# Patient Record
Sex: FEMALE | Race: WHITE | NOT HISPANIC OR LATINO | Employment: OTHER | URBAN - NONMETROPOLITAN AREA
[De-identification: names, ages, dates, MRNs, and addresses within clinical notes are randomized per-mention and may not be internally consistent; named-entity substitution may affect disease eponyms.]

---

## 2017-01-09 RX ORDER — ZOLPIDEM TARTRATE 5 MG/1
5 TABLET ORAL NIGHTLY PRN
Qty: 30 TABLET | Refills: 0 | Status: SHIPPED
Start: 2017-01-09

## 2017-01-10 RX ORDER — PANTOPRAZOLE SODIUM 40 MG/1
TABLET, DELAYED RELEASE ORAL
Qty: 90 TABLET | Refills: 2 | Status: SHIPPED | OUTPATIENT
Start: 2017-01-10 | End: 2017-06-19 | Stop reason: SDUPTHER

## 2017-04-10 RX ORDER — MONTELUKAST SODIUM 10 MG/1
TABLET ORAL
Qty: 90 TABLET | Refills: 3 | Status: SHIPPED | OUTPATIENT
Start: 2017-04-10

## 2017-04-27 ENCOUNTER — OFFICE VISIT (OUTPATIENT)
Dept: OTOLARYNGOLOGY | Facility: CLINIC | Age: 62
End: 2017-04-27

## 2017-04-27 VITALS — TEMPERATURE: 98.4 F | HEIGHT: 67 IN | BODY MASS INDEX: 34.06 KG/M2 | WEIGHT: 217 LBS

## 2017-04-27 DIAGNOSIS — J34.3 NASAL TURBINATE HYPERTROPHY: ICD-10-CM

## 2017-04-27 DIAGNOSIS — J32.8 OTHER CHRONIC SINUSITIS: Primary | ICD-10-CM

## 2017-04-27 PROCEDURE — 99203 OFFICE O/P NEW LOW 30 MIN: CPT | Performed by: OTOLARYNGOLOGY

## 2017-04-27 RX ORDER — FLUTICASONE PROPIONATE 50 MCG
2 SPRAY, SUSPENSION (ML) NASAL DAILY
COMMUNITY
End: 2017-04-27

## 2017-04-27 RX ORDER — SUMATRIPTAN 25 MG/1
25 TABLET, FILM COATED ORAL ONCE AS NEEDED
COMMUNITY
End: 2017-10-24 | Stop reason: SDUPTHER

## 2017-04-27 RX ORDER — FLUOXETINE HYDROCHLORIDE 20 MG/1
20 CAPSULE ORAL DAILY
COMMUNITY
End: 2017-08-01 | Stop reason: SDUPTHER

## 2017-04-27 RX ORDER — METOPROLOL TARTRATE 100 MG/1
100 TABLET ORAL 2 TIMES DAILY
COMMUNITY
End: 2017-08-01

## 2017-04-27 RX ORDER — CEFDINIR 300 MG/1
300 CAPSULE ORAL 2 TIMES DAILY
Qty: 42 CAPSULE | Refills: 0 | Status: SHIPPED | OUTPATIENT
Start: 2017-04-27 | End: 2017-05-26

## 2017-04-27 RX ORDER — MOMETASONE FUROATE 50 UG/1
2 SPRAY, METERED NASAL 2 TIMES DAILY
Qty: 17 G | Refills: 11 | Status: SHIPPED | OUTPATIENT
Start: 2017-04-27 | End: 2017-09-05 | Stop reason: HOSPADM

## 2017-04-27 NOTE — PROGRESS NOTES
Subjective   Mikki Joel is a 62 y.o. female.   CC:She complains of chronic sinusitis has long-standing standing history of ear problems  History of Present Illness   Patient had ear problems for many years to displace and then developed perforation she has to her hearing aid now.  She's noticed some eardrops burn.  Recently she's had sinus difficulty causing her to require ampicillin Z-Sina and more recently Augmentin symptoms only improved temporarily.  She's using Flonase as well.  She doesn't use irrigation the use some sort of saline mist his irrigation seems to make her worse she says.  This problem with his sinuses been particularly worrisome since November.  She's had no imaging.      The following portions of the patient's history were reviewed and updated as appropriate: allergies, current medications, past family history, past medical history, past social history, past surgical history and problem list.      Mikki Joel reports that she has never smoked. She does not have any smokeless tobacco history on file.  Patient is not a tobacco user and has not been counseled for use of tobacco products    No family history on file.      Current Outpatient Prescriptions:   •  BucAlfAspKGlucCouchParsUvaUrJu (WATER PILLS PO), Take  by mouth., Disp: , Rfl:   •  cefdinir (OMNICEF) 300 MG capsule, Take 1 capsule by mouth 2 (Two) Times a Day. Take with food and probiotics and call office and stop if develop watery diarrhea., Disp: 42 capsule, Rfl: 0  •  Cholecalciferol (VITAMIN D3) 5000 UNITS capsule capsule, Take 5,000 Units by mouth Daily., Disp: , Rfl:   •  FLUoxetine (PROzac) 20 MG capsule, Take 20 mg by mouth Daily., Disp: , Rfl:   •  IRON, FERROUS SULFATE, PO, Take  by mouth., Disp: , Rfl:   •  metoprolol tartrate (LOPRESSOR) 100 MG tablet, Take 100 mg by mouth 2 (Two) Times a Day., Disp: , Rfl:   •  mometasone (NASONEX) 50 MCG/ACT nasal spray, 2 sprays into each nostril 2 (Two) Times a Day., Disp: 17 g,  Rfl: 11  •  Multiple Vitamin (MULTI VITAMIN DAILY PO), Take  by mouth., Disp: , Rfl:   •  SUMAtriptan (IMITREX) 25 MG tablet, Take 25 mg by mouth 1 (One) Time As Needed for migraine., Disp: , Rfl:   •  Unable to find, 1 each 1 (One) Time. Unknown Sleeping pill, Disp: , Rfl:   •  Unable to find, 1 each 1 (One) Time. Unknown anxiety medication, Disp: , Rfl:       Past Medical History:   Diagnosis Date   • Arthritis    • Asthma    • High blood pressure          Review of Systems   HENT: Positive for congestion, hearing loss, postnasal drip, rhinorrhea, sore throat and voice change. Negative for ear discharge, ear pain, facial swelling and trouble swallowing.    Respiratory: Positive for choking.    Cardiovascular: Negative.    Musculoskeletal: Positive for back pain.   Allergic/Immunologic: Positive for environmental allergies.   Neurological: Positive for headaches.   Psychiatric/Behavioral:        Depression   All other systems reviewed and are negative.          Objective   Physical Exam   Constitutional: She is oriented to person, place, and time. She appears well-developed and well-nourished.   HENT:   Head: Normocephalic and atraumatic.   Right Ear: External ear and ear canal normal.   Left Ear: External ear and ear canal normal.   Ears:    Nose: Mucosal edema and rhinorrhea present. No nasal deformity or septal deviation. No epistaxis. Right sinus exhibits no maxillary sinus tenderness and no frontal sinus tenderness. Left sinus exhibits no maxillary sinus tenderness and no frontal sinus tenderness.   Mouth/Throat: Uvula is midline, oropharynx is clear and moist and mucous membranes are normal. No trismus in the jaw. Normal dentition. No oropharyngeal exudate or posterior oropharyngeal edema.       Eyes: Conjunctivae and EOM are normal. Pupils are equal, round, and reactive to light.   Neck: Normal range of motion and phonation normal. Neck supple. No JVD present. No tracheal deviation present. No thyroid mass  and no thyromegaly present.       Cardiovascular: Normal rate.    Pulmonary/Chest: Effort normal.   Lymphadenopathy:        Head (right side): No submental, no submandibular, no tonsillar, no preauricular, no posterior auricular and no occipital adenopathy present.        Head (left side): No submental, no submandibular, no tonsillar, no preauricular, no posterior auricular and no occipital adenopathy present.     She has no cervical adenopathy.        Right cervical: No superficial cervical, no deep cervical and no posterior cervical adenopathy present.       Left cervical: No superficial cervical, no deep cervical and no posterior cervical adenopathy present.   Neurological: She is alert and oriented to person, place, and time. No cranial nerve deficit.   Skin: Skin is warm.   Psychiatric: She has a normal mood and affect. Her speech is normal and behavior is normal. Thought content normal.   Nursing note and vitals reviewed.            Assessment/Plan   Mikki was seen today for sinus problem.    Diagnoses and all orders for this visit:    Other chronic sinusitis  -     CT Sinus Without Contrast; Future    Nasal turbinate hypertrophy  -     CT Sinus Without Contrast; Future    Other orders  -     mometasone (NASONEX) 50 MCG/ACT nasal spray; 2 sprays into each nostril 2 (Two) Times a Day.  -     cefdinir (OMNICEF) 300 MG capsule; Take 1 capsule by mouth 2 (Two) Times a Day. Take with food and probiotics and call office and stop if develop watery diarrhea.     plan a 3 week course of antibiotics and nasal neck stop Flonase and get CT scan of follow-up after were she's call for problems or questions discussed use and side effects medications in use of probiotics explained proper use a nasal spray

## 2017-05-25 ENCOUNTER — HOSPITAL ENCOUNTER (OUTPATIENT)
Dept: CT IMAGING | Facility: HOSPITAL | Age: 62
Discharge: HOME | End: 2017-05-25
Admitting: OTOLARYNGOLOGY

## 2017-05-25 DIAGNOSIS — J32.8 OTHER CHRONIC SINUSITIS: ICD-10-CM

## 2017-05-25 DIAGNOSIS — J34.3 NASAL TURBINATE HYPERTROPHY: ICD-10-CM

## 2017-05-25 PROCEDURE — 70486 CT MAXILLOFACIAL W/O DYE: CPT

## 2017-05-25 RX ORDER — TRIAMTERENE AND HYDROCHLOROTHIAZIDE 37.5; 25 MG/1; MG/1
TABLET ORAL
Qty: 90 TABLET | Refills: 3 | Status: SHIPPED | OUTPATIENT
Start: 2017-05-25

## 2017-05-26 RX ORDER — AMOXICILLIN AND CLAVULANATE POTASSIUM 875; 125 MG/1; MG/1
1 TABLET, FILM COATED ORAL EVERY 12 HOURS
Qty: 28 TABLET | Refills: 0 | Status: SHIPPED | OUTPATIENT
Start: 2017-05-26 | End: 2017-07-20

## 2017-05-31 ENCOUNTER — OFFICE VISIT (OUTPATIENT)
Dept: OTOLARYNGOLOGY | Facility: CLINIC | Age: 62
End: 2017-05-31

## 2017-05-31 VITALS — TEMPERATURE: 99.4 F | BODY MASS INDEX: 34.06 KG/M2 | WEIGHT: 217 LBS | HEIGHT: 67 IN

## 2017-05-31 DIAGNOSIS — J34.89 NASAL VALVE STENOSIS: ICD-10-CM

## 2017-05-31 DIAGNOSIS — J34.89 NASAL OBSTRUCTION: ICD-10-CM

## 2017-05-31 DIAGNOSIS — J32.8 OTHER CHRONIC SINUSITIS: Primary | ICD-10-CM

## 2017-05-31 DIAGNOSIS — J34.2 NASAL SEPTAL DEFORMITY: ICD-10-CM

## 2017-05-31 DIAGNOSIS — J32.2 CHRONIC ETHMOIDAL SINUSITIS: Primary | ICD-10-CM

## 2017-05-31 DIAGNOSIS — J32.0 SINUSITIS, MAXILLARY, CHRONIC: ICD-10-CM

## 2017-05-31 DIAGNOSIS — J32.8 OTHER CHRONIC SINUSITIS: ICD-10-CM

## 2017-05-31 PROCEDURE — 31231 NASAL ENDOSCOPY DX: CPT | Performed by: OTOLARYNGOLOGY

## 2017-05-31 PROCEDURE — 87077 CULTURE AEROBIC IDENTIFY: CPT | Performed by: OTOLARYNGOLOGY

## 2017-05-31 PROCEDURE — 87186 SC STD MICRODIL/AGAR DIL: CPT | Performed by: OTOLARYNGOLOGY

## 2017-05-31 PROCEDURE — 87070 CULTURE OTHR SPECIMN AEROBIC: CPT | Performed by: OTOLARYNGOLOGY

## 2017-05-31 PROCEDURE — 99213 OFFICE O/P EST LOW 20 MIN: CPT | Performed by: OTOLARYNGOLOGY

## 2017-05-31 PROCEDURE — 87205 SMEAR GRAM STAIN: CPT | Performed by: OTOLARYNGOLOGY

## 2017-05-31 RX ORDER — OXYMETAZOLINE HYDROCHLORIDE 0.05 G/100ML
2 SPRAY NASAL 2 TIMES DAILY
COMMUNITY
End: 2017-08-10

## 2017-06-02 ENCOUNTER — TELEPHONE (OUTPATIENT)
Dept: OTOLARYNGOLOGY | Facility: CLINIC | Age: 62
End: 2017-06-02

## 2017-06-02 LAB
BACTERIA SPEC AEROBE CULT: ABNORMAL
BETA LACTAMASE: ABNORMAL
GRAM STN SPEC: ABNORMAL
GRAM STN SPEC: ABNORMAL

## 2017-06-02 RX ORDER — CIPROFLOXACIN 500 MG/1
500 TABLET, FILM COATED ORAL 2 TIMES DAILY
Qty: 28 TABLET | Refills: 1 | Status: SHIPPED | OUTPATIENT
Start: 2017-06-02 | End: 2017-07-20

## 2017-06-02 NOTE — TELEPHONE ENCOUNTER
Left message re culture and sensitivities -explained side effects , joint problem and tendon problems and what to look for.  Pt to call back if questions . F/u previously arranged.  PRISCILA Gaston MD

## 2017-06-06 ENCOUNTER — TELEPHONE (OUTPATIENT)
Dept: OTOLARYNGOLOGY | Facility: CLINIC | Age: 62
End: 2017-06-06

## 2017-06-06 NOTE — TELEPHONE ENCOUNTER
I contacted the patient to make sure that she had received Dr. Gaston' message about the new antibiotic that he had called in for her. She said that she was aware and that she had started the new antibiotic and that she is not having any problems from it.    I told her that if she has any other questions or concerns to give us a call back and that otherwise we would see her at her follow up appointment at the end of next month.

## 2017-06-19 RX ORDER — PANTOPRAZOLE SODIUM 40 MG/1
TABLET, DELAYED RELEASE ORAL
Qty: 90 TABLET | Refills: 1 | Status: SHIPPED | OUTPATIENT
Start: 2017-06-19 | End: 2017-09-10 | Stop reason: SDUPTHER

## 2017-07-17 ENCOUNTER — TELEPHONE (OUTPATIENT)
Dept: FAMILY MEDICINE | Age: 62
End: 2017-07-17

## 2017-07-20 ENCOUNTER — OFFICE VISIT (OUTPATIENT)
Dept: OTOLARYNGOLOGY | Facility: CLINIC | Age: 62
End: 2017-07-20

## 2017-07-20 VITALS — TEMPERATURE: 97 F | BODY MASS INDEX: 34.06 KG/M2 | WEIGHT: 217 LBS | HEIGHT: 67 IN

## 2017-07-20 DIAGNOSIS — J32.2 CHRONIC ETHMOIDAL SINUSITIS: Primary | ICD-10-CM

## 2017-07-20 DIAGNOSIS — J32.0 CHRONIC SINUSITIS OF BOTH MAXILLARY SINUSES: ICD-10-CM

## 2017-07-20 PROCEDURE — 99213 OFFICE O/P EST LOW 20 MIN: CPT | Performed by: OTOLARYNGOLOGY

## 2017-07-20 RX ORDER — LEVOFLOXACIN 500 MG/1
500 TABLET, FILM COATED ORAL DAILY
Qty: 21 TABLET | Refills: 0 | Status: SHIPPED | OUTPATIENT
Start: 2017-07-20 | End: 2017-08-10

## 2017-07-20 NOTE — PROGRESS NOTES
Subjective   Mikki Joel is a 62 y.o. female.   CC: chronic sinusitis    History of Present Illness   Comes back in follow-up stating that when she is on antibiotic she is doing great there are symptoms worsen or she is having more pain and pressure and thick drainage and shows recent large crust coming other nose.  We previously reviewed her CT scan and her culture she been placed on Cipro which showed sensitivity to the Serratia marcescens grew her culture.  Allergic to sulfa drugs and so only quinolones with only oral option to treat this.  I have any neurologic or visual complaints has discomfort around the eye.  She has no unusual bleeding but has drainage out of her nose .  As stated the week or 2 after the antibiotics and finish her symptoms started coming back with a large crust forming and discomfort      The following portions of the patient's history were reviewed and updated as appropriate: allergies, current medications, past family history, past medical history, past social history, past surgical history and problem list.      Review of Systems   Constitutional: Positive for fever.   HENT: Positive for postnasal drip, rhinorrhea and sinus pressure. Negative for facial swelling and nosebleeds.    Neurological: Positive for headaches.           Objective   Physical Exam   Constitutional: She is oriented to person, place, and time. She appears well-developed and well-nourished.   HENT:   Head: Normocephalic and atraumatic.   Right Ear: Hearing, tympanic membrane, external ear and ear canal normal.   Left Ear: Hearing, tympanic membrane, external ear and ear canal normal.   Ears:    Nose: Nose normal. No mucosal edema, rhinorrhea, nasal deformity or septal deviation. No epistaxis. Right sinus exhibits no maxillary sinus tenderness and no frontal sinus tenderness. Left sinus exhibits no maxillary sinus tenderness and no frontal sinus tenderness.       Mouth/Throat: Uvula is midline, oropharynx is clear  and moist and mucous membranes are normal. No trismus in the jaw. Normal dentition. No oropharyngeal exudate or posterior oropharyngeal edema.       Eyes: Conjunctivae and EOM are normal. Pupils are equal, round, and reactive to light.   Neck: Normal range of motion and phonation normal. Neck supple. No JVD present. No tracheal deviation present. No thyroid mass and no thyromegaly present.       Cardiovascular: Normal rate and regular rhythm.    Pulmonary/Chest: Effort normal and breath sounds normal.   Musculoskeletal: Normal range of motion.   Lymphadenopathy:        Head (right side): No submental, no submandibular, no tonsillar, no preauricular, no posterior auricular and no occipital adenopathy present.        Head (left side): No submental, no submandibular, no tonsillar, no preauricular, no posterior auricular and no occipital adenopathy present.     She has no cervical adenopathy.        Right cervical: No superficial cervical, no deep cervical and no posterior cervical adenopathy present.       Left cervical: No superficial cervical, no deep cervical and no posterior cervical adenopathy present.   Neurological: She is alert and oriented to person, place, and time. No cranial nerve deficit.   Skin: Skin is warm.   Psychiatric: She has a normal mood and affect. Her speech is normal and behavior is normal. Thought content normal.   Nursing note and vitals reviewed.      Information: Sinus, maxillary left; Wound        Culture   Light growth (2+) Serratia marcescens (A)      BETA LACTAMASE           Corrected gram stain.   Stain   Few (2+) WBCs seen      Rare (1+) Gram negative bacilli            Resulting Agency: [facility]   Susceptibility    Serratia marcescens     CHAR     Amikacin <=16 ug/ml Susceptible     Amoxicillin + Clavulanate >16/8 ug/ml Resistant     Ampicillin >16 ug/ml Resistant     Ampicillin + Sulbactam >16/8 ug/ml Resistant     Cefazolin >16 ug/ml Resistant     Cefotaxime <=2 ug/ml      Cefoxitin 16 ug/ml Intermediate     Ceftazidime <=1 ug/ml     Ceftriaxone <=8 ug/ml     Cefuroxime >16 ug/ml Resistant     Ciprofloxacin <=1 ug/ml Susceptible     Gentamicin <=4 ug/ml Susceptible     Imipenem <=4 ug/ml Susceptible     Levofloxacin <=2 ug/ml Susceptible     Meropenem <=4 ug/ml Susceptible     Nitrofurantoin >64 ug/ml     Piperacillin + Tazobactam <=16 ug/ml     Tetracycline 8 ug/ml Intermediate     Tobramycin <=4 ug/ml Susceptible     Trimethoprim + Sulfamethoxazole <=2/38 ug/ml Susceptible              Specimen Collected: 05/31/17  4:51 PM Last Resulted: 06/02/17  8:19 AM                           Appointment Information   PACS Images   Radiology Images   Study Result   DATE OF EXAM: 5/25/2017 12:20 PM CDT     PROCEDURE: CT SINUSES WITHOUT IV CONTRAST     INDICATION FOR PROCEDURE: 62 years old patient presents for  evaluation of chronic sinusitis.  ICD 10 codes:  J32.8 and J34.3.     TECHNIQUE:  Contiguous axial images were obtained of the  paranasal sinuses. Multiplanar reformations are submitted for  interpretation. Dose length product is 580.1 This exam was  performed according to our departmental dose-optimization  program, which includes automated exposure control, adjustment of  the mA and/or kV according to patient size and/or use of  iterative reconstruction technique.     COMPARISON:  None.     FINDINGS:  Patient has had operative resection of the ostiomeatal  complexes. There has been surgical resection of bilateral middle  turbinates.     The bones appear osteopenic. There is hyperostosis frontalis  interna. The mandible has a normal appearance. The walls of  frontal sinuses, walls of maxillary sinuses and pterygoid plates  have a normal appearance. The bony orbits and zygomatic arches  have a grossly normal appearance. Extraocular muscles, optic  nerves and globes have a normal appearance.     The visualized parotid glands and submandibular glands have a  normal appearance. The  nasopharynx and oropharynx have a grossly  normal appearance.     There is moderate mucoperiosteal thickening in both maxillary  sinuses. There are several opacified ethmoid sinuses. Frontal  sinuses appear to be clear. Sphenoid sinuses appear to be clear.  There is partial opacification of several mastoid air cells.     There is patchy atherosclerotic calcification of the intracranial  arteries.     IMPRESSION:     1.  Extensive postoperative changes of the paranasal sinuses with  moderate residual mucoperiosteal thickening in the maxillary  sinuses. This could be the result of chronic inflammation though  neoplastic etiologies cannot be excluded.  2.  Ethmoid sinus disease.     Electronically signed by:  Marylin Sampson MD  5/25/2017 1:06 PM CDT  Workstation: [employer]   Imaging   CT Sinus Without Contrast (Order #[id number]) on 5/25/2017 - Imaging Information   Signed by   Signed Date/Time    Phone Pager   MARYLIN SAMPSON 5/25/2017 13:06 [phone]    Exam Information       Assessment/Plan   Mikki was seen today for follow-up.    Diagnoses and all orders for this visit:    Chronic ethmoidal sinusitis    Chronic sinusitis of both maxillary sinuses    Other orders  -     levoFLOXacin (LEVAQUIN) 500 MG tablet; Take 1 tablet by mouth Daily.      A long discussion about her culture in the difficulty treating problem she's allergic to sulfa which is only other non-quinolone break she wants to try Levaquin we discussed its use and side effects.  We discussed specifically permanent joint and tendon problems.  She's willing to go through with this she wants to try and avoid surgery possible and also her  is quite ill which is made surgery not a possibility at this time.  She is to call for questions or demonstrated shattered side effects to look for she is to call for improving within the next week otherwise will see her after 3 weeks of treatment 2 weeks following that.  Questions were answered she is agreement  with this approach

## 2017-07-28 ENCOUNTER — TELEPHONE (OUTPATIENT)
Dept: OTOLARYNGOLOGY | Facility: CLINIC | Age: 62
End: 2017-07-28

## 2017-07-28 RX ORDER — TRAMADOL HYDROCHLORIDE 50 MG/1
50 TABLET ORAL EVERY 6 HOURS PRN
Qty: 15 TABLET | Refills: 0 | Status: SHIPPED | OUTPATIENT
Start: 2017-07-28 | End: 2017-10-24

## 2017-07-28 NOTE — TELEPHONE ENCOUNTER
Patient called stating she has had a sinus headache for a week. She states that her tylenol and ibuprofen is not helping and she would like to know if there is something else she can take or that the DR could prescribe.     After speaking with  he has reviewed the patients chart and will be writing a RX for Tramadol 1 tab by mouth every 6 hours as needed for moderate pain.     The patient understood she would have to come to our office to physically pick this RX up and is to call our office if not feeling better the beginning of next week.

## 2017-08-01 ENCOUNTER — OFFICE VISIT (OUTPATIENT)
Dept: FAMILY MEDICINE CLINIC | Facility: CLINIC | Age: 62
End: 2017-08-01

## 2017-08-01 ENCOUNTER — APPOINTMENT (OUTPATIENT)
Dept: LAB | Facility: HOSPITAL | Age: 62
End: 2017-08-01

## 2017-08-01 VITALS
HEART RATE: 77 BPM | OXYGEN SATURATION: 98 % | BODY MASS INDEX: 32.87 KG/M2 | HEIGHT: 67 IN | SYSTOLIC BLOOD PRESSURE: 118 MMHG | DIASTOLIC BLOOD PRESSURE: 68 MMHG | WEIGHT: 209.4 LBS

## 2017-08-01 DIAGNOSIS — Z12.31 ENCOUNTER FOR SCREENING MAMMOGRAM FOR BREAST CANCER: ICD-10-CM

## 2017-08-01 DIAGNOSIS — M79.672 FOOT PAIN, LEFT: ICD-10-CM

## 2017-08-01 DIAGNOSIS — I10 ESSENTIAL HYPERTENSION: Primary | ICD-10-CM

## 2017-08-01 DIAGNOSIS — F32.A DEPRESSION, UNSPECIFIED DEPRESSION TYPE: ICD-10-CM

## 2017-08-01 DIAGNOSIS — G25.81 RESTLESS LEG SYNDROME: ICD-10-CM

## 2017-08-01 DIAGNOSIS — Z11.59 NEED FOR HEPATITIS C SCREENING TEST: ICD-10-CM

## 2017-08-01 DIAGNOSIS — Z13.1 SCREENING FOR DIABETES MELLITUS: ICD-10-CM

## 2017-08-01 LAB — HBA1C MFR BLD: 5.7 % (ref 4–5.6)

## 2017-08-01 PROCEDURE — 99204 OFFICE O/P NEW MOD 45 MIN: CPT | Performed by: FAMILY MEDICINE

## 2017-08-01 PROCEDURE — 83036 HEMOGLOBIN GLYCOSYLATED A1C: CPT | Performed by: FAMILY MEDICINE

## 2017-08-01 PROCEDURE — 36415 COLL VENOUS BLD VENIPUNCTURE: CPT | Performed by: FAMILY MEDICINE

## 2017-08-01 PROCEDURE — 86803 HEPATITIS C AB TEST: CPT | Performed by: FAMILY MEDICINE

## 2017-08-01 RX ORDER — TRIAMTERENE AND HYDROCHLOROTHIAZIDE 37.5; 25 MG/1; MG/1
1 CAPSULE ORAL EVERY MORNING
Qty: 30 CAPSULE | Refills: 11
Start: 2017-08-01 | End: 2018-04-03

## 2017-08-01 RX ORDER — FLUOXETINE HYDROCHLORIDE 40 MG/1
40 CAPSULE ORAL DAILY
Qty: 30 CAPSULE | Refills: 11 | Status: SHIPPED | OUTPATIENT
Start: 2017-08-01 | End: 2018-08-15 | Stop reason: SDUPTHER

## 2017-08-01 RX ORDER — METOPROLOL SUCCINATE 100 MG/1
100 TABLET, EXTENDED RELEASE ORAL DAILY
Qty: 30 TABLET | Refills: 11 | Status: SHIPPED | OUTPATIENT
Start: 2017-08-01 | End: 2018-08-27 | Stop reason: SDUPTHER

## 2017-08-01 RX ORDER — ROPINIROLE 0.25 MG/1
0.25 TABLET, FILM COATED ORAL NIGHTLY
Qty: 30 TABLET | Refills: 11 | Status: SHIPPED | OUTPATIENT
Start: 2017-08-01 | End: 2017-08-18 | Stop reason: SDUPTHER
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
## 2017-08-02 LAB — HCV AB SER DONR QL: NEGATIVE

## 2017-08-07 DIAGNOSIS — M19.90 ARTHRITIS: Primary | ICD-10-CM

## 2017-08-08 ENCOUNTER — TELEPHONE (OUTPATIENT)
Dept: FAMILY MEDICINE CLINIC | Facility: CLINIC | Age: 62
End: 2017-08-08

## 2017-08-08 NOTE — TELEPHONE ENCOUNTER
I have talked to the patient already this morning and relayed her results and recommendations to her.

## 2017-08-08 NOTE — TELEPHONE ENCOUNTER
Pr Dr. Corrigan, Ms. Joel has been called with her recent lab results & recommendations.  Continue her current medications and follow-up as planned or sooner if any problems.  The family member misunderstood when I called yesterday and thought I was wanting to speak to his daughter.    ----- Message from Katheryn Corrigan DO sent at 8/7/2017  1:32 PM CDT -----  She is prediabetic. Should limit sugar and carbs in diet. Hep c screen negative.

## 2017-08-08 NOTE — TELEPHONE ENCOUNTER
Memo Corrigan, Ms. Joel has been called with her recent lab results & recommendations.  Continue her current medications and follow-up as planned or sooner if any problems.  The family member misunderstood when I called yesterday and thought I was wanting to speak to his daughter.    She states she has had Bunion Surgery Bilaterally     ----- Message from Katheryn Corrigan DO sent at 8/7/2017  1:32 PM CDT -----  No fractures on x ray. She has erosive changes of the bones in her toes. This can sometimes be a sign of inflammatory arthritis. It can also be due to osteoarthritis. I recommend that she have testing for rheumatologic disorders through bloodwork. I have ordered these tests and she can come in and have them done at her convenience. X ray also showed deformity of first toe that is usually seen after part of the toe is removed surgically. Please ask her if she has had surgery done on this toe.

## 2017-08-08 NOTE — PROGRESS NOTES
Pr Dr. Corrigan, Ms. Joel has been called with her recent lab results & recommendations.  Continue her current medications and follow-up as planned or sooner if any problems.  The family member misunderstood when I called yesterday and thought I was wanting to speak to his daughter.

## 2017-08-10 ENCOUNTER — APPOINTMENT (OUTPATIENT)
Dept: LAB | Facility: HOSPITAL | Age: 62
End: 2017-08-10

## 2017-08-10 ENCOUNTER — CLINICAL SUPPORT (OUTPATIENT)
Dept: AUDIOLOGY | Facility: CLINIC | Age: 62
End: 2017-08-10

## 2017-08-10 ENCOUNTER — OFFICE VISIT (OUTPATIENT)
Dept: OTOLARYNGOLOGY | Facility: CLINIC | Age: 62
End: 2017-08-10

## 2017-08-10 VITALS — TEMPERATURE: 99.1 F | HEIGHT: 67 IN | WEIGHT: 209 LBS | BODY MASS INDEX: 32.8 KG/M2

## 2017-08-10 DIAGNOSIS — J32.2 CHRONIC ETHMOIDAL SINUSITIS: Primary | ICD-10-CM

## 2017-08-10 DIAGNOSIS — J32.0 SINUSITIS, MAXILLARY, CHRONIC: ICD-10-CM

## 2017-08-10 DIAGNOSIS — Z46.1 ENCOUNTER FOR FITTING OR ADJUSTMENT OF HEARING AID: Primary | ICD-10-CM

## 2017-08-10 LAB
CRP SERPL-MCNC: 1 MG/DL (ref 0–1)
ERYTHROCYTE [SEDIMENTATION RATE] IN BLOOD: 9 MM/HR (ref 0–20)

## 2017-08-10 PROCEDURE — HEARINGNOCHG: Performed by: AUDIOLOGIST

## 2017-08-10 PROCEDURE — 85651 RBC SED RATE NONAUTOMATED: CPT | Performed by: FAMILY MEDICINE

## 2017-08-10 PROCEDURE — 86140 C-REACTIVE PROTEIN: CPT | Performed by: FAMILY MEDICINE

## 2017-08-10 PROCEDURE — 86431 RHEUMATOID FACTOR QUANT: CPT | Performed by: FAMILY MEDICINE

## 2017-08-10 PROCEDURE — 36415 COLL VENOUS BLD VENIPUNCTURE: CPT | Performed by: FAMILY MEDICINE

## 2017-08-10 PROCEDURE — 31231 NASAL ENDOSCOPY DX: CPT | Performed by: OTOLARYNGOLOGY

## 2017-08-10 PROCEDURE — 86038 ANTINUCLEAR ANTIBODIES: CPT | Performed by: FAMILY MEDICINE

## 2017-08-10 PROCEDURE — 99213 OFFICE O/P EST LOW 20 MIN: CPT | Performed by: OTOLARYNGOLOGY

## 2017-08-10 RX ORDER — NAPROXEN SODIUM 275 MG/1
500 TABLET ORAL 2 TIMES DAILY WITH MEALS
Qty: 28 TABLET | Refills: 0 | Status: SHIPPED | OUTPATIENT
Start: 2017-08-10 | End: 2017-08-18 | Stop reason: SDUPTHER

## 2017-08-10 NOTE — PROGRESS NOTES
Subjective   Mikki Joel is a 62 y.o. female.     CC:f/u sinusitis  History of Present Illness   Patient comes back in follow-up with a history of chronic x-ray and ethmoid sinusitis with culture documented therapy done she says the drainage is improved congestion improved thick mucus improved but she's noticed some facial pain and she just finished her antibiotics the pain is located primarily over the right side of her face worse in the morning and then worse at night is better in the middle of the day she's not been febrile she does have history of migraines      The following portions of the patient's history were reviewed and updated as appropriate: allergies, current medications, past family history, past medical history, past social history, past surgical history and problem list.      Current Outpatient Prescriptions:   •  BucAlfAspKGlucCouchParsUvaUrJu (WATER PILLS PO), Take  by mouth., Disp: , Rfl:   •  Cholecalciferol (VITAMIN D3) 5000 UNITS capsule capsule, Take 5,000 Units by mouth Daily., Disp: , Rfl:   •  FLUoxetine (PROzac) 40 MG capsule, Take 1 capsule by mouth Daily., Disp: 30 capsule, Rfl: 11  •  IRON, FERROUS SULFATE, PO, Take  by mouth., Disp: , Rfl:   •  metoprolol succinate XL (TOPROL XL) 100 MG 24 hr tablet, Take 1 tablet by mouth Daily., Disp: 30 tablet, Rfl: 11  •  mometasone (NASONEX) 50 MCG/ACT nasal spray, 2 sprays into each nostril 2 (Two) Times a Day., Disp: 17 g, Rfl: 11  •  Multiple Vitamin (MULTI VITAMIN DAILY PO), Take  by mouth., Disp: , Rfl:   •  rOPINIRole (REQUIP) 0.25 MG tablet, Take 1 tablet by mouth Every Night. Take 1 hour before bedtime., Disp: 30 tablet, Rfl: 11  •  SUMAtriptan (IMITREX) 25 MG tablet, Take 25 mg by mouth 1 (One) Time As Needed for migraine., Disp: , Rfl:   •  traMADol (ULTRAM) 50 MG tablet, Take 1 tablet by mouth Every 6 (Six) Hours As Needed for Moderate Pain (4-6)., Disp: 15 tablet, Rfl: 0  •  triamterene-hydrochlorothiazide (DYAZIDE) 37.5-25 MG per  capsule, Take 1 capsule by mouth Every Morning., Disp: 30 capsule, Rfl: 11  •  Unable to find, 1 each 1 (One) Time. Unknown Sleeping pill, Disp: , Rfl:   •  Unable to find, 1 each 1 (One) Time. Unknown anxiety medication, Disp: , Rfl:   •  naproxen sodium (ANAPROX) 275 MG tablet, Take 2 tablets by mouth 2 (Two) Times a Day With Meals., Disp: 28 tablet, Rfl: 0    Allergies   Allergen Reactions   • Sulfa Antibiotics Hives       Review of Systems   Constitutional: Negative for fever.   HENT: Positive for postnasal drip and rhinorrhea. Negative for facial swelling and nosebleeds.    Hematological: Negative.            Objective   Physical Exam   Constitutional: She is oriented to person, place, and time. She appears well-developed and well-nourished.   HENT:   Head: Normocephalic and atraumatic.   Right Ear: Hearing, tympanic membrane, external ear and ear canal normal.   Left Ear: Hearing, tympanic membrane, external ear and ear canal normal.   Nose: Nose normal. No mucosal edema, rhinorrhea, nasal deformity or septal deviation. No epistaxis. Right sinus exhibits no maxillary sinus tenderness and no frontal sinus tenderness. Left sinus exhibits no maxillary sinus tenderness and no frontal sinus tenderness.   Mouth/Throat: Uvula is midline and oropharynx is clear and moist. No trismus in the jaw. Normal dentition. No oropharyngeal exudate or posterior oropharyngeal edema.   Eyes: Conjunctivae and EOM are normal. Pupils are equal, round, and reactive to light.   Neck: Normal range of motion. Neck supple. No JVD present. No tracheal deviation present. No thyromegaly present.   Cardiovascular: Normal rate.    Pulmonary/Chest: Effort normal.   Musculoskeletal: Normal range of motion.   Lymphadenopathy:        Head (right side): No submental, no submandibular, no tonsillar, no preauricular, no posterior auricular and no occipital adenopathy present.        Head (left side): No submental, no submandibular, no tonsillar, no  preauricular, no posterior auricular and no occipital adenopathy present.     She has no cervical adenopathy.        Right cervical: No superficial cervical, no deep cervical and no posterior cervical adenopathy present.       Left cervical: No superficial cervical, no deep cervical and no posterior cervical adenopathy present.   Neurological: She is alert and oriented to person, place, and time. No cranial nerve deficit.   Skin: Skin is warm.   Psychiatric: She has a normal mood and affect. Her speech is normal and behavior is normal. Thought content normal.   Nursing note and vitals reviewed.    Could not see any purulence and nose spider having the pain.  For that reason nasal endoscopy was carried out or seizure note nasal endoscopy indication chronic maxillary ethmoid sinusitis past history of positive cultures  With persistent facial pain diagnosis same procedure patient had her nose decongested local anesthetic applied an endoscope was applied left side nose is suctioned debris was noted was made minimal debris was noted much better than she had before there is no liquid secretions no evidence of epistaxis no polyps patient tolerated procedure well without endoscopic occasional        Assessment/Plan   Mikki was seen today for follow-up.    Diagnoses and all orders for this visit:    Chronic ethmoidal sinusitis    Sinusitis, maxillary, chronic    Other orders  -     naproxen sodium (ANAPROX) 275 MG tablet; Take 2 tablets by mouth 2 (Two) Times a Day With Meals.         Patient will begin an anti-inflammatory medicines helped her discomfort.  She's not febrile and has no clear evidence of sinusitis on exam sore using anti-inflammatory she just finished her antibiotics yesterday.  We'll offer further antibiotics but if her symptoms continue consider repeat CT scan for further antibiotics.  Discussed with her if this continues she may need to consider sinus surgery revision nature she wants to avoid that if  possible.     recheck 2 weeks where the other when she's been off antibiotics to see if this recurring.  Concerned about the chronic nature of her situation she'll continue nasal saline and  nasal antihistamine and steroid nasal sprays in the meantime

## 2017-08-11 LAB — ANA SER QL: NEGATIVE

## 2017-08-16 NOTE — PROGRESS NOTES
HEARING AID CHECK    Name:  Mikki Joel  :  1955  Age:  62 y.o.  Date of Evaluation:  2017      HISTORY    Reason for visit:  Mikki Joel is seen today for a hearing aid check.  Patient reports she needs a piece on her hearing aid replaced.  [place] hearing aid provider in the [facility] now.      Hearing aid history:  Patient wears hearing aids     OFFICE VISIT    During today's visit parts were replace and in-house service was completed at this time. After service, aid was in good working condition.  A Level 1 office charge of $25.00 was assessed at this time and collected in [facility].      She expressed interest in having a complete hearing test done to see if her hearing aids need to be reprogrammed or if she needs to purchase new aids.  She agreed to make an appointment to have this completed.  She was also encourage to obtain records of her previous hearing test(s) from her previous provider.      It was a pleasure seeing Mikki Joel in Audiology today.  It is a pleasure helping Ms. Joel with her amplification needs.             This document has been electronically signed by Shanell Berrios MS CCC-A on 2017 2:16 PM       Shanell Berrios MS CCC-A  Licensed Audiologist    For Billing and Codin  Hearing Aid Check, Binaural - no charge

## 2017-08-17 ENCOUNTER — TELEPHONE (OUTPATIENT)
Dept: FAMILY MEDICINE CLINIC | Facility: CLINIC | Age: 62
End: 2017-08-17

## 2017-08-17 NOTE — TELEPHONE ENCOUNTER
Ms. Joel has been called with her recent lab results.      She has 2 questions/problems she would like resolved if possible.    1.) She states she is still having problems with sleep, states the things she is doing now are not helping.    2.) She states she is having increased pain and problems with her feet, the left is worse than the right.  She would like to know what can be done to help, or does she need to go to Podiatry??    Please advise.     Her Home number is # [phone]

## 2017-08-17 NOTE — PROGRESS NOTES
Pr Dr. Corrigan, Ms. Joel has been called with her recent lab results & recommendations.  Continue her current medications and follow-up as planned or sooner if any problems.

## 2017-08-18 DIAGNOSIS — M79.672 LEFT FOOT PAIN: Primary | ICD-10-CM

## 2017-08-18 DIAGNOSIS — G25.81 RESTLESS LEG SYNDROME: ICD-10-CM

## 2017-08-18 RX ORDER — NAPROXEN SODIUM 550 MG/1
550 TABLET ORAL 2 TIMES DAILY PRN
Qty: 60 TABLET | Refills: 0 | Status: SHIPPED | OUTPATIENT
Start: 2017-08-18 | End: 2017-09-05 | Stop reason: HOSPADM

## 2017-08-18 RX ORDER — ROPINIROLE 0.5 MG/1
0.5 TABLET, FILM COATED ORAL NIGHTLY
Qty: 30 TABLET | Refills: 5 | Status: SHIPPED | OUTPATIENT
Start: 2017-08-18 | End: 2017-10-24 | Stop reason: SDUPTHER

## 2017-08-18 NOTE — TELEPHONE ENCOUNTER
I'll increase her requip to 0.5 mg at bedtime. I can also refer her to sleep medicine (Dr Bai) if she would like. I sent in a refill on naproxen for her for the feet. Yes, she can see podiatry. Referral is in.

## 2017-08-20 LAB — RHEUMATOID FACT SERPL-ACNC: NEGATIVE [IU]/ML

## 2017-08-21 RX ORDER — FLUOXETINE HYDROCHLORIDE 40 MG/1
CAPSULE ORAL
Qty: 90 CAPSULE | Refills: 0 | Status: SHIPPED | OUTPATIENT
Start: 2017-08-21

## 2017-08-22 NOTE — TELEPHONE ENCOUNTER
I have called MsBrandi Marycruz and relayed recommendations.  She will  the new script of Requip.    She would like the referral to Sleep Medicine as well.  She states she is having trouble staying asleep and getting restful sleep.  (Hopefully the increase of Requip will help with that)    Do you want me to send that referral??  Please Advise    Thank crow

## 2017-08-23 DIAGNOSIS — G47.00 INSOMNIA, UNSPECIFIED TYPE: Primary | ICD-10-CM

## 2017-08-24 ENCOUNTER — OFFICE VISIT (OUTPATIENT)
Dept: OTOLARYNGOLOGY | Facility: CLINIC | Age: 62
End: 2017-08-24

## 2017-08-24 ENCOUNTER — CLINICAL SUPPORT (OUTPATIENT)
Dept: AUDIOLOGY | Facility: CLINIC | Age: 62
End: 2017-08-24

## 2017-08-24 ENCOUNTER — PREP FOR SURGERY (OUTPATIENT)
Dept: OTHER | Facility: HOSPITAL | Age: 62
End: 2017-08-24

## 2017-08-24 VITALS — HEIGHT: 67 IN | WEIGHT: 209 LBS | TEMPERATURE: 97.3 F | BODY MASS INDEX: 32.8 KG/M2

## 2017-08-24 DIAGNOSIS — J34.2 NASAL SEPTAL DEFORMITY: Primary | ICD-10-CM

## 2017-08-24 DIAGNOSIS — J32.2 CHRONIC ETHMOIDAL SINUSITIS: ICD-10-CM

## 2017-08-24 DIAGNOSIS — H93.13 TINNITUS, BILATERAL: ICD-10-CM

## 2017-08-24 DIAGNOSIS — J32.9 SINUSITIS: Primary | ICD-10-CM

## 2017-08-24 DIAGNOSIS — H90.6 MIXED HEARING LOSS, BILATERAL: Primary | ICD-10-CM

## 2017-08-24 DIAGNOSIS — J32.0 CHRONIC MAXILLARY SINUSITIS: ICD-10-CM

## 2017-08-24 DIAGNOSIS — H90.3 SENSORINEURAL HEARING LOSS, BILATERAL: ICD-10-CM

## 2017-08-24 DIAGNOSIS — J34.89 NASAL SEPTAL PERFORATION: ICD-10-CM

## 2017-08-24 DIAGNOSIS — J34.3 NASAL TURBINATE HYPERTROPHY: ICD-10-CM

## 2017-08-24 DIAGNOSIS — H72.93 PERFORATED TYMPANIC MEMBRANE, BILATERAL: ICD-10-CM

## 2017-08-24 PROCEDURE — 92557 COMPREHENSIVE HEARING TEST: CPT | Performed by: AUDIOLOGIST

## 2017-08-24 PROCEDURE — 99213 OFFICE O/P EST LOW 20 MIN: CPT | Performed by: OTOLARYNGOLOGY

## 2017-08-24 RX ORDER — OXYMETAZOLINE HYDROCHLORIDE 0.05 G/100ML
2 SPRAY NASAL
Status: CANCELLED | OUTPATIENT
Start: 2017-09-05

## 2017-08-24 RX ORDER — CLINDAMYCIN PHOSPHATE 900 MG/50ML
900 INJECTION INTRAVENOUS ONCE
Status: CANCELLED | OUTPATIENT
Start: 2017-09-05 | End: 2017-08-24

## 2017-08-24 NOTE — PROGRESS NOTES
Subjective   Mikki Joel is a 62 y.o. female.   CC;f/u sinusitis and nasal obstruction  History of Present Illness   Patient doesn't have fever but continues to have foul-smelling build up her nose nasal obstruction pressure around her eyes left worse in right she also has long-standing hearing loss as a separate issue and has had perforations should not been repaired and wears hearing aids.  She does not want consider surgery went to continue using hearing aids and comes in for hearing testing.  Ear frustrated with continued sinus infections and difficulty despite culture directed treatment CT scan documentation multiple antibiotics.  Some decreased sense of smell and nasal obstruction understands that she does have septal perforation despite previous nasal surgery has nasal obstruction.  Asked surgeries were in [place].      The following portions of the patient's history were reviewed and updated as appropriate: allergies, current medications, past family history, past medical history, past social history, past surgical history and problem list.      Mikki Joel reports that she has never smoked. She has never used smokeless tobacco. She reports that she does not drink alcohol.  Patient is not a tobacco user and has not been counseled for use of tobacco products    Family History   Problem Relation Age of Onset   • Hypertension Mother    • COPD Mother    • Arthritis Mother    • Cancer Father    • Arthritis Father    • COPD Maternal Grandmother    • Stroke Maternal Grandmother          Current Outpatient Prescriptions:   •  BucAlfAspKGlucCouchParsUvaUrJu (WATER PILLS PO), Take  by mouth., Disp: , Rfl:   •  Cholecalciferol (VITAMIN D3) 5000 UNITS capsule capsule, Take 5,000 Units by mouth Daily., Disp: , Rfl:   •  FLUoxetine (PROzac) 40 MG capsule, Take 1 capsule by mouth Daily., Disp: 30 capsule, Rfl: 11  •  IRON, FERROUS SULFATE, PO, Take  by mouth., Disp: , Rfl:   •  metoprolol succinate XL (TOPROL XL)  100 MG 24 hr tablet, Take 1 tablet by mouth Daily., Disp: 30 tablet, Rfl: 11  •  mometasone (NASONEX) 50 MCG/ACT nasal spray, 2 sprays into each nostril 2 (Two) Times a Day., Disp: 17 g, Rfl: 11  •  Multiple Vitamin (MULTI VITAMIN DAILY PO), Take  by mouth., Disp: , Rfl:   •  naproxen sodium (ANAPROX) 550 MG tablet, Take 1 tablet by mouth 2 (Two) Times a Day As Needed for Moderate Pain . Take with food, Disp: 60 tablet, Rfl: 0  •  rOPINIRole (REQUIP) 0.5 MG tablet, Take 1 tablet by mouth Every Night. Take 1 hour before bedtime., Disp: 30 tablet, Rfl: 5  •  SUMAtriptan (IMITREX) 25 MG tablet, Take 25 mg by mouth 1 (One) Time As Needed for migraine., Disp: , Rfl:   •  traMADol (ULTRAM) 50 MG tablet, Take 1 tablet by mouth Every 6 (Six) Hours As Needed for Moderate Pain (4-6)., Disp: 15 tablet, Rfl: 0  •  triamterene-hydrochlorothiazide (DYAZIDE) 37.5-25 MG per capsule, Take 1 capsule by mouth Every Morning., Disp: 30 capsule, Rfl: 11  •  Unable to find, 1 each 1 (One) Time. Unknown anxiety medication, Disp: , Rfl:     Allergies   Allergen Reactions   • Sulfa Antibiotics Hives       Past Medical History:   Diagnosis Date   • Arthritis    • Asthma    • Depression    • High blood pressure          Review of Systems   Constitutional: Negative for fever.   HENT: Positive for hearing loss, postnasal drip, rhinorrhea, sinus pressure and tinnitus. Negative for nosebleeds.    Hematological: Negative for adenopathy.           Objective   Physical Exam   Constitutional: She is oriented to person, place, and time. She appears well-developed and well-nourished.   HENT:   Head: Normocephalic and atraumatic.   Right Ear: Hearing, tympanic membrane, external ear and ear canal normal.   Left Ear: Hearing, tympanic membrane, external ear and ear canal normal.   Nose: Nose normal. No mucosal edema, rhinorrhea, nasal deformity or septal deviation. No epistaxis. Right sinus exhibits no maxillary sinus tenderness and no frontal sinus  tenderness. Left sinus exhibits no maxillary sinus tenderness and no frontal sinus tenderness.   Mouth/Throat: Uvula is midline and oropharynx is clear and moist. No trismus in the jaw. Normal dentition. No oropharyngeal exudate or posterior oropharyngeal edema.   Eyes: Conjunctivae and EOM are normal.   Neck: Normal range of motion. Neck supple. No JVD present. No tracheal deviation present. No thyromegaly present.   Cardiovascular: Normal rate and regular rhythm.    Pulmonary/Chest: Effort normal.   Musculoskeletal: Normal range of motion.   Lymphadenopathy:        Head (right side): No submental, no submandibular, no tonsillar, no preauricular, no posterior auricular and no occipital adenopathy present.        Head (left side): No submental, no submandibular, no tonsillar, no preauricular, no posterior auricular and no occipital adenopathy present.     She has no cervical adenopathy.        Right cervical: No superficial cervical, no deep cervical and no posterior cervical adenopathy present.       Left cervical: No superficial cervical, no deep cervical and no posterior cervical adenopathy present.   Neurological: She is alert and oriented to person, place, and time. No cranial nerve deficit.   Skin: Skin is warm.   Psychiatric: She has a normal mood and affect. Her speech is normal and behavior is normal. Thought content normal.   Nursing note and vitals reviewed.        The audiogram and tympanogram reviewed in detail consistent with audiometric findings of a mixed primarily sensorineural hearing loss and perforated eardrums    Assessment/Plan   Mikki was seen today for follow-up.    Diagnoses and all orders for this visit:    Nasal septal deformity    Chronic ethmoidal sinusitis    Nasal turbinate hypertrophy    Chronic maxillary sinusitis    Nasal septal perforation      Was turned avoid further oral antibiotic she's been using nasal's irrigations carefully.  She's also been using or other nasal sprays.   We discussed that there is no guarantee we can solve her problems particular since she's had multiple surgeries talked about making an extra large opening in x-ray sinus and ethmoid sinus surgery trying to help a little bit more with her septum turbinates to give her more space.  Reluctant to do much aggressive surgery and is nasal septum because were septal perforation.  She understands does not questions were answered has recently expectations understand it can be severe complications and she still might require other surgeries this is not successful limitations time off from activity and postop care were discussed in detail.  Inset was obtained with oral and written informed  I reviewed the CT scan again in detail with her and discussed the type of surgery the risks benefits and all questions were answered.

## 2017-08-31 ENCOUNTER — APPOINTMENT (OUTPATIENT)
Dept: PREADMISSION TESTING | Facility: HOSPITAL | Age: 62
End: 2017-08-31

## 2017-08-31 VITALS
HEART RATE: 100 BPM | SYSTOLIC BLOOD PRESSURE: 136 MMHG | OXYGEN SATURATION: 97 % | RESPIRATION RATE: 18 BRPM | BODY MASS INDEX: 33.9 KG/M2 | HEIGHT: 67 IN | DIASTOLIC BLOOD PRESSURE: 90 MMHG | WEIGHT: 216 LBS

## 2017-08-31 LAB
ANION GAP SERPL CALCULATED.3IONS-SCNC: 10 MMOL/L (ref 5–15)
BUN BLD-MCNC: 24 MG/DL (ref 7–21)
BUN/CREAT SERPL: 29.3 (ref 7–25)
CALCIUM SPEC-SCNC: 9.6 MG/DL (ref 8.4–10.2)
CHLORIDE SERPL-SCNC: 100 MMOL/L (ref 95–110)
CO2 SERPL-SCNC: 26 MMOL/L (ref 22–31)
CREAT BLD-MCNC: 0.82 MG/DL (ref 0.5–1)
GFR SERPL CREATININE-BSD FRML MDRD: 70 ML/MIN/1.73 (ref 45–104)
GLUCOSE BLD-MCNC: 93 MG/DL (ref 60–100)
POTASSIUM BLD-SCNC: 4.7 MMOL/L (ref 3.5–5.1)
SODIUM BLD-SCNC: 136 MMOL/L (ref 137–145)

## 2017-08-31 PROCEDURE — 93010 ELECTROCARDIOGRAM REPORT: CPT | Performed by: INTERNAL MEDICINE

## 2017-08-31 PROCEDURE — 36415 COLL VENOUS BLD VENIPUNCTURE: CPT

## 2017-08-31 PROCEDURE — 93005 ELECTROCARDIOGRAM TRACING: CPT

## 2017-08-31 PROCEDURE — 80048 BASIC METABOLIC PNL TOTAL CA: CPT | Performed by: ANESTHESIOLOGY

## 2017-08-31 RX ORDER — SODIUM CHLORIDE 9 MG/ML
1000 INJECTION, SOLUTION INTRAVENOUS CONTINUOUS PRN
Status: CANCELLED | OUTPATIENT
Start: 2017-09-05

## 2017-09-05 ENCOUNTER — HOSPITAL ENCOUNTER (OUTPATIENT)
Facility: HOSPITAL | Age: 62
Setting detail: HOSPITAL OUTPATIENT SURGERY
Discharge: HOME | End: 2017-09-05
Attending: OTOLARYNGOLOGY | Admitting: OTOLARYNGOLOGY

## 2017-09-05 ENCOUNTER — ANESTHESIA (OUTPATIENT)
Dept: PERIOP | Facility: HOSPITAL | Age: 62
End: 2017-09-05

## 2017-09-05 ENCOUNTER — ANESTHESIA EVENT (OUTPATIENT)
Dept: PERIOP | Facility: HOSPITAL | Age: 62
End: 2017-09-05

## 2017-09-05 VITALS
OXYGEN SATURATION: 100 % | BODY MASS INDEX: 33.67 KG/M2 | RESPIRATION RATE: 18 BRPM | DIASTOLIC BLOOD PRESSURE: 67 MMHG | SYSTOLIC BLOOD PRESSURE: 113 MMHG | HEIGHT: 67 IN | TEMPERATURE: 97.2 F | WEIGHT: 214.51 LBS | HEART RATE: 81 BPM

## 2017-09-05 DIAGNOSIS — J32.9 SINUSITIS: ICD-10-CM

## 2017-09-05 PROBLEM — J34.3 NASAL TURBINATE HYPERTROPHY: Status: ACTIVE | Noted: 2017-09-05

## 2017-09-05 PROBLEM — J34.2 NASAL SEPTAL DEFORMITY: Status: ACTIVE | Noted: 2017-09-05

## 2017-09-05 PROCEDURE — 30130 EXCISE INFERIOR TURBINATE: CPT | Performed by: OTOLARYNGOLOGY

## 2017-09-05 PROCEDURE — 25010000002 DEXAMETHASONE PER 1 MG: Performed by: NURSE ANESTHETIST, CERTIFIED REGISTERED

## 2017-09-05 PROCEDURE — 25010000002 FENTANYL CITRATE (PF) 100 MCG/2ML SOLUTION: Performed by: NURSE ANESTHETIST, CERTIFIED REGISTERED

## 2017-09-05 PROCEDURE — 88305 TISSUE EXAM BY PATHOLOGIST: CPT | Performed by: PATHOLOGY

## 2017-09-05 PROCEDURE — 87205 SMEAR GRAM STAIN: CPT | Performed by: OTOLARYNGOLOGY

## 2017-09-05 PROCEDURE — 30520 REPAIR OF NASAL SEPTUM: CPT | Performed by: OTOLARYNGOLOGY

## 2017-09-05 PROCEDURE — 88312 SPECIAL STAINS GROUP 1: CPT | Performed by: OTOLARYNGOLOGY

## 2017-09-05 PROCEDURE — 25010000002 ONDANSETRON PER 1 MG: Performed by: NURSE ANESTHETIST, CERTIFIED REGISTERED

## 2017-09-05 PROCEDURE — 25010000002 NEOSTIGMINE 10 MG/10ML SOLUTION

## 2017-09-05 PROCEDURE — 25010000002 MIDAZOLAM PER 1 MG: Performed by: NURSE ANESTHETIST, CERTIFIED REGISTERED

## 2017-09-05 PROCEDURE — 87077 CULTURE AEROBIC IDENTIFY: CPT | Performed by: OTOLARYNGOLOGY

## 2017-09-05 PROCEDURE — 88311 DECALCIFY TISSUE: CPT | Performed by: OTOLARYNGOLOGY

## 2017-09-05 PROCEDURE — 88312 SPECIAL STAINS GROUP 1: CPT | Performed by: PATHOLOGY

## 2017-09-05 PROCEDURE — 87070 CULTURE OTHR SPECIMN AEROBIC: CPT | Performed by: OTOLARYNGOLOGY

## 2017-09-05 PROCEDURE — 31255 NSL/SINS NDSC W/TOT ETHMDCT: CPT | Performed by: OTOLARYNGOLOGY

## 2017-09-05 PROCEDURE — 87186 SC STD MICRODIL/AGAR DIL: CPT | Performed by: OTOLARYNGOLOGY

## 2017-09-05 PROCEDURE — 31267 ENDOSCOPY MAXILLARY SINUS: CPT | Performed by: OTOLARYNGOLOGY

## 2017-09-05 PROCEDURE — 88305 TISSUE EXAM BY PATHOLOGIST: CPT | Performed by: OTOLARYNGOLOGY

## 2017-09-05 PROCEDURE — 88311 DECALCIFY TISSUE: CPT | Performed by: PATHOLOGY

## 2017-09-05 PROCEDURE — 25010000002 PROPOFOL 10 MG/ML EMULSION: Performed by: NURSE ANESTHETIST, CERTIFIED REGISTERED

## 2017-09-05 RX ORDER — SODIUM CHLORIDE 9 MG/ML
1000 INJECTION, SOLUTION INTRAVENOUS CONTINUOUS PRN
Status: DISCONTINUED | OUTPATIENT
Start: 2017-09-05 | End: 2017-09-05 | Stop reason: HOSPADM

## 2017-09-05 RX ORDER — CEFDINIR 300 MG/1
300 CAPSULE ORAL 2 TIMES DAILY
Qty: 20 CAPSULE | Refills: 0 | Status: SHIPPED | OUTPATIENT
Start: 2017-09-05 | End: 2017-09-26

## 2017-09-05 RX ORDER — MIDAZOLAM HYDROCHLORIDE 1 MG/ML
INJECTION INTRAMUSCULAR; INTRAVENOUS AS NEEDED
Status: DISCONTINUED | OUTPATIENT
Start: 2017-09-05 | End: 2017-09-05 | Stop reason: SURG

## 2017-09-05 RX ORDER — EPHEDRINE SULFATE 50 MG/ML
5 INJECTION, SOLUTION INTRAVENOUS ONCE AS NEEDED
Status: DISCONTINUED | OUTPATIENT
Start: 2017-09-05 | End: 2017-09-05 | Stop reason: HOSPADM

## 2017-09-05 RX ORDER — LIDOCAINE HYDROCHLORIDE AND EPINEPHRINE 10; 10 MG/ML; UG/ML
INJECTION, SOLUTION INFILTRATION; PERINEURAL AS NEEDED
Status: DISCONTINUED | OUTPATIENT
Start: 2017-09-05 | End: 2017-09-05 | Stop reason: HOSPADM

## 2017-09-05 RX ORDER — DEXAMETHASONE SODIUM PHOSPHATE 4 MG/ML
INJECTION, SOLUTION INTRA-ARTICULAR; INTRALESIONAL; INTRAMUSCULAR; INTRAVENOUS; SOFT TISSUE AS NEEDED
Status: DISCONTINUED | OUTPATIENT
Start: 2017-09-05 | End: 2017-09-05 | Stop reason: SURG

## 2017-09-05 RX ORDER — ROCURONIUM BROMIDE 10 MG/ML
INJECTION, SOLUTION INTRAVENOUS AS NEEDED
Status: DISCONTINUED | OUTPATIENT
Start: 2017-09-05 | End: 2017-09-05 | Stop reason: SURG

## 2017-09-05 RX ORDER — EPHEDRINE SULFATE 50 MG/ML
INJECTION, SOLUTION INTRAVENOUS AS NEEDED
Status: DISCONTINUED | OUTPATIENT
Start: 2017-09-05 | End: 2017-09-05 | Stop reason: SURG

## 2017-09-05 RX ORDER — NALOXONE HCL 0.4 MG/ML
0.2 VIAL (ML) INJECTION AS NEEDED
Status: DISCONTINUED | OUTPATIENT
Start: 2017-09-05 | End: 2017-09-05 | Stop reason: HOSPADM

## 2017-09-05 RX ORDER — PROPOFOL 10 MG/ML
VIAL (ML) INTRAVENOUS AS NEEDED
Status: DISCONTINUED | OUTPATIENT
Start: 2017-09-05 | End: 2017-09-05 | Stop reason: SURG

## 2017-09-05 RX ORDER — CLINDAMYCIN PHOSPHATE 900 MG/50ML
900 INJECTION INTRAVENOUS ONCE
Status: COMPLETED | OUTPATIENT
Start: 2017-09-05 | End: 2017-09-05

## 2017-09-05 RX ORDER — ONDANSETRON 2 MG/ML
4 INJECTION INTRAMUSCULAR; INTRAVENOUS ONCE AS NEEDED
Status: COMPLETED | OUTPATIENT
Start: 2017-09-05 | End: 2017-09-05

## 2017-09-05 RX ORDER — ACETAMINOPHEN 650 MG/1
650 SUPPOSITORY RECTAL ONCE AS NEEDED
Status: DISCONTINUED | OUTPATIENT
Start: 2017-09-05 | End: 2017-09-05 | Stop reason: HOSPADM

## 2017-09-05 RX ORDER — FENTANYL CITRATE 50 UG/ML
INJECTION, SOLUTION INTRAMUSCULAR; INTRAVENOUS AS NEEDED
Status: DISCONTINUED | OUTPATIENT
Start: 2017-09-05 | End: 2017-09-05 | Stop reason: SURG

## 2017-09-05 RX ORDER — FLUMAZENIL 0.1 MG/ML
0.2 INJECTION INTRAVENOUS AS NEEDED
Status: DISCONTINUED | OUTPATIENT
Start: 2017-09-05 | End: 2017-09-05 | Stop reason: HOSPADM

## 2017-09-05 RX ORDER — ACETAMINOPHEN 325 MG/1
650 TABLET ORAL ONCE AS NEEDED
Status: DISCONTINUED | OUTPATIENT
Start: 2017-09-05 | End: 2017-09-05 | Stop reason: HOSPADM

## 2017-09-05 RX ORDER — GLYCOPYRROLATE 0.2 MG/ML
INJECTION INTRAMUSCULAR; INTRAVENOUS AS NEEDED
Status: DISCONTINUED | OUTPATIENT
Start: 2017-09-05 | End: 2017-09-05 | Stop reason: SURG

## 2017-09-05 RX ORDER — OXYMETAZOLINE HYDROCHLORIDE 0.05 G/100ML
SPRAY NASAL AS NEEDED
Status: DISCONTINUED | OUTPATIENT
Start: 2017-09-05 | End: 2017-09-05 | Stop reason: HOSPADM

## 2017-09-05 RX ORDER — OXYCODONE HYDROCHLORIDE AND ACETAMINOPHEN 5; 325 MG/1; MG/1
1 TABLET ORAL ONCE AS NEEDED
Status: DISCONTINUED | OUTPATIENT
Start: 2017-09-05 | End: 2017-09-05 | Stop reason: HOSPADM

## 2017-09-05 RX ORDER — OXYMETAZOLINE HYDROCHLORIDE 0.05 G/100ML
2 SPRAY NASAL
Status: COMPLETED | OUTPATIENT
Start: 2017-09-05 | End: 2017-09-05

## 2017-09-05 RX ORDER — LABETALOL HYDROCHLORIDE 5 MG/ML
5 INJECTION, SOLUTION INTRAVENOUS
Status: DISCONTINUED | OUTPATIENT
Start: 2017-09-05 | End: 2017-09-05 | Stop reason: HOSPADM

## 2017-09-05 RX ORDER — OXYCODONE HYDROCHLORIDE AND ACETAMINOPHEN 5; 325 MG/1; MG/1
1-2 TABLET ORAL EVERY 4 HOURS PRN
Qty: 24 TABLET | Refills: 0 | Status: SHIPPED | OUTPATIENT
Start: 2017-09-05 | End: 2017-09-12

## 2017-09-05 RX ORDER — LIDOCAINE HYDROCHLORIDE 20 MG/ML
INJECTION, SOLUTION INFILTRATION; PERINEURAL AS NEEDED
Status: DISCONTINUED | OUTPATIENT
Start: 2017-09-05 | End: 2017-09-05 | Stop reason: SURG

## 2017-09-05 RX ORDER — DIPHENHYDRAMINE HYDROCHLORIDE 50 MG/ML
12.5 INJECTION INTRAMUSCULAR; INTRAVENOUS
Status: DISCONTINUED | OUTPATIENT
Start: 2017-09-05 | End: 2017-09-05 | Stop reason: HOSPADM

## 2017-09-05 RX ADMIN — SODIUM CHLORIDE 1000 ML: 900 INJECTION, SOLUTION INTRAVENOUS at 09:20

## 2017-09-05 RX ADMIN — LIDOCAINE HYDROCHLORIDE 50 MG: 20 INJECTION, SOLUTION INFILTRATION; PERINEURAL at 10:43

## 2017-09-05 RX ADMIN — SODIUM CHLORIDE: 900 INJECTION, SOLUTION INTRAVENOUS at 09:55

## 2017-09-05 RX ADMIN — GLYCOPYRROLATE 0.1 MG: 0.2 INJECTION, SOLUTION INTRAMUSCULAR; INTRAVENOUS at 12:02

## 2017-09-05 RX ADMIN — EPHEDRINE SULFATE 10 MG: 50 INJECTION INTRAMUSCULAR; INTRAVENOUS; SUBCUTANEOUS at 11:01

## 2017-09-05 RX ADMIN — PROPOFOL 170 MG: 10 INJECTION, EMULSION INTRAVENOUS at 10:43

## 2017-09-05 RX ADMIN — PROPOFOL 30 MG: 10 INJECTION, EMULSION INTRAVENOUS at 11:50

## 2017-09-05 RX ADMIN — DEXAMETHASONE SODIUM PHOSPHATE 4 MG: 4 INJECTION, SOLUTION INTRAMUSCULAR; INTRAVENOUS at 10:58

## 2017-09-05 RX ADMIN — FENTANYL CITRATE 50 MCG: 50 INJECTION, SOLUTION INTRAMUSCULAR; INTRAVENOUS at 11:27

## 2017-09-05 RX ADMIN — FENTANYL CITRATE 50 MCG: 50 INJECTION, SOLUTION INTRAMUSCULAR; INTRAVENOUS at 10:43

## 2017-09-05 RX ADMIN — EPHEDRINE SULFATE 10 MG: 50 INJECTION INTRAMUSCULAR; INTRAVENOUS; SUBCUTANEOUS at 11:12

## 2017-09-05 RX ADMIN — PROPOFOL 100 MG: 10 INJECTION, EMULSION INTRAVENOUS at 11:56

## 2017-09-05 RX ADMIN — MIDAZOLAM 2 MG: 1 INJECTION INTRAMUSCULAR; INTRAVENOUS at 10:32

## 2017-09-05 RX ADMIN — OXYMETAZOLINE HYDROCHLORIDE 2 SPRAY: 5 SPRAY NASAL at 09:00

## 2017-09-05 RX ADMIN — ROCURONIUM BROMIDE 35 MG: 10 INJECTION INTRAVENOUS at 10:43

## 2017-09-05 RX ADMIN — CLINDAMYCIN IN 5 PERCENT DEXTROSE 900 MG: 18 INJECTION, SOLUTION INTRAVENOUS at 10:56

## 2017-09-05 RX ADMIN — ONDANSETRON 4 MG: 2 INJECTION INTRAMUSCULAR; INTRAVENOUS at 12:22

## 2017-09-05 RX ADMIN — ROCURONIUM BROMIDE 10 MG: 10 INJECTION INTRAVENOUS at 11:27

## 2017-09-05 NOTE — INTERVAL H&P NOTE
Vitals reviewed.  All questions answered.  Pt seen and no new significant changes noted.  PRISCILA Gaston MD

## 2017-09-05 NOTE — ANESTHESIA PREPROCEDURE EVALUATION
Anesthesia Evaluation     NPO Solid Status: > 8 hours  NPO Liquid Status: > 8 hours     Airway   Mallampati: II  TM distance: >3 FB  Neck ROM: full  possible difficult intubation  Dental    (+) poor dentition    Pulmonary    (+) asthma, decreased breath sounds,   Cardiovascular     Rhythm: regular  Rate: normal    (+) hypertension well controlled,       Neuro/Psych  (+) headaches, psychiatric history Anxiety and Depression,    GI/Hepatic/Renal/Endo    (+) obesity,  GERD well controlled,     Musculoskeletal     Abdominal   (+) obese,     Abdomen: soft.   Substance History      OB/GYN          Other   (+) arthritis                                     Anesthesia Plan    ASA 3     general     intravenous induction

## 2017-09-05 NOTE — PLAN OF CARE
Problem: Patient Care Overview (Adult)  Goal: Plan of Care Review  Outcome: Ongoing (interventions implemented as appropriate)    09/05/17 1237   Coping/Psychosocial Response Interventions   Plan Of Care Reviewed With patient   Patient Care Overview   Progress no change   Outcome Evaluation   Outcome Summary/Follow up Plan vss meets pacu dc criteria          Problem: Perioperative Period (Adult)  Goal: Signs and Symptoms of Listed Potential Problems Will be Absent or Manageable (Perioperative Period)  Outcome: Ongoing (interventions implemented as appropriate)

## 2017-09-05 NOTE — BRIEF OP NOTE
ENDOSCOPIC FUNCTIONAL SINUS SURGERY WITH SEPTOPLASTY AND RESECTION INFERIOR TURBINATES  Procedure Note    Mikki Joel  9/5/2017    Pre-op Diagnosis:   Nasal septal deformity and turbinate hypertrophy and Ethmoi d and maxillary sinusitits   Post-op Diagnosis:      SAME    Procedure/CPT® Codes:      Procedure(s):  BILATERAL ETHMOID AND MAXILLARY SINUS AND NASAL SEPTOPLASTIES AND BILATERAL TURBINECTOMIES    Surgeon(s):  Ramy Gaston MD    Anesthesia: General with 17ml lidocaine and epinephrine local anesthesia    Staff:   Circulator: Ilene Pace RN; Chantelle Washington RN  Scrub Person: Stormy Smith; Khushi Mccabe  Assistant: Ami Munroe    Estimated Blood Loss:  40 ml   Specimens:                  ID Type Source Tests Collected by Time Destination   1 : CULTURE LEFT MAXILLARY  Wound Nose WOUND CULTURE Ramy Gaston MD 9/5/2017 1110    A : LEFT SINUS Tissue Naris, Left TISSUE EXAM Ramy Gaston MD 9/5/2017 1156    B : RIGHT SINUS  Tissue Naris, Right TISSUE EXAM Ramy Gaston MD 9/5/2017 1156          Drains:           Findings:  NSD septal perforation , chronic sinusitis and turbinate hypertrophy    Complications: none apparent      Ramy Gaston MD     Date: 9/5/2017  Time: 12:05 PM

## 2017-09-05 NOTE — H&P
Subjective       Mikki Joel is a 62 y.o. female.   CC;f/u sinusitis and nasal obstruction  History of Present Illness   Patient doesn't have fever but continues to have foul-smelling build up her nose nasal obstruction pressure around her eyes left worse in right she also has long-standing hearing loss as a separate issue and has had perforations should not been repaired and wears hearing aids.  She does not want consider surgery went to continue using hearing aids and comes in for hearing testing.  Ear frustrated with continued sinus infections and difficulty despite culture directed treatment CT scan documentation multiple antibiotics.  Some decreased sense of smell and nasal obstruction understands that she does have septal perforation despite previous nasal surgery has nasal obstruction.  Asked surgeries were in [place].        The following portions of the patient's history were reviewed and updated as appropriate: allergies, current medications, past family history, past medical history, past social history, past surgical history and problem list.        Mikki Joel reports that she has never smoked. She has never used smokeless tobacco. She reports that she does not drink alcohol.  Patient is not a tobacco user and has not been counseled for use of tobacco products           Family History   Problem Relation Age of Onset   • Hypertension Mother     • COPD Mother     • Arthritis Mother     • Cancer Father     • Arthritis Father     • COPD Maternal Grandmother     • Stroke Maternal Grandmother              Current Outpatient Prescriptions:   •  BucAlfAspKGlucCouchParsUvaUrJu (WATER PILLS PO), Take  by mouth., Disp: , Rfl:   •  Cholecalciferol (VITAMIN D3) 5000 UNITS capsule capsule, Take 5,000 Units by mouth Daily., Disp: , Rfl:   •  FLUoxetine (PROzac) 40 MG capsule, Take 1 capsule by mouth Daily., Disp: 30 capsule, Rfl: 11  •  IRON, FERROUS SULFATE, PO, Take  by mouth., Disp: , Rfl:   •  metoprolol  succinate XL (TOPROL XL) 100 MG 24 hr tablet, Take 1 tablet by mouth Daily., Disp: 30 tablet, Rfl: 11  •  mometasone (NASONEX) 50 MCG/ACT nasal spray, 2 sprays into each nostril 2 (Two) Times a Day., Disp: 17 g, Rfl: 11  •  Multiple Vitamin (MULTI VITAMIN DAILY PO), Take  by mouth., Disp: , Rfl:   •  naproxen sodium (ANAPROX) 550 MG tablet, Take 1 tablet by mouth 2 (Two) Times a Day As Needed for Moderate Pain . Take with food, Disp: 60 tablet, Rfl: 0  •  rOPINIRole (REQUIP) 0.5 MG tablet, Take 1 tablet by mouth Every Night. Take 1 hour before bedtime., Disp: 30 tablet, Rfl: 5  •  SUMAtriptan (IMITREX) 25 MG tablet, Take 25 mg by mouth 1 (One) Time As Needed for migraine., Disp: , Rfl:   •  traMADol (ULTRAM) 50 MG tablet, Take 1 tablet by mouth Every 6 (Six) Hours As Needed for Moderate Pain (4-6)., Disp: 15 tablet, Rfl: 0  •  triamterene-hydrochlorothiazide (DYAZIDE) 37.5-25 MG per capsule, Take 1 capsule by mouth Every Morning., Disp: 30 capsule, Rfl: 11  •  Unable to find, 1 each 1 (One) Time. Unknown anxiety medication, Disp: , Rfl:           Allergies   Allergen Reactions   • Sulfa Antibiotics Hives          Medical History         Past Medical History:   Diagnosis Date   • Arthritis     • Asthma     • Depression     • High blood pressure                 Review of Systems   Constitutional: Negative for fever.   HENT: Positive for hearing loss, postnasal drip, rhinorrhea, sinus pressure and tinnitus. Negative for nosebleeds.    Hematological: Negative for adenopathy.                  Objective       Physical Exam   Constitutional: She is oriented to person, place, and time. She appears well-developed and well-nourished.   HENT:   Head: Normocephalic and atraumatic.   Right Ear: Hearing, tympanic membrane, external ear and ear canal normal.   Left Ear: Hearing, tympanic membrane, external ear and ear canal normal.   Nose: Nose normal. No mucosal edema, rhinorrhea, nasal deformity or septal deviation. No  epistaxis. Right sinus exhibits no maxillary sinus tenderness and no frontal sinus tenderness. Left sinus exhibits no maxillary sinus tenderness and no frontal sinus tenderness.   Mouth/Throat: Uvula is midline and oropharynx is clear and moist. No trismus in the jaw. Normal dentition. No oropharyngeal exudate or posterior oropharyngeal edema.   Eyes: Conjunctivae and EOM are normal.   Neck: Normal range of motion. Neck supple. No JVD present. No tracheal deviation present. No thyromegaly present.   Cardiovascular: Normal rate and regular rhythm.    Pulmonary/Chest: Effort normal.   Musculoskeletal: Normal range of motion.   Lymphadenopathy:        Head (right side): No submental, no submandibular, no tonsillar, no preauricular, no posterior auricular and no occipital adenopathy present.        Head (left side): No submental, no submandibular, no tonsillar, no preauricular, no posterior auricular and no occipital adenopathy present.     She has no cervical adenopathy.        Right cervical: No superficial cervical, no deep cervical and no posterior cervical adenopathy present.       Left cervical: No superficial cervical, no deep cervical and no posterior cervical adenopathy present.   Neurological: She is alert and oriented to person, place, and time. No cranial nerve deficit.   Skin: Skin is warm.   Psychiatric: She has a normal mood and affect. Her speech is normal and behavior is normal. Thought content normal.   Nursing note and vitals reviewed.           The audiogram and tympanogram reviewed in detail consistent with audiometric findings of a mixed primarily sensorineural hearing loss and perforated eardrums        Assessment/Plan       Mikki was seen today for follow-up.     Diagnoses and all orders for this visit:     Nasal septal deformity     Chronic ethmoidal sinusitis     Nasal turbinate hypertrophy     Chronic maxillary sinusitis     Nasal septal perforation        Was turned avoid further oral  antibiotic she's been using nasal's irrigations carefully.  She's also been using or other nasal sprays.  We discussed that there is no guarantee we can solve her problems particular since she's had multiple surgeries talked about making an extra large opening in x-ray sinus and ethmoid sinus surgery trying to help a little bit more with her septum turbinates to give her more space.  Reluctant to do much aggressive surgery and is nasal septum because were septal perforation.  She understands does not questions were answered has recently expectations understand it can be severe complications and she still might require other surgeries this is not successful limitations time off from activity and postop care were discussed in detail.  Inset was obtained with oral and written informed  I reviewed the CT scan again in detail with her and discussed the type of surgery the risks benefits and all questions were answered.

## 2017-09-05 NOTE — OP NOTE
OPERATIVE NOTE    Name:    Mikki Joel  YOB: 1955  Date of surgery:   9/5/2017    Pre-op Diagnosis:   Sinusitis [J32.9] chronic maxillary and ethmoid sinusitis nasal septal deformity with septal perforation turbinate hypertrophy    Post-op Diagnosis:    Post-Op Diagnosis Codes:     * Sinusitis [J32.9] involving the maxillary and ethmoid sinuses.  Reviewed septum and turbinate hypertrophy were also noted as the previous existing septal perforation    Procedure:  Procedure(s):  BILATERAL ETHMOID AND MAXILLARY SINUS AND NASAL SEPTOPLASTIES AND BILATERAL TURBINECTOMIES    Surgeon:  Ramy Gaston MD, AAOHNS    Anesthesia: General and local anesthesia infiltration approximate 17 mL 1% lidocaine with 1 100,000 epinephrine    Staff:   Circulator: Ilene Pace RN; Chantelle Washington RN  Scrub Person: Stormy Smith; Khushi Mccabe  Assistant: Ami Munroe    Estimated Blood Loss: *No blood loss documented*    Specimens:                  ID Type Source Tests Collected by Time Destination   1 : CULTURE LEFT MAXILLARY  Wound Nose WOUND CULTURE Ramy Gaston MD 9/5/2017 1110    A : LEFT SINUS Tissue Naris, Left TISSUE EXAM Ramy Gaston MD 9/5/2017 1156    B : RIGHT SINUS  Tissue Naris, Right TISSUE EXAM Ramy Gaston MD 9/5/2017 1156          Drains:    na       Findings:  Thick mucoid secretions with hypertrophy of the mucosa within the sinuses scarring and partially obstructed maxillary os.    Complications: None    IMPLANTS:   Nothing was implanted during the procedure    INDICATIONS: Medical  versus surgical  therapy with   discussion of complications and this was not guaranteed resolve the patient's symptoms and there were significant risks including need for further surgery failure to improve symptoms need for other medical as well as surgical options.    PROCEDURE: Patient was taken to the operating room placed in supine position.  Anesthesia was carried out.  Timeout was  carried out.  CT scans put up with the screen and are reviewed.  Internal nose along the nasal septum lateral walls turbinates ethmoid and maxillary sinus areas was injected with local anesthetic and Afrin pledgets were placed into the nose and left for approximately 10 minutes prior to beginning the procedure.  Patient was prepped and draped leaving the eyes untaped but with ointment in place with a could be observed throughout the procedure.    Incision was made in the left nostril  some adhesions and scar in the nasal valve area.  Later sutures were used as coapt the edges away from the septum.  Releasing incision was made in the floor the nose anteriorly to release the scar and allow the septum to move medially.   An hemitransfixtion incision was made in the septum and minimal anterior dissection is carried out not removing cartilage recontouring and then sutured in place.  This was not carried back to the previous existing septal perforation.  Chromic gut sutures were placed through and through the anterior septum and flap suture back in position.  Inferior turbinates were outfractured and noted to be enlarged and utilizing a microdebrider other mass and the other instruments that were reduced in size and cauterized.     that the turbinate surgery was sent with use of the surgery endoscope.  Then utilizing the 30 and 70° endoscope the mattress sinus ostia was enlarged anteriorly and inferiorly down into the midportion of the inferior turbinate.  The middle turbinate was left in place.  The maillary sinus was opened enlargement and debris removed including some thick mucoid secretions this was also cultured from the left side.  Then taking down some of the scarring and open up the ethmoids were superiorly and then posteriorly system with the microdebrider as well as up-biting instruments.  Support was left for the middle turbinate.  Opposite side sinuses were  done like fashion again with enlarging the  antrostomy doing better larger size.  Ethmoid cells opened as above.  No evidence orbital fat or unusual bleeding, csf was noted.  Note the eyes were pupils were checked throughout the procedure no abnormality noted.  Then several liters of irrigant was used and to wash out the maxillary and ethmoid sinuses.  She was placed in because of some oozing.  Reassess in no severe bleeding found but in procedure and all the pledgets are removed.  The eyes were checked and found to be normal.  Patient was sent later transferred to the recovery and eventually same-day surgery in good condition without evidence of complications.                          This document has been electronically signed by Ramy Gaston MD on September 5, 2017 5:13 PM

## 2017-09-05 NOTE — ANESTHESIA POSTPROCEDURE EVALUATION
Patient: Mikki Joel    Procedure Summary     Date Anesthesia Start Anesthesia Stop Room / Location    09/05/17 1035 1216  [facility] / [facility]       Procedure Diagnosis Surgeon Provider    BILATERAL ETHMOID AND MAXILLARY SINUS AND NASAL SEPTOPLASTIES AND BILATERAL TURBINECTOMIES (Bilateral Nose) Sinusitis  (Sinusitis [J32.9]) MD Simeon Lau MD          Anesthesia Type: general  Last vitals  BP        Temp        Pulse       Resp        SpO2          Post Anesthesia Care and Evaluation    Patient location during evaluation: PACU  Patient participation: complete - patient participated  Level of consciousness: awake and alert  Pain management: adequate  Airway patency: patent  Anesthetic complications: No anesthetic complications    Cardiovascular status: acceptable  Respiratory status: acceptable  Hydration status: acceptable

## 2017-09-05 NOTE — ANESTHESIA PROCEDURE NOTES
Airway  Urgency: elective    Date/Time: 9/5/2017 10:43 AM  End Time:9/5/2017 10:47 AM  Airway not difficult    General Information and Staff    Patient location during procedure: OR    Indications and Patient Condition  Indications for airway management: airway protection    Preoxygenated: yes  Mask difficulty assessment: 1 - vent by mask    Final Airway Details  Final airway type: endotracheal airway      Successful airway: ETT  Cuffed: yes   Successful intubation technique: direct laryngoscopy  Facilitating devices/methods: intubating stylet  Endotracheal tube insertion site: oral  Blade: Jennifer  Blade size: #3  ETT size: 7.0 mm  Cormack-Lehane Classification: grade IIa - partial view of glottis  Placement verified by: chest auscultation, capnometry and palpation of cuff   Measured from: lips  ETT to lips (cm): 21  Number of attempts at approach: 1

## 2017-09-06 LAB
LAB AP CASE REPORT: NORMAL
Lab: NORMAL
PATH REPORT.FINAL DX SPEC: NORMAL
PATH REPORT.GROSS SPEC: NORMAL

## 2017-09-08 LAB
BACTERIA SPEC AEROBE CULT: ABNORMAL
GRAM STN SPEC: ABNORMAL
GRAM STN SPEC: ABNORMAL

## 2017-09-09 ENCOUNTER — TELEPHONE (OUTPATIENT)
Dept: OTOLARYNGOLOGY | Facility: CLINIC | Age: 62
End: 2017-09-09

## 2017-09-09 RX ORDER — LEVOFLOXACIN 500 MG/1
500 TABLET, FILM COATED ORAL DAILY
Qty: 10 TABLET | Refills: 0 | Status: SHIPPED | OUTPATIENT
Start: 2017-09-09 | End: 2017-09-12

## 2017-09-09 NOTE — TELEPHONE ENCOUNTER
Message regarding culture and to Bactrim require different antibiotic she's to discontinue her Omnicef and Levaquin.  I told her to stop if she is joint or tendon pain she has questions to call otherwise were seen in the next few days.  Explained not to take with food

## 2017-09-11 RX ORDER — PANTOPRAZOLE SODIUM 40 MG/1
TABLET, DELAYED RELEASE ORAL
Qty: 60 TABLET | Refills: 0 | Status: SHIPPED | OUTPATIENT
Start: 2017-09-11

## 2017-09-11 RX ORDER — FLUTICASONE PROPIONATE 50 MCG
SPRAY, SUSPENSION (ML) NASAL
Qty: 48 G | Refills: 0 | Status: SHIPPED | OUTPATIENT
Start: 2017-09-11

## 2017-09-12 ENCOUNTER — OFFICE VISIT (OUTPATIENT)
Dept: OTOLARYNGOLOGY | Facility: CLINIC | Age: 62
End: 2017-09-12

## 2017-09-12 VITALS — TEMPERATURE: 98.9 F | HEIGHT: 67 IN | BODY MASS INDEX: 32.33 KG/M2 | WEIGHT: 206 LBS

## 2017-09-12 DIAGNOSIS — J32.0 SINUSITIS, MAXILLARY, CHRONIC: Primary | ICD-10-CM

## 2017-09-12 PROCEDURE — 99024 POSTOP FOLLOW-UP VISIT: CPT | Performed by: OTOLARYNGOLOGY

## 2017-09-12 RX ORDER — LEVOFLOXACIN 500 MG/1
500 TABLET, FILM COATED ORAL DAILY
Qty: 10 TABLET | Refills: 0 | Status: SHIPPED | OUTPATIENT
Start: 2017-09-12 | End: 2017-09-26

## 2017-09-12 NOTE — PROGRESS NOTES
Patient's doing better postop says she's Adam breathing that she did pre-op not having usual pain or fever.  She says she didn't  her prescription to pharmacy this weekend because she didn't get the message.  I reviewed with her the findings on the culture.  She says she's taking Levaquin and without difficulty and the past  Procedure NOTE: Endoscopic debridement bilateral and 8 right sinus:  Was done with the indications postop sinus crusting and she had no complications and no bleeding from the procedure  Nasal endoscopy and debridement were reviewed and bilaterally after placing local anesthetic of Afrin and lidocaine.  Patient tolerated procedure well and crusting was removed off the inferior turbinates bilaterally tickly on the right osteomeatal unitunit removing the Gelfilm and no blood and mucus..  No new septal perforation this size is still is a that was previously.  Viewed importance of using the saline irrigation using the Levaquin discussed the side effects and what to do there are any side effects and call us and stop it.  She is agreement this will see her back in 2 Weeks and she'll begin using Flonase the lateral aspect of her nose also with irrigations all her questions were answered.     7d ago     Final Diagnosis   1.  MUCOSA, NASAL SINUS, LEFT SIDE:  CHRONIC SINUSITIS OF RESPIRATORY MUCOSA IN RELATION TO BITS OF BONE.  NEGATIVE FOR FUNGI (GMS STAIN).     2.  MUCOSA, NASAL SINUS, RIGHT SIDE:  CHRONIC SINUSITIS OF RESPIRATORY MUCOSA IN RELATION TO BITS OF BONE.  NEGATIVE FOR FUNGI (GMS STAIN).   Electronically signed by Deven Conner MD on 9/6/2017 at 1323   Gross       Recorded by Katheryn Corrigan DO on 9/6/2017 at 7:16 AM  Not ordering provider   Culture   Light growth (2+) Pseudomonas aeruginosa (A)   Pseudomonas aeruginosa may develop resistance during prolonged therapy with all antibiotics. Isolates that are initially susceptible may become resistant within 3 or 4 days. Repeat  susceptibility testing may be indicated.      Light growth (2+) Serratia marcescens (A)      Light growth (2+) Streptococcus mitis / oralis (A)   Susceptibility upon request.      Stain   Moderate (3+) WBCs seen      Rare (1+) Gram negative bacilli            Resulting Agency: [facility]   Susceptibility    Pseudomonas aeruginosa     CHAR     Ceftazidime 4 ug/ml     Cefuroxime sodium >16 ug/ml     Gentamicin <=4 ug/ml Susceptible     Levofloxacin <=2 ug/ml Susceptible     Piperacillin + Tazobactam <=16 ug/ml     Tobramycin <=4 ug/ml Susceptible           Susceptibility    Serratia marcescens     CHAR     Amikacin <=16 ug/ml Susceptible     Amoxicillin + Clavulanate >16/8 ug/ml Resistant     Ampicillin >16 ug/ml Resistant     Ampicillin + Sulbactam >16/8 ug/ml Resistant     Cefazolin >16 ug/ml Resistant     Cefotaxime <=2 ug/ml     Cefoxitin 16 ug/ml Intermediate     Ceftazidime <=1 ug/ml     Ceftriaxone <=8 ug/ml     Cefuroxime sodium >16 ug/ml Resistant     Ciprofloxacin 2 ug/ml Intermediate     Gentamicin <=4 ug/ml Susceptible     Imipenem <=4 ug/ml Susceptible     Levofloxacin <=2 ug/ml Susceptible     Meropenem <=4 ug/ml Susceptible     Nitrofurantoin >64 ug/ml     Piperacillin + Tazobactam <=16 ug/ml     Tetracycline <=4 ug/ml Susceptible     Tobramycin <=4 ug/ml Susceptible     Trimethoprim + Sulfamethoxazole <=2/38 ug/ml Susceptible              Specimen Collected: 09/05/17 11:10 AM Last Resulted: 09/08/17  6:44 AM                      Other Results from 9/5/2017    Tissue Pathology Exam  Final result 9/5/2017   Wound Culture     PRISCILA Gaston MD

## 2017-09-18 RX ORDER — NAPROXEN SODIUM 550 MG/1
TABLET ORAL
Qty: 60 TABLET | Refills: 0 | Status: SHIPPED | OUTPATIENT
Start: 2017-09-18 | End: 2017-10-24 | Stop reason: ALTCHOICE

## 2017-09-26 ENCOUNTER — OFFICE VISIT (OUTPATIENT)
Dept: OTOLARYNGOLOGY | Facility: CLINIC | Age: 62
End: 2017-09-26

## 2017-09-26 VITALS — HEIGHT: 67 IN | WEIGHT: 206 LBS | TEMPERATURE: 98.8 F | BODY MASS INDEX: 32.33 KG/M2

## 2017-09-26 DIAGNOSIS — J32.0 CHRONIC MAXILLARY SINUSITIS: ICD-10-CM

## 2017-09-26 PROCEDURE — 31237 NSL/SINS NDSC SURG BX POLYPC: CPT | Performed by: OTOLARYNGOLOGY

## 2017-09-26 NOTE — PROGRESS NOTES
OPERATIVE NOTE    Name:    Mikki Joel  YOB: 1955  Date of surgery:        Pre-op Diagnosis:     Chronic b/lt maxillary and ethmoid sinusitis  Post-op Diagnosis:    Name  Procedure: Endoscopy and sinus endoscopy and debridement of crusting bilateral maxillary and ethmoid ostiomeatal units      Surgeon:  Ramy Gaston MD, AAOHNS    Anesthesia topical cane and Afrin    Staff:    ANNAMARIA sosa    Estimated Blood Loss:  none    Specimens:                * Cannot find log * none      Drains:    none       Findings:  Crusting improved but still significant and right ostiomeatal unit as well as the left this was debrided to clear persistent debris maxillary ethmoid sinus area bilaterally    Complications: None    IMPLANTS:   [unfilled]    INDICATIONS:    PROCEDURE: Local anesthesia and consented been obtained and is debrided right of suction different forceps and different suction sizes as they would include this was done to the area is clear and there is no unusual signs of bleeding infection afterwards            This document has been electronically signed by Ramy Gaston MD on September 26, 2017 5:46 PM

## 2017-10-02 RX ORDER — PANTOPRAZOLE SODIUM 40 MG/1
TABLET, DELAYED RELEASE ORAL
Qty: 60 TABLET | Refills: 0 | Status: SHIPPED | OUTPATIENT
Start: 2017-10-02

## 2017-10-18 RX ORDER — NAPROXEN SODIUM 550 MG/1
TABLET ORAL
Qty: 60 TABLET | Refills: 0 | Status: SHIPPED | OUTPATIENT
Start: 2017-10-18 | End: 2017-10-24 | Stop reason: ALTCHOICE

## 2017-10-24 ENCOUNTER — OFFICE VISIT (OUTPATIENT)
Dept: PODIATRY | Facility: CLINIC | Age: 62
End: 2017-10-24

## 2017-10-24 ENCOUNTER — OFFICE VISIT (OUTPATIENT)
Dept: FAMILY MEDICINE CLINIC | Facility: CLINIC | Age: 62
End: 2017-10-24

## 2017-10-24 VITALS
HEART RATE: 53 BPM | BODY MASS INDEX: 34.01 KG/M2 | HEIGHT: 67 IN | OXYGEN SATURATION: 94 % | WEIGHT: 216.7 LBS | DIASTOLIC BLOOD PRESSURE: 100 MMHG | SYSTOLIC BLOOD PRESSURE: 134 MMHG

## 2017-10-24 VITALS
DIASTOLIC BLOOD PRESSURE: 106 MMHG | SYSTOLIC BLOOD PRESSURE: 177 MMHG | HEART RATE: 74 BPM | WEIGHT: 206 LBS | BODY MASS INDEX: 32.33 KG/M2 | HEIGHT: 67 IN | OXYGEN SATURATION: 97 %

## 2017-10-24 DIAGNOSIS — I10 ESSENTIAL HYPERTENSION: ICD-10-CM

## 2017-10-24 DIAGNOSIS — M25.561 CHRONIC PAIN OF RIGHT KNEE: ICD-10-CM

## 2017-10-24 DIAGNOSIS — M19.072 PRIMARY OSTEOARTHRITIS OF LEFT FOOT: ICD-10-CM

## 2017-10-24 DIAGNOSIS — G25.81 RESTLESS LEG SYNDROME: Primary | ICD-10-CM

## 2017-10-24 DIAGNOSIS — Z12.11 SCREENING FOR COLON CANCER: ICD-10-CM

## 2017-10-24 DIAGNOSIS — G89.29 CHRONIC PAIN OF RIGHT KNEE: ICD-10-CM

## 2017-10-24 DIAGNOSIS — M79.672 LEFT FOOT PAIN: Primary | ICD-10-CM

## 2017-10-24 DIAGNOSIS — M72.2 PLANTAR FASCIITIS: ICD-10-CM

## 2017-10-24 DIAGNOSIS — Z23 NEED FOR INFLUENZA VACCINATION: ICD-10-CM

## 2017-10-24 DIAGNOSIS — G44.209 TENSION HEADACHE: ICD-10-CM

## 2017-10-24 PROCEDURE — 90471 IMMUNIZATION ADMIN: CPT | Performed by: FAMILY MEDICINE

## 2017-10-24 PROCEDURE — 90686 IIV4 VACC NO PRSV 0.5 ML IM: CPT | Performed by: FAMILY MEDICINE

## 2017-10-24 PROCEDURE — 99204 OFFICE O/P NEW MOD 45 MIN: CPT | Performed by: PODIATRIST

## 2017-10-24 PROCEDURE — 99214 OFFICE O/P EST MOD 30 MIN: CPT | Performed by: FAMILY MEDICINE

## 2017-10-24 RX ORDER — SUMATRIPTAN 25 MG/1
25 TABLET, FILM COATED ORAL ONCE AS NEEDED
Qty: 30 TABLET | Refills: 1 | Status: SHIPPED | OUTPATIENT
Start: 2017-10-24 | End: 2017-12-20 | Stop reason: SDUPTHER

## 2017-10-24 RX ORDER — ROPINIROLE 1 MG/1
1 TABLET, FILM COATED ORAL NIGHTLY
Qty: 30 TABLET | Refills: 11 | Status: SHIPPED | OUTPATIENT
Start: 2017-10-24 | End: 2018-09-06 | Stop reason: SDUPTHER

## 2017-10-24 RX ORDER — MELOXICAM 15 MG/1
15 TABLET ORAL DAILY
Qty: 30 TABLET | Refills: 0 | Status: SHIPPED | OUTPATIENT
Start: 2017-10-24 | End: 2017-11-17 | Stop reason: SDUPTHER

## 2017-10-24 RX ORDER — CYCLOBENZAPRINE HCL 5 MG
5 TABLET ORAL NIGHTLY PRN
Qty: 30 TABLET | Refills: 5 | Status: SHIPPED | OUTPATIENT
Start: 2017-10-24 | End: 2017-11-15 | Stop reason: SDUPTHER

## 2017-10-24 NOTE — PROGRESS NOTES
Mikki Joel  1955  62 y.o. female     Patient presents today with a complaint of bilateral foot pain; L>R.    10/24/2017  Chief Complaint   Patient presents with   • Left Foot - Pain   • Right Foot - Pain           History of Present Illness    Mikki Joel is a 62 y.o.female who presents to clinic today with chief complaint of left foot pain.  Patient is several months.  She describes the pain as a dull ache.  Pain is located in 2 specific spots.  To the lateral aspect of her left foot in the bottom of her left heel.  She rates the pain as a 6 out of 10.  Pain is aggravated with prolonged weightbearing and relieved with rest.  She denies any injuries or trauma.  She has no other pedal complaints.          Past Medical History:   Diagnosis Date   • Anemia    • Anxiety    • Arthritis    • Asthma    • Depression    • High blood pressure    • [place] (hard of hearing)    • Migraine          Past Surgical History:   Procedure Laterality Date   • BUNIONECTOMY Bilateral    • EAR TUBES     • ENDOSCOPIC FUNCTIONAL SINUS SURGERY (FESS) Bilateral 9/5/2017    Procedure: BILATERAL ETHMOID AND MAXILLARY SINUS AND NASAL SEPTOPLASTIES AND BILATERAL TURBINECTOMIES;  Surgeon: Ramy Gaston MD;  Location: [facility];  Service:    • HAND SURGERY     • JOINT REPLACEMENT      right knee replacement 2016   • RHINOPLASTY      x3   • SHOULDER ARTHROSCOPY     • TONSILLECTOMY     • TUBAL ABDOMINAL LIGATION           Family History   Problem Relation Age of Onset   • Hypertension Mother    • COPD Mother    • Arthritis Mother    • Cancer Father    • Arthritis Father    • COPD Maternal Grandmother    • Stroke Maternal Grandmother        Allergies   Allergen Reactions   • Sulfa Antibiotics Hives       Social History     Social History   • Marital status:      Spouse name: N/A   • Number of children: N/A   • Years of education: N/A     Occupational History   • Not on file.     Social History Main Topics   • Smoking status:  "Never Smoker   • Smokeless tobacco: Never Used   • Alcohol use Yes      Comment: occasionally   • Drug use: No   • Sexual activity: Defer     Other Topics Concern   • Not on file     Social History Narrative         Current Outpatient Prescriptions   Medication Sig Dispense Refill   • Cholecalciferol (VITAMIN D3) 5000 UNITS capsule capsule Take 5,000 Units by mouth Daily.     • FLUoxetine (PROzac) 40 MG capsule Take 1 capsule by mouth Daily. 30 capsule 11   • metoprolol succinate XL (TOPROL XL) 100 MG 24 hr tablet Take 1 tablet by mouth Daily. 30 tablet 11   • Multiple Vitamin (MULTI VITAMIN DAILY PO) Take 1 tablet by mouth Daily.     • triamterene-hydrochlorothiazide (DYAZIDE) 37.5-25 MG per capsule Take 1 capsule by mouth Every Morning. 30 capsule 11   • cyclobenzaprine (FLEXERIL) 5 MG tablet Take 1 tablet by mouth At Night As Needed for Muscle Spasms. 30 tablet 5   • meloxicam (MOBIC) 15 MG tablet Take 1 tablet by mouth Daily. Take once daily. 30 tablet 0   • rOPINIRole (REQUIP) 1 MG tablet Take 1 tablet by mouth Every Night. Take 1 hour before bedtime. 30 tablet 11   • SUMAtriptan (IMITREX) 25 MG tablet Take 1 tablet by mouth 1 (One) Time As Needed for Migraine. 30 tablet 1     No current facility-administered medications for this visit.          OBJECTIVE    BP (!) 177/106  Pulse 74  Ht 67\" (170.2 cm)  Wt 206 lb (93.4 kg)  SpO2 97%  BMI 32.26 kg/m2      Review of Systems   Constitutional: Positive for activity change and fatigue.   HENT: Positive for hearing loss.    Musculoskeletal: Positive for arthralgias and back pain.        Foot pain  Joint pain   Neurological: Positive for headaches.   Psychiatric/Behavioral:        Depression   All other systems reviewed and are negative.        Constitutional: well developed, well nourished    HEENT: Normocephalic and atraumatic, normal hearing    Respiratory: Non labored respirations noted    Cardiovascular:    DP/PT pulses palpable    CFT brisk  to all " digits  Skin temp is warm to warm from proximal tibia to distal digits  Pedal hair growth present.   No erythema or edema noted     Musculoskeletal:  Muscle strength is 5/5 for all muscle groups tested   Pain with mobilization of the left fourth and fifth tarsometatarsal joints   Pain on palpation to the medial tubercle of the left calcaneus.  Negative lateral squeeze test.  Rectus foot type     Dermatological:   Skin is warm, dry and intact    No subcutaneous nodules or masses noted    No open wounds noted     Neurological:   Sensation intact to light touch    DTR intact    Psychiatric: A&O x 3 with normal mood and affect. NAD.         Procedures        ASSESSMENT AND PLAN    Mikki was seen today for pain and pain.    Diagnoses and all orders for this visit:    Left foot pain    Plantar fasciitis    Primary osteoarthritis of left foot    Other orders  -     meloxicam (MOBIC) 15 MG tablet; Take 1 tablet by mouth Daily. Take once daily.    - Comprehensive foot and ankle exam performed.   - X-rays reviewed taken previously.  No acute osseous normality is noted.  Midfoot joint degeneration noted.  - Rx for Mobic  - Rx for custom orthotics  - All questions were answered to the patients satisfaction.  - RTC 2 weeks after obtaining new orthotics          This document has been electronically signed by Brando Brice DPM on October 24, 2017 4:08 PM     10/24/2017  4:08 PM

## 2017-10-24 NOTE — PATIENT INSTRUCTIONS
Please check your blood pressure at home 1-2 times per day. It should be < 140/90. Please call me if it is consistently >140/90.

## 2017-10-24 NOTE — PROGRESS NOTES
Subjective   Chief Complaint   Patient presents with   • Follow-up     arthritis pain       Mikki Joel is a 62 y.o. female who presents for Follow-up (arthritis pain)   Knee Pain    The incident occurred more than 1 week ago (5-6 months ago). The incident occurred at home. The injury mechanism was a fall (fell on right knee. no twisting or poping). The pain is present in the right knee. The pain has been fluctuating since onset. Pertinent negatives include no inability to bear weight, numbness or tingling. She reports no foreign bodies present. She has tried NSAIDs for the symptoms. The treatment provided mild relief.   has hx of knee replacement in right knee.    RLS:  requip helping, but still having some RLS symptoms at night. Sleeping better, but still not through the night. No side effects or drowsiness.    BP has been well controlled at home. Thinks it is high today because she is having tension headache in her neck.     The following portions of the patient's history were reviewed and updated as appropriate:problem list, current medications, allergies, past family history, past medical history, past social history and past surgical history    Past Medical History:   Diagnosis Date   • Anemia    • Anxiety    • Arthritis    • Asthma    • Depression    • High blood pressure    • [place] (hard of hearing)    • Migraine        Social History   Substance Use Topics   • Smoking status: Never Smoker   • Smokeless tobacco: Never Used   • Alcohol use Yes      Comment: occasionally     History   Sexual Activity   • Sexual activity: Defer       Medications:  Outpatient Medications Prior to Visit   Medication Sig Dispense Refill   • Cholecalciferol (VITAMIN D3) 5000 UNITS capsule capsule Take 5,000 Units by mouth Daily.     • FLUoxetine (PROzac) 40 MG capsule Take 1 capsule by mouth Daily. 30 capsule 11   • meloxicam (MOBIC) 15 MG tablet Take 1 tablet by mouth Daily. Take once daily. 30 tablet 0   • metoprolol  "succinate XL (TOPROL XL) 100 MG 24 hr tablet Take 1 tablet by mouth Daily. 30 tablet 11   • Multiple Vitamin (MULTI VITAMIN DAILY PO) Take 1 tablet by mouth Daily.     • triamterene-hydrochlorothiazide (DYAZIDE) 37.5-25 MG per capsule Take 1 capsule by mouth Every Morning. 30 capsule 11   • rOPINIRole (REQUIP) 0.5 MG tablet Take 1 tablet by mouth Every Night. Take 1 hour before bedtime. 30 tablet 5   • SUMAtriptan (IMITREX) 25 MG tablet Take 25 mg by mouth 1 (One) Time As Needed for migraine.     • naproxen sodium (ANAPROX) 550 MG tablet TAKE 1 TABLET BY MOUTH TWICE DAILY WITH FOOD AS NEEDED FOR MODERATE PAIN 60 tablet 0   • naproxen sodium (ANAPROX) 550 MG tablet TAKE 1 TABLET BY MOUTH TWICE DAILY WITH FOOD AS NEEDED FOR MODERATE PAIN 60 tablet 0   • traMADol (ULTRAM) 50 MG tablet Take 1 tablet by mouth Every 6 (Six) Hours As Needed for Moderate Pain (4-6). 15 tablet 0     No facility-administered medications prior to visit.        Allergies   Allergen Reactions   • Sulfa Antibiotics Hives       Review of Systems   Constitutional: Negative for fatigue, fever and unexpected weight change.   Eyes: Negative.    Respiratory: Negative for shortness of breath.    Cardiovascular: Negative for chest pain, palpitations and leg swelling.   Gastrointestinal: Negative for abdominal pain, constipation, diarrhea, nausea and vomiting.   Endocrine: Negative.    Genitourinary: Negative.    Musculoskeletal: Positive for neck pain.        Right knee pain   Skin: Negative.    Neurological: Positive for headaches. Negative for tingling and numbness.        RLS   Psychiatric/Behavioral: Negative for dysphoric mood and sleep disturbance.       Objective   Visit Vitals   • /100 (BP Location: Left arm, Patient Position: Sitting, Cuff Size: Large Adult)   • Pulse 53   • Ht 67\" (170.2 cm)   • Wt 216 lb 11.2 oz (98.3 kg)   • SpO2 94%   • BMI 33.94 kg/m2       Physical Exam   Constitutional: She is oriented to person, place, and time. " She appears well-developed and well-nourished. No distress.   HENT:   Head: Normocephalic.   Nose: Nose normal.   Eyes: Conjunctivae are normal.   Neck: Normal range of motion.   Pulmonary/Chest: Effort normal. No respiratory distress.   Abdominal: She exhibits no distension.   Musculoskeletal: Normal range of motion. She exhibits no edema.        Right knee: She exhibits swelling (mild). She exhibits normal range of motion, no ecchymosis, no deformity, no laceration, no erythema, normal alignment, no LCL laxity, normal patellar mobility, no bony tenderness, normal meniscus and no MCL laxity. No tenderness found.   Neurological: She is alert and oriented to person, place, and time. No cranial nerve deficit. Coordination and gait normal.   Skin: She is not diaphoretic.   Psychiatric: She has a normal mood and affect. Her behavior is normal.   Nursing note and vitals reviewed.      Assessment/Plan   Mikki Joel is a 62 y.o. female seen today for the followin. Restless leg syndrome  Increase requip. Sedation precautions given.    - rOPINIRole (REQUIP) 1 MG tablet; Take 1 tablet by mouth Every Night. Take 1 hour before bedtime.  Dispense: 30 tablet; Refill: 11    2. Chronic pain of right knee  Will get x ray today. She is starting mobic today. Discussed risks of nsaid use. She understands     - XR Knee 3 View Right    3. Tension headache  Trial of muscle relaxer. Sedation precautions    - cyclobenzaprine (FLEXERIL) 5 MG tablet; Take 1 tablet by mouth At Night As Needed for Muscle Spasms.  Dispense: 30 tablet; Refill: 5    4. Need for influenza vaccination    - Flu Vaccine Intradermal Quad 18-64YR    5. Screening for colon cancer    - Ambulatory Referral to Gastroenterology    6. Essential hypertension  Elevated likely due to headache and knee pain. Advised to check bp at home and call or come in if >140/90    Follow up: Return in about 3 months (around 2018) for Recheck.          This document has been  electronically signed by Katheryn Corrigan DO on October 24, 2017 5:12 PM

## 2017-10-25 ENCOUNTER — TRANSCRIBE ORDERS (OUTPATIENT)
Dept: PODIATRY | Facility: CLINIC | Age: 62
End: 2017-10-25

## 2017-10-25 DIAGNOSIS — M72.2 PLANTAR FASCIITIS: Primary | ICD-10-CM

## 2017-10-26 ENCOUNTER — TELEPHONE (OUTPATIENT)
Dept: FAMILY MEDICINE CLINIC | Facility: CLINIC | Age: 62
End: 2017-10-26

## 2017-10-26 ENCOUNTER — OFFICE VISIT (OUTPATIENT)
Dept: OTOLARYNGOLOGY | Facility: CLINIC | Age: 62
End: 2017-10-26

## 2017-10-26 VITALS — BODY MASS INDEX: 33.9 KG/M2 | WEIGHT: 216 LBS | TEMPERATURE: 98.3 F | HEIGHT: 67 IN

## 2017-10-26 DIAGNOSIS — J32.0 SINUSITIS, MAXILLARY, CHRONIC: Primary | ICD-10-CM

## 2017-10-26 PROCEDURE — 99024 POSTOP FOLLOW-UP VISIT: CPT | Performed by: OTOLARYNGOLOGY

## 2017-10-26 RX ORDER — AZELASTINE 1 MG/ML
2 SPRAY, METERED NASAL 2 TIMES DAILY
Qty: 30 ML | Refills: 11 | Status: SHIPPED | OUTPATIENT
Start: 2017-10-26 | End: 2018-04-03

## 2017-10-26 NOTE — TELEPHONE ENCOUNTER
Pr Dr. Corrigan, Ms. Joel willow been called with her recent X-ray results & recommendations.  Continue her current medications and follow-up as planned or sooner if any problems.      ----- Message from Katheryn Corrigan DO sent at 10/25/2017 12:08 PM CDT -----  Knee x ray did not show any abnormalities. She should wear knee brace as we discussed. If problem persists, will need to see ortho

## 2017-10-26 NOTE — PROGRESS NOTES
Patient's doing well postoperatively.    Some pressure with f onts  come through which she's not having is much crusting and there is less crusting coming out.  I endoscopically a weighted nose and she has small amount of crusting which I cleaned.  Septal perforation which she had before her surgery is no bigger.  No nusual scarring.  Please she's doing well and asked her to continue using the rinse to use her mometasone and then Astelin on an as-needed basis.  Recommend f/u in a month if she hasn't problems or questions she is to let me know.  PRISCILA Gaston MD.

## 2017-10-26 NOTE — PROGRESS NOTES
Pr Dr. Corrigan, Ms. Marycruz willow been called with her recent X-ray results & recommendations.  Continue her current medications and follow-up as planned or sooner if any problems.

## 2017-11-01 ENCOUNTER — HOSPITAL ENCOUNTER (OUTPATIENT)
Dept: PHYSICAL THERAPY | Facility: HOSPITAL | Age: 62
Setting detail: THERAPIES SERIES
Discharge: HOME | End: 2017-11-01

## 2017-11-07 ENCOUNTER — TELEPHONE (OUTPATIENT)
Dept: FAMILY MEDICINE CLINIC | Facility: CLINIC | Age: 62
End: 2017-11-07

## 2017-11-08 ENCOUNTER — HOSPITAL ENCOUNTER (OUTPATIENT)
Dept: PHYSICAL THERAPY | Facility: HOSPITAL | Age: 62
Setting detail: THERAPIES SERIES
Discharge: HOME | End: 2017-11-08
Attending: PODIATRIST

## 2017-11-08 DIAGNOSIS — M72.2 PLANTAR FASCIITIS, BILATERAL: Primary | ICD-10-CM

## 2017-11-08 PROCEDURE — 97161 PT EVAL LOW COMPLEX 20 MIN: CPT | Performed by: PHYSICAL THERAPIST

## 2017-11-08 NOTE — THERAPY EVALUATION
Outpatient Physical Therapy Ortho Initial Evaluation  [facility]     Patient Name: Mikki Joel  : 1955  MRN: [id number]  Today's Date: 2017      Visit Date: 2017  Attendance:   Subjective % Improvement: N/A  Recert Date: 2017  MD appointment: TBD     Therapy Diagnosis: Bilateral foot pain    Patient Active Problem List   Diagnosis   • High blood pressure   • Asthma   • Arthritis   • Sinusitis   • Nasal septal deformity   • Nasal turbinate hypertrophy        Past Medical History:   Diagnosis Date   • Anemia    • Anxiety    • Arthritis    • Asthma    • Depression    • High blood pressure    • [place] (hard of hearing)    • Migraine         Past Surgical History:   Procedure Laterality Date   • BUNIONECTOMY Bilateral    • EAR TUBES     • ENDOSCOPIC FUNCTIONAL SINUS SURGERY (FESS) Bilateral 2017    Procedure: BILATERAL ETHMOID AND MAXILLARY SINUS AND NASAL SEPTOPLASTIES AND BILATERAL TURBINECTOMIES;  Surgeon: Ramy Gaston MD;  Location: [facility];  Service:    • HAND SURGERY     • JOINT REPLACEMENT      right knee replacement 2016   • RHINOPLASTY      x3   • SHOULDER ARTHROSCOPY     • TONSILLECTOMY     • TUBAL ABDOMINAL LIGATION         Visit Dx:     ICD-10-CM ICD-9-CM   1. Plantar fasciitis, bilateral M72.2 728.71             Patient History       17 1435          History    Chief Complaint Pain  -BB      Type of Pain Foot pain  -BB      Date Current Problem(s) Began --   about year ago that progresses   -BB      Brief Description of Current Complaint Reports both feet hurt especially if she does a lot of walking. Reports having bunion surgery on left 2 years ago and history of aqua temp orthotics. No numbness or tingling. Sharp pain in left foot that comes and goes. Cramping in feet at end of day when resting. Has pain when walking. Pain in heel, arch and top of foot.   -BB      Onset Date- PT 2017  -BB      Patient/Caregiver Goals Relieve pain  -BB       "Patient's Rating of General Health Very good  -BB      Hand Dominance left-handed  -BB      Occupation/sports/leisure activities Retired   -BB      What clinical tests have you had for this problem? X-ray  -BB      Results of Clinical Tests no fractures, bone spur, arthritis per patient   -BB      Pain     Pain Location Foot  -BB      Pain at Present 4  -BB      Pain at Best 2  -BB      Pain at Worst 9  -BB      Pain Frequency Constant/continuous  -BB      What Performance Factors Make the Current Problem(s) WORSE? standing, walking   -BB      What Performance Factors Make the Current Problem(s) BETTER? message, rest, does HEP from MD  -BB      Tolerance Time- Standing 5 minutes   -BB      Tolerance Time- Walking 5 minutes  -BB      Is your sleep disturbed? Yes   \"they can keep me awake\"  -BB      Fall Risk Assessment    Any falls in the past year: No  -BB        User Key  (r) = Recorded By, (t) = Taken By, (c) = Cosigned By    Initials Name Provider Type    WARNER Ambrosio PT Physical Therapist                PT Ortho       11/08/17 6965    Subjective Comments    Subjective Comments See patient history   -BB    Precautions and Contraindications    Precautions/Limitations no known precautions/limitations  -BB    Subjective Pain    Able to rate subjective pain? yes  -BB    Pre-Treatment Pain Level 4  -BB    Post-Treatment Pain Level 4  -BB    Posture/Observations    Posture/Observations Comments Bilateral incision on great toe. Sensation in tact. Bilateral pes planus with over pronation. Very mild callus on great toe met head otherwise no other skin breakdown.   -BB    Special Tests/Palpation    Special Tests/Palpation Ankle/Foot  -BB    Foot/Ankle Palpation    Plantar Fascia Bilateral:;Tender  -BB    Peroneals Left:;Tender  -BB    Foot/Ankle Palpation? Yes  -BB    ROM (Range of Motion)    General ROM no range of motion deficits identified  -BB    MMT (Manual Muscle Testing)    General MMT Assessment no strength " deficits identified  -BB    Transfers    Transfer, Comment Independent   -BB    Gait Assessment/Treatment    Gait, Comment Independent, mild antalgics noted   -BB      User Key  (r) = Recorded By, (t) = Taken By, (c) = Cosigned By    Initials Name Provider Type    WARNER Ambrosio PT Physical Therapist                            Therapy Education       11/08/17 1435          Therapy Education    Education Details Wear schedule, skin inspection   -BB      Given Symptoms/condition management  -BB      Program New  -BB      How Provided Verbal  -BB      Provided to Patient  -BB      Level of Understanding Verbalized  -BB        User Key  (r) = Recorded By, (t) = Taken By, (c) = Cosigned By    Initials Name Provider Type    BB Rachael Ambrosio, PT Physical Therapist                PT OP Goals       11/08/17 1435       PT Short Term Goals    STG Date to Achieve 11/29/17  -BB     STG 1 Independent in wear schedule, skin inspection   -BB     STG 1 Progress New  -BB     STG 2 Good fit of orthotic noted   -BB     STG 2 Progress New  -BB     Time Calculation    PT Goal Re-Cert Due Date 11/29/17  -BB       User Key  (r) = Recorded By, (t) = Taken By, (c) = Cosigned By    Initials Name Provider Type    WARNER Ambrosio, PT Physical Therapist                PT Assessment/Plan       11/08/17 1435       PT Assessment    Functional Limitations Limitations in functional capacity and performance  -BB     Impairments Gait;Pain  -BB     Assessment Comments Patient presents with left>right foot pain. Patient has bilateral pes planus with pronation. Patient could benefit from custom orthotics for increased support of the midfoot to decrease pronation moment. Patients ROM and strength is WNL with no skin breakdown.Orthotics to be fabricated per MD orders for tenderfoot medium per MD orders.  -BB     Please refer to paper survey for additional self-reported information No  -BB     Rehab Potential Good  -BB     Patient/caregiver participated  in establishment of treatment plan and goals Yes  -BB     Patient would benefit from skilled therapy intervention Yes  -BB     PT Plan    Predicted Duration of Therapy Intervention (days/wks) Orthotic pickup   -BB     Planned CPT's? PT EVAL LOW COMPLEXITY: 98275;PT ORTHOTIC MGMT/TRAIN EA 15 MIN: 35519;PT THER SUPP EA 15 MIN  -BB     Physical Therapy Interventions (Optional Details) orthotic fitting/training  -BB     PT Plan Comments Orthotic pickup/adjustment  -BB       User Key  (r) = Recorded By, (t) = Taken By, (c) = Cosigned By    Initials Name Provider Type    WARNER Ambrosio PT Physical Therapist                  Exercises       11/08/17 1435          Subjective Comments    Subjective Comments See patient history   -BB      Subjective Pain    Able to rate subjective pain? yes  -BB      Pre-Treatment Pain Level 4  -BB      Post-Treatment Pain Level 4  -BB        User Key  (r) = Recorded By, (t) = Taken By, (c) = Cosigned By    Initials Name Provider Type    WARNER Ambrosio PT Physical Therapist                              Time Calculation:   Start Time: 1435  Stop Time: 1517  Time Calculation (min): 42 min  Total Timed Code Minutes- PT: 0 minute(s)     Therapy Charges for Today     Code Description Service Date Service Provider Modifiers Qty    26523045481 HC PT-CUSTOM ORTHOTICS-LEVEL 2 11/8/2017 Rachael Ambrosio PT  1    51547929424 HC PT EVAL LOW COMPLEXITY 1 11/8/2017 Rachael Ambrosio PT GP 1                    Rachael Ambrosio, PT  11/8/2017

## 2017-11-15 ENCOUNTER — OFFICE VISIT (OUTPATIENT)
Dept: FAMILY MEDICINE CLINIC | Facility: CLINIC | Age: 62
End: 2017-11-15

## 2017-11-15 ENCOUNTER — CONSULT (OUTPATIENT)
Dept: SLEEP MEDICINE | Facility: HOSPITAL | Age: 62
End: 2017-11-15

## 2017-11-15 VITALS
WEIGHT: 219 LBS | HEIGHT: 67 IN | OXYGEN SATURATION: 97 % | BODY MASS INDEX: 34.37 KG/M2 | HEART RATE: 109 BPM | SYSTOLIC BLOOD PRESSURE: 120 MMHG | DIASTOLIC BLOOD PRESSURE: 76 MMHG

## 2017-11-15 VITALS
OXYGEN SATURATION: 97 % | WEIGHT: 217.7 LBS | SYSTOLIC BLOOD PRESSURE: 132 MMHG | HEIGHT: 67 IN | HEART RATE: 100 BPM | BODY MASS INDEX: 34.17 KG/M2 | DIASTOLIC BLOOD PRESSURE: 90 MMHG

## 2017-11-15 DIAGNOSIS — G25.81 RESTLESS LEGS SYNDROME (RLS): Primary | ICD-10-CM

## 2017-11-15 DIAGNOSIS — F41.1 GENERALIZED ANXIETY DISORDER: Primary | ICD-10-CM

## 2017-11-15 DIAGNOSIS — F51.04 PSYCHOPHYSIOLOGICAL INSOMNIA: ICD-10-CM

## 2017-11-15 DIAGNOSIS — G44.209 TENSION HEADACHE: ICD-10-CM

## 2017-11-15 PROCEDURE — 99213 OFFICE O/P EST LOW 20 MIN: CPT | Performed by: FAMILY MEDICINE

## 2017-11-15 PROCEDURE — 99203 OFFICE O/P NEW LOW 30 MIN: CPT | Performed by: INTERNAL MEDICINE

## 2017-11-15 RX ORDER — ZOLPIDEM TARTRATE 5 MG/1
5 TABLET ORAL NIGHTLY PRN
Qty: 30 TABLET | Refills: 3 | Status: SHIPPED | OUTPATIENT
Start: 2017-11-15 | End: 2017-12-15

## 2017-11-15 RX ORDER — BUSPIRONE HYDROCHLORIDE 7.5 MG/1
7.5 TABLET ORAL 2 TIMES DAILY
Qty: 60 TABLET | Refills: 11 | Status: SHIPPED | OUTPATIENT
Start: 2017-11-15 | End: 2019-03-12

## 2017-11-15 RX ORDER — CYCLOBENZAPRINE HCL 10 MG
10 TABLET ORAL 2 TIMES DAILY PRN
Qty: 60 TABLET | Refills: 5 | Status: SHIPPED | OUTPATIENT
Start: 2017-11-15 | End: 2017-11-15 | Stop reason: SDUPTHER

## 2017-11-15 RX ORDER — CYCLOBENZAPRINE HCL 10 MG
10 TABLET ORAL 2 TIMES DAILY PRN
Qty: 60 TABLET | Refills: 5 | Status: SHIPPED | OUTPATIENT
Start: 2017-11-15 | End: 2018-08-06
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
## 2017-11-15 NOTE — PROGRESS NOTES
Subjective   Chief Complaint   Patient presents with   • Headache     stress headaches       Mikki Joel is a 62 y.o. female who presents for Headache (stress headaches)   History of Present Illness:   She continues to have tension headaches almost daily. Described as pain/cramping in neck that radiates to back of head and front of head in band-like pattern. No eye pain, vision changes, photophobia, phonophobia, nausea/vomiting, focal neuro deficits or aura. Muscle relaxer helped a little bit and did not have any adverse side effects. Did not make her drowsy. She is under a lot of stress right now with family issues. It is hard for her this time a year and she gets a lot of anxiety. Anxiety has been worse. She was on buspar in the past which helped. Denies SI/HI.  Saw sleep med today and starting ambien tonight    The following portions of the patient's history were reviewed and updated as appropriate:problem list, current medications, allergies, past family history, past medical history, past social history and past surgical history    Past Medical History:   Diagnosis Date   • Anemia    • Anxiety    • Arthritis    • Asthma    • Depression    • High blood pressure    • [place] (hard of hearing)    • Migraine        Social History   Substance Use Topics   • Smoking status: Never Smoker   • Smokeless tobacco: Never Used   • Alcohol use Yes      Comment: occasionally     History   Sexual Activity   • Sexual activity: Defer       Medications:  Outpatient Medications Prior to Visit   Medication Sig Dispense Refill   • azelastine (ASTELIN) 0.1 % nasal spray 2 sprays into each nostril 2 (Two) Times a Day. Use in each nostril as directed 30 mL 11   • Cholecalciferol (VITAMIN D3) 5000 UNITS capsule capsule Take 5,000 Units by mouth Daily.     • FLUoxetine (PROzac) 40 MG capsule Take 1 capsule by mouth Daily. 30 capsule 11   • meloxicam (MOBIC) 15 MG tablet Take 1 tablet by mouth Daily. Take once daily. 30 tablet 0   •  "metoprolol succinate XL (TOPROL XL) 100 MG 24 hr tablet Take 1 tablet by mouth Daily. 30 tablet 11   • Multiple Vitamin (MULTI VITAMIN DAILY PO) Take 1 tablet by mouth Daily.     • rOPINIRole (REQUIP) 1 MG tablet Take 1 tablet by mouth Every Night. Take 1 hour before bedtime. 30 tablet 11   • SUMAtriptan (IMITREX) 25 MG tablet Take 1 tablet by mouth 1 (One) Time As Needed for Migraine. 30 tablet 1   • triamterene-hydrochlorothiazide (DYAZIDE) 37.5-25 MG per capsule Take 1 capsule by mouth Every Morning. 30 capsule 11   • zolpidem (AMBIEN) 5 MG tablet Take 1 tablet by mouth At Night As Needed for Sleep for up to 30 days. 30 tablet 3   • cyclobenzaprine (FLEXERIL) 5 MG tablet Take 1 tablet by mouth At Night As Needed for Muscle Spasms. 30 tablet 5     No facility-administered medications prior to visit.        Allergies   Allergen Reactions   • Sulfa Antibiotics Hives       Review of Systems   Constitutional: Negative for fatigue, fever and unexpected weight change.   Eyes: Negative.  Negative for photophobia and visual disturbance.   Respiratory: Negative for shortness of breath.    Cardiovascular: Negative for chest pain, palpitations and leg swelling.   Gastrointestinal: Negative for abdominal pain, constipation, diarrhea, nausea and vomiting.   Endocrine: Negative.    Genitourinary: Negative.    Musculoskeletal: Negative.    Skin: Negative.    Neurological: Positive for headaches.   Psychiatric/Behavioral: Positive for sleep disturbance. Negative for dysphoric mood and suicidal ideas.       Objective   Visit Vitals   • /90 (BP Location: Left arm, Patient Position: Sitting, Cuff Size: Large Adult)   • Pulse 100   • Ht 67\" (170.2 cm)   • Wt 217 lb 11.2 oz (98.7 kg)   • SpO2 97%   • BMI 34.1 kg/m2       Physical Exam   Constitutional: She is oriented to person, place, and time. She appears well-developed and well-nourished. No distress.   HENT:   Head: Normocephalic.   Nose: Nose normal.   Eyes: Conjunctivae " are normal.   Neck: Normal range of motion.   Pulmonary/Chest: Effort normal. No respiratory distress.   Abdominal: She exhibits no distension.   Musculoskeletal: Normal range of motion. She exhibits no edema.   Neurological: She is alert and oriented to person, place, and time. No cranial nerve deficit.   No focal neuro deficits   Skin: She is not diaphoretic.   Psychiatric: She has a normal mood and affect. Her behavior is normal. Judgment and thought content normal.   Nursing note and vitals reviewed.      Assessment/Plan   Mikki Joel is a 62 y.o. female seen today for the followin. Tension headache  Increase flexeril to 10 mg and take prn. Discussed sedation precautions. She is going to start counseling as well. Will try this for 4 weeks and if not working, will start trokendi    - cyclobenzaprine (FLEXERIL) 10 MG tablet; Take 1 tablet by mouth 2 (Two) Times a Day As Needed (tension headaches or neck pain).  Dispense: 60 tablet; Refill: 5    2. Generalized anxiety disorder  Add buspar. She is going to start counseling    - busPIRone (BUSPAR) 7.5 MG tablet; Take 1 tablet by mouth 2 (Two) Times a Day.  Dispense: 60 tablet; Refill: 11            This document has been electronically signed by Katheryn Corrigan DO on November 15, 2017 2:30 PM

## 2017-11-17 RX ORDER — MELOXICAM 15 MG/1
TABLET ORAL
Qty: 30 TABLET | Refills: 0 | Status: SHIPPED | OUTPATIENT
Start: 2017-11-17 | End: 2017-12-15 | Stop reason: SDUPTHER

## 2017-11-27 RX ORDER — NAPROXEN SODIUM 550 MG/1
TABLET ORAL
Qty: 60 TABLET | Refills: 0 | OUTPATIENT
Start: 2017-11-27

## 2017-11-28 ENCOUNTER — OFFICE VISIT (OUTPATIENT)
Dept: GASTROENTEROLOGY | Facility: CLINIC | Age: 62
End: 2017-11-28

## 2017-11-28 VITALS
SYSTOLIC BLOOD PRESSURE: 134 MMHG | HEART RATE: 88 BPM | HEIGHT: 67 IN | DIASTOLIC BLOOD PRESSURE: 82 MMHG | BODY MASS INDEX: 35 KG/M2 | WEIGHT: 223 LBS

## 2017-11-28 DIAGNOSIS — R10.13 EPIGASTRIC PAIN: ICD-10-CM

## 2017-11-28 DIAGNOSIS — K21.9 GASTROESOPHAGEAL REFLUX DISEASE, ESOPHAGITIS PRESENCE NOT SPECIFIED: ICD-10-CM

## 2017-11-28 DIAGNOSIS — Z86.010 ENCOUNTER FOR COLONOSCOPY DUE TO HISTORY OF COLONIC POLYP: Primary | ICD-10-CM

## 2017-11-28 DIAGNOSIS — Z12.11 ENCOUNTER FOR COLONOSCOPY DUE TO HISTORY OF COLONIC POLYP: Primary | ICD-10-CM

## 2017-11-28 PROCEDURE — 99214 OFFICE O/P EST MOD 30 MIN: CPT | Performed by: NURSE PRACTITIONER

## 2017-11-28 RX ORDER — DEXTROSE AND SODIUM CHLORIDE 5; .45 G/100ML; G/100ML
30 INJECTION, SOLUTION INTRAVENOUS CONTINUOUS PRN
Status: CANCELLED | OUTPATIENT
Start: 2017-11-28

## 2017-11-28 RX ORDER — DEXTROSE AND SODIUM CHLORIDE 5; .45 G/100ML; G/100ML
30 INJECTION, SOLUTION INTRAVENOUS CONTINUOUS PRN
Status: CANCELLED | OUTPATIENT
Start: 2018-01-19

## 2017-11-28 RX ORDER — SODIUM, POTASSIUM,MAG SULFATES 17.5-3.13G
1 SOLUTION, RECONSTITUTED, ORAL ORAL EVERY 12 HOURS
Qty: 2 BOTTLE | Refills: 0 | Status: ON HOLD | OUTPATIENT
Start: 2017-11-28 | End: 2018-01-19

## 2017-11-28 RX ORDER — PANTOPRAZOLE SODIUM 40 MG/1
40 TABLET, DELAYED RELEASE ORAL DAILY
COMMUNITY
End: 2018-02-01

## 2017-11-28 NOTE — PROGRESS NOTES
Chief Complaint   Patient presents with   • Colonoscopy     consult       Subjective    Mikki Joel is a 62 y.o. female. she is being seen for consultation today at the request of Katheryn Corrigan DO    History of Present Illness  62-year-old female presents to discuss screening colonoscopy.  She denies any current abdominal pain.  States she will intermittently have episodes of epigastric pain associated with nausea after meals but not able to pinpoint to a certain male.  Denies any vomiting.  States her bowel movements have been regular.  Reports Reflux is controlled with protonix.  States her last colonoscopy was in [place] which was about 5 years ago and states she had a colon polyp removed.  I do not have those results available.  She denies any family history of colon cancer.  Plan; we'll schedule patient for EGD due to epigastric pain and reflux disease.  Schedule screening colonoscopy due to personal history of colonic polyps.       The following portions of the patient's history were reviewed and updated as appropriate:   Past Medical History:   Diagnosis Date   • Anemia    • Anemia June 2016    might have been due to knee replacement surgery I had in mar   • Anxiety    • Arthritis    • Asthma    • Depression    • High blood pressure    • [place] (hard of hearing)    • Hypertension 1999    metoprolol 100 mg and triamterene/hydrochlorothiazide 37.5mg   • Migraine      Past Surgical History:   Procedure Laterality Date   • BUNIONECTOMY Bilateral    • COLONOSCOPY  2015   • EAR TUBES     • ENDOSCOPIC FUNCTIONAL SINUS SURGERY (FESS) Bilateral 9/5/2017    Procedure: BILATERAL ETHMOID AND MAXILLARY SINUS AND NASAL SEPTOPLASTIES AND BILATERAL TURBINECTOMIES;  Surgeon: Ramy Gaston MD;  Location: [facility];  Service:    • HAND SURGERY     • JOINT REPLACEMENT      right knee replacement 2016   • RHINOPLASTY      x3   • SHOULDER ARTHROSCOPY     • TONSILLECTOMY     • TUBAL ABDOMINAL LIGATION     • TUBAL  ABDOMINAL LIGATION     • UPPER GASTROINTESTINAL ENDOSCOPY       Family History   Problem Relation Age of Onset   • Hypertension Mother    • COPD Mother    • Arthritis Mother    • Cancer Father    • Arthritis Father    • Alcohol abuse Father    • COPD Maternal Grandmother    • Stroke Maternal Grandmother    • Irritable bowel syndrome Sister      OB History      Para Term  AB Living    2 2 2       SAB TAB Ectopic Multiple Live Births                Current Outpatient Prescriptions   Medication Sig Dispense Refill   • azelastine (ASTELIN) 0.1 % nasal spray 2 sprays into each nostril 2 (Two) Times a Day. Use in each nostril as directed 30 mL 11   • busPIRone (BUSPAR) 7.5 MG tablet Take 1 tablet by mouth 2 (Two) Times a Day. 60 tablet 11   • Cholecalciferol (VITAMIN D3) 5000 UNITS capsule capsule Take 5,000 Units by mouth Daily.     • cyclobenzaprine (FLEXERIL) 10 MG tablet Take 1 tablet by mouth 2 (Two) Times a Day As Needed (tension headaches or neck pain). May cause drowsiness 60 tablet 5   • FLUoxetine (PROzac) 40 MG capsule Take 1 capsule by mouth Daily. 30 capsule 11   • meloxicam (MOBIC) 15 MG tablet TAKE 1 TABLET BY MOUTH EVERY DAY 30 tablet 0   • metoprolol succinate XL (TOPROL XL) 100 MG 24 hr tablet Take 1 tablet by mouth Daily. 30 tablet 11   • Multiple Vitamin (MULTI VITAMIN DAILY PO) Take 1 tablet by mouth Daily.     • pantoprazole (PROTONIX) 40 MG EC tablet Take 40 mg by mouth Daily.     • rOPINIRole (REQUIP) 1 MG tablet Take 1 tablet by mouth Every Night. Take 1 hour before bedtime. 30 tablet 11   • SUMAtriptan (IMITREX) 25 MG tablet Take 1 tablet by mouth 1 (One) Time As Needed for Migraine. 30 tablet 1   • triamterene-hydrochlorothiazide (DYAZIDE) 37.5-25 MG per capsule Take 1 capsule by mouth Every Morning. 30 capsule 11   • zolpidem (AMBIEN) 5 MG tablet Take 1 tablet by mouth At Night As Needed for Sleep for up to 30 days. 30 tablet 3   • sodium-potassium-magnesium sulfates  "(SUPREP BOWEL PREP KIT) 17.5-3.13-1.6 GM/180ML solution oral solution Take 1 bottle by mouth Every 12 (Twelve) Hours. 2 bottle 0     No current facility-administered medications for this visit.      Allergies   Allergen Reactions   • Sulfa Antibiotics Hives     Social History     Social History   • Marital status:      Spouse name: N/A   • Number of children: N/A   • Years of education: N/A     Social History Main Topics   • Smoking status: Never Smoker   • Smokeless tobacco: Never Used      Comment: Never had the desire to try smoking   • Alcohol use Yes     2 Glasses of wine, 3 Shots of liquor, 2 Standard drinks or equivalent per week      Comment: per month   • Drug use: No   • Sexual activity: Yes     Partners: Male     Birth control/ protection: Post-menopausal, Surgical, None      Comment: then tubal ligation after last child at age 32.     Other Topics Concern   • None     Social History Narrative       Review of Systems  Review of Systems   Constitutional: Negative for activity change, appetite change, chills, diaphoresis, fatigue, fever and unexpected weight change.   HENT: Negative for sore throat and trouble swallowing.    Respiratory: Negative for shortness of breath.    Gastrointestinal: Positive for nausea. Negative for abdominal distention, abdominal pain, anal bleeding, blood in stool, constipation, diarrhea, rectal pain and vomiting.   Musculoskeletal: Negative for arthralgias.   Skin: Negative for pallor.   Neurological: Negative for light-headedness.        /82 (BP Location: Left arm, Patient Position: Sitting, Cuff Size: Adult)  Pulse 88  Ht 67\" (170.2 cm)  Wt 223 lb (101 kg)  BMI 34.93 kg/m2    Objective    Physical Exam   Constitutional: She is oriented to person, place, and time. She appears well-developed and well-nourished. She is cooperative. No distress.   HENT:   Head: Normocephalic and atraumatic.   Neck: Normal range of motion. Neck supple. No thyromegaly present. "   Cardiovascular: Normal rate, regular rhythm and normal heart sounds.    Pulmonary/Chest: Effort normal and breath sounds normal. She has no wheezes. She has no rhonchi. She has no rales.   Abdominal: Soft. Normal appearance and bowel sounds are normal. She exhibits no shifting dullness and no distension. There is no hepatosplenomegaly. There is tenderness in the epigastric area. There is no rigidity and no guarding. No hernia.   Lymphadenopathy:     She has no cervical adenopathy.   Neurological: She is alert and oriented to person, place, and time.   Skin: Skin is warm, dry and intact. No rash noted. No pallor.   Psychiatric: She has a normal mood and affect. Her speech is normal.     Admission on 09/05/2017, Discharged on 09/05/2017   Component Date Value Ref Range Status   • Wound Culture 09/05/2017 Light growth (2+) Pseudomonas aeruginosa*  Final    Pseudomonas aeruginosa may develop resistance during prolonged therapy with all antibiotics. Isolates that are initially susceptible may become resistant within 3 or 4 days. Repeat susceptibility testing may be indicated.     • Wound Culture 09/05/2017 Light growth (2+) Serratia marcescens*  Final   • Wound Culture 09/05/2017 Light growth (2+) Streptococcus mitis / oralis*  Final    Susceptibility upon request.     • Gram Stain Result 09/05/2017 Moderate (3+) WBCs seen   Final   • Gram Stain Result 09/05/2017 Rare (1+) Gram negative bacilli   Final   • Case Report 09/05/2017    Final                    Value:Surgical Pathology Report                         Case: [id number]                                  Authorizing Provider:  Ramy Gaston MD        Collected:           09/05/2017 11:56 AM          Ordering Location:     [facility]             Received:            09/05/2017 01:26 PM                                 [facility]                                                              Pathologist:           Deven Conner MD                                                           Specimens:   1) - Naris, Left, LEFT SINUS                                                                        2) - Naris, Right, RIGHT SINUS                                                            • Final Diagnosis 09/05/2017    Final                    Value:This result contains rich text formatting which cannot be displayed here.   • Gross Description 09/05/2017    Final                    Value:This result contains rich text formatting which cannot be displayed here.     Assessment/Plan      1. Encounter for colonoscopy due to history of colonic polyp    2. Gastroesophageal reflux disease, esophagitis presence not specified    3. Epigastric pain    .       Orders placed during this encounter include:    COLONOSCOPY (N/A)    Review and/or summary of lab tests, radiology, procedures, medications. Review and summary of old records and obtaining of history. The risks and benefits of my recommendations, as well as other treatment options were discussed with the patient today. Questions were answered.    New Medications Ordered This Visit   Medications   • sodium-potassium-magnesium sulfates (SUPREP BOWEL PREP KIT) 17.5-3.13-1.6 GM/180ML solution oral solution     Sig: Take 1 bottle by mouth Every 12 (Twelve) Hours.     Dispense:  2 bottle     Refill:  0       Follow-up: Return in about 4 weeks (around 12/26/2017).          This document has been electronically signed by MIRIAM White on November 28, 2017 3:07 PM             Results for orders placed or performed during the hospital encounter of 09/05/17   Tissue Pathology Exam   Result Value Ref Range    Case Report       Surgical Pathology Report                         Case: [id number]                                  Authorizing Provider:  Ramy Gaston MD        Collected:           09/05/2017 11:56 AM          Ordering Location:     [facility]             Received:            09/05/2017 01:26 PM                   "[facility] OR                                                              Pathologist:           Deven Conner MD                                                          Specimens:   1) - Naris, Left, LEFT SINUS                                                                        2) - Naris, Right, RIGHT SINUS                                                             Final Diagnosis       1.  MUCOSA, NASAL SINUS, LEFT SIDE:  CHRONIC SINUSITIS OF RESPIRATORY MUCOSA IN RELATION TO BITS OF BONE.  NEGATIVE FOR FUNGI (GMS STAIN).    2.  MUCOSA, NASAL SINUS, RIGHT SIDE:  CHRONIC SINUSITIS OF RESPIRATORY MUCOSA IN RELATION TO BITS OF BONE.  NEGATIVE FOR FUNGI (GMS STAIN).      Gross Description       1.  The first container is labeled \"left sinus, nose\" and has pinkish white mucosa measuring 1.5 cc in aggregate.  The entire specimen is embedded as 1A.    2.  The second container is labeled \"right sinus, nose\" and has pinkish white mucosa measuring 1.5 cc in aggregate.  The entire specimen is embedded as 2A.       Embedded Images     Wound Culture   Result Value Ref Range    Wound Culture Light growth (2+) Pseudomonas aeruginosa (A)     Wound Culture Light growth (2+) Serratia marcescens (A)     Wound Culture Light growth (2+) Streptococcus mitis / oralis (A)     Gram Stain Result Moderate (3+) WBCs seen     Gram Stain Result Rare (1+) Gram negative bacilli        Susceptibility    Pseudomonas aeruginosa - CHAR     Ceftazidime 4  ug/ml     Cefuroxime sodium >16  ug/ml     Gentamicin <=4 Susceptible ug/ml     Levofloxacin <=2 Susceptible ug/ml     Piperacillin + Tazobactam <=16  ug/ml     Tobramycin <=4 Susceptible ug/ml    Serratia marcescens - CHAR     Amikacin <=16 Susceptible ug/ml     Amoxicillin + Clavulanate >16/8 Resistant ug/ml     Ampicillin >16 Resistant ug/ml     Ampicillin + Sulbactam >16/8 Resistant ug/ml     Cefazolin >16 Resistant ug/ml     Cefotaxime <=2  ug/ml     Cefoxitin 16 " Intermediate ug/ml     Ceftazidime <=1  ug/ml     Ceftriaxone <=8  ug/ml     Cefuroxime sodium >16 Resistant ug/ml     Ciprofloxacin 2 Intermediate ug/ml     Gentamicin <=4 Susceptible ug/ml     Imipenem <=4 Susceptible ug/ml     Levofloxacin <=2 Susceptible ug/ml     Meropenem <=4 Susceptible ug/ml     Nitrofurantoin >64  ug/ml     Piperacillin + Tazobactam <=16  ug/ml     Tetracycline <=4 Susceptible ug/ml     Tobramycin <=4 Susceptible ug/ml     Trimethoprim + Sulfamethoxazole <=2/38 Susceptible ug/ml   Results for orders placed or performed in visit on 08/31/17   Basic Metabolic Panel   Result Value Ref Range    Glucose 93 60 - 100 mg/dL    BUN 24 (H) 7 - 21 mg/dL    Creatinine 0.82 0.50 - 1.00 mg/dL    Sodium 136 (L) 137 - 145 mmol/L    Potassium 4.7 3.5 - 5.1 mmol/L    Chloride 100 95 - 110 mmol/L    CO2 26.0 22.0 - 31.0 mmol/L    Calcium 9.6 8.4 - 10.2 mg/dL    eGFR Non  Amer 70 45 - 104 mL/min/1.73    BUN/Creatinine Ratio 29.3 (H) 7.0 - 25.0    Anion Gap 10.0 5.0 - 15.0 mmol/L   Results for orders placed or performed in visit on 08/07/17   Sedimentation Rate   Result Value Ref Range    Sed Rate 9 0 - 20 mm/hr   Rheumatoid Factor   Result Value Ref Range    Rheumatoid Factor Qualitative Negative Negative   C-reactive protein   Result Value Ref Range    C-Reactive Protein 1.00 0.00 - 1.00 mg/dL   DERRICK   Result Value Ref Range    DERRICK Direct Negative Negative   Results for orders placed or performed in visit on 08/01/17   Hepatitis C Antibody   Result Value Ref Range    Hepatitis C Ab Negative Negative   Hemoglobin A1c   Result Value Ref Range    Hemoglobin A1C 5.7 (H) 4 - 5.6 %   Results for orders placed or performed in visit on 05/31/17   Wound Culture   Result Value Ref Range    Wound Culture Light growth (2+) Serratia marcescens (A)     Beta Lactamase      Gram Stain Result Few (2+) WBCs seen     Gram Stain Result Rare (1+) Gram negative bacilli        Susceptibility    Serratia marcescens - CHAR      Amikacin <=16 Susceptible ug/ml     Amoxicillin + Clavulanate >16/8 Resistant ug/ml     Ampicillin >16 Resistant ug/ml     Ampicillin + Sulbactam >16/8 Resistant ug/ml     Cefazolin >16 Resistant ug/ml     Cefotaxime <=2  ug/ml     Cefoxitin 16 Intermediate ug/ml     Ceftazidime <=1  ug/ml     Ceftriaxone <=8  ug/ml     Cefuroxime >16 Resistant ug/ml     Ciprofloxacin <=1 Susceptible ug/ml     Gentamicin <=4 Susceptible ug/ml     Imipenem <=4 Susceptible ug/ml     Levofloxacin <=2 Susceptible ug/ml     Meropenem <=4 Susceptible ug/ml     Nitrofurantoin >64  ug/ml     Piperacillin + Tazobactam <=16  ug/ml     Tetracycline 8 Intermediate ug/ml     Tobramycin <=4 Susceptible ug/ml     Trimethoprim + Sulfamethoxazole <=2/38 Susceptible ug/ml

## 2017-12-05 ENCOUNTER — TELEPHONE (OUTPATIENT)
Dept: OTOLARYNGOLOGY | Facility: CLINIC | Age: 62
End: 2017-12-05

## 2017-12-06 ENCOUNTER — TELEPHONE (OUTPATIENT)
Dept: FAMILY MEDICINE CLINIC | Facility: CLINIC | Age: 62
End: 2017-12-06

## 2017-12-06 NOTE — TELEPHONE ENCOUNTER
----- Message from Katheryn Corrigan DO sent at 12/6/2017  9:15 AM CST -----  Regarding: RE: Topamax  Yes, thanks   ----- Message -----     From: Gail Griffin MA     Sent: 12/6/2017   7:39 AM       To: Katheryn Corrigan DO  Subject: Topamax                                          Patient has called and asked for an RX for Topamax. I do not see this on her current meds list. Do you want her to make an appointment?  Thanks Gail

## 2017-12-07 ENCOUNTER — TELEPHONE (OUTPATIENT)
Dept: OTOLARYNGOLOGY | Facility: CLINIC | Age: 62
End: 2017-12-07

## 2017-12-11 ENCOUNTER — OFFICE VISIT (OUTPATIENT)
Dept: OTOLARYNGOLOGY | Facility: CLINIC | Age: 62
End: 2017-12-11

## 2017-12-11 VITALS — TEMPERATURE: 97.7 F | BODY MASS INDEX: 35 KG/M2 | WEIGHT: 223 LBS | HEIGHT: 67 IN

## 2017-12-11 DIAGNOSIS — H72.93 PERFORATED TYMPANIC MEMBRANE, BILATERAL: Primary | ICD-10-CM

## 2017-12-11 DIAGNOSIS — J30.0 CHRONIC VASOMOTOR RHINITIS: ICD-10-CM

## 2017-12-11 PROCEDURE — 99213 OFFICE O/P EST LOW 20 MIN: CPT | Performed by: OTOLARYNGOLOGY

## 2017-12-11 RX ORDER — IPRATROPIUM BROMIDE 42 UG/1
2 SPRAY, METERED NASAL 3 TIMES DAILY
Qty: 15 ML | Refills: 5 | Status: SHIPPED | OUTPATIENT
Start: 2017-12-11 | End: 2018-04-03

## 2017-12-11 NOTE — PROGRESS NOTES
Subjective   Mikki Joel is a 62 y.o. female.   Cc: previously seen for sinus problems now with ear drainage    History of Present Illness   Patient with a history of sinus nasal problems now some postnasal drip but more concerned about ear drainage particularly left ear she's not any fever chills she has long-standing ear problems and history of perforations in both ears      The following portions of the patient's history were reviewed and updated as appropriate: allergies, current medications, past family history, past medical history, past social history, past surgical history and problem list.      Current Outpatient Prescriptions:   •  azelastine (ASTELIN) 0.1 % nasal spray, 2 sprays into each nostril 2 (Two) Times a Day. Use in each nostril as directed, Disp: 30 mL, Rfl: 11  •  busPIRone (BUSPAR) 7.5 MG tablet, Take 1 tablet by mouth 2 (Two) Times a Day., Disp: 60 tablet, Rfl: 11  •  Cholecalciferol (VITAMIN D3) 5000 UNITS capsule capsule, Take 5,000 Units by mouth Daily., Disp: , Rfl:   •  cyclobenzaprine (FLEXERIL) 10 MG tablet, Take 1 tablet by mouth 2 (Two) Times a Day As Needed (tension headaches or neck pain). May cause drowsiness, Disp: 60 tablet, Rfl: 5  •  FLUoxetine (PROzac) 40 MG capsule, Take 1 capsule by mouth Daily., Disp: 30 capsule, Rfl: 11  •  meloxicam (MOBIC) 15 MG tablet, TAKE 1 TABLET BY MOUTH EVERY DAY, Disp: 30 tablet, Rfl: 0  •  metoprolol succinate XL (TOPROL XL) 100 MG 24 hr tablet, Take 1 tablet by mouth Daily., Disp: 30 tablet, Rfl: 11  •  Multiple Vitamin (MULTI VITAMIN DAILY PO), Take 1 tablet by mouth Daily., Disp: , Rfl:   •  pantoprazole (PROTONIX) 40 MG EC tablet, Take 40 mg by mouth Daily., Disp: , Rfl:   •  rOPINIRole (REQUIP) 1 MG tablet, Take 1 tablet by mouth Every Night. Take 1 hour before bedtime., Disp: 30 tablet, Rfl: 11  •  sodium-potassium-magnesium sulfates (SUPREP BOWEL PREP KIT) 17.5-3.13-1.6 GM/180ML solution oral solution, Take 1 bottle by mouth Every 12  (Twelve) Hours., Disp: 2 bottle, Rfl: 0  •  SUMAtriptan (IMITREX) 25 MG tablet, Take 1 tablet by mouth 1 (One) Time As Needed for Migraine., Disp: 30 tablet, Rfl: 1  •  triamterene-hydrochlorothiazide (DYAZIDE) 37.5-25 MG per capsule, Take 1 capsule by mouth Every Morning., Disp: 30 capsule, Rfl: 11  •  zolpidem (AMBIEN) 5 MG tablet, Take 1 tablet by mouth At Night As Needed for Sleep for up to 30 days., Disp: 30 tablet, Rfl: 3  •  ciprofloxacin-hydrocortisone (CIPRO HC) 0.2-1 % otic suspension, Administer 3 drops into both ears 2 (Two) Times a Day., Disp: 10 mL, Rfl: 1  •  ipratropium (ATROVENT) 0.06 % nasal spray, 2 sprays into each nostril 3 (Three) Times a Day., Disp: 15 mL, Rfl: 5    Allergies   Allergen Reactions   • Sulfa Antibiotics Hives            Review of Systems   Constitutional: Negative for fever.   HENT: Positive for ear discharge and postnasal drip. Negative for facial swelling.            Objective   Physical Exam   Constitutional: She is oriented to person, place, and time. She appears well-developed and well-nourished.   HENT:   Head: Normocephalic and atraumatic.   Right Ear: Hearing, external ear and ear canal normal. Tympanic membrane is scarred and perforated.   Left Ear: Hearing, external ear and ear canal normal. There is drainage. Tympanic membrane is scarred, perforated and erythematous.   Nose: Nose normal. No mucosal edema, rhinorrhea, nasal deformity or septal deviation. No epistaxis. Right sinus exhibits no maxillary sinus tenderness and no frontal sinus tenderness. Left sinus exhibits no maxillary sinus tenderness and no frontal sinus tenderness.   Mouth/Throat: Uvula is midline and oropharynx is clear and moist. No trismus in the jaw. Normal dentition. No oropharyngeal exudate or posterior oropharyngeal edema.   Eyes: Conjunctivae and EOM are normal.   Neck: Normal range of motion. Neck supple. No JVD present. No tracheal deviation present. No thyromegaly present.   Cardiovascular:  Normal rate and regular rhythm.    Pulmonary/Chest: Effort normal.   Musculoskeletal: Normal range of motion.   Lymphadenopathy:        Head (right side): No submental, no submandibular, no tonsillar, no preauricular, no posterior auricular and no occipital adenopathy present.        Head (left side): No submental, no submandibular, no tonsillar, no preauricular, no posterior auricular and no occipital adenopathy present.     She has no cervical adenopathy.        Right cervical: No superficial cervical, no deep cervical and no posterior cervical adenopathy present.       Left cervical: No superficial cervical, no deep cervical and no posterior cervical adenopathy present.   Neurological: She is alert and oriented to person, place, and time. No cranial nerve deficit.   Skin: Skin is warm.   Psychiatric: She has a normal mood and affect. Her speech is normal and behavior is normal. Thought content normal.   Nursing note and vitals reviewed.      Her nose and sinuses are doing well better ears show evidence of left ear infection area was then chronic infection    Assessment/Plan   Mikki was seen today for follow-up.    Diagnoses and all orders for this visit:    Perforated tympanic membrane, bilateral    Chronic vasomotor rhinitis    Other orders  -     ciprofloxacin-hydrocortisone (CIPRO HC) 0.2-1 % otic suspension; Administer 3 drops into both ears 2 (Two) Times a Day.  -     ipratropium (ATROVENT) 0.06 % nasal spray; 2 sprays into each nostril 3 (Three) Times a Day.    keep ears dry   Use spray -call if problems  Call if not improving otherwise or recheck next month

## 2017-12-15 RX ORDER — MELOXICAM 15 MG/1
TABLET ORAL
Qty: 30 TABLET | Refills: 0 | Status: SHIPPED | OUTPATIENT
Start: 2017-12-15 | End: 2018-04-02

## 2017-12-19 ENCOUNTER — CLINICAL SUPPORT (OUTPATIENT)
Dept: AUDIOLOGY | Facility: CLINIC | Age: 62
End: 2017-12-19

## 2017-12-19 DIAGNOSIS — Z71.89 ENCOUNTER FOR HEARING AID CONSULTATION: Primary | ICD-10-CM

## 2017-12-19 PROCEDURE — HEARINGNOCHG: Performed by: AUDIOLOGIST

## 2017-12-20 ENCOUNTER — OFFICE VISIT (OUTPATIENT)
Dept: PODIATRY | Facility: CLINIC | Age: 62
End: 2017-12-20

## 2017-12-20 VITALS — HEIGHT: 67 IN | BODY MASS INDEX: 35 KG/M2 | WEIGHT: 223 LBS

## 2017-12-20 DIAGNOSIS — M72.2 PLANTAR FASCIITIS: Primary | ICD-10-CM

## 2017-12-20 PROCEDURE — 20550 NJX 1 TENDON SHEATH/LIGAMENT: CPT | Performed by: PODIATRIST

## 2017-12-20 RX ORDER — TRIAMCINOLONE ACETONIDE 40 MG/ML
10 INJECTION, SUSPENSION INTRA-ARTICULAR; INTRAMUSCULAR ONCE
Status: COMPLETED | OUTPATIENT
Start: 2017-12-20 | End: 2017-12-20

## 2017-12-20 RX ORDER — DEXAMETHASONE SODIUM PHOSPHATE 4 MG/ML
2 INJECTION, SOLUTION INTRA-ARTICULAR; INTRALESIONAL; INTRAMUSCULAR; INTRAVENOUS; SOFT TISSUE ONCE
Status: COMPLETED | OUTPATIENT
Start: 2017-12-20 | End: 2017-12-20

## 2017-12-20 RX ORDER — SUMATRIPTAN 25 MG/1
TABLET, FILM COATED ORAL
Qty: 30 TABLET | Refills: 0 | Status: SHIPPED | OUTPATIENT
Start: 2017-12-20 | End: 2018-01-24 | Stop reason: SDUPTHER

## 2017-12-20 RX ORDER — BUPIVACAINE HYDROCHLORIDE 5 MG/ML
1 INJECTION, SOLUTION EPIDURAL; INTRACAUDAL ONCE
Status: COMPLETED | OUTPATIENT
Start: 2017-12-20 | End: 2017-12-20

## 2017-12-20 RX ADMIN — DEXAMETHASONE SODIUM PHOSPHATE 2 MG: 4 INJECTION, SOLUTION INTRA-ARTICULAR; INTRALESIONAL; INTRAMUSCULAR; INTRAVENOUS; SOFT TISSUE at 14:40

## 2017-12-20 RX ADMIN — BUPIVACAINE HYDROCHLORIDE 1 ML: 5 INJECTION, SOLUTION EPIDURAL; INTRACAUDAL at 14:40

## 2017-12-20 RX ADMIN — TRIAMCINOLONE ACETONIDE 10 MG: 40 INJECTION, SUSPENSION INTRA-ARTICULAR; INTRAMUSCULAR at 14:40

## 2017-12-20 NOTE — PROGRESS NOTES
Mikki Joel  1955  62 y.o. female     Patient presents today for a follow-up of bilateral foot pain.  Patient states her pain of the left foot is an 8/10.  Patient received orthotics about 2 weeks.     12/20/2017  Chief Complaint   Patient presents with   • Left Foot - Follow-up           History of Present Illness    Patient presents to clinic today for follow-up of her bilateral foot pain.  She has obtained her custom orthotics and has them in her shoes currently.  She states they're comfortable.  The pain in the right foot has resolved.  She continues to complain of pain in the left foot.  She rates as 8 out of 10.  It is located to the bottom of the heel and the midfoot.  No other pedal complaints.        Past Medical History:   Diagnosis Date   • Anemia    • Anemia June 2016    might have been due to knee replacement surgery I had in mar   • Anxiety    • Arthritis    • Asthma    • Depression    • High blood pressure    • [place] (hard of hearing)    • Hypertension 1999    metoprolol 100 mg and triamterene/hydrochlorothiazide 37.5mg   • Migraine          Past Surgical History:   Procedure Laterality Date   • BUNIONECTOMY Bilateral    • COLONOSCOPY  2015   • EAR TUBES     • ENDOSCOPIC FUNCTIONAL SINUS SURGERY (FESS) Bilateral 9/5/2017    Procedure: BILATERAL ETHMOID AND MAXILLARY SINUS AND NASAL SEPTOPLASTIES AND BILATERAL TURBINECTOMIES;  Surgeon: Ramy Gaston MD;  Location: [facility];  Service:    • HAND SURGERY     • JOINT REPLACEMENT      right knee replacement 2016   • RHINOPLASTY      x3   • SHOULDER ARTHROSCOPY     • TONSILLECTOMY     • TUBAL ABDOMINAL LIGATION     • TUBAL ABDOMINAL LIGATION  1987   • UPPER GASTROINTESTINAL ENDOSCOPY  2016         Family History   Problem Relation Age of Onset   • Hypertension Mother    • COPD Mother    • Arthritis Mother    • Cancer Father    • Arthritis Father    • Alcohol abuse Father    • COPD Maternal Grandmother    • Stroke Maternal Grandmother    •  Irritable bowel syndrome Sister        Allergies   Allergen Reactions   • Sulfa Antibiotics Hives       Social History     Social History   • Marital status:      Spouse name: N/A   • Number of children: N/A   • Years of education: N/A     Occupational History   • Not on file.     Social History Main Topics   • Smoking status: Never Smoker   • Smokeless tobacco: Never Used      Comment: Never had the desire to try smoking   • Alcohol use Yes     2 Glasses of wine, 3 Shots of liquor, 2 Standard drinks or equivalent per week      Comment: per month   • Drug use: No   • Sexual activity: Yes     Partners: Male     Birth control/ protection: Post-menopausal, Surgical, None      Comment: then tubal ligation after last child at age 32.     Other Topics Concern   • Not on file     Social History Narrative         Current Outpatient Prescriptions   Medication Sig Dispense Refill   • azelastine (ASTELIN) 0.1 % nasal spray 2 sprays into each nostril 2 (Two) Times a Day. Use in each nostril as directed 30 mL 11   • busPIRone (BUSPAR) 7.5 MG tablet Take 1 tablet by mouth 2 (Two) Times a Day. 60 tablet 11   • Cholecalciferol (VITAMIN D3) 5000 UNITS capsule capsule Take 5,000 Units by mouth Daily.     • ciprofloxacin-hydrocortisone (CIPRO HC) 0.2-1 % otic suspension Administer 3 drops into both ears 2 (Two) Times a Day. 10 mL 1   • cyclobenzaprine (FLEXERIL) 10 MG tablet Take 1 tablet by mouth 2 (Two) Times a Day As Needed (tension headaches or neck pain). May cause drowsiness 60 tablet 5   • FLUoxetine (PROzac) 40 MG capsule Take 1 capsule by mouth Daily. 30 capsule 11   • ipratropium (ATROVENT) 0.06 % nasal spray 2 sprays into each nostril 3 (Three) Times a Day. 15 mL 5   • meloxicam (MOBIC) 15 MG tablet TAKE 1 TABLET BY MOUTH EVERY DAY 30 tablet 0   • metoprolol succinate XL (TOPROL XL) 100 MG 24 hr tablet Take 1 tablet by mouth Daily. 30 tablet 11   • Multiple Vitamin (MULTI VITAMIN DAILY PO) Take 1 tablet by mouth  "Daily.     • pantoprazole (PROTONIX) 40 MG EC tablet Take 40 mg by mouth Daily.     • rOPINIRole (REQUIP) 1 MG tablet Take 1 tablet by mouth Every Night. Take 1 hour before bedtime. 30 tablet 11   • sodium-potassium-magnesium sulfates (SUPREP BOWEL PREP KIT) 17.5-3.13-1.6 GM/180ML solution oral solution Take 1 bottle by mouth Every 12 (Twelve) Hours. 2 bottle 0   • SUMAtriptan (IMITREX) 25 MG tablet TAKE 1 TABLET BY MOUTH 1 TIME AS NEEDED FOR MIGRAINE 30 tablet 0   • triamterene-hydrochlorothiazide (DYAZIDE) 37.5-25 MG per capsule Take 1 capsule by mouth Every Morning. 30 capsule 11     Current Facility-Administered Medications   Medication Dose Route Frequency Provider Last Rate Last Dose   • bupivacaine (PF) (MARCAINE) 0.5 % injection 1 mL  1 mL Injection Once Brando Brice DPM       • dexamethasone (DECADRON) injection 2 mg  2 mg Intramuscular Once Brando Brice DPM       • triamcinolone acetonide (KENALOG-40) injection 10 mg  10 mg Intramuscular Once Brando Brice DPM             OBJECTIVE    Ht 170.2 cm (67.01\")  Wt 101 kg (223 lb)  BMI 34.92 kg/m2      Review of Systems   Constitutional: Positive for activity change and fatigue.   HENT: Positive for hearing loss.    Musculoskeletal: Positive for arthralgias and back pain.        Foot pain  Joint pain   Neurological: Positive for headaches.   Psychiatric/Behavioral:        Depression   All other systems reviewed and are negative.        Constitutional: well developed, well nourished    HEENT: Normocephalic and atraumatic, normal hearing    Respiratory: Non labored respirations noted    Cardiovascular:    DP/PT pulses palpable    CFT brisk  to all digits  Skin temp is warm to warm from proximal tibia to distal digits  Pedal hair growth present.   No erythema or edema noted     Musculoskeletal:  Muscle strength is 5/5 for all muscle groups tested   Pain with mobilization of the left fourth and fifth tarsometatarsal joints   Pain on palpation to the medial " tubercle of the left calcaneus.  Negative lateral squeeze test.  Rectus foot type     Dermatological:   Skin is warm, dry and intact    No subcutaneous nodules or masses noted    No open wounds noted     Neurological:   Sensation intact to light touch    DTR intact    Psychiatric: A&O x 3 with normal mood and affect. NAD.         Procedures    Plantar Fasciits Injection  Date/Time: 12/20/17  Performed by: SALIMA SYED  Authorized by: SALIMA SYED   Consent: Verbal consent obtained. Written consent obtained.  Risks and benefits: risks, benefits and alternatives were discussed  Consent given by: patient  Patient identity confirmed: verbally with patient  Indications: pain relief  Nerve block body site: left  heel.  Sedation:  Patient sedated: no    Patient position: sitting  Needle size: 25 G  Local anesthetic: 0.5% Marcaine plain, Kenalog 40 mg/ml , Decadron 4 mg/mL.   Outcome: pain improved  Patient tolerance: Patient tolerated the procedure well with no immediate complications            ASSESSMENT AND CRISTAL    Mikki was seen today for follow-up.    Diagnoses and all orders for this visit:    Plantar fasciitis  -     bupivacaine (PF) (MARCAINE) 0.5 % injection 1 mL; Inject 1 mL as directed 1 (One) Time.  -     dexamethasone (DECADRON) injection 2 mg; Inject 0.5 mL into the shoulder, thigh, or buttocks 1 (One) Time.  -     triamcinolone acetonide (KENALOG-40) injection 10 mg; Inject 0.25 mL into the shoulder, thigh, or buttocks 1 (One) Time.      - Steroid injection left heel  - Continue stretching and custom orthotics  - All questions were answered to the patients satisfaction.  - RTC symptoms worsen or fail to          This document has been electronically signed by Salima Syed DPM on December 20, 2017 3:50 PM     12/20/2017  3:50 PM

## 2017-12-21 NOTE — PROGRESS NOTES
HEARING AID CONSULT    Name:  Mikki Joel  :  1955  Age:  62 y.o.  Date of Evaluation:  2017      HISTORY    Reason for visit:  Mikki Joel is seen today for a hearing aid consultation at the request of herself.  A previous audiogram completed on 2017 shows mild to moderate rising mixed hearing loss  for right ear, and moderate to severe sloping mixed hearing loss  in left ear.  Patient reports she has lost her right hearing aid.  It is no longer under warranty, and she wishes to purchase new hearing aids similar to what she has now.  She currently wears Siemens Pure 7 Binax ARMANI hearing aids.    Hearing aid history:  Patient currently wears  In the Canal hearing aids in both ears.      RECOMMENDATIONS:  Amplification options were discussed.  During the Hearing Aid discussion, Ms. Joel has chosen 2  in the Canal (ARMANI) hearing aid(s) for both ears.  The hearing aid recommended is from Siemens with the Pure 7 Primax M digital circuit. The cost of this hearing aid is $2,300.00 per aid for a total of $4,600.00 .  The hearing aids have been ordered, and an appointment has been made for her fitting.      It was determined that her insurance will pay 90% of the total bill up to $6,000.00 after her deductible has been met.  Since she has already met her deductible for this year, she is interested in having these hearing aids fit and billed this year before the end of 2017.  Therefore, patient's portion of $460.00 will be due at the time of fitting.       It was a pleasure seeing Mikki Joel in Audiology today.  I look forward to helping Ms. Joel with her amplification needs.            This document has been electronically signed by Shanell Berrios MS CCC-ERICK on 2017 2:08 PM       Shanell Berrios MS CCC-A  Licensed Audiologist    For Billing and Coding:  Z71.89  Encounter for Hearing Aid Consultation - no charge

## 2017-12-29 ENCOUNTER — CLINICAL SUPPORT (OUTPATIENT)
Dept: AUDIOLOGY | Facility: CLINIC | Age: 62
End: 2017-12-29

## 2017-12-29 DIAGNOSIS — Z46.1 ENCOUNTER FOR FITTING OR ADJUSTMENT OF HEARING AID: Primary | ICD-10-CM

## 2017-12-29 PROCEDURE — V5261 HEARING AID, DIGIT, BIN, BTE: HCPCS | Performed by: AUDIOLOGIST

## 2017-12-29 NOTE — PROGRESS NOTES
HEARING AID FITTING    Name:  Mikki Joel  :  1955  Age:  62 y.o.  Date of Evaluation:  2017      HISTORY    Reason for visit:  Mikki Joel is seen today for a hearing aid fitting.  The hearing aids to be fit are  in the Canal (ARMANI) hearing aids from [email or web address] with the Pure 7PX BRN digital circuit.    Right Hearing Aid Serial Number: [id number]  Left Hearing Aid Serial Number:[id number]      Warranty Expiration:  's warranty extends through 2020 which covers repairs, loss and damage.    Battery Size:  312    The hearing aids were fit to Ms. Joel's ears.  Patient reported the fit was comfortable and the sound was good.  Patient was instructed on use and care of the hearing instruments.  The necessary paperwork was signed.  All questions were answered at this time.  The cost of this hearing aid is $2300.00 per aid for a total of $4600.00 which be due at the time of fitting.  The cost of this hearing aid is $2300.00 per aid for a total of $4600.00.  Patient's insurance will be billed for 90% of the cost, which is $4140.00  The patient will be billed for the remainder 10%, which is $460.00.    Ms. Joel will return in 2 weeks for a hearing aid follow up.  At that time we will make adjustments to the hearing aid(s) and address problems as necessary.      It was a pleasure seeing Mikki Joel in Audiology today.  It is a pleasure helping Ms. Joel with her amplification needs.               This document has been electronically signed by CHESTER Payne on 2017 5:45 PM            CHESTER Payne  Licensed Audiologist      For Billing and Coding:  Please bill the following to the patient's insurance    Hearing Aid, Digital Binaural BTE - $4600.00

## 2018-01-11 ENCOUNTER — CLINICAL SUPPORT (OUTPATIENT)
Dept: AUDIOLOGY | Facility: CLINIC | Age: 63
End: 2018-01-11

## 2018-01-11 DIAGNOSIS — Z46.1 ENCOUNTER FOR FITTING OR ADJUSTMENT OF HEARING AID: Primary | ICD-10-CM

## 2018-01-11 PROCEDURE — HEARINGNOCHG: Performed by: AUDIOLOGIST

## 2018-01-11 NOTE — PROGRESS NOTES
HEARING AID CHECK    Name:  Mikki Joel  :  1955  Age:  62 y.o.  Date of Evaluation:  2018      HISTORY    Reason for visit:  Mikki Joel is seen today for a hearing aid follow up.  Patient reports she is doing well with the new hearing aids, and she wears them every day.  She did state she thinks the aids needs to be turned up and she isn't hearing the TV as well.      Hearing aid history:  Patient is currently wearing a  in the Canal (ARMANI) hearing aid in both ear(s).     OFFICE VISIT    During today's visit master gain was increased 2 times.  She reported this sounded better.  She will return for hearing aid assistance as necessary.      It was a pleasure seeing Mikki Joel in Audiology today.  It is a pleasure helping Ms. Joel with her amplification needs.             This document has been electronically signed by Shanell Berrios MS CCC-A on 2018 2:54 PM       Shanell Berrios MS CCC-A  Licensed Audiologist    For Billing and Codin  Hearing Aid Check, Binaural - no charge

## 2018-01-19 ENCOUNTER — HOSPITAL ENCOUNTER (OUTPATIENT)
Facility: HOSPITAL | Age: 63
Setting detail: HOSPITAL OUTPATIENT SURGERY
Discharge: HOME | End: 2018-01-19
Attending: INTERNAL MEDICINE | Admitting: INTERNAL MEDICINE

## 2018-01-19 ENCOUNTER — ANESTHESIA (OUTPATIENT)
Dept: GASTROENTEROLOGY | Facility: HOSPITAL | Age: 63
End: 2018-01-19

## 2018-01-19 ENCOUNTER — ANESTHESIA EVENT (OUTPATIENT)
Dept: GASTROENTEROLOGY | Facility: HOSPITAL | Age: 63
End: 2018-01-19

## 2018-01-19 VITALS
HEART RATE: 92 BPM | OXYGEN SATURATION: 99 % | WEIGHT: 213.85 LBS | RESPIRATION RATE: 16 BRPM | BODY MASS INDEX: 33.56 KG/M2 | SYSTOLIC BLOOD PRESSURE: 113 MMHG | TEMPERATURE: 98.1 F | HEIGHT: 67 IN | DIASTOLIC BLOOD PRESSURE: 72 MMHG

## 2018-01-19 DIAGNOSIS — K21.9 GASTROESOPHAGEAL REFLUX DISEASE, ESOPHAGITIS PRESENCE NOT SPECIFIED: ICD-10-CM

## 2018-01-19 DIAGNOSIS — Z12.11 ENCOUNTER FOR COLONOSCOPY DUE TO HISTORY OF COLONIC POLYP: ICD-10-CM

## 2018-01-19 DIAGNOSIS — R10.13 EPIGASTRIC PAIN: ICD-10-CM

## 2018-01-19 DIAGNOSIS — Z86.010 ENCOUNTER FOR COLONOSCOPY DUE TO HISTORY OF COLONIC POLYP: ICD-10-CM

## 2018-01-19 PROCEDURE — 43239 EGD BIOPSY SINGLE/MULTIPLE: CPT | Performed by: INTERNAL MEDICINE

## 2018-01-19 PROCEDURE — 88305 TISSUE EXAM BY PATHOLOGIST: CPT | Performed by: PATHOLOGY

## 2018-01-19 PROCEDURE — 45378 DIAGNOSTIC COLONOSCOPY: CPT | Performed by: INTERNAL MEDICINE

## 2018-01-19 PROCEDURE — 88305 TISSUE EXAM BY PATHOLOGIST: CPT | Performed by: INTERNAL MEDICINE

## 2018-01-19 PROCEDURE — 25010000002 PROPOFOL 10 MG/ML EMULSION: Performed by: NURSE ANESTHETIST, CERTIFIED REGISTERED

## 2018-01-19 RX ORDER — PROMETHAZINE HYDROCHLORIDE 25 MG/ML
12.5 INJECTION, SOLUTION INTRAMUSCULAR; INTRAVENOUS ONCE AS NEEDED
Status: DISCONTINUED | OUTPATIENT
Start: 2018-01-19 | End: 2018-01-19 | Stop reason: HOSPADM

## 2018-01-19 RX ORDER — PROPOFOL 10 MG/ML
VIAL (ML) INTRAVENOUS AS NEEDED
Status: DISCONTINUED | OUTPATIENT
Start: 2018-01-19 | End: 2018-01-19 | Stop reason: SURG

## 2018-01-19 RX ORDER — PROMETHAZINE HYDROCHLORIDE 25 MG/1
25 TABLET ORAL ONCE AS NEEDED
Status: DISCONTINUED | OUTPATIENT
Start: 2018-01-19 | End: 2018-01-19 | Stop reason: HOSPADM

## 2018-01-19 RX ORDER — ONDANSETRON 2 MG/ML
4 INJECTION INTRAMUSCULAR; INTRAVENOUS ONCE AS NEEDED
Status: DISCONTINUED | OUTPATIENT
Start: 2018-01-19 | End: 2018-01-19 | Stop reason: HOSPADM

## 2018-01-19 RX ORDER — LIDOCAINE HYDROCHLORIDE 10 MG/ML
INJECTION, SOLUTION INFILTRATION; PERINEURAL AS NEEDED
Status: DISCONTINUED | OUTPATIENT
Start: 2018-01-19 | End: 2018-01-19 | Stop reason: SURG

## 2018-01-19 RX ORDER — PROMETHAZINE HYDROCHLORIDE 25 MG/1
25 SUPPOSITORY RECTAL ONCE AS NEEDED
Status: DISCONTINUED | OUTPATIENT
Start: 2018-01-19 | End: 2018-01-19 | Stop reason: HOSPADM

## 2018-01-19 RX ORDER — DEXTROSE AND SODIUM CHLORIDE 5; .45 G/100ML; G/100ML
20 INJECTION, SOLUTION INTRAVENOUS CONTINUOUS
Status: DISCONTINUED | OUTPATIENT
Start: 2018-01-19 | End: 2018-01-19 | Stop reason: HOSPADM

## 2018-01-19 RX ADMIN — PROPOFOL 30 MG: 10 INJECTION, EMULSION INTRAVENOUS at 14:18

## 2018-01-19 RX ADMIN — DEXTROSE AND SODIUM CHLORIDE 20 ML/HR: 5; 450 INJECTION, SOLUTION INTRAVENOUS at 13:48

## 2018-01-19 RX ADMIN — PROPOFOL 100 MG: 10 INJECTION, EMULSION INTRAVENOUS at 14:05

## 2018-01-19 RX ADMIN — PROPOFOL 50 MG: 10 INJECTION, EMULSION INTRAVENOUS at 14:09

## 2018-01-19 RX ADMIN — LIDOCAINE HYDROCHLORIDE 100 MG: 10 INJECTION, SOLUTION INFILTRATION; PERINEURAL at 14:05

## 2018-01-19 RX ADMIN — PROPOFOL 50 MG: 10 INJECTION, EMULSION INTRAVENOUS at 14:12

## 2018-01-19 RX ADMIN — PROPOFOL 50 MG: 10 INJECTION, EMULSION INTRAVENOUS at 14:16

## 2018-01-19 RX ADMIN — PROPOFOL 50 MG: 10 INJECTION, EMULSION INTRAVENOUS at 14:14

## 2018-01-19 RX ADMIN — PROPOFOL 50 MG: 10 INJECTION, EMULSION INTRAVENOUS at 14:07

## 2018-01-19 NOTE — H&P
Progress Notes  Encounter Date: 1/18/2018  Ravi joyce  Gastroenterology   Expand All Collapse All    []Hide copied text  []Hover for attribution information  Chief Complaint   Patient presents with   • Colonoscopy       consult            Subjective        Mikki Joel is a 62 y.o. female. she is being seen for consultation today at the request of Katheryn Corrigan,      History of Present Illness  62-year-old female presents to discuss screening colonoscopy.  She denies any current abdominal pain.  States she will intermittently have episodes of epigastric pain associated with nausea after meals but not able to pinpoint to a certain male.  Denies any vomiting.  States her bowel movements have been regular.  Reports Reflux is controlled with protonix.  States her last colonoscopy was in [place] which was about 5 years ago and states she had a colon polyp removed.  I do not have those results available.  She denies any family history of colon cancer.  Plan; we'll schedule patient for EGD due to epigastric pain and reflux disease.  Schedule screening colonoscopy due to personal history of colonic polyps.         The following portions of the patient's history were reviewed and updated as appropriate:    Medical History    Past Medical History:   Diagnosis Date   • Anemia     • Anemia June 2016     might have been due to knee replacement surgery I had in mar   • Anxiety     • Arthritis     • Asthma     • Depression     • High blood pressure     • [place] (hard of hearing)     • Hypertension 1999     metoprolol 100 mg and triamterene/hydrochlorothiazide 37.5mg   • Migraine            Surgical History          Past Surgical History:   Procedure Laterality Date   • BUNIONECTOMY Bilateral     • COLONOSCOPY   2015   • EAR TUBES       • ENDOSCOPIC FUNCTIONAL SINUS SURGERY (FESS) Bilateral 9/5/2017     Procedure: BILATERAL ETHMOID AND MAXILLARY SINUS AND NASAL SEPTOPLASTIES AND BILATERAL TURBINECTOMIES;  Surgeon:  Ramy Gaston MD;  Location: [facility];  Service:    • HAND SURGERY       • JOINT REPLACEMENT         right knee replacement 2016   • RHINOPLASTY         x3   • SHOULDER ARTHROSCOPY       • TONSILLECTOMY       • TUBAL ABDOMINAL LIGATION       • TUBAL ABDOMINAL LIGATION      • UPPER GASTROINTESTINAL ENDOSCOPY   2016               Family History   Problem Relation Age of Onset   • Hypertension Mother     • COPD Mother     • Arthritis Mother     • Cancer Father     • Arthritis Father     • Alcohol abuse Father     • COPD Maternal Grandmother     • Stroke Maternal Grandmother     • Irritable bowel syndrome Sister        OB History      Para Term  AB Living     2 2 2          SAB TAB Ectopic Multiple Live Births                        Current Medications           Current Outpatient Prescriptions   Medication Sig Dispense Refill   • azelastine (ASTELIN) 0.1 % nasal spray 2 sprays into each nostril 2 (Two) Times a Day. Use in each nostril as directed 30 mL 11   • busPIRone (BUSPAR) 7.5 MG tablet Take 1 tablet by mouth 2 (Two) Times a Day. 60 tablet 11   • Cholecalciferol (VITAMIN D3) 5000 UNITS capsule capsule Take 5,000 Units by mouth Daily.       • cyclobenzaprine (FLEXERIL) 10 MG tablet Take 1 tablet by mouth 2 (Two) Times a Day As Needed (tension headaches or neck pain). May cause drowsiness 60 tablet 5   • FLUoxetine (PROzac) 40 MG capsule Take 1 capsule by mouth Daily. 30 capsule 11   • meloxicam (MOBIC) 15 MG tablet TAKE 1 TABLET BY MOUTH EVERY DAY 30 tablet 0   • metoprolol succinate XL (TOPROL XL) 100 MG 24 hr tablet Take 1 tablet by mouth Daily. 30 tablet 11   • Multiple Vitamin (MULTI VITAMIN DAILY PO) Take 1 tablet by mouth Daily.       • pantoprazole (PROTONIX) 40 MG EC tablet Take 40 mg by mouth Daily.       • rOPINIRole (REQUIP) 1 MG tablet Take 1 tablet by mouth Every Night. Take 1 hour before bedtime. 30 tablet 11   • SUMAtriptan (IMITREX) 25 MG tablet Take 1 tablet by mouth 1 (One)  Time As Needed for Migraine. 30 tablet 1   • triamterene-hydrochlorothiazide (DYAZIDE) 37.5-25 MG per capsule Take 1 capsule by mouth Every Morning. 30 capsule 11   • zolpidem (AMBIEN) 5 MG tablet Take 1 tablet by mouth At Night As Needed for Sleep for up to 30 days. 30 tablet 3   • sodium-potassium-magnesium sulfates (SUPREP BOWEL PREP KIT) 17.5-3.13-1.6 GM/180ML solution oral solution Take 1 bottle by mouth Every 12 (Twelve) Hours. 2 bottle 0      No current facility-administered medications for this visit.               Allergies   Allergen Reactions   • Sulfa Antibiotics Hives       Social History    Social History            Social History   • Marital status:        Spouse name: N/A   • Number of children: N/A   • Years of education: N/A              Social History Main Topics   • Smoking status: Never Smoker   • Smokeless tobacco: Never Used         Comment: Never had the desire to try smoking   • Alcohol use Yes        2 Glasses of wine, 3 Shots of liquor, 2 Standard drinks or equivalent per week          Comment: per month   • Drug use: No   • Sexual activity: Yes       Partners: Male       Birth control/ protection: Post-menopausal, Surgical, None         Comment: then tubal ligation after last child at age 32.           Other Topics Concern   • None      Social History Narrative            Review of Systems  Review of Systems   Constitutional: Negative for activity change, appetite change, chills, diaphoresis, fatigue, fever and unexpected weight change.   HENT: Negative for sore throat and trouble swallowing.    Respiratory: Negative for shortness of breath.    Gastrointestinal: Positive for nausea. Negative for abdominal distention, abdominal pain, anal bleeding, blood in stool, constipation, diarrhea, rectal pain and vomiting.   Musculoskeletal: Negative for arthralgias.   Skin: Negative for pallor.   Neurological: Negative for light-headedness.                              /82 (BP  "Location: Left arm, Patient Position: Sitting, Cuff Size: Adult)  Pulse 88  Ht 67\" (170.2 cm)  Wt 223 lb (101 kg)  BMI 34.93 kg/m2        Objective        Physical Exam   Constitutional: She is oriented to person, place, and time. She appears well-developed and well-nourished. She is cooperative. No distress.   HENT:   Head: Normocephalic and atraumatic.   Neck: Normal range of motion. Neck supple. No thyromegaly present.   Cardiovascular: Normal rate, regular rhythm and normal heart sounds.    Pulmonary/Chest: Effort normal and breath sounds normal. She has no wheezes. She has no rhonchi. She has no rales.   Abdominal: Soft. Normal appearance and bowel sounds are normal. She exhibits no shifting dullness and no distension. There is no hepatosplenomegaly. There is tenderness in the epigastric area. There is no rigidity and no guarding. No hernia.   Lymphadenopathy:     She has no cervical adenopathy.   Neurological: She is alert and oriented to person, place, and time.   Skin: Skin is warm, dry and intact. No rash noted. No pallor.   Psychiatric: She has a normal mood and affect. Her speech is normal.               Admission on 09/05/2017, Discharged on 09/05/2017   Component Date Value Ref Range Status    • Wound Culture 09/05/2017 Light growth (2+) Pseudomonas aeruginosa*   Final     Pseudomonas aeruginosa may develop resistance during prolonged therapy with all antibiotics. Isolates that are initially susceptible may become resistant within 3 or 4 days. Repeat susceptibility testing may be indicated.   • Wound Culture 09/05/2017 Light growth (2+) Serratia marcescens*   Final   • Wound Culture 09/05/2017 Light growth (2+) Streptococcus mitis / oralis*   Final     Susceptibility upon request.   • Gram Stain Result 09/05/2017 Moderate (3+) WBCs seen    Final   • Gram Stain Result 09/05/2017 Rare (1+) Gram negative bacilli    Final   • Case Report 09/05/2017      Final                    Value:Surgical Pathology " Report                         Case: [id number]                                  Authorizing Provider:  Ramy Gaston MD        Collected:           09/05/2017 11:56 AM          Ordering Location:     [facility]             Received:            09/05/2017 01:26 PM                                 [facility]                                                              Pathologist:           Deven Conner MD                                                          Specimens:   1) - Naris, Left, LEFT SINUS                                                                        2) - Naris, Right, RIGHT SINUS                                                          • Final Diagnosis 09/05/2017      Final                    Value:This result contains rich text formatting which cannot be displayed here.   • Gross Description 09/05/2017      Final                    Value:This result contains rich text formatting which cannot be displayed here.         Assessment/Plan           1. Encounter for colonoscopy due to history of colonic polyp    2. Gastroesophageal reflux disease, esophagitis presence not specified    3. Epigastric pain    .         Orders placed during this encounter include:     COLONOSCOPY (N/A)     Review and/or summary of lab tests, radiology, procedures, medications. Review and summary of old records and obtaining of history. The risks and benefits of my recommendations, as well as other treatment options were discussed with the patient today. Questions were answered.          New Medications Ordered This Visit   Medications   • sodium-potassium-magnesium sulfates (SUPREP BOWEL PREP KIT) 17.5-3.13-1.6 GM/180ML solution oral solution       Sig: Take 1 bottle by mouth Every 12 (Twelve) Hours.       Dispense:  2 bottle       Refill:  0               This document has been electronically signed by Ravi Cavanaugh MD on January 19, 2018 12:57 PM                       Results for orders placed or  "performed during the hospital encounter of 09/05/17   Tissue Pathology Exam   Result Value Ref Range     Case Report           Surgical Pathology Report                         Case: [id number]                                  Authorizing Provider:  Ramy Gaston MD        Collected:           09/05/2017 11:56 AM          Ordering Location:     [facility]             Received:            09/05/2017 01:26 PM                                 [facility]                                                              Pathologist:           Deven Conner MD                                                          Specimens:   1) - Naris, Left, LEFT SINUS                                                                        2) - Naris, Right, RIGHT SINUS                                                            Final Diagnosis           1.  MUCOSA, NASAL SINUS, LEFT SIDE:  CHRONIC SINUSITIS OF RESPIRATORY MUCOSA IN RELATION TO BITS OF BONE.  NEGATIVE FOR FUNGI (GMS STAIN).     2.  MUCOSA, NASAL SINUS, RIGHT SIDE:  CHRONIC SINUSITIS OF RESPIRATORY MUCOSA IN RELATION TO BITS OF BONE.  NEGATIVE FOR FUNGI (GMS STAIN).     Gross Description           1.  The first container is labeled \"left sinus, nose\" and has pinkish white mucosa measuring 1.5 cc in aggregate.  The entire specimen is embedded as 1A.     2.  The second container is labeled \"right sinus, nose\" and has pinkish white mucosa measuring 1.5 cc in aggregate.  The entire specimen is embedded as 2A.      Embedded Images       Wound Culture   Result Value Ref Range     Wound Culture Light growth (2+) Pseudomonas aeruginosa (A)       Wound Culture Light growth (2+) Serratia marcescens (A)       Wound Culture Light growth (2+) Streptococcus mitis / oralis (A)       Gram Stain Result Moderate (3+) WBCs seen       Gram Stain Result Rare (1+) Gram negative bacilli         Susceptibility     Pseudomonas aeruginosa - CHAR       Ceftazidime 4   ug/ml       " Cefuroxime sodium >16   ug/ml       Gentamicin <=4 Susceptible ug/ml       Levofloxacin <=2 Susceptible ug/ml       Piperacillin + Tazobactam <=16   ug/ml       Tobramycin <=4 Susceptible ug/ml     Serratia marcescens - CHAR       Amikacin <=16 Susceptible ug/ml       Amoxicillin + Clavulanate >16/8 Resistant ug/ml       Ampicillin >16 Resistant ug/ml       Ampicillin + Sulbactam >16/8 Resistant ug/ml       Cefazolin >16 Resistant ug/ml       Cefotaxime <=2   ug/ml       Cefoxitin 16 Intermediate ug/ml       Ceftazidime <=1   ug/ml       Ceftriaxone <=8   ug/ml       Cefuroxime sodium >16 Resistant ug/ml       Ciprofloxacin 2 Intermediate ug/ml       Gentamicin <=4 Susceptible ug/ml       Imipenem <=4 Susceptible ug/ml       Levofloxacin <=2 Susceptible ug/ml       Meropenem <=4 Susceptible ug/ml       Nitrofurantoin >64   ug/ml       Piperacillin + Tazobactam <=16   ug/ml       Tetracycline <=4 Susceptible ug/ml       Tobramycin <=4 Susceptible ug/ml       Trimethoprim + Sulfamethoxazole <=2/38 Susceptible ug/ml   Results for orders placed or performed in visit on 08/31/17   Basic Metabolic Panel   Result Value Ref Range     Glucose 93 60 - 100 mg/dL     BUN 24 (H) 7 - 21 mg/dL     Creatinine 0.82 0.50 - 1.00 mg/dL     Sodium 136 (L) 137 - 145 mmol/L     Potassium 4.7 3.5 - 5.1 mmol/L     Chloride 100 95 - 110 mmol/L     CO2 26.0 22.0 - 31.0 mmol/L     Calcium 9.6 8.4 - 10.2 mg/dL     eGFR Non  Amer 70 45 - 104 mL/min/1.73     BUN/Creatinine Ratio 29.3 (H) 7.0 - 25.0     Anion Gap 10.0 5.0 - 15.0 mmol/L   Results for orders placed or performed in visit on 08/07/17   Sedimentation Rate   Result Value Ref Range     Sed Rate 9 0 - 20 mm/hr   Rheumatoid Factor   Result Value Ref Range     Rheumatoid Factor Qualitative Negative Negative   C-reactive protein   Result Value Ref Range     C-Reactive Protein 1.00 0.00 - 1.00 mg/dL   DERRICK   Result Value Ref Range     DERRICK Direct Negative Negative   Results for orders  placed or performed in visit on 08/01/17   Hepatitis C Antibody   Result Value Ref Range     Hepatitis C Ab Negative Negative   Hemoglobin A1c   Result Value Ref Range     Hemoglobin A1C 5.7 (H) 4 - 5.6 %   Results for orders placed or performed in visit on 05/31/17   Wound Culture   Result Value Ref Range     Wound Culture Light growth (2+) Serratia marcescens (A)       Beta Lactamase         Gram Stain Result Few (2+) WBCs seen       Gram Stain Result Rare (1+) Gram negative bacilli         Susceptibility     Serratia marcescens - CHAR       Amikacin <=16 Susceptible ug/ml       Amoxicillin + Clavulanate >16/8 Resistant ug/ml       Ampicillin >16 Resistant ug/ml       Ampicillin + Sulbactam >16/8 Resistant ug/ml       Cefazolin >16 Resistant ug/ml       Cefotaxime <=2   ug/ml       Cefoxitin 16 Intermediate ug/ml       Ceftazidime <=1   ug/ml       Ceftriaxone <=8   ug/ml       Cefuroxime >16 Resistant ug/ml       Ciprofloxacin <=1 Susceptible ug/ml       Gentamicin <=4 Susceptible ug/ml       Imipenem <=4 Susceptible ug/ml       Levofloxacin <=2 Susceptible ug/ml       Meropenem <=4 Susceptible ug/ml       Nitrofurantoin >64   ug/ml       Piperacillin + Tazobactam <=16   ug/ml       Tetracycline 8 Intermediate ug/ml       Tobramycin <=4 Susceptible ug/ml       Trimethoprim + Sulfamethoxazole <=2/38 Susceptible ug/ml               Office Visit on 11/28/2017              Detailed Report

## 2018-01-19 NOTE — ANESTHESIA PREPROCEDURE EVALUATION
Anesthesia Evaluation     NPO Solid Status: > 8 hours  NPO Liquid Status: > 6 hours     Airway   Mallampati: IV  TM distance: <3 FB  difficult intubation highly probable  Dental - normal exam     Pulmonary - normal exam   Cardiovascular - normal exam        Neuro/Psych  GI/Hepatic/Renal/Endo    (+) obesity,      Musculoskeletal     Abdominal   (+) obese,    Substance History      OB/GYN          Other                                                Anesthesia Plan    ASA 3     MAC     intravenous induction   Anesthetic plan and risks discussed with patient and spouse/significant other.

## 2018-01-19 NOTE — PLAN OF CARE
Problem: Patient Care Overview (Adult)  Goal: Plan of Care Review  Outcome: Ongoing (interventions implemented as appropriate)   01/19/18 1421   Coping/Psychosocial Response Interventions   Plan Of Care Reviewed With patient   Patient Care Overview   Progress no change       Problem: GI Endoscopy (Adult)  Goal: Signs and Symptoms of Listed Potential Problems Will be Absent or Manageable (GI Endoscopy)  Outcome: Ongoing (interventions implemented as appropriate)   01/19/18 1421   GI Endoscopy   Problems Assessed (GI Endoscopy) all   Problems Present (GI Endoscopy) none

## 2018-01-19 NOTE — ANESTHESIA POSTPROCEDURE EVALUATION
Patient: Mikki Joel    Procedure Summary     Date Anesthesia Start Anesthesia Stop Room / Location    01/19/18 1402 1421 [facility] ENDOSCOPY 1 / [facility] ENDOSCOPY       Procedure Diagnosis Surgeon Provider    ESOPHAGOGASTRODUODENOSCOPY (N/A Esophagus); COLONOSCOPY (N/A ) Epigastric pain; Encounter for colonoscopy due to history of colonic polyp; Gastroesophageal reflux disease, esophagitis presence not specified  (Epigastric pain [R10.13]; Encounter for colonoscopy due to history of colonic polyp [Z12.11, Z86.010]; Gastroesophageal reflux disease, esophagitis presence not specified [K21.9]) MD Sparkle Hutchinson CRNA          Anesthesia Type: MAC  Last vitals  BP   170/84 (01/19/18 1340)   Temp   97 °F (36.1 °C) (01/19/18 1340)   Pulse   97 (01/19/18 1340)   Resp   18 (01/19/18 1340)     SpO2   98 % (01/19/18 1340)     Post Anesthesia Care and Evaluation    Patient location during evaluation: bedside  Patient participation: complete - patient participated  Level of consciousness: responsive to verbal stimuli  Pain management: adequate  Airway patency: patent  Anesthetic complications: No anesthetic complications    Cardiovascular status: acceptable  Respiratory status: acceptable  Hydration status: acceptable

## 2018-01-24 RX ORDER — SUMATRIPTAN 25 MG/1
TABLET, FILM COATED ORAL
Qty: 30 TABLET | Refills: 0 | Status: SHIPPED | OUTPATIENT
Start: 2018-01-24 | End: 2018-01-30 | Stop reason: SDUPTHER

## 2018-01-30 ENCOUNTER — OFFICE VISIT (OUTPATIENT)
Dept: FAMILY MEDICINE CLINIC | Facility: CLINIC | Age: 63
End: 2018-01-30

## 2018-01-30 ENCOUNTER — APPOINTMENT (OUTPATIENT)
Dept: LAB | Facility: HOSPITAL | Age: 63
End: 2018-01-30

## 2018-01-30 VITALS
BODY MASS INDEX: 34.45 KG/M2 | HEIGHT: 67 IN | SYSTOLIC BLOOD PRESSURE: 140 MMHG | DIASTOLIC BLOOD PRESSURE: 88 MMHG | OXYGEN SATURATION: 96 % | HEART RATE: 88 BPM | WEIGHT: 219.5 LBS

## 2018-01-30 DIAGNOSIS — Z00.00 PREVENTATIVE HEALTH CARE: ICD-10-CM

## 2018-01-30 DIAGNOSIS — I10 ESSENTIAL HYPERTENSION: ICD-10-CM

## 2018-01-30 DIAGNOSIS — J30.9 CHRONIC ALLERGIC RHINITIS, UNSPECIFIED SEASONALITY, UNSPECIFIED TRIGGER: ICD-10-CM

## 2018-01-30 DIAGNOSIS — G43.909 MIGRAINE WITHOUT STATUS MIGRAINOSUS, NOT INTRACTABLE, UNSPECIFIED MIGRAINE TYPE: Primary | ICD-10-CM

## 2018-01-30 LAB
ARTICHOKE IGE QN: 122 MG/DL (ref 1–129)
CHOLEST SERPL-MCNC: 197 MG/DL (ref 0–199)
HDLC SERPL-MCNC: 45 MG/DL (ref 60–200)
LDLC/HDLC SERPL: 2.37 {RATIO} (ref 0–3.22)
T4 FREE SERPL-MCNC: 1.04 NG/DL (ref 0.78–2.19)
TRIGL SERPL-MCNC: 227 MG/DL (ref 20–199)
TSH SERPL DL<=0.05 MIU/L-ACNC: 0.24 MIU/ML (ref 0.46–4.68)

## 2018-01-30 PROCEDURE — 99214 OFFICE O/P EST MOD 30 MIN: CPT | Performed by: FAMILY MEDICINE

## 2018-01-30 PROCEDURE — 80061 LIPID PANEL: CPT | Performed by: FAMILY MEDICINE

## 2018-01-30 PROCEDURE — 84443 ASSAY THYROID STIM HORMONE: CPT | Performed by: FAMILY MEDICINE

## 2018-01-30 PROCEDURE — 84439 ASSAY OF FREE THYROXINE: CPT | Performed by: FAMILY MEDICINE

## 2018-01-30 PROCEDURE — 36415 COLL VENOUS BLD VENIPUNCTURE: CPT | Performed by: FAMILY MEDICINE

## 2018-01-30 RX ORDER — TOPIRAMATE 25 MG/1
1 CAPSULE, EXTENDED RELEASE ORAL DAILY
Qty: 30 CAPSULE | Refills: 5 | Status: SHIPPED | OUTPATIENT
Start: 2018-01-30 | End: 2018-04-03

## 2018-01-30 RX ORDER — SUMATRIPTAN 25 MG/1
TABLET, FILM COATED ORAL
Qty: 30 TABLET | Refills: 5 | Status: SHIPPED | OUTPATIENT
Start: 2018-01-30 | End: 2018-10-19 | Stop reason: SDUPTHER

## 2018-01-30 RX ORDER — CETIRIZINE HYDROCHLORIDE 10 MG/1
10 TABLET ORAL DAILY
Qty: 30 TABLET | Refills: 11 | Status: SHIPPED | OUTPATIENT
Start: 2018-01-30 | End: 2019-04-24 | Stop reason: SDUPTHER

## 2018-02-01 ENCOUNTER — OFFICE VISIT (OUTPATIENT)
Dept: GASTROENTEROLOGY | Facility: CLINIC | Age: 63
End: 2018-02-01

## 2018-02-01 VITALS
WEIGHT: 221.6 LBS | DIASTOLIC BLOOD PRESSURE: 88 MMHG | HEIGHT: 67 IN | SYSTOLIC BLOOD PRESSURE: 142 MMHG | HEART RATE: 114 BPM | BODY MASS INDEX: 34.78 KG/M2

## 2018-02-01 DIAGNOSIS — K29.50 CHRONIC GASTRITIS WITHOUT BLEEDING, UNSPECIFIED GASTRITIS TYPE: ICD-10-CM

## 2018-02-01 DIAGNOSIS — K59.00 CONSTIPATION, UNSPECIFIED CONSTIPATION TYPE: ICD-10-CM

## 2018-02-01 DIAGNOSIS — R11.2 NAUSEA AND VOMITING, INTRACTABILITY OF VOMITING NOT SPECIFIED, UNSPECIFIED VOMITING TYPE: Primary | ICD-10-CM

## 2018-02-01 PROCEDURE — 99213 OFFICE O/P EST LOW 20 MIN: CPT | Performed by: NURSE PRACTITIONER

## 2018-02-01 RX ORDER — DEXLANSOPRAZOLE 60 MG/1
60 CAPSULE, DELAYED RELEASE ORAL DAILY
Qty: 30 CAPSULE | Refills: 11 | Status: SHIPPED | OUTPATIENT
Start: 2018-02-01 | End: 2018-04-03

## 2018-02-01 NOTE — PROGRESS NOTES
Chief Complaint   Patient presents with   • Colonoscopy     results   • EGD     results       Subjective    Mikki Joel is a 63 y.o. female. she is being seen for Follow-up.  History of Present Illness  62-year-old female presents to discuss EGD and colonoscopy results.  She was last seen 11/28/17 regarding nausea vomiting epigastric pain and screening colonoscopy.  States she is still having bloating and reflux described as burning in her esophagus.  States she still having some nausea with intermittent vomiting.  Reports her bowel movements are somewhat constipated about every 2-3 days and she has been taking Colace routinely.  EGD noted gastritis, normal esophagus and normal duodenum.  Biopsy was consistent with mild chronic gastritis, negative for H. pylori.  Colonoscopy had a fair prep normal perianal and digital rectal exam.  No specimens were collected repeat is recommended in 5 years for surveillance.  Plan; we'll discontinue Protonix and start patient on Dexalone daily.  Have instructed her to avoid gastric irritants and recommended weight loss.  If constipation persists or worsen recommended MiraLAX as needed.  Follow-up in 3 months return to office sooner if needed.         The following portions of the patient's history were reviewed and updated as appropriate:   Past Medical History:   Diagnosis Date   • Anemia    • Anemia June 2016    might have been due to knee replacement surgery I had in mar   • Anemia June 2016    might have been due to knee replacement surgery I had in mar   • Anxiety    • Arthritis    • Asthma    • Depression    • High blood pressure    • [place] (hard of hearing)    • Hypertension 1999    metoprolol 100 mg and triamterene/hydrochlorothiazide 37.5mg   • Hypertension 1999    metoprolol 100 mg and triamterene/hydrochlorothiazide 37.5mg   • Migraine      Past Surgical History:   Procedure Laterality Date   • BUNIONECTOMY Bilateral    • COLONOSCOPY  2015   • COLONOSCOPY N/A  2018    Procedure: COLONOSCOPY;  Surgeon: Ravi Cavanaugh MD;  Location: [facility] ENDOSCOPY;  Service:    • COLONOSCOPY     • EAR TUBES     • ENDOSCOPIC FUNCTIONAL SINUS SURGERY (FESS) Bilateral 2017    Procedure: BILATERAL ETHMOID AND MAXILLARY SINUS AND NASAL SEPTOPLASTIES AND BILATERAL TURBINECTOMIES;  Surgeon: Ramy Gaston MD;  Location: [facility] OR;  Service:    • ENDOSCOPY N/A 2018    Procedure: ESOPHAGOGASTRODUODENOSCOPY;  Surgeon: Ravi Cavanaugh MD;  Location: [facility] ENDOSCOPY;  Service:    • HAND SURGERY     • JOINT REPLACEMENT  2016    right knee replacement 2016   • JOINT REPLACEMENT      right shoulder   • RHINOPLASTY      x3   • SHOULDER ARTHROSCOPY     • TONSILLECTOMY     • TUBAL ABDOMINAL LIGATION     • TUBAL ABDOMINAL LIGATION     • TUBAL ABDOMINAL LIGATION     • UPPER GASTROINTESTINAL ENDOSCOPY     • UPPER GASTROINTESTINAL ENDOSCOPY       Family History   Problem Relation Age of Onset   • Hypertension Mother    • COPD Mother    • Arthritis Mother    • Alcohol abuse Mother    • Cancer Father    • Arthritis Father    • Alcohol abuse Father    • COPD Maternal Grandmother    • Stroke Maternal Grandmother    • Irritable bowel syndrome Sister      OB History      Para Term  AB Living    2 2 2       SAB TAB Ectopic Multiple Live Births                Current Outpatient Prescriptions   Medication Sig Dispense Refill   • azelastine (ASTELIN) 0.1 % nasal spray 2 sprays into each nostril 2 (Two) Times a Day. Use in each nostril as directed 30 mL 11   • busPIRone (BUSPAR) 7.5 MG tablet Take 1 tablet by mouth 2 (Two) Times a Day. 60 tablet 11   • cetirizine (ZYRTEC ALLERGY) 10 MG tablet Take 1 tablet by mouth Daily. 30 tablet 11   • Cholecalciferol (VITAMIN D3) 5000 UNITS capsule capsule Take 5,000 Units by mouth Daily.     • ciprofloxacin-hydrocortisone (CIPRO HC) 0.2-1 % otic suspension Administer 3 drops into both ears 2 (Two) Times a Day. 10 mL 1   •  cyclobenzaprine (FLEXERIL) 10 MG tablet Take 1 tablet by mouth 2 (Two) Times a Day As Needed (tension headaches or neck pain). May cause drowsiness 60 tablet 5   • FLUoxetine (PROzac) 40 MG capsule Take 1 capsule by mouth Daily. 30 capsule 11   • ipratropium (ATROVENT) 0.06 % nasal spray 2 sprays into each nostril 3 (Three) Times a Day. 15 mL 5   • meloxicam (MOBIC) 15 MG tablet TAKE 1 TABLET BY MOUTH EVERY DAY 30 tablet 0   • metoprolol succinate XL (TOPROL XL) 100 MG 24 hr tablet Take 1 tablet by mouth Daily. 30 tablet 11   • Multiple Vitamin (MULTI VITAMIN DAILY PO) Take 1 tablet by mouth Daily.     • rOPINIRole (REQUIP) 1 MG tablet Take 1 tablet by mouth Every Night. Take 1 hour before bedtime. 30 tablet 11   • SUMAtriptan (IMITREX) 25 MG tablet Take 1 tab po prn for migraine 30 tablet 5   • Topiramate ER 25 MG capsule sustained-release 24 hr Take 1 each by mouth Daily. 30 capsule 5   • triamterene-hydrochlorothiazide (DYAZIDE) 37.5-25 MG per capsule Take 1 capsule by mouth Every Morning. 30 capsule 11   • dexlansoprazole (DEXILANT) 60 MG capsule Take 1 capsule by mouth Daily. 30 capsule 11     No current facility-administered medications for this visit.      Allergies   Allergen Reactions   • Codeine Other (See Comments)     Increases heart rate   • Sulfa Antibiotics Hives     Social History     Social History   • Marital status:      Spouse name: N/A   • Number of children: N/A   • Years of education: N/A     Social History Main Topics   • Smoking status: Never Smoker   • Smokeless tobacco: Never Used      Comment: Never had the desire to try smoking   • Alcohol use Yes     2 Glasses of wine, 2 Standard drinks or equivalent per week      Comment: per month   • Drug use: No   • Sexual activity: Yes     Partners: Male     Birth control/ protection: Post-menopausal, Surgical, None      Comment: then tubal ligation after last child at age 32.     Other Topics Concern   • None     Social History Narrative  "      Review of Systems  Review of Systems   Constitutional: Positive for fatigue. Negative for activity change, appetite change, chills, diaphoresis, fever and unexpected weight change.   HENT: Negative for sore throat and trouble swallowing.    Respiratory: Negative for shortness of breath.    Gastrointestinal: Positive for abdominal distention (bloating), abdominal pain, nausea and vomiting. Negative for anal bleeding, blood in stool, constipation, diarrhea and rectal pain.   Musculoskeletal: Negative for arthralgias.   Skin: Negative for pallor.   Neurological: Negative for light-headedness.        /88 (BP Location: Left arm, Patient Position: Sitting, Cuff Size: Adult)  Pulse 114  Ht 170.2 cm (67.01\")  Wt 101 kg (221 lb 9.6 oz)  BMI 34.7 kg/m2    Objective    Physical Exam   Constitutional: She is oriented to person, place, and time. She appears well-developed and well-nourished. She is cooperative. No distress.   HENT:   Head: Normocephalic and atraumatic.   Neck: Normal range of motion. Neck supple. No thyromegaly present.   Cardiovascular: Normal rate, regular rhythm and normal heart sounds.    Pulmonary/Chest: Effort normal and breath sounds normal. She has no wheezes. She has no rhonchi. She has no rales.   Abdominal: Soft. Normal appearance and bowel sounds are normal. She exhibits no shifting dullness and no distension. There is no hepatosplenomegaly. There is tenderness in the epigastric area. There is no rigidity and no guarding. No hernia.   Lymphadenopathy:     She has no cervical adenopathy.   Neurological: She is alert and oriented to person, place, and time.   Skin: Skin is warm, dry and intact. No rash noted. No pallor.   Psychiatric: She has a normal mood and affect. Her speech is normal.     Office Visit on 01/30/2018   Component Date Value Ref Range Status   • Total Cholesterol 01/30/2018 197  0 - 199 mg/dL Final   • Triglycerides 01/30/2018 227* 20 - 199 mg/dL Final   • HDL " Cholesterol 01/30/2018 45* 60 - 200 mg/dL Final   • LDL Cholesterol  01/30/2018 122  1 - 129 mg/dL Final   • LDL/HDL Ratio 01/30/2018 2.37  0.00 - 3.22 Final   • Free T4 01/30/2018 1.04  0.78 - 2.19 ng/dL Final   • TSH 01/30/2018 0.240* 0.460 - 4.680 mIU/mL Final     Assessment/Plan      1. Nausea and vomiting, intractability of vomiting not specified, unspecified vomiting type    2. Constipation, unspecified constipation type    3. Chronic gastritis without bleeding, unspecified gastritis type    .       Orders placed during this encounter include:    * Surgery not found *    Review and/or summary of lab tests, radiology, procedures, medications. Review and summary of old records and obtaining of history. The risks and benefits of my recommendations, as well as other treatment options were discussed with the patient today. Questions were answered.    New Medications Ordered This Visit   Medications   • dexlansoprazole (DEXILANT) 60 MG capsule     Sig: Take 1 capsule by mouth Daily.     Dispense:  30 capsule     Refill:  11       Follow-up: Return in about 2 months (around 4/1/2018).          This document has been electronically signed by MIRIAM White on February 2, 2018 3:01 PM             Results for orders placed or performed in visit on 01/30/18   TSH   Result Value Ref Range    TSH 0.240 (L) 0.460 - 4.680 mIU/mL   T4, Free   Result Value Ref Range    Free T4 1.04 0.78 - 2.19 ng/dL   Lipid Panel   Result Value Ref Range    Total Cholesterol 197 0 - 199 mg/dL    Triglycerides 227 (H) 20 - 199 mg/dL    HDL Cholesterol 45 (L) 60 - 200 mg/dL    LDL Cholesterol  122 1 - 129 mg/dL    LDL/HDL Ratio 2.37 0.00 - 3.22   Results for orders placed or performed during the hospital encounter of 01/19/18   Tissue Pathology Exam - Tissue, Gastric, Antrum   Result Value Ref Range    Case Report       Surgical Pathology Report                         Case: [id number]                                  Authorizing Provider:   Ravi Cavanaugh MD         Collected:           01/19/2018 02:07 PM          Ordering Location:     [facility]             Received:            01/19/2018 02:49 PM                                 [place] [facility]                                                     Pathologist:           Jonnie Bradley MD                                                         Specimen:    Gastric, Antrum                                                                            Final Diagnosis       GASTRIC ANTRUM, MUCOSAL BIOPSY:   MILD CHRONIC GASTRITIS.   NO EVIDENCE OF HELICOBACTER PYLORI.       Gross Description       The specimen consists of a mucosal biopsy measuring up to 0.3 cm in greatest dimension.  All embedded.        Embedded Images     Results for orders placed or performed during the hospital encounter of 09/05/17   Tissue Pathology Exam   Result Value Ref Range    Case Report       Surgical Pathology Report                         Case: [id number]                                  Authorizing Provider:  Ramy Gaston MD        Collected:           09/05/2017 11:56 AM          Ordering Location:     [facility]             Received:            09/05/2017 01:26 PM                                 [place] [facility]                                                              Pathologist:           Deven Conner MD                                                          Specimens:   1) - [place], Left, LEFT SINUS                                                                        2) - [place], Right, RIGHT SINUS                                                             Final Diagnosis       1.  MUCOSA, NASAL SINUS, LEFT SIDE:  CHRONIC SINUSITIS OF RESPIRATORY MUCOSA IN RELATION TO BITS OF BONE.  NEGATIVE FOR FUNGI (GMS STAIN).    2.  MUCOSA, NASAL SINUS, RIGHT SIDE:  CHRONIC SINUSITIS OF RESPIRATORY MUCOSA IN RELATION TO BITS OF BONE.  NEGATIVE FOR FUNGI (GMS STAIN).      Gross Description       1.  " The first container is labeled \"left sinus, nose\" and has pinkish white mucosa measuring 1.5 cc in aggregate.  The entire specimen is embedded as 1A.    2.  The second container is labeled \"right sinus, nose\" and has pinkish white mucosa measuring 1.5 cc in aggregate.  The entire specimen is embedded as 2A.       Embedded Images     Wound Culture   Result Value Ref Range    Wound Culture Light growth (2+) Pseudomonas aeruginosa (A)     Wound Culture Light growth (2+) Serratia marcescens (A)     Wound Culture Light growth (2+) Streptococcus mitis / oralis (A)     Gram Stain Result Moderate (3+) WBCs seen     Gram Stain Result Rare (1+) Gram negative bacilli        Susceptibility    Pseudomonas aeruginosa - CHAR     Ceftazidime 4  ug/ml     Cefuroxime sodium >16  ug/ml     Gentamicin <=4 Susceptible ug/ml     Levofloxacin <=2 Susceptible ug/ml     Piperacillin + Tazobactam <=16  ug/ml     Tobramycin <=4 Susceptible ug/ml    Serratia marcescens - CHAR     Amikacin <=16 Susceptible ug/ml     Amoxicillin + Clavulanate >16/8 Resistant ug/ml     Ampicillin >16 Resistant ug/ml     Ampicillin + Sulbactam >16/8 Resistant ug/ml     Cefazolin >16 Resistant ug/ml     Cefotaxime <=2  ug/ml     Cefoxitin 16 Intermediate ug/ml     Ceftazidime <=1  ug/ml     Ceftriaxone <=8  ug/ml     Cefuroxime sodium >16 Resistant ug/ml     Ciprofloxacin 2 Intermediate ug/ml     Gentamicin <=4 Susceptible ug/ml     Imipenem <=4 Susceptible ug/ml     Levofloxacin <=2 Susceptible ug/ml     Meropenem <=4 Susceptible ug/ml     Nitrofurantoin >64  ug/ml     Piperacillin + Tazobactam <=16  ug/ml     Tetracycline <=4 Susceptible ug/ml     Tobramycin <=4 Susceptible ug/ml     Trimethoprim + Sulfamethoxazole <=2/38 Susceptible ug/ml   Results for orders placed or performed in visit on 08/31/17   Basic Metabolic Panel   Result Value Ref Range    Glucose 93 60 - 100 mg/dL    BUN 24 (H) 7 - 21 mg/dL    Creatinine 0.82 0.50 - 1.00 mg/dL    Sodium 136 (L) 137 - " 145 mmol/L    Potassium 4.7 3.5 - 5.1 mmol/L    Chloride 100 95 - 110 mmol/L    CO2 26.0 22.0 - 31.0 mmol/L    Calcium 9.6 8.4 - 10.2 mg/dL    eGFR Non  Amer 70 45 - 104 mL/min/1.73    BUN/Creatinine Ratio 29.3 (H) 7.0 - 25.0    Anion Gap 10.0 5.0 - 15.0 mmol/L   Results for orders placed or performed in visit on 08/07/17   Sedimentation Rate   Result Value Ref Range    Sed Rate 9 0 - 20 mm/hr   Rheumatoid Factor   Result Value Ref Range    Rheumatoid Factor Qualitative Negative Negative   C-reactive protein   Result Value Ref Range    C-Reactive Protein 1.00 0.00 - 1.00 mg/dL   DERRICK   Result Value Ref Range    DERRICK Direct Negative Negative   Results for orders placed or performed in visit on 08/01/17   Hepatitis C Antibody   Result Value Ref Range    Hepatitis C Ab Negative Negative   Hemoglobin A1c   Result Value Ref Range    Hemoglobin A1C 5.7 (H) 4 - 5.6 %   Results for orders placed or performed in visit on 05/31/17   Wound Culture   Result Value Ref Range    Wound Culture Light growth (2+) Serratia marcescens (A)     Beta Lactamase      Gram Stain Result Few (2+) WBCs seen     Gram Stain Result Rare (1+) Gram negative bacilli        Susceptibility    Serratia marcescens - CHAR     Amikacin <=16 Susceptible ug/ml     Amoxicillin + Clavulanate >16/8 Resistant ug/ml     Ampicillin >16 Resistant ug/ml     Ampicillin + Sulbactam >16/8 Resistant ug/ml     Cefazolin >16 Resistant ug/ml     Cefotaxime <=2  ug/ml     Cefoxitin 16 Intermediate ug/ml     Ceftazidime <=1  ug/ml     Ceftriaxone <=8  ug/ml     Cefuroxime >16 Resistant ug/ml     Ciprofloxacin <=1 Susceptible ug/ml     Gentamicin <=4 Susceptible ug/ml     Imipenem <=4 Susceptible ug/ml     Levofloxacin <=2 Susceptible ug/ml     Meropenem <=4 Susceptible ug/ml     Nitrofurantoin >64  ug/ml     Piperacillin + Tazobactam <=16  ug/ml     Tetracycline 8 Intermediate ug/ml     Tobramycin <=4 Susceptible ug/ml     Trimethoprim + Sulfamethoxazole <=2/38  Susceptible ug/ml

## 2018-02-05 ENCOUNTER — TELEPHONE (OUTPATIENT)
Dept: FAMILY MEDICINE CLINIC | Facility: CLINIC | Age: 63
End: 2018-02-05

## 2018-02-05 NOTE — TELEPHONE ENCOUNTER
----- Message from Katheryn Corrigan DO sent at 2/2/2018  3:19 PM CST -----  Triglycerides were high. I recommend that she start omega 3 fish oil over the counter. Low saturated fat diet will also help with this. Also her TSH was a little low but T4 was normal. She needs to have this rechecked in 3 months by her next pcp to make sure it normalizes

## 2018-02-20 DIAGNOSIS — G25.81 RESTLESS LEG SYNDROME: ICD-10-CM

## 2018-02-20 RX ORDER — ROPINIROLE 0.5 MG/1
TABLET, FILM COATED ORAL
Qty: 30 TABLET | Refills: 0 | OUTPATIENT
Start: 2018-02-20

## 2018-02-22 ENCOUNTER — OFFICE VISIT (OUTPATIENT)
Dept: SLEEP MEDICINE | Facility: HOSPITAL | Age: 63
End: 2018-02-22

## 2018-02-22 VITALS
DIASTOLIC BLOOD PRESSURE: 72 MMHG | SYSTOLIC BLOOD PRESSURE: 132 MMHG | HEIGHT: 67 IN | WEIGHT: 214 LBS | BODY MASS INDEX: 33.59 KG/M2

## 2018-02-22 DIAGNOSIS — F51.04 PSYCHOPHYSIOLOGICAL INSOMNIA: Primary | ICD-10-CM

## 2018-02-22 DIAGNOSIS — G25.81 RESTLESS LEGS SYNDROME (RLS): ICD-10-CM

## 2018-02-22 PROCEDURE — 99213 OFFICE O/P EST LOW 20 MIN: CPT | Performed by: INTERNAL MEDICINE

## 2018-02-22 RX ORDER — ESZOPICLONE 1 MG/1
1 TABLET, FILM COATED ORAL NIGHTLY
Qty: 30 TABLET | Refills: 1 | Status: SHIPPED | OUTPATIENT
Start: 2018-02-22 | End: 2018-04-03

## 2018-02-22 NOTE — PROGRESS NOTES
Sleep Clinic Follow Up    Date: 2/22/2018  Primary Care Physician: Katheryn Corrigan DO      Interim History (1/3):  Since the last visit on 11/15/2017, patient had minimal improvement on Ambien. She has continued with prolonged time in bed, but has followed up with behavioral psychology and it is helping with insomnia. Some poor sleep habits remain (tv in bed with eye covers). She is curious about Neurontin, it helps her daughter who also suffers from insomnia.     Bed time: [id number]  Sleep latency:  minutes  Number of times awakens during the night: 2-3  Wake time: varies but in bed 10-14 hours, afternoon  Estimated total sleep time at night: 8-12 hours  Caffeine intake: 1 tea  Alcohol intake: none  Nap time: none  Sleepiness with Driving: none    2) RLS controlled on requip 1 mg.    PMHx, FH, SH reviewed and pertinent changes are: unchanged from last office visit on 11/15/2017      REVIEW OF SYSTEMS:   Negative for chest pain, fever, chills, SOA, abdominal pain. Smoking: none      Exam (6-11/12):    Vitals:    02/22/18 1331   BP: 132/72     Body mass index is 33.52 kg/(m^2). Patient's BMI is above normal parameters. Follow-up plan includes:  educational material.      Gen:  No distress, conversant, pleasant, appears stated age, alert, oriented  Eyes:   Anicteric sclera, moist conjunctiva, no lid lag     PERRLA, EOMI   Heent:   NC/AT    Oropharynx clear, Mallampati 4    normal hearing  Lungs:  Normal effort, non-labored breathing    Clear to auscultation    CV:  Normal S1/S2, no murmur    no lower extremity edema  ABD:  Soft, normal bowel sounds    Psych:  Appropriate affect  Neuro:  CN 2-12 intact    Past Medical History:   Diagnosis Date   • Anemia    • Anemia June 2016    might have been due to knee replacement surgery I had in mar   • Anemia June 2016    might have been due to knee replacement surgery I had in mar   • Anxiety    • Arthritis    • Asthma    • Depression    • High blood pressure    • [place]  (hard of hearing)    • Hypertension 1999    metoprolol 100 mg and triamterene/hydrochlorothiazide 37.5mg   • Hypertension 1999    metoprolol 100 mg and triamterene/hydrochlorothiazide 37.5mg   • Migraine        Current Outpatient Prescriptions:   •  azelastine (ASTELIN) 0.1 % nasal spray, 2 sprays into each nostril 2 (Two) Times a Day. Use in each nostril as directed, Disp: 30 mL, Rfl: 11  •  busPIRone (BUSPAR) 7.5 MG tablet, Take 1 tablet by mouth 2 (Two) Times a Day., Disp: 60 tablet, Rfl: 11  •  cetirizine (ZYRTEC ALLERGY) 10 MG tablet, Take 1 tablet by mouth Daily., Disp: 30 tablet, Rfl: 11  •  Cholecalciferol (VITAMIN D3) 5000 UNITS capsule capsule, Take 5,000 Units by mouth Daily., Disp: , Rfl:   •  ciprofloxacin-hydrocortisone (CIPRO HC) 0.2-1 % otic suspension, Administer 3 drops into both ears 2 (Two) Times a Day., Disp: 10 mL, Rfl: 1  •  cyclobenzaprine (FLEXERIL) 10 MG tablet, Take 1 tablet by mouth 2 (Two) Times a Day As Needed (tension headaches or neck pain). May cause drowsiness, Disp: 60 tablet, Rfl: 5  •  dexlansoprazole (DEXILANT) 60 MG capsule, Take 1 capsule by mouth Daily., Disp: 30 capsule, Rfl: 11  •  FLUoxetine (PROzac) 40 MG capsule, Take 1 capsule by mouth Daily., Disp: 30 capsule, Rfl: 11  •  ipratropium (ATROVENT) 0.06 % nasal spray, 2 sprays into each nostril 3 (Three) Times a Day., Disp: 15 mL, Rfl: 5  •  meloxicam (MOBIC) 15 MG tablet, TAKE 1 TABLET BY MOUTH EVERY DAY, Disp: 30 tablet, Rfl: 0  •  metoprolol succinate XL (TOPROL XL) 100 MG 24 hr tablet, Take 1 tablet by mouth Daily., Disp: 30 tablet, Rfl: 11  •  Multiple Vitamin (MULTI VITAMIN DAILY PO), Take 1 tablet by mouth Daily., Disp: , Rfl:   •  rOPINIRole (REQUIP) 1 MG tablet, Take 1 tablet by mouth Every Night. Take 1 hour before bedtime., Disp: 30 tablet, Rfl: 11  •  SUMAtriptan (IMITREX) 25 MG tablet, Take 1 tab po prn for migraine, Disp: 30 tablet, Rfl: 5  •  Topiramate ER 25 MG capsule sustained-release 24 hr, Take 1 each  by mouth Daily., Disp: 30 capsule, Rfl: 5  •  triamterene-hydrochlorothiazide (DYAZIDE) 37.5-25 MG per capsule, Take 1 capsule by mouth Every Morning., Disp: 30 capsule, Rfl: 11      ASSESSMENT / PLAN:     1. Restless leg syndrome  1. Stable on 1 mg requip  2. Insomnia  1. continue CBT-I  2. Stop Ambien - poor efficacy  3. Trial of Lunesta for 30 days  3. Poor sleep hygiene   1. [place] wake time to 08:30 am  2. No tv in bed  3. No more than 6 hours in bed  4. RTC in 6 months      Total time 15 min, more than half spent in face to face counseling and coordination of care.     This document has been electronically signed by Ramirez Bai MD on February 22, 2018         CC: Katheryn Corrigan DO          No ref. provider found

## 2018-03-29 ENCOUNTER — TRANSCRIBE ORDERS (OUTPATIENT)
Dept: ORTHOPEDIC SURGERY | Facility: CLINIC | Age: 63
End: 2018-03-29

## 2018-03-29 DIAGNOSIS — M25.562 LEFT KNEE PAIN, UNSPECIFIED CHRONICITY: Primary | ICD-10-CM

## 2018-04-02 ENCOUNTER — OFFICE VISIT (OUTPATIENT)
Dept: GASTROENTEROLOGY | Facility: CLINIC | Age: 63
End: 2018-04-02

## 2018-04-02 VITALS
HEIGHT: 67 IN | HEART RATE: 96 BPM | SYSTOLIC BLOOD PRESSURE: 196 MMHG | OXYGEN SATURATION: 98 % | WEIGHT: 226.8 LBS | DIASTOLIC BLOOD PRESSURE: 104 MMHG | BODY MASS INDEX: 35.6 KG/M2

## 2018-04-02 DIAGNOSIS — K21.9 GASTROESOPHAGEAL REFLUX DISEASE WITHOUT ESOPHAGITIS: Primary | ICD-10-CM

## 2018-04-02 DIAGNOSIS — M25.562 LEFT KNEE PAIN, UNSPECIFIED CHRONICITY: Primary | ICD-10-CM

## 2018-04-02 DIAGNOSIS — K29.50 CHRONIC GASTRITIS WITHOUT BLEEDING, UNSPECIFIED GASTRITIS TYPE: ICD-10-CM

## 2018-04-02 PROCEDURE — 99213 OFFICE O/P EST LOW 20 MIN: CPT | Performed by: NURSE PRACTITIONER

## 2018-04-02 NOTE — PROGRESS NOTES
Chief Complaint   Patient presents with   • Nausea, Vomiting, Constipation       Subjective    Mikki Palak Joel is a 63 y.o. female. she is here today for follow-up.    Abdominal Pain   This is a recurrent problem. The current episode started more than 1 year ago. The onset quality is undetermined. The problem occurs every several days. The most recent episode lasted 4 days. The problem has been gradually improving. The pain is located in the LUQ. The pain is at a severity of 0/10. The quality of the pain is burning. The abdominal pain radiates to the epigastric region. Associated symptoms include constipation (colace as needed), headaches, nausea and vomiting. Pertinent negatives include no anorexia, arthralgias, belching, diarrhea, dysuria, fever, flatus, frequency, hematochezia, hematuria, melena, myalgias or weight loss. Associated symptoms comments: One time since last visit . The pain is aggravated by eating. The pain is relieved by recumbency and vomiting. Prior diagnostic workup includes lower endoscopy and upper endoscopy.   63 old female presents for 2 months follow-up regarding nausea vomiting GERD without esophagitis constipation.Reports takes a lot has done very well for her reflux she does report 1 episode of epigastric pain nausea and vomiting since last visit.  Her weight is stable.  Reports her bowel movements have been normal about one every 2-3 days with use of Colace as needed.  Previous visit we discussed use of MiraLAX she has not used that.  Plan; recommend patient continue to Exelon daily.  Patient's blood pressure is elevated today however she is asymptomatic have discussed follow-up with primary care provider regarding elevated blood pressure.  She will check again once she gets home and is relaxed.  If she develops headache or worsening symptoms she will seek emergency medical attention.  Follow-up in 6 months regarding GERD, nausea, vomiting and constipation.  Sooner if needed.      The following portions of the patient's history were reviewed and updated as appropriate:   Past Medical History:   Diagnosis Date   • Anemia    • Anemia June 2016    might have been due to knee replacement surgery I had in mar   • Anemia June 2016    might have been due to knee replacement surgery I had in mar   • Anxiety    • Arthritis    • Asthma    • Depression    • High blood pressure    • [place] hearing)    • Hypertension 1999    metoprolol 100 mg and triamterene/hydrochlorothiazide 37.5mg   • Hypertension 1999    metoprolol 100 mg and triamterene/hydrochlorothiazide 37.5mg   • Migraine      Past Surgical History:   Procedure Laterality Date   • BUNIONECTOMY Bilateral    • COLONOSCOPY  2015   • COLONOSCOPY N/A 1/19/2018    Procedure: COLONOSCOPY;  Surgeon: Ravi Polk MD;  Location: [facility] ENDOSCOPY;  Service:    • COLONOSCOPY  2015   • EAR TUBES     • ENDOSCOPIC FUNCTIONAL SINUS SURGERY (FESS) Bilateral 9/5/2017    Procedure: BILATERAL ETHMOID AND MAXILLARY SINUS AND NASAL SEPTOPLASTIES AND BILATERAL TURBINECTOMIES;  Surgeon: Ramy Gaston MD;  Location: [facility] OR;  Service:    • ENDOSCOPY N/A 1/19/2018    Procedure: ESOPHAGOGASTRODUODENOSCOPY;  Surgeon: Ravi Polk MD;  Location: [facility] ENDOSCOPY;  Service:    • HAND SURGERY     • JOINT REPLACEMENT  2016    right knee replacement 2016   • JOINT REPLACEMENT  2015    right shoulder   • RHINOPLASTY      x3   • SHOULDER ARTHROSCOPY     • TONSILLECTOMY     • TUBAL ABDOMINAL LIGATION     • TUBAL ABDOMINAL LIGATION  1987   • TUBAL ABDOMINAL LIGATION  1987   • UPPER GASTROINTESTINAL ENDOSCOPY  2016   • UPPER GASTROINTESTINAL ENDOSCOPY  2016   • UPPER GASTROINTESTINAL ENDOSCOPY  01/19/2018    Per Dr. Ravi Polk M.D.     Family History   Problem Relation Age of Onset   • Hypertension Mother    • COPD Mother    • Arthritis Mother    • Alcohol abuse Mother    • Cancer Father    • Arthritis Father    • Alcohol abuse Father    • COPD Maternal  Grandmother    • Stroke Maternal Grandmother    • Irritable bowel syndrome Sister      OB History      Para Term  AB Living    2 2 2       SAB TAB Ectopic Molar Multiple Live Births                 Current Outpatient Prescriptions   Medication Sig Dispense Refill   • azelastine (ASTELIN) 0.1 % nasal spray 2 sprays into each nostril 2 (Two) Times a Day. Use in each nostril as directed 30 mL 11   • busPIRone (BUSPAR) 7.5 MG tablet Take 1 tablet by mouth 2 (Two) Times a Day. 60 tablet 11   • cetirizine (ZYRTEC ALLERGY) 10 MG tablet Take 1 tablet by mouth Daily. 30 tablet 11   • Cholecalciferol (VITAMIN D3) 5000 UNITS capsule capsule Take 5,000 Units by mouth Daily.     • ciprofloxacin-hydrocortisone (CIPRO HC) 0.2-1 % otic suspension Administer 3 drops into both ears 2 (Two) Times a Day. (Patient taking differently: Administer 3 drops into both ears 2 (Two) Times a Day. As Needed For Infection) 10 mL 1   • cyclobenzaprine (FLEXERIL) 10 MG tablet Take 1 tablet by mouth 2 (Two) Times a Day As Needed (tension headaches or neck pain). May cause drowsiness 60 tablet 5   • dexlansoprazole (DEXILANT) 60 MG capsule Take 1 capsule by mouth Daily. 30 capsule 11   • eszopiclone (LUNESTA) 1 MG tablet Take 1 tablet by mouth Every Night. Take immediately before bedtime 30 tablet 1   • FLUoxetine (PROzac) 40 MG capsule Take 1 capsule by mouth Daily. 30 capsule 11   • ipratropium (ATROVENT) 0.06 % nasal spray 2 sprays into each nostril 3 (Three) Times a Day. 15 mL 5   • metoprolol succinate XL (TOPROL XL) 100 MG 24 hr tablet Take 1 tablet by mouth Daily. 30 tablet 11   • Multiple Vitamin (MULTI VITAMIN DAILY PO) Take 1 tablet by mouth Daily.     • rOPINIRole (REQUIP) 1 MG tablet Take 1 tablet by mouth Every Night. Take 1 hour before bedtime. 30 tablet 11   • SUMAtriptan (IMITREX) 25 MG tablet Take 1 tab po prn for migraine 30 tablet 5   • Topiramate ER 25 MG capsule sustained-release 24 hr Take 1 each by mouth Daily. 30  "capsule 5   • triamterene-hydrochlorothiazide (DYAZIDE) 37.5-25 MG per capsule Take 1 capsule by mouth Every Morning. 30 capsule 11     No current facility-administered medications for this visit.      Allergies   Allergen Reactions   • Codeine Other (See Comments)     Increases heart rate   • Sulfa Antibiotics Hives     Social History     Social History   • Marital status:      Social History Main Topics   • Smoking status: Never Smoker   • Smokeless tobacco: Never Used   • Alcohol use Yes     2 Glasses of wine, 2 Standard drinks or equivalent per week      Comment: 04/02/2018 - Patient reports  2 glasses of wine or less per month alcoholic beverage consumption with an occasional mixed alcoholic beverage while dining out.   • Drug use: No   • Sexual activity: Defer     Other Topics Concern   • Not on file       Review of Systems  Review of Systems   Constitutional: Positive for fatigue. Negative for activity change, appetite change, chills, diaphoresis, fever, unexpected weight change and weight loss.   HENT: Negative for sore throat and trouble swallowing.    Respiratory: Negative for shortness of breath.    Gastrointestinal: Positive for abdominal pain, constipation (colace as needed), nausea and vomiting. Negative for abdominal distention, anal bleeding, anorexia, blood in stool, diarrhea, flatus, hematochezia, melena and rectal pain.   Genitourinary: Negative for dysuria, frequency and hematuria.   Musculoskeletal: Negative for arthralgias and myalgias.   Skin: Negative for pallor.   Neurological: Positive for headaches. Negative for light-headedness.   Psychiatric/Behavioral: The patient is nervous/anxious (reports insomnia worsened recently anniversary of daughters death ).         BP (!) 196/104 (BP Location: Right arm, Patient Position: Sitting, Cuff Size: Adult)   Pulse 96   Ht 170.2 cm (67\")   Wt 103 kg (226 lb 12.8 oz)   SpO2 98%   BMI 35.52 kg/m²     Objective    Physical Exam "   Constitutional: She is oriented to person, place, and time. She appears well-developed and well-nourished. She is cooperative. No distress.   HENT:   Head: Normocephalic and atraumatic.   Neck: Normal range of motion. Neck supple. No thyromegaly present.   Cardiovascular: Normal rate, regular rhythm and normal heart sounds.    Pulmonary/Chest: Effort normal and breath sounds normal. She has no wheezes. She has no rhonchi. She has no rales.   Abdominal: Soft. Normal appearance and bowel sounds are normal. She exhibits no distension. There is no hepatosplenomegaly. There is no tenderness. There is no rigidity and no guarding. No hernia.   Lymphadenopathy:     She has no cervical adenopathy.   Neurological: She is alert and oriented to person, place, and time.   Skin: Skin is warm, dry and intact. No rash noted. No pallor.   Psychiatric: She has a normal mood and affect. Her speech is normal.     Office Visit on 01/30/2018   Component Date Value Ref Range Status   • Total Cholesterol 01/30/2018 197  0 - 199 mg/dL Final   • Triglycerides 01/30/2018 227* 20 - 199 mg/dL Final   • HDL Cholesterol 01/30/2018 45* 60 - 200 mg/dL Final   • LDL Cholesterol  01/30/2018 122  1 - 129 mg/dL Final   • LDL/HDL Ratio 01/30/2018 2.37  0.00 - 3.22 Final   • Free T4 01/30/2018 1.04  0.78 - 2.19 ng/dL Final   • TSH 01/30/2018 0.240* 0.460 - 4.680 mIU/mL Final     Assessment/Plan      1. Gastroesophageal reflux disease without esophagitis    2. Chronic gastritis without bleeding, unspecified gastritis type    .       Orders placed during this encounter include:  No orders of the defined types were placed in this encounter.      * Surgery not found *    Review and/or summary of lab tests, radiology, procedures, medications. Review and summary of old records and obtaining of history. The risks and benefits of my recommendations, as well as other treatment options were discussed with the patient today. Questions were answered.    No orders  of the defined types were placed in this encounter.      Follow-up: Return in about 6 months (around 10/2/2018).          This document has been electronically signed by MIRIAM White on April 2, 2018 2:35 PM             Results for orders placed or performed in visit on 01/30/18   TSH   Result Value Ref Range    TSH 0.240 (L) 0.460 - 4.680 mIU/mL   T4, Free   Result Value Ref Range    Free T4 1.04 0.78 - 2.19 ng/dL   Lipid Panel   Result Value Ref Range    Total Cholesterol 197 0 - 199 mg/dL    Triglycerides 227 (H) 20 - 199 mg/dL    HDL Cholesterol 45 (L) 60 - 200 mg/dL    LDL Cholesterol  122 1 - 129 mg/dL    LDL/HDL Ratio 2.37 0.00 - 3.22   Results for orders placed or performed during the hospital encounter of 01/19/18   Tissue Pathology Exam - Tissue, Gastric, Antrum   Result Value Ref Range    Case Report       Surgical Pathology Report                         Case: [id number]                                  Authorizing Provider:  Ravi Cavanaugh MD         Collected:           01/19/2018 02:07 PM          Ordering Location:     [facility]             Received:            01/19/2018 02:49 PM                                 [facility]                                                     Pathologist:           Jonnie Bradley MD                                                         Specimen:    Gastric, Antrum                                                                            Final Diagnosis       GASTRIC ANTRUM, MUCOSAL BIOPSY:   MILD CHRONIC GASTRITIS.   NO EVIDENCE OF HELICOBACTER PYLORI.       Gross Description       The specimen consists of a mucosal biopsy measuring up to 0.3 cm in greatest dimension.  All embedded.        Embedded Images     Results for orders placed or performed during the hospital encounter of 09/05/17   Tissue Pathology Exam   Result Value Ref Range    Case Report       Surgical Pathology Report                         Case: [id number]                 "                  Authorizing Provider:  Ramy Gaston MD        Collected:           09/05/2017 11:56 AM          Ordering Location:     [facility]             Received:            09/05/2017 01:26 PM                                 [place] [facility]                                                              Pathologist:           Deven Conner MD                                                          Specimens:   1) - Naris, Left, LEFT SINUS                                                                        2) - Naris, Right, RIGHT SINUS                                                             Final Diagnosis       1.  MUCOSA, NASAL SINUS, LEFT SIDE:  CHRONIC SINUSITIS OF RESPIRATORY MUCOSA IN RELATION TO BITS OF BONE.  NEGATIVE FOR FUNGI (GMS STAIN).    2.  MUCOSA, NASAL SINUS, RIGHT SIDE:  CHRONIC SINUSITIS OF RESPIRATORY MUCOSA IN RELATION TO BITS OF BONE.  NEGATIVE FOR FUNGI (GMS STAIN).      Gross Description       1.  The first container is labeled \"left sinus, nose\" and has pinkish white mucosa measuring 1.5 cc in aggregate.  The entire specimen is embedded as 1A.    2.  The second container is labeled \"right sinus, nose\" and has pinkish white mucosa measuring 1.5 cc in aggregate.  The entire specimen is embedded as 2A.       Embedded Images     Wound Culture   Result Value Ref Range    Wound Culture Light growth (2+) Pseudomonas aeruginosa (A)     Wound Culture Light growth (2+) Serratia marcescens (A)     Wound Culture Light growth (2+) Streptococcus mitis / oralis (A)     Gram Stain Result Moderate (3+) WBCs seen     Gram Stain Result Rare (1+) Gram negative bacilli        Susceptibility    Pseudomonas aeruginosa - CHAR     Ceftazidime 4  ug/ml     Cefuroxime sodium >16  ug/ml     Gentamicin <=4 Susceptible ug/ml     Levofloxacin <=2 Susceptible ug/ml     Piperacillin + Tazobactam <=16  ug/ml     Tobramycin <=4 Susceptible ug/ml    Serratia marcescens - CAHR     Amikacin <=16 Susceptible " ug/ml     Amoxicillin + Clavulanate >16/8 Resistant ug/ml     Ampicillin >16 Resistant ug/ml     Ampicillin + Sulbactam >16/8 Resistant ug/ml     Cefazolin >16 Resistant ug/ml     Cefotaxime <=2  ug/ml     Cefoxitin 16 Intermediate ug/ml     Ceftazidime <=1  ug/ml     Ceftriaxone <=8  ug/ml     Cefuroxime sodium >16 Resistant ug/ml     Ciprofloxacin 2 Intermediate ug/ml     Gentamicin <=4 Susceptible ug/ml     Imipenem <=4 Susceptible ug/ml     Levofloxacin <=2 Susceptible ug/ml     Meropenem <=4 Susceptible ug/ml     Nitrofurantoin >64  ug/ml     Piperacillin + Tazobactam <=16  ug/ml     Tetracycline <=4 Susceptible ug/ml     Tobramycin <=4 Susceptible ug/ml     Trimethoprim + Sulfamethoxazole <=2/38 Susceptible ug/ml   Results for orders placed or performed in visit on 08/31/17   Basic Metabolic Panel   Result Value Ref Range    Glucose 93 60 - 100 mg/dL    BUN 24 (H) 7 - 21 mg/dL    Creatinine 0.82 0.50 - 1.00 mg/dL    Sodium 136 (L) 137 - 145 mmol/L    Potassium 4.7 3.5 - 5.1 mmol/L    Chloride 100 95 - 110 mmol/L    CO2 26.0 22.0 - 31.0 mmol/L    Calcium 9.6 8.4 - 10.2 mg/dL    eGFR Non  Amer 70 45 - 104 mL/min/1.73    BUN/Creatinine Ratio 29.3 (H) 7.0 - 25.0    Anion Gap 10.0 5.0 - 15.0 mmol/L   Results for orders placed or performed in visit on 08/07/17   Sedimentation Rate   Result Value Ref Range    Sed Rate 9 0 - 20 mm/hr   Rheumatoid Factor   Result Value Ref Range    Rheumatoid Factor Qualitative Negative Negative   C-reactive protein   Result Value Ref Range    C-Reactive Protein 1.00 0.00 - 1.00 mg/dL   DERRICK   Result Value Ref Range    DERRICK Direct Negative Negative   Results for orders placed or performed in visit on 08/01/17   Hepatitis C Antibody   Result Value Ref Range    Hepatitis C Ab Negative Negative   Hemoglobin A1c   Result Value Ref Range    Hemoglobin A1C 5.7 (H) 4 - 5.6 %   Results for orders placed or performed in visit on 05/31/17   Wound Culture   Result Value Ref Range    Wound  Culture Light growth (2+) Serratia marcescens (A)     Beta Lactamase      Gram Stain Result Few (2+) WBCs seen     Gram Stain Result Rare (1+) Gram negative bacilli        Susceptibility    Serratia marcescens - CHAR     Amikacin <=16 Susceptible ug/ml     Amoxicillin + Clavulanate >16/8 Resistant ug/ml     Ampicillin >16 Resistant ug/ml     Ampicillin + Sulbactam >16/8 Resistant ug/ml     Cefazolin >16 Resistant ug/ml     Cefotaxime <=2  ug/ml     Cefoxitin 16 Intermediate ug/ml     Ceftazidime <=1  ug/ml     Ceftriaxone <=8  ug/ml     Cefuroxime >16 Resistant ug/ml     Ciprofloxacin <=1 Susceptible ug/ml     Gentamicin <=4 Susceptible ug/ml     Imipenem <=4 Susceptible ug/ml     Levofloxacin <=2 Susceptible ug/ml     Meropenem <=4 Susceptible ug/ml     Nitrofurantoin >64  ug/ml     Piperacillin + Tazobactam <=16  ug/ml     Tetracycline 8 Intermediate ug/ml     Tobramycin <=4 Susceptible ug/ml     Trimethoprim + Sulfamethoxazole <=2/38 Susceptible ug/ml

## 2018-04-02 NOTE — PATIENT INSTRUCTIONS
Constipation, Adult  Constipation is when a person:  · Poops (has a bowel movement) fewer times in a week than normal.  · Has a hard time pooping.  · Has poop that is dry, hard, or bigger than normal.  Follow these instructions at home:  Eating and drinking     · Eat foods that have a lot of fiber, such as:  ¨ Fresh fruits and vegetables.  ¨ Whole grains.  ¨ Beans.  · Eat less of foods that are high in fat, low in fiber, or overly processed, such as:  ¨ French fries.  ¨ Hamburgers.  ¨ Cookies.  ¨ Candy.  ¨ Soda.  · Drink enough fluid to keep your pee (urine) clear or pale yellow.  General instructions   · Exercise regularly or as told by your doctor.  · Go to the restroom when you feel like you need to poop. Do not hold it in.  · Take over-the-counter and prescription medicines only as told by your doctor. These include any fiber supplements.  · Do pelvic floor retraining exercises, such as:  ¨ Doing deep breathing while relaxing your lower belly (abdomen).  ¨ Relaxing your pelvic floor while pooping.  · Watch your condition for any changes.  · Keep all follow-up visits as told by your doctor. This is important.  Contact a doctor if:  · You have pain that gets worse.  · You have a fever.  · You have not pooped for 4 days.  · You throw up (vomit).  · You are not hungry.  · You lose weight.  · You are bleeding from the anus.  · You have thin, pencil-like poop (stool).  Get help right away if:  · You have a fever, and your symptoms suddenly get worse.  · You leak poop or have blood in your poop.  · Your belly feels hard or bigger than normal (is bloated).  · You have very bad belly pain.  · You feel dizzy or you faint.  This information is not intended to replace advice given to you by your health care provider. Make sure you discuss any questions you have with your health care provider.  Document Released: 06/05/2009 Document Revised: 07/07/2017 Document Reviewed: 06/07/2017  [employer] Interactive Patient Education ©  2017 [employer]  Nausea and Vomiting, Adult  Feeling sick to your stomach (nausea) means that your stomach is upset or you feel like you have to throw up (vomit). Feeling more and more sick to your stomach can lead to throwing up. Throwing up happens when food and liquid from your stomach are thrown up and out the mouth. Throwing up can make you feel weak and cause you to get dehydrated. Dehydration can make you tired and thirsty, make you have a dry mouth, and make it so you pee (urinate) less often. Older adults and people with other diseases or a weak defense system (immune system) are at higher risk for dehydration. If you feel sick to your stomach or if you throw up, it is important to follow instructions from your doctor about how to take care of yourself.  Follow these instructions at home:  Eating and drinking   Follow these instructions as told by your doctor:  · Take an oral rehydration solution (ORS). This is a drink that is sold at pharmacies and stores.  · Drink clear fluids in small amounts as you are able, such as:  ¨ Water.  ¨ Ice chips.  ¨ Diluted fruit juice.  ¨ Low-calorie sports drinks.  · Eat bland, easy-to-digest foods in small amounts as you are able, such as:  ¨ Bananas.  ¨ Applesauce.  ¨ Rice.  ¨ Low-fat (lean) meats.  ¨ Toast.  ¨ Crackers.  · Avoid fluids that have a lot of sugar or caffeine in them.  · Avoid alcohol.  · Avoid spicy or fatty foods.  General instructions   · Drink enough fluid to keep your pee (urine) clear or pale yellow.  · Wash your hands often. If you cannot use soap and water, use hand .  · Make sure that all people in your home wash their hands well and often.  · Take over-the-counter and prescription medicines only as told by your doctor.  · Rest at home while you get better.  · Watch your condition for any changes.  · Breathe slowly and deeply when you feel sick to your stomach.  · Keep all follow-up visits as told by your doctor. This is  important.  Contact a doctor if:  · You have a fever.  · You cannot keep fluids down.  · Your symptoms get worse.  · You have new symptoms.  · You feel sick to your stomach for more than two days.  · You feel light-headed or dizzy.  · You have a headache.  · You have muscle cramps.  Get help right away if:  · You have pain in your chest, neck, arm, or jaw.  · You feel very weak or you pass out (faint).  · You throw up again and again.  · You see blood in your throw-up.  · Your throw-up looks like black coffee grounds.  · You have bloody or black poop (stools) or poop that look like tar.  · You have a very bad headache, a stiff neck, or both.  · You have a rash.  · You have very bad pain, cramping, or bloating in your belly (abdomen).  · You have trouble breathing.  · You are breathing very quickly.  · Your heart is beating very quickly.  · Your skin feels cold and clammy.  · You feel confused.  · You have pain when you pee.  · You have signs of dehydration, such as:  ¨ Dark pee, hardly any pee, or no pee.  ¨ Cracked lips.  ¨ Dry mouth.  ¨ Sunken eyes.  ¨ Sleepiness.  ¨ Weakness.  These symptoms may be an emergency. Do not wait to see if the symptoms will go away. Get medical help right away. Call your local emergency services (911 in the [place] Do not drive yourself to the hospital.  This information is not intended to replace advice given to you by your health care provider. Make sure you discuss any questions you have with your health care provider.  Document Released: 06/05/2009 Document Revised: 07/07/2017 Document Reviewed: 08/23/2016  [employer] Interactive Patient Education © 2017 [employer]  Nausea, Adult  Nausea is the feeling of an upset stomach or having to vomit. Nausea on its own is not usually a serious concern, but it may be an early sign of a more serious medical problem. As nausea gets worse, it can lead to vomiting. If vomiting develops, or if you are not able to drink enough fluids, you are at  risk of becoming dehydrated. Dehydration can make you tired and thirsty, cause you to have a dry mouth, and decrease how often you urinate. Older adults and people with other diseases or a weak immune system are at higher risk for dehydration. The main goals of treating your nausea are:  · To limit repeated nausea episodes.  · To prevent vomiting and dehydration.  Follow these instructions at home:  Follow instructions from your health care provider about how to care for yourself at home.  Eating and drinking   Follow these recommendations as told by your health care provider:  · Take an oral rehydration solution (ORS). This is a drink that is sold at pharmacies and retail stores.  · Drink clear fluids in small amounts as you are able. Clear fluids include water, ice chips, diluted fruit juice, and low-calorie sports drinks.  · Eat bland, easy-to-digest foods in small amounts as you are able. These foods include bananas, applesauce, rice, lean meats, toast, and crackers.  · Avoid drinking fluids that contain a lot of sugar or caffeine, such as energy drinks, sports drinks, and soda.  · Avoid alcohol.  · Avoid spicy or fatty foods.  General instructions   · Drink enough fluid to keep your urine clear or pale yellow.  · Wash your hands often. If soap and water are not available, use hand .  · Make sure that all people in your household wash their hands well and often.  · Rest at home while you recover.  · Take over-the-counter and prescription medicines only as told by your health care provider.  · Breathe slowly and deeply when you feel nauseous.  · Watch your condition for any changes.  · Keep all follow-up visits as told by your health care provider. This is important.  Contact a health care provider if:  · You have a headache.  · You have new symptoms.  · Your nausea gets worse.  · You have a fever.  · You feel light-headed or dizzy.  · You vomit.  · You cannot keep fluids down.  Get help right away  if:  · You have pain in your chest, neck, arm, or jaw.  · You feel extremely weak or you faint.  · You have vomit that is bright red or looks like coffee grounds.  · You have bloody or black stools or stools that look like tar.  · You have a severe headache, a stiff neck, or both.  · You have severe pain, cramping, or bloating in your abdomen.  · You have a rash.  · You have difficulty breathing or are breathing very quickly.  · Your heart is beating very quickly.  · Your skin feels cold and clammy.  · You feel confused.  · You have pain when you urinate.  · You have signs of dehydration, such as:  ¨ Dark urine, very little, or no urine.  ¨ Cracked lips.  ¨ Dry mouth.  ¨ Sunken eyes.  ¨ Sleepiness.  ¨ Weakness.  These symptoms may represent a serious problem that is an emergency. Do not wait to see if the symptoms will go away. Get medical help right away. Call your local emergency services (911 in the [place] Do not drive yourself to the hospital.  This information is not intended to replace advice given to you by your health care provider. Make sure you discuss any questions you have with your health care provider.  Document Released: 01/25/2006 Document Revised: 05/22/2017 Document Reviewed: 08/23/2016  Elsevier Interactive Patient Education © 2017 [employer]

## 2018-04-03 ENCOUNTER — OFFICE VISIT (OUTPATIENT)
Dept: ORTHOPEDIC SURGERY | Facility: CLINIC | Age: 63
End: 2018-04-03

## 2018-04-03 VITALS — WEIGHT: 220.5 LBS | BODY MASS INDEX: 34.61 KG/M2 | HEIGHT: 67 IN

## 2018-04-03 DIAGNOSIS — M25.561 RIGHT KNEE PAIN, UNSPECIFIED CHRONICITY: Primary | ICD-10-CM

## 2018-04-03 DIAGNOSIS — Z96.651 PRESENCE OF RIGHT ARTIFICIAL KNEE JOINT: ICD-10-CM

## 2018-04-03 DIAGNOSIS — E66.9 OBESITY (BMI 30.0-34.9): ICD-10-CM

## 2018-04-03 PROBLEM — G89.29 OTHER CHRONIC PAIN: Status: ACTIVE | Noted: 2018-04-03

## 2018-04-03 PROBLEM — F41.8 DEPRESSION WITH ANXIETY: Status: ACTIVE | Noted: 2018-04-03

## 2018-04-03 PROBLEM — G47.00 INSOMNIA: Status: ACTIVE | Noted: 2018-04-03

## 2018-04-03 PROBLEM — F43.10 PTSD (POST-TRAUMATIC STRESS DISORDER): Status: ACTIVE | Noted: 2018-04-03

## 2018-04-03 PROBLEM — G25.81 RESTLESS LEG: Status: ACTIVE | Noted: 2018-04-03

## 2018-04-03 PROCEDURE — 96372 THER/PROPH/DIAG INJ SC/IM: CPT | Performed by: NURSE PRACTITIONER

## 2018-04-03 PROCEDURE — 99214 OFFICE O/P EST MOD 30 MIN: CPT | Performed by: NURSE PRACTITIONER

## 2018-04-03 RX ORDER — ZOLPIDEM TARTRATE 5 MG/1
TABLET ORAL
COMMUNITY
End: 2018-04-26 | Stop reason: ALTCHOICE

## 2018-04-03 RX ORDER — TRIAMCINOLONE ACETONIDE 40 MG/ML
80 INJECTION, SUSPENSION INTRA-ARTICULAR; INTRAMUSCULAR ONCE
Status: COMPLETED | OUTPATIENT
Start: 2018-04-03 | End: 2018-04-03

## 2018-04-03 RX ORDER — KETOROLAC TROMETHAMINE 30 MG/ML
60 INJECTION, SOLUTION INTRAMUSCULAR; INTRAVENOUS ONCE
Status: COMPLETED | OUTPATIENT
Start: 2018-04-03 | End: 2018-04-03

## 2018-04-03 RX ORDER — MOMETASONE FUROATE 50 UG/1
SPRAY, METERED NASAL
Refills: 11 | COMMUNITY
Start: 2018-03-20 | End: 2018-05-31

## 2018-04-03 RX ORDER — TOPIRAMATE 25 MG/1
CAPSULE, EXTENDED RELEASE ORAL
COMMUNITY
End: 2018-05-31

## 2018-04-03 RX ORDER — MOMETASONE FUROATE 50 UG/1
SPRAY, METERED NASAL
COMMUNITY
End: 2018-04-03 | Stop reason: SDUPTHER

## 2018-04-03 RX ORDER — KETOROLAC TROMETHAMINE 30 MG/ML
60 INJECTION, SOLUTION INTRAMUSCULAR; INTRAVENOUS ONCE
Status: DISCONTINUED | OUTPATIENT
Start: 2018-04-03 | End: 2018-04-03

## 2018-04-03 RX ADMIN — TRIAMCINOLONE ACETONIDE 80 MG: 40 INJECTION, SUSPENSION INTRA-ARTICULAR; INTRAMUSCULAR at 09:55

## 2018-04-03 RX ADMIN — KETOROLAC TROMETHAMINE 60 MG: 30 INJECTION, SOLUTION INTRAMUSCULAR; INTRAVENOUS at 09:59

## 2018-04-03 NOTE — PROGRESS NOTES
Mikki Joel is a 63 y.o. female   Primary provider:  Katheryn Corrigan DO       Chief Complaint   Patient presents with   • Right Knee - Establish Care     Xray done today in office         HISTORY OF PRESENT ILLNESS:    Patient states that the soft tissue around her knee has been swollen and tender for about 18 months after a fall. She had a TKA 2016 in [place].         Knee Pain    Incident onset: 18 months. There was no injury mechanism. The pain is present in the right knee. The quality of the pain is described as aching. The pain is moderate. The pain has been constant since onset. Associated symptoms include a loss of motion. Associated symptoms comments: Swelling  . The symptoms are aggravated by weight bearing and movement. She has tried rest for the symptoms.        CONCURRENT MEDICAL HISTORY:    Past Medical History:   Diagnosis Date   • Anemia    • Anemia June 2016    might have been due to knee replacement surgery I had in mar   • Anemia June 2016    might have been due to knee replacement surgery I had in mar   • Anxiety    • Arthritis    • Arthritis of back 2009    lower back   • Asthma    • CTS (carpal tunnel syndrome) 1987    left wrist - had corrective surgery   • Depression    • Fracture of wrist 1979   • High blood pressure    • [place] (hard of hearing)    • Hypertension 1999    metoprolol 100 mg and triamterene/hydrochlorothiazide 37.5mg   • Hypertension 1999    metoprolol 100 mg and triamterene/hydrochlorothiazide 37.5mg   • Knee swelling 2010   • Migraine    • Periarthritis of shoulder 2011    both shoulders right shoulder total replacement   • Tear of meniscus of knee 2016    fell       Allergies   Allergen Reactions   • Codeine Other (See Comments)     Increases heart rate   • Sulfa Antibiotics Hives         Current Outpatient Prescriptions:   •  busPIRone (BUSPAR) 7.5 MG tablet, Take 1 tablet by mouth 2 (Two) Times a Day., Disp: 60 tablet, Rfl: 11  •  cetirizine (ZYRTEC ALLERGY) 10 MG  tablet, Take 1 tablet by mouth Daily., Disp: 30 tablet, Rfl: 11  •  cyclobenzaprine (FLEXERIL) 10 MG tablet, Take 1 tablet by mouth 2 (Two) Times a Day As Needed (tension headaches or neck pain). May cause drowsiness, Disp: 60 tablet, Rfl: 5  •  FLUoxetine (PROzac) 40 MG capsule, Take 1 capsule by mouth Daily., Disp: 30 capsule, Rfl: 11  •  metoprolol succinate XL (TOPROL XL) 100 MG 24 hr tablet, Take 1 tablet by mouth Daily., Disp: 30 tablet, Rfl: 11  •  mometasone (NASONEX) 50 MCG/ACT nasal spray, U 2 SPRAYS IN EACH NOSTRIL BID., Disp: , Rfl: 11  •  Multiple Vitamin (MULTI VITAMIN DAILY PO), Take 1 tablet by mouth Daily., Disp: , Rfl:   •  rOPINIRole (REQUIP) 1 MG tablet, Take 1 tablet by mouth Every Night. Take 1 hour before bedtime., Disp: 30 tablet, Rfl: 11  •  SUMAtriptan (IMITREX) 25 MG tablet, Take 1 tab po prn for migraine, Disp: 30 tablet, Rfl: 5  •  Topiramate ER (TROKENDI XR) 25 MG capsule sustained-release 24 hr, TK 1 C PO D, Disp: , Rfl:   •  zolpidem (AMBIEN) 5 MG tablet, (Schedule IV Drug) TK 1 T PO QD HS PRN, Disp: , Rfl:     Past Surgical History:   Procedure Laterality Date   • BUNIONECTOMY Bilateral    • COLONOSCOPY  2015   • COLONOSCOPY N/A 1/19/2018    Procedure: COLONOSCOPY;  Surgeon: Ravi Cavanaugh MD;  Location: [facility] ENDOSCOPY;  Service:    • COLONOSCOPY  2015   • EAR TUBES     • ENDOSCOPIC FUNCTIONAL SINUS SURGERY (FESS) Bilateral 9/5/2017    Procedure: BILATERAL ETHMOID AND MAXILLARY SINUS AND NASAL SEPTOPLASTIES AND BILATERAL TURBINECTOMIES;  Surgeon: Ramy Gaston MD;  Location: [facility] OR;  Service:    • ENDOSCOPY N/A 1/19/2018    Procedure: ESOPHAGOGASTRODUODENOSCOPY;  Surgeon: Ravi Cavanaugh MD;  Location: [facility] ENDOSCOPY;  Service:    • HAND SURGERY     • JOINT REPLACEMENT  2016    right knee replacement 2016   • JOINT REPLACEMENT  2015    right shoulder   • KNEE SURGERY  2016    total right knee replacement   • RHINOPLASTY      x3   • SHOULDER ARTHROSCOPY     • SHOULDER  "SURGERY  2015    complete shoulder joint replacement   • TONSILLECTOMY     • TUBAL ABDOMINAL LIGATION     • TUBAL ABDOMINAL LIGATION  1987   • TUBAL ABDOMINAL LIGATION  1987   • UPPER GASTROINTESTINAL ENDOSCOPY  2016   • UPPER GASTROINTESTINAL ENDOSCOPY  2016   • UPPER GASTROINTESTINAL ENDOSCOPY  01/19/2018    Per Dr. Ravi Polk M.D.   • WRIST SURGERY  1987    carpal tunnel repair left wrist       Family History   Problem Relation Age of Onset   • Hypertension Mother    • COPD Mother    • Arthritis Mother    • Alcohol abuse Mother    • Cancer Father    • Arthritis Father    • Alcohol abuse Father    • COPD Maternal Grandmother    • Stroke Maternal Grandmother    • Osteoporosis Maternal Grandmother    • Irritable bowel syndrome Sister        Social History     Social History   • Marital status:      Spouse name: N/A   • Number of children: N/A   • Years of education: N/A     Occupational History   • Not on file.     Social History Main Topics   • Smoking status: Never Smoker   • Smokeless tobacco: Never Used      Comment: Never had the desire to try smoking   • Alcohol use Yes     2 Glasses of wine, 2 Standard drinks or equivalent per week      Comment: per month   • Drug use: No   • Sexual activity: Yes     Partners: Male     Birth control/ protection: Post-menopausal, Surgical, None      Comment: then tubal ligation after last child at age 32.     Other Topics Concern   • Not on file     Social History Narrative   • No narrative on file        Review of Systems    PHYSICAL EXAMINATION:       Ht 170.2 cm (67\")   Wt 100 kg (220 lb 8 oz)   BMI 34.54 kg/m²     Physical Exam   Constitutional: She is oriented to person, place, and time. Vital signs are normal. She appears well-developed and well-nourished. She is cooperative.   HENT:   Head: Normocephalic and atraumatic.   Neck: Trachea normal and phonation normal.   Pulmonary/Chest: Effort normal. No respiratory distress.   Abdominal: Soft. Normal " appearance. She exhibits no distension.   Musculoskeletal:        Right knee: She exhibits no effusion.   Neurological: She is alert and oriented to person, place, and time. GCS eye subscore is 4. GCS verbal subscore is 5. GCS motor subscore is 6.   Skin: Skin is warm, dry and intact.   Psychiatric: She has a normal mood and affect. Her speech is normal and behavior is normal. Judgment and thought content normal. Cognition and memory are normal.   Vitals reviewed.      GAIT:     []  Normal  [x]  Antalgic    Assistive device: []  None  []  Walker     []  Crutches  []  Cane     []  Wheelchair  []  Stretcher    Right Knee Exam     Tenderness   The patient is experiencing tenderness in the medial joint line and lateral joint line.    Range of Motion   Extension: normal   Flexion: abnormal     Other   Erythema: absent  Scars: present  Sensation: normal  Pulse: present  Swelling: mild  Other tests: no effusion present      Left Knee Exam   Left knee exam is normal.    Muscle Strength     The patient has normal left knee strength.                  Xr Knee 1 Or 2 View Right    Result Date: 4/3/2018  Narrative: Standing AP of both knees with lateral views of the right knee only reveals a stable total knee arthroplasty without any evidence of hardware failure or other acute radiological abnormalities noted. 04/03/18 at 9:22 AM by MIRIAM Lucio    Xr Knee Bilateral Ap Standing    Result Date: 4/3/2018  Narrative: Standing AP of both knees with lateral views of the right knee only reveals a stable total knee arthroplasty without any evidence of hardware failure or other acute radiological abnormalities noted. 04/03/18 at 9:22 AM by MIRIAM Lucio           ASSESSMENT:    Diagnoses and all orders for this visit:    Right knee pain, unspecified chronicity  -     triamcinolone acetonide (KENALOG-40) injection 80 mg; Inject 2 mL into the shoulder, thigh, or buttocks 1 (One) Time.  -     Discontinue: ketorolac  (TORADOL) injection 60 mg; Inject 60 mg into the shoulder, thigh, or buttocks 1 (One) Time.  -     ketorolac (TORADOL) injection 60 mg; Inject 60 mg into the shoulder, thigh, or buttocks 1 (One) Time.    Presence of right artificial knee joint  -     triamcinolone acetonide (KENALOG-40) injection 80 mg; Inject 2 mL into the shoulder, thigh, or buttocks 1 (One) Time.  -     Discontinue: ketorolac (TORADOL) injection 60 mg; Inject 60 mg into the shoulder, thigh, or buttocks 1 (One) Time.  -     ketorolac (TORADOL) injection 60 mg; Inject 60 mg into the shoulder, thigh, or buttocks 1 (One) Time.    Obesity (BMI 30.0-34.9)    Other orders  -     Discontinue: mometasone (NASONEX) 50 MCG/ACT nasal spray; U 2 SPRAYS IN EACH NOSTRIL BID.  -     zolpidem (AMBIEN) 5 MG tablet; (Schedule IV Drug) TK 1 T PO QD HS PRN  -     Topiramate ER (TROKENDI XR) 25 MG capsule sustained-release 24 hr; TK 1 C PO D  -     mometasone (NASONEX) 50 MCG/ACT nasal spray; U 2 SPRAYS IN EACH NOSTRIL BID.          PLAN  Discussed the options with the patient in detail and today we'll proceed with an IM dose of Kenalog and Toradol and progression range of motion and activity with home exercises for her low impact as tolerated.  We also discussed weight management.  No Follow-up on file.    MIRIAM Lucio

## 2018-04-17 RX ORDER — MOMETASONE FUROATE 50 UG/1
SPRAY, METERED NASAL
Qty: 1 EACH | Refills: 0 | Status: SHIPPED | OUTPATIENT
Start: 2018-04-17 | End: 2018-05-14 | Stop reason: SDUPTHER

## 2018-04-25 RX ORDER — OXYMETAZOLINE HYDROCHLORIDE 0.05 G/100ML
2 SPRAY NASAL 2 TIMES DAILY
Qty: 14.7 ML | Refills: 0 | Status: SHIPPED | OUTPATIENT
Start: 2018-04-25 | End: 2018-04-28

## 2018-04-25 RX ORDER — CEFDINIR 300 MG/1
300 CAPSULE ORAL 2 TIMES DAILY
Qty: 28 CAPSULE | Refills: 0 | Status: SHIPPED | OUTPATIENT
Start: 2018-04-25 | End: 2018-04-30

## 2018-04-30 ENCOUNTER — OFFICE VISIT (OUTPATIENT)
Dept: FAMILY MEDICINE CLINIC | Facility: CLINIC | Age: 63
End: 2018-04-30

## 2018-04-30 ENCOUNTER — TELEPHONE (OUTPATIENT)
Dept: SLEEP MEDICINE | Facility: HOSPITAL | Age: 63
End: 2018-04-30

## 2018-04-30 VITALS
WEIGHT: 226.3 LBS | HEIGHT: 67 IN | DIASTOLIC BLOOD PRESSURE: 88 MMHG | BODY MASS INDEX: 35.52 KG/M2 | SYSTOLIC BLOOD PRESSURE: 138 MMHG

## 2018-04-30 DIAGNOSIS — F41.8 DEPRESSION WITH ANXIETY: Chronic | ICD-10-CM

## 2018-04-30 DIAGNOSIS — I10 ESSENTIAL HYPERTENSION: ICD-10-CM

## 2018-04-30 DIAGNOSIS — Z12.31 VISIT FOR SCREENING MAMMOGRAM: ICD-10-CM

## 2018-04-30 PROBLEM — J32.9 SINUSITIS: Chronic | Status: ACTIVE | Noted: 2017-08-24

## 2018-04-30 PROBLEM — J34.2 NASAL SEPTAL DEFORMITY: Status: RESOLVED | Noted: 2017-09-05 | Resolved: 2018-04-30

## 2018-04-30 PROBLEM — J34.3 NASAL TURBINATE HYPERTROPHY: Chronic | Status: ACTIVE | Noted: 2017-09-05

## 2018-04-30 PROBLEM — G47.00 INSOMNIA: Chronic | Status: ACTIVE | Noted: 2018-04-03

## 2018-04-30 PROBLEM — G25.81 RESTLESS LEG: Chronic | Status: ACTIVE | Noted: 2018-04-03

## 2018-04-30 PROBLEM — F43.10 PTSD (POST-TRAUMATIC STRESS DISORDER): Chronic | Status: ACTIVE | Noted: 2018-04-03

## 2018-04-30 PROBLEM — R10.13 EPIGASTRIC PAIN: Status: RESOLVED | Noted: 2017-11-28 | Resolved: 2018-04-30

## 2018-04-30 PROBLEM — G89.29 OTHER CHRONIC PAIN: Chronic | Status: ACTIVE | Noted: 2018-04-03

## 2018-04-30 PROBLEM — Z12.11 ENCOUNTER FOR COLONOSCOPY DUE TO HISTORY OF COLONIC POLYP: Chronic | Status: ACTIVE | Noted: 2017-11-28

## 2018-04-30 PROBLEM — K21.9 GASTROESOPHAGEAL REFLUX DISEASE: Chronic | Status: ACTIVE | Noted: 2017-11-28

## 2018-04-30 PROBLEM — Z86.010 ENCOUNTER FOR COLONOSCOPY DUE TO HISTORY OF COLONIC POLYP: Chronic | Status: ACTIVE | Noted: 2017-11-28

## 2018-04-30 PROCEDURE — 99214 OFFICE O/P EST MOD 30 MIN: CPT | Performed by: FAMILY MEDICINE

## 2018-04-30 RX ORDER — ESZOPICLONE 1 MG/1
1 TABLET, FILM COATED ORAL NIGHTLY
Qty: 30 TABLET | Refills: 3 | Status: SHIPPED | OUTPATIENT
Start: 2018-04-30 | End: 2018-08-21 | Stop reason: SDUPTHER

## 2018-04-30 NOTE — TELEPHONE ENCOUNTER
I left voicemail message at # [phone] requesting pt call back to dept re: rx for Lunesta. Pt to either  or it can be mailed to home address per Dr. Bai.

## 2018-04-30 NOTE — PROGRESS NOTES
Subjective   Mikki Joel is a 63 y.o. female.     History of Present Illness   follow-up from Dr. Schober's office.  Surgery seen sleep clinic.  Is up-to-date on all screenings except for mammogram in late August.  Not due for immunizations till turns 64.  Have  mainly day on weight loss exercise lifestyle measures.  History is reviewed.  Medicines are reviewed.    The following portions of the patient's history were reviewed and updated as appropriate: allergies, current medications, past family history, past medical history, past social history, past surgical history and problem list.    Review of Systems   Musculoskeletal: Positive for arthralgias, back pain and myalgias.       Objective   Physical Exam   Constitutional: She appears well-developed.   HENT:   Head: Normocephalic.   Eyes: Pupils are equal, round, and reactive to light.   Neck: Normal range of motion.   Cardiovascular: Normal rate.    Pulmonary/Chest: Effort normal.   Psychiatric: She has a normal mood and affect. Her behavior is normal. Thought content normal.       Assessment/Plan   Mikki was seen today for establish care.    Diagnoses and all orders for this visit:    BMI 35.0-35.9,adult    Essential hypertension    Visit for screening mammogram  -     Mammo Screening Digital Tomosynthesis Bilateral With CAD; Future    Depression with anxiety       on wt loss measures and programs  Lifestyle measures recheck blood pressure 6 months

## 2018-05-14 ENCOUNTER — OFFICE VISIT (OUTPATIENT)
Dept: OTOLARYNGOLOGY | Facility: CLINIC | Age: 63
End: 2018-05-14

## 2018-05-14 ENCOUNTER — PREP FOR SURGERY (OUTPATIENT)
Dept: OTHER | Facility: HOSPITAL | Age: 63
End: 2018-05-14

## 2018-05-14 VITALS — TEMPERATURE: 98.6 F | WEIGHT: 230 LBS | BODY MASS INDEX: 36.1 KG/M2 | HEIGHT: 67 IN

## 2018-05-14 DIAGNOSIS — H72.92 PERFORATION OF LEFT TYMPANIC MEMBRANE: Primary | ICD-10-CM

## 2018-05-14 DIAGNOSIS — J30.0 CHRONIC VASOMOTOR RHINITIS: ICD-10-CM

## 2018-05-14 DIAGNOSIS — H72.93 PERFORATED TYMPANIC MEMBRANE, BILATERAL: Primary | ICD-10-CM

## 2018-05-14 DIAGNOSIS — H90.72 MIXED HEARING LOSS OF LEFT EAR: ICD-10-CM

## 2018-05-14 DIAGNOSIS — J34.3 NASAL TURBINATE HYPERTROPHY: ICD-10-CM

## 2018-05-14 PROCEDURE — 99214 OFFICE O/P EST MOD 30 MIN: CPT | Performed by: OTOLARYNGOLOGY

## 2018-05-14 RX ORDER — AZELASTINE 1 MG/ML
SPRAY, METERED NASAL
Refills: 11 | COMMUNITY
Start: 2018-05-09 | End: 2018-05-31

## 2018-05-14 RX ORDER — IPRATROPIUM BROMIDE 42 UG/1
2 SPRAY, METERED NASAL 3 TIMES DAILY
Qty: 15 ML | Refills: 5 | Status: SHIPPED | OUTPATIENT
Start: 2018-05-14 | End: 2018-10-06 | Stop reason: SDUPTHER

## 2018-05-14 NOTE — PATIENT INSTRUCTIONS

## 2018-05-15 ENCOUNTER — OFFICE VISIT (OUTPATIENT)
Dept: ORTHOPEDIC SURGERY | Facility: CLINIC | Age: 63
End: 2018-05-15

## 2018-05-15 VITALS — WEIGHT: 227 LBS | HEIGHT: 67 IN | BODY MASS INDEX: 35.63 KG/M2

## 2018-05-15 DIAGNOSIS — Z96.651 PRESENCE OF RIGHT ARTIFICIAL KNEE JOINT: Primary | ICD-10-CM

## 2018-05-15 DIAGNOSIS — M25.561 RIGHT KNEE PAIN, UNSPECIFIED CHRONICITY: ICD-10-CM

## 2018-05-15 PROCEDURE — 96372 THER/PROPH/DIAG INJ SC/IM: CPT | Performed by: NURSE PRACTITIONER

## 2018-05-15 PROCEDURE — 99213 OFFICE O/P EST LOW 20 MIN: CPT | Performed by: NURSE PRACTITIONER

## 2018-05-15 RX ORDER — KETOROLAC TROMETHAMINE 30 MG/ML
60 INJECTION, SOLUTION INTRAMUSCULAR; INTRAVENOUS ONCE
Status: COMPLETED | OUTPATIENT
Start: 2018-05-15 | End: 2018-05-15

## 2018-05-15 RX ADMIN — KETOROLAC TROMETHAMINE 60 MG: 30 INJECTION, SOLUTION INTRAMUSCULAR; INTRAVENOUS at 14:00

## 2018-05-15 NOTE — PROGRESS NOTES
Mikki Joel is a 63 y.o. female returns for     Chief Complaint   Patient presents with   • Right Knee - Follow-up       HISTORY OF PRESENT ILLNESS:    63-year-old  female in the office today for follow-up after an IM injection of Toradol Along approximately 1 month ago.  She reports a dramatic improvement in her symptoms since the past evaluations.     CONCURRENT MEDICAL HISTORY:    Past Medical History:   Diagnosis Date   • Anemia    • Anemia June 2016    might have been due to knee replacement surgery I had in mar   • Anemia June 2016    might have been due to knee replacement surgery I had in mar   • Anxiety    • Arthritis    • Arthritis of back 2009    lower back   • Asthma    • CTS (carpal tunnel syndrome) 1987    left wrist - had corrective surgery   • Depression    • Fracture of wrist 1979   • High blood pressure    • [place] (hard of hearing)    • Hypertension 1999    metoprolol 100 mg and triamterene/hydrochlorothiazide 37.5mg   • Hypertension 1999    metoprolol 100 mg and triamterene/hydrochlorothiazide 37.5mg   • Knee swelling 2010   • Migraine    • Periarthritis of shoulder 2011    both shoulders right shoulder total replacement   • Tear of meniscus of knee 2016    fell       Allergies   Allergen Reactions   • Codeine Other (See Comments)     Increases heart rate   • Sulfa Antibiotics Hives         Current Outpatient Prescriptions:   •  azelastine (ASTELIN) 0.1 % nasal spray, USE 2 SPRAYS IN EACH NOS BID UTD, Disp: , Rfl: 11  •  busPIRone (BUSPAR) 7.5 MG tablet, Take 1 tablet by mouth 2 (Two) Times a Day., Disp: 60 tablet, Rfl: 11  •  cetirizine (ZYRTEC ALLERGY) 10 MG tablet, Take 1 tablet by mouth Daily., Disp: 30 tablet, Rfl: 11  •  cyclobenzaprine (FLEXERIL) 10 MG tablet, Take 1 tablet by mouth 2 (Two) Times a Day As Needed (tension headaches or neck pain). May cause drowsiness, Disp: 60 tablet, Rfl: 5  •  eszopiclone (LUNESTA) 1 MG tablet, Take 1 tablet by mouth Every Night. Take  immediately before bedtime, Disp: 30 tablet, Rfl: 3  •  FLUoxetine (PROzac) 40 MG capsule, Take 1 capsule by mouth Daily., Disp: 30 capsule, Rfl: 11  •  ipratropium (ATROVENT) 0.06 % nasal spray, 2 sprays into each nostril 3 (Three) Times a Day., Disp: 15 mL, Rfl: 5  •  metoprolol succinate XL (TOPROL XL) 100 MG 24 hr tablet, Take 1 tablet by mouth Daily., Disp: 30 tablet, Rfl: 11  •  mometasone (NASONEX) 50 MCG/ACT nasal spray, U 2 SPRAYS IN EACH NOSTRIL BID., Disp: , Rfl: 11  •  rOPINIRole (REQUIP) 1 MG tablet, Take 1 tablet by mouth Every Night. Take 1 hour before bedtime., Disp: 30 tablet, Rfl: 11  •  SUMAtriptan (IMITREX) 25 MG tablet, Take 1 tab po prn for migraine, Disp: 30 tablet, Rfl: 5  •  Topiramate ER (TROKENDI XR) 25 MG capsule sustained-release 24 hr, TK 1 C PO D, Disp: , Rfl:   No current facility-administered medications for this visit.     Past Surgical History:   Procedure Laterality Date   • BUNIONECTOMY Bilateral    • COLONOSCOPY  2015   • COLONOSCOPY N/A 1/19/2018    Procedure: COLONOSCOPY;  Surgeon: Ravi Cavanaugh MD;  Location: [facility] ENDOSCOPY;  Service:    • COLONOSCOPY  2015   • EAR TUBES     • ENDOSCOPIC FUNCTIONAL SINUS SURGERY (FESS) Bilateral 9/5/2017    Procedure: BILATERAL ETHMOID AND MAXILLARY SINUS AND NASAL SEPTOPLASTIES AND BILATERAL TURBINECTOMIES;  Surgeon: Ramy Gaston MD;  Location: [facility] OR;  Service:    • ENDOSCOPY N/A 1/19/2018    Procedure: ESOPHAGOGASTRODUODENOSCOPY;  Surgeon: Ravi Cavanaugh MD;  Location: [facility] ENDOSCOPY;  Service:    • HAND SURGERY     • JOINT REPLACEMENT  2016    right knee replacement 2016   • JOINT REPLACEMENT  2015    right shoulder   • KNEE SURGERY  2016    total right knee replacement   • RHINOPLASTY      x3   • SHOULDER ARTHROSCOPY     • SHOULDER SURGERY  2015    complete shoulder joint replacement   • TONSILLECTOMY     • TUBAL ABDOMINAL LIGATION     • TUBAL ABDOMINAL LIGATION  1987   • TUBAL ABDOMINAL LIGATION  1987   • UPPER  "GASTROINTESTINAL ENDOSCOPY  2016   • UPPER GASTROINTESTINAL ENDOSCOPY  2016   • UPPER GASTROINTESTINAL ENDOSCOPY  01/19/2018    Per Dr. Ravi Polk M.D.   • WRIST SURGERY  1987    carpal tunnel repair left wrist       ROS  No fevers or chills.  No chest pain or shortness of air.  No GI or  disturbances.    PHYSICAL EXAMINATION:       Ht 170.2 cm (67\")   Wt 103 kg (227 lb)   BMI 35.55 kg/m²     Physical Exam   Constitutional: She is oriented to person, place, and time. Vital signs are normal. She appears well-developed and well-nourished. She is cooperative.   HENT:   Head: Normocephalic and atraumatic.   Neck: Trachea normal and phonation normal.   Pulmonary/Chest: Effort normal. No respiratory distress.   Abdominal: Soft. Normal appearance. She exhibits no distension.   Musculoskeletal:        Right knee: She exhibits no effusion.   Neurological: She is alert and oriented to person, place, and time. GCS eye subscore is 4. GCS verbal subscore is 5. GCS motor subscore is 6.   Skin: Skin is warm, dry and intact.   Psychiatric: She has a normal mood and affect. Her speech is normal and behavior is normal. Judgment and thought content normal. Cognition and memory are normal.   Vitals reviewed.      GAIT:     [x]  Normal  []  Antalgic    Assistive device: [x]  None  []  Walker     []  Crutches  []  Cane     []  Wheelchair  []  Stretcher    Right Knee Exam     Tenderness   The patient is experiencing tenderness in the medial joint line and lateral joint line.    Range of Motion   Extension: normal   Flexion: abnormal     Other   Erythema: absent  Scars: present  Sensation: normal  Pulse: present  Swelling: mild  Other tests: no effusion present      Left Knee Exam   Left knee exam is normal.    Muscle Strength     The patient has normal left knee strength.              No results found.          ASSESSMENT:    Diagnoses and all orders for this visit:    Presence of right artificial knee joint  -     ketorolac " (TORADOL) injection 60 mg; Inject 60 mg into the shoulder, thigh, or buttocks 1 (One) Time.    Right knee pain, unspecified chronicity  -     ketorolac (TORADOL) injection 60 mg; Inject 60 mg into the shoulder, thigh, or buttocks 1 (One) Time.          PLAN  Repeated IM dose of Toradol today and recommended progression of range of motion and activity as tolerated on pain.  No Follow-up on file.    Devyn Pal, APRN

## 2018-05-31 ENCOUNTER — APPOINTMENT (OUTPATIENT)
Dept: PREADMISSION TESTING | Facility: HOSPITAL | Age: 63
End: 2018-05-31

## 2018-05-31 VITALS
WEIGHT: 233 LBS | RESPIRATION RATE: 14 BRPM | SYSTOLIC BLOOD PRESSURE: 136 MMHG | HEIGHT: 67 IN | HEART RATE: 82 BPM | BODY MASS INDEX: 36.57 KG/M2 | OXYGEN SATURATION: 99 % | DIASTOLIC BLOOD PRESSURE: 84 MMHG

## 2018-05-31 PROCEDURE — 93010 ELECTROCARDIOGRAM REPORT: CPT | Performed by: INTERNAL MEDICINE

## 2018-05-31 PROCEDURE — 93005 ELECTROCARDIOGRAM TRACING: CPT

## 2018-05-31 RX ORDER — AZELASTINE 1 MG/ML
2 SPRAY, METERED NASAL 2 TIMES DAILY
COMMUNITY
End: 2019-06-17

## 2018-05-31 RX ORDER — SODIUM CHLORIDE, SODIUM GLUCONATE, SODIUM ACETATE, POTASSIUM CHLORIDE, AND MAGNESIUM CHLORIDE 526; 502; 368; 37; 30 MG/100ML; MG/100ML; MG/100ML; MG/100ML; MG/100ML
1000 INJECTION, SOLUTION INTRAVENOUS CONTINUOUS
Status: CANCELLED | OUTPATIENT
Start: 2018-06-05

## 2018-05-31 RX ORDER — MOMETASONE FUROATE 50 UG/1
2 SPRAY, METERED NASAL 2 TIMES DAILY
COMMUNITY
End: 2018-09-06

## 2018-05-31 RX ORDER — TOPIRAMATE 25 MG/1
1 CAPSULE, EXTENDED RELEASE ORAL NIGHTLY
COMMUNITY
End: 2018-10-29

## 2018-06-04 ENCOUNTER — ANESTHESIA EVENT (OUTPATIENT)
Dept: PERIOP | Facility: HOSPITAL | Age: 63
End: 2018-06-04

## 2018-06-04 NOTE — H&P
Subjective          Mikki Joel is a 63 y.o. female.     CC follow-up rhinitis perforated membranes and hearing loss    History of Present Illness     Agents frustrated by her hearing problems and perforation she said her hearing is worse ever since she's had the perforations her ear she's been 4-5 years.  She has some postnasal drip which bothers especially the mornings is not having purulent drainage in his breathing well through her nose sinus surgeries healthy that respect.  She has some tinnitus not have any current vertigo              The following portions of the patient's history were reviewed and updated as appropriate: allergies, current medications, past family history, past medical history, past social history, past surgical history and problem list.              Mikki Joel reports that she has never smoked. She has never used smokeless tobacco. She reports that she drinks alcohol. She reports that she does not use drugs.    Patient is not a tobacco user and has not been counseled for use of tobacco products                 Family History       Problem     Relation     Age of Onset       •     Hypertension     Mother             •     COPD     Mother             •     Arthritis     Mother             •     Alcohol abuse     Mother             •     Cancer     Father             •     Arthritis     Father             •     Alcohol abuse     Father             •     COPD     Maternal Grandmother             •     Stroke     Maternal Grandmother             •     Osteoporosis     Maternal Grandmother             •     Irritable bowel syndrome     Sister                            Current Outpatient Prescriptions:     •  azelastine (ASTELIN) 0.1 % nasal spray, USE 2 SPRAYS IN EACH NOS BID UTD, Disp: , Rfl: 11    •  busPIRone (BUSPAR) 7.5 MG tablet, Take 1 tablet by mouth 2 (Two) Times a Day., Disp: 60 tablet, Rfl: 11    •  cetirizine (ZYRTEC ALLERGY) 10 MG tablet, Take 1 tablet by  mouth Daily., Disp: 30 tablet, Rfl: 11    •  cyclobenzaprine (FLEXERIL) 10 MG tablet, Take 1 tablet by mouth 2 (Two) Times a Day As Needed (tension headaches or neck pain). May cause drowsiness, Disp: 60 tablet, Rfl: 5    •  eszopiclone (LUNESTA) 1 MG tablet, Take 1 tablet by mouth Every Night. Take immediately before bedtime, Disp: 30 tablet, Rfl: 3    •  FLUoxetine (PROzac) 40 MG capsule, Take 1 capsule by mouth Daily., Disp: 30 capsule, Rfl: 11    •  metoprolol succinate XL (TOPROL XL) 100 MG 24 hr tablet, Take 1 tablet by mouth Daily., Disp: 30 tablet, Rfl: 11    •  mometasone (NASONEX) 50 MCG/ACT nasal spray, U 2 SPRAYS IN EACH NOSTRIL BID., Disp: , Rfl: 11    •  rOPINIRole (REQUIP) 1 MG tablet, Take 1 tablet by mouth Every Night. Take 1 hour before bedtime., Disp: 30 tablet, Rfl: 11    •  SUMAtriptan (IMITREX) 25 MG tablet, Take 1 tab po prn for migraine, Disp: 30 tablet, Rfl: 5    •  Topiramate ER (TROKENDI XR) 25 MG capsule sustained-release 24 hr, TK 1 C PO D, Disp: , Rfl:     •  ipratropium (ATROVENT) 0.06 % nasal spray, 2 sprays into each nostril 3 (Three) Times a Day., Disp: 15 mL, Rfl: 5                 Allergies       Allergen     Reactions       •     Codeine     Other (See Comments)                   Increases heart rate       •     Sulfa Antibiotics     Hives                 Medical History              Past Medical History:       Diagnosis     Date       •     Anemia             •     Anemia     June 2016             might have been due to knee replacement surgery I had in mar       •     Anemia     June 2016             might have been due to knee replacement surgery I had in mar       •     Anxiety             •     Arthritis             •     Arthritis of back     2009             lower back       •     Asthma             •     CTS (carpal tunnel syndrome)     1987             left wrist - had corrective surgery       •     Depression             •     Fracture of wrist     1979       •     High  blood pressure             •     [place] (hard of hearing)             •     Hypertension     1999             metoprolol 100 mg and triamterene/hydrochlorothiazide 37.5mg       •     Hypertension     1999             metoprolol 100 mg and triamterene/hydrochlorothiazide 37.5mg       •     Knee swelling     2010       •     Migraine             •     Periarthritis of shoulder     2011             both shoulders right shoulder total replacement       •     Tear of meniscus of knee     2016             fell                         Review of Systems     Constitutional: Negative for fever.     HENT: Positive for hearing loss, postnasal drip and tinnitus. Negative for ear discharge and ear pain.      Eyes: Negative for pain.     Neurological: Negative for dizziness and facial asymmetry.     Hematological: Negative for adenopathy.                                  Objective          Physical Exam     Constitutional: She is oriented to person, place, and time. She appears well-developed and well-nourished.     HENT:     Head: Normocephalic and atraumatic.   Right Ear: External ear and ear canal normal.   Left Ear: External ear and ear canal normal.   Ears:  untitled image  TM bilateral scarring and perforation , no drainage  Nose: Nose normal. No mucosal edema, rhinorrhea, nasal deformity or septal deviation. No epistaxis. Right sinus exhibits no maxillary sinus tenderness and no frontal sinus tenderness. Left sinus exhibits no maxillary sinus tenderness and no frontal sinus tenderness.     Mouth/Throat: Uvula is midline and oropharynx is clear and moist. No trismus in the jaw. Normal dentition. No oropharyngeal exudate or posterior oropharyngeal edema.     Eyes: Conjunctivae and EOM are normal. Pupils are equal, round, and reactive to light.     Neck: Normal range of motion. Neck supple. No JVD present. No tracheal deviation present. No thyromegaly present.     Cardiovascular: Normal rate and regular rhythm.       Pulmonary/Chest: Effort normal and breath sounds normal.   Musculoskeletal: Normal range of motion.   Lymphadenopathy:        Head (right side): No submental, no submandibular, no tonsillar, no preauricular, no posterior auricular and no occipital adenopathy present.        Head (left side): No submental, no submandibular, no tonsillar, no preauricular, no posterior auricular and no occipital adenopathy present.     She has no cervical adenopathy.        Right cervical: No superficial cervical, no deep cervical and no posterior cervical adenopathy present.       Left cervical: No superficial cervical, no deep cervical and no posterior cervical adenopathy present.   Neurological: She is alert and oriented to person, place, and time. No cranial nerve deficit.     Skin: Skin is warm.   Psychiatric: She has a normal mood and affect. Her speech is normal and behavior is normal. Thought content normal.     Nursing note and vitals reviewed.              Audiograms were reviewed showing mixed hearing loss shown to patient                       Assessment/Plan          Mikki was seen today for follow-up.         Diagnoses and all orders for this visit:         Perforated tympanic membrane, bilateral         Nasal turbinate hypertrophy         Other orders    -     ipratropium (ATROVENT) 0.06 % nasal spray; 2 sprays into each nostril 3 (Three) Times a Day.              Left tympanoplasty with cartilage graft         Eventually may consider both eardrums to be operated on that at this point only one at a time in the left being worse.  Explained her that will not resolve her tinnitus will not resolve all of her hearing loss may help some it could make it easier for use her hearing aid.   We discussed that  there is a possibly the fluid may recur we discussed risks of vertigo,tinnitus, facial paralysis failure rate and success rate.  For further surgery limitations and pain afterwards she prefers to go have the surgery  scheduled the near future.                 Reason for visit:  Mikki Joel is seen today for a hearing evaluation at the request of Shanell Berrios, MS, CCC-A.  Patient reports she has had a hearing loss for about 3 years when she had tubes put in her ears and left holes in her ear drums.  She states she hears but she can't understand, and she has ringing in her ears.  She currently wears two Siemens Pure 7 binax M ARMANI hearing aids, purchased from a provider [facility], which are about 2 years old: Right serial #: [id number], and Left serial #: [id number].   She thinks her hearing has decreased since she got the aids and has to turn her hearing aids up all the way.          EVALUATION     See Audiogram     RESULTS        Otoscopy and Tympanometry 226 Hz :  Right Ear:  Otoscopy:  Clear ear canal                    Tympanometry:  Unable to test due to no seal     Left Ear:   Otoscopy:  Clear ear canal                   Tympanometry:  Unable to test due to no seal     Test technique:  Standard Audiometry      Pure Tone Audiometry:   Patient responded to pure tones at 30-60 dB for 250-8000 Hz in right ear, and at 45-95 dB for 250-8000 Hz in left ear.        Speech Audiometry:        Right Ear:  Speech Reception Threshold (SRT) was obtained at 50 dBHL                 Speech Discrimination scores were 100% in quiet when words were presented at 90 dBHL       Left Ear:  Speech Reception Threshold (SRT) was obtained at 55 dBHL                 Speech Discrimination scores were 92% in quiet when words were presented at 90 dBHL     Reliability:   good        IMPRESSIONS:  1.  Tympanometry results are consistent with Unable to test due to no seal in both ears.  2.  Pure tone results are consistent with mild to moderate rising mixed hearing loss for right ear, and moderate to severe sloping mixed hearing loss in left ear.                                            RECOMMENDATIONS:  Patient is seeing the Ear Nose and Throat  physician immediately following this examination. Today's audiogram was entered into hearing aid software and aids were reprogrammed based on today's thresholds.  After increasing gain slightly, she reported aids sounded better.  She will return for hearing aid assistance as necessary.   It was a pleasure seeing Mikki Joel in Audiology today.  We would be happy to do further testing or discuss these test as necessary.             This document has been electronically signed by Shanell Berrios MS CCC-A on August 24, 2017 3:49 PM        Shanell Berrios MS CCC-A  Licensed Audiologist          Clinical Support on 8/24/2017            Detailed Report                                Nikia Chowdary

## 2018-06-05 ENCOUNTER — ANESTHESIA (OUTPATIENT)
Dept: PERIOP | Facility: HOSPITAL | Age: 63
End: 2018-06-05

## 2018-06-05 ENCOUNTER — HOSPITAL ENCOUNTER (OUTPATIENT)
Facility: HOSPITAL | Age: 63
Setting detail: HOSPITAL OUTPATIENT SURGERY
Discharge: HOME | End: 2018-06-05
Attending: OTOLARYNGOLOGY | Admitting: OTOLARYNGOLOGY

## 2018-06-05 VITALS
SYSTOLIC BLOOD PRESSURE: 157 MMHG | WEIGHT: 225.53 LBS | OXYGEN SATURATION: 98 % | DIASTOLIC BLOOD PRESSURE: 88 MMHG | BODY MASS INDEX: 35.4 KG/M2 | HEIGHT: 67 IN | TEMPERATURE: 96.8 F | RESPIRATION RATE: 18 BRPM | HEART RATE: 83 BPM

## 2018-06-05 PROCEDURE — 25010000002 PROPOFOL 10 MG/ML EMULSION: Performed by: NURSE ANESTHETIST, CERTIFIED REGISTERED

## 2018-06-05 PROCEDURE — 25010000002 ONDANSETRON PER 1 MG: Performed by: OTOLARYNGOLOGY

## 2018-06-05 PROCEDURE — 25010000002 PHENYLEPHRINE PER 1 ML: Performed by: NURSE ANESTHETIST, CERTIFIED REGISTERED

## 2018-06-05 PROCEDURE — 69631 REPAIR EARDRUM STRUCTURES: CPT | Performed by: OTOLARYNGOLOGY

## 2018-06-05 PROCEDURE — 25010000002 ONDANSETRON PER 1 MG: Performed by: NURSE ANESTHETIST, CERTIFIED REGISTERED

## 2018-06-05 PROCEDURE — 25010000002 FENTANYL CITRATE (PF) 100 MCG/2ML SOLUTION: Performed by: NURSE ANESTHETIST, CERTIFIED REGISTERED

## 2018-06-05 PROCEDURE — 25010000002 PROMETHAZINE PER 50 MG: Performed by: ANESTHESIOLOGY

## 2018-06-05 PROCEDURE — 25010000002 DEXAMETHASONE PER 1 MG: Performed by: NURSE ANESTHETIST, CERTIFIED REGISTERED

## 2018-06-05 RX ORDER — ONDANSETRON 2 MG/ML
INJECTION INTRAMUSCULAR; INTRAVENOUS AS NEEDED
Status: DISCONTINUED | OUTPATIENT
Start: 2018-06-05 | End: 2018-06-05 | Stop reason: SURG

## 2018-06-05 RX ORDER — PROMETHAZINE HYDROCHLORIDE 25 MG/ML
6.25 INJECTION, SOLUTION INTRAMUSCULAR; INTRAVENOUS ONCE
Status: COMPLETED | OUTPATIENT
Start: 2018-06-05 | End: 2018-06-05

## 2018-06-05 RX ORDER — MEPERIDINE HYDROCHLORIDE 50 MG/ML
12.5 INJECTION INTRAMUSCULAR; INTRAVENOUS; SUBCUTANEOUS
Status: DISCONTINUED | OUTPATIENT
Start: 2018-06-05 | End: 2018-06-05 | Stop reason: HOSPADM

## 2018-06-05 RX ORDER — DEXAMETHASONE SODIUM PHOSPHATE 4 MG/ML
INJECTION, SOLUTION INTRA-ARTICULAR; INTRALESIONAL; INTRAMUSCULAR; INTRAVENOUS; SOFT TISSUE AS NEEDED
Status: DISCONTINUED | OUTPATIENT
Start: 2018-06-05 | End: 2018-06-05 | Stop reason: SURG

## 2018-06-05 RX ORDER — ONDANSETRON 2 MG/ML
4 INJECTION INTRAMUSCULAR; INTRAVENOUS ONCE AS NEEDED
Status: COMPLETED | OUTPATIENT
Start: 2018-06-05 | End: 2018-06-05

## 2018-06-05 RX ORDER — HYDROMORPHONE HYDROCHLORIDE 2 MG/1
2 TABLET ORAL EVERY 4 HOURS PRN
Qty: 18 TABLET | Refills: 0 | Status: SHIPPED | OUTPATIENT
Start: 2018-06-05 | End: 2018-06-26

## 2018-06-05 RX ORDER — LIDOCAINE HYDROCHLORIDE AND EPINEPHRINE 10; 10 MG/ML; UG/ML
INJECTION, SOLUTION INFILTRATION; PERINEURAL AS NEEDED
Status: DISCONTINUED | OUTPATIENT
Start: 2018-06-05 | End: 2018-06-05 | Stop reason: HOSPADM

## 2018-06-05 RX ORDER — LIDOCAINE HYDROCHLORIDE 20 MG/ML
INJECTION, SOLUTION INFILTRATION; PERINEURAL AS NEEDED
Status: DISCONTINUED | OUTPATIENT
Start: 2018-06-05 | End: 2018-06-05 | Stop reason: SURG

## 2018-06-05 RX ORDER — SCOLOPAMINE TRANSDERMAL SYSTEM 1 MG/1
1 PATCH, EXTENDED RELEASE TRANSDERMAL ONCE
Status: DISCONTINUED | OUTPATIENT
Start: 2018-06-05 | End: 2018-06-05 | Stop reason: HOSPADM

## 2018-06-05 RX ORDER — PROPOFOL 10 MG/ML
VIAL (ML) INTRAVENOUS AS NEEDED
Status: DISCONTINUED | OUTPATIENT
Start: 2018-06-05 | End: 2018-06-05 | Stop reason: SURG

## 2018-06-05 RX ORDER — FENTANYL CITRATE 50 UG/ML
INJECTION, SOLUTION INTRAMUSCULAR; INTRAVENOUS AS NEEDED
Status: DISCONTINUED | OUTPATIENT
Start: 2018-06-05 | End: 2018-06-05 | Stop reason: SURG

## 2018-06-05 RX ORDER — SODIUM CHLORIDE, SODIUM GLUCONATE, SODIUM ACETATE, POTASSIUM CHLORIDE, AND MAGNESIUM CHLORIDE 526; 502; 368; 37; 30 MG/100ML; MG/100ML; MG/100ML; MG/100ML; MG/100ML
1000 INJECTION, SOLUTION INTRAVENOUS CONTINUOUS
Status: DISCONTINUED | OUTPATIENT
Start: 2018-06-05 | End: 2018-06-05 | Stop reason: HOSPADM

## 2018-06-05 RX ORDER — LIDOCAINE HYDROCHLORIDE AND EPINEPHRINE BITARTRATE 20; .01 MG/ML; MG/ML
INJECTION, SOLUTION SUBCUTANEOUS AS NEEDED
Status: DISCONTINUED | OUTPATIENT
Start: 2018-06-05 | End: 2018-06-05 | Stop reason: HOSPADM

## 2018-06-05 RX ADMIN — FENTANYL CITRATE 25 MCG: 50 INJECTION, SOLUTION INTRAMUSCULAR; INTRAVENOUS at 12:19

## 2018-06-05 RX ADMIN — PHENYLEPHRINE HYDROCHLORIDE 100 MCG: 10 INJECTION INTRAVENOUS at 12:57

## 2018-06-05 RX ADMIN — PROPOFOL 200 MG: 10 INJECTION, EMULSION INTRAVENOUS at 11:58

## 2018-06-05 RX ADMIN — SODIUM CHLORIDE, SODIUM GLUCONATE, SODIUM ACETATE, POTASSIUM CHLORIDE, AND MAGNESIUM CHLORIDE: 526; 502; 368; 37; 30 INJECTION, SOLUTION INTRAVENOUS at 13:15

## 2018-06-05 RX ADMIN — PROMETHAZINE HYDROCHLORIDE 6.25 MG: 25 INJECTION INTRAMUSCULAR; INTRAVENOUS at 14:39

## 2018-06-05 RX ADMIN — LIDOCAINE HYDROCHLORIDE 100 MG: 20 INJECTION, SOLUTION INFILTRATION; PERINEURAL at 11:58

## 2018-06-05 RX ADMIN — FENTANYL CITRATE 25 MCG: 50 INJECTION, SOLUTION INTRAMUSCULAR; INTRAVENOUS at 12:10

## 2018-06-05 RX ADMIN — ONDANSETRON 4 MG: 2 INJECTION, SOLUTION INTRAMUSCULAR; INTRAVENOUS at 13:49

## 2018-06-05 RX ADMIN — DEXAMETHASONE SODIUM PHOSPHATE 4 MG: 4 INJECTION, SOLUTION INTRAMUSCULAR; INTRAVENOUS at 12:05

## 2018-06-05 RX ADMIN — SCOPALAMINE 1 PATCH: 1 PATCH, EXTENDED RELEASE TRANSDERMAL at 10:59

## 2018-06-05 RX ADMIN — FENTANYL CITRATE 25 MCG: 50 INJECTION, SOLUTION INTRAMUSCULAR; INTRAVENOUS at 12:00

## 2018-06-05 RX ADMIN — SODIUM CHLORIDE, SODIUM GLUCONATE, SODIUM ACETATE, POTASSIUM CHLORIDE, AND MAGNESIUM CHLORIDE 1000 ML: 526; 502; 368; 37; 30 INJECTION, SOLUTION INTRAVENOUS at 10:32

## 2018-06-05 RX ADMIN — ONDANSETRON 4 MG: 2 INJECTION INTRAMUSCULAR; INTRAVENOUS at 13:19

## 2018-06-05 RX ADMIN — PHENYLEPHRINE HYDROCHLORIDE 100 MCG: 10 INJECTION INTRAVENOUS at 12:32

## 2018-06-05 RX ADMIN — FENTANYL CITRATE 25 MCG: 50 INJECTION, SOLUTION INTRAMUSCULAR; INTRAVENOUS at 12:35

## 2018-06-05 NOTE — INTERVAL H&P NOTE
No new medical issues noted.  All questions answered.  Vitals reviewed.  Post op care and instructions reviewed, with pt and family.  PRISCILA Gaston MD

## 2018-06-05 NOTE — ANESTHESIA PROCEDURE NOTES
Airway  Urgency: elective    Airway not difficult    General Information and Staff    Patient location during procedure: OR  CRNA: REZA BARRAGAN    Indications and Patient Condition  Indications for airway management: airway protection    Preoxygenated: yes  MILS maintained throughout  Mask difficulty assessment: 0 - not attempted    Final Airway Details  Final airway type: supraglottic airway      Successful airway: I-gel  Size 4    Number of attempts at approach: 1

## 2018-06-05 NOTE — ANESTHESIA POSTPROCEDURE EVALUATION
Patient: Mikki Joel    Procedure Summary     Date:  06/05/18 Room / Location:  [facility] OR 08 / [facility] OR    Anesthesia Start:  1148 Anesthesia Stop:  1332    Procedure:  TYMPANOPLASTY WITH CARTILAGE GRAFT (Left Ear) Diagnosis:       Perforation of left tympanic membrane      Mixed hearing loss of left ear      (Perforation of left tympanic membrane [H72.92])      (Mixed hearing loss of left ear [H90.72])    Surgeon:  Ramy Gaston MD Provider:  Bernard Esteban MD    Anesthesia Type:  general ASA Status:  3          Anesthesia Type: general  Last vitals  BP   131/84 (06/05/18 1024)   Temp   96.6 °F (35.9 °C) (06/05/18 1024)   Pulse   99 (06/05/18 1024)   Resp   18 (06/05/18 1024)     SpO2   100 % (06/05/18 1024)     Post Anesthesia Care and Evaluation    Patient location during evaluation: PACU  Patient participation: complete - patient participated  Level of consciousness: sleepy but conscious  Pain management: adequate  Airway patency: patent  Anesthetic complications: No anesthetic complications    Cardiovascular status: acceptable  Respiratory status: acceptable  Hydration status: acceptable

## 2018-06-05 NOTE — OP NOTE
OPERATIVE NOTE    Name:    Mikki Joel  YOB: 1955  Date of surgery:   6/5/2018    Pre-op Diagnosis:   Perforation of left tympanic membrane [H72.92]  Mixed hearing loss of left ear [H90.72]    Post-op Diagnosis:    Post-Op Diagnosis Codes:     * Perforation of left tympanic membrane [H72.92]     * Mixed hearing loss of left ear [H90.72]    Procedure:  Procedure(s):  TYMPANOPLASTY WITH CARTILAGE GRAFT    Surgeon:  Ramy Gaston MD, AAOHNS    Anesthesia: General, with 7 mL of 1% lidocaine 1: 100,000 epinephrine    Staff:   Circulator: Ilene Pace RN; Ilsa Bustamante RN  Scrub Person: Sally Long; Khushi Mccabe  Assistant: Ami Munroe    Estimated Blood Loss: 7 ml  Specimens:                 None      Drains:  None    Findings:  Perforation which much surrounding tympanosclerosis marginal in its posterior inferior extent extent    Complications: None    IMPLANTS:   Nothing was implanted during the procedure    INDICATIONS: Persistent perforation with recurrent drainage and infections mixed hearing loss patient understood her hearing still be severely affected because the nerve hearing loss component of her hearing.    PROCEDURE: Patient was taken operating room placed in supine position and general anesthesia carried out.  The timeout was carried out.  The ear was injected on the left side and posterior auricular area and in the tragus.  A tragal incision was made and cartilage was harvested and laid back on the table with saline.  Postauricular incision was made in the superior portion behind the ear and fascia was harvested and left to dry.  Multilayer closure was Carried out with Vicryl and chromic.  The tragal incision was closed chromic.    The margins the perforation was scarified and portion of the scar removed.  The undersurface of the drum was scarified with a aching stapedial joint knife.    Canal incision made from 6 to 12:00 creating a vascular strip.  Lazara  elevated from the midportion of the ear down trying to stay well away from the chorda tympani nerve.  Cartilage that had been previously harvested is cut to the proper shape and placed underneath the drum covering the whole perforation site on top of this pieces of fascia which been previously trimmed proper size were placed for several millimeters underneath the remaining perforation and an additional piece placed on top of and ointment placed on top of this the canal flaps were laid back into position.  Catarino's note that the cartilage was placed underneath the malleus the perforation extended up into that area as well as the surrounding portions of the perforation.  Cotton ball was placed ear.  Patient was woken take the recovery room in stable  condition.  Instructions were given to the family.            This document has been electronically signed by Ramy Gaston MD on June 5, 2018 1:43 PM

## 2018-06-05 NOTE — ANESTHESIA PREPROCEDURE EVALUATION
Anesthesia Evaluation     history of anesthetic complications: PONV  NPO Solid Status: > 8 hours  NPO Liquid Status: > 2 hours           Airway   Mallampati: II  TM distance: >3 FB  Neck ROM: full  possible difficult intubation  Dental    (+) poor dentition    Pulmonary    (+) asthma, decreased breath sounds,   (-) not a smoker  Cardiovascular   Exercise tolerance: good (4-7 METS)    ECG reviewed  Patient on routine beta blocker and Beta blocker given within 24 hours of surgery  Rhythm: regular  Rate: abnormal    (+) hypertension well controlled,   (-) angina, murmur    ROS comment: Normal sinus rhythm  Normal ECG  When compared with ECG of 31-AUG-2017 15:19,  No significant change was found  Confirmed by VETTIANKAL    Neuro/Psych  (+) headaches (migraines), psychiatric history Anxiety and Depression,     GI/Hepatic/Renal/Endo    (+) obesity,  GERD well controlled,      Musculoskeletal     Abdominal   (+) obese,     Abdomen: soft.   Substance History   (+) alcohol use (occ),      OB/GYN          Other   (+) arthritis (knees and shoulders)     (-) history of cancer                    Anesthesia Plan    ASA 3     general     intravenous induction   Anesthetic plan and risks discussed with patient and spouse/significant other.

## 2018-06-05 NOTE — BRIEF OP NOTE
TYMPANOPLASTY  Progress Note    Mikki Joel  6/5/2018    Pre-op Diagnosis:   Perforation of left tympanic membrane [H72.92]  Mixed hearing loss of left ear [H90.72]       Post-Op Diagnosis Codes:     * Perforation of left tympanic membrane [H72.92]     * Mixed hearing loss of left ear [H90.72]    Procedure/CPT® Codes:      Procedure(s):  TYMPANOPLASTY WITH CARTILAGE GRAFT    Surgeon(s):  Ramy Gaston MD    Anesthesia: General    Staff:   Circulator: Ilene Pace RN; Ilsa Bustamante RN  Scrub Person: Sally Long; Khushi Mccabe  Assistant: Ami Munroe    Estimated Blood Loss 7 ml     Specimens:                 none      Drains:  none    Findings:  large perf    Complications: NA      Ramy Gaston MD     Date: 6/5/2018  Time: 1:32 PM

## 2018-06-05 NOTE — DISCHARGE INSTRUCTIONS
NO strenuous activity , no nose blowing 10 days  Keep ear canal dry indefinitely  Keep postauricular incision dry for 5 days-use bacitracin bid for 5 days    No nose blowing, sniffing or snorting. No bending at the waist or straining. Sneeze out of open mouth rather than the nose for 14 days.    May remove dressing in morning and change cotton ball. You wiill need to change cotton ball as needed for drainage until no further drainage. If there is a incision behind the ear (on the postauricular area), clean with soap and water, place over the counter antibiotic ointment twice a day. May leave open or place bandaids over wound prn. May leave the bubble dressing off, or, may keep on for comfort as needed.

## 2018-06-06 ENCOUNTER — TELEPHONE (OUTPATIENT)
Dept: OTOLARYNGOLOGY | Facility: CLINIC | Age: 63
End: 2018-06-06

## 2018-06-06 NOTE — TELEPHONE ENCOUNTER
Spoke to patient and she was unsure if she could use her nasal saline solution.  I told her it was ok to use.  Asked how she was feeling after her surgery and she stated she had a rough night, but was doing ok today.  Asked her to call if she had any problems or concerns.          ----- Message from Jaiden Avalos sent at 6/6/2018  9:19 AM CDT -----  Contact: [phone]  Mrs. Joel called wanting to know when she can stop using the saline nose rinse that she has been using since her surgery. Has appointment on 06/14/18 for 1 1/2 week post op.    Mikki [phone]

## 2018-06-08 RX ORDER — OXYMETAZOLINE HYDROCHLORIDE 0.05 G/100ML
2 SPRAY NASAL 3 TIMES DAILY
Qty: 2 ML | Refills: 0 | Status: SHIPPED | OUTPATIENT
Start: 2018-06-08 | End: 2018-06-11

## 2018-06-08 RX ORDER — CEFDINIR 300 MG/1
300 CAPSULE ORAL 2 TIMES DAILY
Qty: 20 CAPSULE | Refills: 0 | Status: SHIPPED | OUTPATIENT
Start: 2018-06-08 | End: 2018-06-18

## 2018-06-14 ENCOUNTER — OFFICE VISIT (OUTPATIENT)
Dept: OTOLARYNGOLOGY | Facility: CLINIC | Age: 63
End: 2018-06-14

## 2018-06-14 VITALS — TEMPERATURE: 95.7 F | WEIGHT: 229.2 LBS | HEIGHT: 67 IN | BODY MASS INDEX: 35.97 KG/M2

## 2018-06-14 DIAGNOSIS — H72.93 PERFORATED TYMPANIC MEMBRANE, BILATERAL: Primary | ICD-10-CM

## 2018-06-14 PROCEDURE — 99024 POSTOP FOLLOW-UP VISIT: CPT | Performed by: OTOLARYNGOLOGY

## 2018-06-14 NOTE — PATIENT INSTRUCTIONS

## 2018-06-14 NOTE — PROGRESS NOTES
Patient's healing well postop no unusual pain or discomfort has no drainage at this point she reveals suture site and graft be healing well she does not lateralize on tuning fork testing airs greater than bone she's no facial weakness no other complaints please of the way she's healing C no arms complication will see her in 3 weeks she not to blow her nose she can use nasal irrigation but otherwise she will resume normal activity.  Call for questions or problems in interim and will keep her ear canal dry.  PRISCILA Gaston MD.

## 2018-06-26 ENCOUNTER — OFFICE VISIT (OUTPATIENT)
Dept: ORTHOPEDIC SURGERY | Facility: CLINIC | Age: 63
End: 2018-06-26

## 2018-06-26 VITALS — BODY MASS INDEX: 34.84 KG/M2 | HEIGHT: 67 IN | WEIGHT: 222 LBS

## 2018-06-26 DIAGNOSIS — M25.561 RIGHT KNEE PAIN, UNSPECIFIED CHRONICITY: ICD-10-CM

## 2018-06-26 DIAGNOSIS — Z96.651 PRESENCE OF RIGHT ARTIFICIAL KNEE JOINT: Primary | ICD-10-CM

## 2018-06-26 PROCEDURE — 99213 OFFICE O/P EST LOW 20 MIN: CPT | Performed by: NURSE PRACTITIONER

## 2018-06-26 PROCEDURE — 96372 THER/PROPH/DIAG INJ SC/IM: CPT | Performed by: NURSE PRACTITIONER

## 2018-06-26 RX ORDER — TRIAMCINOLONE ACETONIDE 40 MG/ML
80 INJECTION, SUSPENSION INTRA-ARTICULAR; INTRAMUSCULAR ONCE
Status: COMPLETED | OUTPATIENT
Start: 2018-06-26 | End: 2018-06-26

## 2018-06-26 RX ORDER — KETOROLAC TROMETHAMINE 30 MG/ML
60 INJECTION, SOLUTION INTRAMUSCULAR; INTRAVENOUS ONCE
Status: COMPLETED | OUTPATIENT
Start: 2018-06-26 | End: 2018-06-26

## 2018-06-26 RX ADMIN — TRIAMCINOLONE ACETONIDE 80 MG: 40 INJECTION, SUSPENSION INTRA-ARTICULAR; INTRAMUSCULAR at 14:06

## 2018-06-26 RX ADMIN — KETOROLAC TROMETHAMINE 60 MG: 30 INJECTION, SOLUTION INTRAMUSCULAR; INTRAVENOUS at 14:07

## 2018-06-26 NOTE — PROGRESS NOTES
Mikki Joel is a 63 y.o. female returns for     Chief Complaint   Patient presents with   • Right Knee - Follow-up, Pain       HISTORY OF PRESENT ILLNESS: f/u right knee.  Patient states that she has some swelling in right and tender.        CONCURRENT MEDICAL HISTORY:    Past Medical History:   Diagnosis Date   • Anemia    • Anemia June 2016    might have been due to knee replacement surgery I had in mar   • Anemia June 2016    might have been due to knee replacement surgery I had in mar   • Anxiety    • Arthritis    • Arthritis of back 2009    lower back   • Asthma    • CTS (carpal tunnel syndrome) 1987    left wrist - had corrective surgery   • Depression    • Fracture of wrist 1979   • High blood pressure    • [place] (hard of hearing)    • Hypertension 1999    metoprolol 100 mg and triamterene/hydrochlorothiazide 37.5mg   • Hypertension 1999    metoprolol 100 mg and triamterene/hydrochlorothiazide 37.5mg   • Knee swelling 2010   • Migraine    • Periarthritis of shoulder 2011    both shoulders right shoulder total replacement   • Tear of meniscus of knee 2016    fell       Allergies   Allergen Reactions   • Codeine Other (See Comments)     Increases heart rate   • Sulfa Antibiotics Hives         Current Outpatient Prescriptions:   •  azelastine (ASTELIN) 0.1 % nasal spray, 2 sprays into each nostril 2 (Two) Times a Day. Use in each nostril as directed, Disp: , Rfl:   •  busPIRone (BUSPAR) 7.5 MG tablet, Take 1 tablet by mouth 2 (Two) Times a Day., Disp: 60 tablet, Rfl: 11  •  cetirizine (ZYRTEC ALLERGY) 10 MG tablet, Take 1 tablet by mouth Daily., Disp: 30 tablet, Rfl: 11  •  cyclobenzaprine (FLEXERIL) 10 MG tablet, Take 1 tablet by mouth 2 (Two) Times a Day As Needed (tension headaches or neck pain). May cause drowsiness, Disp: 60 tablet, Rfl: 5  •  eszopiclone (LUNESTA) 1 MG tablet, Take 1 tablet by mouth Every Night. Take immediately before bedtime, Disp: 30 tablet, Rfl: 3  •  FLUoxetine (PROzac) 40 MG  capsule, Take 1 capsule by mouth Daily., Disp: 30 capsule, Rfl: 11  •  ibuprofen (ADVIL,MOTRIN) 100 MG/5ML suspension, Take 39.8 mL by mouth Every 8 (Eight) Hours As Needed for Mild Pain ., Disp: , Rfl: 0  •  ipratropium (ATROVENT) 0.06 % nasal spray, 2 sprays into each nostril 3 (Three) Times a Day., Disp: 15 mL, Rfl: 5  •  metoprolol succinate XL (TOPROL XL) 100 MG 24 hr tablet, Take 1 tablet by mouth Daily., Disp: 30 tablet, Rfl: 11  •  mometasone (NASONEX) 50 MCG/ACT nasal spray, 2 sprays into each nostril 2 (Two) Times a Day., Disp: , Rfl:   •  rOPINIRole (REQUIP) 1 MG tablet, Take 1 tablet by mouth Every Night. Take 1 hour before bedtime., Disp: 30 tablet, Rfl: 11  •  SUMAtriptan (IMITREX) 25 MG tablet, Take 1 tab po prn for migraine, Disp: 30 tablet, Rfl: 5  •  Topiramate ER (TROKENDI XR) 25 MG capsule sustained-release 24 hr, Take 1 tablet by mouth Every Night., Disp: , Rfl:   No current facility-administered medications for this visit.     Past Surgical History:   Procedure Laterality Date   • BUNIONECTOMY Bilateral    • COLONOSCOPY  2015   • COLONOSCOPY N/A 1/19/2018    Procedure: COLONOSCOPY;  Surgeon: Ravi Cavanaugh MD;  Location: [facility] ENDOSCOPY;  Service:    • COLONOSCOPY  2015   • EAR TUBES     • ENDOSCOPIC FUNCTIONAL SINUS SURGERY (FESS) Bilateral 9/5/2017    Procedure: BILATERAL ETHMOID AND MAXILLARY SINUS AND NASAL SEPTOPLASTIES AND BILATERAL TURBINECTOMIES;  Surgeon: Ramy Gaston MD;  Location: [facility] OR;  Service:    • ENDOSCOPY N/A 1/19/2018    Procedure: ESOPHAGOGASTRODUODENOSCOPY;  Surgeon: Ravi Cavanaugh MD;  Location: [facility] ENDOSCOPY;  Service:    • HAND SURGERY     • JOINT REPLACEMENT  2016    right knee replacement 2016   • JOINT REPLACEMENT  2015    right shoulder   • KNEE SURGERY  2016    total right knee replacement   • RHINOPLASTY      x3   • SHOULDER ARTHROSCOPY     • SHOULDER SURGERY  2015    complete shoulder joint replacement   • TONSILLECTOMY     • TUBAL ABDOMINAL LIGATION  " 1987   • TYMPANOPLASTY Left 6/5/2018    Procedure: TYMPANOPLASTY WITH CARTILAGE GRAFT;  Surgeon: Ramy Gaston MD;  Location: [facility];  Service: ENT   • UPPER GASTROINTESTINAL ENDOSCOPY  2016   • UPPER GASTROINTESTINAL ENDOSCOPY  2016   • UPPER GASTROINTESTINAL ENDOSCOPY  01/19/2018    Per Dr. Ravi Polk M.D.   • WRIST SURGERY  1987    carpal tunnel repair left wrist       ROS  No fevers or chills.  No chest pain or shortness of air.  No GI or  disturbances.    PHYSICAL EXAMINATION:       Ht 170.2 cm (67\")   Wt 101 kg (222 lb)   LMP  (LMP Unknown)   BMI 34.77 kg/m²     Physical Exam   Constitutional: She is oriented to person, place, and time. Vital signs are normal. She appears well-developed and well-nourished. She is cooperative.   HENT:   Head: Normocephalic and atraumatic.   Neck: Trachea normal and phonation normal.   Pulmonary/Chest: Effort normal. No respiratory distress.   Abdominal: Soft. Normal appearance. She exhibits no distension.   Musculoskeletal:        Right knee: She exhibits no effusion.   Neurological: She is alert and oriented to person, place, and time. GCS eye subscore is 4. GCS verbal subscore is 5. GCS motor subscore is 6.   Skin: Skin is warm, dry and intact.   Psychiatric: She has a normal mood and affect. Her speech is normal and behavior is normal. Judgment and thought content normal. Cognition and memory are normal.   Vitals reviewed.      GAIT:     []  Normal  []  Antalgic    Assistive device: [x]  None  []  Walker     []  Crutches  []  Cane     []  Wheelchair  []  Stretcher    Right Knee Exam     Tenderness   The patient is experiencing tenderness in the medial joint line and lateral joint line.    Range of Motion   Extension: normal   Flexion: abnormal     Other   Erythema: absent  Scars: present  Sensation: normal  Pulse: present  Swelling: mild  Other tests: no effusion present      Left Knee Exam   Left knee exam is normal.    Muscle Strength     The patient has " normal left knee strength.              No results found.          ASSESSMENT:    Diagnoses and all orders for this visit:    Presence of right artificial knee joint  -     ketorolac (TORADOL) injection 60 mg; Inject 60 mg into the shoulder, thigh, or buttocks 1 (One) Time.  -     triamcinolone acetonide (KENALOG-40) injection 80 mg; Inject 2 mL into the shoulder, thigh, or buttocks 1 (One) Time.    Right knee pain, unspecified chronicity  -     ketorolac (TORADOL) injection 60 mg; Inject 60 mg into the shoulder, thigh, or buttocks 1 (One) Time.  -     triamcinolone acetonide (KENALOG-40) injection 80 mg; Inject 2 mL into the shoulder, thigh, or buttocks 1 (One) Time.          PLAN  Improved we'll recommend continue progression range of motion and activity and continued scheduled dosing of the Kenalog and Toradol when needed for pain.  No Follow-up on file.    Devyn Pal, APRN

## 2018-07-09 ENCOUNTER — OFFICE VISIT (OUTPATIENT)
Dept: OTOLARYNGOLOGY | Facility: CLINIC | Age: 63
End: 2018-07-09

## 2018-07-09 VITALS — TEMPERATURE: 95.7 F | HEIGHT: 67 IN | WEIGHT: 223 LBS | BODY MASS INDEX: 35 KG/M2

## 2018-07-09 DIAGNOSIS — H72.93 PERFORATED TYMPANIC MEMBRANE, BILATERAL: Primary | ICD-10-CM

## 2018-07-09 PROCEDURE — 99024 POSTOP FOLLOW-UP VISIT: CPT | Performed by: OTOLARYNGOLOGY

## 2018-07-09 RX ORDER — OXYMETAZOLINE HYDROCHLORIDE 0.05 G/100ML
2 SPRAY NASAL 2 TIMES DAILY
Qty: 2 ML | Refills: 0 | Status: SHIPPED | OUTPATIENT
Start: 2018-07-09 | End: 2018-07-13

## 2018-07-09 NOTE — PATIENT INSTRUCTIONS

## 2018-07-09 NOTE — PROGRESS NOTES
Patient's doing better with her hearing on the left ear she's developed bronchitis and having some congestion and popping.  Suggested she use her antihistamine spray regularly in her steroid spray I suggested Afrin for 4 days.    Her graft reveals to still be intact and incisions healing well.  Hearing better and she is congested hearing aid down into 20 make sure she gets the best possible results of the surgery so we want to minimize her eustachian problems.  She's call if she has any drainage otherwise will recheck in 1 month.  PRISCILA Gaston M.D.

## 2018-07-10 ENCOUNTER — CLINICAL SUPPORT (OUTPATIENT)
Dept: AUDIOLOGY | Facility: CLINIC | Age: 63
End: 2018-07-10

## 2018-07-10 ENCOUNTER — OFFICE VISIT (OUTPATIENT)
Dept: FAMILY MEDICINE CLINIC | Facility: CLINIC | Age: 63
End: 2018-07-10

## 2018-07-10 VITALS
BODY MASS INDEX: 35.16 KG/M2 | DIASTOLIC BLOOD PRESSURE: 88 MMHG | SYSTOLIC BLOOD PRESSURE: 128 MMHG | HEIGHT: 67 IN | WEIGHT: 224 LBS

## 2018-07-10 DIAGNOSIS — J30.2 CHRONIC SEASONAL ALLERGIC RHINITIS DUE TO OTHER ALLERGEN: ICD-10-CM

## 2018-07-10 DIAGNOSIS — R06.2 WHEEZE: ICD-10-CM

## 2018-07-10 DIAGNOSIS — J40 BRONCHITIS: ICD-10-CM

## 2018-07-10 DIAGNOSIS — Z46.1 ENCOUNTER FOR FITTING OR ADJUSTMENT OF HEARING AID: Primary | ICD-10-CM

## 2018-07-10 DIAGNOSIS — R05.9 COUGH: Primary | ICD-10-CM

## 2018-07-10 DIAGNOSIS — J45.20 MILD INTERMITTENT ASTHMA, UNSPECIFIED WHETHER COMPLICATED: Chronic | ICD-10-CM

## 2018-07-10 PROCEDURE — HEARINGNOCHG: Performed by: AUDIOLOGIST

## 2018-07-10 PROCEDURE — 99214 OFFICE O/P EST MOD 30 MIN: CPT | Performed by: FAMILY MEDICINE

## 2018-07-10 RX ORDER — PREDNISONE 20 MG/1
TABLET ORAL
Qty: 15 TABLET | Refills: 1 | Status: SHIPPED | OUTPATIENT
Start: 2018-07-10 | End: 2018-08-03

## 2018-07-10 RX ORDER — ALBUTEROL SULFATE 90 UG/1
2 AEROSOL, METERED RESPIRATORY (INHALATION) EVERY 4 HOURS PRN
Qty: 1 INHALER | Refills: 2 | Status: SHIPPED | OUTPATIENT
Start: 2018-07-10 | End: 2018-08-22 | Stop reason: SDUPTHER

## 2018-07-10 NOTE — PROGRESS NOTES
HEARING AID CHECK    Name:  Mikki Joel  :  1955  Age:  63 y.o.  Date of Evaluation:  7/10/2018      HISTORY    Reason for visit:  Mikki Joel is seen today for a hearing aid check.  Patient reports that she had surgery on her left ear and is hearing better from that side.  She reports that she would like to have her left hearing aid volume turned down.    Hearing aid history:  Patient is currently wearing a  in the [place] (ARMANI) hearing aid in both ear(s).     OFFICE VISIT    During today's visit the left hearing aid master gain volume was decreased from 24 dB to 9 dB in both hearing aid programs.  The patient was satisfied with these changes.  It is recommended that she schedule a hearing aid check after her post-op hearing test to have the aid re-programmed for her new hearing loss.  The patient's hearing aids are currently under warranty.    It was a pleasure seeing Mikki Joel in Audiology today.  It is a pleasure helping Ms. Joel with her amplification needs.          This document has been electronically signed by CHESTER Payne on July 10, 2018 4:23 PM        CHESTER Payne  Licensed Audiologist    For Billing and Codin  Hearing Aid Check, Monaural - no charge

## 2018-07-10 NOTE — PROGRESS NOTES
Subjective   Mikki Joel is a 63 y.o. female.     History of Present Illness    requesting evaluation persisting cough congestion voice loss.  Previous history noted including tripped in urgent care.  Has a history of seasonal allergy possible seasonal asthma.  Has been using the nose sprays and irrigations as outlined.  History noted.    The following portions of the patient's history were reviewed and updated as appropriate: allergies, current medications, past family history, past medical history, past social history, past surgical history and problem list.    Review of Systems   Constitutional: Negative for activity change, appetite change, fatigue and unexpected weight change.   HENT: Positive for congestion and voice change. Negative for trouble swallowing.    Eyes: Negative for redness and visual disturbance.   Respiratory: Positive for cough and wheezing.    Cardiovascular: Negative for chest pain and palpitations.   Gastrointestinal: Negative for abdominal pain, constipation, diarrhea, nausea and vomiting.   Genitourinary: Negative for urgency.   Musculoskeletal: Negative for joint swelling.   Neurological: Negative for syncope and headaches.   Hematological: Negative for adenopathy.   Psychiatric/Behavioral: Negative for sleep disturbance.       Objective   Physical Exam   Constitutional: She is oriented to person, place, and time. She appears well-developed.   HENT:   Head: Normocephalic.   Right Ear: External ear normal.   Nose: Mucosal edema present.   Mouth/Throat: Oropharynx is clear and moist.   Eyes: Pupils are equal, round, and reactive to light.   Neck: Normal range of motion. No thyromegaly present.   Cardiovascular: Normal rate, regular rhythm, normal heart sounds and intact distal pulses.  Exam reveals no gallop and no friction rub.    No murmur heard.  Pulmonary/Chest: She has rhonchi in the right lower field and the left lower field.   Decreased breath sounds bases scant expiratory  rhonchi bases voice loss.  Intermittent cough.   Abdominal: Soft. She exhibits no distension and no mass. There is no tenderness.   Musculoskeletal: Normal range of motion.   Neurological: She is alert and oriented to person, place, and time. She has normal reflexes.   Skin: Skin is warm and dry.   Psychiatric: She has a normal mood and affect.       Assessment/Plan   Mikki was seen today for bronchitis.    Diagnoses and all orders for this visit:    Cough  -     predniSONE (DELTASONE) 20 MG tablet; 2qdx5 then 1qd x 5 stop  -     albuterol (PROVENTIL HFA;VENTOLIN HFA) 108 (90 Base) MCG/ACT inhaler; Inhale 2 puffs Every 4 (Four) Hours As Needed for Wheezing.    Bronchitis  -     predniSONE (DELTASONE) 20 MG tablet; 2qdx5 then 1qd x 5 stop  -     albuterol (PROVENTIL HFA;VENTOLIN HFA) 108 (90 Base) MCG/ACT inhaler; Inhale 2 puffs Every 4 (Four) Hours As Needed for Wheezing.    Chronic seasonal allergic rhinitis due to other allergen  -     predniSONE (DELTASONE) 20 MG tablet; 2qdx5 then 1qd x 5 stop  -     albuterol (PROVENTIL HFA;VENTOLIN HFA) 108 (90 Base) MCG/ACT inhaler; Inhale 2 puffs Every 4 (Four) Hours As Needed for Wheezing.    Wheeze  -     predniSONE (DELTASONE) 20 MG tablet; 2qdx5 then 1qd x 5 stop  -     albuterol (PROVENTIL HFA;VENTOLIN HFA) 108 (90 Base) MCG/ACT inhaler; Inhale 2 puffs Every 4 (Four) Hours As Needed for Wheezing.    Mild intermittent asthma, unspecified whether complicated      Suspect more seasonal environmental issues.   increasing hydration Elvi pot use continuing no sprays start off rescue inhaler steroids extended.  Counseled on environmental measures.  Recheck as directed.

## 2018-08-03 ENCOUNTER — OFFICE VISIT (OUTPATIENT)
Dept: FAMILY MEDICINE CLINIC | Facility: CLINIC | Age: 63
End: 2018-08-03

## 2018-08-03 ENCOUNTER — APPOINTMENT (OUTPATIENT)
Dept: LAB | Facility: HOSPITAL | Age: 63
End: 2018-08-03

## 2018-08-03 VITALS
BODY MASS INDEX: 35.63 KG/M2 | SYSTOLIC BLOOD PRESSURE: 132 MMHG | DIASTOLIC BLOOD PRESSURE: 92 MMHG | HEIGHT: 67 IN | WEIGHT: 227 LBS

## 2018-08-03 DIAGNOSIS — J45.20 MILD INTERMITTENT ASTHMA, UNSPECIFIED WHETHER COMPLICATED: Chronic | ICD-10-CM

## 2018-08-03 DIAGNOSIS — R06.09 DOE (DYSPNEA ON EXERTION): Primary | ICD-10-CM

## 2018-08-03 LAB
ALBUMIN SERPL-MCNC: 4.6 G/DL (ref 3.4–4.8)
ALBUMIN/GLOB SERPL: 1.6 G/DL (ref 1.1–1.8)
ALP SERPL-CCNC: 139 U/L (ref 38–126)
ALT SERPL W P-5'-P-CCNC: 32 U/L (ref 9–52)
ANION GAP SERPL CALCULATED.3IONS-SCNC: 11 MMOL/L (ref 5–15)
AST SERPL-CCNC: 25 U/L (ref 14–36)
BILIRUB SERPL-MCNC: 0.3 MG/DL (ref 0.2–1.3)
BUN BLD-MCNC: 37 MG/DL (ref 7–21)
BUN/CREAT SERPL: 31.4 (ref 7–25)
CALCIUM SPEC-SCNC: 9.6 MG/DL (ref 8.4–10.2)
CHLORIDE SERPL-SCNC: 102 MMOL/L (ref 95–110)
CO2 SERPL-SCNC: 25 MMOL/L (ref 22–31)
CREAT BLD-MCNC: 1.18 MG/DL (ref 0.5–1)
DEPRECATED RDW RBC AUTO: 43 FL (ref 36.4–46.3)
ERYTHROCYTE [DISTWIDTH] IN BLOOD BY AUTOMATED COUNT: 13.6 % (ref 11.5–14.5)
GFR SERPL CREATININE-BSD FRML MDRD: 46 ML/MIN/1.73 (ref 45–104)
GLOBULIN UR ELPH-MCNC: 2.9 GM/DL (ref 2.3–3.5)
GLUCOSE BLD-MCNC: 97 MG/DL (ref 60–100)
HCT VFR BLD AUTO: 37.7 % (ref 35–45)
HGB BLD-MCNC: 12.7 G/DL (ref 12–15.5)
MCH RBC QN AUTO: 29.1 PG (ref 26.5–34)
MCHC RBC AUTO-ENTMCNC: 33.7 G/DL (ref 31.4–36)
MCV RBC AUTO: 86.5 FL (ref 80–98)
PLATELET # BLD AUTO: 397 10*3/MM3 (ref 150–450)
PMV BLD AUTO: 9.4 FL (ref 8–12)
POTASSIUM BLD-SCNC: 3.8 MMOL/L (ref 3.5–5.1)
PROT SERPL-MCNC: 7.5 G/DL (ref 6.3–8.6)
RBC # BLD AUTO: 4.36 10*6/MM3 (ref 3.77–5.16)
SODIUM BLD-SCNC: 138 MMOL/L (ref 137–145)
WBC NRBC COR # BLD: 13.99 10*3/MM3 (ref 3.2–9.8)

## 2018-08-03 PROCEDURE — 99214 OFFICE O/P EST MOD 30 MIN: CPT | Performed by: FAMILY MEDICINE

## 2018-08-03 PROCEDURE — 85027 COMPLETE CBC AUTOMATED: CPT | Performed by: FAMILY MEDICINE

## 2018-08-03 PROCEDURE — 36415 COLL VENOUS BLD VENIPUNCTURE: CPT | Performed by: FAMILY MEDICINE

## 2018-08-03 PROCEDURE — 80053 COMPREHEN METABOLIC PANEL: CPT | Performed by: FAMILY MEDICINE

## 2018-08-03 NOTE — PROGRESS NOTES
Subjective   Mikki Joel is a 63 y.o. female.     History of Present Illness   follow-up above-mentioned asthma symptoms.  States still having dyspnea on exertion no pain but decided on exertion with walking distances.  States albuterol inhaler helps for about an hour to then wears off.  Some mild sinus drainage.  Took all medication.  Chest x-ray done last month was normal.  History noted.    The following portions of the patient's history were reviewed and updated as appropriate: allergies, current medications, past family history, past medical history, past social history, past surgical history and problem list.    Review of Systems   Constitutional: Negative for activity change, appetite change, fatigue and unexpected weight change.   HENT: Negative for trouble swallowing and voice change.    Eyes: Negative for redness and visual disturbance.   Respiratory: Positive for shortness of breath. Negative for cough and wheezing.    Cardiovascular: Negative for chest pain and palpitations.   Gastrointestinal: Negative for abdominal pain, constipation, diarrhea, nausea and vomiting.   Genitourinary: Negative for urgency.   Musculoskeletal: Negative for joint swelling.   Neurological: Negative for syncope and headaches.   Hematological: Negative for adenopathy.   Psychiatric/Behavioral: Negative for sleep disturbance.       Objective   Physical Exam   Constitutional: She appears well-developed.   HENT:   Head: Normocephalic.   Nose: Nose normal.   Mouth/Throat: Oropharynx is clear and moist.   Eyes: Pupils are equal, round, and reactive to light. Conjunctivae are normal.   Neck: Normal range of motion.   Cardiovascular: Normal rate and regular rhythm.    Pulmonary/Chest: Effort normal and breath sounds normal. No respiratory distress. She has no wheezes. She has no rales. She exhibits no tenderness.   Abdominal: Soft. Bowel sounds are normal.   Musculoskeletal:   No peripheral edema   Psychiatric: She has a  normal mood and affect. Her speech is normal and behavior is normal.       Assessment/Plan   Mikki was seen today for sinus problem.    Diagnoses and all orders for this visit:    WALLER (dyspnea on exertion)  -     CBC (No Diff)  -     Comprehensive Metabolic Panel  -     Ambulatory Referral to Cardiology    Mild intermittent asthma, unspecified whether complicated  -     Fluticasone Furoate-Vilanterol 100-25 MCG/INH aerosol powder ; 1puff qd and rinse out mouth       multiple possibilities including mild airway narrowing chronic versus a cardiac output issues versus anemia etc.  We'll add long-acting inhaler for now.  Consultation with cardiology regards to cardiac output for completeness.  Lab work to rule out anemia.  Recheck 7-10 days.

## 2018-08-06 ENCOUNTER — OFFICE VISIT (OUTPATIENT)
Dept: ORTHOPEDIC SURGERY | Facility: CLINIC | Age: 63
End: 2018-08-06

## 2018-08-06 VITALS — WEIGHT: 234 LBS | BODY MASS INDEX: 36.73 KG/M2 | HEIGHT: 67 IN

## 2018-08-06 DIAGNOSIS — M25.561 RIGHT KNEE PAIN, UNSPECIFIED CHRONICITY: Primary | ICD-10-CM

## 2018-08-06 DIAGNOSIS — Z96.651 PRESENCE OF RIGHT ARTIFICIAL KNEE JOINT: ICD-10-CM

## 2018-08-06 PROCEDURE — 99213 OFFICE O/P EST LOW 20 MIN: CPT | Performed by: NURSE PRACTITIONER

## 2018-08-06 PROCEDURE — 20610 DRAIN/INJ JOINT/BURSA W/O US: CPT | Performed by: NURSE PRACTITIONER

## 2018-08-06 RX ADMIN — LIDOCAINE HYDROCHLORIDE 1 ML: 10 INJECTION, SOLUTION INFILTRATION; PERINEURAL at 15:29

## 2018-08-06 RX ADMIN — TRIAMCINOLONE ACETONIDE 40 MG: 40 INJECTION, SUSPENSION INTRA-ARTICULAR; INTRAMUSCULAR at 15:29

## 2018-08-06 NOTE — PROGRESS NOTES
Mikki Joel is a 63 y.o. female returns for     Chief Complaint   Patient presents with   • Right Knee - Follow-up       HISTORY OF PRESENT ILLNESS: f/u right knee. Patient had her knee replaced in 2016. She would like a injection.        CONCURRENT MEDICAL HISTORY:    Past Medical History:   Diagnosis Date   • Anemia    • Anemia June 2016    might have been due to knee replacement surgery I had in mar   • Anemia June 2016    might have been due to knee replacement surgery I had in mar   • Anxiety    • Arthritis    • Arthritis of back 2009    lower back   • Asthma    • CTS (carpal tunnel syndrome) 1987    left wrist - had corrective surgery   • Depression    • Fracture of wrist 1979   • High blood pressure    • [place] (hard of hearing)    • Hypertension 1999    metoprolol 100 mg and triamterene/hydrochlorothiazide 37.5mg   • Hypertension 1999    metoprolol 100 mg and triamterene/hydrochlorothiazide 37.5mg   • Knee swelling 2010   • Migraine    • Periarthritis of shoulder 2011    both shoulders right shoulder total replacement   • Tear of meniscus of knee 2016    fell       Allergies   Allergen Reactions   • Codeine Other (See Comments)     Increases heart rate   • Sulfa Antibiotics Hives         Current Outpatient Prescriptions:   •  albuterol (PROVENTIL HFA;VENTOLIN HFA) 108 (90 Base) MCG/ACT inhaler, Inhale 2 puffs Every 4 (Four) Hours As Needed for Wheezing., Disp: 1 inhaler, Rfl: 2  •  azelastine (ASTELIN) 0.1 % nasal spray, 2 sprays into each nostril 2 (Two) Times a Day. Use in each nostril as directed, Disp: , Rfl:   •  busPIRone (BUSPAR) 7.5 MG tablet, Take 1 tablet by mouth 2 (Two) Times a Day., Disp: 60 tablet, Rfl: 11  •  cetirizine (ZYRTEC ALLERGY) 10 MG tablet, Take 1 tablet by mouth Daily., Disp: 30 tablet, Rfl: 11  •  eszopiclone (LUNESTA) 1 MG tablet, Take 1 tablet by mouth Every Night. Take immediately before bedtime, Disp: 30 tablet, Rfl: 3  •  FLUoxetine (PROzac) 40 MG capsule, Take 1 capsule  by mouth Daily., Disp: 30 capsule, Rfl: 11  •  Fluticasone Furoate-Vilanterol 100-25 MCG/INH aerosol powder , 1puff qd and rinse out mouth, Disp: 60 puff, Rfl: 5  •  ibuprofen (ADVIL,MOTRIN) 100 MG/5ML suspension, Take 39.8 mL by mouth Every 8 (Eight) Hours As Needed for Mild Pain ., Disp: , Rfl: 0  •  ipratropium (ATROVENT) 0.06 % nasal spray, 2 sprays into each nostril 3 (Three) Times a Day., Disp: 15 mL, Rfl: 5  •  metoprolol succinate XL (TOPROL XL) 100 MG 24 hr tablet, Take 1 tablet by mouth Daily., Disp: 30 tablet, Rfl: 11  •  mometasone (NASONEX) 50 MCG/ACT nasal spray, 2 sprays into each nostril 2 (Two) Times a Day., Disp: , Rfl:   •  rOPINIRole (REQUIP) 1 MG tablet, Take 1 tablet by mouth Every Night. Take 1 hour before bedtime., Disp: 30 tablet, Rfl: 11  •  SUMAtriptan (IMITREX) 25 MG tablet, Take 1 tab po prn for migraine, Disp: 30 tablet, Rfl: 5  •  Topiramate ER (TROKENDI XR) 25 MG capsule sustained-release 24 hr, Take 1 tablet by mouth Every Night., Disp: , Rfl:     Past Surgical History:   Procedure Laterality Date   • BUNIONECTOMY Bilateral    • COLONOSCOPY  2015   • COLONOSCOPY N/A 1/19/2018    Procedure: COLONOSCOPY;  Surgeon: Ravi Cavanaugh MD;  Location: [facility] ENDOSCOPY;  Service:    • COLONOSCOPY  2015   • EAR TUBES     • ENDOSCOPIC FUNCTIONAL SINUS SURGERY (FESS) Bilateral 9/5/2017    Procedure: BILATERAL ETHMOID AND MAXILLARY SINUS AND NASAL SEPTOPLASTIES AND BILATERAL TURBINECTOMIES;  Surgeon: Ramy Gaston MD;  Location: [facility] OR;  Service:    • ENDOSCOPY N/A 1/19/2018    Procedure: ESOPHAGOGASTRODUODENOSCOPY;  Surgeon: Ravi Cavanaugh MD;  Location: [facility] ENDOSCOPY;  Service:    • HAND SURGERY     • JOINT REPLACEMENT  2016    right knee replacement 2016   • JOINT REPLACEMENT  2015    right shoulder   • KNEE SURGERY  2016    total right knee replacement   • RHINOPLASTY      x3   • SHOULDER ARTHROSCOPY     • SHOULDER SURGERY  2015    complete shoulder joint replacement   •  "TONSILLECTOMY     • TUBAL ABDOMINAL LIGATION  1987   • TYMPANOPLASTY Left 6/5/2018    Procedure: TYMPANOPLASTY WITH CARTILAGE GRAFT;  Surgeon: Ramy Gaston MD;  Location: [facility];  Service: ENT   • UPPER GASTROINTESTINAL ENDOSCOPY  2016   • UPPER GASTROINTESTINAL ENDOSCOPY  2016   • UPPER GASTROINTESTINAL ENDOSCOPY  01/19/2018    Per Dr. Ravi Polk M.D.   • WRIST SURGERY  1987    carpal tunnel repair left wrist       ROS  No fevers or chills.  No chest pain or shortness of air.  No GI or  disturbances.    PHYSICAL EXAMINATION:       Ht 170.2 cm (67\")   Wt 106 kg (234 lb)   LMP  (LMP Unknown)   BMI 36.65 kg/m²     Physical Exam   Constitutional: She is oriented to person, place, and time. Vital signs are normal. She appears well-developed and well-nourished. She is cooperative.   HENT:   Head: Normocephalic and atraumatic.   Neck: Trachea normal and phonation normal.   Pulmonary/Chest: Effort normal. No respiratory distress.   Abdominal: Soft. Normal appearance. She exhibits no distension.   Musculoskeletal:        Right knee: She exhibits no effusion.   Neurological: She is alert and oriented to person, place, and time. GCS eye subscore is 4. GCS verbal subscore is 5. GCS motor subscore is 6.   Skin: Skin is warm, dry and intact.   Psychiatric: She has a normal mood and affect. Her speech is normal and behavior is normal. Judgment and thought content normal. Cognition and memory are normal.   Vitals reviewed.      GAIT:     []  Normal  []  Antalgic    Assistive device: [x]  None  []  Walker     []  Crutches  []  Cane     []  Wheelchair  []  Stretcher    Right Knee Exam     Tenderness   The patient is experiencing tenderness in the medial joint line, pes anserinus and lateral joint line.    Range of Motion   Extension: normal   Flexion: abnormal     Other   Erythema: absent  Scars: present  Sensation: normal  Pulse: present  Swelling: mild  Other tests: no effusion present      Left Knee Exam   Left " knee exam is normal.    Muscle Strength     The patient has normal left knee strength.              No results found.    Large Joint Arthrocentesis  Date/Time: 8/6/2018 3:29 PM  Consent given by: patient  Site marked: site marked  Timeout: Immediately prior to procedure a time out was called to verify the correct patient, procedure, equipment, support staff and site/side marked as required   Supporting Documentation  Indications: pain   Procedure Details  Location: knee - R knee (pes bursa)  Preparation: Patient was prepped and draped in the usual sterile fashion  Needle size: 22 G  Approach: anteromedial  Medications administered: 1 mL lidocaine 1 %; 40 mg triamcinolone acetonide 40 MG/ML  Patient tolerance: patient tolerated the procedure well with no immediate complications            ASSESSMENT:    Diagnoses and all orders for this visit:    Right knee pain, unspecified chronicity  -     Large Joint Arthrocentesis    Presence of right artificial knee joint  -     Large Joint Arthrocentesis          PLAN  Most of the patient's pain and tenderness appears to be over the Pes Bursa, injected it today with steroid and lidocaine mixture and recommended progression range of motion exercises and follow-up in 1 month for recheck.  No Follow-up on file.    Devyn Pal, APRN

## 2018-08-07 RX ORDER — LIDOCAINE HYDROCHLORIDE 10 MG/ML
1 INJECTION, SOLUTION INFILTRATION; PERINEURAL
Status: COMPLETED | OUTPATIENT
Start: 2018-08-06 | End: 2018-08-06

## 2018-08-07 RX ORDER — TRIAMCINOLONE ACETONIDE 40 MG/ML
40 INJECTION, SUSPENSION INTRA-ARTICULAR; INTRAMUSCULAR
Status: COMPLETED | OUTPATIENT
Start: 2018-08-06 | End: 2018-08-06

## 2018-08-10 ENCOUNTER — OFFICE VISIT (OUTPATIENT)
Dept: FAMILY MEDICINE CLINIC | Facility: CLINIC | Age: 63
End: 2018-08-10

## 2018-08-10 VITALS
SYSTOLIC BLOOD PRESSURE: 158 MMHG | HEIGHT: 67 IN | DIASTOLIC BLOOD PRESSURE: 94 MMHG | WEIGHT: 234.5 LBS | BODY MASS INDEX: 36.81 KG/M2

## 2018-08-10 DIAGNOSIS — J45.20 MILD INTERMITTENT ASTHMA, UNSPECIFIED WHETHER COMPLICATED: Chronic | ICD-10-CM

## 2018-08-10 PROCEDURE — 99213 OFFICE O/P EST LOW 20 MIN: CPT | Performed by: FAMILY MEDICINE

## 2018-08-10 NOTE — PROGRESS NOTES
Subjective   Mikki Joel is a 63 y.o. female.     History of Present Illness    reevaluation asthma appears to start long-acting Inhalers has really had uses rescue inhaler couple times a day.  States does run out of follow-up coverage in the evening time.  We will increase her long-acting inhaler 2 twice a day.  She's continue to use her rescue inhaler.   getting a flu vaccine in the fall as well as pneumonia vaccines next year when she turns 64.  Keep follow-up appointment October.    The following portions of the patient's history were reviewed and updated as appropriate: allergies, current medications, past family history, past medical history, past social history, past surgical history and problem list.    Review of Systems   Constitutional: Negative for activity change, appetite change, fatigue and unexpected weight change.   HENT: Negative for trouble swallowing and voice change.    Eyes: Negative for redness and visual disturbance.   Respiratory: Negative for cough and wheezing.    Cardiovascular: Negative for chest pain and palpitations.   Gastrointestinal: Negative for abdominal pain, constipation, diarrhea, nausea and vomiting.   Genitourinary: Negative for urgency.   Musculoskeletal: Negative for joint swelling.   Neurological: Negative for syncope and headaches.   Hematological: Negative for adenopathy.   Psychiatric/Behavioral: Negative for sleep disturbance.       Objective   Physical Exam   Constitutional: She appears well-developed.   HENT:   Head: Normocephalic.   Eyes: Pupils are equal, round, and reactive to light.   Neck: Normal range of motion.   Cardiovascular: Regular rhythm.    Pulmonary/Chest: Effort normal and breath sounds normal.   Abdominal: Soft.   Psychiatric: She has a normal mood and affect. Her behavior is normal. Judgment and thought content normal.       Assessment/Plan

## 2018-08-15 DIAGNOSIS — F32.A DEPRESSION, UNSPECIFIED DEPRESSION TYPE: ICD-10-CM

## 2018-08-15 RX ORDER — FLUOXETINE HYDROCHLORIDE 40 MG/1
40 CAPSULE ORAL DAILY
Qty: 90 CAPSULE | Refills: 3 | Status: SHIPPED | OUTPATIENT
Start: 2018-08-15 | End: 2018-10-30 | Stop reason: ALTCHOICE

## 2018-08-16 ENCOUNTER — OFFICE VISIT (OUTPATIENT)
Dept: OTOLARYNGOLOGY | Facility: CLINIC | Age: 63
End: 2018-08-16

## 2018-08-16 VITALS — WEIGHT: 240 LBS | BODY MASS INDEX: 37.67 KG/M2 | TEMPERATURE: 97.6 F | HEIGHT: 67 IN

## 2018-08-16 DIAGNOSIS — J32.0 CHRONIC MAXILLARY SINUSITIS: Primary | ICD-10-CM

## 2018-08-16 PROCEDURE — 99024 POSTOP FOLLOW-UP VISIT: CPT | Performed by: OTOLARYNGOLOGY

## 2018-08-16 RX ORDER — AMOXICILLIN AND CLAVULANATE POTASSIUM 875; 125 MG/1; MG/1
1 TABLET, FILM COATED ORAL EVERY 12 HOURS
Qty: 20 TABLET | Refills: 0 | Status: SHIPPED | OUTPATIENT
Start: 2018-08-16 | End: 2018-09-06

## 2018-08-16 NOTE — PATIENT INSTRUCTIONS

## 2018-08-16 NOTE — PROGRESS NOTES
The patient comes back in follow-up since she's having sinus pain and pressure.  She's not having ear drainage feels like her hearing is dramatically improved.  She says she didn't have to wear hearing aids to watch TV.    Is a fever chills and also facial pressure teeth are hurting on the upper area.  Examination reveals large amount of congestion or nose no purulence there is marked swelling she says she been using her nasal sprays regularly so I placed her on an antibiotic.  Portion of both ears appear to be intact the scar tissue but no drainage.  We'll see her back in 3 weeks after she's finished antibiotics as long she is improving and recheck her hearing at that point she'll be 3 months after surgery all questions were answered and she is agreement with this approach..  PRISCILA Gaston MD

## 2018-08-21 ENCOUNTER — OFFICE VISIT (OUTPATIENT)
Dept: SLEEP MEDICINE | Facility: HOSPITAL | Age: 63
End: 2018-08-21

## 2018-08-21 VITALS
BODY MASS INDEX: 38.14 KG/M2 | SYSTOLIC BLOOD PRESSURE: 142 MMHG | OXYGEN SATURATION: 96 % | DIASTOLIC BLOOD PRESSURE: 82 MMHG | WEIGHT: 243 LBS | HEART RATE: 85 BPM | HEIGHT: 67 IN

## 2018-08-21 DIAGNOSIS — F51.04 PSYCHOPHYSIOLOGICAL INSOMNIA: Primary | ICD-10-CM

## 2018-08-21 DIAGNOSIS — G25.81 RESTLESS LEGS SYNDROME (RLS): ICD-10-CM

## 2018-08-21 PROCEDURE — 99214 OFFICE O/P EST MOD 30 MIN: CPT | Performed by: INTERNAL MEDICINE

## 2018-08-21 RX ORDER — ESZOPICLONE 1 MG/1
1 TABLET, FILM COATED ORAL NIGHTLY
Qty: 30 TABLET | Refills: 5 | Status: SHIPPED | OUTPATIENT
Start: 2018-08-21 | End: 2019-03-12 | Stop reason: SDUPTHER

## 2018-08-21 NOTE — PATIENT INSTRUCTIONS
You would benefit from online cognitive behavioral therapy for insomnia (CBT - I) treatment. The American Academy of Sleep [employer] is a good resource for information. Consider websites like www.myshuti.com, www.cbtforinsomnia.com. Alternatively, this can be done with consultation of a behavioral sleep medicine specialist.  We will be happy to refer you to one if you would prefer a face-to-face visit versus online treatment.    [facility]

## 2018-08-21 NOTE — PROGRESS NOTES
Sleep Clinic Follow Up    Date: 8/21/2018  Primary Care Physician: Ronald Bowser MD      Interim History (1/3):  Since the last visit on 02/22/2018, patient has:      1) RLS - on Ropinirole 1 mg - slightly worse    2) Insomnia - worse, waking up with sinus headaches. She is on antibiotics, Zyrtec, and several nose sprays    Bed time: 2395-4645 - she has fear of waking up with a sinus headache  Sleep latency: 30-60 minutes  Number of times awakens during the night: 3-4  Wake time: 3962-0616  Estimated total sleep time at night: 7 hours  Caffeine intake: 0oz of coffee, 8oz of tea, and 0oz of soda  Alcohol intake: 0 drinks per week  Nap time: rare   Sleepiness with Driving: N/A    [place] - 1    PMHx, FH, SH reviewed and pertinent changes are: left ear surgery in June 2018      REVIEW OF SYSTEMS:   Negative for chest pain, fever, chills, SOA, abdominal pain. Smoking: none      Exam (6-11/12):    Vitals:    08/21/18 1404   BP: 142/82   Pulse: 85   SpO2: 96%       Body mass index is 38.06 kg/m². Patient's Body mass index is 38.06 kg/m². BMI is above normal parameters. Recommendations include: referral to primary care.      Gen:  No distress, conversant, pleasant, appears stated age, alert, oriented  Eyes:   Anicteric sclera, moist conjunctiva, no lid lag     PERRLA, EOMI   Heent:   NC/AT    Oropharynx clear, Mallampati 4    normal hearing  Lungs:  Normal effort, non-labored breathing    Clear to auscultation    CV:  Normal S1/S2, no murmur    no lower extremity edema  ABD:  Soft, normal bowel sounds    Psych:  Appropriate affect  Neuro:  CN 2-12 intact    Past Medical History:   Diagnosis Date   • Anemia    • Anemia June 2016    might have been due to knee replacement surgery I had in mar   • Anemia June 2016    might have been due to knee replacement surgery I had in mar   • Anxiety    • Arthritis    • Arthritis of back 2009    lower back   • Asthma    • CTS (carpal tunnel syndrome) 1987    left wrist - had corrective  surgery   • Depression    • Fracture of wrist 1979   • High blood pressure    • [place] (hard of hearing)    • Hypertension 1999    metoprolol 100 mg and triamterene/hydrochlorothiazide 37.5mg   • Hypertension 1999    metoprolol 100 mg and triamterene/hydrochlorothiazide 37.5mg   • Knee swelling 2010   • Migraine    • Periarthritis of shoulder 2011    both shoulders right shoulder total replacement   • Tear of meniscus of knee 2016    fell       Current Outpatient Prescriptions:   •  albuterol (PROVENTIL HFA;VENTOLIN HFA) 108 (90 Base) MCG/ACT inhaler, Inhale 2 puffs Every 4 (Four) Hours As Needed for Wheezing., Disp: 1 inhaler, Rfl: 2  •  amoxicillin-clavulanate (AUGMENTIN) 875-125 MG per tablet, Take 1 tablet by mouth Every 12 (Twelve) Hours., Disp: 20 tablet, Rfl: 0  •  azelastine (ASTELIN) 0.1 % nasal spray, 2 sprays into each nostril 2 (Two) Times a Day. Use in each nostril as directed, Disp: , Rfl:   •  busPIRone (BUSPAR) 7.5 MG tablet, Take 1 tablet by mouth 2 (Two) Times a Day., Disp: 60 tablet, Rfl: 11  •  cetirizine (ZYRTEC ALLERGY) 10 MG tablet, Take 1 tablet by mouth Daily., Disp: 30 tablet, Rfl: 11  •  eszopiclone (LUNESTA) 1 MG tablet, Take 1 tablet by mouth Every Night. Take immediately before bedtime, Disp: 30 tablet, Rfl: 3  •  FLUoxetine (PROzac) 40 MG capsule, Take 1 capsule by mouth Daily., Disp: 90 capsule, Rfl: 3  •  Fluticasone Furoate-Vilanterol 100-25 MCG/INH aerosol powder , 1 puff bid and rinse out mouth, Disp: 120 puff, Rfl: 5  •  ibuprofen (ADVIL,MOTRIN) 100 MG/5ML suspension, Take 39.8 mL by mouth Every 8 (Eight) Hours As Needed for Mild Pain ., Disp: , Rfl: 0  •  ipratropium (ATROVENT) 0.06 % nasal spray, 2 sprays into each nostril 3 (Three) Times a Day., Disp: 15 mL, Rfl: 5  •  metoprolol succinate XL (TOPROL XL) 100 MG 24 hr tablet, Take 1 tablet by mouth Daily., Disp: 30 tablet, Rfl: 11  •  mometasone (NASONEX) 50 MCG/ACT nasal spray, 2 sprays into each nostril 2 (Two) Times a Day.,  Disp: , Rfl:   •  rOPINIRole (REQUIP) 1 MG tablet, Take 1 tablet by mouth Every Night. Take 1 hour before bedtime., Disp: 30 tablet, Rfl: 11  •  SUMAtriptan (IMITREX) 25 MG tablet, Take 1 tab po prn for migraine, Disp: 30 tablet, Rfl: 5  •  Topiramate ER (TROKENDI XR) 25 MG capsule sustained-release 24 hr, Take 1 tablet by mouth Every Night., Disp: , Rfl:     ASSESSMENT / PLAN:     1. Restless leg syndrome  1. Trial of 1.5 mg requip  2. Call if working or not  2. Insomnia  1. continue CBT-I online - download at home  2. Lunesta did better with less hangover feeling  3. 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 Poor sleep hygiene   1. Discussed again anchoring wake time to 08:30 am  2. No tv in bed - again  3. RTC in 6 months     Total time 25 min, more than half spent in face to face counseling and coordination of care.    RTC in 6 months     This document has been electronically signed by Ramirez Bai MD on August 21, 2018         CC: Ronald Bowser MD          No ref. provider found

## 2018-08-22 DIAGNOSIS — J40 BRONCHITIS: ICD-10-CM

## 2018-08-22 DIAGNOSIS — R05.9 COUGH: ICD-10-CM

## 2018-08-22 DIAGNOSIS — J30.2 CHRONIC SEASONAL ALLERGIC RHINITIS DUE TO OTHER ALLERGEN: ICD-10-CM

## 2018-08-22 DIAGNOSIS — R06.2 WHEEZE: ICD-10-CM

## 2018-08-27 DIAGNOSIS — I10 ESSENTIAL HYPERTENSION: ICD-10-CM

## 2018-08-27 RX ORDER — METOPROLOL SUCCINATE 100 MG/1
100 TABLET, EXTENDED RELEASE ORAL DAILY
Qty: 30 TABLET | Refills: 3 | Status: SHIPPED | OUTPATIENT
Start: 2018-08-27 | End: 2018-12-27 | Stop reason: SDUPTHER

## 2018-09-06 ENCOUNTER — CLINICAL SUPPORT (OUTPATIENT)
Dept: AUDIOLOGY | Facility: CLINIC | Age: 63
End: 2018-09-06

## 2018-09-06 ENCOUNTER — OFFICE VISIT (OUTPATIENT)
Dept: OTOLARYNGOLOGY | Facility: CLINIC | Age: 63
End: 2018-09-06

## 2018-09-06 VITALS — TEMPERATURE: 96.2 F | BODY MASS INDEX: 37.2 KG/M2 | HEIGHT: 67 IN | WEIGHT: 237 LBS

## 2018-09-06 DIAGNOSIS — H90.6 MIXED HEARING LOSS, BILATERAL: Primary | ICD-10-CM

## 2018-09-06 DIAGNOSIS — H72.93 PERFORATED TYMPANIC MEMBRANE, BILATERAL: ICD-10-CM

## 2018-09-06 DIAGNOSIS — G25.81 RESTLESS LEG SYNDROME: ICD-10-CM

## 2018-09-06 DIAGNOSIS — J32.0 CHRONIC MAXILLARY SINUSITIS: Primary | ICD-10-CM

## 2018-09-06 PROCEDURE — 92557 COMPREHENSIVE HEARING TEST: CPT | Performed by: AUDIOLOGIST

## 2018-09-06 PROCEDURE — 99213 OFFICE O/P EST LOW 20 MIN: CPT | Performed by: OTOLARYNGOLOGY

## 2018-09-06 PROCEDURE — 92567 TYMPANOMETRY: CPT | Performed by: AUDIOLOGIST

## 2018-09-06 RX ORDER — CEFDINIR 300 MG/1
300 CAPSULE ORAL 2 TIMES DAILY
Qty: 28 CAPSULE | Refills: 0 | Status: SHIPPED | OUTPATIENT
Start: 2018-09-06 | End: 2018-10-01

## 2018-09-06 RX ORDER — MOMETASONE FUROATE 50 UG/1
2 SPRAY, METERED NASAL 2 TIMES DAILY
Qty: 17 G | Refills: 11 | Status: SHIPPED | OUTPATIENT
Start: 2018-09-06 | End: 2019-07-31 | Stop reason: SDUPTHER

## 2018-09-06 RX ORDER — ROPINIROLE 1 MG/1
1.5 TABLET, FILM COATED ORAL NIGHTLY
Qty: 45 TABLET | Refills: 11 | Status: SHIPPED | OUTPATIENT
Start: 2018-09-06 | End: 2018-11-02 | Stop reason: SDUPTHER

## 2018-09-06 NOTE — PATIENT INSTRUCTIONS

## 2018-09-06 NOTE — PROGRESS NOTES
Subjective   Mikki Joel is a 63 y.o. female.   Follow-up for ears and sinuses    History of Present Illness   Patient since she's doing better with ear no pain or drainage left side still has a perforation on the right, is a side she did not have surgery on ,she's dry and not draining.  Main concern is facial pressure around the sinuses tickly on the left and goes around her eyes she's not any fever chills.  His which she describes as migraines she's having some drainage she says was felt that it's better than it was with antibiotics but did not clear of symptoms        The following portions of the patient's history were reviewed and updated as appropriate: allergies, current medications, past family history, past medical history, past social history, past surgical history and problem list.      Current Outpatient Prescriptions:   •  azelastine (ASTELIN) 0.1 % nasal spray, 2 sprays into each nostril 2 (Two) Times a Day. Use in each nostril as directed, Disp: , Rfl:   •  busPIRone (BUSPAR) 7.5 MG tablet, Take 1 tablet by mouth 2 (Two) Times a Day., Disp: 60 tablet, Rfl: 11  •  cetirizine (ZYRTEC ALLERGY) 10 MG tablet, Take 1 tablet by mouth Daily., Disp: 30 tablet, Rfl: 11  •  eszopiclone (LUNESTA) 1 MG tablet, Take 1 tablet by mouth Every Night. Take immediately before bedtime, Disp: 30 tablet, Rfl: 5  •  FLUoxetine (PROzac) 40 MG capsule, Take 1 capsule by mouth Daily., Disp: 90 capsule, Rfl: 3  •  Fluticasone Furoate-Vilanterol 100-25 MCG/INH aerosol powder , 1 puff bid and rinse out mouth, Disp: 120 puff, Rfl: 5  •  ibuprofen (ADVIL,MOTRIN) 100 MG/5ML suspension, Take 39.8 mL by mouth Every 8 (Eight) Hours As Needed for Mild Pain ., Disp: , Rfl: 0  •  ipratropium (ATROVENT) 0.06 % nasal spray, 2 sprays into each nostril 3 (Three) Times a Day., Disp: 15 mL, Rfl: 5  •  metoprolol succinate XL (TOPROL XL) 100 MG 24 hr tablet, Take 1 tablet by mouth Daily., Disp: 30 tablet, Rfl: 3  •  PROAIR  (90  Base) MCG/ACT inhaler, INHALE 2 PUFFS BY MOUTH EVERY 4 HOURS AS NEEDED FOR WHEEZING, Disp: 8.5 g, Rfl: 0  •  SUMAtriptan (IMITREX) 25 MG tablet, Take 1 tab po prn for migraine, Disp: 30 tablet, Rfl: 5  •  Topiramate ER (TROKENDI XR) 25 MG capsule sustained-release 24 hr, Take 1 tablet by mouth Every Night., Disp: , Rfl:   •  cefdinir (OMNICEF) 300 MG capsule, Take 1 capsule by mouth 2 (Two) Times a Day., Disp: 28 capsule, Rfl: 0  •  mometasone (NASONEX) 50 MCG/ACT nasal spray, 2 sprays into the nostril(s) as directed by provider 2 (Two) Times a Day., Disp: 17 g, Rfl: 11  •  rOPINIRole (REQUIP) 1 MG tablet, Take 1.5 tablets by mouth Every Night. Take 1 hour before bedtime., Disp: 45 tablet, Rfl: 11    Allergies   Allergen Reactions   • Codeine Other (See Comments)     Increases heart rate   • Sulfa Antibiotics Hives             Review of Systems   Constitutional: Negative for fever.   HENT: Positive for congestion and sinus pain. Negative for ear discharge, ear pain and trouble swallowing.    Hematological: Negative for adenopathy.           Objective   Physical Exam   Constitutional: She is oriented to person, place, and time. She appears well-developed and well-nourished.   HENT:   Head: Normocephalic and atraumatic.   Right Ear: External ear and ear canal normal.   Left Ear: External ear and ear canal normal.   Ears:    Nose: Nose normal. No mucosal edema, rhinorrhea, nasal deformity or septal deviation. No epistaxis. Right sinus exhibits no maxillary sinus tenderness and no frontal sinus tenderness. Left sinus exhibits no maxillary sinus tenderness and no frontal sinus tenderness.   Mouth/Throat: Uvula is midline and oropharynx is clear and moist. No trismus in the jaw. Normal dentition. No oropharyngeal exudate or posterior oropharyngeal edema.   Eyes: Conjunctivae are normal.   Neck: Normal range of motion. Neck supple. No JVD present. No tracheal deviation present. No thyromegaly present.   Cardiovascular:  Normal rate.    Pulmonary/Chest: Effort normal.   Musculoskeletal: Normal range of motion.   Lymphadenopathy:        Head (right side): No submental, no submandibular, no tonsillar, no preauricular, no posterior auricular and no occipital adenopathy present.        Head (left side): No submental, no submandibular, no tonsillar, no preauricular, no posterior auricular and no occipital adenopathy present.     She has no cervical adenopathy.        Right cervical: No superficial cervical, no deep cervical and no posterior cervical adenopathy present.       Left cervical: No superficial cervical, no deep cervical and no posterior cervical adenopathy present.   Neurological: She is alert and oriented to person, place, and time. No cranial nerve deficit.   Skin: Skin is warm.   Psychiatric: She has a normal mood and affect. Her speech is normal and behavior is normal. Thought content normal.   Nursing note and vitals reviewed.    Audiogram reviewed showing improvement in hearing on the left that is been operated on and eardrums intact    Justina willams as expected   Assessment/Plan   Mikki was seen today for follow-up.    Diagnoses and all orders for this visit:    Chronic maxillary sinusitis    Perforated tympanic membrane, bilateral    Other orders  -     mometasone (NASONEX) 50 MCG/ACT nasal spray; 2 sprays into the nostril(s) as directed by provider 2 (Two) Times a Day.  -     cefdinir (OMNICEF) 300 MG capsule; Take 1 capsule by mouth 2 (Two) Times a Day.        To try and treat her sinus symptoms and consider repeat CT if not improving she's glad attempts and so we'll hold off the CT pressure a different antibiotic and had her use nasal spray incision made consider right tympanic plasty but we'll get sinus situation or control she'll see me back in 3 weeks after she gets back from her trip if need be will get CT of the sinuses on exam see a lot of purulence in his nose is open    Explained proper use and side  effects medication call for questions or problems in interim

## 2018-09-06 NOTE — PROGRESS NOTES
STANDARD AUDIOMETRIC EVALUATION      Name:  Mikki Joel  :  1955  Age:  63 y.o.  Date of Evaluation:  2018      HISTORY    Reason for visit:  Mikki Joel is seen today for a hearing evaluation at the request of Dr. Ramy Gaston.  Patient reports she had surgery on her left ear to close a hole in her left ear drum.  She states her hearing in her left ear has improved since the surgery.  She states she continues to do well with her hearing aids, but may need her left hearing aids readjusted.       EVALUATION    See Audiogram    RESULTS        Otoscopy and Tympanometry 226 Hz :  Right Ear:  Otoscopy:  Clear ear canal          Tympanometry:  Unable to test due to no seal    Left Ear:   Otoscopy:  Clear ear canal        Tympanometry:  Middle ear function within normal limits    Test technique:  Standard Audiometry     Pure Tone Audiometry:   Patient responded to pure tones at 35-65 dB for 250-8000 Hz in right ear, and at 25-85 dB for 250-8000 Hz in left ear.       Speech Audiometry:        Right Ear:  Speech Reception Threshold (SRT) was obtained at 55 dBHL                 Speech Discrimination scores were 100% in quiet when words were presented at 85 dBHL       Left Ear:  Speech Reception Threshold (SRT) was obtained at 45 dBHL                 Speech Discrimination scores were 80% in quiet when words were presented at 85 dBHL    Reliability:   good    IMPRESSIONS:  1.  Tympanometry results are consistent with Unable to test due to no seal in right ear, and Middle ear function within normal limits in left ear.  2.  Pure tone results are consistent with mild to moderate cookie bite mixed hearing loss  for right ear, and mild to severe sloping mixed hearing loss  in left ear.       RECOMMENDATIONS:  Patient is seeing the Ear Nose and Throat physician immediately following this examination.  It was a pleasure seeing Mikki Joel in Audiology today.  We would be happy to do further  testing or discuss these test as necessary.          This document has been electronically signed by Shanell Berrios MS CCC-ERICK on September 6, 2018 3:07 PM       Shanell Berrios MS CCC-A  Licensed Audiologist

## 2018-09-20 DIAGNOSIS — J30.2 CHRONIC SEASONAL ALLERGIC RHINITIS DUE TO OTHER ALLERGEN: ICD-10-CM

## 2018-09-20 DIAGNOSIS — R05.9 COUGH: ICD-10-CM

## 2018-09-20 DIAGNOSIS — R06.2 WHEEZE: ICD-10-CM

## 2018-09-20 DIAGNOSIS — J40 BRONCHITIS: ICD-10-CM

## 2018-09-21 ENCOUNTER — OFFICE VISIT (OUTPATIENT)
Dept: ORTHOPEDIC SURGERY | Facility: CLINIC | Age: 63
End: 2018-09-21

## 2018-09-21 VITALS — WEIGHT: 236 LBS | BODY MASS INDEX: 37.04 KG/M2 | HEIGHT: 67 IN

## 2018-09-21 DIAGNOSIS — M25.561 RIGHT KNEE PAIN, UNSPECIFIED CHRONICITY: ICD-10-CM

## 2018-09-21 DIAGNOSIS — Z96.651 PRESENCE OF RIGHT ARTIFICIAL KNEE JOINT: Primary | ICD-10-CM

## 2018-09-21 PROCEDURE — 99213 OFFICE O/P EST LOW 20 MIN: CPT | Performed by: NURSE PRACTITIONER

## 2018-09-21 PROCEDURE — 96372 THER/PROPH/DIAG INJ SC/IM: CPT | Performed by: NURSE PRACTITIONER

## 2018-09-21 RX ORDER — KETOROLAC TROMETHAMINE 30 MG/ML
60 INJECTION, SOLUTION INTRAMUSCULAR; INTRAVENOUS ONCE
Status: COMPLETED | OUTPATIENT
Start: 2018-09-21 | End: 2018-09-21

## 2018-09-21 RX ORDER — TRIAMCINOLONE ACETONIDE 40 MG/ML
80 INJECTION, SUSPENSION INTRA-ARTICULAR; INTRAMUSCULAR ONCE
Status: COMPLETED | OUTPATIENT
Start: 2018-09-21 | End: 2018-09-21

## 2018-09-21 RX ADMIN — TRIAMCINOLONE ACETONIDE 80 MG: 40 INJECTION, SUSPENSION INTRA-ARTICULAR; INTRAMUSCULAR at 13:32

## 2018-09-21 RX ADMIN — KETOROLAC TROMETHAMINE 60 MG: 30 INJECTION, SOLUTION INTRAMUSCULAR; INTRAVENOUS at 13:31

## 2018-09-21 NOTE — PROGRESS NOTES
"Mikki Joel is a 63 y.o. female returns for     Chief Complaint   Patient presents with   • Right Knee - Follow-up       HISTORY OF PRESENT ILLNESS: f/u on rt knee, patient states that there has been swelling in her rt knee and has been aching,not taking anything for it      CONCURRENT MEDICAL HISTORY:    The following portions of the patient's history were reviewed and updated as appropriate: allergies, current medications, past family history, past medical history, past social history, past surgical history and problem list.     ROS  No fevers or chills.  No chest pain or shortness of air.  No GI or  disturbances.    PHYSICAL EXAMINATION:       Ht 170.2 cm (67\")   Wt 107 kg (236 lb)   LMP  (LMP Unknown)   BMI 36.96 kg/m²     Physical Exam   Constitutional: She is oriented to person, place, and time. Vital signs are normal. She appears well-developed and well-nourished. She is cooperative.   HENT:   Head: Normocephalic and atraumatic.   Neck: Trachea normal and phonation normal.   Pulmonary/Chest: Effort normal. No respiratory distress.   Abdominal: Soft. Normal appearance. She exhibits no distension.   Musculoskeletal:        Right knee: She exhibits no effusion.   Neurological: She is alert and oriented to person, place, and time. GCS eye subscore is 4. GCS verbal subscore is 5. GCS motor subscore is 6.   Skin: Skin is warm, dry and intact.   Psychiatric: She has a normal mood and affect. Her speech is normal and behavior is normal. Judgment and thought content normal. Cognition and memory are normal.   Vitals reviewed.      GAIT:     [x]  Normal  []  Antalgic    Assistive device: []  None  []  Walker     []  Crutches  []  Cane     []  Wheelchair  []  Stretcher    Right Knee Exam     Tenderness   The patient is experiencing tenderness in the medial joint line, pes anserinus and lateral joint line.    Range of Motion   Extension: normal   Flexion: abnormal     Other   Erythema: absent  Scars: " present  Sensation: normal  Pulse: present  Swelling: mild  Other tests: no effusion present      Left Knee Exam   Left knee exam is normal.    Muscle Strength     The patient has normal left knee strength.              No results found.          ASSESSMENT:    Diagnoses and all orders for this visit:    Presence of right artificial knee joint  -     triamcinolone acetonide (KENALOG-40) injection 80 mg; Inject 2 mL into the appropriate muscle as directed by prescriber 1 (One) Time.  -     ketorolac (TORADOL) injection 60 mg; Inject 2 mL into the appropriate muscle as directed by prescriber 1 (One) Time.    Right knee pain, unspecified chronicity  -     triamcinolone acetonide (KENALOG-40) injection 80 mg; Inject 2 mL into the appropriate muscle as directed by prescriber 1 (One) Time.  -     ketorolac (TORADOL) injection 60 mg; Inject 2 mL into the appropriate muscle as directed by prescriber 1 (One) Time.          PLAN  Repeated the IM injection of steroids and Toradol today and recommended progression of ROM and activity as tolerated based on pain.   FOllow up as needed.   No Follow-up on file.    MIRIAM Lucio

## 2018-10-01 ENCOUNTER — OFFICE VISIT (OUTPATIENT)
Dept: OTOLARYNGOLOGY | Facility: CLINIC | Age: 63
End: 2018-10-01

## 2018-10-01 VITALS — TEMPERATURE: 97.2 F | BODY MASS INDEX: 37.35 KG/M2 | WEIGHT: 238 LBS | HEIGHT: 67 IN

## 2018-10-01 DIAGNOSIS — Z09 POSTOP CHECK: Primary | ICD-10-CM

## 2018-10-01 PROCEDURE — 99024 POSTOP FOLLOW-UP VISIT: CPT | Performed by: OTOLARYNGOLOGY

## 2018-10-01 NOTE — PATIENT INSTRUCTIONS

## 2018-10-01 NOTE — PROGRESS NOTES
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  She still uses her hearing aids in both sides she has a sensorineural hearing loss.    We'll see her after the first year and she'll call for questions or problems in the meantime.  She will eventually lead to consider right tympanoplasty.  PRISCILA Gaston MD

## 2018-10-08 RX ORDER — IPRATROPIUM BROMIDE 42 UG/1
SPRAY, METERED NASAL
Qty: 15 ML | Refills: 0 | Status: SHIPPED | OUTPATIENT
Start: 2018-10-08 | End: 2018-10-19 | Stop reason: SDUPTHER

## 2018-10-10 ENCOUNTER — OFFICE VISIT (OUTPATIENT)
Dept: GASTROENTEROLOGY | Facility: CLINIC | Age: 63
End: 2018-10-10

## 2018-10-10 VITALS
HEIGHT: 67 IN | SYSTOLIC BLOOD PRESSURE: 148 MMHG | WEIGHT: 240.6 LBS | BODY MASS INDEX: 37.76 KG/M2 | DIASTOLIC BLOOD PRESSURE: 72 MMHG | HEART RATE: 105 BPM | OXYGEN SATURATION: 96 %

## 2018-10-10 DIAGNOSIS — K21.00 GASTROESOPHAGEAL REFLUX DISEASE WITH ESOPHAGITIS: Primary | ICD-10-CM

## 2018-10-10 PROCEDURE — 99214 OFFICE O/P EST MOD 30 MIN: CPT | Performed by: NURSE PRACTITIONER

## 2018-10-10 RX ORDER — DEXLANSOPRAZOLE 60 MG/1
60 CAPSULE, DELAYED RELEASE ORAL DAILY
Qty: 90 CAPSULE | Refills: 1 | Status: SHIPPED | OUTPATIENT
Start: 2018-10-10 | End: 2019-05-24 | Stop reason: SDUPTHER

## 2018-10-10 NOTE — PROGRESS NOTES
Chief Complaint   Patient presents with   • Heartburn       Subjective    Mikki Palak Joel is a 63 y.o. female. she is here today for follow-up.    History of Present Illness  63-year-old female presents for follow-up regarding GERD with esophagitis.  States with use of dexilant her reflux symptoms have been well controlled.  She denies any current  Nausea,  vomiting or abdominal pain.  Her weight is stable denies any changes in her bowel habits.  His bowel movements are still about every 2-3 days with use of Colace or MiraLAX if needed.  Colonoscopy repeat is due 2023 for surveillance.  Plan; standard antireflux measures are discussed continued Dexilant daily refill sent to  pharmacy of patient's choice.       The following portions of the patient's history were reviewed and updated as appropriate:   Past Medical History:   Diagnosis Date   • Anemia    • Anemia June 2016    might have been due to knee replacement surgery I had in mar   • Anemia June 2016    might have been due to knee replacement surgery I had in mar   • Anxiety    • Arthritis    • Arthritis of back 2009    lower back   • Asthma    • CTS (carpal tunnel syndrome) 1987    left wrist - had corrective surgery   • Depression    • Fracture of wrist 1979   • High blood pressure    • [place] (hard of hearing)    • Hypertension 1999    metoprolol 100 mg and triamterene/hydrochlorothiazide 37.5mg   • Hypertension 1999    metoprolol 100 mg and triamterene/hydrochlorothiazide 37.5mg   • Knee swelling 2010   • Migraine    • Periarthritis of shoulder 2011    both shoulders right shoulder total replacement   • Tear of meniscus of knee 2016    fell     Past Surgical History:   Procedure Laterality Date   • BUNIONECTOMY Bilateral    • COLONOSCOPY  2015   • COLONOSCOPY N/A 1/19/2018    Procedure: COLONOSCOPY;  Surgeon: Ravi Cavanaugh MD;  Location: [facility] ENDOSCOPY;  Service:    • COLONOSCOPY  2015   • EAR TUBES     • ENDOSCOPIC FUNCTIONAL SINUS SURGERY (FESS)  Bilateral 2017    Procedure: BILATERAL ETHMOID AND MAXILLARY SINUS AND NASAL SEPTOPLASTIES AND BILATERAL TURBINECTOMIES;  Surgeon: Ramy Gaston MD;  Location: [facility] OR;  Service:    • ENDOSCOPY N/A 2018    Procedure: ESOPHAGOGASTRODUODENOSCOPY;  Surgeon: Ravi Polk MD;  Location: [facility] ENDOSCOPY;  Service:    • HAND SURGERY     • JOINT REPLACEMENT  2016    right knee replacement 2016   • JOINT REPLACEMENT  2015    right shoulder   • KNEE SURGERY  2016    total right knee replacement   • RHINOPLASTY      x3   • SHOULDER ARTHROSCOPY     • SHOULDER SURGERY      complete shoulder joint replacement   • TONSILLECTOMY     • TUBAL ABDOMINAL LIGATION     • TYMPANOPLASTY Left 2018    Procedure: TYMPANOPLASTY WITH CARTILAGE GRAFT;  Surgeon: Ramy Gaston MD;  Location: [facility] OR;  Service: ENT   • UPPER GASTROINTESTINAL ENDOSCOPY     • UPPER GASTROINTESTINAL ENDOSCOPY     • UPPER GASTROINTESTINAL ENDOSCOPY  2018    Per Dr. Ravi Polk M.D.   • WRIST SURGERY      carpal tunnel repair left wrist     Family History   Problem Relation Age of Onset   • Hypertension Mother    • COPD Mother    • Arthritis Mother    • Alcohol abuse Mother    • Cancer Father    • Arthritis Father    • Alcohol abuse Father    • COPD Maternal Grandmother    • Stroke Maternal Grandmother    • Osteoporosis Maternal Grandmother    • Irritable bowel syndrome Sister      OB History      Para Term  AB Living    2 2 2          SAB TAB Ectopic Molar Multiple Live Births                       Current Outpatient Prescriptions   Medication Sig Dispense Refill   • azelastine (ASTELIN) 0.1 % nasal spray 2 sprays into each nostril 2 (Two) Times a Day. Use in each nostril as directed     • busPIRone (BUSPAR) 7.5 MG tablet Take 1 tablet by mouth 2 (Two) Times a Day. 60 tablet 11   • cetirizine (ZYRTEC ALLERGY) 10 MG tablet Take 1 tablet by mouth Daily. 30 tablet 11   • eszopiclone (LUNESTA) 1 MG  tablet Take 1 tablet by mouth Every Night. Take immediately before bedtime 30 tablet 5   • FLUoxetine (PROzac) 40 MG capsule Take 1 capsule by mouth Daily. 90 capsule 3   • Fluticasone Furoate-Vilanterol 100-25 MCG/INH aerosol powder  1 puff bid and rinse out mouth 120 puff 5   • ipratropium (ATROVENT) 0.06 % nasal spray INSTILL 2 SPRAYS IN EACH NOSTRIL THREE TIMES DAILY 15 mL 0   • metoprolol succinate XL (TOPROL XL) 100 MG 24 hr tablet Take 1 tablet by mouth Daily. 30 tablet 3   • mometasone (NASONEX) 50 MCG/ACT nasal spray 2 sprays into the nostril(s) as directed by provider 2 (Two) Times a Day. 17 g 11   • PROAIR  (90 Base) MCG/ACT inhaler INHALE 2 PUFFS BY MOUTH EVERY 4 HOURS AS NEEDED FOR WHEEZING 8.5 g 0   • rOPINIRole (REQUIP) 1 MG tablet Take 1.5 tablets by mouth Every Night. Take 1 hour before bedtime. 45 tablet 11   • SUMAtriptan (IMITREX) 25 MG tablet Take 1 tab po prn for migraine 30 tablet 5   • Topiramate ER (TROKENDI XR) 25 MG capsule sustained-release 24 hr Take 1 tablet by mouth Every Night.     • dexlansoprazole (DEXILANT) 60 MG capsule Take 1 capsule by mouth Daily for 180 days. 90 capsule 1     No current facility-administered medications for this visit.      Allergies   Allergen Reactions   • Codeine Other (See Comments)     Increases heart rate   • Sulfa Antibiotics Hives     Social History     Social History   • Marital status:      Social History Main Topics   • Smoking status: Never Smoker   • Smokeless tobacco: Never Used   • Alcohol use 1.2 oz/week     2 Standard drinks or equivalent per week      Comment: occasional   • Drug use: No   • Sexual activity: Defer      Comment: then tubal ligation after last child at age 32.     Other Topics Concern   • Not on file       Review of Systems  Review of Systems   Constitutional: Negative for activity change, appetite change, chills, diaphoresis, fatigue, fever and unexpected weight change.   HENT: Negative for sore throat and  "trouble swallowing.    Respiratory: Negative for shortness of breath.    Gastrointestinal: Positive for nausea (much more rare ). Negative for abdominal distention, abdominal pain, anal bleeding, blood in stool, constipation, diarrhea, rectal pain and vomiting.   Musculoskeletal: Negative for arthralgias.   Skin: Negative for pallor.   Neurological: Negative for light-headedness.        /72 (BP Location: Left arm)   Pulse 105   Ht 170.2 cm (67\")   Wt 109 kg (240 lb 9.6 oz)   LMP  (LMP Unknown)   SpO2 96%   BMI 37.68 kg/m²     Objective    Physical Exam   Constitutional: She is oriented to person, place, and time. She appears well-developed and well-nourished. She is cooperative. No distress.   HENT:   Head: Normocephalic and atraumatic.   Neck: Normal range of motion. Neck supple. No thyromegaly present.   Cardiovascular: Normal rate, regular rhythm and normal heart sounds.    Pulmonary/Chest: Effort normal and breath sounds normal. She has no wheezes. She has no rhonchi. She has no rales.   Abdominal: Soft. Normal appearance and bowel sounds are normal. She exhibits no distension. There is no hepatosplenomegaly. There is tenderness in the epigastric area. There is no rigidity and no guarding. No hernia.   Lymphadenopathy:     She has no cervical adenopathy.   Neurological: She is alert and oriented to person, place, and time.   Skin: Skin is warm, dry and intact. No rash noted. No pallor.   Psychiatric: She has a normal mood and affect. Her speech is normal.     Office Visit on 08/03/2018   Component Date Value Ref Range Status   • WBC 08/03/2018 13.99* 3.20 - 9.80 10*3/mm3 Final   • RBC 08/03/2018 4.36  3.77 - 5.16 10*6/mm3 Final   • Hemoglobin 08/03/2018 12.7  12.0 - 15.5 g/dL Final   • Hematocrit 08/03/2018 37.7  35.0 - 45.0 % Final   • MCV 08/03/2018 86.5  80.0 - 98.0 fL Final   • MCH 08/03/2018 29.1  26.5 - 34.0 pg Final   • MCHC 08/03/2018 33.7  31.4 - 36.0 g/dL Final   • RDW 08/03/2018 13.6  11.5 " - 14.5 % Final   • RDW-SD 08/03/2018 43.0  36.4 - 46.3 fl Final   • MPV 08/03/2018 9.4  8.0 - 12.0 fL Final   • Platelets 08/03/2018 397  150 - 450 10*3/mm3 Final   • Glucose 08/03/2018 97  60 - 100 mg/dL Final   • BUN 08/03/2018 37* 7 - 21 mg/dL Final   • Creatinine 08/03/2018 1.18* 0.50 - 1.00 mg/dL Final   • Sodium 08/03/2018 138  137 - 145 mmol/L Final   • Potassium 08/03/2018 3.8  3.5 - 5.1 mmol/L Final   • Chloride 08/03/2018 102  95 - 110 mmol/L Final   • CO2 08/03/2018 25.0  22.0 - 31.0 mmol/L Final   • Calcium 08/03/2018 9.6  8.4 - 10.2 mg/dL Final   • Total Protein 08/03/2018 7.5  6.3 - 8.6 g/dL Final   • Albumin 08/03/2018 4.60  3.40 - 4.80 g/dL Final   • ALT (SGPT) 08/03/2018 32  9 - 52 U/L Final   • AST (SGOT) 08/03/2018 25  14 - 36 U/L Final   • Alkaline Phosphatase 08/03/2018 139* 38 - 126 U/L Final   • Total Bilirubin 08/03/2018 0.3  0.2 - 1.3 mg/dL Final   • eGFR Non  Amer 08/03/2018 46  45 - 104 mL/min/1.73 Final   • Globulin 08/03/2018 2.9  2.3 - 3.5 gm/dL Final   • A/G Ratio 08/03/2018 1.6  1.1 - 1.8 g/dL Final   • BUN/Creatinine Ratio 08/03/2018 31.4* 7.0 - 25.0 Final   • Anion Gap 08/03/2018 11.0  5.0 - 15.0 mmol/L Final     Assessment/Plan      1. Gastroesophageal reflux disease with esophagitis    .       Orders placed during this encounter include:  No orders of the defined types were placed in this encounter.      * Surgery not found *    Review and/or summary of lab tests, radiology, procedures, medications. Review and summary of old records and obtaining of history. The risks and benefits of my recommendations, as well as other treatment options were discussed with the patient today. Questions were answered.    New Medications Ordered This Visit   Medications   • dexlansoprazole (DEXILANT) 60 MG capsule     Sig: Take 1 capsule by mouth Daily for 180 days.     Dispense:  90 capsule     Refill:  1       Follow-up: Return in about 6 months (around 4/10/2019).          This document  has been electronically signed by MIRIAM White on October 10, 2018 3:26 PM             Results for orders placed or performed in visit on 08/03/18   CBC (No Diff)   Result Value Ref Range    WBC 13.99 (H) 3.20 - 9.80 10*3/mm3    RBC 4.36 3.77 - 5.16 10*6/mm3    Hemoglobin 12.7 12.0 - 15.5 g/dL    Hematocrit 37.7 35.0 - 45.0 %    MCV 86.5 80.0 - 98.0 fL    MCH 29.1 26.5 - 34.0 pg    MCHC 33.7 31.4 - 36.0 g/dL    RDW 13.6 11.5 - 14.5 %    RDW-SD 43.0 36.4 - 46.3 fl    MPV 9.4 8.0 - 12.0 fL    Platelets 397 150 - 450 10*3/mm3   Comprehensive Metabolic Panel   Result Value Ref Range    Glucose 97 60 - 100 mg/dL    BUN 37 (H) 7 - 21 mg/dL    Creatinine 1.18 (H) 0.50 - 1.00 mg/dL    Sodium 138 137 - 145 mmol/L    Potassium 3.8 3.5 - 5.1 mmol/L    Chloride 102 95 - 110 mmol/L    CO2 25.0 22.0 - 31.0 mmol/L    Calcium 9.6 8.4 - 10.2 mg/dL    Total Protein 7.5 6.3 - 8.6 g/dL    Albumin 4.60 3.40 - 4.80 g/dL    ALT (SGPT) 32 9 - 52 U/L    AST (SGOT) 25 14 - 36 U/L    Alkaline Phosphatase 139 (H) 38 - 126 U/L    Total Bilirubin 0.3 0.2 - 1.3 mg/dL    eGFR Non  Amer 46 45 - 104 mL/min/1.73    Globulin 2.9 2.3 - 3.5 gm/dL    A/G Ratio 1.6 1.1 - 1.8 g/dL    BUN/Creatinine Ratio 31.4 (H) 7.0 - 25.0    Anion Gap 11.0 5.0 - 15.0 mmol/L   Results for orders placed or performed in visit on 01/30/18   TSH   Result Value Ref Range    TSH 0.240 (L) 0.460 - 4.680 mIU/mL   T4, Free   Result Value Ref Range    Free T4 1.04 0.78 - 2.19 ng/dL   Lipid Panel   Result Value Ref Range    Total Cholesterol 197 0 - 199 mg/dL    Triglycerides 227 (H) 20 - 199 mg/dL    HDL Cholesterol 45 (L) 60 - 200 mg/dL    LDL Cholesterol  122 1 - 129 mg/dL    LDL/HDL Ratio 2.37 0.00 - 3.22   Results for orders placed or performed during the hospital encounter of 01/19/18   Tissue Pathology Exam - Tissue, Gastric, Antrum   Result Value Ref Range    Case Report       Surgical Pathology Report                         Case: [id number]                                   Authorizing Provider:  Ravi Cavanaugh MD         Collected:           01/19/2018 02:07 PM          Ordering Location:     [facility]             Received:            01/19/2018 02:49 PM                                 [facility]                                                     Pathologist:           Jonnie Bradley MD                                                         Specimen:    Gastric, Antrum                                                                            Final Diagnosis       GASTRIC ANTRUM, MUCOSAL BIOPSY:   MILD CHRONIC GASTRITIS.   NO EVIDENCE OF HELICOBACTER PYLORI.       Gross Description       The specimen consists of a mucosal biopsy measuring up to 0.3 cm in greatest dimension.  All embedded.        Embedded Images     Results for orders placed or performed during the hospital encounter of 09/05/17   Tissue Pathology Exam   Result Value Ref Range    Case Report       Surgical Pathology Report                         Case: [id number]                                  Authorizing Provider:  Ramy Gaston MD        Collected:           09/05/2017 11:56 AM          Ordering Location:     [facility]             Received:            09/05/2017 01:26 PM                                 [facility]                                                              Pathologist:           Deven Conner MD                                                          Specimens:   1) - Naris, Left, LEFT SINUS                                                                        2) - Naris, Right, RIGHT SINUS                                                             Final Diagnosis       1.  MUCOSA, NASAL SINUS, LEFT SIDE:  CHRONIC SINUSITIS OF RESPIRATORY MUCOSA IN RELATION TO BITS OF BONE.  NEGATIVE FOR FUNGI (GMS STAIN).    2.  MUCOSA, NASAL SINUS, RIGHT SIDE:  CHRONIC SINUSITIS OF RESPIRATORY MUCOSA IN RELATION TO BITS OF BONE.  NEGATIVE FOR FUNGI (GMS  "STAIN).      Gross Description       1.  The first container is labeled \"left sinus, nose\" and has pinkish white mucosa measuring 1.5 cc in aggregate.  The entire specimen is embedded as 1A.    2.  The second container is labeled \"right sinus, nose\" and has pinkish white mucosa measuring 1.5 cc in aggregate.  The entire specimen is embedded as 2A.       Embedded Images     Wound Culture   Result Value Ref Range    Wound Culture Light growth (2+) Pseudomonas aeruginosa (A)     Wound Culture Light growth (2+) Serratia marcescens (A)     Wound Culture Light growth (2+) Streptococcus mitis / oralis (A)     Gram Stain Result Moderate (3+) WBCs seen     Gram Stain Result Rare (1+) Gram negative bacilli        Susceptibility    Pseudomonas aeruginosa - CHAR     Ceftazidime 4  ug/ml     Cefuroxime sodium >16  ug/ml     Gentamicin <=4 Susceptible ug/ml     Levofloxacin <=2 Susceptible ug/ml     Piperacillin + Tazobactam <=16  ug/ml     Tobramycin <=4 Susceptible ug/ml    Serratia marcescens - CHAR     Amikacin <=16 Susceptible ug/ml     Amoxicillin + Clavulanate >16/8 Resistant ug/ml     Ampicillin >16 Resistant ug/ml     Ampicillin + Sulbactam >16/8 Resistant ug/ml     Cefazolin >16 Resistant ug/ml     Cefotaxime <=2  ug/ml     Cefoxitin 16 Intermediate ug/ml     Ceftazidime <=1  ug/ml     Ceftriaxone <=8  ug/ml     Cefuroxime sodium >16 Resistant ug/ml     Ciprofloxacin 2 Intermediate ug/ml     Gentamicin <=4 Susceptible ug/ml     Imipenem <=4 Susceptible ug/ml     Levofloxacin <=2 Susceptible ug/ml     Meropenem <=4 Susceptible ug/ml     Nitrofurantoin >64  ug/ml     Piperacillin + Tazobactam <=16  ug/ml     Tetracycline <=4 Susceptible ug/ml     Tobramycin <=4 Susceptible ug/ml     Trimethoprim + Sulfamethoxazole <=2/38 Susceptible ug/ml   Results for orders placed or performed in visit on 08/31/17   Basic Metabolic Panel   Result Value Ref Range    Glucose 93 60 - 100 mg/dL    BUN 24 (H) 7 - 21 mg/dL    Creatinine 0.82 " 0.50 - 1.00 mg/dL    Sodium 136 (L) 137 - 145 mmol/L    Potassium 4.7 3.5 - 5.1 mmol/L    Chloride 100 95 - 110 mmol/L    CO2 26.0 22.0 - 31.0 mmol/L    Calcium 9.6 8.4 - 10.2 mg/dL    eGFR Non  Amer 70 45 - 104 mL/min/1.73    BUN/Creatinine Ratio 29.3 (H) 7.0 - 25.0    Anion Gap 10.0 5.0 - 15.0 mmol/L   Results for orders placed or performed in visit on 08/07/17   Sedimentation Rate   Result Value Ref Range    Sed Rate 9 0 - 20 mm/hr   Rheumatoid Factor   Result Value Ref Range    Rheumatoid Factor Qualitative Negative Negative   C-reactive protein   Result Value Ref Range    C-Reactive Protein 1.00 0.00 - 1.00 mg/dL   DERRICK   Result Value Ref Range    DERRICK Direct Negative Negative   Results for orders placed or performed in visit on 08/01/17   Hepatitis C Antibody   Result Value Ref Range    Hepatitis C Ab Negative Negative   Hemoglobin A1c   Result Value Ref Range    Hemoglobin A1C 5.7 (H) 4 - 5.6 %   Results for orders placed or performed in visit on 05/31/17   Wound Culture   Result Value Ref Range    Wound Culture Light growth (2+) Serratia marcescens (A)     Beta Lactamase      Gram Stain Result Few (2+) WBCs seen     Gram Stain Result Rare (1+) Gram negative bacilli        Susceptibility    Serratia marcescens - CHAR     Amikacin <=16 Susceptible ug/ml     Amoxicillin + Clavulanate >16/8 Resistant ug/ml     Ampicillin >16 Resistant ug/ml     Ampicillin + Sulbactam >16/8 Resistant ug/ml     Cefazolin >16 Resistant ug/ml     Cefotaxime <=2  ug/ml     Cefoxitin 16 Intermediate ug/ml     Ceftazidime <=1  ug/ml     Ceftriaxone <=8  ug/ml     Cefuroxime >16 Resistant ug/ml     Ciprofloxacin <=1 Susceptible ug/ml     Gentamicin <=4 Susceptible ug/ml     Imipenem <=4 Susceptible ug/ml     Levofloxacin <=2 Susceptible ug/ml     Meropenem <=4 Susceptible ug/ml     Nitrofurantoin >64  ug/ml     Piperacillin + Tazobactam <=16  ug/ml     Tetracycline 8 Intermediate ug/ml     Tobramycin <=4 Susceptible ug/ml      Trimethoprim + Sulfamethoxazole <=2/38 Susceptible ug/ml

## 2018-10-10 NOTE — PATIENT INSTRUCTIONS
Gastroesophageal Reflux Disease, Adult  Normally, food travels down the esophagus and stays in the stomach to be digested. If a person has gastroesophageal reflux disease (GERD), food and stomach acid move back up into the esophagus. When this happens, the esophagus becomes sore and swollen (inflamed). Over time, GERD can make small holes (ulcers) in the lining of the esophagus.  Follow these instructions at home:  Diet  · Follow a diet as told by your doctor. You may need to avoid foods and drinks such as:  ? Coffee and tea (with or without caffeine).  ? Drinks that contain alcohol.  ? Energy drinks and sports drinks.  ? Carbonated drinks or sodas.  ? Chocolate and cocoa.  ? Peppermint and mint flavorings.  ? Garlic and onions.  ? Horseradish.  ? Spicy and acidic foods, such as peppers, chili powder, molina powder, vinegar, hot sauces, and BBQ sauce.  ? Citrus fruit juices and citrus fruits, such as oranges, navarro, and limes.  ? Tomato-based foods, such as red sauce, chili, salsa, and pizza with red sauce.  ? Fried and fatty foods, such as donuts, french fries, potato chips, and high-fat dressings.  ? High-fat meats, such as hot dogs, rib eye steak, sausage, ham, and lacey.  ? High-fat dairy items, such as whole milk, butter, and cream cheese.  · Eat small meals often. Avoid eating large meals.  · Avoid drinking large amounts of liquid with your meals.  · Avoid eating meals during the 2-3 hours before bedtime.  · Avoid lying down right after you eat.  · Do not exercise right after you eat.  General instructions  · Pay attention to any changes in your symptoms.  · Take over-the-counter and prescription medicines only as told by your doctor. Do not take aspirin, ibuprofen, or other NSAIDs unless your doctor says it is okay.  · Do not use any tobacco products, including cigarettes, chewing tobacco, and e-cigarettes. If you need help quitting, ask your doctor.  · Wear loose clothes. Do not wear anything tight around  your waist.  · Raise (elevate) the head of your bed about 6 inches (15 cm).  · Try to lower your stress. If you need help doing this, ask your doctor.  · If you are overweight, lose an amount of weight that is healthy for you. Ask your doctor about a safe weight loss goal.  · Keep all follow-up visits as told by your doctor. This is important.  Contact a doctor if:  · You have new symptoms.  · You lose weight and you do not know why it is happening.  · You have trouble swallowing, or it hurts to swallow.  · You have wheezing or a cough that keeps happening.  · Your symptoms do not get better with treatment.  · You have a hoarse voice.  Get help right away if:  · You have pain in your arms, neck, jaw, teeth, or back.  · You feel sweaty, dizzy, or light-headed.  · You have chest pain or shortness of breath.  · You throw up (vomit) and your throw up looks like blood or coffee grounds.  · You pass out (faint).  · Your poop (stool) is bloody or black.  · You cannot swallow, drink, or eat.  This information is not intended to replace advice given to you by your health care provider. Make sure you discuss any questions you have with your health care provider.  Document Released: 06/05/2009 Document Revised: 05/25/2017 Document Reviewed: 04/13/2016  [employer] Interactive Patient Education © 2018 [employer]

## 2018-10-19 DIAGNOSIS — R06.2 WHEEZE: ICD-10-CM

## 2018-10-19 DIAGNOSIS — J40 BRONCHITIS: ICD-10-CM

## 2018-10-19 DIAGNOSIS — R05.9 COUGH: ICD-10-CM

## 2018-10-19 DIAGNOSIS — G43.909 MIGRAINE WITHOUT STATUS MIGRAINOSUS, NOT INTRACTABLE, UNSPECIFIED MIGRAINE TYPE: ICD-10-CM

## 2018-10-19 RX ORDER — SUMATRIPTAN 25 MG/1
TABLET, FILM COATED ORAL
Qty: 30 TABLET | Refills: 5 | Status: SHIPPED | OUTPATIENT
Start: 2018-10-19 | End: 2019-03-25 | Stop reason: SDUPTHER

## 2018-10-19 RX ORDER — IPRATROPIUM BROMIDE 42 UG/1
SPRAY, METERED NASAL
Qty: 15 ML | Refills: 2 | Status: SHIPPED | OUTPATIENT
Start: 2018-10-19 | End: 2018-12-21 | Stop reason: SDUPTHER

## 2018-10-26 ENCOUNTER — TELEPHONE (OUTPATIENT)
Dept: SLEEP MEDICINE | Facility: HOSPITAL | Age: 63
End: 2018-10-26

## 2018-10-26 NOTE — TELEPHONE ENCOUNTER
Called patient and told her to increase her dose to 2 MG qhs per Dr. Bai andhe wants her to notify him on how she is doing and if this helps.

## 2018-10-30 ENCOUNTER — OFFICE VISIT (OUTPATIENT)
Dept: FAMILY MEDICINE CLINIC | Facility: CLINIC | Age: 63
End: 2018-10-30

## 2018-10-30 VITALS
WEIGHT: 236.8 LBS | DIASTOLIC BLOOD PRESSURE: 80 MMHG | HEIGHT: 67 IN | BODY MASS INDEX: 37.17 KG/M2 | SYSTOLIC BLOOD PRESSURE: 122 MMHG

## 2018-10-30 DIAGNOSIS — J45.20 MILD INTERMITTENT ASTHMA, UNSPECIFIED WHETHER COMPLICATED: Chronic | ICD-10-CM

## 2018-10-30 DIAGNOSIS — Z13.820 SCREENING FOR OSTEOPOROSIS: ICD-10-CM

## 2018-10-30 DIAGNOSIS — Z12.31 VISIT FOR SCREENING MAMMOGRAM: ICD-10-CM

## 2018-10-30 DIAGNOSIS — I10 ESSENTIAL HYPERTENSION, BENIGN: Primary | Chronic | ICD-10-CM

## 2018-10-30 DIAGNOSIS — Z23 NEED FOR IMMUNIZATION AGAINST INFLUENZA: ICD-10-CM

## 2018-10-30 PROBLEM — J32.9 SINUSITIS: Chronic | Status: RESOLVED | Noted: 2017-08-24 | Resolved: 2018-10-30

## 2018-10-30 PROBLEM — M25.561 RIGHT KNEE PAIN: Chronic | Status: ACTIVE | Noted: 2018-05-15

## 2018-10-30 PROBLEM — Z96.651 PRESENCE OF RIGHT ARTIFICIAL KNEE JOINT: Chronic | Status: ACTIVE | Noted: 2018-05-15

## 2018-10-30 PROBLEM — Z12.11 ENCOUNTER FOR COLONOSCOPY DUE TO HISTORY OF COLONIC POLYP: Chronic | Status: RESOLVED | Noted: 2017-11-28 | Resolved: 2018-10-30

## 2018-10-30 PROBLEM — Z86.010 ENCOUNTER FOR COLONOSCOPY DUE TO HISTORY OF COLONIC POLYP: Chronic | Status: RESOLVED | Noted: 2017-11-28 | Resolved: 2018-10-30

## 2018-10-30 PROCEDURE — 90674 CCIIV4 VAC NO PRSV 0.5 ML IM: CPT | Performed by: FAMILY MEDICINE

## 2018-10-30 PROCEDURE — 99214 OFFICE O/P EST MOD 30 MIN: CPT | Performed by: FAMILY MEDICINE

## 2018-10-30 PROCEDURE — 90471 IMMUNIZATION ADMIN: CPT | Performed by: FAMILY MEDICINE

## 2018-10-30 NOTE — PROGRESS NOTES
Subjective   Mikki Joel is a 63 y.o. female.     History of Present Illness   follow-up new-onset asthma history of hypertension obesity  in the interim mild staying on all medicines had depression medicines adjusted syncope working better.  Overall feels well continue chronic right knee pain.  Spent most time today counseling on lifestyle measures including diet exercise weight control.  We'll ask nutrition services to help.  Also is behind on mammogram bone density.  History reviewed.    The following portions of the patient's history were reviewed and updated as appropriate: allergies, current medications, past family history, past medical history, past social history, past surgical history and problem list.    Review of Systems   Constitutional: Negative for activity change, appetite change, fatigue and unexpected weight change.   HENT: Negative for trouble swallowing and voice change.    Eyes: Negative for redness and visual disturbance.   Respiratory: Negative for cough and wheezing.    Cardiovascular: Negative for chest pain and palpitations.   Gastrointestinal: Negative for abdominal pain, constipation, diarrhea, nausea and vomiting.   Genitourinary: Negative for urgency.   Musculoskeletal: Positive for arthralgias. Negative for joint swelling.   Neurological: Negative for syncope and headaches.   Hematological: Negative for adenopathy.   Psychiatric/Behavioral: Negative for sleep disturbance.       Objective   Physical Exam   Constitutional: She is oriented to person, place, and time. She appears well-developed.   HENT:   Head: Normocephalic.   Nose: Nose normal.   Eyes: Pupils are equal, round, and reactive to light.   Neck: Normal range of motion. No thyromegaly present.   Cardiovascular: Normal rate, regular rhythm, normal heart sounds and intact distal pulses.  Exam reveals no gallop and no friction rub.    No murmur heard.  Pulmonary/Chest: Breath sounds normal.   Abdominal: Soft. She  exhibits no distension and no mass. There is no tenderness.   Musculoskeletal: Normal range of motion. She exhibits tenderness (Right knee minimal swelling good range of motion with walking).   Neurological: She is alert and oriented to person, place, and time. She has normal reflexes.   Skin: Skin is warm and dry.   Psychiatric: She has a normal mood and affect. Her behavior is normal. Judgment and thought content normal.       Assessment/Plan   Mikki was seen today for follow-up.    Diagnoses and all orders for this visit:    Essential hypertension, benign  -     Ambulatory Referral to Nutrition Services    Mild intermittent asthma, unspecified whether complicated    BMI 35.0-35.9,adult  -     Ambulatory Referral to Nutrition Services    Visit for screening mammogram  -     Mammo Screening Digital Tomosynthesis Bilateral With CAD    Need for immunization against influenza  -     Flu Vaccine Quad PF 3YR+    Screening for osteoporosis  -     DEXA Bone Density Axial       on wt loss measures and programs  For nutrition services counseled on lifestyle measures recheck 6 months betime for lab and pneumonia series

## 2018-11-02 DIAGNOSIS — G25.81 RESTLESS LEG SYNDROME: ICD-10-CM

## 2018-11-02 RX ORDER — ROPINIROLE 1 MG/1
1 TABLET, FILM COATED ORAL NIGHTLY
Qty: 30 TABLET | Refills: 3 | Status: SHIPPED | OUTPATIENT
Start: 2018-11-02 | End: 2019-03-03 | Stop reason: SDUPTHER

## 2018-12-14 ENCOUNTER — OFFICE VISIT (OUTPATIENT)
Dept: ORTHOPEDIC SURGERY | Facility: CLINIC | Age: 63
End: 2018-12-14

## 2018-12-14 VITALS — WEIGHT: 236 LBS | BODY MASS INDEX: 37.04 KG/M2 | HEIGHT: 67 IN

## 2018-12-14 DIAGNOSIS — M25.561 RIGHT KNEE PAIN, UNSPECIFIED CHRONICITY: ICD-10-CM

## 2018-12-14 DIAGNOSIS — Z96.651 PRESENCE OF RIGHT ARTIFICIAL KNEE JOINT: Primary | ICD-10-CM

## 2018-12-14 PROCEDURE — 96372 THER/PROPH/DIAG INJ SC/IM: CPT | Performed by: NURSE PRACTITIONER

## 2018-12-14 PROCEDURE — 99214 OFFICE O/P EST MOD 30 MIN: CPT | Performed by: NURSE PRACTITIONER

## 2018-12-14 RX ORDER — TRIAMCINOLONE ACETONIDE 40 MG/ML
80 INJECTION, SUSPENSION INTRA-ARTICULAR; INTRAMUSCULAR ONCE
Status: COMPLETED | OUTPATIENT
Start: 2018-12-14 | End: 2018-12-14

## 2018-12-14 RX ORDER — KETOROLAC TROMETHAMINE 30 MG/ML
60 INJECTION, SOLUTION INTRAMUSCULAR; INTRAVENOUS ONCE
Status: COMPLETED | OUTPATIENT
Start: 2018-12-14 | End: 2018-12-14

## 2018-12-14 RX ADMIN — TRIAMCINOLONE ACETONIDE 80 MG: 40 INJECTION, SUSPENSION INTRA-ARTICULAR; INTRAMUSCULAR at 13:56

## 2018-12-14 RX ADMIN — KETOROLAC TROMETHAMINE 60 MG: 30 INJECTION, SOLUTION INTRAMUSCULAR; INTRAVENOUS at 13:56

## 2018-12-14 NOTE — PROGRESS NOTES
"Mikki Joel is a 63 y.o. female returns for     Chief Complaint   Patient presents with   • Right Knee - Follow-up, Pain       HISTORY OF PRESENT ILLNESS: f/u right knee. Patient states no changes since last visit still has swelling from knee down to ankles when standing for long periods of time.      CONCURRENT MEDICAL HISTORY:    The following portions of the patient's history were reviewed and updated as appropriate: allergies, current medications, past family history, past medical history, past social history, past surgical history and problem list.     ROS  No fevers or chills.  No chest pain or shortness of air.  No GI or  disturbances.    PHYSICAL EXAMINATION:       Ht 170.2 cm (67\")   Wt 107 kg (236 lb)   LMP  (LMP Unknown)   BMI 36.96 kg/m²     Physical Exam   Constitutional: She is oriented to person, place, and time. Vital signs are normal. She appears well-developed and well-nourished. She is cooperative.   HENT:   Head: Normocephalic and atraumatic.   Neck: Trachea normal and phonation normal.   Pulmonary/Chest: Effort normal. No respiratory distress.   Abdominal: Soft. Normal appearance. She exhibits no distension.   Musculoskeletal:        Right knee: She exhibits no effusion.   Neurological: She is alert and oriented to person, place, and time. GCS eye subscore is 4. GCS verbal subscore is 5. GCS motor subscore is 6.   Skin: Skin is warm, dry and intact. Capillary refill takes less than 2 seconds.   Psychiatric: She has a normal mood and affect. Her speech is normal and behavior is normal. Judgment and thought content normal. Cognition and memory are normal.   Vitals reviewed.      GAIT:     [x]  Normal  []  Antalgic    Assistive device: [x]  None  []  Walker     []  Crutches  []  Cane     []  Wheelchair  []  Stretcher    Right Knee Exam     Tenderness   The patient is experiencing tenderness in the medial joint line, pes anserinus and lateral joint line.    Range of Motion   Extension: " normal   Right knee flexion: 110.     Other   Erythema: absent  Scars: present  Sensation: normal  Pulse: present  Swelling: mild  Effusion: no effusion present      Left Knee Exam   Left knee exam is normal.    Muscle Strength   The patient has normal left knee strength.              No results found.          ASSESSMENT:    Diagnoses and all orders for this visit:    Presence of right artificial knee joint  -     NM Bone Scan 3 Phase; Future  -     triamcinolone acetonide (KENALOG-40) injection 80 mg; Inject 2 mL into the appropriate muscle as directed by prescriber 1 (One) Time.  -     ketorolac (TORADOL) injection 60 mg; Inject 2 mL into the appropriate muscle as directed by prescriber 1 (One) Time.    Right knee pain, unspecified chronicity  -     NM Bone Scan 3 Phase; Future  -     triamcinolone acetonide (KENALOG-40) injection 80 mg; Inject 2 mL into the appropriate muscle as directed by prescriber 1 (One) Time.  -     ketorolac (TORADOL) injection 60 mg; Inject 2 mL into the appropriate muscle as directed by prescriber 1 (One) Time.          PLAN  Repeated the intermuscular injection of Kenalog and Toradol and recommended a through the bone scan to rule out loosening of the surgical hardware.  On the patient's follow-up for test results after she's completed.  In the meantime she can continue to progress her range of motion and activity as tolerated based on pain.  No Follow-up on file.    MIRIAM Lucio

## 2018-12-24 RX ORDER — IPRATROPIUM BROMIDE 42 UG/1
SPRAY, METERED NASAL
Qty: 15 ML | Refills: 0 | Status: SHIPPED | OUTPATIENT
Start: 2018-12-24 | End: 2019-01-11 | Stop reason: SDUPTHER

## 2018-12-27 DIAGNOSIS — I10 ESSENTIAL HYPERTENSION: ICD-10-CM

## 2018-12-27 RX ORDER — METOPROLOL SUCCINATE 100 MG/1
100 TABLET, EXTENDED RELEASE ORAL DAILY
Qty: 30 TABLET | Refills: 0 | Status: SHIPPED | OUTPATIENT
Start: 2018-12-27 | End: 2019-01-21 | Stop reason: SDUPTHER

## 2019-01-03 ENCOUNTER — TELEPHONE (OUTPATIENT)
Dept: GENERAL RADIOLOGY | Facility: HOSPITAL | Age: 64
End: 2019-01-03

## 2019-01-08 ENCOUNTER — APPOINTMENT (OUTPATIENT)
Dept: NUTRITION | Facility: HOSPITAL | Age: 64
End: 2019-01-08

## 2019-01-14 RX ORDER — IPRATROPIUM BROMIDE 42 UG/1
SPRAY, METERED NASAL
Qty: 15 ML | Refills: 0 | Status: SHIPPED | OUTPATIENT
Start: 2019-01-14 | End: 2019-02-12 | Stop reason: SDUPTHER

## 2019-01-21 DIAGNOSIS — I10 ESSENTIAL HYPERTENSION: ICD-10-CM

## 2019-01-21 RX ORDER — METOPROLOL SUCCINATE 100 MG/1
100 TABLET, EXTENDED RELEASE ORAL DAILY
Qty: 90 TABLET | Refills: 3 | Status: SHIPPED | OUTPATIENT
Start: 2019-01-21 | End: 2020-01-06

## 2019-02-04 ENCOUNTER — OFFICE VISIT (OUTPATIENT)
Dept: OTOLARYNGOLOGY | Facility: CLINIC | Age: 64
End: 2019-02-04

## 2019-02-04 VITALS
HEIGHT: 67 IN | WEIGHT: 231.6 LBS | OXYGEN SATURATION: 99 % | BODY MASS INDEX: 36.35 KG/M2 | TEMPERATURE: 96.7 F | HEART RATE: 102 BPM

## 2019-02-04 DIAGNOSIS — J32.0 SINUSITIS, MAXILLARY, CHRONIC: Primary | ICD-10-CM

## 2019-02-04 DIAGNOSIS — H72.93 PERFORATED TYMPANIC MEMBRANE, BILATERAL: ICD-10-CM

## 2019-02-04 PROCEDURE — 99213 OFFICE O/P EST LOW 20 MIN: CPT | Performed by: OTOLARYNGOLOGY

## 2019-02-04 RX ORDER — CEFDINIR 300 MG/1
300 CAPSULE ORAL 2 TIMES DAILY
Qty: 28 CAPSULE | Refills: 0 | Status: SHIPPED | OUTPATIENT
Start: 2019-02-04 | End: 2019-02-15

## 2019-02-04 NOTE — PROGRESS NOTES
Subjective   Mikki Joel is a 64 y.o. female.   F/u  ears and sinus    History of Present Illness   The patient comes in stating she's had a lot of congestion and foul-smelling drainage or throats been persisting warm to have weeks possibly low-grade feverterrible sore throat no new ear complaints hearing stable uses her hearing aids no unusual drainage noted from the ears      The following portions of the patient's history were reviewed and updated as appropriate: allergies, current medications, past family history, past medical history, past social history, past surgical history and problem list.      Current Outpatient Medications:   •  azelastine (ASTELIN) 0.1 % nasal spray, 2 sprays into each nostril 2 (Two) Times a Day. Use in each nostril as directed, Disp: , Rfl:   •  busPIRone (BUSPAR) 7.5 MG tablet, Take 1 tablet by mouth 2 (Two) Times a Day. (Patient taking differently: Take 30 mg by mouth 2 (Two) Times a Day.), Disp: 60 tablet, Rfl: 11  •  cetirizine (ZYRTEC ALLERGY) 10 MG tablet, Take 1 tablet by mouth Daily., Disp: 30 tablet, Rfl: 11  •  dexlansoprazole (DEXILANT) 60 MG capsule, Take 1 capsule by mouth Daily for 180 days., Disp: 90 capsule, Rfl: 1  •  eszopiclone (LUNESTA) 1 MG tablet, Take 1 tablet by mouth Every Night. Take immediately before bedtime, Disp: 30 tablet, Rfl: 5  •  Fluticasone Furoate-Vilanterol 100-25 MCG/INH aerosol powder , 1 puff bid and rinse out mouth, Disp: 120 puff, Rfl: 5  •  ipratropium (ATROVENT) 0.06 % nasal spray, INSTILL 2 SPRAYS IN EACH NOSTRIL THREE TIMES DAILY, Disp: 15 mL, Rfl: 0  •  metoprolol succinate XL (TOPROL-XL) 100 MG 24 hr tablet, TAKE 1 TABLET BY MOUTH DAILY, Disp: 90 tablet, Rfl: 3  •  mometasone (NASONEX) 50 MCG/ACT nasal spray, 2 sprays into the nostril(s) as directed by provider 2 (Two) Times a Day., Disp: 17 g, Rfl: 11  •  PROAIR  (90 Base) MCG/ACT inhaler, INHALE 2 PUFFS BY MOUTH EVERY 4 HOURS AS NEEDED FOR WHEEZING, Disp: 8.5 g, Rfl:  0  •  rOPINIRole (REQUIP) 1 MG tablet, Take 1 tablet by mouth Every Night. Take 1 hour before bedtime., Disp: 30 tablet, Rfl: 3  •  SUMAtriptan (IMITREX) 25 MG tablet, Take 1 tab po prn for migraine, Disp: 30 tablet, Rfl: 5  •  Vortioxetine HBr (TRINTELLIX) 10 MG tablet, Take 10 mg by mouth Daily., Disp: , Rfl:   •  cefdinir (OMNICEF) 300 MG capsule, Take 1 capsule by mouth 2 (Two) Times a Day., Disp: 28 capsule, Rfl: 0    Allergies   Allergen Reactions   • Codeine Other (See Comments)     Increases heart rate   • Sulfa Antibiotics Hives             Review of Systems   Constitutional: Negative for fever.   HENT: Positive for congestion, ear discharge, postnasal drip and rhinorrhea. Negative for facial swelling.    Respiratory: Negative for apnea.            Objective   Physical Exam   Constitutional: She is oriented to person, place, and time. She appears well-developed and well-nourished.   HENT:   Head: Normocephalic and atraumatic.   Right Ear: External ear and ear canal normal.   Left Ear: External ear and ear canal normal.   Ears:    Nose: Nose normal. No mucosal edema, rhinorrhea, nasal deformity or septal deviation. No epistaxis. Right sinus exhibits no maxillary sinus tenderness and no frontal sinus tenderness. Left sinus exhibits no maxillary sinus tenderness and no frontal sinus tenderness.   Mouth/Throat: Uvula is midline and oropharynx is clear and moist. No trismus in the jaw. Normal dentition. No oropharyngeal exudate or posterior oropharyngeal edema.       Eyes: Conjunctivae are normal.   Neck: Normal range of motion. Neck supple. No JVD present. No tracheal deviation present. No thyromegaly present.   Cardiovascular: Normal rate.   Pulmonary/Chest: Effort normal.   Musculoskeletal: Normal range of motion.   Lymphadenopathy:        Head (right side): No submental, no submandibular, no tonsillar, no preauricular, no posterior auricular and no occipital adenopathy present.        Head (left side): No  submental, no submandibular, no tonsillar, no preauricular, no posterior auricular and no occipital adenopathy present.     She has no cervical adenopathy.        Right cervical: No superficial cervical, no deep cervical and no posterior cervical adenopathy present.       Left cervical: No superficial cervical, no deep cervical and no posterior cervical adenopathy present.   Neurological: She is alert and oriented to person, place, and time. No cranial nerve deficit.   Skin: Skin is warm.   Psychiatric: She has a normal mood and affect. Her speech is normal and behavior is normal. Thought content normal.   Nursing note and vitals reviewed.          Assessment/Plan   Mikki was seen today for 4 month clinical appointment.    Diagnoses and all orders for this visit:    Sinusitis, maxillary, chronic    Perforated tympanic membrane, bilateral    Other orders  -     cefdinir (OMNICEF) 300 MG capsule; Take 1 capsule by mouth 2 (Two) Times a Day.    I explained in detail the use and side effects of her antibiotic in the proper use and call us if not improving otherwise will see her back in follow-up in 4 months for sinus symptoms are improving she's let me know if the next few weeks or she has any ear symptoms she'll let me know as well  To continue using nasal saline spray as well

## 2019-02-04 NOTE — PATIENT INSTRUCTIONS
"Barotitis Media  Barotitis media is inflammation of the middle ear. This condition occurs when an auditory tube (eustachian tube) is blocked in one or both ears. These tubes lead from the middle ear to the back of the nose (nasopharynx). This condition typically occurs when you experience changes in pressure, such as when flying or scuba diving. Untreated barotitis media may lead to damage or hearing loss (barotrauma), which may become permanent.  What are the causes?  This condition may be caused by changes in air pressure from:  · Flying.  · Scuba diving.  · A nearby explosion.    What increases the risk?  The following factors may make you more likely to develop this condition:  · Middle ear infection.  · Sinus infection.  · A cold.  · Environmental allergies.  · Small eustachian tubes.  · Recent ear surgery.    What are the signs or symptoms?  Symptoms of this condition may include:  · Ear pain.  · Hearing loss.    In severe cases, symptoms can include:  · Dizziness and nausea (vertigo).  · Temporary facial paralysis.    How is this diagnosed?  This condition is diagnosed based on:  · A physical exam. Your health care provider may:  ? Use a device (otoscope) to look into your ear canal and check your eardrum.  ? Do a test that changes air pressure in the middle ear to check how well the eardrum moves and to see if the eustachian tube is working(tympanogram).  · Your medical history.    In some cases, your health care provider may have you take a hearing test. You may also be referred to someone who specializes in ear treatment (otolaryngologist, \"ENT\").  How is this treated?  This condition may be treated with:  · Medicines to relieve congestion in your nose, sinus, or upper respiratory tract (decongestants).  · Techniques to equalize pressure (to \"pop\" your ears), such as:  ? Yawning.  ? Chewing gum.  ? Swallowing.    In severe cases, you may need surgery to relieve your symptoms or to prevent future " inflammation.  Follow these instructions at home:  · Take over-the-counter and prescription medicines only as told by your health care provider.  · Do not put anything into your ears to clean or unplug them. Ear drops will not help.  · Keep all follow-up visits as told by your health care provider. This is important.  How is this prevented?  Using these strategies may help to prevent barotitis media:  · Chewing gum with frequent, forceful swallowing during takeoff and landing when flying.  · Holding your nose and gently blowing to pop your ears for equalizing pressure changes. This forces air into the eustachian tube.  · Yawning during air pressure changes.  · Using a nasal decongestant about 30-60 minutes before flying, if you have nasal congestion.    Contact a health care provider if:  · You have vertigo.  · You have hearing loss.  · Your symptoms do not get better or they get worse.  · You have a fever.  Get help right away if:  · You have a severe headache, ear pain, and dizziness.  · You have balance problems.  · You cannot move or feel part of your face.  · You have bloody or pus-like drainage from your ears.  Summary  · Barotitis media is inflammation of the middle ear.  · This condition typically occurs when you experience changes in pressure, such as when flying or scuba diving.  · You may be at a higher risk for this condition if you have small eustachian tubes, had recent ear surgery, or have allergies, a cold, or sinus or middle ear infection.  · This condition may be treated with medicines or techniques to equalize pressure in your ears.  · Strategies can be used to help prevent barotitis media.  This information is not intended to replace advice given to you by your health care provider. Make sure you discuss any questions you have with your health care provider.  Document Released: 12/15/2001 Document Revised: 11/06/2017 Document Reviewed: 11/06/2017  [employer] Interactive Patient Education © 2017  [employer]  BMI for Adults  Body mass index (BMI) is a number that is calculated from a person's weight and height. In most adults, the number is used to find how much of an adult's weight is made up of fat. BMI is not as accurate as a direct measure of body fat.  How is BMI calculated?  BMI is calculated by dividing weight in kilograms by height in meters squared. It can also be calculated by dividing weight in pounds by height in inches squared, then multiplying the resulting number by 703. Charts are available to help you find your BMI quickly and easily without doing this calculation.  How is BMI interpreted?  Health care professionals use BMI charts to identify whether an adult is underweight, at a normal weight, or overweight based on the following guidelines:  · Underweight: BMI less than 18.5.  · Normal weight: BMI between 18.5 and 24.9.  · Overweight: BMI between 25 and 29.9.  · Obese: BMI of 30 and above.    BMI is usually interpreted the same for males and females.  Weight includes both fat and muscle, so someone with a muscular build, such as an athlete, may have a BMI that is higher than 24.9. In cases like these, BMI may not accurately depict body fat. To determine if excess body fat is the cause of a BMI of 25 or higher, further assessments may need to be done by a health care provider.  Why is BMI a useful tool?  BMI is used to identify a possible weight problem that may be related to a medical problem or may increase the risk for medical problems. BMI can also be used to promote changes to reach a healthy weight.  This information is not intended to replace advice given to you by your health care provider. Make sure you discuss any questions you have with your health care provider.  Document Released: 08/29/2005 Document Revised: 04/27/2017 Document Reviewed: 05/15/2015  [employer] Interactive Patient Education © 2018 [employer]

## 2019-02-12 RX ORDER — IPRATROPIUM BROMIDE 42 UG/1
SPRAY, METERED NASAL
Qty: 15 ML | Refills: 0 | Status: SHIPPED | OUTPATIENT
Start: 2019-02-12 | End: 2019-03-19 | Stop reason: SDUPTHER

## 2019-02-15 RX ORDER — AMOXICILLIN AND CLAVULANATE POTASSIUM 875; 125 MG/1; MG/1
1 TABLET, FILM COATED ORAL EVERY 12 HOURS
Qty: 28 TABLET | Refills: 0 | Status: SHIPPED | OUTPATIENT
Start: 2019-02-15 | End: 2019-03-12

## 2019-02-26 ENCOUNTER — OFFICE VISIT (OUTPATIENT)
Dept: OTOLARYNGOLOGY | Facility: CLINIC | Age: 64
End: 2019-02-26

## 2019-02-26 VITALS — BODY MASS INDEX: 35.53 KG/M2 | WEIGHT: 226.4 LBS | HEIGHT: 67 IN | TEMPERATURE: 98.6 F

## 2019-02-26 DIAGNOSIS — J32.0 CHRONIC MAXILLARY ANTRITIS: ICD-10-CM

## 2019-02-26 PROCEDURE — 87186 SC STD MICRODIL/AGAR DIL: CPT | Performed by: OTOLARYNGOLOGY

## 2019-02-26 PROCEDURE — 87205 SMEAR GRAM STAIN: CPT | Performed by: OTOLARYNGOLOGY

## 2019-02-26 PROCEDURE — 87070 CULTURE OTHR SPECIMN AEROBIC: CPT | Performed by: OTOLARYNGOLOGY

## 2019-02-26 PROCEDURE — 31237 NSL/SINS NDSC SURG BX POLYPC: CPT | Performed by: OTOLARYNGOLOGY

## 2019-02-26 PROCEDURE — 87077 CULTURE AEROBIC IDENTIFY: CPT | Performed by: OTOLARYNGOLOGY

## 2019-02-26 RX ORDER — BUSPIRONE HYDROCHLORIDE 30 MG/1
30 TABLET ORAL 2 TIMES DAILY
COMMUNITY
Start: 2018-10-01 | End: 2020-09-23

## 2019-02-26 RX ORDER — LEVOFLOXACIN 500 MG/1
500 TABLET, FILM COATED ORAL DAILY
Qty: 14 TABLET | Refills: 0 | Status: SHIPPED | OUTPATIENT
Start: 2019-02-26 | End: 2019-03-18

## 2019-02-26 RX ORDER — ROPINIROLE 3 MG/1
3 TABLET, FILM COATED ORAL DAILY
COMMUNITY
Start: 2018-12-18 | End: 2019-09-18 | Stop reason: SDUPTHER

## 2019-02-26 NOTE — PROGRESS NOTES
OPERATIVE NOTE    Name:    Mikki Joel  YOB: 1955  Date of surgery:   February 26, 2019    Pre-op Diagnosis:  Chronic sinusitis  Post-op Diagnosis:    Right maxillary sinusitis was a large amount of crusting  Procedure: Scopic debridement of large amount of crusting and also culturing of secretions both from the posterior ethmoids and sphenoid complex on the right and also from the left maxillary sinus on the left      Surgeon:  Ramy Gatson MD, AAOHNS    Anesthesia: Topical lidocaine with Afrin    Staff:   ANNAMARIA Munroe    Estimated Blood Loss: None    Specimens:                Culture from the above noted areas      Drains:      Findings:      Complications: None    IMPLANTS:   None    INDICATIONS: Chronic sinusitis failed to respond to 2 different courses of prior antibiotics history of prior sinus surgery    PROCEDURE: After getting consent because the patient persistent symptoms or failure to respond to 2 different antibiotics times carried out the nose was decongested and local anesthetic applied some thick mucopurulent drainage is coming from the posterior ethmoid sphenoid area on the right none was coming of the maxillary sinus and anterior ethmoids on the left there is a large amount of crusting which is debrided suction and brought out with multiple forceps material then cultured and sent for specimen.  The 30 degree endoscope was used and multiple forceps and different size sections of 5 and 7 she tolerated it well without evidence of complication there is no bleeding there were no polyps seen in the ostiomeatal unit just of mucopurulent thick crusted material  This was sent because she does not respond to antibiotics is appropriate to send this for culture meantime because this appeared to be possible gram-negative, we placed her on Levaquin she is taking it in the past when she is to side effects including the risk of damage to the joints and tendons she went to go ahead because  she been feeling bad having chronic pain discomfort and facial facial swelling we will call her with the results and change culture pending she will continue saline irrigation in the interim.            This document has been electronically signed by Ramy Gaston MD on February 26, 2019 2:14 PM

## 2019-02-28 LAB
BACTERIA SPEC AEROBE CULT: ABNORMAL
BACTERIA SPEC AEROBE CULT: ABNORMAL
GRAM STN SPEC: ABNORMAL

## 2019-03-03 DIAGNOSIS — G25.81 RESTLESS LEG SYNDROME: ICD-10-CM

## 2019-03-04 RX ORDER — ROPINIROLE 1 MG/1
TABLET, FILM COATED ORAL
Qty: 30 TABLET | Refills: 0 | Status: SHIPPED | OUTPATIENT
Start: 2019-03-04 | End: 2019-04-03 | Stop reason: SDUPTHER

## 2019-03-11 RX ORDER — LEVOFLOXACIN 500 MG/1
500 TABLET, FILM COATED ORAL DAILY
Qty: 14 TABLET | Refills: 0 | OUTPATIENT
Start: 2019-03-11

## 2019-03-12 ENCOUNTER — OFFICE VISIT (OUTPATIENT)
Dept: SLEEP MEDICINE | Facility: HOSPITAL | Age: 64
End: 2019-03-12

## 2019-03-12 VITALS
WEIGHT: 220.5 LBS | BODY MASS INDEX: 34.61 KG/M2 | OXYGEN SATURATION: 98 % | HEART RATE: 73 BPM | DIASTOLIC BLOOD PRESSURE: 98 MMHG | HEIGHT: 67 IN | SYSTOLIC BLOOD PRESSURE: 179 MMHG

## 2019-03-12 DIAGNOSIS — G25.81 RESTLESS LEGS SYNDROME (RLS): Primary | ICD-10-CM

## 2019-03-12 DIAGNOSIS — F51.04 PSYCHOPHYSIOLOGICAL INSOMNIA: ICD-10-CM

## 2019-03-12 PROCEDURE — 99214 OFFICE O/P EST MOD 30 MIN: CPT | Performed by: INTERNAL MEDICINE

## 2019-03-12 NOTE — PROGRESS NOTES
Sleep Clinic Follow Up    Date: 3/12/2019  Primary Care Physician: Ronald Bowser MD    Last office visit: 08/21/2018 (I reviewed this note)    CC: Follow up: RLS and insomnia    Assessment and Plan:    1. Restless leg syndrome Established, stable (1)  1. Remains on Requip - now on 3mg po qhs  2. Insomnia  1. Doing CBT-I  With counselor  2. Lunesta no longer covered - changed to Temazepam 7.5 mg po by counselor  3. 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 Doing better, can go to sleep 60-90 minutes late.   4. Poor sleep hygiene - better, but still present with tv for 1 hour    Interim History:  Since the last visit:    1) insomnia -  Mikki Joel has improved from this point. She was changed over to temazepam. She has more gait instability on this medication and hang over feeling.     Bed time: 2300  Sleep latency: 10-60 minutes  Number of times awakens during the night: 2-3  Wake time: 0930  Estimated total sleep time at night: 6 hours  Caffeine intake: 0oz of coffee, 0oz of tea, and 0oz of soda  Alcohol intake: 0 drinks per week  Nap time: rare   Sleepiness with Driving: none    2) Patient has stable RLS symptoms.     PMHx, FH, SH reviewed and pertinent changes are: unchanged from last office visit on 08/21/2018      REVIEW OF SYSTEMS:   Negative for chest pain, fever, cough, SOA, abdominal pain.       Exam:  Vitals:    03/12/19 0925   BP: 179/98   Pulse: 73   SpO2: 98%           03/12/19  0925   Weight: 100 kg (220 lb 8 oz)     Body mass index is 34.53 kg/m². Patient's Body mass index is 34.53 kg/m². BMI is above normal parameters. Recommendations include: referral to primary care.    Gen:  No acute distress, alert, oriented  Lungs:  CTA with normal effort   CV:  RRR, no M/R/G  GI:  soft, non-tender  Psych:  Appropriate affect    Past Medical History:   Diagnosis Date   • Anemia    • Anemia  June 2016    might have been due to knee replacement surgery I had in mar   • Anemia June 2016    might have been due to knee replacement surgery I had in mar   • Anxiety    • Arthritis    • Arthritis of back 2009    lower back   • Asthma    • CTS (carpal tunnel syndrome) 1987    left wrist - had corrective surgery   • Depression    • Fracture of wrist 1979   • High blood pressure    • [place] (hard of hearing)    • Hypertension 1999    metoprolol 100 mg and triamterene/hydrochlorothiazide 37.5mg   • Hypertension 1999    metoprolol 100 mg and triamterene/hydrochlorothiazide 37.5mg   • Knee swelling 2010   • Migraine    • Periarthritis of shoulder 2011    both shoulders right shoulder total replacement   • Tear of meniscus of knee 2016    fell       Current Outpatient Medications:   •  azelastine (ASTELIN) 0.1 % nasal spray, 2 sprays into each nostril 2 (Two) Times a Day. Use in each nostril as directed, Disp: , Rfl:   •  busPIRone (BUSPAR) 30 MG tablet, Take 30 mg by mouth 2 (Two) Times a Day., Disp: , Rfl:   •  cetirizine (ZYRTEC ALLERGY) 10 MG tablet, Take 1 tablet by mouth Daily., Disp: 30 tablet, Rfl: 11  •  dexlansoprazole (DEXILANT) 60 MG capsule, Take 1 capsule by mouth Daily for 180 days., Disp: 90 capsule, Rfl: 1  •  eszopiclone (LUNESTA) 1 MG tablet, Take 1 tablet by mouth Every Night. Take immediately before bedtime, Disp: 30 tablet, Rfl: 5  •  Fluticasone Furoate-Vilanterol 100-25 MCG/INH aerosol powder , 1 puff bid and rinse out mouth, Disp: 120 puff, Rfl: 5  •  ipratropium (ATROVENT) 0.06 % nasal spray, INSTILL 2 SPRAYS INTO EACH NOSTRIL THREE TIMES DAILY, Disp: 15 mL, Rfl: 0  •  levoFLOXacin (LEVAQUIN) 500 MG tablet, Take 1 tablet by mouth Daily., Disp: 14 tablet, Rfl: 0  •  metoprolol succinate XL (TOPROL-XL) 100 MG 24 hr tablet, TAKE 1 TABLET BY MOUTH DAILY, Disp: 90 tablet, Rfl: 3  •  mometasone (NASONEX) 50 MCG/ACT nasal spray, 2 sprays into the nostril(s) as directed by provider 2 (Two) Times a Day.,  Disp: 17 g, Rfl: 11  •  PROAIR  (90 Base) MCG/ACT inhaler, INHALE 2 PUFFS BY MOUTH EVERY 4 HOURS AS NEEDED FOR WHEEZING, Disp: 8.5 g, Rfl: 0  •  rOPINIRole (REQUIP) 1 MG tablet, TAKE 1 TABLET BY MOUTH EVERY NIGHT 1 HOUR BEFORE BEDTIME, Disp: 30 tablet, Rfl: 0  •  rOPINIRole (REQUIP) 3 MG tablet, Take 3 mg by mouth Daily., Disp: , Rfl:   •  SUMAtriptan (IMITREX) 25 MG tablet, Take 1 tab po prn for migraine, Disp: 30 tablet, Rfl: 5  •  Vortioxetine HBr (TRINTELLIX) 10 MG tablet, Take 10 mg by mouth Daily., Disp: , Rfl:     Total time 25 min, more than half spent in face to face counseling and coordination of care.    RTC in 6 months     This document has been electronically signed by Ramirez Bai MD on March 12, 2019         CC: Ronald Bowser MD          No ref. provider found

## 2019-03-14 RX ORDER — ESZOPICLONE 1 MG/1
1 TABLET, FILM COATED ORAL NIGHTLY
Qty: 30 TABLET | Refills: 5 | Status: SHIPPED | OUTPATIENT
Start: 2019-03-14 | End: 2019-06-13 | Stop reason: SDUPTHER

## 2019-03-18 ENCOUNTER — OFFICE VISIT (OUTPATIENT)
Dept: OTOLARYNGOLOGY | Facility: CLINIC | Age: 64
End: 2019-03-18

## 2019-03-18 VITALS — TEMPERATURE: 97.3 F | HEIGHT: 67 IN | BODY MASS INDEX: 34.37 KG/M2 | WEIGHT: 219 LBS

## 2019-03-18 DIAGNOSIS — H72.93 PERFORATED TYMPANIC MEMBRANE, BILATERAL: Primary | ICD-10-CM

## 2019-03-18 PROCEDURE — 99213 OFFICE O/P EST LOW 20 MIN: CPT | Performed by: OTOLARYNGOLOGY

## 2019-03-18 RX ORDER — ONDANSETRON HYDROCHLORIDE 8 MG/1
8 TABLET, FILM COATED ORAL EVERY 8 HOURS PRN
Qty: 12 TABLET | Refills: 0 | Status: SHIPPED | OUTPATIENT
Start: 2019-03-18 | End: 2019-06-21 | Stop reason: SDUPTHER

## 2019-03-18 NOTE — PROGRESS NOTES
Subjective   Mikki Joel is a 64 y.o. female.   Follow-up sinusitis and ear    History of Present Illness   Is doing well with her left ear sinus symptoms improved she is had some nausea but no diarrhea she has no fever chills no abdominal distention she is recently been on antibiotics for sinusitis she not had any drainage or pain in her ear here is much better of her left ear we operated on she is considering surgery in the left ear but not at this point      The following portions of the patient's history were reviewed and updated as appropriate: allergies, current medications, past family history, past medical history, past social history, past surgical history and problem list.      Current Outpatient Medications:   •  busPIRone (BUSPAR) 30 MG tablet, Take 30 mg by mouth 2 (Two) Times a Day., Disp: , Rfl:   •  cetirizine (ZYRTEC ALLERGY) 10 MG tablet, Take 1 tablet by mouth Daily., Disp: 30 tablet, Rfl: 11  •  dexlansoprazole (DEXILANT) 60 MG capsule, Take 1 capsule by mouth Daily for 180 days., Disp: 90 capsule, Rfl: 1  •  eszopiclone (LUNESTA) 1 MG tablet, Take 1 tablet by mouth Every Night. Take immediately before bedtime, Disp: 30 tablet, Rfl: 5  •  Fluticasone Furoate-Vilanterol 100-25 MCG/INH aerosol powder , 1 puff bid and rinse out mouth, Disp: 120 puff, Rfl: 5  •  ipratropium (ATROVENT) 0.06 % nasal spray, INSTILL 2 SPRAYS INTO EACH NOSTRIL THREE TIMES DAILY, Disp: 15 mL, Rfl: 0  •  metoprolol succinate XL (TOPROL-XL) 100 MG 24 hr tablet, TAKE 1 TABLET BY MOUTH DAILY, Disp: 90 tablet, Rfl: 3  •  PROAIR  (90 Base) MCG/ACT inhaler, INHALE 2 PUFFS BY MOUTH EVERY 4 HOURS AS NEEDED FOR WHEEZING, Disp: 8.5 g, Rfl: 0  •  rOPINIRole (REQUIP) 1 MG tablet, TAKE 1 TABLET BY MOUTH EVERY NIGHT 1 HOUR BEFORE BEDTIME, Disp: 30 tablet, Rfl: 0  •  rOPINIRole (REQUIP) 3 MG tablet, Take 3 mg by mouth Daily., Disp: , Rfl:   •  SUMAtriptan (IMITREX) 25 MG tablet, Take 1 tab po prn for migraine, Disp: 30  tablet, Rfl: 5  •  Vortioxetine HBr (TRINTELLIX) 10 MG tablet, Take 10 mg by mouth Daily., Disp: , Rfl:   •  azelastine (ASTELIN) 0.1 % nasal spray, 2 sprays into each nostril 2 (Two) Times a Day. Use in each nostril as directed, Disp: , Rfl:   •  mometasone (NASONEX) 50 MCG/ACT nasal spray, 2 sprays into the nostril(s) as directed by provider 2 (Two) Times a Day., Disp: 17 g, Rfl: 11  •  ondansetron (ZOFRAN) 8 MG tablet, Take 1 tablet by mouth Every 8 (Eight) Hours As Needed for Nausea or Vomiting., Disp: 12 tablet, Rfl: 0    Allergies   Allergen Reactions   • Codeine Other (See Comments)     Increases heart rate   • Sulfa Antibiotics Hives             Review of Systems   Constitutional: Negative for fever.   HENT: Positive for hearing loss. Negative for postnasal drip and sinus pressure.    Gastrointestinal: Positive for abdominal distention and nausea. Negative for abdominal pain.   Neurological: Negative for dizziness.   Hematological: Negative for adenopathy.           Objective   Physical Exam   Constitutional: She is oriented to person, place, and time. She appears well-developed and well-nourished.   HENT:   Head: Normocephalic and atraumatic.   Right Ear: External ear and ear canal normal.   Left Ear: External ear and ear canal normal.   Ears:    Nose: Nose normal. No mucosal edema, rhinorrhea, nasal deformity or septal deviation. No epistaxis. Right sinus exhibits no maxillary sinus tenderness and no frontal sinus tenderness. Left sinus exhibits no maxillary sinus tenderness and no frontal sinus tenderness.   Mouth/Throat: Uvula is midline and oropharynx is clear and moist. No trismus in the jaw. Normal dentition. No oropharyngeal exudate or posterior oropharyngeal edema.       Eyes: Conjunctivae are normal.   Neck: Normal range of motion. Neck supple. No JVD present. No tracheal deviation present. No thyromegaly present.   Pulmonary/Chest: Effort normal.   Musculoskeletal: Normal range of motion.    Lymphadenopathy:        Head (right side): No submental, no submandibular, no tonsillar, no preauricular, no posterior auricular and no occipital adenopathy present.        Head (left side): No submental, no submandibular, no tonsillar, no preauricular, no posterior auricular and no occipital adenopathy present.     She has no cervical adenopathy.        Right cervical: No superficial cervical, no deep cervical and no posterior cervical adenopathy present.       Left cervical: No superficial cervical, no deep cervical and no posterior cervical adenopathy present.   Neurological: She is alert and oriented to person, place, and time. No cranial nerve deficit.   Skin: Skin is warm.   Psychiatric: She has a normal mood and affect. Her speech is normal and behavior is normal. Thought content normal.   Nursing note and vitals reviewed.      Right stable perforation dry patent without cholesteatoma or fluid left eardrum healed well    Assessment/Plan   Mikki was seen today for follow-up.    Diagnoses and all orders for this visit:    Perforated tympanic membrane, bilateral    Other orders  -     ondansetron (ZOFRAN) 8 MG tablet; Take 1 tablet by mouth Every 8 (Eight) Hours As Needed for Nausea or Vomiting.    Patient has nausea positive and antibiotics have given her a few days of Zofran told her if this persists she needs to talk to her GI physician or see her primary physician is am not sure we can totally attribute to her recent antibiotics she is to call if any question problems she is not vomiting drinking and eating well

## 2019-03-19 RX ORDER — IPRATROPIUM BROMIDE 42 UG/1
SPRAY, METERED NASAL
Qty: 15 ML | Refills: 0 | Status: SHIPPED | OUTPATIENT
Start: 2019-03-19 | End: 2019-04-15 | Stop reason: SDUPTHER

## 2019-03-25 DIAGNOSIS — G43.909 MIGRAINE WITHOUT STATUS MIGRAINOSUS, NOT INTRACTABLE, UNSPECIFIED MIGRAINE TYPE: ICD-10-CM

## 2019-03-25 RX ORDER — SUMATRIPTAN 25 MG/1
TABLET, FILM COATED ORAL
Qty: 30 TABLET | Refills: 0 | Status: SHIPPED | OUTPATIENT
Start: 2019-03-25 | End: 2019-04-11 | Stop reason: SDUPTHER

## 2019-04-03 DIAGNOSIS — G25.81 RESTLESS LEG SYNDROME: ICD-10-CM

## 2019-04-03 RX ORDER — ROPINIROLE 1 MG/1
TABLET, FILM COATED ORAL
Qty: 30 TABLET | Refills: 0 | Status: SHIPPED | OUTPATIENT
Start: 2019-04-03 | End: 2019-04-22 | Stop reason: ALTCHOICE

## 2019-04-11 ENCOUNTER — APPOINTMENT (OUTPATIENT)
Dept: LAB | Facility: HOSPITAL | Age: 64
End: 2019-04-11

## 2019-04-11 ENCOUNTER — OFFICE VISIT (OUTPATIENT)
Dept: GASTROENTEROLOGY | Facility: CLINIC | Age: 64
End: 2019-04-11

## 2019-04-11 VITALS
HEIGHT: 67 IN | SYSTOLIC BLOOD PRESSURE: 148 MMHG | DIASTOLIC BLOOD PRESSURE: 88 MMHG | BODY MASS INDEX: 35.09 KG/M2 | HEART RATE: 86 BPM | WEIGHT: 223.6 LBS

## 2019-04-11 DIAGNOSIS — K21.00 GASTROESOPHAGEAL REFLUX DISEASE WITH ESOPHAGITIS: Primary | ICD-10-CM

## 2019-04-11 DIAGNOSIS — R11.2 NAUSEA AND VOMITING, INTRACTABILITY OF VOMITING NOT SPECIFIED, UNSPECIFIED VOMITING TYPE: ICD-10-CM

## 2019-04-11 DIAGNOSIS — G43.909 MIGRAINE WITHOUT STATUS MIGRAINOSUS, NOT INTRACTABLE, UNSPECIFIED MIGRAINE TYPE: ICD-10-CM

## 2019-04-11 DIAGNOSIS — R10.13 EPIGASTRIC PAIN: ICD-10-CM

## 2019-04-11 PROCEDURE — 99214 OFFICE O/P EST MOD 30 MIN: CPT | Performed by: NURSE PRACTITIONER

## 2019-04-11 RX ORDER — DEXLANSOPRAZOLE 60 MG/1
60 CAPSULE, DELAYED RELEASE ORAL DAILY
COMMUNITY
End: 2019-05-24 | Stop reason: SDUPTHER

## 2019-04-11 RX ORDER — SUMATRIPTAN 25 MG/1
TABLET, FILM COATED ORAL
Qty: 30 TABLET | Refills: 0 | Status: SHIPPED | OUTPATIENT
Start: 2019-04-11 | End: 2019-06-03

## 2019-04-11 RX ORDER — SUCRALFATE ORAL 1 G/10ML
1 SUSPENSION ORAL
Qty: 1260 ML | Refills: 3 | Status: SHIPPED | OUTPATIENT
Start: 2019-04-11 | End: 2019-04-23 | Stop reason: ALTCHOICE

## 2019-04-11 NOTE — PATIENT INSTRUCTIONS
Heartburn  Heartburn is a type of pain or discomfort that can happen in the throat or chest. It is often described as a burning pain. It may also cause a bad taste in the mouth. Heartburn may feel worse when you lie down or bend over, and it is often worse at night. Heartburn may be caused by stomach contents that move back up into the esophagus (reflux).  Follow these instructions at home:  Take these actions to decrease your discomfort and to help avoid complications.  Diet  · Follow a diet as recommended by your health care provider. This may involve avoiding foods and drinks such as:  ? Coffee and tea (with or without caffeine).  ? Drinks that contain alcohol.  ? Energy drinks and sports drinks.  ? Carbonated drinks or sodas.  ? Chocolate and cocoa.  ? Peppermint and mint flavorings.  ? Garlic and onions.  ? Horseradish.  ? Spicy and acidic foods, including peppers, chili powder, molina powder, vinegar, hot sauces, and barbecue sauce.  ? Citrus fruit juices and citrus fruits, such as oranges, navarro, and limes.  ? Tomato-based foods, such as red sauce, chili, salsa, and pizza with red sauce.  ? Fried and fatty foods, such as donuts, french fries, potato chips, and high-fat dressings.  ? High-fat meats, such as hot dogs and fatty cuts of red and white meats, such as rib eye steak, sausage, ham, and lacey.  ? High-fat dairy items, such as whole milk, butter, and cream cheese.  · Eat small, frequent meals instead of large meals.  · Avoid drinking large amounts of liquid with your meals.  · Avoid eating meals during the 2-3 hours before bedtime.  · Avoid lying down right after you eat.  · Do not exercise right after you eat.  General instructions  · Pay attention to any changes in your symptoms.  · Take over-the-counter and prescription medicines only as told by your health care provider. Do not take aspirin, ibuprofen, or other NSAIDs unless your health care provider told you to do so.  · Do not use any tobacco  products, including cigarettes, chewing tobacco, and e-cigarettes. If you need help quitting, ask your health care provider.  · Wear loose-fitting clothing. Do not wear anything tight around your waist that causes pressure on your abdomen.  · Raise (elevate) the head of your bed about 6 inches (15 cm).  · Try to reduce your stress, such as with yoga or meditation. If you need help reducing stress, ask your health care provider.  · If you are overweight, reduce your weight to an amount that is healthy for you. Ask your health care provider for guidance about a safe weight loss goal.  · Keep all follow-up visits as told by your health care provider. This is important.  Contact a health care provider if:  · You have new symptoms.  · You have unexplained weight loss.  · You have difficulty swallowing, or it hurts to swallow.  · You have wheezing or a persistent cough.  · Your symptoms do not improve with treatment.  · You have frequent heartburn for more than two weeks.  Get help right away if:  · You have pain in your arms, neck, jaw, teeth, or back.  · You feel sweaty, dizzy, or light-headed.  · You have chest pain or shortness of breath.  · You vomit and your vomit looks like blood or coffee grounds.  · Your stool is bloody or black.  This information is not intended to replace advice given to you by your health care provider. Make sure you discuss any questions you have with your health care provider.  Document Released: 05/06/2010 Document Revised: 05/25/2017 Document Reviewed: 04/13/2016  [employer] Patient Education © 2019 [employer]

## 2019-04-11 NOTE — PROGRESS NOTES
Chief Complaint   Patient presents with   • Heartburn   • Abdominal Pain     epigastric       Subjective    Mikki Palak Joel is a 64 y.o. female. she is here today for follow-up.  64-year-old female presents for follow-up regarding GERD with esophagitis.  Had previously controlled her symptoms well however she reports over the last 6 months she has had worsening nausea and episodes of vomiting states it feels like morning sickness.  Reports her abdomen is been very tender denies any changes in her bowel habits or blood within her stool.  Previous EGD 1/19/18 noted esophagitis gastritis which was negative for H. pylori    Heartburn   She complains of abdominal pain, belching, heartburn and nausea. She reports no chest pain, no choking, no coughing, no dysphagia, no early satiety, no globus sensation, no hoarse voice, no sore throat, no stridor, no tooth decay, no water brash or no wheezing. hiccups before n/v. This is a chronic problem. The problem occurs frequently. The problem has been waxing and waning. The heartburn duration is less than a minute. The heartburn is of mild intensity. The heartburn does not wake her from sleep. The heartburn limits her activity. The heartburn changes with position. The symptoms are aggravated by exertion and lying down (with and without intake ). Associated symptoms include fatigue. She has tried a PPI for the symptoms. The treatment provided mild relief. Past procedures include an EGD.     Plan; will obtain upper GI with small bowel follow-through to further evaluate upper abdominal pain and nausea.  Will add Carafate before meals and at bedtime.  We will also obtain lab work to rule out H. pylori and treat if indicated.  Discussed with patient may need to repeat endoscopy pending results of the above.       The following portions of the patient's history were reviewed and updated as appropriate:   Past Medical History:   Diagnosis Date   • Anemia    • Anemia June 2016     might have been due to knee replacement surgery I had in mar   • Anemia June 2016    might have been due to knee replacement surgery I had in mar   • Anxiety    • Arthritis    • Arthritis of back 2009    lower back   • Asthma    • CTS (carpal tunnel syndrome) 1987    left wrist - had corrective surgery   • Depression    • Fracture of wrist 1979   • High blood pressure    • Trib[place] (hard of hearing)    • Hypertension 1999    metoprolol 100 mg and triamterene/hydrochlorothiazide 37.5mg   • Hypertension 1999    metoprolol 100 mg and triamterene/hydrochlorothiazide 37.5mg   • Knee swelling 2010   • Migraine    • Periarthritis of shoulder 2011    both shoulders right shoulder total replacement   • Tear of meniscus of knee 2016    fell     Past Surgical History:   Procedure Laterality Date   • BUNIONECTOMY Bilateral    • COLONOSCOPY  2015   • COLONOSCOPY N/A 1/19/2018    Procedure: COLONOSCOPY;  Surgeon: Ravi Cavanaugh MD;  Location: [facility] ENDOSCOPY;  Service:    • COLONOSCOPY  2015   • EAR TUBES     • ENDOSCOPIC FUNCTIONAL SINUS SURGERY (FESS) Bilateral 9/5/2017    Procedure: BILATERAL ETHMOID AND MAXILLARY SINUS AND NASAL SEPTOPLASTIES AND BILATERAL TURBINECTOMIES;  Surgeon: Ramy Gaston MD;  Location: [facility] OR;  Service:    • ENDOSCOPY N/A 1/19/2018    Procedure: ESOPHAGOGASTRODUODENOSCOPY;  Surgeon: Ravi Cavanaugh MD;  Location: [facility] ENDOSCOPY;  Service:    • HAND SURGERY     • JOINT REPLACEMENT  2016    right knee replacement 2016   • JOINT REPLACEMENT  2015    right shoulder   • KNEE SURGERY  2016    total right knee replacement   • RHINOPLASTY      x3   • SHOULDER ARTHROSCOPY     • SHOULDER SURGERY  2015    complete shoulder joint replacement   • TONSILLECTOMY     • TUBAL ABDOMINAL LIGATION  1987   • TYMPANOPLASTY Left 6/5/2018    Procedure: TYMPANOPLASTY WITH CARTILAGE GRAFT;  Surgeon: Ramy Gaston MD;  Location: [facility] OR;  Service: ENT   • UPPER GASTROINTESTINAL ENDOSCOPY  2016   • UPPER  GASTROINTESTINAL ENDOSCOPY     • UPPER GASTROINTESTINAL ENDOSCOPY  2018    Per Dr. Ravi Polk M.D.   • WRIST SURGERY      carpal tunnel repair left wrist     Family History   Problem Relation Age of Onset   • Hypertension Mother    • COPD Mother    • Arthritis Mother    • Alcohol abuse Mother    • Cancer Father    • Arthritis Father    • Alcohol abuse Father    • COPD Maternal Grandmother    • Stroke Maternal Grandmother    • Osteoporosis Maternal Grandmother    • Irritable bowel syndrome Sister      OB History      Para Term  AB Living    2 2 2          SAB TAB Ectopic Molar Multiple Live Births                       Prior to Admission medications    Medication Sig Start Date End Date Taking? Authorizing Provider   azelastine (ASTELIN) 0.1 % nasal spray 2 sprays into each nostril 2 (Two) Times a Day. Use in each nostril as directed   Yes ProviderLucía MD   busPIRone (BUSPAR) 30 MG tablet Take 30 mg by mouth 2 (Two) Times a Day. 10/1/18  Yes Lucía Greene MD   cetirizine (ZYRTEC ALLERGY) 10 MG tablet Take 1 tablet by mouth Daily. 18  Yes Katheryn Corrigan DO   dexlansoprazole (DEXILANT) 60 MG capsule Take 60 mg by mouth Daily.   Yes ProviderLucía MD   eszopiclone (LUNESTA) 1 MG tablet Take 1 tablet by mouth Every Night. Take immediately before bedtime 3/14/19  Yes Ramirez Bai MD   Fluticasone Furoate-Vilanterol 100-25 MCG/INH aerosol powder  1 puff bid and rinse out mouth 8/10/18  Yes Ronald Bowser MD   ipratropium (ATROVENT) 0.06 % nasal spray INSTILL 2 SPRAYS IN EACH NOSTRIL THREE TIMES DAILY 3/19/19  Yes Ramy Gaston MD   metoprolol succinate XL (TOPROL-XL) 100 MG 24 hr tablet TAKE 1 TABLET BY MOUTH DAILY 19  Yes Ronald Bowser MD   mometasone (NASONEX) 50 MCG/ACT nasal spray 2 sprays into the nostril(s) as directed by provider 2 (Two) Times a Day. 18  Yes Ramy Gaston MD   ondansetron (ZOFRAN) 8 MG tablet Take 1 tablet by mouth  Every 8 (Eight) Hours As Needed for Nausea or Vomiting. 3/18/19  Yes Ramy Gaston MD   PROAIR  (90 Base) MCG/ACT inhaler INHALE 2 PUFFS BY MOUTH EVERY 4 HOURS AS NEEDED FOR WHEEZING 10/19/18  Yes Ronald Bowser MD   rOPINIRole (REQUIP) 1 MG tablet TAKE 1 TABLET BY MOUTH EVERY NIGHT 1 HOUR BEFORE BEDTIME 4/3/19  Yes Ronald Bowser MD   rOPINIRole (REQUIP) 3 MG tablet Take 3 mg by mouth Daily. 12/18/18  Yes Lucía Greene MD   SUMAtriptan (IMITREX) 25 MG tablet TAKE 1 TABLET BY MOUTH AS NEEDED FOR MIGRAINE 3/25/19  Yes Ronald Bowser MD   Vortioxetine HBr (TRINTELLIX) 10 MG tablet Take 10 mg by mouth Daily.   Yes ProviderLucía MD     Allergies   Allergen Reactions   • Codeine Other (See Comments)     Increases heart rate   • Sulfa Antibiotics Hives     Social History     Socioeconomic History   • Marital status:      Spouse name: Not on file   • Number of children: Not on file   • Years of education: Not on file   • Highest education level: Not on file   Tobacco Use   • Smoking status: Never Smoker   • Smokeless tobacco: Never Used   Substance and Sexual Activity   • Alcohol use: Yes     Alcohol/week: 1.2 oz     Types: 2 Standard drinks or equivalent per week     Comment: 02/04/2019 - Patient confirmed she partakes of alcoholic beverage consumption on an occasional basis.   • Drug use: No   • Sexual activity: Defer     Partners: Male     Birth control/protection: Post-menopausal, Surgical, None, OCP     Comment: 02/04/2019 - Patient confirmed Tubal Ligation following birth of  last child at age 32.       Review of Systems  Review of Systems   Constitutional: Positive for fatigue. Negative for activity change, appetite change, chills, diaphoresis, fever and unexpected weight change.   HENT: Negative for hoarse voice, sore throat and trouble swallowing.    Respiratory: Negative for cough, choking, shortness of breath and wheezing.    Cardiovascular: Negative for chest pain.  "  Gastrointestinal: Positive for abdominal pain, heartburn and nausea. Negative for abdominal distention, anal bleeding, blood in stool, constipation, diarrhea, dysphagia, rectal pain and vomiting.   Musculoskeletal: Negative for arthralgias.   Skin: Negative for pallor.   Neurological: Negative for light-headedness.        /88 (BP Location: Left arm)   Pulse 86   Ht 170.2 cm (67\")   Wt 101 kg (223 lb 9.6 oz)   LMP  (LMP Unknown)   BMI 35.02 kg/m²     Objective    Physical Exam   Constitutional: She is oriented to person, place, and time. She appears well-developed and well-nourished. She is cooperative. No distress.   HENT:   Head: Normocephalic and atraumatic.   Neck: Normal range of motion. Neck supple. No thyromegaly present.   Cardiovascular: Normal rate, regular rhythm and normal heart sounds.   Pulmonary/Chest: Effort normal and breath sounds normal. She has no wheezes. She has no rhonchi. She has no rales.   Abdominal: Soft. Normal appearance and bowel sounds are normal. She exhibits no distension. There is no hepatosplenomegaly. There is tenderness in the epigastric area. There is no rigidity and no guarding. No hernia.   Lymphadenopathy:     She has no cervical adenopathy.   Neurological: She is alert and oriented to person, place, and time.   Skin: Skin is warm, dry and intact. No rash noted. No pallor.   Psychiatric: She has a normal mood and affect. Her speech is normal.     Office Visit on 02/26/2019   Component Date Value Ref Range Status   • Wound Culture 02/26/2019 Moderate growth (3+) Serratia marcescens*  Final   • Wound Culture 02/26/2019 Light growth (2+) Normal Skin Barbara   Final   • Gram Stain 02/26/2019 Rare (1+) WBCs seen   Final   • Gram Stain 02/26/2019 Few (2+) Gram negative bacilli   Final   • Gram Stain 02/26/2019 Rare (1+) Gram positive cocci   Final     Assessment/Plan      1. Gastroesophageal reflux disease with esophagitis    2. Nausea and vomiting, intractability of " vomiting not specified, unspecified vomiting type    3. Epigastric pain    .       Orders placed during this encounter include:  Orders Placed This Encounter   Procedures   • FL Upper GI With Air Contrast & SBFT     Standing Status:   Future     Standing Expiration Date:   4/10/2020     Order Specific Question:   Reason for Exam:     Answer:   upper abd pain, nausea   • Helicobacter Pylori, IgA IgG IgM       * Surgery not found *    Review and/or summary of lab tests, radiology, procedures, medications. Review and summary of old records and obtaining of history. The risks and benefits of my recommendations, as well as other treatment options were discussed with the patient today. Questions were answered.    New Medications Ordered This Visit   Medications   • sucralfate (CARAFATE) 1 GM/10ML suspension     Sig: Take 10 mL by mouth 4 (Four) Times a Day With Meals & at Bedtime.     Dispense:  1260 mL     Refill:  3       Follow-up: Return in about 4 weeks (around 5/9/2019).          This document has been electronically signed by MIRIAM White on April 11, 2019 2:10 PM             Results for orders placed or performed in visit on 02/26/19   Wound Culture - Wound, Sinus, maxillary left   Result Value Ref Range    Wound Culture Moderate growth (3+) Serratia marcescens (A)     Wound Culture Light growth (2+) Normal Skin Barbara     Gram Stain Rare (1+) WBCs seen     Gram Stain Few (2+) Gram negative bacilli     Gram Stain Rare (1+) Gram positive cocci        Susceptibility    Serratia marcescens - CHAR     Amoxicillin + Clavulanate >16/8 Resistant ug/ml     Ampicillin >16 Resistant ug/ml     Ampicillin + Sulbactam >16/8 Resistant ug/ml     Cefazolin >16 Resistant ug/ml     Cefepime <=8 Susceptible ug/ml     Cefoxitin >16 Resistant ug/ml     Ceftazidime <=1 Susceptible ug/ml     Ceftriaxone <=8 Susceptible ug/ml     Cefuroxime sodium >16 Resistant ug/ml     Levofloxacin 4 Intermediate ug/ml     Piperacillin + Tazobactam  <=16 Susceptible ug/ml     Tetracycline 8 Intermediate ug/ml     Trimethoprim + Sulfamethoxazole >2/38 Resistant ug/ml   Results for orders placed or performed in visit on 08/03/18   CBC (No Diff)   Result Value Ref Range    WBC 13.99 (H) 3.20 - 9.80 10*3/mm3    RBC 4.36 3.77 - 5.16 10*6/mm3    Hemoglobin 12.7 12.0 - 15.5 g/dL    Hematocrit 37.7 35.0 - 45.0 %    MCV 86.5 80.0 - 98.0 fL    MCH 29.1 26.5 - 34.0 pg    MCHC 33.7 31.4 - 36.0 g/dL    RDW 13.6 11.5 - 14.5 %    RDW-SD 43.0 36.4 - 46.3 fl    MPV 9.4 8.0 - 12.0 fL    Platelets 397 150 - 450 10*3/mm3   Comprehensive Metabolic Panel   Result Value Ref Range    Glucose 97 60 - 100 mg/dL    BUN 37 (H) 7 - 21 mg/dL    Creatinine 1.18 (H) 0.50 - 1.00 mg/dL    Sodium 138 137 - 145 mmol/L    Potassium 3.8 3.5 - 5.1 mmol/L    Chloride 102 95 - 110 mmol/L    CO2 25.0 22.0 - 31.0 mmol/L    Calcium 9.6 8.4 - 10.2 mg/dL    Total Protein 7.5 6.3 - 8.6 g/dL    Albumin 4.60 3.40 - 4.80 g/dL    ALT (SGPT) 32 9 - 52 U/L    AST (SGOT) 25 14 - 36 U/L    Alkaline Phosphatase 139 (H) 38 - 126 U/L    Total Bilirubin 0.3 0.2 - 1.3 mg/dL    eGFR Non  Amer 46 45 - 104 mL/min/1.73    Globulin 2.9 2.3 - 3.5 gm/dL    A/G Ratio 1.6 1.1 - 1.8 g/dL    BUN/Creatinine Ratio 31.4 (H) 7.0 - 25.0    Anion Gap 11.0 5.0 - 15.0 mmol/L   Results for orders placed or performed in visit on 01/30/18   TSH   Result Value Ref Range    TSH 0.240 (L) 0.460 - 4.680 mIU/mL   T4, Free   Result Value Ref Range    Free T4 1.04 0.78 - 2.19 ng/dL   Lipid Panel   Result Value Ref Range    Total Cholesterol 197 0 - 199 mg/dL    Triglycerides 227 (H) 20 - 199 mg/dL    HDL Cholesterol 45 (L) 60 - 200 mg/dL    LDL Cholesterol  122 1 - 129 mg/dL    LDL/HDL Ratio 2.37 0.00 - 3.22   Results for orders placed or performed during the hospital encounter of 01/19/18   Tissue Pathology Exam - Tissue, Gastric, Antrum   Result Value Ref Range    Case Report       Surgical Pathology Report                         Case:  [id number]                                  Authorizing Provider:  Ravi Cavanaugh MD         Collected:           01/19/2018 02:07 PM          Ordering Location:     [facility]             Received:            01/19/2018 02:49 PM                                 [facility]                                                     Pathologist:           Jonnie Bradley MD                                                         Specimen:    Gastric, Antrum                                                                            Final Diagnosis       GASTRIC ANTRUM, MUCOSAL BIOPSY:   MILD CHRONIC GASTRITIS.   NO EVIDENCE OF HELICOBACTER PYLORI.       Gross Description       The specimen consists of a mucosal biopsy measuring up to 0.3 cm in greatest dimension.  All embedded.        Embedded Images     Results for orders placed or performed during the hospital encounter of 09/05/17   Tissue Pathology Exam   Result Value Ref Range    Case Report       Surgical Pathology Report                         Case: [id number]                                  Authorizing Provider:  Ramy Gaston MD        Collected:           09/05/2017 11:56 AM          Ordering Location:     [facility]             Received:            09/05/2017 01:26 PM                                 [facility]                                                              Pathologist:           Deven Conner MD                                                          Specimens:   1) - Naris, Left, LEFT SINUS                                                                        2) - Naris, Right, RIGHT SINUS                                                             Final Diagnosis       1.  MUCOSA, NASAL SINUS, LEFT SIDE:  CHRONIC SINUSITIS OF RESPIRATORY MUCOSA IN RELATION TO BITS OF BONE.  NEGATIVE FOR FUNGI (GMS STAIN).    2.  MUCOSA, NASAL SINUS, RIGHT SIDE:  CHRONIC SINUSITIS OF RESPIRATORY MUCOSA IN RELATION TO BITS OF  "BONE.  NEGATIVE FOR FUNGI (GMS STAIN).      Gross Description       1.  The first container is labeled \"left sinus, nose\" and has pinkish white mucosa measuring 1.5 cc in aggregate.  The entire specimen is embedded as 1A.    2.  The second container is labeled \"right sinus, nose\" and has pinkish white mucosa measuring 1.5 cc in aggregate.  The entire specimen is embedded as 2A.       Embedded Images     Wound Culture   Result Value Ref Range    Wound Culture Light growth (2+) Pseudomonas aeruginosa (A)     Wound Culture Light growth (2+) Serratia marcescens (A)     Wound Culture Light growth (2+) Streptococcus mitis / oralis (A)     Gram Stain Moderate (3+) WBCs seen     Gram Stain Rare (1+) Gram negative bacilli        Susceptibility    Pseudomonas aeruginosa - CHAR     Ceftazidime 4  ug/ml     Cefuroxime sodium >16  ug/ml     Gentamicin <=4 Susceptible ug/ml     Levofloxacin <=2 Susceptible ug/ml     Piperacillin + Tazobactam <=16  ug/ml     Tobramycin <=4 Susceptible ug/ml    Serratia marcescens - CHAR     Amikacin <=16 Susceptible ug/ml     Amoxicillin + Clavulanate >16/8 Resistant ug/ml     Ampicillin >16 Resistant ug/ml     Ampicillin + Sulbactam >16/8 Resistant ug/ml     Cefazolin >16 Resistant ug/ml     Cefotaxime <=2  ug/ml     Cefoxitin 16 Intermediate ug/ml     Ceftazidime <=1  ug/ml     Ceftriaxone <=8  ug/ml     Cefuroxime sodium >16 Resistant ug/ml     Ciprofloxacin 2 Intermediate ug/ml     Gentamicin <=4 Susceptible ug/ml     Imipenem <=4 Susceptible ug/ml     Levofloxacin <=2 Susceptible ug/ml     Meropenem <=4 Susceptible ug/ml     Nitrofurantoin >64  ug/ml     Piperacillin + Tazobactam <=16  ug/ml     Tetracycline <=4 Susceptible ug/ml     Tobramycin <=4 Susceptible ug/ml     Trimethoprim + Sulfamethoxazole <=2/38 Susceptible ug/ml   Results for orders placed or performed in visit on 08/31/17   Basic Metabolic Panel   Result Value Ref Range    Glucose 93 60 - 100 mg/dL    BUN 24 (H) 7 - 21 mg/dL    " Creatinine 0.82 0.50 - 1.00 mg/dL    Sodium 136 (L) 137 - 145 mmol/L    Potassium 4.7 3.5 - 5.1 mmol/L    Chloride 100 95 - 110 mmol/L    CO2 26.0 22.0 - 31.0 mmol/L    Calcium 9.6 8.4 - 10.2 mg/dL    eGFR Non  Amer 70 45 - 104 mL/min/1.73    BUN/Creatinine Ratio 29.3 (H) 7.0 - 25.0    Anion Gap 10.0 5.0 - 15.0 mmol/L   Results for orders placed or performed in visit on 08/07/17   Sedimentation Rate   Result Value Ref Range    Sed Rate 9 0 - 20 mm/hr   Rheumatoid Factor   Result Value Ref Range    Rheumatoid Factor Qualitative Negative Negative   C-reactive protein   Result Value Ref Range    C-Reactive Protein 1.00 0.00 - 1.00 mg/dL   DERRICK   Result Value Ref Range    DERRICK Direct Negative Negative   Results for orders placed or performed in visit on 08/01/17   Hepatitis C Antibody   Result Value Ref Range    Hepatitis C Ab Negative Negative   Hemoglobin A1c   Result Value Ref Range    Hemoglobin A1C 5.7 (H) 4 - 5.6 %   Results for orders placed or performed in visit on 05/31/17   Wound Culture   Result Value Ref Range    Wound Culture Light growth (2+) Serratia marcescens (A)     Beta Lactamase      Gram Stain Few (2+) WBCs seen     Gram Stain Rare (1+) Gram negative bacilli        Susceptibility    Serratia marcescens - CHAR     Amikacin <=16 Susceptible ug/ml     Amoxicillin + Clavulanate >16/8 Resistant ug/ml     Ampicillin >16 Resistant ug/ml     Ampicillin + Sulbactam >16/8 Resistant ug/ml     Cefazolin >16 Resistant ug/ml     Cefotaxime <=2  ug/ml     Cefoxitin 16 Intermediate ug/ml     Ceftazidime <=1  ug/ml     Ceftriaxone <=8  ug/ml     Cefuroxime >16 Resistant ug/ml     Ciprofloxacin <=1 Susceptible ug/ml     Gentamicin <=4 Susceptible ug/ml     Imipenem <=4 Susceptible ug/ml     Levofloxacin <=2 Susceptible ug/ml     Meropenem <=4 Susceptible ug/ml     Nitrofurantoin >64  ug/ml     Piperacillin + Tazobactam <=16  ug/ml     Tetracycline 8 Intermediate ug/ml     Tobramycin <=4 Susceptible ug/ml      Trimethoprim + Sulfamethoxazole <=2/38 Susceptible ug/ml

## 2019-04-15 RX ORDER — IPRATROPIUM BROMIDE 42 UG/1
2 SPRAY, METERED NASAL 3 TIMES DAILY
Qty: 15 ML | Refills: 5 | Status: SHIPPED | OUTPATIENT
Start: 2019-04-15 | End: 2019-07-01 | Stop reason: SDUPTHER

## 2019-04-16 ENCOUNTER — HOSPITAL ENCOUNTER (OUTPATIENT)
Dept: GENERAL RADIOLOGY | Facility: HOSPITAL | Age: 64
Discharge: HOME | End: 2019-04-16
Admitting: NURSE PRACTITIONER

## 2019-04-16 DIAGNOSIS — R10.13 EPIGASTRIC PAIN: ICD-10-CM

## 2019-04-16 DIAGNOSIS — K21.00 GASTROESOPHAGEAL REFLUX DISEASE WITH ESOPHAGITIS: ICD-10-CM

## 2019-04-16 DIAGNOSIS — R11.2 NAUSEA AND VOMITING, INTRACTABILITY OF VOMITING NOT SPECIFIED, UNSPECIFIED VOMITING TYPE: ICD-10-CM

## 2019-04-16 PROCEDURE — 74249: CPT

## 2019-04-16 RX ADMIN — BARIUM SULFATE 240 ML: 960 POWDER, FOR SUSPENSION ORAL at 07:49

## 2019-04-18 ENCOUNTER — APPOINTMENT (OUTPATIENT)
Dept: LAB | Facility: HOSPITAL | Age: 64
End: 2019-04-18

## 2019-04-18 PROCEDURE — 86677 HELICOBACTER PYLORI ANTIBODY: CPT | Performed by: NURSE PRACTITIONER

## 2019-04-18 PROCEDURE — 36415 COLL VENOUS BLD VENIPUNCTURE: CPT | Performed by: NURSE PRACTITIONER

## 2019-04-19 LAB
H PYLORI IGA SER IA-ACNC: <9 UNITS (ref 0–8.9)
H PYLORI IGG SER IA-ACNC: 0.19 INDEX VALUE (ref 0–0.79)
H PYLORI IGM SER-ACNC: <9 UNITS (ref 0–8.9)

## 2019-04-22 ENCOUNTER — TELEPHONE (OUTPATIENT)
Dept: GASTROENTEROLOGY | Facility: CLINIC | Age: 64
End: 2019-04-22

## 2019-04-22 ENCOUNTER — OFFICE VISIT (OUTPATIENT)
Dept: FAMILY MEDICINE CLINIC | Facility: CLINIC | Age: 64
End: 2019-04-22

## 2019-04-22 ENCOUNTER — TELEPHONE (OUTPATIENT)
Dept: FAMILY MEDICINE CLINIC | Facility: CLINIC | Age: 64
End: 2019-04-22

## 2019-04-22 VITALS
HEIGHT: 67 IN | WEIGHT: 216.4 LBS | DIASTOLIC BLOOD PRESSURE: 82 MMHG | BODY MASS INDEX: 33.97 KG/M2 | SYSTOLIC BLOOD PRESSURE: 142 MMHG

## 2019-04-22 DIAGNOSIS — R06.2 WHEEZE: ICD-10-CM

## 2019-04-22 DIAGNOSIS — K21.00 GASTROESOPHAGEAL REFLUX DISEASE WITH ESOPHAGITIS: Chronic | ICD-10-CM

## 2019-04-22 DIAGNOSIS — Z23 NEED FOR VACCINATION: ICD-10-CM

## 2019-04-22 DIAGNOSIS — J40 BRONCHITIS: ICD-10-CM

## 2019-04-22 DIAGNOSIS — I10 ESSENTIAL HYPERTENSION, BENIGN: Primary | Chronic | ICD-10-CM

## 2019-04-22 DIAGNOSIS — R05.9 COUGH: ICD-10-CM

## 2019-04-22 DIAGNOSIS — J45.20 MILD INTERMITTENT ASTHMA, UNSPECIFIED WHETHER COMPLICATED: Chronic | ICD-10-CM

## 2019-04-22 PROBLEM — H72.92 PERFORATION OF LEFT TYMPANIC MEMBRANE: Status: RESOLVED | Noted: 2018-05-14 | Resolved: 2019-04-22

## 2019-04-22 PROCEDURE — 99214 OFFICE O/P EST MOD 30 MIN: CPT | Performed by: FAMILY MEDICINE

## 2019-04-22 PROCEDURE — 90670 PCV13 VACCINE IM: CPT | Performed by: FAMILY MEDICINE

## 2019-04-22 PROCEDURE — 90471 IMMUNIZATION ADMIN: CPT | Performed by: FAMILY MEDICINE

## 2019-04-22 RX ORDER — ALBUTEROL SULFATE 90 UG/1
2 AEROSOL, METERED RESPIRATORY (INHALATION) EVERY 4 HOURS PRN
Qty: 3 INHALER | Refills: 3 | Status: SHIPPED | OUTPATIENT
Start: 2019-04-22 | End: 2019-06-21

## 2019-04-22 NOTE — TELEPHONE ENCOUNTER
Contacted patient regarding lab and upper gi results. There was no answer on patient phone, however patient did request that results be left on her voicemail. Results were left on patient voicemail with instructions to call with any questions or concerns.    ----- Message from MIRIAM Upton sent at 4/19/2019  3:12 PM CDT -----  Please call patient with results, H pylori negative

## 2019-04-22 NOTE — TELEPHONE ENCOUNTER
[facility] CALLED BACK ON PRESP TODAY FOR MOLLY JAMES.   THE BREO INHALER WAS FOR 2 PUFFS A DAY BUT THE ONLY WAY HER INSURANCE WILL PAY FOR THIS IS IF THE PRESP CAN BE INCREASED TO 200MCG AND DO 1 PUFF DAILY  PLEASE SEND NEW PRESP OR CALL PHARM BACK  [phone]

## 2019-04-22 NOTE — PROGRESS NOTES
Subjective   Mikki Joel is a 64 y.o. female.     History of Present Illness   follow-up hypertension obesity asthma reflux disease.  Current is having problems with reflux vomitings actually lost some weight due to it is seeing GI for same.  Fluoroscopy and barium swallow revealed hiatal hernia with probable reflux esophageal valve problem.  Defer to GI for same.  Weight and blood pressure have improved slightly.  Is time for her first pneumonia vaccine.  Seeing all subspecialist.    The following portions of the patient's history were reviewed and updated as appropriate: allergies, current medications, past family history, past medical history, past social history, past surgical history and problem list.    Review of Systems   Constitutional: Negative for activity change, appetite change, fatigue and unexpected weight change.   HENT: Negative for trouble swallowing and voice change.    Eyes: Negative for redness and visual disturbance.   Respiratory: Negative for cough and wheezing.    Cardiovascular: Negative for chest pain and palpitations.   Gastrointestinal: Positive for nausea and vomiting. Negative for abdominal pain, constipation and diarrhea.   Genitourinary: Negative for urgency.   Musculoskeletal: Positive for arthralgias. Negative for joint swelling.   Neurological: Negative for syncope and headaches.   Hematological: Negative for adenopathy.   Psychiatric/Behavioral: Negative for sleep disturbance.       Objective   Physical Exam   Constitutional: She is oriented to person, place, and time. She appears well-developed.   HENT:   Head: Normocephalic.   Nose: Nose normal.   Eyes: Pupils are equal, round, and reactive to light.   Neck: Normal range of motion. No thyromegaly present.   Cardiovascular: Normal rate, regular rhythm, normal heart sounds and intact distal pulses. Exam reveals no gallop and no friction rub.   No murmur heard.  Pulmonary/Chest: Breath sounds normal.   Abdominal: Soft. She  exhibits no distension and no mass. There is no tenderness.   Musculoskeletal: Normal range of motion.   Get up and go 3 seconds gait normal   Neurological: She is alert and oriented to person, place, and time. She has normal reflexes.   Skin: Skin is warm and dry.   Psychiatric: She has a normal mood and affect. Her speech is normal and behavior is normal.       Assessment/Plan   Mikki was seen today for hypertension.    Diagnoses and all orders for this visit:    Essential hypertension, benign    BMI 35.0-35.9,adult    Need for vaccination  -     Pneumococcal Conjugate Vaccine 13-Valent All    Mild intermittent asthma, unspecified whether complicated  -     Fluticasone Furoate-Vilanterol (BREO ELLIPTA) 100-25 MCG/INH inhaler; 1 puff bid and rinse out mouth    Cough  -     albuterol sulfate HFA (PROAIR HFA) 108 (90 Base) MCG/ACT inhaler; Inhale 2 puffs Every 4 (Four) Hours As Needed for Wheezing.    Bronchitis  -     albuterol sulfate HFA (PROAIR HFA) 108 (90 Base) MCG/ACT inhaler; Inhale 2 puffs Every 4 (Four) Hours As Needed for Wheezing.    Wheeze  -     albuterol sulfate HFA (PROAIR HFA) 108 (90 Base) MCG/ACT inhaler; Inhale 2 puffs Every 4 (Four) Hours As Needed for Wheezing.    Gastroesophageal reflux disease with esophagitis      Defer to GI for same counseled again on weight loss lifestyle measures making sure using all inhalers during the summer months.  Counseled on hydration start immunization update recheck 6 months

## 2019-04-23 RX ORDER — LEVOFLOXACIN 500 MG/1
500 TABLET, FILM COATED ORAL DAILY
Qty: 10 TABLET | Refills: 0 | Status: SHIPPED | OUTPATIENT
Start: 2019-04-23 | End: 2019-06-03

## 2019-04-23 RX ORDER — LEVOFLOXACIN 500 MG/1
TABLET, FILM COATED ORAL
Qty: 14 TABLET | Refills: 0 | Status: SHIPPED | OUTPATIENT
Start: 2019-04-23 | End: 2019-05-09

## 2019-04-23 NOTE — TELEPHONE ENCOUNTER
----- Message from Ramy Gaston MD sent at 4/23/2019  1:05 PM CDT -----  Contact: [phone]  Tell her , ok call if no better in 2 weeks. Also take 2 hr away from food intake,  Do not take sucrafate with in  6 hr  ----- Message -----  From: Jaiden Avalos  Sent: 4/23/2019  12:49 PM  To: Antonina Byrd, Ximena Young, #    Says that she having another sinus infection with the smell, discolored mucus just like she had in February. Wants to know if you can call in a prescription for Levofloxacin 500 mg to [facility] in [place].

## 2019-04-23 NOTE — TELEPHONE ENCOUNTER
Called patient and let her know that her medicine had been called in to her pharmacy and to call if no better in 2 weeks patient understood these instructions

## 2019-04-24 DIAGNOSIS — J30.9 ALLERGIC RHINITIS, UNSPECIFIED SEASONALITY, UNSPECIFIED TRIGGER: ICD-10-CM

## 2019-04-24 RX ORDER — CETIRIZINE HYDROCHLORIDE 10 MG/1
10 TABLET ORAL DAILY
Qty: 30 TABLET | Refills: 11 | Status: SHIPPED | OUTPATIENT
Start: 2019-04-24 | End: 2020-04-01

## 2019-04-29 ENCOUNTER — TELEPHONE (OUTPATIENT)
Dept: GASTROENTEROLOGY | Facility: CLINIC | Age: 64
End: 2019-04-29

## 2019-04-29 NOTE — TELEPHONE ENCOUNTER
Attempted to contact patient to offer her an appointment this week. There was no answer. I left a voicemail requesting she call the office if she wants that appointment.        ----- Message from Milagro Aviles sent at 4/22/2019  1:55 PM CDT -----  Patient has questions about the barium test she took and she is still throwing up. She needs to know what to do. Please call her back later this afternoon or during lunch tomorrow. Thanks

## 2019-05-02 DIAGNOSIS — G43.909 MIGRAINE WITHOUT STATUS MIGRAINOSUS, NOT INTRACTABLE, UNSPECIFIED MIGRAINE TYPE: ICD-10-CM

## 2019-05-02 RX ORDER — SUMATRIPTAN 25 MG/1
TABLET, FILM COATED ORAL
Qty: 90 TABLET | Refills: 0 | Status: SHIPPED | OUTPATIENT
Start: 2019-05-02 | End: 2019-06-03

## 2019-05-09 ENCOUNTER — OFFICE VISIT (OUTPATIENT)
Dept: GASTROENTEROLOGY | Facility: CLINIC | Age: 64
End: 2019-05-09

## 2019-05-09 VITALS
WEIGHT: 219.8 LBS | BODY MASS INDEX: 34.5 KG/M2 | SYSTOLIC BLOOD PRESSURE: 132 MMHG | HEART RATE: 82 BPM | DIASTOLIC BLOOD PRESSURE: 82 MMHG | HEIGHT: 67 IN

## 2019-05-09 DIAGNOSIS — K21.9 GASTROESOPHAGEAL REFLUX DISEASE, ESOPHAGITIS PRESENCE NOT SPECIFIED: ICD-10-CM

## 2019-05-09 DIAGNOSIS — R11.0 NAUSEA: ICD-10-CM

## 2019-05-09 DIAGNOSIS — K22.89 PRESBYESOPHAGUS: ICD-10-CM

## 2019-05-09 DIAGNOSIS — R10.13 EPIGASTRIC PAIN: Primary | ICD-10-CM

## 2019-05-09 PROCEDURE — 99214 OFFICE O/P EST MOD 30 MIN: CPT | Performed by: NURSE PRACTITIONER

## 2019-05-09 RX ORDER — DEXTROSE AND SODIUM CHLORIDE 5; .45 G/100ML; G/100ML
30 INJECTION, SOLUTION INTRAVENOUS CONTINUOUS PRN
Status: CANCELLED | OUTPATIENT
Start: 2019-06-10

## 2019-05-09 NOTE — PATIENT INSTRUCTIONS
Heartburn  Heartburn is a type of pain or discomfort that can happen in the throat or chest. It is often described as a burning pain. It may also cause a bad taste in the mouth. Heartburn may feel worse when you lie down or bend over. It may be caused by stomach contents that move back up (reflux) into the tube that connects the mouth with the stomach (esophagus).  Follow these instructions at home:  Take these actions to lessen your discomfort and to help avoid problems.  Diet  · Follow a diet as told by your doctor. You may need to avoid foods and drinks such as:  ? Coffee and tea (with or without caffeine).  ? Drinks that contain alcohol.  ? Energy drinks and sports drinks.  ? Carbonated drinks or sodas.  ? Chocolate and cocoa.  ? Peppermint and mint flavorings.  ? Garlic and onions.  ? Horseradish.  ? Spicy and acidic foods, such as peppers, chili powder, molina powder, vinegar, hot sauces, and BBQ sauce.  ? Citrus fruit juices and citrus fruits, such as oranges, navarro, and limes.  ? Tomato-based foods, such as red sauce, chili, salsa, and pizza with red sauce.  ? Fried and fatty foods, such as donuts, french fries, potato chips, and high-fat dressings.  ? High-fat meats, such as hot dogs, rib eye steak, sausage, ham, and lacey.  ? High-fat dairy items, such as whole milk, butter, and cream cheese.  · Eat small meals often. Avoid eating large meals.  · Avoid drinking large amounts of liquid with your meals.  · Avoid eating meals during the 2-3 hours before bedtime.  · Avoid lying down right after you eat.  · Do not exercise right after you eat.  General instructions  · Pay attention to any changes in your symptoms.  · Take over-the-counter and prescription medicines only as told by your doctor. Do not take aspirin, ibuprofen, or other NSAIDs unless your doctor says it is okay.  · Do not use any tobacco products, including cigarettes, chewing tobacco, and e-cigarettes. If you need help quitting, ask your  doctor.  · Wear loose clothes. Do not wear anything tight around your waist.  · Raise (elevate) the head of your bed about 6 inches (15 cm).  · Try to lower your stress. If you need help doing this, ask your doctor.  · If you are overweight, lose an amount of weight that is healthy for you. Ask your doctor about a safe weight loss goal.  · Keep all follow-up visits as told by your doctor. This is important.  Contact a doctor if:  · You have new symptoms.  · You lose weight and you do not know why it is happening.  · You have trouble swallowing, or it hurts to swallow.  · You have wheezing or a cough that keeps happening.  · Your symptoms do not get better with treatment.  · You have heartburn often for more than two weeks.  Get help right away if:  · You have pain in your arms, neck, jaw, teeth, or back.  · You feel sweaty, dizzy, or light-headed.  · You have chest pain or shortness of breath.  · You throw up (vomit) and your throw up looks like blood or coffee grounds.  · Your poop (stool) is bloody or black.  This information is not intended to replace advice given to you by your health care provider. Make sure you discuss any questions you have with your health care provider.  Document Released: 08/29/2012 Document Revised: 05/25/2017 Document Reviewed: 04/13/2016  [employer] Patient Education © 2019 [employer]

## 2019-05-09 NOTE — PROGRESS NOTES
Chief Complaint   Patient presents with   • Heartburn   • Abdominal Pain       Subjective    Mikki Palak Joel is a 64 y.o. female. she is here today for follow-up.    History of Present Illness  64-year-old female presents to discuss heartburn abdominal pain and upper GI series results.  States anytime she eats she has severe epigastric pain and frequent nausea and vomiting.  Her weight has remained stable.  Previous EGD was completed January 2018 noted esophagitis and gastritis.  She denies any melena or hematochezia.  Reports her bowel movements are about 1-2 times per day.  Plan; schedule patient for EGD due to abnormal findings on upper GI series, presbyesophagus, GERD, epigastric pain.  Follow-up after tests are in office sooner if needed       The following portions of the patient's history were reviewed and updated as appropriate:   Past Medical History:   Diagnosis Date   • Anemia    • Anemia June 2016    might have been due to knee replacement surgery I had in mar   • Anemia June 2016    might have been due to knee replacement surgery I had in mar   • Anxiety    • Arthritis    • Arthritis of back 2009    lower back   • Asthma    • CTS (carpal tunnel syndrome) 1987    left wrist - had corrective surgery   • Depression    • Fracture of wrist 1979   • High blood pressure    • [place] (hard of hearing)    • Hypertension 1999    metoprolol 100 mg and triamterene/hydrochlorothiazide 37.5mg   • Hypertension 1999    metoprolol 100 mg and triamterene/hydrochlorothiazide 37.5mg   • Knee swelling 2010   • Migraine    • Periarthritis of shoulder 2011    both shoulders right shoulder total replacement   • Tear of meniscus of knee 2016    fell     Past Surgical History:   Procedure Laterality Date   • BUNIONECTOMY Bilateral    • COLONOSCOPY  2015   • COLONOSCOPY N/A 1/19/2018    Procedure: COLONOSCOPY;  Surgeon: Ravi Cavanaugh MD;  Location: [facility] ENDOSCOPY;  Service:    • COLONOSCOPY  2015   • EAR TUBES     •  ENDOSCOPIC FUNCTIONAL SINUS SURGERY (FESS) Bilateral 2017    Procedure: BILATERAL ETHMOID AND MAXILLARY SINUS AND NASAL SEPTOPLASTIES AND BILATERAL TURBINECTOMIES;  Surgeon: Ramy Gaston MD;  Location: [facility];  Service:    • ENDOSCOPY N/A 2018    Procedure: ESOPHAGOGASTRODUODENOSCOPY;  Surgeon: Ravi Polk MD;  Location: [facility] ENDOSCOPY;  Service:    • HAND SURGERY     • JOINT REPLACEMENT  2016    right knee replacement 2016   • JOINT REPLACEMENT      right shoulder   • KNEE SURGERY  2016    total right knee replacement   • RHINOPLASTY      x3   • SHOULDER ARTHROSCOPY     • SHOULDER SURGERY      complete shoulder joint replacement   • TONSILLECTOMY     • TUBAL ABDOMINAL LIGATION     • TYMPANOPLASTY Left 2018    Procedure: TYMPANOPLASTY WITH CARTILAGE GRAFT;  Surgeon: Ramy Gaston MD;  Location: [facility];  Service: ENT   • UPPER GASTROINTESTINAL ENDOSCOPY     • UPPER GASTROINTESTINAL ENDOSCOPY     • UPPER GASTROINTESTINAL ENDOSCOPY  2018    Per Dr. Ravi Polk M.D.   • WRIST SURGERY      carpal tunnel repair left wrist     Family History   Problem Relation Age of Onset   • Hypertension Mother    • COPD Mother    • Arthritis Mother    • Alcohol abuse Mother    • Cancer Father    • Arthritis Father    • Alcohol abuse Father    • COPD Maternal Grandmother    • Stroke Maternal Grandmother    • Osteoporosis Maternal Grandmother    • Irritable bowel syndrome Sister      OB History      Para Term  AB Living    2 2 2          SAB TAB Ectopic Molar Multiple Live Births                       Prior to Admission medications    Medication Sig Start Date End Date Taking? Authorizing Provider   albuterol sulfate HFA (PROAIR HFA) 108 (90 Base) MCG/ACT inhaler Inhale 2 puffs Every 4 (Four) Hours As Needed for Wheezing. 19  Yes Ronald Bowser MD   azelastine (ASTELIN) 0.1 % nasal spray 2 sprays into each nostril 2 (Two) Times a Day. Use in each nostril  as directed   Yes Lucía Greene MD   busPIRone (BUSPAR) 30 MG tablet Take 30 mg by mouth 2 (Two) Times a Day. 10/1/18  Yes Lucía Greene MD   cetirizine (ZYRTEC ALLERGY) 10 MG tablet Take 1 tablet by mouth Daily. 4/24/19  Yes Ronald Bowser MD   dexlansoprazole (DEXILANT) 60 MG capsule Take 60 mg by mouth Daily.   Yes Lucía Greene MD   eszopiclone (LUNESTA) 1 MG tablet Take 1 tablet by mouth Every Night. Take immediately before bedtime 3/14/19  Yes Ramirez Bai MD   Fluticasone Furoate-Vilanterol (BREO ELLIPTA) 100-25 MCG/INH inhaler 1 puff bid and rinse out mouth 4/22/19  Yes Ronald Bowser MD   levoFLOXacin (LEVAQUIN) 500 MG tablet Take 1 tablet by mouth Daily. Call and d/c if joint/tendon pain 4/23/19  Yes Ramy Gaston MD   metoprolol succinate XL (TOPROL-XL) 100 MG 24 hr tablet TAKE 1 TABLET BY MOUTH DAILY 1/21/19  Yes Ronald Bowser MD   mometasone (NASONEX) 50 MCG/ACT nasal spray 2 sprays into the nostril(s) as directed by provider 2 (Two) Times a Day. 9/6/18  Yes Ramy Gaston MD   ondansetron (ZOFRAN) 8 MG tablet Take 1 tablet by mouth Every 8 (Eight) Hours As Needed for Nausea or Vomiting. 3/18/19  Yes Ramy Gaston MD   rOPINIRole (REQUIP) 3 MG tablet Take 3 mg by mouth Daily. 12/18/18  Yes Lucía Greene MD   SUMAtriptan (IMITREX) 25 MG tablet TAKE 1 TABLET BY MOUTH AS NEEDED FOR MIGRAINE 4/11/19  Yes Ronald Bowser MD   SUMAtriptan (IMITREX) 25 MG tablet TAKE 1 TABLET BY MOUTH AS NEEDED FOR MIGRAINE 5/2/19  Yes Ronald Bowser MD   Vortioxetine HBr (TRINTELLIX) 10 MG tablet Take 10 mg by mouth Daily.   Yes Lucía Greene MD   levoFLOXacin (LEVAQUIN) 500 MG tablet TAKE 1 TABLET BY MOUTH EVERY DAY 4/23/19 5/9/19 Yes Ramy Gaston MD     Allergies   Allergen Reactions   • Codeine Other (See Comments)     Increases heart rate   • Sulfa Antibiotics Hives     Social History     Socioeconomic History   • Marital status:      Spouse name: Not on  "file   • Number of children: Not on file   • Years of education: Not on file   • Highest education level: Not on file   Tobacco Use   • Smoking status: Never Smoker   • Smokeless tobacco: Never Used   Substance and Sexual Activity   • Alcohol use: Yes     Alcohol/week: 1.2 oz     Types: 2 Standard drinks or equivalent per week     Comment: 02/04/2019 - Patient confirmed she partakes of alcoholic beverage consumption on an occasional basis.   • Drug use: No   • Sexual activity: Defer     Partners: Male     Birth control/protection: Post-menopausal, Surgical, None, OCP     Comment: 02/04/2019 - Patient confirmed Tubal Ligation following birth of  last child at age 32.       Review of Systems  Review of Systems     /82 (BP Location: Left arm)   Pulse 82   Ht 170.2 cm (67\")   Wt 99.7 kg (219 lb 12.8 oz)   LMP  (LMP Unknown)   BMI 34.43 kg/m²     Objective    Physical Exam   Constitutional: She is oriented to person, place, and time. She appears well-developed and well-nourished. She is cooperative. No distress.   HENT:   Head: Normocephalic and atraumatic.   Neck: Normal range of motion. Neck supple. No thyromegaly present.   Cardiovascular: Normal rate, regular rhythm and normal heart sounds.   Pulmonary/Chest: Effort normal and breath sounds normal. She has no wheezes. She has no rhonchi. She has no rales.   Abdominal: Soft. Normal appearance and bowel sounds are normal. She exhibits no distension. There is no hepatosplenomegaly. There is tenderness in the epigastric area. There is no rigidity and no guarding. No hernia.   Lymphadenopathy:     She has no cervical adenopathy.   Neurological: She is alert and oriented to person, place, and time.   Skin: Skin is warm, dry and intact. No rash noted. No pallor.   Psychiatric: She has a normal mood and affect. Her speech is normal.     Office Visit on 04/11/2019   Component Date Value Ref Range Status   • H. pylori IgG 04/18/2019 0.19  0.00 - 0.79 Index Value " Final                                 Negative           <0.80                               Equivocal    0.80 - 0.89                               Positive           >0.89   • H. pylori, IgA ABS 04/18/2019 <9.0  0.0 - 8.9 units Final                                    Negative          <9.0                                  Equivocal   9.0 - 11.0                                  Positive         >11.0   • H. Pylori, IgM 04/18/2019 <9.0  0.0 - 8.9 units Final                                    Negative          <9.0                                  Equivocal   9.0 - 11.0                                  Positive         >11.0  This test was developed and its performance characteristics  determined by [facility]. It has not been cleared or approved  by the Food and Drug Administration.     Assessment/Plan      1. Epigastric pain    2. Nausea    3. Gastroesophageal reflux disease, esophagitis presence not specified    4. Presbyesophagus    .       Orders placed during this encounter include:  Orders Placed This Encounter   Procedures   • Follow Anesthesia Guidelines / Standing Orders     Standing Status:   Future   • Obtain Informed Consent     Standing Status:   Future     Order Specific Question:   Informed Consent Given For     Answer:   ESOPHAGOGASTRODUODENOSCOPY possible dilation       ESOPHAGOGASTRODUODENOSCOPY possible dilation (N/A)    Review and/or summary of lab tests, radiology, procedures, medications. Review and summary of old records and obtaining of history. The risks and benefits of my recommendations, as well as other treatment options were discussed with the patient today. Questions were answered.    No orders of the defined types were placed in this encounter.      Follow-up: Return in about 4 weeks (around 6/6/2019).          This document has been electronically signed by MIRIAM White on May 9, 2019 2:18 PM             Results for orders placed or performed in visit on 04/11/19   Helicobacter  Pylori, IgA IgG IgM   Result Value Ref Range    H. pylori IgG 0.19 0.00 - 0.79 Index Value    H. pylori, IgA ABS <9.0 0.0 - 8.9 units    H. Pylori, IgM <9.0 0.0 - 8.9 units   Results for orders placed or performed in visit on 02/26/19   Wound Culture - Wound, Sinus, maxillary left   Result Value Ref Range    Wound Culture Moderate growth (3+) Serratia marcescens (A)     Wound Culture Light growth (2+) Normal Skin Barbara     Gram Stain Rare (1+) WBCs seen     Gram Stain Few (2+) Gram negative bacilli     Gram Stain Rare (1+) Gram positive cocci        Susceptibility    Serratia marcescens - CHAR     Amoxicillin + Clavulanate >16/8 Resistant ug/ml     Ampicillin >16 Resistant ug/ml     Ampicillin + Sulbactam >16/8 Resistant ug/ml     Cefazolin >16 Resistant ug/ml     Cefepime <=8 Susceptible ug/ml     Cefoxitin >16 Resistant ug/ml     Ceftazidime <=1 Susceptible ug/ml     Ceftriaxone <=8 Susceptible ug/ml     Cefuroxime sodium >16 Resistant ug/ml     Levofloxacin 4 Intermediate ug/ml     Piperacillin + Tazobactam <=16 Susceptible ug/ml     Tetracycline 8 Intermediate ug/ml     Trimethoprim + Sulfamethoxazole >2/38 Resistant ug/ml   Results for orders placed or performed in visit on 08/03/18   CBC (No Diff)   Result Value Ref Range    WBC 13.99 (H) 3.20 - 9.80 10*3/mm3    RBC 4.36 3.77 - 5.16 10*6/mm3    Hemoglobin 12.7 12.0 - 15.5 g/dL    Hematocrit 37.7 35.0 - 45.0 %    MCV 86.5 80.0 - 98.0 fL    MCH 29.1 26.5 - 34.0 pg    MCHC 33.7 31.4 - 36.0 g/dL    RDW 13.6 11.5 - 14.5 %    RDW-SD 43.0 36.4 - 46.3 fl    MPV 9.4 8.0 - 12.0 fL    Platelets 397 150 - 450 10*3/mm3   Comprehensive Metabolic Panel   Result Value Ref Range    Glucose 97 60 - 100 mg/dL    BUN 37 (H) 7 - 21 mg/dL    Creatinine 1.18 (H) 0.50 - 1.00 mg/dL    Sodium 138 137 - 145 mmol/L    Potassium 3.8 3.5 - 5.1 mmol/L    Chloride 102 95 - 110 mmol/L    CO2 25.0 22.0 - 31.0 mmol/L    Calcium 9.6 8.4 - 10.2 mg/dL    Total Protein 7.5 6.3 - 8.6 g/dL    Albumin  4.60 3.40 - 4.80 g/dL    ALT (SGPT) 32 9 - 52 U/L    AST (SGOT) 25 14 - 36 U/L    Alkaline Phosphatase 139 (H) 38 - 126 U/L    Total Bilirubin 0.3 0.2 - 1.3 mg/dL    eGFR Non  Amer 46 45 - 104 mL/min/1.73    Globulin 2.9 2.3 - 3.5 gm/dL    A/G Ratio 1.6 1.1 - 1.8 g/dL    BUN/Creatinine Ratio 31.4 (H) 7.0 - 25.0    Anion Gap 11.0 5.0 - 15.0 mmol/L   Results for orders placed or performed in visit on 01/30/18   TSH   Result Value Ref Range    TSH 0.240 (L) 0.460 - 4.680 mIU/mL   T4, Free   Result Value Ref Range    Free T4 1.04 0.78 - 2.19 ng/dL   Lipid Panel   Result Value Ref Range    Total Cholesterol 197 0 - 199 mg/dL    Triglycerides 227 (H) 20 - 199 mg/dL    HDL Cholesterol 45 (L) 60 - 200 mg/dL    LDL Cholesterol  122 1 - 129 mg/dL    LDL/HDL Ratio 2.37 0.00 - 3.22   Results for orders placed or performed during the hospital encounter of 01/19/18   Tissue Pathology Exam - Tissue, Gastric, Antrum   Result Value Ref Range    Case Report       Surgical Pathology Report                         Case: [id number]                                  Authorizing Provider:  Ravi Cavanaugh MD         Collected:           01/19/2018 02:07 PM          Ordering Location:     [facility]             Received:            01/19/2018 02:49 PM                                 [place] [facility]                                                     Pathologist:           Jonnie Bradley MD                                                         Specimen:    Gastric, Antrum                                                                            Final Diagnosis       GASTRIC ANTRUM, MUCOSAL BIOPSY:   MILD CHRONIC GASTRITIS.   NO EVIDENCE OF HELICOBACTER PYLORI.       Gross Description       The specimen consists of a mucosal biopsy measuring up to 0.3 cm in greatest dimension.  All embedded.        Embedded Images     Results for orders placed or performed during the hospital encounter of 09/05/17   Tissue Pathology  "Exam   Result Value Ref Range    Case Report       Surgical Pathology Report                         Case: [id number]                                  Authorizing Provider:  Ramy Gaston MD        Collected:           09/05/2017 11:56 AM          Ordering Location:     [facility]             Received:            09/05/2017 01:26 PM                                 [place] [facility]                                                              Pathologist:           Deven Conner MD                                                          Specimens:   1) - Naris, Left, LEFT SINUS                                                                        2) - Naris, Right, RIGHT SINUS                                                             Final Diagnosis       1.  MUCOSA, NASAL SINUS, LEFT SIDE:  CHRONIC SINUSITIS OF RESPIRATORY MUCOSA IN RELATION TO BITS OF BONE.  NEGATIVE FOR FUNGI (GMS STAIN).    2.  MUCOSA, NASAL SINUS, RIGHT SIDE:  CHRONIC SINUSITIS OF RESPIRATORY MUCOSA IN RELATION TO BITS OF BONE.  NEGATIVE FOR FUNGI (GMS STAIN).      Gross Description       1.  The first container is labeled \"left sinus, nose\" and has pinkish white mucosa measuring 1.5 cc in aggregate.  The entire specimen is embedded as 1A.    2.  The second container is labeled \"right sinus, nose\" and has pinkish white mucosa measuring 1.5 cc in aggregate.  The entire specimen is embedded as 2A.       Embedded Images     Wound Culture   Result Value Ref Range    Wound Culture Light growth (2+) Pseudomonas aeruginosa (A)     Wound Culture Light growth (2+) Serratia marcescens (A)     Wound Culture Light growth (2+) Streptococcus mitis / oralis (A)     Gram Stain Moderate (3+) WBCs seen     Gram Stain Rare (1+) Gram negative bacilli        Susceptibility    Pseudomonas aeruginosa - CHAR     Ceftazidime 4  ug/ml     Cefuroxime sodium >16  ug/ml     Gentamicin <=4 Susceptible ug/ml     Levofloxacin <=2 Susceptible ug/ml     Piperacillin + " Tazobactam <=16  ug/ml     Tobramycin <=4 Susceptible ug/ml    Serratia marcescens - CHAR     Amikacin <=16 Susceptible ug/ml     Amoxicillin + Clavulanate >16/8 Resistant ug/ml     Ampicillin >16 Resistant ug/ml     Ampicillin + Sulbactam >16/8 Resistant ug/ml     Cefazolin >16 Resistant ug/ml     Cefotaxime <=2  ug/ml     Cefoxitin 16 Intermediate ug/ml     Ceftazidime <=1  ug/ml     Ceftriaxone <=8  ug/ml     Cefuroxime sodium >16 Resistant ug/ml     Ciprofloxacin 2 Intermediate ug/ml     Gentamicin <=4 Susceptible ug/ml     Imipenem <=4 Susceptible ug/ml     Levofloxacin <=2 Susceptible ug/ml     Meropenem <=4 Susceptible ug/ml     Nitrofurantoin >64  ug/ml     Piperacillin + Tazobactam <=16  ug/ml     Tetracycline <=4 Susceptible ug/ml     Tobramycin <=4 Susceptible ug/ml     Trimethoprim + Sulfamethoxazole <=2/38 Susceptible ug/ml   Results for orders placed or performed in visit on 08/31/17   Basic Metabolic Panel   Result Value Ref Range    Glucose 93 60 - 100 mg/dL    BUN 24 (H) 7 - 21 mg/dL    Creatinine 0.82 0.50 - 1.00 mg/dL    Sodium 136 (L) 137 - 145 mmol/L    Potassium 4.7 3.5 - 5.1 mmol/L    Chloride 100 95 - 110 mmol/L    CO2 26.0 22.0 - 31.0 mmol/L    Calcium 9.6 8.4 - 10.2 mg/dL    eGFR Non  Amer 70 45 - 104 mL/min/1.73    BUN/Creatinine Ratio 29.3 (H) 7.0 - 25.0    Anion Gap 10.0 5.0 - 15.0 mmol/L   Results for orders placed or performed in visit on 08/07/17   Sedimentation Rate   Result Value Ref Range    Sed Rate 9 0 - 20 mm/hr   Rheumatoid Factor   Result Value Ref Range    Rheumatoid Factor Qualitative Negative Negative   C-reactive protein   Result Value Ref Range    C-Reactive Protein 1.00 0.00 - 1.00 mg/dL   DERRICK   Result Value Ref Range    DERRICK Direct Negative Negative   Results for orders placed or performed in visit on 08/01/17   Hepatitis C Antibody   Result Value Ref Range    Hepatitis C Ab Negative Negative   Hemoglobin A1c   Result Value Ref Range    Hemoglobin A1C 5.7 (H) 4 -  5.6 %   Results for orders placed or performed in visit on 05/31/17   Wound Culture   Result Value Ref Range    Wound Culture Light growth (2+) Serratia marcescens (A)     Beta Lactamase      Gram Stain Few (2+) WBCs seen     Gram Stain Rare (1+) Gram negative bacilli        Susceptibility    Serratia marcescens - CHAR     Amikacin <=16 Susceptible ug/ml     Amoxicillin + Clavulanate >16/8 Resistant ug/ml     Ampicillin >16 Resistant ug/ml     Ampicillin + Sulbactam >16/8 Resistant ug/ml     Cefazolin >16 Resistant ug/ml     Cefotaxime <=2  ug/ml     Cefoxitin 16 Intermediate ug/ml     Ceftazidime <=1  ug/ml     Ceftriaxone <=8  ug/ml     Cefuroxime >16 Resistant ug/ml     Ciprofloxacin <=1 Susceptible ug/ml     Gentamicin <=4 Susceptible ug/ml     Imipenem <=4 Susceptible ug/ml     Levofloxacin <=2 Susceptible ug/ml     Meropenem <=4 Susceptible ug/ml     Nitrofurantoin >64  ug/ml     Piperacillin + Tazobactam <=16  ug/ml     Tetracycline 8 Intermediate ug/ml     Tobramycin <=4 Susceptible ug/ml     Trimethoprim + Sulfamethoxazole <=2/38 Susceptible ug/ml

## 2019-05-24 RX ORDER — DEXLANSOPRAZOLE 60 MG/1
CAPSULE, DELAYED RELEASE ORAL
Qty: 90 CAPSULE | Refills: 0 | Status: SHIPPED | OUTPATIENT
Start: 2019-05-24 | End: 2019-08-24 | Stop reason: SDUPTHER

## 2019-05-29 DIAGNOSIS — R05.9 COUGH: ICD-10-CM

## 2019-05-29 DIAGNOSIS — J40 BRONCHITIS: ICD-10-CM

## 2019-05-29 DIAGNOSIS — R06.2 WHEEZE: ICD-10-CM

## 2019-06-10 ENCOUNTER — HOSPITAL ENCOUNTER (OUTPATIENT)
Facility: HOSPITAL | Age: 64
Setting detail: HOSPITAL OUTPATIENT SURGERY
Discharge: HOME | End: 2019-06-10
Attending: INTERNAL MEDICINE | Admitting: INTERNAL MEDICINE

## 2019-06-10 ENCOUNTER — ANESTHESIA (OUTPATIENT)
Dept: GASTROENTEROLOGY | Facility: HOSPITAL | Age: 64
End: 2019-06-10

## 2019-06-10 ENCOUNTER — ANESTHESIA EVENT (OUTPATIENT)
Dept: GASTROENTEROLOGY | Facility: HOSPITAL | Age: 64
End: 2019-06-10

## 2019-06-10 VITALS
DIASTOLIC BLOOD PRESSURE: 74 MMHG | SYSTOLIC BLOOD PRESSURE: 140 MMHG | HEIGHT: 67 IN | OXYGEN SATURATION: 100 % | HEART RATE: 71 BPM | WEIGHT: 214 LBS | BODY MASS INDEX: 33.59 KG/M2 | TEMPERATURE: 97 F | RESPIRATION RATE: 18 BRPM

## 2019-06-10 DIAGNOSIS — K22.89 PRESBYESOPHAGUS: ICD-10-CM

## 2019-06-10 DIAGNOSIS — R10.13 EPIGASTRIC PAIN: ICD-10-CM

## 2019-06-10 DIAGNOSIS — R11.0 NAUSEA: ICD-10-CM

## 2019-06-10 DIAGNOSIS — K21.9 GASTROESOPHAGEAL REFLUX DISEASE, ESOPHAGITIS PRESENCE NOT SPECIFIED: ICD-10-CM

## 2019-06-10 PROCEDURE — 88305 TISSUE EXAM BY PATHOLOGIST: CPT | Performed by: INTERNAL MEDICINE

## 2019-06-10 PROCEDURE — 88342 IMHCHEM/IMCYTCHM 1ST ANTB: CPT | Performed by: INTERNAL MEDICINE

## 2019-06-10 PROCEDURE — 88342 IMHCHEM/IMCYTCHM 1ST ANTB: CPT | Performed by: PATHOLOGY

## 2019-06-10 PROCEDURE — 25010000002 PROPOFOL 10 MG/ML EMULSION: Performed by: NURSE ANESTHETIST, CERTIFIED REGISTERED

## 2019-06-10 PROCEDURE — 43239 EGD BIOPSY SINGLE/MULTIPLE: CPT | Performed by: INTERNAL MEDICINE

## 2019-06-10 PROCEDURE — 88305 TISSUE EXAM BY PATHOLOGIST: CPT | Performed by: PATHOLOGY

## 2019-06-10 RX ORDER — DEXTROSE AND SODIUM CHLORIDE 5; .45 G/100ML; G/100ML
30 INJECTION, SOLUTION INTRAVENOUS CONTINUOUS PRN
Status: DISCONTINUED | OUTPATIENT
Start: 2019-06-10 | End: 2019-06-10 | Stop reason: HOSPADM

## 2019-06-10 RX ORDER — LIDOCAINE HYDROCHLORIDE 20 MG/ML
INJECTION, SOLUTION INTRAVENOUS AS NEEDED
Status: DISCONTINUED | OUTPATIENT
Start: 2019-06-10 | End: 2019-06-10 | Stop reason: SURG

## 2019-06-10 RX ORDER — PROPOFOL 10 MG/ML
VIAL (ML) INTRAVENOUS AS NEEDED
Status: DISCONTINUED | OUTPATIENT
Start: 2019-06-10 | End: 2019-06-10 | Stop reason: SURG

## 2019-06-10 RX ADMIN — PROPOFOL 30 MG: 10 INJECTION, EMULSION INTRAVENOUS at 16:40

## 2019-06-10 RX ADMIN — PROPOFOL 20 MG: 10 INJECTION, EMULSION INTRAVENOUS at 16:38

## 2019-06-10 RX ADMIN — DEXTROSE AND SODIUM CHLORIDE 30 ML/HR: 5; 450 INJECTION, SOLUTION INTRAVENOUS at 16:12

## 2019-06-10 RX ADMIN — LIDOCAINE HYDROCHLORIDE 70 MG: 20 INJECTION, SOLUTION INTRAVENOUS at 16:36

## 2019-06-10 RX ADMIN — PROPOFOL 80 MG: 10 INJECTION, EMULSION INTRAVENOUS at 16:36

## 2019-06-10 NOTE — H&P
No chief complaint on file.      Subjective    Mikki Joel is a 64 y.o. female. she is here today for follow-up.    Review of Systems   Constitutional: Negative for activity change, appetite change, chills, diaphoresis, fatigue, fever and unexpected weight change.   HENT: Negative for congestion, sore throat and trouble swallowing.    Respiratory: Negative for apnea, cough, choking, chest tightness, shortness of breath, wheezing and stridor.    Cardiovascular: Negative for chest pain, palpitations and leg swelling.   Gastrointestinal: Negative for abdominal distention, abdominal pain, anal bleeding, blood in stool, constipation, diarrhea, nausea, rectal pain and vomiting.   Musculoskeletal: Negative for arthralgias.   Skin: Negative for color change, pallor, rash and wound.   Neurological: Negative for dizziness, syncope, weakness, light-headedness and headaches.   Psychiatric/Behavioral: Negative for agitation, behavioral problems, confusion and decreased concentration.       64-year-old female presents to discuss heartburn abdominal pain and upper GI series results.  States anytime she eats she has severe epigastric pain and frequent nausea and vomiting.  Her weight has remained stable.  Previous EGD was completed January 2018 noted esophagitis and gastritis.  She denies any melena or hematochezia.  Reports her bowel movements are about 1-2 times per day.  Plan; schedule patient for EGD due to abnormal findings on upper GI series, presbyesophagus, GERD, epigastric pain.  Follow-up after tests are in office sooner if needed       The following portions of the patient's history were reviewed and updated as appropriate:   Past Medical History:   Diagnosis Date   • Anemia    • Anemia June 2016    might have been due to knee replacement surgery I had in mar   • Anemia June 2016    might have been due to knee replacement surgery I had in mar   • Anxiety    • Arthritis    • Arthritis of back 2009    lower back    • Asthma    • CTS (carpal tunnel syndrome) 1987    left wrist - had corrective surgery   • Depression    • Fracture of wrist 1979   • High blood pressure    • [place] (hard of hearing)    • Hypertension 1999    metoprolol 100 mg and triamterene/hydrochlorothiazide 37.5mg   • Hypertension 1999    metoprolol 100 mg and triamterene/hydrochlorothiazide 37.5mg   • Knee swelling 2010   • Migraine    • Periarthritis of shoulder 2011    both shoulders right shoulder total replacement   • Tear of meniscus of knee 2016    fell     Past Surgical History:   Procedure Laterality Date   • BUNIONECTOMY Bilateral    • COLONOSCOPY  2015   • COLONOSCOPY N/A 1/19/2018    Procedure: COLONOSCOPY;  Surgeon: Ravi Polk MD;  Location: [facility] ENDOSCOPY;  Service:    • COLONOSCOPY  2015   • EAR TUBES     • ENDOSCOPIC FUNCTIONAL SINUS SURGERY (FESS) Bilateral 9/5/2017    Procedure: BILATERAL ETHMOID AND MAXILLARY SINUS AND NASAL SEPTOPLASTIES AND BILATERAL TURBINECTOMIES;  Surgeon: Ramy Gaston MD;  Location: [facility] OR;  Service:    • ENDOSCOPY N/A 1/19/2018    Procedure: ESOPHAGOGASTRODUODENOSCOPY;  Surgeon: Ravi Polk MD;  Location: [facility] ENDOSCOPY;  Service:    • HAND SURGERY     • JOINT REPLACEMENT  2016    right knee replacement 2016   • JOINT REPLACEMENT  2015    right shoulder   • KNEE SURGERY  2016    total right knee replacement   • RHINOPLASTY      x3   • SHOULDER ARTHROSCOPY     • SHOULDER SURGERY  2015    complete shoulder joint replacement   • TONSILLECTOMY     • TUBAL ABDOMINAL LIGATION  1987   • TYMPANOPLASTY Left 6/5/2018    Procedure: TYMPANOPLASTY WITH CARTILAGE GRAFT;  Surgeon: Ramy Gaston MD;  Location: [facility] OR;  Service: ENT   • UPPER GASTROINTESTINAL ENDOSCOPY  2016   • UPPER GASTROINTESTINAL ENDOSCOPY  2016   • UPPER GASTROINTESTINAL ENDOSCOPY  01/19/2018    Per Dr. Ravi Polk M.D.   • WRIST SURGERY  1987    carpal tunnel repair left wrist     Family History   Problem Relation Age of Onset   •  Hypertension Mother    • COPD Mother    • Arthritis Mother    • Alcohol abuse Mother    • Cancer Father    • Arthritis Father    • Alcohol abuse Father    • COPD Maternal Grandmother    • Stroke Maternal Grandmother    • Osteoporosis Maternal Grandmother    • Irritable bowel syndrome Sister      OB History      Para Term  AB Living    2 2 2          SAB TAB Ectopic Molar Multiple Live Births                       Prior to Admission medications    Medication Sig Start Date End Date Taking? Authorizing Provider   albuterol sulfate HFA (PROAIR HFA) 108 (90 Base) MCG/ACT inhaler Inhale 2 puffs Every 4 (Four) Hours As Needed for Wheezing. 19  Yes Ronald Bowser MD   azelastine (ASTELIN) 0.1 % nasal spray 2 sprays into each nostril 2 (Two) Times a Day. Use in each nostril as directed   Yes Lucía Greene MD   busPIRone (BUSPAR) 30 MG tablet Take 30 mg by mouth 2 (Two) Times a Day. 10/1/18  Yes Lucía Greene MD   cetirizine (ZYRTEC ALLERGY) 10 MG tablet Take 1 tablet by mouth Daily. 19  Yes Ronald Bowser MD   dexlansoprazole (DEXILANT) 60 MG capsule Take 60 mg by mouth Daily.   Yes ProviderLucía MD   eszopiclone (LUNESTA) 1 MG tablet Take 1 tablet by mouth Every Night. Take immediately before bedtime 3/14/19  Yes Ramirez Bai MD   Fluticasone Furoate-Vilanterol (BREO ELLIPTA) 100-25 MCG/INH inhaler 1 puff bid and rinse out mouth 19  Yes Ronald Bowser MD   levoFLOXacin (LEVAQUIN) 500 MG tablet Take 1 tablet by mouth Daily. Call and d/c if joint/tendon pain 19  Yes Ramy Gaston MD   metoprolol succinate XL (TOPROL-XL) 100 MG 24 hr tablet TAKE 1 TABLET BY MOUTH DAILY 19  Yes Ronald Bowser MD   mometasone (NASONEX) 50 MCG/ACT nasal spray 2 sprays into the nostril(s) as directed by provider 2 (Two) Times a Day. 18  Yes Ramy Gaston MD   ondansetron (ZOFRAN) 8 MG tablet Take 1 tablet by mouth Every 8 (Eight) Hours As Needed for Nausea or Vomiting.  "3/18/19  Yes Ramy Gaston MD   rOPINIRole (REQUIP) 3 MG tablet Take 3 mg by mouth Daily. 12/18/18  Yes ProviderLucía MD   SUMAtriptan (IMITREX) 25 MG tablet TAKE 1 TABLET BY MOUTH AS NEEDED FOR MIGRAINE 4/11/19  Yes Ronald Bowser MD   SUMAtriptan (IMITREX) 25 MG tablet TAKE 1 TABLET BY MOUTH AS NEEDED FOR MIGRAINE 5/2/19  Yes Ronald Bowser MD   Vortioxetine HBr (TRINTELLIX) 10 MG tablet Take 10 mg by mouth Daily.   Yes Provider, MD Lucía   levoFLOXacin (LEVAQUIN) 500 MG tablet TAKE 1 TABLET BY MOUTH EVERY DAY 4/23/19 5/9/19 Yes Ramy Gaston MD     Allergies   Allergen Reactions   • Codeine Other (See Comments)     Increases heart rate   • Sulfa Antibiotics Hives     Social History     Socioeconomic History   • Marital status:      Spouse name: Not on file   • Number of children: Not on file   • Years of education: Not on file   • Highest education level: Not on file   Tobacco Use   • Smoking status: Never Smoker   • Smokeless tobacco: Never Used   Substance and Sexual Activity   • Alcohol use: Yes     Alcohol/week: 1.2 oz     Types: 2 Standard drinks or equivalent per week     Comment: occasional   • Drug use: No   • Sexual activity: Defer     Partners: Male     Birth control/protection: Post-menopausal, Surgical, None, OCP     Comment: 02/04/2019 - Patient confirmed Tubal Ligation following birth of  last child at age 32.       Review of Systems  Review of Systems     Ht 170.2 cm (67\")   Wt 95.7 kg (211 lb)   LMP  (LMP Unknown)   BMI 33.05 kg/m²     Objective    Physical Exam   Constitutional: She is oriented to person, place, and time. She appears well-developed and well-nourished. She is cooperative. No distress.   HENT:   Head: Normocephalic and atraumatic.   Neck: Normal range of motion. Neck supple. No thyromegaly present.   Cardiovascular: Normal rate, regular rhythm and normal heart sounds.   Pulmonary/Chest: Effort normal and breath sounds normal. She has no wheezes. She " has no rhonchi. She has no rales.   Abdominal: Soft. Normal appearance and bowel sounds are normal. She exhibits no distension. There is no hepatosplenomegaly. There is tenderness in the epigastric area. There is no rigidity and no guarding. No hernia.   Lymphadenopathy:     She has no cervical adenopathy.   Neurological: She is alert and oriented to person, place, and time.   Skin: Skin is warm, dry and intact. No rash noted. No pallor.   Psychiatric: She has a normal mood and affect. Her speech is normal.     Office Visit on 04/11/2019   Component Date Value Ref Range Status   • H. pylori IgG 04/18/2019 0.19  0.00 - 0.79 Index Value Final                                 Negative           <0.80                               Equivocal    0.80 - 0.89                               Positive           >0.89   • H. pylori, IgA ABS 04/18/2019 <9.0  0.0 - 8.9 units Final                                    Negative          <9.0                                  Equivocal   9.0 - 11.0                                  Positive         >11.0   • H. Pylori, IgM 04/18/2019 <9.0  0.0 - 8.9 units Final                                    Negative          <9.0                                  Equivocal   9.0 - 11.0                                  Positive         >11.0  This test was developed and its performance characteristics  determined by [facility]. It has not been cleared or approved  by the Food and Drug Administration.     Assessment/Plan      No diagnosis found..       Orders placed during this encounter include:  No orders of the defined types were placed in this encounter.      ESOPHAGOGASTRODUODENOSCOPY possible dilation (N/A)    Review and/or summary of lab tests, radiology, procedures, medications. Review and summary of old records and obtaining of history. The risks and benefits of my recommendations, as well as other treatment options were discussed with the patient today. Questions were answered.    No orders of  the defined types were placed in this encounter.      Follow-up: No Follow-up on file.          This document has been electronically signed by Ravi Cavanaugh MD on Lily 10, 2019 3:34 PM             Results for orders placed or performed in visit on 04/11/19   Helicobacter Pylori, IgA IgG IgM   Result Value Ref Range    H. pylori IgG 0.19 0.00 - 0.79 Index Value    H. pylori, IgA ABS <9.0 0.0 - 8.9 units    H. Pylori, IgM <9.0 0.0 - 8.9 units   Results for orders placed or performed in visit on 02/26/19   Wound Culture - Wound, Sinus, maxillary left   Result Value Ref Range    Wound Culture Moderate growth (3+) Serratia marcescens (A)     Wound Culture Light growth (2+) Normal Skin Barbara     Gram Stain Rare (1+) WBCs seen     Gram Stain Few (2+) Gram negative bacilli     Gram Stain Rare (1+) Gram positive cocci        Susceptibility    Serratia marcescens - CHAR     Amoxicillin + Clavulanate >16/8 Resistant ug/ml     Ampicillin >16 Resistant ug/ml     Ampicillin + Sulbactam >16/8 Resistant ug/ml     Cefazolin >16 Resistant ug/ml     Cefepime <=8 Susceptible ug/ml     Cefoxitin >16 Resistant ug/ml     Ceftazidime <=1 Susceptible ug/ml     Ceftriaxone <=8 Susceptible ug/ml     Cefuroxime sodium >16 Resistant ug/ml     Levofloxacin 4 Intermediate ug/ml     Piperacillin + Tazobactam <=16 Susceptible ug/ml     Tetracycline 8 Intermediate ug/ml     Trimethoprim + Sulfamethoxazole >2/38 Resistant ug/ml   Results for orders placed or performed in visit on 08/03/18   CBC (No Diff)   Result Value Ref Range    WBC 13.99 (H) 3.20 - 9.80 10*3/mm3    RBC 4.36 3.77 - 5.16 10*6/mm3    Hemoglobin 12.7 12.0 - 15.5 g/dL    Hematocrit 37.7 35.0 - 45.0 %    MCV 86.5 80.0 - 98.0 fL    MCH 29.1 26.5 - 34.0 pg    MCHC 33.7 31.4 - 36.0 g/dL    RDW 13.6 11.5 - 14.5 %    RDW-SD 43.0 36.4 - 46.3 fl    MPV 9.4 8.0 - 12.0 fL    Platelets 397 150 - 450 10*3/mm3   Comprehensive Metabolic Panel   Result Value Ref Range    Glucose 97 60 - 100 mg/dL     BUN 37 (H) 7 - 21 mg/dL    Creatinine 1.18 (H) 0.50 - 1.00 mg/dL    Sodium 138 137 - 145 mmol/L    Potassium 3.8 3.5 - 5.1 mmol/L    Chloride 102 95 - 110 mmol/L    CO2 25.0 22.0 - 31.0 mmol/L    Calcium 9.6 8.4 - 10.2 mg/dL    Total Protein 7.5 6.3 - 8.6 g/dL    Albumin 4.60 3.40 - 4.80 g/dL    ALT (SGPT) 32 9 - 52 U/L    AST (SGOT) 25 14 - 36 U/L    Alkaline Phosphatase 139 (H) 38 - 126 U/L    Total Bilirubin 0.3 0.2 - 1.3 mg/dL    eGFR Non  Amer 46 45 - 104 mL/min/1.73    Globulin 2.9 2.3 - 3.5 gm/dL    A/G Ratio 1.6 1.1 - 1.8 g/dL    BUN/Creatinine Ratio 31.4 (H) 7.0 - 25.0    Anion Gap 11.0 5.0 - 15.0 mmol/L   Results for orders placed or performed in visit on 01/30/18   TSH   Result Value Ref Range    TSH 0.240 (L) 0.460 - 4.680 mIU/mL   T4, Free   Result Value Ref Range    Free T4 1.04 0.78 - 2.19 ng/dL   Lipid Panel   Result Value Ref Range    Total Cholesterol 197 0 - 199 mg/dL    Triglycerides 227 (H) 20 - 199 mg/dL    HDL Cholesterol 45 (L) 60 - 200 mg/dL    LDL Cholesterol  122 1 - 129 mg/dL    LDL/HDL Ratio 2.37 0.00 - 3.22   Results for orders placed or performed during the hospital encounter of 01/19/18   Tissue Pathology Exam - Tissue, Gastric, Antrum   Result Value Ref Range    Case Report       Surgical Pathology Report                         Case: [id number]                                  Authorizing Provider:  Ravi Cavanaugh MD         Collected:           01/19/2018 02:07 PM          Ordering Location:     [facility]             Received:            01/19/2018 02:49 PM                                 [facility]                                                     Pathologist:           Jonnie Bradley MD                                                         Specimen:    Gastric, Antrum                                                                            Final Diagnosis       GASTRIC ANTRUM, MUCOSAL BIOPSY:   MILD CHRONIC GASTRITIS.   NO EVIDENCE OF  "HELICOBACTER PYLORI.       Gross Description       The specimen consists of a mucosal biopsy measuring up to 0.3 cm in greatest dimension.  All embedded.        Embedded Images     Results for orders placed or performed during the hospital encounter of 09/05/17   Tissue Pathology Exam   Result Value Ref Range    Case Report       Surgical Pathology Report                         Case: [id number]                                  Authorizing Provider:  Ramy Gaston MD        Collected:           09/05/2017 11:56 AM          Ordering Location:     [facility]             Received:            09/05/2017 01:26 PM                                 [place] [facility]                                                              Pathologist:           Deven Conner MD                                                          Specimens:   1) - Naris, Left, LEFT SINUS                                                                        2) - Naris, Right, RIGHT SINUS                                                             Final Diagnosis       1.  MUCOSA, NASAL SINUS, LEFT SIDE:  CHRONIC SINUSITIS OF RESPIRATORY MUCOSA IN RELATION TO BITS OF BONE.  NEGATIVE FOR FUNGI (GMS STAIN).    2.  MUCOSA, NASAL SINUS, RIGHT SIDE:  CHRONIC SINUSITIS OF RESPIRATORY MUCOSA IN RELATION TO BITS OF BONE.  NEGATIVE FOR FUNGI (GMS STAIN).      Gross Description       1.  The first container is labeled \"left sinus, nose\" and has pinkish white mucosa measuring 1.5 cc in aggregate.  The entire specimen is embedded as 1A.    2.  The second container is labeled \"right sinus, nose\" and has pinkish white mucosa measuring 1.5 cc in aggregate.  The entire specimen is embedded as 2A.       Embedded Images     Wound Culture   Result Value Ref Range    Wound Culture Light growth (2+) Pseudomonas aeruginosa (A)     Wound Culture Light growth (2+) Serratia marcescens (A)     Wound Culture Light growth (2+) Streptococcus mitis / oralis (A)     Gram " Stain Moderate (3+) WBCs seen     Gram Stain Rare (1+) Gram negative bacilli        Susceptibility    Pseudomonas aeruginosa - CHAR     Ceftazidime 4  ug/ml     Cefuroxime sodium >16  ug/ml     Gentamicin <=4 Susceptible ug/ml     Levofloxacin <=2 Susceptible ug/ml     Piperacillin + Tazobactam <=16  ug/ml     Tobramycin <=4 Susceptible ug/ml    Serratia marcescens - CHAR     Amikacin <=16 Susceptible ug/ml     Amoxicillin + Clavulanate >16/8 Resistant ug/ml     Ampicillin >16 Resistant ug/ml     Ampicillin + Sulbactam >16/8 Resistant ug/ml     Cefazolin >16 Resistant ug/ml     Cefotaxime <=2  ug/ml     Cefoxitin 16 Intermediate ug/ml     Ceftazidime <=1  ug/ml     Ceftriaxone <=8  ug/ml     Cefuroxime sodium >16 Resistant ug/ml     Ciprofloxacin 2 Intermediate ug/ml     Gentamicin <=4 Susceptible ug/ml     Imipenem <=4 Susceptible ug/ml     Levofloxacin <=2 Susceptible ug/ml     Meropenem <=4 Susceptible ug/ml     Nitrofurantoin >64  ug/ml     Piperacillin + Tazobactam <=16  ug/ml     Tetracycline <=4 Susceptible ug/ml     Tobramycin <=4 Susceptible ug/ml     Trimethoprim + Sulfamethoxazole <=2/38 Susceptible ug/ml   Results for orders placed or performed in visit on 08/31/17   Basic Metabolic Panel   Result Value Ref Range    Glucose 93 60 - 100 mg/dL    BUN 24 (H) 7 - 21 mg/dL    Creatinine 0.82 0.50 - 1.00 mg/dL    Sodium 136 (L) 137 - 145 mmol/L    Potassium 4.7 3.5 - 5.1 mmol/L    Chloride 100 95 - 110 mmol/L    CO2 26.0 22.0 - 31.0 mmol/L    Calcium 9.6 8.4 - 10.2 mg/dL    eGFR Non  Amer 70 45 - 104 mL/min/1.73    BUN/Creatinine Ratio 29.3 (H) 7.0 - 25.0    Anion Gap 10.0 5.0 - 15.0 mmol/L   Results for orders placed or performed in visit on 08/07/17   Sedimentation Rate   Result Value Ref Range    Sed Rate 9 0 - 20 mm/hr   Rheumatoid Factor   Result Value Ref Range    Rheumatoid Factor Qualitative Negative Negative   C-reactive protein   Result Value Ref Range    C-Reactive Protein 1.00 0.00 - 1.00  mg/dL   DERRICK   Result Value Ref Range    DERRICK Direct Negative Negative   Results for orders placed or performed in visit on 08/01/17   Hepatitis C Antibody   Result Value Ref Range    Hepatitis C Ab Negative Negative   Hemoglobin A1c   Result Value Ref Range    Hemoglobin A1C 5.7 (H) 4 - 5.6 %   Results for orders placed or performed in visit on 05/31/17   Wound Culture   Result Value Ref Range    Wound Culture Light growth (2+) Serratia marcescens (A)     Beta Lactamase      Gram Stain Few (2+) WBCs seen     Gram Stain Rare (1+) Gram negative bacilli        Susceptibility    Serratia marcescens - CHAR     Amikacin <=16 Susceptible ug/ml     Amoxicillin + Clavulanate >16/8 Resistant ug/ml     Ampicillin >16 Resistant ug/ml     Ampicillin + Sulbactam >16/8 Resistant ug/ml     Cefazolin >16 Resistant ug/ml     Cefotaxime <=2  ug/ml     Cefoxitin 16 Intermediate ug/ml     Ceftazidime <=1  ug/ml     Ceftriaxone <=8  ug/ml     Cefuroxime >16 Resistant ug/ml     Ciprofloxacin <=1 Susceptible ug/ml     Gentamicin <=4 Susceptible ug/ml     Imipenem <=4 Susceptible ug/ml     Levofloxacin <=2 Susceptible ug/ml     Meropenem <=4 Susceptible ug/ml     Nitrofurantoin >64  ug/ml     Piperacillin + Tazobactam <=16  ug/ml     Tetracycline 8 Intermediate ug/ml     Tobramycin <=4 Susceptible ug/ml     Trimethoprim + Sulfamethoxazole <=2/38 Susceptible ug/ml

## 2019-06-10 NOTE — ANESTHESIA POSTPROCEDURE EVALUATION
Patient: Mikki Joel    Procedure Summary     Date:  06/10/19 Room / Location:  [facility] ENDOSCOPY 1 / [facility] ENDOSCOPY    Anesthesia Start:  1635 Anesthesia Stop:  1642    Procedure:  ESOPHAGOGASTRODUODENOSCOPY possible dilation (N/A ) Diagnosis:       Epigastric pain      Nausea      Gastroesophageal reflux disease, esophagitis presence not specified      Presbyesophagus      (Epigastric pain [R10.13])      (Nausea [R11.0])      (Gastroesophageal reflux disease, esophagitis presence not specified [K21.9])      (Presbyesophagus [K22.8])    Surgeon:  Ravi Cavanaugh MD Provider:  Carolina Nascimento CRNA    Anesthesia Type:  MAC ASA Status:  3          Anesthesia Type: MAC  Last vitals  BP   (!) 189/95 (06/10/19 1600)   Temp   97.2 °F (36.2 °C) (06/10/19 1600)   Pulse   80 (06/10/19 1600)   Resp   18 (06/10/19 1600)     SpO2   96 % (06/10/19 1600)     Post Anesthesia Care and Evaluation    Patient location during evaluation: bedside  Patient participation: complete - patient participated  Level of consciousness: awake and awake and alert  Pain score: 0  Pain management: satisfactory to patient  Airway patency: patent  Anesthetic complications: No anesthetic complications  PONV Status: none  Cardiovascular status: acceptable and stable  Respiratory status: acceptable, room air and spontaneous ventilation  Hydration status: acceptable

## 2019-06-10 NOTE — ANESTHESIA PREPROCEDURE EVALUATION
Anesthesia Evaluation     Patient summary reviewed and Nursing notes reviewed   history of anesthetic complications: PONV  NPO Solid Status: > 8 hours  NPO Liquid Status: > 4 hours           Airway   Mallampati: II  TM distance: >3 FB  Neck ROM: full  possible difficult intubation  Dental    (+) poor dentition    Pulmonary    (+) asthma, decreased breath sounds,   (-) not a smoker  Cardiovascular   Exercise tolerance: good (4-7 METS)    ECG reviewed  Patient on routine beta blocker and Beta blocker given within 24 hours of surgery  Rhythm: regular  Rate: abnormal    (+) hypertension well controlled,   (-) angina, murmur    ROS comment: Normal sinus rhythm  Normal ECG  When compared with ECG of 31-AUG-2017 15:19,  No significant change was found  Confirmed by VETTIANKAL    Neuro/Psych  (+) headaches (migraines), psychiatric history Anxiety and Depression,     GI/Hepatic/Renal/Endo    (+) obesity,  GERD well controlled,      Musculoskeletal     Abdominal   (+) obese,     Abdomen: soft.   Substance History   (+) alcohol use (occ),      OB/GYN          Other   (+) arthritis (knees and shoulders)     (-) history of cancer                    Anesthesia Plan    ASA 3     MAC     intravenous induction   Anesthetic plan, all risks, benefits, and alternatives have been provided, discussed and informed consent has been obtained with: patient.

## 2019-06-12 LAB
LAB AP CASE REPORT: NORMAL
LAB AP DIAGNOSIS COMMENT: NORMAL
PATH REPORT.FINAL DX SPEC: NORMAL
PATH REPORT.GROSS SPEC: NORMAL

## 2019-06-13 DIAGNOSIS — R05.9 COUGH: ICD-10-CM

## 2019-06-13 DIAGNOSIS — R06.2 WHEEZE: ICD-10-CM

## 2019-06-13 DIAGNOSIS — J40 BRONCHITIS: ICD-10-CM

## 2019-06-13 RX ORDER — ALBUTEROL SULFATE 90 UG/1
AEROSOL, METERED RESPIRATORY (INHALATION)
Qty: 8.5 G | Refills: 0 | Status: SHIPPED | OUTPATIENT
Start: 2019-06-13 | End: 2021-08-10

## 2019-06-17 ENCOUNTER — OFFICE VISIT (OUTPATIENT)
Dept: OTOLARYNGOLOGY | Facility: CLINIC | Age: 64
End: 2019-06-17

## 2019-06-17 VITALS
OXYGEN SATURATION: 99 % | TEMPERATURE: 97.8 F | HEART RATE: 85 BPM | HEIGHT: 67 IN | BODY MASS INDEX: 33.09 KG/M2 | WEIGHT: 210.8 LBS

## 2019-06-17 DIAGNOSIS — H72.93 PERFORATED TYMPANIC MEMBRANE, BILATERAL: Primary | ICD-10-CM

## 2019-06-17 DIAGNOSIS — J30.0 CHRONIC VASOMOTOR RHINITIS: ICD-10-CM

## 2019-06-17 PROCEDURE — 99213 OFFICE O/P EST LOW 20 MIN: CPT | Performed by: OTOLARYNGOLOGY

## 2019-06-17 RX ORDER — OLOPATADINE HYDROCHLORIDE 665 UG/1
2 SPRAY NASAL 2 TIMES DAILY
Qty: 30 G | Refills: 11 | Status: SHIPPED | OUTPATIENT
Start: 2019-06-17 | End: 2020-01-13 | Stop reason: SDUPTHER

## 2019-06-17 NOTE — PATIENT INSTRUCTIONS

## 2019-06-17 NOTE — PROGRESS NOTES
Subjective   Mikki Joel is a 64 y.o. female.   Follow-up sinusitis and ear problems    History of Present Illness   Patient denied any ear drainage when she was here for right ear thinks she is holding off on that she has pressure that comes and goes her forehead and stops of the ears is just momentary she is using mometasone and Astelin symptoms nasal saline she has no signs and symptoms of infection just pressure and she does have a known history of migraines she said no recent otorrhea or known ear infections      The following portions of the patient's history were reviewed and updated as appropriate: allergies, current medications, past family history, past medical history, past social history, past surgical history and problem list.      Current Outpatient Medications:   •  albuterol sulfate HFA (PROAIR HFA) 108 (90 Base) MCG/ACT inhaler, Inhale 2 puffs Every 4 (Four) Hours As Needed for Wheezing., Disp: 3 inhaler, Rfl: 3  •  ALBUTEROL SULFATE  (90 Base) MCG/ACT inhaler, INHALE 2 PUFFS BY MOUTH EVERY 4 HOURS AS NEEDED FOR WHEEZING, Disp: 8.5 g, Rfl: 0  •  busPIRone (BUSPAR) 30 MG tablet, Take 30 mg by mouth 2 (Two) Times a Day., Disp: , Rfl:   •  cetirizine (ZYRTEC ALLERGY) 10 MG tablet, Take 1 tablet by mouth Daily., Disp: 30 tablet, Rfl: 11  •  DEXILANT 60 MG capsule, TAKE 1 CAPSULE BY MOUTH DAILY, Disp: 90 capsule, Rfl: 0  •  eszopiclone (LUNESTA) 1 MG tablet, Take 1 tablet by mouth Every Night. Take immediately before bedtime, Disp: 30 tablet, Rfl: 5  •  Fluticasone Furoate-Vilanterol (BREO ELLIPTA) 100-25 MCG/INH inhaler, 1 puff bid and rinse out mouth, Disp: 90 each, Rfl: 3  •  metoprolol succinate XL (TOPROL-XL) 100 MG 24 hr tablet, TAKE 1 TABLET BY MOUTH DAILY, Disp: 90 tablet, Rfl: 3  •  mometasone (NASONEX) 50 MCG/ACT nasal spray, 2 sprays into the nostril(s) as directed by provider 2 (Two) Times a Day., Disp: 17 g, Rfl: 11  •  rOPINIRole (REQUIP) 3 MG tablet, Take 3 mg by mouth  Daily., Disp: , Rfl:   •  Vortioxetine HBr (TRINTELLIX) 10 MG tablet, Take 10 mg by mouth Daily., Disp: , Rfl:   •  olopatadine (PATANASE) 0.6 % solution nasal solution, 2 sprays by Each Nare route 2 (Two) Times a Day., Disp: 30 g, Rfl: 11  •  ondansetron (ZOFRAN) 8 MG tablet, Take 1 tablet by mouth Every 8 (Eight) Hours As Needed for Nausea or Vomiting., Disp: 12 tablet, Rfl: 0    Allergies   Allergen Reactions   • Codeine Other (See Comments)     Increases heart rate   • Sulfa Antibiotics Hives             Review of Systems   Constitutional: Negative for fever.   HENT: Positive for hearing loss and sinus pain. Negative for ear pain.    Neurological: Negative for dizziness.   Hematological: Negative for adenopathy.           Objective   Physical Exam   Constitutional: She is oriented to person, place, and time. She appears well-developed and well-nourished.   HENT:   Head: Normocephalic and atraumatic.   Right Ear: External ear and ear canal normal.   Left Ear: External ear and ear canal normal.   Ears:    Nose: Nose normal. No mucosal edema, rhinorrhea, nasal deformity or septal deviation. No epistaxis. Right sinus exhibits no maxillary sinus tenderness and no frontal sinus tenderness. Left sinus exhibits no maxillary sinus tenderness and no frontal sinus tenderness.   Mouth/Throat: Uvula is midline and oropharynx is clear and moist. No trismus in the jaw. Normal dentition. No oropharyngeal exudate or posterior oropharyngeal edema.       Eyes: Conjunctivae are normal.   Neck: Neck supple. No JVD present. No tracheal deviation present. No thyromegaly present.   Pulmonary/Chest: Effort normal.   Musculoskeletal: Normal range of motion.   Lymphadenopathy:        Head (right side): No submental, no submandibular, no tonsillar, no preauricular, no posterior auricular and no occipital adenopathy present.        Head (left side): No submental, no submandibular, no tonsillar, no preauricular, no posterior auricular and no  occipital adenopathy present.     She has no cervical adenopathy.        Right cervical: No superficial cervical, no deep cervical and no posterior cervical adenopathy present.       Left cervical: No superficial cervical, no deep cervical and no posterior cervical adenopathy present.   Neurological: She is alert and oriented to person, place, and time. No cranial nerve deficit.   Skin: Skin is warm.   Psychiatric: She has a normal mood and affect. Her speech is normal and behavior is normal. Thought content normal.   Nursing note and vitals reviewed.          Assessment/Plan   Mikki was seen today for follow-up.    Diagnoses and all orders for this visit:    Perforated tympanic membrane, bilateral    Chronic vasomotor rhinitis    Other orders  -     olopatadine (PATANASE) 0.6 % solution nasal solution; 2 sprays by Each Nare route 2 (Two) Times a Day.    Discussed having surgery at some point she wants to hold off on that.  We discussed the surgery behind her right ear for tympanoplasty.    I see no evidence of sinusitis suggest she talk to her primary physician about migrainous medicines which she is taken before and seem to be covered by her insurance.    In the meantime we will switch her antihistamine Super Pinky pressure now though ultimately if this is migraine that may not help the symptoms it certainly can help with her nose she has a good airway we will try to minimize eustachian tube and pressure symptoms otherwise we will see her in 6 months though she has a problem

## 2019-06-18 RX ORDER — ESZOPICLONE 1 MG/1
1 TABLET, FILM COATED ORAL NIGHTLY
Qty: 30 TABLET | Refills: 5 | Status: SHIPPED | OUTPATIENT
Start: 2019-06-18 | End: 2019-09-13 | Stop reason: SDUPTHER

## 2019-06-21 ENCOUNTER — OFFICE VISIT (OUTPATIENT)
Dept: GASTROENTEROLOGY | Facility: CLINIC | Age: 64
End: 2019-06-21

## 2019-06-21 VITALS
SYSTOLIC BLOOD PRESSURE: 158 MMHG | BODY MASS INDEX: 33.56 KG/M2 | DIASTOLIC BLOOD PRESSURE: 88 MMHG | WEIGHT: 213.8 LBS | HEART RATE: 88 BPM | HEIGHT: 67 IN

## 2019-06-21 DIAGNOSIS — K29.50 CHRONIC GASTRITIS WITHOUT BLEEDING, UNSPECIFIED GASTRITIS TYPE: ICD-10-CM

## 2019-06-21 DIAGNOSIS — K44.9 HIATAL HERNIA: ICD-10-CM

## 2019-06-21 DIAGNOSIS — K21.00 GASTROESOPHAGEAL REFLUX DISEASE WITH ESOPHAGITIS: Primary | ICD-10-CM

## 2019-06-21 PROCEDURE — 99214 OFFICE O/P EST MOD 30 MIN: CPT | Performed by: NURSE PRACTITIONER

## 2019-06-21 RX ORDER — SUCRALFATE 1 G/1
1 TABLET ORAL 4 TIMES DAILY
Qty: 120 TABLET | Refills: 2 | Status: SHIPPED | OUTPATIENT
Start: 2019-06-21 | End: 2019-09-18 | Stop reason: SDUPTHER

## 2019-06-21 RX ORDER — ONDANSETRON HYDROCHLORIDE 8 MG/1
8 TABLET, FILM COATED ORAL EVERY 8 HOURS PRN
Qty: 20 TABLET | Refills: 1 | Status: SHIPPED | OUTPATIENT
Start: 2019-06-21 | End: 2019-07-19 | Stop reason: SDUPTHER

## 2019-06-21 NOTE — PATIENT INSTRUCTIONS
Heartburn  Heartburn is a type of pain or discomfort that can happen in the throat or chest. It is often described as a burning pain. It may also cause a bad taste in the mouth. Heartburn may feel worse when you lie down or bend over, and it is often worse at night. Heartburn may be caused by stomach contents that move back up into the esophagus (reflux).  Follow these instructions at home:  Take these actions to decrease your discomfort and to help avoid complications.  Diet  · Follow a diet as recommended by your health care provider. This may involve avoiding foods and drinks such as:  ? Coffee and tea (with or without caffeine).  ? Drinks that contain alcohol.  ? Energy drinks and sports drinks.  ? Carbonated drinks or sodas.  ? Chocolate and cocoa.  ? Peppermint and mint flavorings.  ? Garlic and onions.  ? Horseradish.  ? Spicy and acidic foods, including peppers, chili powder, molina powder, vinegar, hot sauces, and barbecue sauce.  ? Citrus fruit juices and citrus fruits, such as oranges, navarro, and limes.  ? Tomato-based foods, such as red sauce, chili, salsa, and pizza with red sauce.  ? Fried and fatty foods, such as donuts, french fries, potato chips, and high-fat dressings.  ? High-fat meats, such as hot dogs and fatty cuts of red and white meats, such as rib eye steak, sausage, ham, and lacey.  ? High-fat dairy items, such as whole milk, butter, and cream cheese.  · Eat small, frequent meals instead of large meals.  · Avoid drinking large amounts of liquid with your meals.  · Avoid eating meals during the 2-3 hours before bedtime.  · Avoid lying down right after you eat.  · Do not exercise right after you eat.  General instructions  · Pay attention to any changes in your symptoms.  · Take over-the-counter and prescription medicines only as told by your health care provider. Do not take aspirin, ibuprofen, or other NSAIDs unless your health care provider told you to do so.  · Do not use any tobacco  products, including cigarettes, chewing tobacco, and e-cigarettes. If you need help quitting, ask your health care provider.  · Wear loose-fitting clothing. Do not wear anything tight around your waist that causes pressure on your abdomen.  · Raise (elevate) the head of your bed about 6 inches (15 cm).  · Try to reduce your stress, such as with yoga or meditation. If you need help reducing stress, ask your health care provider.  · If you are overweight, reduce your weight to an amount that is healthy for you. Ask your health care provider for guidance about a safe weight loss goal.  · Keep all follow-up visits as told by your health care provider. This is important.  Contact a health care provider if:  · You have new symptoms.  · You have unexplained weight loss.  · You have difficulty swallowing, or it hurts to swallow.  · You have wheezing or a persistent cough.  · Your symptoms do not improve with treatment.  · You have frequent heartburn for more than two weeks.  Get help right away if:  · You have pain in your arms, neck, jaw, teeth, or back.  · You feel sweaty, dizzy, or light-headed.  · You have chest pain or shortness of breath.  · You vomit and your vomit looks like blood or coffee grounds.  · Your stool is bloody or black.  This information is not intended to replace advice given to you by your health care provider. Make sure you discuss any questions you have with your health care provider.  Document Released: 05/06/2010 Document Revised: 05/25/2017 Document Reviewed: 04/13/2016  [employer] Patient Education © 2019 [employer]

## 2019-06-21 NOTE — PROGRESS NOTES
Chief Complaint   Patient presents with   • Abdominal Pain   • Heartburn       Subjective    Mikki Palak Joel is a 64 y.o. female. she is here today for follow-up.    Heartburn   She complains of abdominal pain, early satiety, globus sensation, heartburn, a hoarse voice and nausea (bilous vomiting ). She reports no belching, no chest pain, no choking, no coughing, no dysphagia or no sore throat. This is a chronic problem. The current episode started more than 1 year ago. The problem occurs frequently. The problem has been waxing and waning. The heartburn duration is less than a minute. The heartburn is located in the substernum. The heartburn is of moderate intensity. Associated symptoms include fatigue.   Patient reports she has had more symptoms recently.  Her EGD 6/10/2019 noted mildly severe esophagitis, gastritis normal duodenum and a medium sized hiatal hernia and antral biopsy no chronic gastritis.  Negative for H. pylori.  Distal esophagus biopsy no reactive change squamous mucosa.  Patient has used  Dexilant in the past and states Protonix does better than that.  Will add Carafate before meals and bedtime as it helped in the past.  We will add Zofran daily to improve nausea and decreased amount of vomiting which will worsen esophagitis and gastritis.  Follow-up in 1 month for recheck return office sooner if needed         The following portions of the patient's history were reviewed and updated as appropriate:   Past Medical History:   Diagnosis Date   • Anemia    • Anemia June 2016    might have been due to knee replacement surgery I had in mar   • Anemia June 2016    might have been due to knee replacement surgery I had in mar   • Anxiety    • Arthritis    • Arthritis of back 2009    lower back   • Asthma    • CTS (carpal tunnel syndrome) 1987    left wrist - had corrective surgery   • Depression    • Fracture of wrist 1979   • High blood pressure    • Ione (hard of hearing)    • Hypertension 1999     metoprolol 100 mg and triamterene/hydrochlorothiazide 37.5mg   • Hypertension 1999    metoprolol 100 mg and triamterene/hydrochlorothiazide 37.5mg   • Knee swelling 2010   • Migraine    • Periarthritis of shoulder 2011    both shoulders right shoulder total replacement   • Tear of meniscus of knee 2016    fell     Past Surgical History:   Procedure Laterality Date   • BUNIONECTOMY Bilateral    • COLONOSCOPY  2015   • COLONOSCOPY N/A 1/19/2018    Procedure: COLONOSCOPY;  Surgeon: Ravi Polk MD;  Location: [facility] ENDOSCOPY;  Service:    • COLONOSCOPY  2015   • EAR TUBES     • ENDOSCOPIC FUNCTIONAL SINUS SURGERY (FESS) Bilateral 9/5/2017    Procedure: BILATERAL ETHMOID AND MAXILLARY SINUS AND NASAL SEPTOPLASTIES AND BILATERAL TURBINECTOMIES;  Surgeon: Ramy Gaston MD;  Location: [facility] OR;  Service:    • ENDOSCOPY N/A 1/19/2018    Procedure: ESOPHAGOGASTRODUODENOSCOPY;  Surgeon: Ravi Polk MD;  Location: [facility] ENDOSCOPY;  Service:    • ENDOSCOPY N/A 6/10/2019    Procedure: ESOPHAGOGASTRODUODENOSCOPY possible dilation;  Surgeon: Ravi Polk MD;  Location: [facility] ENDOSCOPY;  Service: Gastroenterology   • HAND SURGERY     • JOINT REPLACEMENT  2016    right knee replacement 2016   • JOINT REPLACEMENT  2015    right shoulder   • KNEE SURGERY  2016    total right knee replacement   • RHINOPLASTY      x3   • SHOULDER ARTHROSCOPY     • SHOULDER SURGERY  2015    complete shoulder joint replacement   • TONSILLECTOMY     • TUBAL ABDOMINAL LIGATION  1987   • TYMPANOPLASTY Left 6/5/2018    Procedure: TYMPANOPLASTY WITH CARTILAGE GRAFT;  Surgeon: Ramy Gaston MD;  Location: [facility] OR;  Service: ENT   • UPPER GASTROINTESTINAL ENDOSCOPY  2016   • UPPER GASTROINTESTINAL ENDOSCOPY  2016   • UPPER GASTROINTESTINAL ENDOSCOPY  01/19/2018    Per Dr. Ravi Polk M.D.   • WRIST SURGERY  1987    carpal tunnel repair left wrist     Family History   Problem Relation Age of Onset   • Hypertension Mother    • COPD  Mother    • Arthritis Mother    • Alcohol abuse Mother    • Cancer Father    • Arthritis Father    • Alcohol abuse Father    • COPD Maternal Grandmother    • Stroke Maternal Grandmother    • Osteoporosis Maternal Grandmother    • Irritable bowel syndrome Sister      OB History      Para Term  AB Living    2 2 2          SAB TAB Ectopic Molar Multiple Live Births                       Prior to Admission medications    Medication Sig Start Date End Date Taking? Authorizing Provider   ALBUTEROL SULFATE  (90 Base) MCG/ACT inhaler INHALE 2 PUFFS BY MOUTH EVERY 4 HOURS AS NEEDED FOR WHEEZING 19  Yes Ronald Bowser MD   busPIRone (BUSPAR) 30 MG tablet Take 30 mg by mouth 2 (Two) Times a Day. 10/1/18  Yes ProviderLucía MD   cetirizine (ZYRTEC ALLERGY) 10 MG tablet Take 1 tablet by mouth Daily. 19  Yes Ronald Bowser MD   DEXILANT 60 MG capsule TAKE 1 CAPSULE BY MOUTH DAILY 19  Yes Zelda Rich APRN   eszopiclone (LUNESTA) 1 MG tablet Take 1 tablet by mouth Every Night. Take immediately before bedtime 19  Yes Ramirez Bai MD   Fluticasone Furoate-Vilanterol (BREO ELLIPTA) 100-25 MCG/INH inhaler 1 puff bid and rinse out mouth 19  Yes Ronald Bowser MD   metoprolol succinate XL (TOPROL-XL) 100 MG 24 hr tablet TAKE 1 TABLET BY MOUTH DAILY 19  Yes Ronald Bowser MD   mometasone (NASONEX) 50 MCG/ACT nasal spray 2 sprays into the nostril(s) as directed by provider 2 (Two) Times a Day. 18  Yes Ramy Gaston MD   olopatadine (PATANASE) 0.6 % solution nasal solution 2 sprays by Each Nare route 2 (Two) Times a Day. 19  Yes Ramy Gaston MD   ondansetron (ZOFRAN) 8 MG tablet Take 1 tablet by mouth Every 8 (Eight) Hours As Needed for Nausea or Vomiting. 3/18/19  Yes Ramy Gaston MD   rOPINIRole (REQUIP) 3 MG tablet Take 3 mg by mouth Daily. 18  Yes ProviderLucía MD   Vortioxetine HBr (TRINTELLIX) 10 MG tablet Take 10 mg by mouth Daily.    "Yes Provider, MD Lucía   albuterol sulfate HFA (PROAIR HFA) 108 (90 Base) MCG/ACT inhaler Inhale 2 puffs Every 4 (Four) Hours As Needed for Wheezing. 4/22/19 6/21/19 Yes Ronald Bowser MD     Allergies   Allergen Reactions   • Codeine Other (See Comments)     Increases heart rate   • Sulfa Antibiotics Hives     Social History     Socioeconomic History   • Marital status:      Spouse name: Not on file   • Number of children: Not on file   • Years of education: Not on file   • Highest education level: Not on file   Tobacco Use   • Smoking status: Never Smoker   • Smokeless tobacco: Never Used   Substance and Sexual Activity   • Alcohol use: Yes     Alcohol/week: 1.2 oz     Types: 2 Standard drinks or equivalent per week     Comment: occasional   • Drug use: No   • Sexual activity: Defer     Partners: Male     Birth control/protection: Post-menopausal, Surgical, None, OCP     Comment: 02/04/2019 - Patient confirmed Tubal Ligation following birth of  last child at age 32.       Review of Systems  Review of Systems   Constitutional: Positive for fatigue. Negative for activity change, appetite change, chills, diaphoresis, fever and unexpected weight change.   HENT: Positive for hoarse voice. Negative for sore throat and trouble swallowing.    Respiratory: Negative for cough, choking and shortness of breath.    Cardiovascular: Negative for chest pain.   Gastrointestinal: Positive for abdominal distention, abdominal pain, heartburn and nausea (bilous vomiting ). Negative for anal bleeding, blood in stool, constipation, diarrhea, dysphagia, rectal pain and vomiting.   Musculoskeletal: Negative for arthralgias.   Skin: Negative for pallor.   Neurological: Negative for light-headedness.        /88 (BP Location: Left arm)   Pulse 88   Ht 170.2 cm (67\")   Wt 97 kg (213 lb 12.8 oz)   LMP  (LMP Unknown)   BMI 33.49 kg/m²     Objective    Physical Exam   Constitutional: She is oriented to person, place, and " time. She appears well-developed and well-nourished. She is cooperative. No distress.   HENT:   Head: Normocephalic and atraumatic.   Neck: Normal range of motion. Neck supple. No thyromegaly present.   Cardiovascular: Normal rate, regular rhythm and normal heart sounds.   Pulmonary/Chest: Effort normal and breath sounds normal. She has no wheezes. She has no rhonchi. She has no rales.   Abdominal: Soft. Normal appearance and bowel sounds are normal. She exhibits no distension. There is no hepatosplenomegaly. There is tenderness in the epigastric area. There is no rigidity and no guarding. No hernia.   Lymphadenopathy:     She has no cervical adenopathy.   Neurological: She is alert and oriented to person, place, and time.   Skin: Skin is warm, dry and intact. No rash noted. No pallor.   Psychiatric: She has a normal mood and affect. Her speech is normal.     Admission on 06/10/2019, Discharged on 06/10/2019   Component Date Value Ref Range Status   • Case Report 06/10/2019    Final                    Value:Surgical Pathology Report                         Case: [id number]                                  Authorizing Provider:  Ravi Cavanaugh MD        Collected:           06/10/2019 04:42 PM          Ordering Location:     [facility]             Received:            06/11/2019 07:28 AM                                 [place] [facility]                                                     Pathologist:           Deven Conner MD                                                         Specimens:   1) - Gastric, Antrum                                                                                2) - Esophagus, Distal                                                                    • Final Diagnosis 06/10/2019    Final                    Value:This result contains rich text formatting which cannot be displayed here.   • Comment 06/10/2019    Final                    Value:This result contains rich  text formatting which cannot be displayed here.   • Gross Description 06/10/2019    Final                    Value:This result contains rich text formatting which cannot be displayed here.     Assessment/Plan      1. Gastroesophageal reflux disease with esophagitis    2. Hiatal hernia    3. Chronic gastritis without bleeding, unspecified gastritis type    .       Orders placed during this encounter include:  No orders of the defined types were placed in this encounter.      * Surgery not found *    Review and/or summary of lab tests, radiology, procedures, medications. Review and summary of old records and obtaining of history. The risks and benefits of my recommendations, as well as other treatment options were discussed with the patient today. Questions were answered.    New Medications Ordered This Visit   Medications   • sucralfate (CARAFATE) 1 g tablet     Sig: Take 1 tablet by mouth 4 (Four) Times a Day.     Dispense:  120 tablet     Refill:  2   • ondansetron (ZOFRAN) 8 MG tablet     Sig: Take 1 tablet by mouth Every 8 (Eight) Hours As Needed for Nausea or Vomiting.     Dispense:  20 tablet     Refill:  1       Follow-up: Return in about 4 weeks (around 7/19/2019).          This document has been electronically signed by MIRIAM White on June 21, 2019 1:17 PM             Results for orders placed or performed during the hospital encounter of 06/10/19   Tissue Pathology Exam   Result Value Ref Range    Case Report       Surgical Pathology Report                         Case: [id number]                                  Authorizing Provider:  Ravi Cavanaugh MD        Collected:           06/10/2019 04:42 PM          Ordering Location:     [facility]             Received:            06/11/2019 07:28 AM                                 [facility]                                                     Pathologist:           Deven Conner MD                                                     "     Specimens:   1) - Gastric, Antrum                                                                                2) - Esophagus, Distal                                                                     Final Diagnosis       1.  MUCOSA, ANTRUM OF STOMACH:  CHRONIC GASTRITIS.  NEGATIVE FOR HELICOBACTER PYLORI (HP IMMUNOSTAIN).    2.  MUCOSA, DISTAL ESOPHAGUS:  REACTIVE CHANGE OF SQUAMOUS MUCOSA.      Comment       Helicobacter pylori (HP) immunostain is performed (Block 1) because an appropriate inflammatory milieu is present and organisms are not seen on H & E stained slides.    HP immunostain was developed and its performance characteristics determined by [facility].  It has not been cleared or approved by the U.S. Food and Drug Administration.  The FDA has determined that such clearance or approval is not necessary.  This test is used for clinical purposes.  It should not be regarded as investigational or for research.  This laboratory is certified under the Clinical Laboratory Improvement Amendments of 1988 (CLIA-88) as qualified to perform high complexity clinical laboratory testing.    All controls show appropriate reactivity.          Gross Description       1.  The first container is labeled 'antrum of stomach\" and has nodular bits of white soft tissue measuring 0.4 cc in aggregate.  The entire specimen is embedded as 1A.    2.  The second container is labeled \"distal esophagus\" and has nodular bits of white soft tissue measuring 0.4 cc in aggregate.  The entire specimen is embedded as 2A.     Results for orders placed or performed in visit on 04/11/19   Helicobacter Pylori, IgA IgG IgM   Result Value Ref Range    H. pylori IgG 0.19 0.00 - 0.79 Index Value    H. pylori, IgA ABS <9.0 0.0 - 8.9 units    H. Pylori, IgM <9.0 0.0 - 8.9 units   Results for orders placed or performed in visit on 02/26/19   Wound Culture - Wound, Sinus, maxillary left   Result Value Ref " Range    Wound Culture Moderate growth (3+) Serratia marcescens (A)     Wound Culture Light growth (2+) Normal Skin Barbara     Gram Stain Rare (1+) WBCs seen     Gram Stain Few (2+) Gram negative bacilli     Gram Stain Rare (1+) Gram positive cocci        Susceptibility    Serratia marcescens - CHAR     Amoxicillin + Clavulanate >16/8 Resistant ug/ml     Ampicillin >16 Resistant ug/ml     Ampicillin + Sulbactam >16/8 Resistant ug/ml     Cefazolin >16 Resistant ug/ml     Cefepime <=8 Susceptible ug/ml     Cefoxitin >16 Resistant ug/ml     Ceftazidime <=1 Susceptible ug/ml     Ceftriaxone <=8 Susceptible ug/ml     Cefuroxime sodium >16 Resistant ug/ml     Levofloxacin 4 Intermediate ug/ml     Piperacillin + Tazobactam <=16 Susceptible ug/ml     Tetracycline 8 Intermediate ug/ml     Trimethoprim + Sulfamethoxazole >2/38 Resistant ug/ml   Results for orders placed or performed in visit on 08/03/18   CBC (No Diff)   Result Value Ref Range    WBC 13.99 (H) 3.20 - 9.80 10*3/mm3    RBC 4.36 3.77 - 5.16 10*6/mm3    Hemoglobin 12.7 12.0 - 15.5 g/dL    Hematocrit 37.7 35.0 - 45.0 %    MCV 86.5 80.0 - 98.0 fL    MCH 29.1 26.5 - 34.0 pg    MCHC 33.7 31.4 - 36.0 g/dL    RDW 13.6 11.5 - 14.5 %    RDW-SD 43.0 36.4 - 46.3 fl    MPV 9.4 8.0 - 12.0 fL    Platelets 397 150 - 450 10*3/mm3   Comprehensive Metabolic Panel   Result Value Ref Range    Glucose 97 60 - 100 mg/dL    BUN 37 (H) 7 - 21 mg/dL    Creatinine 1.18 (H) 0.50 - 1.00 mg/dL    Sodium 138 137 - 145 mmol/L    Potassium 3.8 3.5 - 5.1 mmol/L    Chloride 102 95 - 110 mmol/L    CO2 25.0 22.0 - 31.0 mmol/L    Calcium 9.6 8.4 - 10.2 mg/dL    Total Protein 7.5 6.3 - 8.6 g/dL    Albumin 4.60 3.40 - 4.80 g/dL    ALT (SGPT) 32 9 - 52 U/L    AST (SGOT) 25 14 - 36 U/L    Alkaline Phosphatase 139 (H) 38 - 126 U/L    Total Bilirubin 0.3 0.2 - 1.3 mg/dL    eGFR Non  Amer 46 45 - 104 mL/min/1.73    Globulin 2.9 2.3 - 3.5 gm/dL    A/G Ratio 1.6 1.1 - 1.8 g/dL    BUN/Creatinine Ratio  31.4 (H) 7.0 - 25.0    Anion Gap 11.0 5.0 - 15.0 mmol/L   Results for orders placed or performed in visit on 01/30/18   TSH   Result Value Ref Range    TSH 0.240 (L) 0.460 - 4.680 mIU/mL   T4, Free   Result Value Ref Range    Free T4 1.04 0.78 - 2.19 ng/dL   Lipid Panel   Result Value Ref Range    Total Cholesterol 197 0 - 199 mg/dL    Triglycerides 227 (H) 20 - 199 mg/dL    HDL Cholesterol 45 (L) 60 - 200 mg/dL    LDL Cholesterol  122 1 - 129 mg/dL    LDL/HDL Ratio 2.37 0.00 - 3.22   Results for orders placed or performed during the hospital encounter of 01/19/18   Tissue Pathology Exam - Tissue, Gastric, Antrum   Result Value Ref Range    Case Report       Surgical Pathology Report                         Case: [id number]                                  Authorizing Provider:  Ravi Cavanaugh MD         Collected:           01/19/2018 02:07 PM          Ordering Location:     [facility]             Received:            01/19/2018 02:49 PM                                 [place] [facility]                                                     Pathologist:           Jonnie Bradley MD                                                         Specimen:    Gastric, Antrum                                                                            Final Diagnosis       GASTRIC ANTRUM, MUCOSAL BIOPSY:   MILD CHRONIC GASTRITIS.   NO EVIDENCE OF HELICOBACTER PYLORI.       Gross Description       The specimen consists of a mucosal biopsy measuring up to 0.3 cm in greatest dimension.  All embedded.        Embedded Images     Results for orders placed or performed during the hospital encounter of 09/05/17   Tissue Pathology Exam   Result Value Ref Range    Case Report       Surgical Pathology Report                         Case: [id number]                                  Authorizing Provider:  Ramy Gaston MD        Collected:           09/05/2017 11:56 AM          Ordering Location:     [facility]              "Received:            09/05/2017 01:26 PM                                 [place] [facility]                                                              Pathologist:           Deven Conner MD                                                          Specimens:   1) - Naris, Left, LEFT SINUS                                                                        2) - Naris, Right, RIGHT SINUS                                                             Final Diagnosis       1.  MUCOSA, NASAL SINUS, LEFT SIDE:  CHRONIC SINUSITIS OF RESPIRATORY MUCOSA IN RELATION TO BITS OF BONE.  NEGATIVE FOR FUNGI (GMS STAIN).    2.  MUCOSA, NASAL SINUS, RIGHT SIDE:  CHRONIC SINUSITIS OF RESPIRATORY MUCOSA IN RELATION TO BITS OF BONE.  NEGATIVE FOR FUNGI (GMS STAIN).      Gross Description       1.  The first container is labeled \"left sinus, nose\" and has pinkish white mucosa measuring 1.5 cc in aggregate.  The entire specimen is embedded as 1A.    2.  The second container is labeled \"right sinus, nose\" and has pinkish white mucosa measuring 1.5 cc in aggregate.  The entire specimen is embedded as 2A.       Embedded Images     Wound Culture   Result Value Ref Range    Wound Culture Light growth (2+) Pseudomonas aeruginosa (A)     Wound Culture Light growth (2+) Serratia marcescens (A)     Wound Culture Light growth (2+) Streptococcus mitis / oralis (A)     Gram Stain Moderate (3+) WBCs seen     Gram Stain Rare (1+) Gram negative bacilli        Susceptibility    Pseudomonas aeruginosa - CHAR     Ceftazidime 4  ug/ml     Cefuroxime sodium >16  ug/ml     Gentamicin <=4 Susceptible ug/ml     Levofloxacin <=2 Susceptible ug/ml     Piperacillin + Tazobactam <=16  ug/ml     Tobramycin <=4 Susceptible ug/ml    Serratia marcescens - CHAR     Amikacin <=16 Susceptible ug/ml     Amoxicillin + Clavulanate >16/8 Resistant ug/ml     Ampicillin >16 Resistant ug/ml     Ampicillin + Sulbactam >16/8 Resistant ug/ml     Cefazolin >16 Resistant ug/ml     " Cefotaxime <=2  ug/ml     Cefoxitin 16 Intermediate ug/ml     Ceftazidime <=1  ug/ml     Ceftriaxone <=8  ug/ml     Cefuroxime sodium >16 Resistant ug/ml     Ciprofloxacin 2 Intermediate ug/ml     Gentamicin <=4 Susceptible ug/ml     Imipenem <=4 Susceptible ug/ml     Levofloxacin <=2 Susceptible ug/ml     Meropenem <=4 Susceptible ug/ml     Nitrofurantoin >64  ug/ml     Piperacillin + Tazobactam <=16  ug/ml     Tetracycline <=4 Susceptible ug/ml     Tobramycin <=4 Susceptible ug/ml     Trimethoprim + Sulfamethoxazole <=2/38 Susceptible ug/ml   Results for orders placed or performed in visit on 08/31/17   Basic Metabolic Panel   Result Value Ref Range    Glucose 93 60 - 100 mg/dL    BUN 24 (H) 7 - 21 mg/dL    Creatinine 0.82 0.50 - 1.00 mg/dL    Sodium 136 (L) 137 - 145 mmol/L    Potassium 4.7 3.5 - 5.1 mmol/L    Chloride 100 95 - 110 mmol/L    CO2 26.0 22.0 - 31.0 mmol/L    Calcium 9.6 8.4 - 10.2 mg/dL    eGFR Non  Amer 70 45 - 104 mL/min/1.73    BUN/Creatinine Ratio 29.3 (H) 7.0 - 25.0    Anion Gap 10.0 5.0 - 15.0 mmol/L   Results for orders placed or performed in visit on 08/07/17   Sedimentation Rate   Result Value Ref Range    Sed Rate 9 0 - 20 mm/hr   Rheumatoid Factor   Result Value Ref Range    Rheumatoid Factor Qualitative Negative Negative   C-reactive protein   Result Value Ref Range    C-Reactive Protein 1.00 0.00 - 1.00 mg/dL   DERRICK   Result Value Ref Range    DERRICK Direct Negative Negative   Results for orders placed or performed in visit on 08/01/17   Hepatitis C Antibody   Result Value Ref Range    Hepatitis C Ab Negative Negative   Hemoglobin A1c   Result Value Ref Range    Hemoglobin A1C 5.7 (H) 4 - 5.6 %   Results for orders placed or performed in visit on 05/31/17   Wound Culture   Result Value Ref Range    Wound Culture Light growth (2+) Serratia marcescens (A)     Beta Lactamase      Gram Stain Few (2+) WBCs seen     Gram Stain Rare (1+) Gram negative bacilli        Susceptibility     Serratia marcescens - CHAR     Amikacin <=16 Susceptible ug/ml     Amoxicillin + Clavulanate >16/8 Resistant ug/ml     Ampicillin >16 Resistant ug/ml     Ampicillin + Sulbactam >16/8 Resistant ug/ml     Cefazolin >16 Resistant ug/ml     Cefotaxime <=2  ug/ml     Cefoxitin 16 Intermediate ug/ml     Ceftazidime <=1  ug/ml     Ceftriaxone <=8  ug/ml     Cefuroxime >16 Resistant ug/ml     Ciprofloxacin <=1 Susceptible ug/ml     Gentamicin <=4 Susceptible ug/ml     Imipenem <=4 Susceptible ug/ml     Levofloxacin <=2 Susceptible ug/ml     Meropenem <=4 Susceptible ug/ml     Nitrofurantoin >64  ug/ml     Piperacillin + Tazobactam <=16  ug/ml     Tetracycline 8 Intermediate ug/ml     Tobramycin <=4 Susceptible ug/ml     Trimethoprim + Sulfamethoxazole <=2/38 Susceptible ug/ml

## 2019-07-01 RX ORDER — IPRATROPIUM BROMIDE 42 UG/1
SPRAY, METERED NASAL
Qty: 15 ML | Refills: 0 | Status: SHIPPED | OUTPATIENT
Start: 2019-07-01 | End: 2019-07-19 | Stop reason: SDUPTHER

## 2019-07-19 ENCOUNTER — OFFICE VISIT (OUTPATIENT)
Dept: GASTROENTEROLOGY | Facility: CLINIC | Age: 64
End: 2019-07-19

## 2019-07-19 VITALS
HEIGHT: 67 IN | BODY MASS INDEX: 33.84 KG/M2 | SYSTOLIC BLOOD PRESSURE: 140 MMHG | HEART RATE: 84 BPM | DIASTOLIC BLOOD PRESSURE: 86 MMHG | WEIGHT: 215.6 LBS

## 2019-07-19 DIAGNOSIS — K44.9 HIATAL HERNIA: ICD-10-CM

## 2019-07-19 DIAGNOSIS — K21.00 GASTROESOPHAGEAL REFLUX DISEASE WITH ESOPHAGITIS: Primary | ICD-10-CM

## 2019-07-19 DIAGNOSIS — R11.0 NAUSEA: ICD-10-CM

## 2019-07-19 PROCEDURE — 99213 OFFICE O/P EST LOW 20 MIN: CPT | Performed by: NURSE PRACTITIONER

## 2019-07-19 RX ORDER — ONDANSETRON HYDROCHLORIDE 8 MG/1
8 TABLET, FILM COATED ORAL EVERY 8 HOURS PRN
Qty: 30 TABLET | Refills: 1 | Status: SHIPPED | OUTPATIENT
Start: 2019-07-19 | End: 2019-09-14 | Stop reason: SDUPTHER

## 2019-07-19 NOTE — PATIENT INSTRUCTIONS
Heartburn  Heartburn is a type of pain or discomfort that can happen in the throat or chest. It is often described as a burning pain. It may also cause a bad taste in the mouth. Heartburn may feel worse when you lie down or bend over, and it is often worse at night. Heartburn may be caused by stomach contents that move back up into the esophagus (reflux).  Follow these instructions at home:  Take these actions to decrease your discomfort and to help avoid complications.  Diet  · Follow a diet as recommended by your health care provider. This may involve avoiding foods and drinks such as:  ? Coffee and tea (with or without caffeine).  ? Drinks that contain alcohol.  ? Energy drinks and sports drinks.  ? Carbonated drinks or sodas.  ? Chocolate and cocoa.  ? Peppermint and mint flavorings.  ? Garlic and onions.  ? Horseradish.  ? Spicy and acidic foods, including peppers, chili powder, molina powder, vinegar, hot sauces, and barbecue sauce.  ? Citrus fruit juices and citrus fruits, such as oranges, navarro, and limes.  ? Tomato-based foods, such as red sauce, chili, salsa, and pizza with red sauce.  ? Fried and fatty foods, such as donuts, french fries, potato chips, and high-fat dressings.  ? High-fat meats, such as hot dogs and fatty cuts of red and white meats, such as rib eye steak, sausage, ham, and lacey.  ? High-fat dairy items, such as whole milk, butter, and cream cheese.  · Eat small, frequent meals instead of large meals.  · Avoid drinking large amounts of liquid with your meals.  · Avoid eating meals during the 2-3 hours before bedtime.  · Avoid lying down right after you eat.  · Do not exercise right after you eat.  General instructions  · Pay attention to any changes in your symptoms.  · Take over-the-counter and prescription medicines only as told by your health care provider. Do not take aspirin, ibuprofen, or other NSAIDs unless your health care provider told you to do so.  · Do not use any tobacco  products, including cigarettes, chewing tobacco, and e-cigarettes. If you need help quitting, ask your health care provider.  · Wear loose-fitting clothing. Do not wear anything tight around your waist that causes pressure on your abdomen.  · Raise (elevate) the head of your bed about 6 inches (15 cm).  · Try to reduce your stress, such as with yoga or meditation. If you need help reducing stress, ask your health care provider.  · If you are overweight, reduce your weight to an amount that is healthy for you. Ask your health care provider for guidance about a safe weight loss goal.  · Keep all follow-up visits as told by your health care provider. This is important.  Contact a health care provider if:  · You have new symptoms.  · You have unexplained weight loss.  · You have difficulty swallowing, or it hurts to swallow.  · You have wheezing or a persistent cough.  · Your symptoms do not improve with treatment.  · You have frequent heartburn for more than two weeks.  Get help right away if:  · You have pain in your arms, neck, jaw, teeth, or back.  · You feel sweaty, dizzy, or light-headed.  · You have chest pain or shortness of breath.  · You vomit and your vomit looks like blood or coffee grounds.  · Your stool is bloody or black.  This information is not intended to replace advice given to you by your health care provider. Make sure you discuss any questions you have with your health care provider.  Document Released: 05/06/2010 Document Revised: 05/25/2017 Document Reviewed: 04/13/2016  [employer] Education © 2019 [employer]

## 2019-07-19 NOTE — PROGRESS NOTES
Chief Complaint   Patient presents with   • Heartburn       Subjective    Mikki Palak Joel is a 64 y.o. female. she is here today for follow-up.    64-year-old female presents for one-month recheck regarding GERD hiatal hernia and nausea.  At last visit we started her on Carafate before meals and bedtime Zofran as needed states Zofran has greatly improved her symptoms reports she still has intermittent nausea and vomiting that has occurred much less frequently.    Heartburn   She complains of abdominal pain, early satiety, heartburn and nausea. She reports no belching, no chest pain, no choking, no coughing, no dysphagia, no globus sensation, no hoarse voice or no sore throat. This is a chronic problem. The current episode started more than 1 year ago. The problem occurs occasionally. The heartburn is of mild intensity. Heartburn wakes from sleep: on bad days  The heartburn does not limit her activity. The heartburn changes with position. The symptoms are aggravated by certain foods and exertion. Pertinent negatives include no fatigue. Risk factors include obesity. She has tried a PPI and a diet change for the symptoms.     Plan; discussed importance of continual avoidance of gastric irritants and standard antireflux measures.  continue with Dexilant daily and Carafate before meals and bedtime refill of Zofran sent follow-up in 3 months for recheck return office sooner if needed     The following portions of the patient's history were reviewed and updated as appropriate:   Past Medical History:   Diagnosis Date   • Anemia    • Anemia June 2016    might have been due to knee replacement surgery I had in mar   • Anemia June 2016    might have been due to knee replacement surgery I had in mar   • Anxiety    • Arthritis    • Arthritis of back 2009    lower back   • Asthma    • CTS (carpal tunnel syndrome) 1987    left wrist - had corrective surgery   • Depression    • Fracture of wrist 1979   • High blood pressure     • [place] (hard of hearing)    • Hypertension 1999    metoprolol 100 mg and triamterene/hydrochlorothiazide 37.5mg   • Hypertension 1999    metoprolol 100 mg and triamterene/hydrochlorothiazide 37.5mg   • Knee swelling 2010   • Migraine    • Periarthritis of shoulder 2011    both shoulders right shoulder total replacement   • Tear of meniscus of knee 2016    fell     Past Surgical History:   Procedure Laterality Date   • BUNIONECTOMY Bilateral    • COLONOSCOPY  2015   • COLONOSCOPY N/A 1/19/2018    Procedure: COLONOSCOPY;  Surgeon: Ravi Polk MD;  Location: [facility] ENDOSCOPY;  Service:    • COLONOSCOPY  2015   • EAR TUBES     • ENDOSCOPIC FUNCTIONAL SINUS SURGERY (FESS) Bilateral 9/5/2017    Procedure: BILATERAL ETHMOID AND MAXILLARY SINUS AND NASAL SEPTOPLASTIES AND BILATERAL TURBINECTOMIES;  Surgeon: Ramy Gaston MD;  Location: [facility] OR;  Service:    • ENDOSCOPY N/A 1/19/2018    Procedure: ESOPHAGOGASTRODUODENOSCOPY;  Surgeon: Ravi Polk MD;  Location: [facility] ENDOSCOPY;  Service:    • ENDOSCOPY N/A 6/10/2019    Procedure: ESOPHAGOGASTRODUODENOSCOPY possible dilation;  Surgeon: Ravi Polk MD;  Location: [facility] ENDOSCOPY;  Service: Gastroenterology   • HAND SURGERY     • JOINT REPLACEMENT  2016    right knee replacement 2016   • JOINT REPLACEMENT  2015    right shoulder   • KNEE SURGERY  2016    total right knee replacement   • RHINOPLASTY      x3   • SHOULDER ARTHROSCOPY     • SHOULDER SURGERY  2015    complete shoulder joint replacement   • TONSILLECTOMY     • TUBAL ABDOMINAL LIGATION  1987   • TYMPANOPLASTY Left 6/5/2018    Procedure: TYMPANOPLASTY WITH CARTILAGE GRAFT;  Surgeon: Ramy Gaston MD;  Location: [facility];  Service: ENT   • UPPER GASTROINTESTINAL ENDOSCOPY  2016   • UPPER GASTROINTESTINAL ENDOSCOPY  2016   • UPPER GASTROINTESTINAL ENDOSCOPY  01/19/2018    Per Dr. Ravi Polk M.D.   • WRIST SURGERY  1987    carpal tunnel repair left wrist     Family History   Problem Relation  Age of Onset   • Hypertension Mother    • COPD Mother    • Arthritis Mother    • Alcohol abuse Mother    • Cancer Father    • Arthritis Father    • Alcohol abuse Father    • COPD Maternal Grandmother    • Stroke Maternal Grandmother    • Osteoporosis Maternal Grandmother    • Irritable bowel syndrome Sister      OB History      Para Term  AB Living    2 2 2          SAB TAB Ectopic Molar Multiple Live Births                       Prior to Admission medications    Medication Sig Start Date End Date Taking? Authorizing Provider   ALBUTEROL SULFATE  (90 Base) MCG/ACT inhaler INHALE 2 PUFFS BY MOUTH EVERY 4 HOURS AS NEEDED FOR WHEEZING 19  Yes Ronald Bowser MD   busPIRone (BUSPAR) 30 MG tablet Take 30 mg by mouth 2 (Two) Times a Day. 10/1/18  Yes ProviderLucía MD   cetirizine (ZYRTEC ALLERGY) 10 MG tablet Take 1 tablet by mouth Daily. 19  Yes Ronald Bowser MD   DEXILANT 60 MG capsule TAKE 1 CAPSULE BY MOUTH DAILY 19  Yes Zelda Rich APRN   eszopiclone (LUNESTA) 1 MG tablet Take 1 tablet by mouth Every Night. Take immediately before bedtime 19  Yes Ramirez Bai MD   Fluticasone Furoate-Vilanterol (BREO ELLIPTA) 100-25 MCG/INH inhaler 1 puff bid and rinse out mouth 19  Yes Ronald Bowser MD   ipratropium (ATROVENT) 0.06 % nasal spray INSTILL 2 SPRAYS IN EACH NOSTRIL THREE TIMES DAILY AS DIRECTED BY PROVIDER 19  Yes Ramy Gaston MD   metoprolol succinate XL (TOPROL-XL) 100 MG 24 hr tablet TAKE 1 TABLET BY MOUTH DAILY 19  Yes Ronald Bowser MD   mometasone (NASONEX) 50 MCG/ACT nasal spray 2 sprays into the nostril(s) as directed by provider 2 (Two) Times a Day. 18  Yes Ramy Gaston MD   olopatadine (PATANASE) 0.6 % solution nasal solution 2 sprays by Each Nare route 2 (Two) Times a Day. 19  Yes Ramy Gaston MD   ondansetron (ZOFRAN) 8 MG tablet Take 1 tablet by mouth Every 8 (Eight) Hours As Needed for Nausea or Vomiting. 19   Yes Zelda Rich APRN   rOPINIRole (REQUIP) 3 MG tablet Take 3 mg by mouth Daily. 12/18/18  Yes ProviderLucía MD   sucralfate (CARAFATE) 1 g tablet Take 1 tablet by mouth 4 (Four) Times a Day. 6/21/19  Yes Zelda Rihc APRN   Vortioxetine HBr (TRINTELLIX) 10 MG tablet Take 10 mg by mouth Daily.   Yes Provider, MD Lucía   ondansetron (ZOFRAN) 8 MG tablet Take 1 tablet by mouth Every 8 (Eight) Hours As Needed for Nausea or Vomiting. 6/21/19 7/19/19 Yes Zelda Rich APRN     Allergies   Allergen Reactions   • Codeine Other (See Comments)     Increases heart rate   • Sulfa Antibiotics Hives     Social History     Socioeconomic History   • Marital status:      Spouse name: Not on file   • Number of children: Not on file   • Years of education: Not on file   • Highest education level: Not on file   Tobacco Use   • Smoking status: Never Smoker   • Smokeless tobacco: Never Used   Substance and Sexual Activity   • Alcohol use: Yes     Alcohol/week: 1.2 oz     Types: 2 Standard drinks or equivalent per week     Comment: occasional   • Drug use: No   • Sexual activity: Defer     Partners: Male     Birth control/protection: Post-menopausal, Surgical, None, OCP     Comment: 02/04/2019 - Patient confirmed Tubal Ligation following birth of  last child at age 32.       Review of Systems  Review of Systems   Constitutional: Negative for activity change, appetite change, chills, diaphoresis, fatigue, fever and unexpected weight change.   HENT: Negative for hoarse voice, sore throat and trouble swallowing.    Respiratory: Negative for cough, choking and shortness of breath.    Cardiovascular: Negative for chest pain.   Gastrointestinal: Positive for abdominal pain, heartburn, nausea and vomiting (much less frequent in the last month ). Negative for abdominal distention, anal bleeding, blood in stool, constipation, diarrhea, dysphagia and rectal pain.   Musculoskeletal: Negative for arthralgias.   Skin: Negative  "for pallor.   Neurological: Negative for light-headedness.        /86 (BP Location: Left arm)   Pulse 84   Ht 170.2 cm (67\")   Wt 97.8 kg (215 lb 9.6 oz)   LMP  (LMP Unknown)   BMI 33.77 kg/m²     Objective    Physical Exam   Constitutional: She is oriented to person, place, and time. She appears well-developed and well-nourished. She is cooperative. No distress.   HENT:   Head: Normocephalic and atraumatic.   Neck: Normal range of motion. Neck supple. No thyromegaly present.   Cardiovascular: Normal rate, regular rhythm and normal heart sounds.   Pulmonary/Chest: Effort normal and breath sounds normal. She has no wheezes. She has no rhonchi. She has no rales.   Abdominal: Soft. Normal appearance and bowel sounds are normal. She exhibits no distension. There is no hepatosplenomegaly. There is tenderness in the epigastric area. There is no rigidity and no guarding. No hernia.   Lymphadenopathy:     She has no cervical adenopathy.   Neurological: She is alert and oriented to person, place, and time.   Skin: Skin is warm, dry and intact. No rash noted. No pallor.   Psychiatric: She has a normal mood and affect. Her speech is normal.     Admission on 06/10/2019, Discharged on 06/10/2019   Component Date Value Ref Range Status   • Case Report 06/10/2019    Final                    Value:Surgical Pathology Report                         Case: [id number]                                  Authorizing Provider:  Ravi Cavanaugh MD        Collected:           06/10/2019 04:42 PM          Ordering Location:     [facility]             Received:            06/11/2019 07:28 AM                                 [place] [facility]                                                     Pathologist:           Deven Conner MD                                                         Specimens:   1) - Gastric, Antrum                                                                                2) - Esophagus, Distal "                                                                    • Final Diagnosis 06/10/2019    Final                    Value:This result contains rich text formatting which cannot be displayed here.   • Comment 06/10/2019    Final                    Value:This result contains rich text formatting which cannot be displayed here.   • Gross Description 06/10/2019    Final                    Value:This result contains rich text formatting which cannot be displayed here.     Assessment/Plan      1. Gastroesophageal reflux disease with esophagitis    2. Hiatal hernia    3. Nausea    .       Orders placed during this encounter include:  No orders of the defined types were placed in this encounter.      * Surgery not found *    Review and/or summary of lab tests, radiology, procedures, medications. Review and summary of old records and obtaining of history. The risks and benefits of my recommendations, as well as other treatment options were discussed with the patient today. Questions were answered.    New Medications Ordered This Visit   Medications   • ondansetron (ZOFRAN) 8 MG tablet     Sig: Take 1 tablet by mouth Every 8 (Eight) Hours As Needed for Nausea or Vomiting.     Dispense:  30 tablet     Refill:  1       Follow-up: Return in about 3 months (around 10/19/2019).          This document has been electronically signed by MIRIAM White on July 19, 2019 11:05 AM             Results for orders placed or performed during the hospital encounter of 06/10/19   Tissue Pathology Exam   Result Value Ref Range    Case Report       Surgical Pathology Report                         Case: [id number]                                  Authorizing Provider:  Ravi Cavanaugh MD        Collected:           06/10/2019 04:42 PM          Ordering Location:     [facility]             Received:            06/11/2019 07:28 AM                                 [place] [facility]                                               "       Pathologist:           Deven Conner MD                                                         Specimens:   1) - Gastric, Antrum                                                                                2) - Esophagus, Distal                                                                     Final Diagnosis       1.  MUCOSA, ANTRUM OF STOMACH:  CHRONIC GASTRITIS.  NEGATIVE FOR HELICOBACTER PYLORI (HP IMMUNOSTAIN).    2.  MUCOSA, DISTAL ESOPHAGUS:  REACTIVE CHANGE OF SQUAMOUS MUCOSA.      Comment       Helicobacter pylori (HP) immunostain is performed (Block 1) because an appropriate inflammatory milieu is present and organisms are not seen on H & E stained slides.    HP immunostain was developed and its performance characteristics determined by [facility].  It has not been cleared or approved by the U.S. Food and Drug Administration.  The FDA has determined that such clearance or approval is not necessary.  This test is used for clinical purposes.  It should not be regarded as investigational or for research.  This laboratory is certified under the Clinical Laboratory Improvement Amendments of 1988 (CLIA-88) as qualified to perform high complexity clinical laboratory testing.    All controls show appropriate reactivity.          Gross Description       1.  The first container is labeled 'antrum of stomach\" and has nodular bits of white soft tissue measuring 0.4 cc in aggregate.  The entire specimen is embedded as 1A.    2.  The second container is labeled \"distal esophagus\" and has nodular bits of white soft tissue measuring 0.4 cc in aggregate.  The entire specimen is embedded as 2A.     Results for orders placed or performed in visit on 04/11/19   Helicobacter Pylori, IgA IgG IgM   Result Value Ref Range    H. pylori IgG 0.19 0.00 - 0.79 Index Value    H. pylori, IgA ABS <9.0 0.0 - 8.9 units    H. Pylori, IgM <9.0 0.0 - 8.9 units   Results for orders placed or " performed in visit on 02/26/19   Wound Culture - Wound, Sinus, maxillary left   Result Value Ref Range    Wound Culture Moderate growth (3+) Serratia marcescens (A)     Wound Culture Light growth (2+) Normal Skin Barbara     Gram Stain Rare (1+) WBCs seen     Gram Stain Few (2+) Gram negative bacilli     Gram Stain Rare (1+) Gram positive cocci        Susceptibility    Serratia marcescens - CHAR     Amoxicillin + Clavulanate >16/8 Resistant ug/ml     Ampicillin >16 Resistant ug/ml     Ampicillin + Sulbactam >16/8 Resistant ug/ml     Cefazolin >16 Resistant ug/ml     Cefepime <=8 Susceptible ug/ml     Cefoxitin >16 Resistant ug/ml     Ceftazidime <=1 Susceptible ug/ml     Ceftriaxone <=8 Susceptible ug/ml     Cefuroxime sodium >16 Resistant ug/ml     Levofloxacin 4 Intermediate ug/ml     Piperacillin + Tazobactam <=16 Susceptible ug/ml     Tetracycline 8 Intermediate ug/ml     Trimethoprim + Sulfamethoxazole >2/38 Resistant ug/ml   Results for orders placed or performed in visit on 08/03/18   CBC (No Diff)   Result Value Ref Range    WBC 13.99 (H) 3.20 - 9.80 10*3/mm3    RBC 4.36 3.77 - 5.16 10*6/mm3    Hemoglobin 12.7 12.0 - 15.5 g/dL    Hematocrit 37.7 35.0 - 45.0 %    MCV 86.5 80.0 - 98.0 fL    MCH 29.1 26.5 - 34.0 pg    MCHC 33.7 31.4 - 36.0 g/dL    RDW 13.6 11.5 - 14.5 %    RDW-SD 43.0 36.4 - 46.3 fl    MPV 9.4 8.0 - 12.0 fL    Platelets 397 150 - 450 10*3/mm3   Comprehensive Metabolic Panel   Result Value Ref Range    Glucose 97 60 - 100 mg/dL    BUN 37 (H) 7 - 21 mg/dL    Creatinine 1.18 (H) 0.50 - 1.00 mg/dL    Sodium 138 137 - 145 mmol/L    Potassium 3.8 3.5 - 5.1 mmol/L    Chloride 102 95 - 110 mmol/L    CO2 25.0 22.0 - 31.0 mmol/L    Calcium 9.6 8.4 - 10.2 mg/dL    Total Protein 7.5 6.3 - 8.6 g/dL    Albumin 4.60 3.40 - 4.80 g/dL    ALT (SGPT) 32 9 - 52 U/L    AST (SGOT) 25 14 - 36 U/L    Alkaline Phosphatase 139 (H) 38 - 126 U/L    Total Bilirubin 0.3 0.2 - 1.3 mg/dL    eGFR Non  Amer 46 45 - 104  mL/min/1.73    Globulin 2.9 2.3 - 3.5 gm/dL    A/G Ratio 1.6 1.1 - 1.8 g/dL    BUN/Creatinine Ratio 31.4 (H) 7.0 - 25.0    Anion Gap 11.0 5.0 - 15.0 mmol/L   Results for orders placed or performed in visit on 01/30/18   TSH   Result Value Ref Range    TSH 0.240 (L) 0.460 - 4.680 mIU/mL   T4, Free   Result Value Ref Range    Free T4 1.04 0.78 - 2.19 ng/dL   Lipid Panel   Result Value Ref Range    Total Cholesterol 197 0 - 199 mg/dL    Triglycerides 227 (H) 20 - 199 mg/dL    HDL Cholesterol 45 (L) 60 - 200 mg/dL    LDL Cholesterol  122 1 - 129 mg/dL    LDL/HDL Ratio 2.37 0.00 - 3.22   Results for orders placed or performed during the hospital encounter of 01/19/18   Tissue Pathology Exam - Tissue, Gastric, Antrum   Result Value Ref Range    Case Report       Surgical Pathology Report                         Case: [id number]                                  Authorizing Provider:  Ravi Cavanaugh MD         Collected:           01/19/2018 02:07 PM          Ordering Location:     [facility]             Received:            01/19/2018 02:49 PM                                 [place] [facility]                                                     Pathologist:           Jonnie Bradley MD                                                         Specimen:    Gastric, Antrum                                                                            Final Diagnosis       GASTRIC ANTRUM, MUCOSAL BIOPSY:   MILD CHRONIC GASTRITIS.   NO EVIDENCE OF HELICOBACTER PYLORI.       Gross Description       The specimen consists of a mucosal biopsy measuring up to 0.3 cm in greatest dimension.  All embedded.        Embedded Images     Results for orders placed or performed during the hospital encounter of 09/05/17   Tissue Pathology Exam   Result Value Ref Range    Case Report       Surgical Pathology Report                         Case: [id number]                                  Authorizing Provider:  Ramy Gaston MD         "Collected:           09/05/2017 11:56 AM          Ordering Location:     [facility]             Received:            09/05/2017 01:26 PM                                 [place] OR                                                              Pathologist:           Deven Conner MD                                                          Specimens:   1) - Naris, Left, LEFT SINUS                                                                        2) - Naris, Right, RIGHT SINUS                                                             Final Diagnosis       1.  MUCOSA, NASAL SINUS, LEFT SIDE:  CHRONIC SINUSITIS OF RESPIRATORY MUCOSA IN RELATION TO BITS OF BONE.  NEGATIVE FOR FUNGI (GMS STAIN).    2.  MUCOSA, NASAL SINUS, RIGHT SIDE:  CHRONIC SINUSITIS OF RESPIRATORY MUCOSA IN RELATION TO BITS OF BONE.  NEGATIVE FOR FUNGI (GMS STAIN).      Gross Description       1.  The first container is labeled \"left sinus, nose\" and has pinkish white mucosa measuring 1.5 cc in aggregate.  The entire specimen is embedded as 1A.    2.  The second container is labeled \"right sinus, nose\" and has pinkish white mucosa measuring 1.5 cc in aggregate.  The entire specimen is embedded as 2A.       Embedded Images     Wound Culture   Result Value Ref Range    Wound Culture Light growth (2+) Pseudomonas aeruginosa (A)     Wound Culture Light growth (2+) Serratia marcescens (A)     Wound Culture Light growth (2+) Streptococcus mitis / oralis (A)     Gram Stain Moderate (3+) WBCs seen     Gram Stain Rare (1+) Gram negative bacilli        Susceptibility    Pseudomonas aeruginosa - CHAR     Ceftazidime 4  ug/ml     Cefuroxime sodium >16  ug/ml     Gentamicin <=4 Susceptible ug/ml     Levofloxacin <=2 Susceptible ug/ml     Piperacillin + Tazobactam <=16  ug/ml     Tobramycin <=4 Susceptible ug/ml    Serratia marcescens - CHAR     Amikacin <=16 Susceptible ug/ml     Amoxicillin + Clavulanate >16/8 Resistant ug/ml     Ampicillin >16 " Resistant ug/ml     Ampicillin + Sulbactam >16/8 Resistant ug/ml     Cefazolin >16 Resistant ug/ml     Cefotaxime <=2  ug/ml     Cefoxitin 16 Intermediate ug/ml     Ceftazidime <=1  ug/ml     Ceftriaxone <=8  ug/ml     Cefuroxime sodium >16 Resistant ug/ml     Ciprofloxacin 2 Intermediate ug/ml     Gentamicin <=4 Susceptible ug/ml     Imipenem <=4 Susceptible ug/ml     Levofloxacin <=2 Susceptible ug/ml     Meropenem <=4 Susceptible ug/ml     Nitrofurantoin >64  ug/ml     Piperacillin + Tazobactam <=16  ug/ml     Tetracycline <=4 Susceptible ug/ml     Tobramycin <=4 Susceptible ug/ml     Trimethoprim + Sulfamethoxazole <=2/38 Susceptible ug/ml   Results for orders placed or performed in visit on 08/31/17   Basic Metabolic Panel   Result Value Ref Range    Glucose 93 60 - 100 mg/dL    BUN 24 (H) 7 - 21 mg/dL    Creatinine 0.82 0.50 - 1.00 mg/dL    Sodium 136 (L) 137 - 145 mmol/L    Potassium 4.7 3.5 - 5.1 mmol/L    Chloride 100 95 - 110 mmol/L    CO2 26.0 22.0 - 31.0 mmol/L    Calcium 9.6 8.4 - 10.2 mg/dL    eGFR Non  Amer 70 45 - 104 mL/min/1.73    BUN/Creatinine Ratio 29.3 (H) 7.0 - 25.0    Anion Gap 10.0 5.0 - 15.0 mmol/L   Results for orders placed or performed in visit on 08/07/17   Sedimentation Rate   Result Value Ref Range    Sed Rate 9 0 - 20 mm/hr   Rheumatoid Factor   Result Value Ref Range    Rheumatoid Factor Qualitative Negative Negative   C-reactive protein   Result Value Ref Range    C-Reactive Protein 1.00 0.00 - 1.00 mg/dL   DERRICK   Result Value Ref Range    DERRICK Direct Negative Negative   Results for orders placed or performed in visit on 08/01/17   Hepatitis C Antibody   Result Value Ref Range    Hepatitis C Ab Negative Negative   Hemoglobin A1c   Result Value Ref Range    Hemoglobin A1C 5.7 (H) 4 - 5.6 %   Results for orders placed or performed in visit on 05/31/17   Wound Culture   Result Value Ref Range    Wound Culture Light growth (2+) Serratia marcescens (A)     Beta Lactamase      Gram  Stain Few (2+) WBCs seen     Gram Stain Rare (1+) Gram negative bacilli        Susceptibility    Serratia marcescens - CHAR     Amikacin <=16 Susceptible ug/ml     Amoxicillin + Clavulanate >16/8 Resistant ug/ml     Ampicillin >16 Resistant ug/ml     Ampicillin + Sulbactam >16/8 Resistant ug/ml     Cefazolin >16 Resistant ug/ml     Cefotaxime <=2  ug/ml     Cefoxitin 16 Intermediate ug/ml     Ceftazidime <=1  ug/ml     Ceftriaxone <=8  ug/ml     Cefuroxime >16 Resistant ug/ml     Ciprofloxacin <=1 Susceptible ug/ml     Gentamicin <=4 Susceptible ug/ml     Imipenem <=4 Susceptible ug/ml     Levofloxacin <=2 Susceptible ug/ml     Meropenem <=4 Susceptible ug/ml     Nitrofurantoin >64  ug/ml     Piperacillin + Tazobactam <=16  ug/ml     Tetracycline 8 Intermediate ug/ml     Tobramycin <=4 Susceptible ug/ml     Trimethoprim + Sulfamethoxazole <=2/38 Susceptible ug/ml

## 2019-07-22 RX ORDER — IPRATROPIUM BROMIDE 42 UG/1
SPRAY, METERED NASAL
Qty: 15 ML | Refills: 0 | Status: SHIPPED | OUTPATIENT
Start: 2019-07-22 | End: 2019-08-04 | Stop reason: SDUPTHER

## 2019-07-31 RX ORDER — MOMETASONE FUROATE 50 UG/1
SPRAY, METERED NASAL
Qty: 17 G | Refills: 5 | Status: SHIPPED | OUTPATIENT
Start: 2019-07-31 | End: 2020-04-03

## 2019-08-05 RX ORDER — IPRATROPIUM BROMIDE 42 UG/1
SPRAY, METERED NASAL
Qty: 15 ML | Refills: 0 | Status: SHIPPED | OUTPATIENT
Start: 2019-08-05 | End: 2020-01-14

## 2019-08-27 RX ORDER — DEXLANSOPRAZOLE 60 MG/1
CAPSULE, DELAYED RELEASE ORAL
Qty: 90 CAPSULE | Refills: 0 | Status: SHIPPED | OUTPATIENT
Start: 2019-08-27 | End: 2019-10-22 | Stop reason: SDUPTHER

## 2019-09-12 ENCOUNTER — OFFICE VISIT (OUTPATIENT)
Dept: SLEEP MEDICINE | Facility: HOSPITAL | Age: 64
End: 2019-09-12

## 2019-09-12 VITALS
WEIGHT: 220 LBS | SYSTOLIC BLOOD PRESSURE: 150 MMHG | HEART RATE: 89 BPM | HEIGHT: 67 IN | BODY MASS INDEX: 34.53 KG/M2 | DIASTOLIC BLOOD PRESSURE: 85 MMHG | OXYGEN SATURATION: 97 %

## 2019-09-12 DIAGNOSIS — G25.81 RESTLESS LEGS SYNDROME (RLS): ICD-10-CM

## 2019-09-12 DIAGNOSIS — F51.04 PSYCHOPHYSIOLOGICAL INSOMNIA: Primary | ICD-10-CM

## 2019-09-12 PROCEDURE — 99214 OFFICE O/P EST MOD 30 MIN: CPT | Performed by: INTERNAL MEDICINE

## 2019-09-12 NOTE — PROGRESS NOTES
Sleep Clinic Follow Up    Date: 9/12/2019  Primary Care Physician: Ronald Bowser MD    Last office visit: 03/12/2019 (I reviewed this note)    CC: Follow up: RLS and insomnia    Assessment and Plan:    1. Restless leg syndrome Established, stable (1)  1. Remains on Requip 3 mg po qhs  2. Insomnia - worse lately - issue wwith home sale, anniversary of child's suicide  1. Continue CBT - I with counselor  2. Continue Lunesta 1 mg po qhs  3. Circadian rhythm sleep disorder --stable  1. Delayed sleep phase syndrome  4. Poor sleep hygiene - improved,.      Interim History:  Since the last visit:    1) Insomnia -  Mikki Joel has remained compliant with CBT-I but her stressors have increased. She is involved in a land sale dispute and dealing with emotions about her daughter's suicide several years ago. Her bedtime routine is unchanged except for longer periods of wakefulness when she arouses at night. We discussed at length the importance of continued counseling. I don't think a medication change is necessary yet, however, I have a low threshold to increasing her Lunesta or having her see a psychiatrist for evaluation and treatment of depression.    2) Patient RLS symptoms are stable.    PMHx, FH, SH reviewed and pertinent changes are: as above      REVIEW OF SYSTEMS:   Negative for chest pain, fever, cough, SOA, abdominal pain. Smoking:none    Exam:  Vitals:    09/12/19 1426   BP: 150/85   Pulse: 89   SpO2: 97%           09/12/19  1426   Weight: 99.8 kg (220 lb)     Body mass index is 34.45 kg/m². Patient's Body mass index is 34.45 kg/m². BMI is above normal parameters. Recommendations include: referral to primary care.      Gen:  No acute distress, alert, oriented  Lungs:  CTA with normal effort   CV:  RRR, no M/R/G  GI:  soft, non-tender  Psych:  Appropriate affect    Past Medical History:   Diagnosis Date   • Anemia    • Anemia June 2016    might have been due to knee replacement surgery I had in mar   •  Anemia June 2016    might have been due to knee replacement surgery I had in mar   • Anxiety    • Arthritis    • Arthritis of back 2009    lower back   • Asthma    • CTS (carpal tunnel syndrome) 1987    left wrist - had corrective surgery   • Depression    • Fracture of wrist 1979   • High blood pressure    • [place] (hard of hearing)    • Hypertension 1999    metoprolol 100 mg and triamterene/hydrochlorothiazide 37.5mg   • Hypertension 1999    metoprolol 100 mg and triamterene/hydrochlorothiazide 37.5mg   • Knee swelling 2010   • Migraine    • Periarthritis of shoulder 2011    both shoulders right shoulder total replacement   • Tear of meniscus of knee 2016    fell       Current Outpatient Medications:   •  ALBUTEROL SULFATE  (90 Base) MCG/ACT inhaler, INHALE 2 PUFFS BY MOUTH EVERY 4 HOURS AS NEEDED FOR WHEEZING, Disp: 8.5 g, Rfl: 0  •  busPIRone (BUSPAR) 30 MG tablet, Take 30 mg by mouth 2 (Two) Times a Day., Disp: , Rfl:   •  cetirizine (ZYRTEC ALLERGY) 10 MG tablet, Take 1 tablet by mouth Daily., Disp: 30 tablet, Rfl: 11  •  DEXILANT 60 MG capsule, TAKE 1 CAPSULE BY MOUTH DAILY, Disp: 90 capsule, Rfl: 0  •  eszopiclone (LUNESTA) 1 MG tablet, Take 1 tablet by mouth Every Night. Take immediately before bedtime, Disp: 30 tablet, Rfl: 5  •  Fluticasone Furoate-Vilanterol (BREO ELLIPTA) 100-25 MCG/INH inhaler, 1 puff bid and rinse out mouth, Disp: 90 each, Rfl: 3  •  ipratropium (ATROVENT) 0.06 % nasal spray, USE 2 SPRAYS IN EACH NOSTRIL THREE TIMES DAILY AS DIRECTED., Disp: 15 mL, Rfl: 0  •  metoprolol succinate XL (TOPROL-XL) 100 MG 24 hr tablet, TAKE 1 TABLET BY MOUTH DAILY, Disp: 90 tablet, Rfl: 3  •  mometasone (NASONEX) 50 MCG/ACT nasal spray, USE 2 SPRAYS IN EACH NOSTRIL AS DIRECTED BY PROVIDER TWICE DAILY, Disp: 17 g, Rfl: 5  •  olopatadine (PATANASE) 0.6 % solution nasal solution, 2 sprays by Each Nare route 2 (Two) Times a Day., Disp: 30 g, Rfl: 11  •  ondansetron (ZOFRAN) 8 MG tablet, Take 1 tablet by  mouth Every 8 (Eight) Hours As Needed for Nausea or Vomiting., Disp: 30 tablet, Rfl: 1  •  rOPINIRole (REQUIP) 3 MG tablet, Take 3 mg by mouth Daily., Disp: , Rfl:   •  sucralfate (CARAFATE) 1 g tablet, Take 1 tablet by mouth 4 (Four) Times a Day., Disp: 120 tablet, Rfl: 2  •  Vortioxetine HBr (TRINTELLIX) 10 MG tablet, Take 10 mg by mouth Daily., Disp: , Rfl:     Total time 25 min, more than half spent in face to face counseling and coordination of care.       This document has been electronically signed by Ramirez Bai MD on September 12, 2019         CC: Ronald Bowser MD          No ref. provider found

## 2019-09-13 RX ORDER — ESZOPICLONE 1 MG/1
1 TABLET, FILM COATED ORAL NIGHTLY
Qty: 30 TABLET | Refills: 5 | Status: SHIPPED | OUTPATIENT
Start: 2019-09-13 | End: 2020-01-07

## 2019-09-16 DIAGNOSIS — G25.81 RESTLESS LEG SYNDROME: ICD-10-CM

## 2019-09-16 RX ORDER — ONDANSETRON HYDROCHLORIDE 8 MG/1
TABLET, FILM COATED ORAL
Qty: 30 TABLET | Refills: 1 | Status: SHIPPED | OUTPATIENT
Start: 2019-09-16 | End: 2019-10-02 | Stop reason: SDUPTHER

## 2019-09-18 RX ORDER — ROPINIROLE 1 MG/1
TABLET, FILM COATED ORAL
Qty: 45 TABLET | Refills: 11 | OUTPATIENT
Start: 2019-09-18

## 2019-09-18 RX ORDER — ROPINIROLE 3 MG/1
3 TABLET, FILM COATED ORAL DAILY
Qty: 30 TABLET | Refills: 11 | Status: SHIPPED | OUTPATIENT
Start: 2019-09-18 | End: 2019-10-01

## 2019-09-19 RX ORDER — SUCRALFATE 1 G/1
TABLET ORAL
Qty: 120 TABLET | Refills: 2 | Status: SHIPPED | OUTPATIENT
Start: 2019-09-19 | End: 2019-10-22 | Stop reason: SDUPTHER

## 2019-09-24 ENCOUNTER — PATIENT MESSAGE (OUTPATIENT)
Dept: SLEEP MEDICINE | Facility: HOSPITAL | Age: 64
End: 2019-09-24

## 2019-10-01 RX ORDER — ROPINIROLE 4 MG/1
4 TABLET, FILM COATED ORAL DAILY
Qty: 30 TABLET | Refills: 3 | Status: SHIPPED | OUTPATIENT
Start: 2019-10-01 | End: 2020-01-31

## 2019-10-03 RX ORDER — ONDANSETRON HYDROCHLORIDE 8 MG/1
TABLET, FILM COATED ORAL
Qty: 30 TABLET | Refills: 1 | Status: SHIPPED | OUTPATIENT
Start: 2019-10-03 | End: 2019-10-23 | Stop reason: SDUPTHER

## 2019-10-22 ENCOUNTER — OFFICE VISIT (OUTPATIENT)
Dept: GASTROENTEROLOGY | Facility: CLINIC | Age: 64
End: 2019-10-22

## 2019-10-22 ENCOUNTER — OFFICE VISIT (OUTPATIENT)
Dept: FAMILY MEDICINE CLINIC | Facility: CLINIC | Age: 64
End: 2019-10-22

## 2019-10-22 VITALS
HEART RATE: 88 BPM | HEIGHT: 67 IN | SYSTOLIC BLOOD PRESSURE: 162 MMHG | DIASTOLIC BLOOD PRESSURE: 90 MMHG | BODY MASS INDEX: 34.59 KG/M2 | WEIGHT: 220.4 LBS

## 2019-10-22 VITALS
BODY MASS INDEX: 34.51 KG/M2 | WEIGHT: 219.9 LBS | HEIGHT: 67 IN | SYSTOLIC BLOOD PRESSURE: 124 MMHG | DIASTOLIC BLOOD PRESSURE: 80 MMHG

## 2019-10-22 DIAGNOSIS — I10 ESSENTIAL HYPERTENSION, BENIGN: Primary | Chronic | ICD-10-CM

## 2019-10-22 DIAGNOSIS — K44.9 HIATAL HERNIA: ICD-10-CM

## 2019-10-22 DIAGNOSIS — Z23 NEED FOR VACCINATION: ICD-10-CM

## 2019-10-22 DIAGNOSIS — J45.20 MILD INTERMITTENT ASTHMA, UNSPECIFIED WHETHER COMPLICATED: Chronic | ICD-10-CM

## 2019-10-22 DIAGNOSIS — K21.00 GASTROESOPHAGEAL REFLUX DISEASE WITH ESOPHAGITIS: Primary | ICD-10-CM

## 2019-10-22 DIAGNOSIS — Z12.31 VISIT FOR SCREENING MAMMOGRAM: ICD-10-CM

## 2019-10-22 PROBLEM — R10.13 EPIGASTRIC PAIN: Status: RESOLVED | Noted: 2019-05-09 | Resolved: 2019-10-22

## 2019-10-22 PROBLEM — R11.0 NAUSEA: Chronic | Status: ACTIVE | Noted: 2019-05-09

## 2019-10-22 PROBLEM — H90.72 MIXED HEARING LOSS OF LEFT EAR: Chronic | Status: ACTIVE | Noted: 2018-05-14

## 2019-10-22 PROBLEM — K22.89 PRESBYESOPHAGUS: Chronic | Status: ACTIVE | Noted: 2019-05-09

## 2019-10-22 PROCEDURE — 90653 IIV ADJUVANT VACCINE IM: CPT | Performed by: FAMILY MEDICINE

## 2019-10-22 PROCEDURE — 90732 PPSV23 VACC 2 YRS+ SUBQ/IM: CPT | Performed by: FAMILY MEDICINE

## 2019-10-22 PROCEDURE — G0008 ADMIN INFLUENZA VIRUS VAC: HCPCS | Performed by: FAMILY MEDICINE

## 2019-10-22 PROCEDURE — 99213 OFFICE O/P EST LOW 20 MIN: CPT | Performed by: NURSE PRACTITIONER

## 2019-10-22 PROCEDURE — 99214 OFFICE O/P EST MOD 30 MIN: CPT | Performed by: FAMILY MEDICINE

## 2019-10-22 PROCEDURE — G0009 ADMIN PNEUMOCOCCAL VACCINE: HCPCS | Performed by: FAMILY MEDICINE

## 2019-10-22 RX ORDER — PSEUDOEPHEDRINE HCL 30 MG
30 TABLET ORAL EVERY 6 HOURS PRN
Qty: 20 TABLET | Refills: 1 | Status: SHIPPED | OUTPATIENT
Start: 2019-10-22 | End: 2019-11-17 | Stop reason: SDUPTHER

## 2019-10-22 RX ORDER — SUCRALFATE 1 G/1
1 TABLET ORAL 4 TIMES DAILY
Qty: 120 TABLET | Refills: 2 | Status: SHIPPED | OUTPATIENT
Start: 2019-10-22 | End: 2020-10-28 | Stop reason: SDUPTHER

## 2019-10-22 RX ORDER — DEXLANSOPRAZOLE 60 MG/1
1 CAPSULE, DELAYED RELEASE ORAL DAILY
Qty: 90 CAPSULE | Refills: 1 | Status: SHIPPED | OUTPATIENT
Start: 2019-10-22 | End: 2020-06-29

## 2019-10-22 NOTE — PATIENT INSTRUCTIONS
"BMI for Adults    Body mass index (BMI) is a number that is calculated from a person's weight and height. BMI may help to estimate how much of a person's weight is composed of fat. BMI can help identify those who may be at higher risk for certain medical problems.  How is BMI used with adults?  BMI is used as a screening tool to identify possible weight problems. It is used to check whether a person is obese, overweight, healthy weight, or underweight.  How is BMI calculated?  BMI measures your weight and compares it to your height. This can be done either in English ([place] or metric measurements. Note that charts are available to help you find your BMI quickly and easily without having to do these calculations yourself.  To calculate your BMI in English ([place] measurements, your health care provider will:  1. Measure your weight in pounds (lb).  2. Multiply the number of pounds by 703.  ? For example, for a person who weighs 180 lb, multiply that number by 703, which equals [phone],540.  3. Measure your height in inches (in). Then multiply that number by itself to get a measurement called \"inches squared.\"  ? For example, for a person who is 70 in tall, the \"inches squared\" measurement is 70 in x 70 in, which equals 4900 inches squared.  4. Divide the total from Step 2 (number of lb x 703) by the total from Step 3 (inches squared): 126,540 ÷ 4900 = 25.8. This is your BMI.  To calculate your BMI in metric measurements, your health care provider will:  1. Measure your weight in kilograms (kg).  2. Measure your height in meters (m). Then multiply that number by itself to get a measurement called \"meters squared.\"  ? For example, for a person who is 1.75 m tall, the \"meters squared\" measurement is 1.75 m x 1.75 m, which is equal to 3.1 meters squared.  3. Divide the number of kilograms (your weight) by the meters squared number. In this example: 70 ÷ 3.1 = 22.6. This is your BMI.  How is BMI interpreted?  To interpret your " results, your health care provider will use BMI charts to identify whether you are underweight, normal weight, overweight, or obese. The following guidelines will be used:  · Underweight: BMI less than 18.5.  · Normal weight: BMI between 18.5 and 24.9.  · Overweight: BMI between 25 and 29.9.  · Obese: BMI of 30 and above.  Please note:  · Weight includes both fat and muscle, so someone with a muscular build, such as an athlete, may have a BMI that is higher than 24.9. In cases like these, BMI is not an accurate measure of body fat.  · To determine if excess body fat is the cause of a BMI of 25 or higher, further assessments may need to be done by a health care provider.  · BMI is usually interpreted in the same way for men and women.  Why is BMI a useful tool?  BMI is useful in two ways:  · Identifying a weight problem that may be related to a medical condition, or that may increase the risk for medical problems.  · Promoting lifestyle and diet changes in order to reach a healthy weight.  Summary  · Body mass index (BMI) is a number that is calculated from a person's weight and height.  · BMI may help to estimate how much of a person's weight is composed of fat. BMI can help identify those who may be at higher risk for certain medical problems.  · BMI can be measured using English measurements or metric measurements.  · To interpret your results, your health care provider will use BMI charts to identify whether you are underweight, normal weight, overweight, or obese.  This information is not intended to replace advice given to you by your health care provider. Make sure you discuss any questions you have with your health care provider.    Food Choices for Gastroesophageal Reflux Disease, Adult  When you have gastroesophageal reflux disease (GERD), the foods you eat and your eating habits are very important. Choosing the right foods can help ease your discomfort. Think about working with a nutrition specialist  (dietitian) to help you make good choices.  What are tips for following this plan?    Meals  · Choose healthy foods that are low in fat, such as fruits, vegetables, whole grains, low-fat dairy products, and lean meat, fish, and poultry.  · Eat small meals often instead of 3 large meals a day. Eat your meals slowly, and in a place where you are relaxed. Avoid bending over or lying down until 2-3 hours after eating.  · Avoid eating meals 2-3 hours before bed.  · Avoid drinking a lot of liquid with meals.  · Cook foods using methods other than frying. Bake, grill, or broil food instead.  · Avoid or limit:  ? Chocolate.  ? Peppermint or spearmint.  ? Alcohol.  ? Pepper.  ? Black and decaffeinated coffee.  ? Black and decaffeinated tea.  ? Bubbly (carbonated) soft drinks.  ? Caffeinated energy drinks and soft drinks.  · Limit high-fat foods such as:  ? Fatty meat or fried foods.  ? Whole milk, cream, butter, or ice cream.  ? Nuts and nut butters.  ? Pastries, donuts, and sweets made with butter or shortening.  · Avoid foods that cause symptoms. These foods may be different for everyone. Common foods that cause symptoms include:  ? Tomatoes.  ? Oranges, navarro, and limes.  ? Peppers.  ? Spicy food.  ? Onions and garlic.  ? Vinegar.  Lifestyle  · Maintain a healthy weight. Ask your doctor what weight is healthy for you. If you need to lose weight, work with your doctor to do so safely.  · Exercise for at least 30 minutes for 5 or more days each week, or as told by your doctor.  · Wear loose-fitting clothes.  · Do not smoke. If you need help quitting, ask your doctor.  · Sleep with the head of your bed higher than your feet. Use a wedge under the mattress or blocks under the bed frame to raise the head of the bed.  Summary  · When you have gastroesophageal reflux disease (GERD), food and lifestyle choices are very important in easing your symptoms.  · Eat small meals often instead of 3 large meals a day. Eat your meals  slowly, and in a place where you are relaxed.  · Limit high-fat foods such as fatty meat or fried foods.  · Avoid bending over or lying down until 2-3 hours after eating.  · Avoid peppermint and spearmint, caffeine, alcohol, and chocolate.  This information is not intended to replace advice given to you by your health care provider. Make sure you discuss any questions you have with your health care provider.  Document Released: 06/18/2013 Document Revised: 01/23/2018 Document Reviewed: 01/23/2018  [employer] Patient Education © 2019 [employer]  Document Released: 08/29/2005 Document Revised: 10/31/2018 Document Reviewed: 10/31/2018  [employer] Patient Education © 2019 [employer]

## 2019-10-22 NOTE — PROGRESS NOTES
Chief Complaint   Patient presents with   • Follow-up       Subjective    Mikki Joel is a 64 y.o. female. she is here today for follow-up.    Heartburn   She complains of early satiety, heartburn and nausea (and early satiety at times ). She reports no abdominal pain, no belching, no chest pain, no choking, no coughing, no dysphagia, no globus sensation or no hoarse voice. This is a chronic problem. The current episode started more than 1 year ago. The problem occurs occasionally. The problem has been waxing and waning. The heartburn duration is less than a minute. The heartburn is of moderate intensity. The heartburn does not wake her from sleep. The heartburn limits her activity. The heartburn doesn't change with position. The symptoms are aggravated by exertion and certain foods. Risk factors include obesity. She has tried a PPI for the symptoms.   Patient states she has done very well with current regimen.  Denies any nocturnal symptoms.         The following portions of the patient's history were reviewed and updated as appropriate:   Past Medical History:   Diagnosis Date   • Anemia    • Anemia June 2016    might have been due to knee replacement surgery I had in mar   • Anemia June 2016    might have been due to knee replacement surgery I had in mar   • Anxiety    • Arthritis    • Arthritis of back 2009    lower back   • Asthma    • CTS (carpal tunnel syndrome) 1987    left wrist - had corrective surgery   • Depression    • Fracture of wrist 1979   • High blood pressure    • [place] (hard of hearing)    • Hypertension 1999    metoprolol 100 mg and triamterene/hydrochlorothiazide 37.5mg   • Hypertension 1999    metoprolol 100 mg and triamterene/hydrochlorothiazide 37.5mg   • Knee swelling 2010   • Migraine    • Periarthritis of shoulder 2011    both shoulders right shoulder total replacement   • Tear of meniscus of knee 2016    fell     Past Surgical History:   Procedure Laterality Date   • BUNIONECTOMY  Bilateral    • COLONOSCOPY     • COLONOSCOPY N/A 2018    Procedure: COLONOSCOPY;  Surgeon: Ravi Polk MD;  Location: [facility] ENDOSCOPY;  Service:    • COLONOSCOPY     • EAR TUBES     • ENDOSCOPIC FUNCTIONAL SINUS SURGERY (FESS) Bilateral 2017    Procedure: BILATERAL ETHMOID AND MAXILLARY SINUS AND NASAL SEPTOPLASTIES AND BILATERAL TURBINECTOMIES;  Surgeon: Ramy Gaston MD;  Location: [facility] OR;  Service:    • ENDOSCOPY N/A 2018    Procedure: ESOPHAGOGASTRODUODENOSCOPY;  Surgeon: Ravi Polk MD;  Location: [facility] ENDOSCOPY;  Service:    • ENDOSCOPY N/A 6/10/2019    Procedure: ESOPHAGOGASTRODUODENOSCOPY possible dilation;  Surgeon: Ravi Polk MD;  Location: [facility] ENDOSCOPY;  Service: Gastroenterology   • HAND SURGERY     • JOINT REPLACEMENT  2016    right knee replacement 2016   • JOINT REPLACEMENT      right shoulder   • KNEE SURGERY  2016    total right knee replacement   • RHINOPLASTY      x3   • SHOULDER ARTHROSCOPY     • SHOULDER SURGERY      complete shoulder joint replacement   • TONSILLECTOMY     • TUBAL ABDOMINAL LIGATION     • TYMPANOPLASTY Left 2018    Procedure: TYMPANOPLASTY WITH CARTILAGE GRAFT;  Surgeon: Ramy Gaston MD;  Location: [facility] OR;  Service: ENT   • UPPER GASTROINTESTINAL ENDOSCOPY  2016   • UPPER GASTROINTESTINAL ENDOSCOPY     • UPPER GASTROINTESTINAL ENDOSCOPY  2018    Per Dr. Ravi Polk M.D.   • WRIST SURGERY      carpal tunnel repair left wrist     Family History   Problem Relation Age of Onset   • Hypertension Mother    • COPD Mother    • Arthritis Mother    • Cancer Father    • Arthritis Father    • Alcohol abuse Father    • COPD Maternal Grandmother    • Stroke Maternal Grandmother    • Osteoporosis Maternal Grandmother    • Irritable bowel syndrome Sister      OB History      Para Term  AB Living    2 2 2          SAB TAB Ectopic Molar Multiple Live Births                       Prior to  Admission medications    Medication Sig Start Date End Date Taking? Authorizing Provider   ALBUTEROL SULFATE  (90 Base) MCG/ACT inhaler INHALE 2 PUFFS BY MOUTH EVERY 4 HOURS AS NEEDED FOR WHEEZING 6/13/19  Yes Ronald Bowser MD   busPIRone (BUSPAR) 30 MG tablet Take 30 mg by mouth 2 (Two) Times a Day. 10/1/18  Yes ProviderLucía MD   cetirizine (ZYRTEC ALLERGY) 10 MG tablet Take 1 tablet by mouth Daily. 4/24/19  Yes Ronald Bowser MD   DEXILANT 60 MG capsule TAKE 1 CAPSULE BY MOUTH DAILY 8/27/19  Yes Zelda Rich APRN   eszopiclone (LUNESTA) 1 MG tablet Take 1 tablet by mouth Every Night. Take immediately before bedtime 9/13/19  Yes Ramirez Bai MD   Fluticasone Furoate-Vilanterol (BREO ELLIPTA) 100-25 MCG/INH inhaler 1 puff bid and rinse out mouth 4/22/19  Yes Ronald Bowser MD   ipratropium (ATROVENT) 0.06 % nasal spray USE 2 SPRAYS IN EACH NOSTRIL THREE TIMES DAILY AS DIRECTED. 8/5/19  Yes Ramy Gaston MD   metoprolol succinate XL (TOPROL-XL) 100 MG 24 hr tablet TAKE 1 TABLET BY MOUTH DAILY 1/21/19  Yes Ronald Bowser MD   mometasone (NASONEX) 50 MCG/ACT nasal spray USE 2 SPRAYS IN EACH NOSTRIL AS DIRECTED BY PROVIDER TWICE DAILY 7/31/19  Yes Ramy Gaston MD   olopatadine (PATANASE) 0.6 % solution nasal solution 2 sprays by Each Nare route 2 (Two) Times a Day. 6/17/19  Yes Ramy Gaston MD   ondansetron (ZOFRAN) 8 MG tablet TAKE 1 TABLET BY MOUTH EVERY 8 HOURS AS NEEDED FOR NAUSEA OR VOMITING 10/3/19  Yes Zelda Rich APRN   pseudoephedrine (SUDAFED) 30 MG tablet Take 1 tablet by mouth Every 6 (Six) Hours As Needed for Congestion. 10/22/19  Yes Ronald Bowser MD   rOPINIRole (REQUIP) 4 MG tablet Take 1 tablet by mouth Daily. 10/1/19  Yes Ramirez Bai MD   sucralfate (CARAFATE) 1 g tablet TAKE 1 TABLET BY MOUTH FOUR TIMES DAILY 9/19/19  Yes Zelda Rich APRN   Vortioxetine HBr (TRINTELLIX) 10 MG tablet Take 10 mg by mouth Daily.   Yes Provider, MD Lucía     Allergies  "  Allergen Reactions   • Codeine Other (See Comments)     Increases heart rate   • Sulfa Antibiotics Hives     Social History     Socioeconomic History   • Marital status:      Spouse name: Not on file   • Number of children: Not on file   • Years of education: Not on file   • Highest education level: Not on file   Tobacco Use   • Smoking status: Never Smoker   • Smokeless tobacco: Never Used   Substance and Sexual Activity   • Alcohol use: Yes     Alcohol/week: 1.2 oz     Types: 2 Standard drinks or equivalent per week     Comment: occasional   • Drug use: No   • Sexual activity: Defer     Partners: Male     Birth control/protection: Post-menopausal, Surgical, None, OCP     Comment: 02/04/2019 - Patient confirmed Tubal Ligation following birth of  last child at age 32.       Review of Systems  Review of Systems   HENT: Negative for hoarse voice.    Respiratory: Negative for cough and choking.    Cardiovascular: Negative for chest pain.   Gastrointestinal: Positive for heartburn and nausea (and early satiety at times ). Negative for abdominal pain and dysphagia.        /90 (BP Location: Left arm)   Pulse 88   Ht 170.2 cm (67\")   Wt 100 kg (220 lb 6.4 oz)   LMP  (LMP Unknown)   BMI 34.52 kg/m²     Objective    Physical Exam   Constitutional: She is oriented to person, place, and time. She appears well-developed and well-nourished. She is cooperative. No distress.   HENT:   Head: Normocephalic and atraumatic.   Neck: Normal range of motion. Neck supple. No thyromegaly present.   Cardiovascular: Normal rate, regular rhythm and normal heart sounds.   Pulmonary/Chest: Effort normal and breath sounds normal. She has no wheezes. She has no rhonchi. She has no rales.   Abdominal: Soft. Normal appearance and bowel sounds are normal. She exhibits no distension. There is no hepatosplenomegaly. There is tenderness in the right upper quadrant, epigastric area and left upper quadrant. There is no rigidity and no " guarding. No hernia.   Lymphadenopathy:     She has no cervical adenopathy.   Neurological: She is alert and oriented to person, place, and time.   Skin: Skin is warm, dry and intact. No rash noted. No pallor.   Psychiatric: She has a normal mood and affect. Her speech is normal.     Admission on 06/10/2019, Discharged on 06/10/2019   Component Date Value Ref Range Status   • Case Report 06/10/2019    Final                    Value:Surgical Pathology Report                         Case: [id number]                                  Authorizing Provider:  Ravi Cavanaugh MD        Collected:           06/10/2019 04:42 PM          Ordering Location:     [facility]             Received:            06/11/2019 07:28 AM                                 [facility]                                                     Pathologist:           Deven Conner MD                                                         Specimens:   1) - Gastric, Antrum                                                                                2) - Esophagus, Distal                                                                    • Final Diagnosis 06/10/2019    Final                    Value:This result contains rich text formatting which cannot be displayed here.   • Comment 06/10/2019    Final                    Value:This result contains rich text formatting which cannot be displayed here.   • Gross Description 06/10/2019    Final                    Value:This result contains rich text formatting which cannot be displayed here.     Assessment/Plan      1. Gastroesophageal reflux disease with esophagitis    2. Hiatal hernia    .   Continue current regimen, Dexilant and Carafate.  Try to decrease Carafate to twice daily then discontinue.  Follow-up in 6 months for recheck return office sooner if needed    Orders placed during this encounter include:  No orders of the defined types were placed in this encounter.      *  Surgery not found *    Review and/or summary of lab tests, radiology, procedures, medications. Review and summary of old records and obtaining of history. The risks and benefits of my recommendations, as well as other treatment options were discussed with the patient today. Questions were answered.    New Medications Ordered This Visit   Medications   • dexlansoprazole (DEXILANT) 60 MG capsule     Sig: Take 1 capsule by mouth Daily.     Dispense:  90 capsule     Refill:  1   • sucralfate (CARAFATE) 1 g tablet     Sig: Take 1 tablet by mouth 4 (Four) Times a Day.     Dispense:  120 tablet     Refill:  2       Follow-up: Return in about 6 months (around 4/22/2020).          This document has been electronically signed by MIRIAM White on October 22, 2019 2:13 PM             Results for orders placed or performed during the hospital encounter of 06/10/19   Tissue Pathology Exam   Result Value Ref Range    Case Report       Surgical Pathology Report                         Case: [id number]                                  Authorizing Provider:  Ravi Cavanaugh MD        Collected:           06/10/2019 04:42 PM          Ordering Location:     [facility]             Received:            06/11/2019 07:28 AM                                 [facility]                                                     Pathologist:           Deven Conner MD                                                         Specimens:   1) - Gastric, Antrum                                                                                2) - Esophagus, Distal                                                                     Final Diagnosis       1.  MUCOSA, ANTRUM OF STOMACH:  CHRONIC GASTRITIS.  NEGATIVE FOR HELICOBACTER PYLORI (HP IMMUNOSTAIN).    2.  MUCOSA, DISTAL ESOPHAGUS:  REACTIVE CHANGE OF SQUAMOUS MUCOSA.      Comment       Helicobacter pylori (HP) immunostain is performed (Block 1) because an appropriate  "inflammatory milieu is present and organisms are not seen on H & E stained slides.    HP immunostain was developed and its performance characteristics determined by [facility].  It has not been cleared or approved by the U.S. Food and Drug Administration.  The FDA has determined that such clearance or approval is not necessary.  This test is used for clinical purposes.  It should not be regarded as investigational or for research.  This laboratory is certified under the Clinical Laboratory Improvement Amendments of 1988 (CLIA-88) as qualified to perform high complexity clinical laboratory testing.    All controls show appropriate reactivity.          Gross Description       1.  The first container is labeled 'antrum of stomach\" and has nodular bits of white soft tissue measuring 0.4 cc in aggregate.  The entire specimen is embedded as 1A.    2.  The second container is labeled \"distal esophagus\" and has nodular bits of white soft tissue measuring 0.4 cc in aggregate.  The entire specimen is embedded as 2A.     Results for orders placed or performed in visit on 04/11/19   Helicobacter Pylori, IgA IgG IgM   Result Value Ref Range    H. pylori IgG 0.19 0.00 - 0.79 Index Value    H. pylori, IgA ABS <9.0 0.0 - 8.9 units    H. Pylori, IgM <9.0 0.0 - 8.9 units   Results for orders placed or performed in visit on 02/26/19   Wound Culture - Wound, Sinus, maxillary left   Result Value Ref Range    Wound Culture Moderate growth (3+) Serratia marcescens (A)     Wound Culture Light growth (2+) Normal Skin Barbara     Gram Stain Rare (1+) WBCs seen     Gram Stain Few (2+) Gram negative bacilli     Gram Stain Rare (1+) Gram positive cocci        Susceptibility    Serratia marcescens - CHAR     Amoxicillin + Clavulanate >16/8 Resistant ug/ml     Ampicillin >16 Resistant ug/ml     Ampicillin + Sulbactam >16/8 Resistant ug/ml     Cefazolin >16 Resistant ug/ml     Cefepime <=8 Susceptible ug/ml     " Cefoxitin >16 Resistant ug/ml     Ceftazidime <=1 Susceptible ug/ml     Ceftriaxone <=8 Susceptible ug/ml     Cefuroxime sodium >16 Resistant ug/ml     Levofloxacin 4 Intermediate ug/ml     Piperacillin + Tazobactam <=16 Susceptible ug/ml     Tetracycline 8 Intermediate ug/ml     Trimethoprim + Sulfamethoxazole >2/38 Resistant ug/ml   Results for orders placed or performed in visit on 08/03/18   CBC (No Diff)   Result Value Ref Range    WBC 13.99 (H) 3.20 - 9.80 10*3/mm3    RBC 4.36 3.77 - 5.16 10*6/mm3    Hemoglobin 12.7 12.0 - 15.5 g/dL    Hematocrit 37.7 35.0 - 45.0 %    MCV 86.5 80.0 - 98.0 fL    MCH 29.1 26.5 - 34.0 pg    MCHC 33.7 31.4 - 36.0 g/dL    RDW 13.6 11.5 - 14.5 %    RDW-SD 43.0 36.4 - 46.3 fl    MPV 9.4 8.0 - 12.0 fL    Platelets 397 150 - 450 10*3/mm3   Comprehensive Metabolic Panel   Result Value Ref Range    Glucose 97 60 - 100 mg/dL    BUN 37 (H) 7 - 21 mg/dL    Creatinine 1.18 (H) 0.50 - 1.00 mg/dL    Sodium 138 137 - 145 mmol/L    Potassium 3.8 3.5 - 5.1 mmol/L    Chloride 102 95 - 110 mmol/L    CO2 25.0 22.0 - 31.0 mmol/L    Calcium 9.6 8.4 - 10.2 mg/dL    Total Protein 7.5 6.3 - 8.6 g/dL    Albumin 4.60 3.40 - 4.80 g/dL    ALT (SGPT) 32 9 - 52 U/L    AST (SGOT) 25 14 - 36 U/L    Alkaline Phosphatase 139 (H) 38 - 126 U/L    Total Bilirubin 0.3 0.2 - 1.3 mg/dL    eGFR Non  Amer 46 45 - 104 mL/min/1.73    Globulin 2.9 2.3 - 3.5 gm/dL    A/G Ratio 1.6 1.1 - 1.8 g/dL    BUN/Creatinine Ratio 31.4 (H) 7.0 - 25.0    Anion Gap 11.0 5.0 - 15.0 mmol/L   Results for orders placed or performed in visit on 01/30/18   TSH   Result Value Ref Range    TSH 0.240 (L) 0.460 - 4.680 mIU/mL   T4, Free   Result Value Ref Range    Free T4 1.04 0.78 - 2.19 ng/dL   Lipid Panel   Result Value Ref Range    Total Cholesterol 197 0 - 199 mg/dL    Triglycerides 227 (H) 20 - 199 mg/dL    HDL Cholesterol 45 (L) 60 - 200 mg/dL    LDL Cholesterol  122 1 - 129 mg/dL    LDL/HDL Ratio 2.37 0.00 - 3.22   Results for orders  placed or performed during the hospital encounter of 01/19/18   Tissue Pathology Exam - Tissue, Gastric, Antrum   Result Value Ref Range    Case Report       Surgical Pathology Report                         Case: [id number]                                  Authorizing Provider:  Ravi Cavanaugh MD         Collected:           01/19/2018 02:07 PM          Ordering Location:     [facility]             Received:            01/19/2018 02:49 PM                                 [facility]                                                     Pathologist:           Jonnie Bradley MD                                                         Specimen:    Gastric, Antrum                                                                            Final Diagnosis       GASTRIC ANTRUM, MUCOSAL BIOPSY:   MILD CHRONIC GASTRITIS.   NO EVIDENCE OF HELICOBACTER PYLORI.       Gross Description       The specimen consists of a mucosal biopsy measuring up to 0.3 cm in greatest dimension.  All embedded.        Embedded Images     Results for orders placed or performed during the hospital encounter of 09/05/17   Tissue Pathology Exam   Result Value Ref Range    Case Report       Surgical Pathology Report                         Case: [id number]                                  Authorizing Provider:  Ramy Gaston MD        Collected:           09/05/2017 11:56 AM          Ordering Location:     [facility]             Received:            09/05/2017 01:26 PM                                 [facility]                                                              Pathologist:           Deven Conner MD                                                          Specimens:   1) - Naris, Left, LEFT SINUS                                                                        2) - Naris, Right, RIGHT SINUS                                                             Final Diagnosis       1.  MUCOSA, NASAL SINUS, LEFT  "SIDE:  CHRONIC SINUSITIS OF RESPIRATORY MUCOSA IN RELATION TO BITS OF BONE.  NEGATIVE FOR FUNGI (GMS STAIN).    2.  MUCOSA, NASAL SINUS, RIGHT SIDE:  CHRONIC SINUSITIS OF RESPIRATORY MUCOSA IN RELATION TO BITS OF BONE.  NEGATIVE FOR FUNGI (GMS STAIN).      Gross Description       1.  The first container is labeled \"left sinus, nose\" and has pinkish white mucosa measuring 1.5 cc in aggregate.  The entire specimen is embedded as 1A.    2.  The second container is labeled \"right sinus, nose\" and has pinkish white mucosa measuring 1.5 cc in aggregate.  The entire specimen is embedded as 2A.       Embedded Images     Wound Culture   Result Value Ref Range    Wound Culture Light growth (2+) Pseudomonas aeruginosa (A)     Wound Culture Light growth (2+) Serratia marcescens (A)     Wound Culture Light growth (2+) Streptococcus mitis / oralis (A)     Gram Stain Moderate (3+) WBCs seen     Gram Stain Rare (1+) Gram negative bacilli        Susceptibility    Pseudomonas aeruginosa - CHAR     Ceftazidime 4  ug/ml     Cefuroxime sodium >16  ug/ml     Gentamicin <=4 Susceptible ug/ml     Levofloxacin <=2 Susceptible ug/ml     Piperacillin + Tazobactam <=16  ug/ml     Tobramycin <=4 Susceptible ug/ml    Serratia marcescens - CHAR     Amikacin <=16 Susceptible ug/ml     Amoxicillin + Clavulanate >16/8 Resistant ug/ml     Ampicillin >16 Resistant ug/ml     Ampicillin + Sulbactam >16/8 Resistant ug/ml     Cefazolin >16 Resistant ug/ml     Cefotaxime <=2  ug/ml     Cefoxitin 16 Intermediate ug/ml     Ceftazidime <=1  ug/ml     Ceftriaxone <=8  ug/ml     Cefuroxime sodium >16 Resistant ug/ml     Ciprofloxacin 2 Intermediate ug/ml     Gentamicin <=4 Susceptible ug/ml     Imipenem <=4 Susceptible ug/ml     Levofloxacin <=2 Susceptible ug/ml     Meropenem <=4 Susceptible ug/ml     Nitrofurantoin >64  ug/ml     Piperacillin + Tazobactam <=16  ug/ml     Tetracycline <=4 Susceptible ug/ml     Tobramycin <=4 Susceptible ug/ml     Trimethoprim + " Sulfamethoxazole <=2/38 Susceptible ug/ml   Results for orders placed or performed in visit on 08/31/17   Basic Metabolic Panel   Result Value Ref Range    Glucose 93 60 - 100 mg/dL    BUN 24 (H) 7 - 21 mg/dL    Creatinine 0.82 0.50 - 1.00 mg/dL    Sodium 136 (L) 137 - 145 mmol/L    Potassium 4.7 3.5 - 5.1 mmol/L    Chloride 100 95 - 110 mmol/L    CO2 26.0 22.0 - 31.0 mmol/L    Calcium 9.6 8.4 - 10.2 mg/dL    eGFR Non  Amer 70 45 - 104 mL/min/1.73    BUN/Creatinine Ratio 29.3 (H) 7.0 - 25.0    Anion Gap 10.0 5.0 - 15.0 mmol/L   Results for orders placed or performed in visit on 08/07/17   Sedimentation Rate   Result Value Ref Range    Sed Rate 9 0 - 20 mm/hr   Rheumatoid Factor   Result Value Ref Range    Rheumatoid Factor Qualitative Negative Negative   C-reactive protein   Result Value Ref Range    C-Reactive Protein 1.00 0.00 - 1.00 mg/dL   DERRICK   Result Value Ref Range    DERRICK Direct Negative Negative   Results for orders placed or performed in visit on 08/01/17   Hepatitis C Antibody   Result Value Ref Range    Hepatitis C Ab Negative Negative   Hemoglobin A1c   Result Value Ref Range    Hemoglobin A1C 5.7 (H) 4 - 5.6 %   Results for orders placed or performed in visit on 05/31/17   Wound Culture   Result Value Ref Range    Wound Culture Light growth (2+) Serratia marcescens (A)     Beta Lactamase      Gram Stain Few (2+) WBCs seen     Gram Stain Rare (1+) Gram negative bacilli        Susceptibility    Serratia marcescens - CHAR     Amikacin <=16 Susceptible ug/ml     Amoxicillin + Clavulanate >16/8 Resistant ug/ml     Ampicillin >16 Resistant ug/ml     Ampicillin + Sulbactam >16/8 Resistant ug/ml     Cefazolin >16 Resistant ug/ml     Cefotaxime <=2  ug/ml     Cefoxitin 16 Intermediate ug/ml     Ceftazidime <=1  ug/ml     Ceftriaxone <=8  ug/ml     Cefuroxime >16 Resistant ug/ml     Ciprofloxacin <=1 Susceptible ug/ml     Gentamicin <=4 Susceptible ug/ml     Imipenem <=4 Susceptible ug/ml     Levofloxacin  <=2 Susceptible ug/ml     Meropenem <=4 Susceptible ug/ml     Nitrofurantoin >64  ug/ml     Piperacillin + Tazobactam <=16  ug/ml     Tetracycline 8 Intermediate ug/ml     Tobramycin <=4 Susceptible ug/ml     Trimethoprim + Sulfamethoxazole <=2/38 Susceptible ug/ml

## 2019-10-22 NOTE — PROGRESS NOTES
Subjective   Mikki Joel is a 64 y.o. female.     History of Present Illness   reevaluation mild hypertension asthma obesity chronic reflux etc.  In the interim maintain weight not lost any not gained much.  Counseled on same.  Maintained on medication.  Feels well.  Try and exercise more.  Mammogram due next month.  Does need update her immunization.  Up-to-date on all other.  HPI    The following portions of the patient's history were reviewed and updated as appropriate: allergies, current medications, past family history, past medical history, past social history, past surgical history and problem list.    Review of Systems  Review of Systems   Constitutional: Negative for activity change, appetite change, fatigue and unexpected weight change.   HENT: Negative for trouble swallowing and voice change.    Eyes: Negative for redness and visual disturbance.   Respiratory: Negative for cough and wheezing.    Cardiovascular: Negative for chest pain and palpitations.   Gastrointestinal: Negative for abdominal pain, constipation, diarrhea, nausea and vomiting.   Genitourinary: Negative for urgency.   Musculoskeletal: Positive for arthralgias (Hip and back chronic going to pain management). Negative for joint swelling.   Neurological: Negative for syncope and headaches.   Hematological: Negative for adenopathy.   Psychiatric/Behavioral: Negative for sleep disturbance.       Objective   Physical Exam  Physical Exam   Constitutional: She is oriented to person, place, and time. She appears well-developed.   HENT:   Head: Normocephalic.   Nose: Nose normal.   Eyes: Pupils are equal, round, and reactive to light.   Neck: Normal range of motion. No thyromegaly present.   Cardiovascular: Normal rate, regular rhythm, normal heart sounds and intact distal pulses. Exam reveals no gallop and no friction rub.   No murmur heard.  Pulmonary/Chest: Breath sounds normal.   Abdominal: Soft. She exhibits no distension and no mass.  "There is no tenderness.   Musculoskeletal: Normal range of motion.   Up and go 3 seconds gait normal   Neurological: She is alert and oriented to person, place, and time. She has normal reflexes.   Skin: Skin is warm and dry.   Psychiatric: She has a normal mood and affect. Her speech is normal and behavior is normal.         Visit Vitals  /80   Ht 170.2 cm (67\")   Wt 99.7 kg (219 lb 14.4 oz)   LMP  (LMP Unknown)   BMI 34.44 kg/m²     Body mass index is 34.44 kg/m².      Assessment/Plan   Mikki was seen today for hypertension.    Diagnoses and all orders for this visit:    Essential hypertension, benign    Mild intermittent asthma, unspecified whether complicated    BMI 35.0-35.9,adult    Need for vaccination  -     Fluzone High Dose =>65Years  -     Pneumococcal Polysaccharide Vaccine 23-Valent Greater Than or Equal To 1yo Subcutaneous / IM    Visit for screening mammogram  -     Mammo Screening Digital Tomosynthesis Bilateral With CAD; Future     on wt loss measures and programs   lifestyle measures summertime hydration fall prevention etc.  Update immunizations recheck 6 months be time for lab  "

## 2019-10-23 RX ORDER — ONDANSETRON HYDROCHLORIDE 8 MG/1
TABLET, FILM COATED ORAL
Qty: 30 TABLET | Refills: 1 | Status: SHIPPED | OUTPATIENT
Start: 2019-10-23 | End: 2019-11-25 | Stop reason: SDUPTHER

## 2019-11-09 DIAGNOSIS — J40 BRONCHITIS: ICD-10-CM

## 2019-11-09 DIAGNOSIS — R05.9 COUGH: ICD-10-CM

## 2019-11-09 DIAGNOSIS — R06.2 WHEEZE: ICD-10-CM

## 2019-11-11 RX ORDER — ALBUTEROL SULFATE 90 UG/1
AEROSOL, METERED RESPIRATORY (INHALATION)
Qty: 25.5 G | Refills: 0 | Status: SHIPPED | OUTPATIENT
Start: 2019-11-11 | End: 2020-01-06

## 2019-11-18 RX ORDER — PSEUDOPHEDRINE HCL 30 MG/1
TABLET, FILM COATED ORAL
Qty: 24 TABLET | Refills: 0 | Status: SHIPPED | OUTPATIENT
Start: 2019-11-18 | End: 2019-12-16 | Stop reason: SDUPTHER

## 2019-11-25 RX ORDER — ONDANSETRON HYDROCHLORIDE 8 MG/1
TABLET, FILM COATED ORAL
Qty: 30 TABLET | Refills: 0 | Status: SHIPPED | OUTPATIENT
Start: 2019-11-25 | End: 2020-03-05

## 2019-12-16 RX ORDER — PSEUDOPHEDRINE HCL 30 MG/1
TABLET, FILM COATED ORAL
Qty: 24 TABLET | Refills: 0 | Status: SHIPPED | OUTPATIENT
Start: 2019-12-16 | End: 2020-02-11 | Stop reason: HOSPADM

## 2020-01-04 DIAGNOSIS — J40 BRONCHITIS: ICD-10-CM

## 2020-01-04 DIAGNOSIS — R05.9 COUGH: ICD-10-CM

## 2020-01-04 DIAGNOSIS — R06.2 WHEEZE: ICD-10-CM

## 2020-01-05 DIAGNOSIS — I10 ESSENTIAL HYPERTENSION: ICD-10-CM

## 2020-01-06 RX ORDER — ALBUTEROL SULFATE 90 UG/1
AEROSOL, METERED RESPIRATORY (INHALATION)
Qty: 25.5 G | Refills: 0 | Status: SHIPPED | OUTPATIENT
Start: 2020-01-06 | End: 2020-01-07

## 2020-01-06 RX ORDER — METOPROLOL SUCCINATE 100 MG/1
100 TABLET, EXTENDED RELEASE ORAL DAILY
Qty: 90 TABLET | Refills: 3 | Status: SHIPPED | OUTPATIENT
Start: 2020-01-06 | End: 2020-01-31

## 2020-01-07 ENCOUNTER — OFFICE VISIT (OUTPATIENT)
Dept: SLEEP MEDICINE | Facility: HOSPITAL | Age: 65
End: 2020-01-07

## 2020-01-07 VITALS
OXYGEN SATURATION: 97 % | DIASTOLIC BLOOD PRESSURE: 86 MMHG | SYSTOLIC BLOOD PRESSURE: 166 MMHG | WEIGHT: 226.7 LBS | HEIGHT: 67 IN | BODY MASS INDEX: 35.58 KG/M2 | HEART RATE: 99 BPM

## 2020-01-07 DIAGNOSIS — G25.81 RESTLESS LEGS SYNDROME (RLS): ICD-10-CM

## 2020-01-07 DIAGNOSIS — Z72.821 INADEQUATE SLEEP HYGIENE: ICD-10-CM

## 2020-01-07 DIAGNOSIS — G47.8 SOMNILOQUY: ICD-10-CM

## 2020-01-07 DIAGNOSIS — G47.61 PERIODIC LIMB MOVEMENT DISORDER: ICD-10-CM

## 2020-01-07 DIAGNOSIS — F51.04 PSYCHOPHYSIOLOGICAL INSOMNIA: ICD-10-CM

## 2020-01-07 DIAGNOSIS — G47.33 OBSTRUCTIVE SLEEP APNEA, ADULT: Primary | ICD-10-CM

## 2020-01-07 PROCEDURE — 99214 OFFICE O/P EST MOD 30 MIN: CPT | Performed by: INTERNAL MEDICINE

## 2020-01-07 RX ORDER — ESZOPICLONE 2 MG/1
2 TABLET, FILM COATED ORAL NIGHTLY
Qty: 30 TABLET | Refills: 0 | Status: SHIPPED | OUTPATIENT
Start: 2020-01-07 | End: 2020-02-14

## 2020-01-07 NOTE — PROGRESS NOTES
Sleep Clinic Follow Up    Date: 1/7/2020  Primary Care Physician: Ronald Bowser MD    Last office visit: 09/12/2019 (I reviewed this note)    CC: Follow up: RLS, insomnia    Assessment and Plan:    1. Restless leg syndrome Established, stable (1) - sx are intermittent but  complains of her jerking at night  1. Remains on Requip 3 mg po qhs  2. Presumed PLMD - on antipsychotics for depression, anxiety, PTSD  2. Insomnia - better some lately -  1. Continue CBT - I with counselor (stopped because counselor moved) and eva started  2. Increase Lunesta to 2 mg po qhs  3. Depression  1. Refer to psychiatry (), continue medications  4. Circadian rhythm sleep disorder --stable  1. Delayed sleep phase syndrome - stable  5. Poor sleep hygiene - discussed again      Interim History:  Since the last visit:    1) RLS - stasx still occur but more intermittently. Continue Requip 3 mg po qhs    2) insomnia -  JUNIOR -  Mikki Joel has remained stable on Lunesta 1 mg po qhs with mild improvement at first. Some of delayed sleep onset is secondary to poor sleep hygiene and racing thoughts at bedtime. This holiday season was the worst. We again discussed watching tv in bed and spending more in bed than what is necessary.    3) Depression with PTSD and anxiety - worsening lately with her counselor moving, holidays, and pooe sleep.    Bed time: 8917-8717 to relax  Sleep latency:  minutes after tv off  Wake time: 7457-6904  Estimated total sleep time at night: 5-6 hours  Caffeine intake: 0oz of coffee, 0oz of tea, and 0oz of soda  Alcohol intake: 0 drinks per week  Nap time: none   Sleepiness with Driving: none      PMHx, FH, SH reviewed and pertinent changes are: unchanged from last office visit     REVIEW OF SYSTEMS:   Negative for chest pain, fever, cough, SOA, abdominal pain. Smoking:none    Exam:  Vitals:    01/07/20 1333   BP: 166/86   Pulse: 99   SpO2: 97%           01/07/20  1333   Weight: 103 kg (226  lb 11.2 oz)     Body mass index is 35.5 kg/m². Patient's Body mass index is 35.5 kg/m². BMI is above normal parameters. Recommendations include: referral to primary care.      Gen:  No acute distress, alert, oriented  Lungs:  CTA with normal effort   CV:  RRR, no M/R/G  GI:  soft, non-tender  Psych:  Appropriate affect    Past Medical History:   Diagnosis Date   • Anemia    • Anemia June 2016    might have been due to knee replacement surgery I had in mar   • Anemia June 2016    might have been due to knee replacement surgery I had in mar   • Anxiety    • Arthritis    • Arthritis of back 2009    lower back   • Asthma    • CTS (carpal tunnel syndrome) 1987    left wrist - had corrective surgery   • Depression    • Fracture of wrist 1979   • High blood pressure    • [place] (hard of hearing)    • Hypertension 1999    metoprolol 100 mg and triamterene/hydrochlorothiazide 37.5mg   • Hypertension 1999    metoprolol 100 mg and triamterene/hydrochlorothiazide 37.5mg   • Knee swelling 2010   • Migraine    • Periarthritis of shoulder 2011    both shoulders right shoulder total replacement   • Tear of meniscus of knee 2016    fell       Current Outpatient Medications:   •  ALBUTEROL SULFATE  (90 Base) MCG/ACT inhaler, INHALE 2 PUFFS BY MOUTH EVERY 4 HOURS AS NEEDED FOR WHEEZING, Disp: 8.5 g, Rfl: 0  •  busPIRone (BUSPAR) 30 MG tablet, Take 30 mg by mouth 2 (Two) Times a Day., Disp: , Rfl:   •  cetirizine (ZYRTEC ALLERGY) 10 MG tablet, Take 1 tablet by mouth Daily., Disp: 30 tablet, Rfl: 11  •  dexlansoprazole (DEXILANT) 60 MG capsule, Take 1 capsule by mouth Daily., Disp: 90 capsule, Rfl: 1  •  eszopiclone (LUNESTA) 1 MG tablet, Take 1 tablet by mouth Every Night. Take immediately before bedtime, Disp: 30 tablet, Rfl: 5  •  Fluticasone Furoate-Vilanterol (BREO ELLIPTA) 100-25 MCG/INH inhaler, 1 puff bid and rinse out mouth, Disp: 90 each, Rfl: 3  •  ipratropium (ATROVENT) 0.06 % nasal spray, USE 2 SPRAYS IN EACH NOSTRIL  THREE TIMES DAILY AS DIRECTED., Disp: 15 mL, Rfl: 0  •  metoprolol succinate XL (TOPROL-XL) 100 MG 24 hr tablet, TAKE 1 TABLET BY MOUTH DAILY, Disp: 90 tablet, Rfl: 3  •  mometasone (NASONEX) 50 MCG/ACT nasal spray, USE 2 SPRAYS IN EACH NOSTRIL AS DIRECTED BY PROVIDER TWICE DAILY, Disp: 17 g, Rfl: 5  •  olopatadine (PATANASE) 0.6 % solution nasal solution, 2 sprays by Each Nare route 2 (Two) Times a Day., Disp: 30 g, Rfl: 11  •  ondansetron (ZOFRAN) 8 MG tablet, TAKE 1 TABLET BY MOUTH EVERY 8 HOURS AS NEEDED FOR NAUSEA OR VOMITING, Disp: 30 tablet, Rfl: 0  •  rOPINIRole (REQUIP) 4 MG tablet, Take 1 tablet by mouth Daily., Disp: 30 tablet, Rfl: 3  •  sucralfate (CARAFATE) 1 g tablet, Take 1 tablet by mouth 4 (Four) Times a Day., Disp: 120 tablet, Rfl: 2  •  Vortioxetine HBr (TRINTELLIX) 10 MG tablet, Take 10 mg by mouth Daily., Disp: , Rfl:   •  WAL-PHED 30 MG tablet, TAKE 1 TABLET BY MOUTH EVERY 6 HOURS AS NEEDED FOR CONGESTION, Disp: 24 tablet, Rfl: 0    Total visit time 30 min, with total of 20 minutes spent face-to-face counseling patient extensively regarding:   Sleep hygiene and medication changes, need for follow up with psychiatry      RTC in 3 months     This document has been electronically signed by Ramirez Bai MD on January 7, 2020         CC: Ronald Bowser MD          No ref. provider found

## 2020-01-13 ENCOUNTER — OFFICE VISIT (OUTPATIENT)
Dept: OTOLARYNGOLOGY | Facility: CLINIC | Age: 65
End: 2020-01-13

## 2020-01-13 ENCOUNTER — CLINICAL SUPPORT (OUTPATIENT)
Dept: AUDIOLOGY | Facility: CLINIC | Age: 65
End: 2020-01-13

## 2020-01-13 VITALS — WEIGHT: 225.6 LBS | TEMPERATURE: 97.9 F | BODY MASS INDEX: 35.41 KG/M2 | HEIGHT: 67 IN

## 2020-01-13 DIAGNOSIS — H90.A31 MIXED CONDUCTIVE AND SENSORINEURAL HEARING LOSS OF RIGHT EAR WITH RESTRICTED HEARING OF LEFT EAR: ICD-10-CM

## 2020-01-13 DIAGNOSIS — J30.0 CHRONIC VASOMOTOR RHINITIS: Primary | ICD-10-CM

## 2020-01-13 DIAGNOSIS — H72.93 PERFORATED TYMPANIC MEMBRANE, BILATERAL: ICD-10-CM

## 2020-01-13 DIAGNOSIS — H90.6 MIXED HEARING LOSS, BILATERAL: Primary | ICD-10-CM

## 2020-01-13 DIAGNOSIS — H72.91 PERFORATION OF RIGHT TYMPANIC MEMBRANE: ICD-10-CM

## 2020-01-13 PROCEDURE — 99214 OFFICE O/P EST MOD 30 MIN: CPT | Performed by: OTOLARYNGOLOGY

## 2020-01-13 RX ORDER — HYDROXYZINE PAMOATE 50 MG/1
CAPSULE ORAL
Status: ON HOLD | COMMUNITY
Start: 2019-11-11 | End: 2020-02-11

## 2020-01-13 RX ORDER — PRAZOSIN HYDROCHLORIDE 5 MG/1
CAPSULE ORAL
COMMUNITY
Start: 2020-01-11 | End: 2020-02-11 | Stop reason: HOSPADM

## 2020-01-13 RX ORDER — HYDROXYZINE PAMOATE 50 MG/1
CAPSULE ORAL
COMMUNITY
Start: 2020-01-04 | End: 2020-01-31

## 2020-01-13 RX ORDER — OLOPATADINE HYDROCHLORIDE 665 UG/1
2 SPRAY NASAL 2 TIMES DAILY
Qty: 30 G | Refills: 11 | Status: SHIPPED | OUTPATIENT
Start: 2020-01-13 | End: 2021-01-12 | Stop reason: SDUPTHER

## 2020-01-13 RX ORDER — MOMETASONE FUROATE 50 UG/1
SPRAY, METERED NASAL
COMMUNITY
Start: 2019-12-15 | End: 2020-01-31

## 2020-01-13 RX ORDER — ESZOPICLONE 2 MG/1
TABLET, FILM COATED ORAL
COMMUNITY
Start: 2020-01-07 | End: 2020-01-24 | Stop reason: SDUPTHER

## 2020-01-13 RX ORDER — CEFDINIR 300 MG/1
300 CAPSULE ORAL 2 TIMES DAILY
Qty: 28 CAPSULE | Refills: 0 | Status: SHIPPED | OUTPATIENT
Start: 2020-01-13 | End: 2020-01-24

## 2020-01-13 NOTE — PATIENT INSTRUCTIONS

## 2020-01-13 NOTE — PROGRESS NOTES
Subjective   Mikki Joel is a 65 y.o. female.   Follow-up ear and sinus patient      History of Present Illness     He is having some right-sided sinus pressure and some postnasal drip she is not sure which spray she is been using I wrote the names down for she is concerned about when he had in having her ear surgery she did have to keep her ear dry I told her previously it would not solve all of her hearing problems she continues to wear hearing aids and notes in the left side she is doing better than she had been once we previously operated on  The following portions of the patient's history were reviewed and updated as appropriate: allergies, current medications, past family history, past medical history, past social history, past surgical history and problem list.      Current Outpatient Medications:   •  ALBUTEROL SULFATE  (90 Base) MCG/ACT inhaler, INHALE 2 PUFFS BY MOUTH EVERY 4 HOURS AS NEEDED FOR WHEEZING, Disp: 8.5 g, Rfl: 0  •  busPIRone (BUSPAR) 30 MG tablet, Take 30 mg by mouth 2 (Two) Times a Day., Disp: , Rfl:   •  cetirizine (ZYRTEC ALLERGY) 10 MG tablet, Take 1 tablet by mouth Daily., Disp: 30 tablet, Rfl: 11  •  dexlansoprazole (DEXILANT) 60 MG capsule, Take 1 capsule by mouth Daily., Disp: 90 capsule, Rfl: 1  •  eszopiclone (LUNESTA) 2 MG tablet, Take 1 tablet by mouth Every Night. Take immediately before bedtime, Disp: 30 tablet, Rfl: 0  •  Fluticasone Furoate-Vilanterol (BREO ELLIPTA) 100-25 MCG/INH inhaler, 1 puff bid and rinse out mouth, Disp: 90 each, Rfl: 3  •  hydrOXYzine pamoate (VISTARIL) 50 MG capsule, TAKE 1 CAPSULE BY MOUTH THREE TIMES DAILY AS NEEDED FOR ANXIETY, Disp: , Rfl:   •  ipratropium (ATROVENT) 0.06 % nasal spray, USE 2 SPRAYS IN EACH NOSTRIL THREE TIMES DAILY AS DIRECTED., Disp: 15 mL, Rfl: 0  •  metoprolol succinate XL (TOPROL-XL) 100 MG 24 hr tablet, TAKE 1 TABLET BY MOUTH DAILY, Disp: 90 tablet, Rfl: 3  •  mometasone (NASONEX) 50 MCG/ACT nasal spray, USE 2  SPRAYS IN EACH NOSTRIL AS DIRECTED BY PROVIDER TWICE DAILY, Disp: 17 g, Rfl: 5  •  olopatadine (PATANASE) 0.6 % solution nasal solution, 2 sprays by Each Nare route 2 (Two) Times a Day., Disp: 30 g, Rfl: 11  •  ondansetron (ZOFRAN) 8 MG tablet, TAKE 1 TABLET BY MOUTH EVERY 8 HOURS AS NEEDED FOR NAUSEA OR VOMITING, Disp: 30 tablet, Rfl: 0  •  prazosin (MINIPRESS) 5 MG capsule, , Disp: , Rfl:   •  rOPINIRole (REQUIP) 4 MG tablet, Take 1 tablet by mouth Daily., Disp: 30 tablet, Rfl: 3  •  sucralfate (CARAFATE) 1 g tablet, Take 1 tablet by mouth 4 (Four) Times a Day., Disp: 120 tablet, Rfl: 2  •  Vortioxetine HBr (TRINTELLIX) 10 MG tablet, Take 10 mg by mouth Daily., Disp: , Rfl:   •  WAL-PHED 30 MG tablet, TAKE 1 TABLET BY MOUTH EVERY 6 HOURS AS NEEDED FOR CONGESTION, Disp: 24 tablet, Rfl: 0  •  cefdinir (OMNICEF) 300 MG capsule, Take 1 capsule by mouth 2 (Two) Times a Day. Take probiotics, Disp: 28 capsule, Rfl: 0  •  eszopiclone (LUNESTA) 2 MG tablet, , Disp: , Rfl:   •  hydrOXYzine pamoate (VISTARIL) 50 MG capsule, , Disp: , Rfl:   •  mometasone (NASONEX) 50 MCG/ACT nasal spray, , Disp: , Rfl:     Allergies   Allergen Reactions   • Codeine Other (See Comments)     Increases heart rate   • Sulfa Antibiotics Hives             Review of Systems   Constitutional: Negative for fever.   HENT: Positive for hearing loss and tinnitus. Negative for ear discharge, facial swelling and voice change.    Neurological: Positive for dizziness.   Hematological: Negative for adenopathy.           Objective   Physical Exam   Constitutional: She appears well-developed and well-nourished.   HENT:   Head: Normocephalic.   Ears:    Nose: Nose normal.   Mouth/Throat: Oropharynx is clear and moist.   Eyes: Conjunctivae are normal.   Neck: Neck supple.   Cardiovascular: Normal rate and regular rhythm.   Pulmonary/Chest: Effort normal and breath sounds normal.   Neurological: She is alert.   Skin: Skin is warm.   Psychiatric: She has a normal  mood and affect. Thought content normal.   Nursing note and vitals reviewed.          Assessment/Plan   Mikki was seen today for follow-up.    Diagnoses and all orders for this visit:    Chronic vasomotor rhinitis  -     Case Request; Standing  -     Case Request    Perforated tympanic membrane, bilateral  -     Case Request; Standing  -     Case Request    Mixed conductive and sensorineural hearing loss of right ear with restricted hearing of left ear  -     Case Request; Standing  -     Case Request    Other orders  -     olopatadine (PATANASE) 0.6 % solution nasal solution; 2 sprays by Each Nare route 2 (Two) Times a Day.  -     cefdinir (OMNICEF) 300 MG capsule; Take 1 capsule by mouth 2 (Two) Times a Day. Take probiotics    call if problems or questions  She is to let me know if the sinus symptoms are improving otherwise she wants to wait till February to have her ear surgery discussed there is a failure rate of up to 10% there is risk of bleeding, infection, dizziness,.  He will need for further surgery vertigo tinnitus  She did fairly well except for some dizziness the left side during this and somewhat different operation explained how to use cartilage graft and other techniques answer all question about recovery and failure to schedule surgery in the near future for right tympanoplasty with possible cartilage graft

## 2020-01-14 RX ORDER — IPRATROPIUM BROMIDE 42 UG/1
SPRAY, METERED NASAL
Qty: 15 ML | Refills: 0 | Status: SHIPPED | OUTPATIENT
Start: 2020-01-14 | End: 2020-01-24

## 2020-01-14 NOTE — PROGRESS NOTES
STANDARD AUDIOMETRIC EVALUATION      Name:  Mikki Joel  :  1955  Age:  65 y.o.  Date of Evaluation:  2020      HISTORY    Reason for visit:  Mikki Joel is seen today for a hearing test at the request of Dr. Ramy Gaston.  Patient reports her ears seems okay, but she may have a change in hearing.  She states she has a hole in her right ear drum.  She reports she has had surgery on her left ear in the past.  She states she has tinnitus.  She states she does well with her hearing aids, but she may need aids turned up a little bit.      EVALUATION    See Audiogram    RESULTS        Otoscopy and Tympanometry 226 Hz :  Right Ear:  Otoscopy:  Clear ear canal          Tympanometry:  Unable to test due to no seal    Left Ear:   Otoscopy:  Clear ear canal        Tympanometry:  Middle ear function within normal limits    Test technique:  Standard Audiometry     Pure Tone Audiometry:   Patient responded to pure tones at 45-70 dB for 250-8000 Hz in right ear, and at 20-80 dB for 250-8000 Hz in left ear.       Speech Audiometry:        Right Ear:  Speech Reception Threshold (SRT) was obtained at 55 dBHL                 Speech Discrimination scores were 80% in quiet when words were presented at 85 dBHL       Left Ear:  Speech Reception Threshold (SRT) was obtained at 30 dBHL                 Speech Discrimination scores were 84% in quiet when words were presented at 70 dBHL    Reliability:   good    IMPRESSIONS:  1.  Tympanometry results are consistent with Unable to test due to no seal in right ear, and Middle ear function within normal limits in left ear.  2.  Pure tone results are consistent with moderate cookie bite mixed hearing loss  for right ear, and mild to severe sloping mixed hearing loss  in left ear.       RECOMMENDATIONS:  Patient is seeing the Ear Nose and Throat physician immediately following this examination.  It was a pleasure seeing Mikki Joel in Audiology today.  We  would be happy to do further testing or discuss these test as necessary.          This document has been electronically signed by Shanell Berrios MS CCC-ERICK on January 14, 2020 11:23 AM       Shanell Berrios MS CCC-A  Licensed Audiologist

## 2020-01-23 ENCOUNTER — CLINICAL SUPPORT (OUTPATIENT)
Dept: AUDIOLOGY | Facility: CLINIC | Age: 65
End: 2020-01-23

## 2020-01-23 DIAGNOSIS — Z46.1 ENCOUNTER FOR FITTING OR ADJUSTMENT OF HEARING AID: Primary | ICD-10-CM

## 2020-01-23 PROCEDURE — HEARINGNOCHG: Performed by: AUDIOLOGIST

## 2020-01-24 ENCOUNTER — TELEPHONE (OUTPATIENT)
Dept: OTOLARYNGOLOGY | Facility: CLINIC | Age: 65
End: 2020-01-24

## 2020-01-24 ENCOUNTER — OFFICE VISIT (OUTPATIENT)
Dept: SLEEP MEDICINE | Facility: HOSPITAL | Age: 65
End: 2020-01-24

## 2020-01-24 VITALS
DIASTOLIC BLOOD PRESSURE: 93 MMHG | HEART RATE: 85 BPM | OXYGEN SATURATION: 98 % | WEIGHT: 224.1 LBS | HEIGHT: 67 IN | BODY MASS INDEX: 35.17 KG/M2 | SYSTOLIC BLOOD PRESSURE: 164 MMHG

## 2020-01-24 DIAGNOSIS — G25.81 RESTLESS LEGS SYNDROME (RLS): ICD-10-CM

## 2020-01-24 DIAGNOSIS — F41.1 GAD (GENERALIZED ANXIETY DISORDER): ICD-10-CM

## 2020-01-24 DIAGNOSIS — E61.1 IRON DEFICIENCY: ICD-10-CM

## 2020-01-24 DIAGNOSIS — F51.04 PSYCHOPHYSIOLOGICAL INSOMNIA: Primary | ICD-10-CM

## 2020-01-24 DIAGNOSIS — G47.61 PERIODIC LIMB MOVEMENT DISORDER: ICD-10-CM

## 2020-01-24 DIAGNOSIS — F33.0 MDD (MAJOR DEPRESSIVE DISORDER), RECURRENT EPISODE, MILD (HCC): ICD-10-CM

## 2020-01-24 DIAGNOSIS — D50.8 IRON DEFICIENCY ANEMIA DUE TO DIETARY CAUSES: ICD-10-CM

## 2020-01-24 DIAGNOSIS — G47.21 CIRCADIAN RHYTHM SLEEP DISORDER, DELAYED SLEEP PHASE TYPE: ICD-10-CM

## 2020-01-24 DIAGNOSIS — F43.10 PTSD (POST-TRAUMATIC STRESS DISORDER): Chronic | ICD-10-CM

## 2020-01-24 PROCEDURE — 99214 OFFICE O/P EST MOD 30 MIN: CPT | Performed by: PSYCHIATRY & NEUROLOGY

## 2020-01-24 PROCEDURE — 90833 PSYTX W PT W E/M 30 MIN: CPT | Performed by: PSYCHIATRY & NEUROLOGY

## 2020-01-24 RX ORDER — BENZONATATE 100 MG/1
100 CAPSULE ORAL 3 TIMES DAILY PRN
Qty: 30 CAPSULE | Refills: 0 | Status: SHIPPED | OUTPATIENT
Start: 2020-01-24 | End: 2020-09-23

## 2020-01-24 RX ORDER — IPRATROPIUM BROMIDE 42 UG/1
SPRAY, METERED NASAL
Qty: 15 ML | Refills: 0 | Status: SHIPPED | OUTPATIENT
Start: 2020-01-24 | End: 2020-02-25

## 2020-01-24 RX ORDER — AMOXICILLIN AND CLAVULANATE POTASSIUM 875; 125 MG/1; MG/1
1 TABLET, FILM COATED ORAL 2 TIMES DAILY
Qty: 20 TABLET | Refills: 0 | Status: SHIPPED | OUTPATIENT
Start: 2020-01-24 | End: 2020-02-10

## 2020-01-24 RX ORDER — PSEUDOPHEDRINE HCL 30 MG/1
TABLET, FILM COATED ORAL
Qty: 24 TABLET | Refills: 0 | OUTPATIENT
Start: 2020-01-24

## 2020-01-24 NOTE — PROGRESS NOTES
Sleep Medicine Follow-Up Note    CC & ID:  Ms. Mikki Joel is a 65 y.o. female seen for follow-up regarding RLS, insomnia, presumed PLMD, Delayed sleep phase d/o, Poor sleep hygiene, all in the context of depression.      Treatment Summary:   --Initial Nov 17: RLS & insomnia; Ambien trial started  --F/U Feb 18: RLS controlled on Requip 1mg; Ambien not helpful; Lunesta started  --F/U Aug 18: RLS worse, Requip to 1.5mg qHS; Lunesta some help, continued.  Delayed sleep phase concern w/ sleep of 3.30A to 9.30 A.  --F/U Mar 19: RLS stable; changed to Restoril but with gait instability; Lunesta retrialed; ongoing suboptimal sleep hygiene noted.   --F/U Sep 19: increased social stressors impacting insomnia; RLS stable on 3mg qHS  --F/U Jan 20: RLS stable on 3mg qHS Requip, concern for PLMD w/ worsening from motoricaly activated medications; insomnia stable on Lunesta 1mg qHS, though sub optimal sleep hygiene; worsening depression, anxiety, PTSD.  Delayed sleep phase, stable.  Plan for f/u with me given psychiatric overlay.      HPI:   Today, patient presents unaccompanied.  Pleasant, engaged well.      Insomnia as primary focus.  Difficulty falling sleep.  Conditioned arousal.  Racing thoughts.  Avoidance.  Worry about bed.  Onset was since 40s, intermittent, onset and worsened since daughter's death 20 yrs ago.      Circadian Rhythm, night owl from a young age.  Stable.    RLS sx, +URGE; some issues in last couple years in late afternoon, concern for augmentation but not problematic.  Uses Requip.  No addictive or compulsive behaviors w/ behaviors.      Depression ongoing.  Daughter completed suicide 20 yrs ago - primary inciting factor.  Pt denies any SI/HI/AVH.  Hx SA 26 yrs ago, due relationship issues, none since.  More irritable and tired.  Diminished interest.  Is interested in therapy, none currently.  See MIRIAM Bowser with [facility].  Per my review of his notes, using bright light therapy about 3 min a day.       Per MIRIAM Bowser 14 Jan 20 note:  PREVIOUS PSYCHIATRIC MEDICATIONS:   Prozac  Buspar   Vistaril  Lunesta   Xanax  Wellbutrin  Paxil-weight gain  Zoloft  Lexapro  Ambien  Requip    --Dx of TETE, mild MDD, recurrent    Anxiety w/ nervousness, introversion, SOA, jitteriness.  Ongoing for some time.  Worsens at QHS with anticipatory concerns for sleep.     No outpt BH therapy.      Goal of sleeping better.  Interested and motivated to so do.        ROS:   -Ongoing Snoring: at times  -Dry Mouth: may  -Nocturnal Gasping: no  -ENT: Nasal Obstruction: yes, has URI  -CONSTITUTIONAL: Weight: stable though up from fall  Wt Readings from Last 5 Encounters:   01/24/20 102 kg (224 lb 1.6 oz)   01/13/20 102 kg (225 lb 9.6 oz)   01/07/20 103 kg (226 lb 11.2 oz)   10/25/19 96.1 kg (211 lb 12.8 oz)   10/22/19 100 kg (220 lb 6.4 oz)       Sleep Pattern:  -Bedtime: 11 P - 12 A  -Lights Out: TV x 1 hour  -Environment: Lights/TV off after an hour  -Latency: 60 - 180 min after TV off  -Awakenings: rare, to void; not nightly  -Wake Time: 9-11 A; no alarm  -Rise Time: same  -Patient's estimate of Sleep Time: 4-5 h   -Total Clock Time Spent in Bed: 9-12h    Daytime Symptoms:  -Daytime fatigue/sleepiness: fatigue  -Naps: rare  -Involuntary Dozing: no  -Driving: Difficulty with sleepiness and driving:  no   -- Close calls related to sleepiness: no   -- Accidents related to sleepiness: denies    Social History:  -Employment: retired,  assist  -Nicotine: no  -Caffeine: no  -Alcohol: rare, < 1 wk  -THC: no  -OTC/Supplements/herbals: no  -Illicits:  denies      Patient Active Problem List   Diagnosis   • Asthma   • Arthritis   • Nasal turbinate hypertrophy   • Gastroesophageal reflux disease   • Depression with anxiety   • Insomnia   • Other chronic pain   • PTSD (post-traumatic stress disorder)   • Restless leg   • BMI 35.0-35.9,adult   • Mixed hearing loss of left ear   • Right knee pain   • Presence of right artificial knee  joint   • Essential hypertension, benign   • Nausea   • Presbyesophagus   • Chronic vasomotor rhinitis   • Perforated tympanic membrane, bilateral   • Mixed conductive and sensorineural hearing loss of right ear with restricted hearing of left ear       CMHx:  --> Denies any significant medical changes since last visit.   --> Supplemental Oxygen Use: denies      Current Outpatient Medications   Medication Sig Dispense Refill   • ALBUTEROL SULFATE  (90 Base) MCG/ACT inhaler INHALE 2 PUFFS BY MOUTH EVERY 4 HOURS AS NEEDED FOR WHEEZING 8.5 g 0   • busPIRone (BUSPAR) 30 MG tablet Take 30 mg by mouth 2 (Two) Times a Day.     • cefdinir (OMNICEF) 300 MG capsule Take 1 capsule by mouth 2 (Two) Times a Day. Take probiotics 28 capsule 0   • cetirizine (ZYRTEC ALLERGY) 10 MG tablet Take 1 tablet by mouth Daily. 30 tablet 11   • dexlansoprazole (DEXILANT) 60 MG capsule Take 1 capsule by mouth Daily. 90 capsule 1   • eszopiclone (LUNESTA) 2 MG tablet Take 1 tablet by mouth Every Night. Take immediately before bedtime 30 tablet 0   • eszopiclone (LUNESTA) 2 MG tablet      • Fluticasone Furoate-Vilanterol (BREO ELLIPTA) 100-25 MCG/INH inhaler 1 puff bid and rinse out mouth 90 each 3   • hydrOXYzine pamoate (VISTARIL) 50 MG capsule      • hydrOXYzine pamoate (VISTARIL) 50 MG capsule TAKE 1 CAPSULE BY MOUTH THREE TIMES DAILY AS NEEDED FOR ANXIETY     • ipratropium (ATROVENT) 0.06 % nasal spray USE 2 SPRAYS INTO EACH NOSTRIL THREE TIMES DAILY AS DIRECTED 15 mL 0   • metoprolol succinate XL (TOPROL-XL) 100 MG 24 hr tablet TAKE 1 TABLET BY MOUTH DAILY 90 tablet 3   • mometasone (NASONEX) 50 MCG/ACT nasal spray USE 2 SPRAYS IN EACH NOSTRIL AS DIRECTED BY PROVIDER TWICE DAILY 17 g 5   • mometasone (NASONEX) 50 MCG/ACT nasal spray      • olopatadine (PATANASE) 0.6 % solution nasal solution 2 sprays by Each Nare route 2 (Two) Times a Day. 30 g 11   • ondansetron (ZOFRAN) 8 MG tablet TAKE 1 TABLET BY MOUTH EVERY 8 HOURS AS NEEDED  "FOR NAUSEA OR VOMITING 30 tablet 0   • prazosin (MINIPRESS) 5 MG capsule      • rOPINIRole (REQUIP) 4 MG tablet Take 1 tablet by mouth Daily. 30 tablet 3   • sucralfate (CARAFATE) 1 g tablet Take 1 tablet by mouth 4 (Four) Times a Day. 120 tablet 2   • Vortioxetine HBr (TRINTELLIX) 10 MG tablet Take 10 mg by mouth Daily.     • WAL-PHED 30 MG tablet TAKE 1 TABLET BY MOUTH EVERY 6 HOURS AS NEEDED FOR CONGESTION 24 tablet 0     No current facility-administered medications for this visit.      -Notable Current Medications:  --> Buspar 10mg BID, in morning and at QHS - not typically sedating  --> Zyrtec daily at night - not typically sedating  --> Lunesta at QHS nightly  --> Vistaril TID, morning, mid afternoon, night - not typically sedating  --> Metoprolol in AM - not typically sedating/fatigue  --> Minipres - QHS  --> Requip at QHS  --> Trintellix in the morning - not typically sedating      PE:  Body mass index is 35.09 kg/m².  Vitals:    01/24/20 1353   BP: 164/93   Pulse: 85   SpO2: 98%   Weight: 102 kg (224 lb 1.6 oz)   Height: 170.2 cm (67.01\")     Wt Readings from Last 5 Encounters:   01/24/20 102 kg (224 lb 1.6 oz)   01/13/20 102 kg (225 lb 9.6 oz)   01/07/20 103 kg (226 lb 11.2 oz)   10/25/19 96.1 kg (211 lb 12.8 oz)   10/22/19 100 kg (220 lb 6.4 oz)     General:  In NAD.  Head: Atraumatic  Eyes: EOMI.  CV: No Clubbing.   Pul: Respirations: unlaboured.    MS: No atrophy.  Neuro: No resting tremor.  Gait normal turning & station; unremarkable overall.  Psych: Socially appropriate.  Pleasant.  No overt dysphoria.         Assessment & Plan:  Ms. Mikki Joel is a 65 y.o. female who is seen for follow-up of:     ---Insomnia, chronic: psychophysiologic component.  Benefiting from Lunesta but is interested in trial of behavioral interventions.  D/w pt long term concerns including dementia & cognitive dysfunction risk, and she states a goal is to reduce medications she is on.  D/w pt goals of behavioral " intervention: sleep and napping restriction to 6 hours a day/night; stimulus control; fixed wake time at 9 A regardless of how much or little sleep.  Need for sleep logs.  Sleep logs and written handout provided.  Additionally, discussed with the patient the data noting increased all cause mortality with insomnia without treatment.  Compared it to the data noting increased all cause mortality with hypnotics (long term, > 6 wks) and cognitive dysfunction risk.  Discussed benefits of behavioral therapy to treat insomnia without risks of medication and data suggesting equal efficacy at 4-6 wks of behavioral therapy (though with CBT-I which involves these functions).   Discussed potential worsening of sleepiness and fatigue during initiation of intervention.  Need for increased vigilance with any sustained attention tasks (e.g. Driving).  All questions answered for the patient.        --RLS + presumed PLMD: ferritin & iron panel, fasting given concern for iron defic due to dietary causes.  Cont d-agonist therapy.  Monitor for augmentation s/sx.      --Behavioral health overlay with TETE & MDD:  Encouraged continue f/u with MIRIAM Bowser.  Provided numbers on AVS for outpt therapy contacts.  Discussed overlay of insomnia & behavioral sx.      --Concern for Obstructive Sleep Apnea:  Snoring, post menopausal status, elevated BMI, daytime fatigue all increase possibility, along with concern for PLMD.  As insomnia improves may consider testing.      --Safe driving reviewed.    --Follow-up in 5-6 wks with me; Dr Bai as current; sooner for either, if needed.    --Call with any questions or concerns.    All questions answered for the patient, who indicated understanding and agreed with the plan.       Jimmy Kelly MD  01/24/2020 @ 2:26 PM  Sleep Medicine    CC: Ronald Bowser MD      Psychotherapy Note  --Total Psychotherapy Time: 20 minutes  --Participants: Patient, myself  --Current Clinical Status: anxious  --Focus of  Therapeutic Encounter: insomnia & education  --Intervention Type: Supportive, CBT/cognitive, Psycho-Educational, Stimulus Control, Fixed Wake Time and Sleeping and Nap Restriction  --Therapy notes: I provided reflective listening, supportive therapy, reflection, and allowed them to express affect in therapy course.  CBT re: schema & distortions.  Educational as above.  Insomnia treatment & focus as above  --DX: as above  --Plan: Continue to work on developing & strengthening coping skills; correcting maladaptive schema re: insomnia.  Outpt  f/u.   --Post therapy status: improving affect and understanding.        Jimmy Kelly II, MD

## 2020-01-24 NOTE — TELEPHONE ENCOUNTER
01/24/2020, 1528 - Patient telephoned per this staff member [phone] regarding patient message stating antibiotic prescribed is not alleviating symptoms and request for alternate antibiotic and medication for coughing to be submitted to pharmacy.  Zero answer.  Voice message submitted with date, time, office contact information, and request to contact office at earliest convenience regarding prescription medication.    Rationale for speaking with patient - Notification of verbal orders received per Dr. Ramy Gaston M.D./KATLYN - Augmentin 875-125 MG Tablets, 1 tablet by mouth 2 times per day, #20, 0 renewals and Benzonatate (Tessalon Perles) 100 MG Capsules, 1 capsule by mouth 3 times per day as needed for cough, #30, 0 renewals.       01/24/2020, 1534 - Patient returned this staff member's telephone call as requested.  Patient made aware of verbal orders received per Dr. Ramy Gaston M.D./KATLYN - Augmentin 875-125 MG Tablets, 1 tablet by mouth 2 times per day, #20, 0 renewals and Benzonatate (Tessalon Perles) 100 MG Capsules, 1 capsule by mouth 3 times per day as needed for cough, #30, 0 renewals.  Patient instructed to stop utilization of Cefdinir (Omnicef) 300 MG Capsules, 1 capsule by mouth 2 times per day.  Patient made aware prescription medications [facility], [place], [place].  Per M.D./KATLYN Gaston, patient to schedule clinical appointment with Primary Care Physician should symptoms persist.  Patient verbalized understanding.

## 2020-01-27 ENCOUNTER — HOSPITAL ENCOUNTER (OUTPATIENT)
Dept: SLEEP MEDICINE | Facility: HOSPITAL | Age: 65
Discharge: HOME | End: 2020-01-27
Admitting: INTERNAL MEDICINE

## 2020-01-27 DIAGNOSIS — G47.33 OBSTRUCTIVE SLEEP APNEA, ADULT: ICD-10-CM

## 2020-01-27 DIAGNOSIS — G47.61 PERIODIC LIMB MOVEMENT DISORDER: ICD-10-CM

## 2020-01-27 PROCEDURE — 95810 POLYSOM 6/> YRS 4/> PARAM: CPT | Performed by: INTERNAL MEDICINE

## 2020-01-27 PROCEDURE — 95810 POLYSOM 6/> YRS 4/> PARAM: CPT

## 2020-01-30 DIAGNOSIS — G47.33 OBSTRUCTIVE SLEEP APNEA, ADULT: Primary | ICD-10-CM

## 2020-01-30 DIAGNOSIS — E66.01 MORBIDLY OBESE (HCC): ICD-10-CM

## 2020-01-31 ENCOUNTER — APPOINTMENT (OUTPATIENT)
Dept: PREADMISSION TESTING | Facility: HOSPITAL | Age: 65
End: 2020-01-31

## 2020-01-31 VITALS
OXYGEN SATURATION: 96 % | DIASTOLIC BLOOD PRESSURE: 84 MMHG | SYSTOLIC BLOOD PRESSURE: 158 MMHG | HEART RATE: 89 BPM | WEIGHT: 226 LBS | BODY MASS INDEX: 35.47 KG/M2 | HEIGHT: 67 IN

## 2020-01-31 PROCEDURE — 93005 ELECTROCARDIOGRAM TRACING: CPT

## 2020-01-31 PROCEDURE — 93010 ELECTROCARDIOGRAM REPORT: CPT | Performed by: INTERNAL MEDICINE

## 2020-01-31 RX ORDER — SODIUM CHLORIDE, SODIUM GLUCONATE, SODIUM ACETATE, POTASSIUM CHLORIDE, AND MAGNESIUM CHLORIDE 526; 502; 368; 37; 30 MG/100ML; MG/100ML; MG/100ML; MG/100ML; MG/100ML
1000 INJECTION, SOLUTION INTRAVENOUS CONTINUOUS
Status: CANCELLED | OUTPATIENT
Start: 2020-02-07

## 2020-01-31 RX ORDER — ROPINIROLE 4 MG/1
4 TABLET, FILM COATED ORAL NIGHTLY PRN
COMMUNITY
End: 2020-02-11 | Stop reason: HOSPADM

## 2020-01-31 RX ORDER — METOPROLOL TARTRATE 100 MG/1
100 TABLET ORAL NIGHTLY
Status: ON HOLD | COMMUNITY
End: 2020-02-11 | Stop reason: SDUPTHER

## 2020-02-03 RX ORDER — ROPINIROLE 4 MG/1
4 TABLET, FILM COATED ORAL DAILY
Qty: 30 TABLET | Refills: 3 | Status: SHIPPED | OUTPATIENT
Start: 2020-02-03 | End: 2020-05-21

## 2020-02-06 ENCOUNTER — ANESTHESIA EVENT (OUTPATIENT)
Dept: PERIOP | Facility: HOSPITAL | Age: 65
End: 2020-02-06

## 2020-02-07 ENCOUNTER — ANESTHESIA (OUTPATIENT)
Dept: PERIOP | Facility: HOSPITAL | Age: 65
End: 2020-02-07

## 2020-02-07 ENCOUNTER — HOSPITAL ENCOUNTER (OUTPATIENT)
Facility: HOSPITAL | Age: 65
Setting detail: HOSPITAL OUTPATIENT SURGERY
Discharge: HOME | End: 2020-02-07
Attending: OTOLARYNGOLOGY | Admitting: OTOLARYNGOLOGY

## 2020-02-07 VITALS
DIASTOLIC BLOOD PRESSURE: 82 MMHG | RESPIRATION RATE: 16 BRPM | OXYGEN SATURATION: 96 % | HEART RATE: 71 BPM | HEIGHT: 67 IN | BODY MASS INDEX: 35.29 KG/M2 | WEIGHT: 224.87 LBS | SYSTOLIC BLOOD PRESSURE: 165 MMHG | TEMPERATURE: 98 F

## 2020-02-07 DIAGNOSIS — H72.93 PERFORATED TYMPANIC MEMBRANE, BILATERAL: ICD-10-CM

## 2020-02-07 DIAGNOSIS — H90.A31 MIXED CONDUCTIVE AND SENSORINEURAL HEARING LOSS OF RIGHT EAR WITH RESTRICTED HEARING OF LEFT EAR: ICD-10-CM

## 2020-02-07 DIAGNOSIS — J30.0 CHRONIC VASOMOTOR RHINITIS: ICD-10-CM

## 2020-02-07 PROCEDURE — 25010000002 MIDAZOLAM PER 1 MG: Performed by: NURSE ANESTHETIST, CERTIFIED REGISTERED

## 2020-02-07 PROCEDURE — 21235 EAR CARTILAGE GRAFT: CPT | Performed by: OTOLARYNGOLOGY

## 2020-02-07 PROCEDURE — 25010000002 DEXAMETHASONE PER 1 MG: Performed by: NURSE ANESTHETIST, CERTIFIED REGISTERED

## 2020-02-07 PROCEDURE — 69631 REPAIR EARDRUM STRUCTURES: CPT | Performed by: OTOLARYNGOLOGY

## 2020-02-07 PROCEDURE — 25010000002 ONDANSETRON PER 1 MG: Performed by: NURSE ANESTHETIST, CERTIFIED REGISTERED

## 2020-02-07 PROCEDURE — 25010000002 PROPOFOL 10 MG/ML EMULSION: Performed by: NURSE ANESTHETIST, CERTIFIED REGISTERED

## 2020-02-07 PROCEDURE — 25010000002 FENTANYL CITRATE (PF) 100 MCG/2ML SOLUTION: Performed by: NURSE ANESTHETIST, CERTIFIED REGISTERED

## 2020-02-07 PROCEDURE — 25010000002 SUCCINYLCHOLINE PER 20 MG: Performed by: NURSE ANESTHETIST, CERTIFIED REGISTERED

## 2020-02-07 PROCEDURE — 25010000002 NEOSTIGMINE 4 MG/4ML SOLUTION PREFILLED SYRINGE: Performed by: NURSE ANESTHETIST, CERTIFIED REGISTERED

## 2020-02-07 RX ORDER — PROMETHAZINE HYDROCHLORIDE 25 MG/ML
12.5 INJECTION, SOLUTION INTRAMUSCULAR; INTRAVENOUS ONCE AS NEEDED
Status: DISCONTINUED | OUTPATIENT
Start: 2020-02-07 | End: 2020-02-07 | Stop reason: HOSPADM

## 2020-02-07 RX ORDER — MEPERIDINE HYDROCHLORIDE 100 MG/ML
12.5 INJECTION INTRAMUSCULAR; INTRAVENOUS; SUBCUTANEOUS
Status: DISCONTINUED | OUTPATIENT
Start: 2020-02-07 | End: 2020-02-07 | Stop reason: HOSPADM

## 2020-02-07 RX ORDER — NALOXONE HCL 0.4 MG/ML
0.4 VIAL (ML) INJECTION AS NEEDED
Status: DISCONTINUED | OUTPATIENT
Start: 2020-02-07 | End: 2020-02-07 | Stop reason: HOSPADM

## 2020-02-07 RX ORDER — ROCURONIUM BROMIDE 10 MG/ML
INJECTION, SOLUTION INTRAVENOUS AS NEEDED
Status: DISCONTINUED | OUTPATIENT
Start: 2020-02-07 | End: 2020-02-07 | Stop reason: SURG

## 2020-02-07 RX ORDER — LIDOCAINE HYDROCHLORIDE 20 MG/ML
INJECTION, SOLUTION INFILTRATION; PERINEURAL AS NEEDED
Status: DISCONTINUED | OUTPATIENT
Start: 2020-02-07 | End: 2020-02-07 | Stop reason: SURG

## 2020-02-07 RX ORDER — DEXAMETHASONE SODIUM PHOSPHATE 4 MG/ML
INJECTION, SOLUTION INTRA-ARTICULAR; INTRALESIONAL; INTRAMUSCULAR; INTRAVENOUS; SOFT TISSUE AS NEEDED
Status: DISCONTINUED | OUTPATIENT
Start: 2020-02-07 | End: 2020-02-07 | Stop reason: SURG

## 2020-02-07 RX ORDER — ACETAMINOPHEN 650 MG/1
650 SUPPOSITORY RECTAL ONCE AS NEEDED
Status: DISCONTINUED | OUTPATIENT
Start: 2020-02-07 | End: 2020-02-07 | Stop reason: HOSPADM

## 2020-02-07 RX ORDER — MIDAZOLAM HYDROCHLORIDE 1 MG/ML
INJECTION INTRAMUSCULAR; INTRAVENOUS AS NEEDED
Status: DISCONTINUED | OUTPATIENT
Start: 2020-02-07 | End: 2020-02-07 | Stop reason: SURG

## 2020-02-07 RX ORDER — LIDOCAINE HYDROCHLORIDE AND EPINEPHRINE 10; 10 MG/ML; UG/ML
INJECTION, SOLUTION INFILTRATION; PERINEURAL AS NEEDED
Status: DISCONTINUED | OUTPATIENT
Start: 2020-02-07 | End: 2020-02-07 | Stop reason: HOSPADM

## 2020-02-07 RX ORDER — SUCCINYLCHOLINE CHLORIDE 20 MG/ML
INJECTION INTRAMUSCULAR; INTRAVENOUS AS NEEDED
Status: DISCONTINUED | OUTPATIENT
Start: 2020-02-07 | End: 2020-02-07 | Stop reason: SURG

## 2020-02-07 RX ORDER — EPHEDRINE SULFATE 50 MG/ML
INJECTION, SOLUTION INTRAVENOUS AS NEEDED
Status: DISCONTINUED | OUTPATIENT
Start: 2020-02-07 | End: 2020-02-07 | Stop reason: SURG

## 2020-02-07 RX ORDER — PROMETHAZINE HYDROCHLORIDE 25 MG/1
25 SUPPOSITORY RECTAL ONCE AS NEEDED
Status: DISCONTINUED | OUTPATIENT
Start: 2020-02-07 | End: 2020-02-07 | Stop reason: HOSPADM

## 2020-02-07 RX ORDER — ACETAMINOPHEN 325 MG/1
650 TABLET ORAL ONCE AS NEEDED
Status: DISCONTINUED | OUTPATIENT
Start: 2020-02-07 | End: 2020-02-07 | Stop reason: HOSPADM

## 2020-02-07 RX ORDER — ONDANSETRON 2 MG/ML
4 INJECTION INTRAMUSCULAR; INTRAVENOUS ONCE AS NEEDED
Status: DISCONTINUED | OUTPATIENT
Start: 2020-02-07 | End: 2020-02-07 | Stop reason: HOSPADM

## 2020-02-07 RX ORDER — SODIUM CHLORIDE, SODIUM GLUCONATE, SODIUM ACETATE, POTASSIUM CHLORIDE, AND MAGNESIUM CHLORIDE 526; 502; 368; 37; 30 MG/100ML; MG/100ML; MG/100ML; MG/100ML; MG/100ML
INJECTION, SOLUTION INTRAVENOUS CONTINUOUS PRN
Status: DISCONTINUED | OUTPATIENT
Start: 2020-02-07 | End: 2020-02-07 | Stop reason: SURG

## 2020-02-07 RX ORDER — ONDANSETRON 2 MG/ML
INJECTION INTRAMUSCULAR; INTRAVENOUS AS NEEDED
Status: DISCONTINUED | OUTPATIENT
Start: 2020-02-07 | End: 2020-02-07 | Stop reason: SURG

## 2020-02-07 RX ORDER — PROPOFOL 10 MG/ML
VIAL (ML) INTRAVENOUS AS NEEDED
Status: DISCONTINUED | OUTPATIENT
Start: 2020-02-07 | End: 2020-02-07 | Stop reason: SURG

## 2020-02-07 RX ORDER — DIPHENHYDRAMINE HYDROCHLORIDE 50 MG/ML
12.5 INJECTION INTRAMUSCULAR; INTRAVENOUS
Status: DISCONTINUED | OUTPATIENT
Start: 2020-02-07 | End: 2020-02-07 | Stop reason: HOSPADM

## 2020-02-07 RX ORDER — NEOSTIGMINE METHYLSULFATE 4 MG/4 ML
SYRINGE (ML) INTRAVENOUS AS NEEDED
Status: DISCONTINUED | OUTPATIENT
Start: 2020-02-07 | End: 2020-02-07 | Stop reason: SURG

## 2020-02-07 RX ORDER — SODIUM CHLORIDE, SODIUM GLUCONATE, SODIUM ACETATE, POTASSIUM CHLORIDE, AND MAGNESIUM CHLORIDE 526; 502; 368; 37; 30 MG/100ML; MG/100ML; MG/100ML; MG/100ML; MG/100ML
1000 INJECTION, SOLUTION INTRAVENOUS CONTINUOUS
Status: DISCONTINUED | OUTPATIENT
Start: 2020-02-07 | End: 2020-02-07 | Stop reason: HOSPADM

## 2020-02-07 RX ORDER — FENTANYL CITRATE 50 UG/ML
INJECTION, SOLUTION INTRAMUSCULAR; INTRAVENOUS AS NEEDED
Status: DISCONTINUED | OUTPATIENT
Start: 2020-02-07 | End: 2020-02-07 | Stop reason: SURG

## 2020-02-07 RX ORDER — BACITRACIN ZINC 500 [USP'U]/G
OINTMENT TOPICAL AS NEEDED
Status: DISCONTINUED | OUTPATIENT
Start: 2020-02-07 | End: 2020-02-07 | Stop reason: HOSPADM

## 2020-02-07 RX ORDER — EPHEDRINE SULFATE 50 MG/ML
5 INJECTION, SOLUTION INTRAVENOUS ONCE AS NEEDED
Status: DISCONTINUED | OUTPATIENT
Start: 2020-02-07 | End: 2020-02-07 | Stop reason: HOSPADM

## 2020-02-07 RX ORDER — FLUMAZENIL 0.1 MG/ML
0.2 INJECTION INTRAVENOUS AS NEEDED
Status: DISCONTINUED | OUTPATIENT
Start: 2020-02-07 | End: 2020-02-07 | Stop reason: HOSPADM

## 2020-02-07 RX ORDER — LABETALOL HYDROCHLORIDE 5 MG/ML
5 INJECTION, SOLUTION INTRAVENOUS
Status: DISCONTINUED | OUTPATIENT
Start: 2020-02-07 | End: 2020-02-07 | Stop reason: HOSPADM

## 2020-02-07 RX ORDER — SCOLOPAMINE TRANSDERMAL SYSTEM 1 MG/1
1 PATCH, EXTENDED RELEASE TRANSDERMAL
Status: DISCONTINUED | OUTPATIENT
Start: 2020-02-07 | End: 2020-02-07 | Stop reason: HOSPADM

## 2020-02-07 RX ORDER — ONDANSETRON HCL IN 0.9 % NACL 8 MG/50 ML
8 INTRAVENOUS SOLUTION, PIGGYBACK (ML) INTRAVENOUS ONCE AS NEEDED
Status: CANCELLED | OUTPATIENT
Start: 2020-02-07

## 2020-02-07 RX ORDER — PROMETHAZINE HYDROCHLORIDE 25 MG/1
25 TABLET ORAL ONCE AS NEEDED
Status: DISCONTINUED | OUTPATIENT
Start: 2020-02-07 | End: 2020-02-07 | Stop reason: HOSPADM

## 2020-02-07 RX ADMIN — SCOPALAMINE 1 PATCH: 1 PATCH, EXTENDED RELEASE TRANSDERMAL at 06:53

## 2020-02-07 RX ADMIN — PROPOFOL 40 MG: 10 INJECTION, EMULSION INTRAVENOUS at 08:31

## 2020-02-07 RX ADMIN — ROCURONIUM BROMIDE 10 MG: 10 INJECTION INTRAVENOUS at 08:50

## 2020-02-07 RX ADMIN — FENTANYL CITRATE 50 MCG: 50 INJECTION, SOLUTION INTRAMUSCULAR; INTRAVENOUS at 07:42

## 2020-02-07 RX ADMIN — ROCURONIUM BROMIDE 40 MG: 10 INJECTION INTRAVENOUS at 07:52

## 2020-02-07 RX ADMIN — MIDAZOLAM HYDROCHLORIDE 1 MG: 2 INJECTION, SOLUTION INTRAMUSCULAR; INTRAVENOUS at 07:32

## 2020-02-07 RX ADMIN — LIDOCAINE HYDROCHLORIDE 100 MG: 20 INJECTION, SOLUTION INFILTRATION; PERINEURAL at 07:42

## 2020-02-07 RX ADMIN — EPHEDRINE SULFATE 5 MG: 50 INJECTION INTRAVENOUS at 08:28

## 2020-02-07 RX ADMIN — SODIUM CHLORIDE, SODIUM GLUCONATE, SODIUM ACETATE, POTASSIUM CHLORIDE, AND MAGNESIUM CHLORIDE 1000 ML: 526; 502; 368; 37; 30 INJECTION, SOLUTION INTRAVENOUS at 06:53

## 2020-02-07 RX ADMIN — SODIUM CHLORIDE, SODIUM GLUCONATE, SODIUM ACETATE, POTASSIUM CHLORIDE, AND MAGNESIUM CHLORIDE: 526; 502; 368; 37; 30 INJECTION, SOLUTION INTRAVENOUS at 07:26

## 2020-02-07 RX ADMIN — ROCURONIUM BROMIDE 10 MG: 10 INJECTION INTRAVENOUS at 08:31

## 2020-02-07 RX ADMIN — SUCCINYLCHOLINE CHLORIDE 160 MG: 20 INJECTION, SOLUTION INTRAMUSCULAR; INTRAVENOUS at 07:43

## 2020-02-07 RX ADMIN — DEXAMETHASONE SODIUM PHOSPHATE 4 MG: 4 INJECTION, SOLUTION INTRAMUSCULAR; INTRAVENOUS at 07:49

## 2020-02-07 RX ADMIN — EPHEDRINE SULFATE 10 MG: 50 INJECTION INTRAVENOUS at 08:09

## 2020-02-07 RX ADMIN — GLYCOPYRROLATE 0.8 MG: 0.2 INJECTION, SOLUTION INTRAMUSCULAR; INTRAVITREAL at 08:57

## 2020-02-07 RX ADMIN — EPHEDRINE SULFATE 5 MG: 50 INJECTION INTRAVENOUS at 08:24

## 2020-02-07 RX ADMIN — Medication 4 MG: at 08:57

## 2020-02-07 RX ADMIN — MIDAZOLAM HYDROCHLORIDE 1 MG: 2 INJECTION, SOLUTION INTRAMUSCULAR; INTRAVENOUS at 07:36

## 2020-02-07 RX ADMIN — ONDANSETRON 4 MG: 2 INJECTION INTRAMUSCULAR; INTRAVENOUS at 08:52

## 2020-02-07 RX ADMIN — ROCURONIUM BROMIDE 5 MG: 10 INJECTION INTRAVENOUS at 07:42

## 2020-02-07 RX ADMIN — FENTANYL CITRATE 25 MCG: 50 INJECTION, SOLUTION INTRAMUSCULAR; INTRAVENOUS at 08:38

## 2020-02-07 RX ADMIN — PROPOFOL 150 MG: 10 INJECTION, EMULSION INTRAVENOUS at 07:42

## 2020-02-07 NOTE — ANESTHESIA PREPROCEDURE EVALUATION
Anesthesia Evaluation     Patient summary reviewed and Nursing notes reviewed   history of anesthetic complications: PONV  NPO Solid Status: > 8 hours  NPO Liquid Status: > 4 hours           Airway   Mallampati: II  TM distance: >3 FB  Neck ROM: full  possible difficult intubation and Anterior  Dental    (+) poor dentition    Pulmonary    (+) asthma,decreased breath sounds,   (-) not a smoker  Cardiovascular     NYHA Classification: II  ECG reviewed  Patient on routine beta blocker and Beta blocker given within 24 hours of surgery  Rhythm: regular  Rate: abnormal    (+) hypertension well controlled,   (-) angina, murmur    ROS comment: Normal sinus rhythm  Normal ECG  When compared with ECG of 31-AUG-2017 15:19,  No significant change was found  Confirmed by VETTIANKAL    Neuro/Psych  (+) headaches (migraines), numbness, psychiatric history Anxiety and Depression,     GI/Hepatic/Renal/Endo    (+) obesity, morbid obesity, GERD well controlled,      Musculoskeletal     Abdominal   (+) obese,     Abdomen: soft.   Substance History   (+) alcohol use (occ),      OB/GYN          Other   arthritis (knees and shoulders),      (-) history of cancer                    Anesthesia Plan    ASA 3     general     intravenous induction     Anesthetic plan, all risks, benefits, and alternatives have been provided, discussed and informed consent has been obtained with: patient.

## 2020-02-07 NOTE — ANESTHESIA POSTPROCEDURE EVALUATION
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          Post Anesthesia Care and Evaluation    Patient location during evaluation: PACU  Patient participation: complete - patient participated  Level of consciousness: awake and alert  Pain score: 0  Pain management: adequate  Airway patency: patent  Anesthetic complications: No anesthetic complications  PONV Status: none  Cardiovascular status: acceptable  Respiratory status: acceptable, unassisted, room air and spontaneous ventilation  Hydration status: acceptable

## 2020-02-07 NOTE — ANESTHESIA PROCEDURE NOTES
Airway  Urgency: elective    Date/Time: 2/7/2020 7:44 AM  Airway not difficult    General Information and Staff    Patient location during procedure: OR  CRNA: Riley Medina CRNA    Indications and Patient Condition  Indications for airway management: airway protection    Preoxygenated: yes  MILS maintained throughout  Mask difficulty assessment: 1 - vent by mask    Final Airway Details  Final airway type: endotracheal airway      Successful airway: WENDY tube  Cuffed: yes   Successful intubation technique: video laryngoscopy  Facilitating devices/methods: intubating stylet  Endotracheal tube insertion site: oral  Blade: Green  Blade size: 3  Cormack-Lehane Classification: grade IIa - partial view of glottis  Placement verified by: chest auscultation and capnometry   Measured from: lips  ETT/EBT  to lips (cm): 20  Number of attempts at approach: 1  Assessment: lips, teeth, and gum same as pre-op and atraumatic intubation

## 2020-02-07 NOTE — INTERVAL H&P NOTE
Ear right marked    I have seen the patient and there are no significant medical changes.    All questions were answered.    Temp:  [98.3 °F (36.8 °C)] 98.3 °F (36.8 °C)  Heart Rate:  [68] 68  Resp:  [15] 15  BP: (140)/(65) 140/65

## 2020-02-07 NOTE — OP NOTE
OPERATIVE NOTE    Name:    Mikki Joel  YOB: 1955  Date of surgery:   2/7/2020    Pre-op Diagnosis:   Chronic vasomotor rhinitis [J30.0]  Perforated tympanic membrane, bilateral [H72.93]  Mixed conductive and sensorineural hearing loss of right ear with restricted hearing of left ear [H90.A31]    Post-op Diagnosis:    Post-Op Diagnosis Codes:     * Chronic vasomotor rhinitis [J30.0]     * Perforated tympanic membrane, bilateral [H72.93]     * Mixed conductive and sensorineural hearing loss of right ear with restricted hearing of left ear [H90.A31]    Procedure:  Procedure(s):  Right TYMPANOPLASTY AND  CARTILAGE GRAFT    Surgeon:  Ramy Gaston MD, AAOHNS    Anesthesia: General with 7 mL 1% lidocaine 100,000 epinephrine    Staff:   Circulator: Anneliese Garibay RN; Trista Iniguez RN  Scrub Person: Marion Dixon  Assistant: Ilene Hill CSA    Estimated Blood Loss: 5 ml    Specimens:   None                    Drains:  na    Findings: Very scarred tympanic membrane involving the right 40% of the tympanic membrane with severe myringosclerosis perforation extending to the malleus and over the incus tube almost the canal wall posteriorly and inferiorly and into the midportion of the drum anteriorly no evidence of infection or granulation tissue or cholesteatoma was seen    Complications: None    IMPLANTS:   Nothing was implanted during the procedure    INDICATIONS: Persistent ear infections related to perforated eardrum hearing loss need for hearing aids patient understood she would still need a hearing aid for the nerve portion of her hearing loss but we may be able to improve the conductive component discussed the risk of the  complications including damage to the nerves the face taste hearing as well as worsening tinnitus vertigo decision made for surgery limitations and recovery were discussed as well patient chose this approach.    PROCEDURE:  Patient was taken operating room placed  spine position general seizure carried out.  Once the patient was adequately anesthetized the right ear which had been previously marked and timeout was carried out was prepped and draped in sterile fashion 7 mL local anesthetic was injected the postauricular area as well as in the tragus.  There was prepped and draped in sterile fashion examination ear showed significant scarring large perforation as noted above the margins of perforation were scarified and stripped of scar and then graft material was harvested from the tragus and the wound closed with chromic.    Both perichondrium and cartilage was used to be later placed over the incus and underneath the tympanic membrane.  Fascia was harvested from the postauricular area thigh and on the right self-retaining retractor was used to hold the incision open fascia was harvested cut the proper size and then placed into the fascia pressor and then placed onto a block to be dried.  While this was drying attention was taken to the middle ear where Gelfoam and cartilage was then cut to appropriate size and placed up to the drum the level of perforation was more lateral superiorly posteriorly.  Some fatty tissue was placed as well as the Gelfoam and the cartilage.    By that time the cartilage was in proper position.  Then the fascia was which was cut to proper size and placed several millimeters underneath the margins of the perforation both anterior superiorly inferiorly.    It was laid on top of the previously mentioned tissue.  Additional fascia was then placed on top of this.  The postauricular wound was closed in a multilayer fashion with Vicryl 3 oh.  Went was placed on the area Band-Aids were placed cottonball placed in the ear canal patient woken taken the care of room stable condition without evidence of complication          This document has been electronically signed by Ramy Gaston MD on February 7, 2020 9:00 AM

## 2020-02-07 NOTE — INTERVAL H&P NOTE
Allergies   Allergen Reactions   • Codeine Other (See Comments)     Increases heart rate   • Sulfa Antibiotics Hives         Current Facility-Administered Medications:   •  acetaminophen (TYLENOL) tablet 650 mg, 650 mg, Oral, Once PRN **OR** acetaminophen (TYLENOL) suppository 650 mg, 650 mg, Rectal, Once PRN, Riley Medina CRNA  •  diphenhydrAMINE (BENADRYL) injection 12.5 mg, 12.5 mg, Intravenous, Q15 Min PRN, Riley Medina CRNA  •  electrolyte-148 (PLASMALYTE) solution 1,000 mL, 1,000 mL, Intravenous, Continuous, Simeon Moss MD, Stopped at 02/07/20 1058  •  ePHEDrine injection 5 mg, 5 mg, Intravenous, Once PRN, Riley Medina CRNA  •  flumazenil (ROMAZICON) injection 0.2 mg, 0.2 mg, Intravenous, PRN, Riley Medina CRNA  •  HYDROmorphone (DILAUDID) injection 0.5 mg, 0.5 mg, Intravenous, Q10 Min PRN, Riley Medina CRNA  •  labetalol (NORMODYNE,TRANDATE) injection 5 mg, 5 mg, Intravenous, Q5 Min PRN, Riley Medina CRNA  •  meperidine (DEMEROL) injection 13 mg, 13 mg, Intravenous, Q5 Min PRN, Riley Medina CRNA  •  naloxone (NARCAN) injection 0.4 mg, 0.4 mg, Intravenous, PRN, Riley Medina CRNA  •  ondansetron (ZOFRAN) injection 4 mg, 4 mg, Intravenous, Once PRN, Riley Medina CRNA  •  promethazine (PHENERGAN) injection 12.5 mg, 12.5 mg, Intravenous, Once PRN **OR** promethazine (PHENERGAN) injection 12.5 mg, 12.5 mg, Intramuscular, Once PRN **OR** promethazine (PHENERGAN) suppository 25 mg, 25 mg, Rectal, Once PRN **OR** promethazine (PHENERGAN) tablet 25 mg, 25 mg, Oral, Once PRN, Riley Medina CRNA    Current Outpatient Medications:   •  ALBUTEROL SULFATE  (90 Base) MCG/ACT inhaler, INHALE 2 PUFFS BY MOUTH EVERY 4 HOURS AS NEEDED FOR WHEEZING, Disp: 8.5 g, Rfl: 0  •  benzonatate (TESSALON) 100 MG capsule, Take 1 capsule by mouth 3 (Three) Times a Day As Needed for Cough., Disp: 30 capsule, Rfl: 0  •  busPIRone (BUSPAR) 30 MG  tablet, Take 30 mg by mouth 2 (Two) Times a Day., Disp: , Rfl:   •  cetirizine (ZYRTEC ALLERGY) 10 MG tablet, Take 1 tablet by mouth Daily., Disp: 30 tablet, Rfl: 11  •  dexlansoprazole (DEXILANT) 60 MG capsule, Take 1 capsule by mouth Daily., Disp: 90 capsule, Rfl: 1  •  eszopiclone (LUNESTA) 2 MG tablet, Take 1 tablet by mouth Every Night. Take immediately before bedtime, Disp: 30 tablet, Rfl: 0  •  Fluticasone Furoate-Vilanterol (BREO ELLIPTA) 100-25 MCG/INH inhaler, 1 puff bid and rinse out mouth, Disp: 90 each, Rfl: 3  •  hydrOXYzine pamoate (VISTARIL) 50 MG capsule, TAKE 1 CAPSULE BY MOUTH THREE TIMES DAILY AS NEEDED FOR ANXIETY, Disp: , Rfl:   •  ipratropium (ATROVENT) 0.06 % nasal spray, USE 2 SPRAYS INTO EACH NOSTRIL THREE TIMES DAILY AS DIRECTED, Disp: 15 mL, Rfl: 0  •  metoprolol tartrate (LOPRESSOR) 100 MG tablet, Take 100 mg by mouth Every Night., Disp: , Rfl:   •  mometasone (NASONEX) 50 MCG/ACT nasal spray, USE 2 SPRAYS IN EACH NOSTRIL AS DIRECTED BY PROVIDER TWICE DAILY, Disp: 17 g, Rfl: 5  •  ondansetron (ZOFRAN) 8 MG tablet, TAKE 1 TABLET BY MOUTH EVERY 8 HOURS AS NEEDED FOR NAUSEA OR VOMITING, Disp: 30 tablet, Rfl: 0  •  prazosin (MINIPRESS) 5 MG capsule, , Disp: , Rfl:   •  rOPINIRole (REQUIP) 4 MG tablet, TAKE 1 TABLET BY MOUTH DAILY, Disp: 30 tablet, Rfl: 3  •  rOPINIRole (REQUIP) 4 MG tablet, Take 4 mg by mouth At Night As Needed., Disp: , Rfl:   •  Vortioxetine HBr (TRINTELLIX) 10 MG tablet, Take 10 mg by mouth Daily., Disp: , Rfl:   •  WAL-PHED 30 MG tablet, TAKE 1 TABLET BY MOUTH EVERY 6 HOURS AS NEEDED FOR CONGESTION, Disp: 24 tablet, Rfl: 0  •  amoxicillin-clavulanate (AUGMENTIN) 875-125 MG per tablet, Take 1 tablet by mouth 2 (Two) Times a Day., Disp: 20 tablet, Rfl: 0  •  HYDROcodone-acetaminophen (HYCET) 7.5-325 MG/15ML solution, Take 15 mL by mouth Every 4 (Four) Hours As Needed for Moderate or Severe Pain for up to 3 days.  **Hold if too drowsy**, Disp: 240 mL, Rfl: 0  •  olopatadine  (PATANASE) 0.6 % solution nasal solution, 2 sprays by Each Nare route 2 (Two) Times a Day., Disp: 30 g, Rfl: 11  •  sucralfate (CARAFATE) 1 g tablet, Take 1 tablet by mouth 4 (Four) Times a Day., Disp: 120 tablet, Rfl: 2     Past Medical History:   Diagnosis Date   • Anemia    • Anemia June 2016    might have been due to knee replacement surgery I had in mar   • Anemia June 2016    might have been due to knee replacement surgery I had in mar   • Anxiety    • Arthritis    • Arthritis of back 2009    lower back   • Asthma    • CTS (carpal tunnel syndrome) 1987    left wrist - had corrective surgery   • Depression    • Fracture of wrist 1979   • GERD (gastroesophageal reflux disease)    • Hearing aid worn    • High blood pressure    • [place] (hard of hearing)    • Hypertension 1999    metoprolol 100 mg and triamterene/hydrochlorothiazide 37.5mg   • Hypertension 1999    metoprolol 100 mg and triamterene/hydrochlorothiazide 37.5mg   • Knee swelling 2010   • Migraine    • Periarthritis of shoulder 2011    both shoulders right shoulder total replacement   • PONV (postoperative nausea and vomiting)    • Tear of meniscus of knee 2016    fell   • Wears glasses        Past Surgical History:   Procedure Laterality Date   • BUNIONECTOMY Bilateral    • COLONOSCOPY  2015   • COLONOSCOPY N/A 1/19/2018    Procedure: COLONOSCOPY;  Surgeon: Ravi Cavanaugh MD;  Location: [facility] ENDOSCOPY;  Service:    • COLONOSCOPY  2015   • EAR TUBES     • ENDOSCOPIC FUNCTIONAL SINUS SURGERY (FESS) Bilateral 9/5/2017    Procedure: BILATERAL ETHMOID AND MAXILLARY SINUS AND NASAL SEPTOPLASTIES AND BILATERAL TURBINECTOMIES;  Surgeon: Ramy Gaston MD;  Location: [facility] OR;  Service:    • ENDOSCOPY N/A 1/19/2018    Procedure: ESOPHAGOGASTRODUODENOSCOPY;  Surgeon: Ravi Cavanaugh MD;  Location: [facility] ENDOSCOPY;  Service:    • ENDOSCOPY N/A 6/10/2019    Procedure: ESOPHAGOGASTRODUODENOSCOPY possible dilation;  Surgeon: Ravi Cavanaugh MD;  Location:   [facility] ENDOSCOPY;  Service: Gastroenterology   • HAND SURGERY     • JOINT REPLACEMENT  2016    right knee replacement 2016   • JOINT REPLACEMENT  2015    right shoulder   • KNEE SURGERY  2016    total right knee replacement   • RHINOPLASTY      x3   • SHOULDER ARTHROSCOPY     • SHOULDER SURGERY  2015    complete shoulder joint replacement   • TONSILLECTOMY     • TUBAL ABDOMINAL LIGATION  1987   • TYMPANOPLASTY Left 6/5/2018    Procedure: TYMPANOPLASTY WITH CARTILAGE GRAFT;  Surgeon: Ramy Gaston MD;  Location: [facility];  Service: ENT   • UPPER GASTROINTESTINAL ENDOSCOPY  2016   • UPPER GASTROINTESTINAL ENDOSCOPY  2016   • UPPER GASTROINTESTINAL ENDOSCOPY  01/19/2018    Per Dr. Ravi Polk M.D.   • WRIST SURGERY  1987    carpal tunnel repair left wrist       Social History     Socioeconomic History   • Marital status:      Spouse name: Not on file   • Number of children: Not on file   • Years of education: Not on file   • Highest education level: Not on file   Tobacco Use   • Smoking status: Never Smoker   • Smokeless tobacco: Never Used   Substance and Sexual Activity   • Alcohol use: Yes     Alcohol/week: 2.0 standard drinks     Types: 2 Standard drinks or equivalent per week     Comment: occasional   • Drug use: No   • Sexual activity: Defer     Partners: Male     Birth control/protection: Post-menopausal, Surgical, None, OCP     Comment: 02/04/2019 - Patient confirmed Tubal Ligation following birth of  last child at age 32.       Family History   Problem Relation Age of Onset   • Hypertension Mother    • COPD Mother    • Arthritis Mother    • Cancer Father    • Arthritis Father    • Alcohol abuse Father    • COPD Maternal Grandmother    • Stroke Maternal Grandmother    • Osteoporosis Maternal Grandmother    • Irritable bowel syndrome Sister        Patient Active Problem List   Diagnosis   • Asthma   • Arthritis   • Nasal turbinate hypertrophy   • Gastroesophageal reflux disease   • Depression with  anxiety   • Insomnia   • Other chronic pain   • PTSD (post-traumatic stress disorder)   • Restless leg   • BMI 35.0-35.9,adult   • Mixed hearing loss of left ear   • Right knee pain   • Presence of right artificial knee joint   • Essential hypertension, benign   • Nausea   • Presbyesophagus   • Chronic vasomotor rhinitis   • Perforated tympanic membrane, bilateral   • Mixed conductive and sensorineural hearing loss of right ear with restricted hearing of left ear   • Morbidly obese (CMS/HCC)           Temp:  [97 °F (36.1 °C)-98.3 °F (36.8 °C)] 98 °F (36.7 °C)  Heart Rate:  [68-82] 71  Resp:  [15-20] 16  BP: (140-177)/(65-82) 165/82

## 2020-02-10 ENCOUNTER — HOSPITAL ENCOUNTER (OUTPATIENT)
Facility: HOSPITAL | Age: 65
Setting detail: OBSERVATION
Discharge: HOME | End: 2020-02-11
Attending: EMERGENCY MEDICINE | Admitting: INTERNAL MEDICINE

## 2020-02-10 ENCOUNTER — APPOINTMENT (OUTPATIENT)
Dept: GENERAL RADIOLOGY | Facility: HOSPITAL | Age: 65
End: 2020-02-10

## 2020-02-10 ENCOUNTER — APPOINTMENT (OUTPATIENT)
Dept: CT IMAGING | Facility: HOSPITAL | Age: 65
End: 2020-02-10

## 2020-02-10 DIAGNOSIS — G93.41 ACUTE METABOLIC ENCEPHALOPATHY: ICD-10-CM

## 2020-02-10 DIAGNOSIS — G89.29 OTHER CHRONIC PAIN: Primary | Chronic | ICD-10-CM

## 2020-02-10 DIAGNOSIS — R79.89 ELEVATED SERUM CREATININE: ICD-10-CM

## 2020-02-10 DIAGNOSIS — R44.1 HALLUCINATIONS, VISUAL: ICD-10-CM

## 2020-02-10 DIAGNOSIS — R44.0 AUDITORY HALLUCINATIONS: ICD-10-CM

## 2020-02-10 DIAGNOSIS — F12.10 MARIJUANA ABUSE: ICD-10-CM

## 2020-02-10 PROBLEM — N17.9 AKI (ACUTE KIDNEY INJURY) (HCC): Status: ACTIVE | Noted: 2020-02-10

## 2020-02-10 PROBLEM — Z79.899 POLYPHARMACY: Chronic | Status: ACTIVE | Noted: 2020-02-10

## 2020-02-10 PROBLEM — R53.83 LETHARGY: Status: ACTIVE | Noted: 2020-02-10

## 2020-02-10 LAB
ALBUMIN SERPL-MCNC: 4.4 G/DL (ref 3.5–5.2)
ALBUMIN/GLOB SERPL: 2.2 G/DL
ALP SERPL-CCNC: 151 U/L (ref 39–117)
ALT SERPL W P-5'-P-CCNC: 20 U/L (ref 1–33)
AMPHET+METHAMPHET UR QL: NEGATIVE
AMPHETAMINES UR QL: NEGATIVE
ANION GAP SERPL CALCULATED.3IONS-SCNC: 11 MMOL/L (ref 5–15)
APAP SERPL-MCNC: <5 MCG/ML (ref 10–30)
AST SERPL-CCNC: 11 U/L (ref 1–32)
BARBITURATES UR QL SCN: NEGATIVE
BASOPHILS # BLD AUTO: 0.05 10*3/MM3 (ref 0–0.2)
BASOPHILS NFR BLD AUTO: 0.6 % (ref 0–1.5)
BENZODIAZ UR QL SCN: NEGATIVE
BILIRUB SERPL-MCNC: 0.2 MG/DL (ref 0.2–1.2)
BILIRUB UR QL STRIP: NEGATIVE
BUN BLD-MCNC: 29 MG/DL (ref 8–23)
BUN/CREAT SERPL: 22.5 (ref 7–25)
BUPRENORPHINE SERPL-MCNC: NEGATIVE NG/ML
CALCIUM SPEC-SCNC: 9.4 MG/DL (ref 8.6–10.5)
CANNABINOIDS SERPL QL: POSITIVE
CHLORIDE SERPL-SCNC: 103 MMOL/L (ref 98–107)
CLARITY UR: CLEAR
CO2 SERPL-SCNC: 23 MMOL/L (ref 22–29)
COCAINE UR QL: NEGATIVE
COLOR UR: YELLOW
CREAT BLD-MCNC: 1.29 MG/DL (ref 0.57–1)
DEPRECATED RDW RBC AUTO: 39.4 FL (ref 37–54)
EOSINOPHIL # BLD AUTO: 0.19 10*3/MM3 (ref 0–0.4)
EOSINOPHIL NFR BLD AUTO: 2.1 % (ref 0.3–6.2)
ERYTHROCYTE [DISTWIDTH] IN BLOOD BY AUTOMATED COUNT: 13.4 % (ref 12.3–15.4)
ETHANOL BLD-MCNC: <10 MG/DL (ref 0–10)
ETHANOL UR QL: <0.01 %
GFR SERPL CREATININE-BSD FRML MDRD: 41 ML/MIN/1.73
GLOBULIN UR ELPH-MCNC: 2 GM/DL
GLUCOSE BLD-MCNC: 109 MG/DL (ref 65–99)
GLUCOSE UR STRIP-MCNC: NEGATIVE MG/DL
HCT VFR BLD AUTO: 33.7 % (ref 34–46.6)
HGB BLD-MCNC: 10.9 G/DL (ref 12–15.9)
HGB UR QL STRIP.AUTO: NEGATIVE
HOLD SPECIMEN: NORMAL
IMM GRANULOCYTES # BLD AUTO: 0.04 10*3/MM3 (ref 0–0.05)
IMM GRANULOCYTES NFR BLD AUTO: 0.4 % (ref 0–0.5)
KETONES UR QL STRIP: NEGATIVE
LEUKOCYTE ESTERASE UR QL STRIP.AUTO: NEGATIVE
LYMPHOCYTES # BLD AUTO: 1.08 10*3/MM3 (ref 0.7–3.1)
LYMPHOCYTES NFR BLD AUTO: 12 % (ref 19.6–45.3)
MCH RBC QN AUTO: 26.3 PG (ref 26.6–33)
MCHC RBC AUTO-ENTMCNC: 32.3 G/DL (ref 31.5–35.7)
MCV RBC AUTO: 81.4 FL (ref 79–97)
METHADONE UR QL SCN: NEGATIVE
MONOCYTES # BLD AUTO: 0.5 10*3/MM3 (ref 0.1–0.9)
MONOCYTES NFR BLD AUTO: 5.6 % (ref 5–12)
NEUTROPHILS # BLD AUTO: 7.11 10*3/MM3 (ref 1.7–7)
NEUTROPHILS NFR BLD AUTO: 79.3 % (ref 42.7–76)
NITRITE UR QL STRIP: NEGATIVE
NRBC BLD AUTO-RTO: 0 /100 WBC (ref 0–0.2)
OPIATES UR QL: POSITIVE
OXYCODONE UR QL SCN: NEGATIVE
PCP UR QL SCN: NEGATIVE
PH UR STRIP.AUTO: 6 [PH] (ref 5–9)
PLATELET # BLD AUTO: 303 10*3/MM3 (ref 140–450)
PMV BLD AUTO: 9.2 FL (ref 6–12)
POTASSIUM BLD-SCNC: 4.8 MMOL/L (ref 3.5–5.2)
PROPOXYPH UR QL: NEGATIVE
PROT SERPL-MCNC: 6.4 G/DL (ref 6–8.5)
PROT UR QL STRIP: NEGATIVE
RBC # BLD AUTO: 4.14 10*6/MM3 (ref 3.77–5.28)
SALICYLATES SERPL-MCNC: <0.3 MG/DL
SODIUM BLD-SCNC: 137 MMOL/L (ref 136–145)
SP GR UR STRIP: 1 (ref 1–1.03)
TRICYCLICS UR QL SCN: NEGATIVE
TROPONIN T SERPL-MCNC: <0.01 NG/ML (ref 0–0.03)
TSH SERPL DL<=0.05 MIU/L-ACNC: 0.47 UIU/ML (ref 0.27–4.2)
UROBILINOGEN UR QL STRIP: ABNORMAL
WBC NRBC COR # BLD: 8.97 10*3/MM3 (ref 3.4–10.8)
WHOLE BLOOD HOLD SPECIMEN: NORMAL

## 2020-02-10 PROCEDURE — 93010 ELECTROCARDIOGRAM REPORT: CPT | Performed by: INTERNAL MEDICINE

## 2020-02-10 PROCEDURE — 85025 COMPLETE CBC W/AUTO DIFF WBC: CPT | Performed by: STUDENT IN AN ORGANIZED HEALTH CARE EDUCATION/TRAINING PROGRAM

## 2020-02-10 PROCEDURE — 96361 HYDRATE IV INFUSION ADD-ON: CPT

## 2020-02-10 PROCEDURE — 80307 DRUG TEST PRSMV CHEM ANLYZR: CPT | Performed by: STUDENT IN AN ORGANIZED HEALTH CARE EDUCATION/TRAINING PROGRAM

## 2020-02-10 PROCEDURE — 80053 COMPREHEN METABOLIC PANEL: CPT | Performed by: STUDENT IN AN ORGANIZED HEALTH CARE EDUCATION/TRAINING PROGRAM

## 2020-02-10 PROCEDURE — 81003 URINALYSIS AUTO W/O SCOPE: CPT | Performed by: STUDENT IN AN ORGANIZED HEALTH CARE EDUCATION/TRAINING PROGRAM

## 2020-02-10 PROCEDURE — G0378 HOSPITAL OBSERVATION PER HR: HCPCS

## 2020-02-10 PROCEDURE — 99284 EMERGENCY DEPT VISIT MOD MDM: CPT

## 2020-02-10 PROCEDURE — 84443 ASSAY THYROID STIM HORMONE: CPT | Performed by: STUDENT IN AN ORGANIZED HEALTH CARE EDUCATION/TRAINING PROGRAM

## 2020-02-10 PROCEDURE — 93005 ELECTROCARDIOGRAM TRACING: CPT | Performed by: STUDENT IN AN ORGANIZED HEALTH CARE EDUCATION/TRAINING PROGRAM

## 2020-02-10 PROCEDURE — 71046 X-RAY EXAM CHEST 2 VIEWS: CPT

## 2020-02-10 PROCEDURE — 84484 ASSAY OF TROPONIN QUANT: CPT | Performed by: STUDENT IN AN ORGANIZED HEALTH CARE EDUCATION/TRAINING PROGRAM

## 2020-02-10 PROCEDURE — 70450 CT HEAD/BRAIN W/O DYE: CPT

## 2020-02-10 PROCEDURE — 96360 HYDRATION IV INFUSION INIT: CPT

## 2020-02-10 RX ORDER — HYDROXYZINE PAMOATE 25 MG/1
50 CAPSULE ORAL 3 TIMES DAILY PRN
Status: DISCONTINUED | OUTPATIENT
Start: 2020-02-10 | End: 2020-02-11 | Stop reason: HOSPADM

## 2020-02-10 RX ORDER — SODIUM CHLORIDE 9 MG/ML
75 INJECTION, SOLUTION INTRAVENOUS CONTINUOUS
Status: DISCONTINUED | OUTPATIENT
Start: 2020-02-10 | End: 2020-02-11 | Stop reason: HOSPADM

## 2020-02-10 RX ORDER — FLUTICASONE PROPIONATE 50 MCG
1 SPRAY, SUSPENSION (ML) NASAL DAILY
Status: DISCONTINUED | OUTPATIENT
Start: 2020-02-10 | End: 2020-02-11 | Stop reason: HOSPADM

## 2020-02-10 RX ORDER — METOPROLOL TARTRATE 50 MG/1
100 TABLET, FILM COATED ORAL NIGHTLY
Status: DISCONTINUED | OUTPATIENT
Start: 2020-02-10 | End: 2020-02-11 | Stop reason: HOSPADM

## 2020-02-10 RX ORDER — SODIUM CHLORIDE 0.9 % (FLUSH) 0.9 %
10 SYRINGE (ML) INJECTION AS NEEDED
Status: DISCONTINUED | OUTPATIENT
Start: 2020-02-10 | End: 2020-02-11 | Stop reason: HOSPADM

## 2020-02-10 RX ORDER — ZOLPIDEM TARTRATE 5 MG/1
5 TABLET ORAL NIGHTLY PRN
Status: DISCONTINUED | OUTPATIENT
Start: 2020-02-10 | End: 2020-02-11 | Stop reason: HOSPADM

## 2020-02-10 RX ORDER — SODIUM CHLORIDE 0.9 % (FLUSH) 0.9 %
10 SYRINGE (ML) INJECTION EVERY 12 HOURS SCHEDULED
Status: DISCONTINUED | OUTPATIENT
Start: 2020-02-10 | End: 2020-02-11 | Stop reason: HOSPADM

## 2020-02-10 RX ORDER — ALBUTEROL SULFATE 2.5 MG/3ML
2.5 SOLUTION RESPIRATORY (INHALATION) EVERY 6 HOURS PRN
Status: DISCONTINUED | OUTPATIENT
Start: 2020-02-10 | End: 2020-02-11 | Stop reason: HOSPADM

## 2020-02-10 RX ORDER — BUSPIRONE HYDROCHLORIDE 15 MG/1
30 TABLET ORAL 2 TIMES DAILY
Status: DISCONTINUED | OUTPATIENT
Start: 2020-02-10 | End: 2020-02-11 | Stop reason: HOSPADM

## 2020-02-10 RX ORDER — PANTOPRAZOLE SODIUM 40 MG/1
40 TABLET, DELAYED RELEASE ORAL EVERY MORNING
Status: DISCONTINUED | OUTPATIENT
Start: 2020-02-11 | End: 2020-02-11 | Stop reason: HOSPADM

## 2020-02-10 RX ADMIN — FLUTICASONE PROPIONATE 1 SPRAY: 50 SPRAY, METERED NASAL at 20:30

## 2020-02-10 RX ADMIN — METOPROLOL TARTRATE 100 MG: 50 TABLET, FILM COATED ORAL at 20:31

## 2020-02-10 RX ADMIN — ZOLPIDEM TARTRATE 5 MG: 5 TABLET ORAL at 20:30

## 2020-02-10 RX ADMIN — BUSPIRONE HYDROCHLORIDE 30 MG: 15 TABLET ORAL at 20:30

## 2020-02-10 RX ADMIN — SODIUM CHLORIDE 75 ML/HR: 9 INJECTION, SOLUTION INTRAVENOUS at 20:30

## 2020-02-10 RX ADMIN — HYDROXYZINE PAMOATE 50 MG: 25 CAPSULE ORAL at 20:31

## 2020-02-10 RX ADMIN — SODIUM CHLORIDE, PRESERVATIVE FREE 10 ML: 5 INJECTION INTRAVENOUS at 20:30

## 2020-02-10 NOTE — H&P
History and Physical  Umberto Carvalho MD  Hospitalist    Date of admission: 2/10/2020    Patient Care Team:  Ronald Bowser MD as PCP - General (Family Medicine)    Chief complaint   Chief Complaint   Patient presents with   • Altered Mental Status     Subjective     Patient is a 65 y.o. female admitted for confusion, symptom that seemed to occur after taking liquid Norco for her post op R sided ear pain. Her  stated that he could not make any sense from what she was saying; the patient was hallucinating and talking to imaginary people. Her condition improved as the day progressed - she returned to her baseline by the time of admission.    History  Past Medical History:   Diagnosis Date   • Anxiety    • Chronic anemia    • Chronic pain    • CTS (carpal tunnel syndrome)    • Deaf    • Depression with anxiety    • Fracture of wrist    • Gastroesophageal reflux disease    • Hypertension    • Migraine    • Obesity    • Osteoarthritis    • Perforated tympanic membrane, bilateral      Past Surgical History:   Procedure Laterality Date   • COLONOSCOPY N/A 1/19/2018   • EAR TUBES     • ENDOSCOPIC FUNCTIONAL SINUS SURGERY (FESS) Bilateral 9/5/2017   • ENDOSCOPY N/A 1/19/2018   • ENDOSCOPY N/A 6/10/2019   • KNEE ARTHROPLASTY     • RHINOPLASTY     • SHOULDER ARTHROSCOPY     • TYMPANOPLASTY Left 6/5/2018   • UPPER GASTROINTESTINAL ENDOSCOPY       Family History   Problem Relation Age of Onset   • Hypertension Mother    • Alcohol abuse Father    • Stroke Maternal Grandmother    • Irritable bowel syndrome Sister      Social History     Tobacco Use   • Smoking status: Never Smoker   • Smokeless tobacco: Never Used   Substance Use Topics   • Alcohol use: Yes   • Drug use: No     Medications Prior to Admission   Medication Sig Dispense Refill Last Dose   • ALBUTEROL SULFATE  (90 Base) MCG/ACT inhaler INHALE 2 PUFFS BY MOUTH EVERY 4 HOURS AS NEEDED FOR WHEEZING 8.5 g 0 Past Week at Unknown time   • benzonatate (TESSALON)  100 MG capsule Take 1 capsule by mouth 3 (Three) Times a Day As Needed for Cough. 30 capsule 0 Past Month at Unknown time   • busPIRone (BUSPAR) 30 MG tablet Take 30 mg by mouth 2 (Two) Times a Day.   2/7/2020 at 0500   • cetirizine (ZYRTEC ALLERGY) 10 MG tablet Take 1 tablet by mouth Daily. 30 tablet 11 2/6/2020 at 2100   • dexlansoprazole (DEXILANT) 60 MG capsule Take 1 capsule by mouth Daily. 90 capsule 1 2/7/2020 at 0500   • eszopiclone (LUNESTA) 2 MG tablet Take 1 tablet by mouth Every Night. Take immediately before bedtime 30 tablet 0 2/6/2020 at 2100   • Fluticasone Furoate-Vilanterol (BREO ELLIPTA) 100-25 MCG/INH inhaler 1 puff bid and rinse out mouth 90 each 3 2/7/2020 at 0500   • HYDROcodone-acetaminophen (HYCET) 7.5-325 MG/15ML solution Take 15 mL by mouth Every 4 (Four) Hours As Needed for Moderate or Severe Pain for up to 3 days.  **Hold if too drowsy** 240 mL 0    • hydrOXYzine pamoate (VISTARIL) 50 MG capsule TAKE 1 CAPSULE BY MOUTH THREE TIMES DAILY AS NEEDED FOR ANXIETY   2/6/2020 at 2100   • ipratropium (ATROVENT) 0.06 % nasal spray USE 2 SPRAYS INTO EACH NOSTRIL THREE TIMES DAILY AS DIRECTED 15 mL 0 2/6/2020 at 2100   • metoprolol tartrate (LOPRESSOR) 100 MG tablet Take 100 mg by mouth Every Night.   2/6/2020 at 2100   • mometasone (NASONEX) 50 MCG/ACT nasal spray USE 2 SPRAYS IN EACH NOSTRIL AS DIRECTED BY PROVIDER TWICE DAILY 17 g 5 2/7/2020 at 0500   • olopatadine (PATANASE) 0.6 % solution nasal solution 2 sprays by Each Nare route 2 (Two) Times a Day. 30 g 11 More than a month at Unknown time   • ondansetron (ZOFRAN) 8 MG tablet TAKE 1 TABLET BY MOUTH EVERY 8 HOURS AS NEEDED FOR NAUSEA OR VOMITING 30 tablet 0 2/7/2020 at 0500   • prazosin (MINIPRESS) 5 MG capsule    2/6/2020 at 2100   • rOPINIRole (REQUIP) 4 MG tablet TAKE 1 TABLET BY MOUTH DAILY 30 tablet 3 2/6/2020 at 2100   • rOPINIRole (REQUIP) 4 MG tablet Take 4 mg by mouth At Night As Needed.   2/6/2020 at 2100   • sucralfate (CARAFATE)  1 g tablet Take 1 tablet by mouth 4 (Four) Times a Day. 120 tablet 2 More than a month at Unknown time   • Vortioxetine HBr (TRINTELLIX) 10 MG tablet Take 10 mg by mouth Daily.   2/7/2020 at 0500   • WAL-PHED 30 MG tablet TAKE 1 TABLET BY MOUTH EVERY 6 HOURS AS NEEDED FOR CONGESTION 24 tablet 0 Past Week at Unknown time     Allergies:  Codeine and Sulfa antibiotics    Review of Systems  Review of Systems   Constitutional: Positive for fatigue. Negative for fever.   HENT: Positive for ear pain (R).    Respiratory: Negative for cough, shortness of breath and wheezing.    Cardiovascular: Negative for chest pain and palpitations.   Gastrointestinal: Negative for abdominal distention, anal bleeding, blood in stool, nausea and vomiting.   Genitourinary: Negative for decreased urine volume, dysuria, frequency, genital sores, hematuria, pelvic pain and vaginal discharge.   Musculoskeletal: Positive for arthralgias.   Skin: Positive for pallor. Negative for color change, rash and wound.   Neurological: Positive for weakness. Negative for seizures, syncope, facial asymmetry, speech difficulty, light-headedness and headaches.   Psychiatric/Behavioral: Positive for confusion and decreased concentration. Negative for agitation and behavioral problems.   All other systems reviewed and are negative.      Objective     Vital Signs  Temp:  [97.5 °F (36.4 °C)-97.8 °F (36.6 °C)] 97.8 °F (36.6 °C)  Heart Rate:  [83-99] 83  Resp:  [18] 18  BP: (148-165)/(77-97) 158/92    Physical Exam:  Physical Exam   Constitutional: She is oriented to person, place, and time. No distress.   Obese    HENT:   Head: Normocephalic and atraumatic.   Eyes: Pupils are equal, round, and reactive to light. EOM are normal. No scleral icterus.   Neck: Normal range of motion. Neck supple.   Cardiovascular: Normal rate and regular rhythm. Exam reveals no friction rub.   No murmur heard.  Pulmonary/Chest: Effort normal and breath sounds normal. No stridor. No  respiratory distress.   Abdominal: Soft. Bowel sounds are normal. She exhibits no distension and no mass. There is no tenderness. There is no rebound.   Musculoskeletal: Normal range of motion. She exhibits no edema or tenderness.   Neurological: She is alert and oriented to person, place, and time. She displays normal reflexes. No cranial nerve deficit. Coordination normal.   Skin: Skin is warm and dry. No rash noted. No erythema. There is pallor.   Psychiatric: She has a normal mood and affect. Her behavior is normal.   Vitals reviewed.      Results Review:   Lab Results (last 24 hours)     Procedure Component Value Units Date/Time    Urine Drug Screen - Urine, Clean Catch [[id number]]  (Abnormal) Collected:  02/10/20 1521    Specimen:  Urine, Clean Catch Updated:  02/10/20 1539     THC, Screen, Urine Positive     Phencyclidine (PCP), Urine Negative     Cocaine Screen, Urine Negative     Methamphetamine, Ur Negative     Opiate Screen Positive     Amphetamine Screen, Urine Negative     Benzodiazepine Screen, Urine Negative     Tricyclic Antidepressants Screen Negative     Methadone Screen, Urine Negative     Barbiturates Screen, Urine Negative     Oxycodone Screen, Urine Negative     Propoxyphene Screen Negative     Buprenorphine, Screen, Urine Negative    Narrative:       Cutoff For Drugs Screened:    Amphetamines               500 ng/ml  Barbiturates               200 ng/ml  Benzodiazepines            150 ng/ml  Cocaine                    150 ng/ml  Methadone                  200 ng/ml  Opiates                    100 ng/ml  Phencyclidine               25 ng/ml  THC                            50 ng/ml  Methamphetamine            500 ng/ml  Tricyclic Antidepressants  300 ng/ml  Oxycodone                  100 ng/ml  Propoxyphene               300 ng/ml  Buprenorphine               10 ng/ml    The normal value for all drugs tested is negative. This report includes unconfirmed screening results, with the cutoff values  listed, to be used for medical treatment purposes only.  Unconfirmed results must not be used for non-medical purposes such as employment or legal testing.  Clinical consideration should be applied to any drug of abuse test, particularly when unconfirmed results are used.      Urinalysis With Microscopic If Indicated (No Culture) - Urine, Clean Catch [[id number]]  (Abnormal) Collected:  02/10/20 1521    Specimen:  Urine, Clean Catch Updated:  02/10/20 1533     Color, UA Yellow     Appearance, UA Clear     pH, UA 6.0     Specific Gravity, UA 1.002     Comment: Result obtained by Refractometer        Glucose, UA Negative     Ketones, UA Negative     Bilirubin, UA Negative     Blood, UA Negative     Protein, UA Negative     Leuk Esterase, UA Negative     Nitrite, UA Negative     Urobilinogen, UA 0.2 E.U./dL    Narrative:       Urine microscopic not indicated.    Extra Tubes [[id number]] Collected:  02/10/20 1421    Specimen:  Blood Updated:  02/10/20 1531    Narrative:       The following orders were created for panel order Extra Tubes.  Procedure                               Abnormality         Status                     ---------                               -----------         ------                     Light Blue Top[[id number]]                                   Final result               Gold Top - SST[[id number]]                                   Final result                 Please view results for these tests on the individual orders.    Light Blue Top [[id number]] Collected:  02/10/20 1430    Specimen:  Blood Updated:  02/10/20 1531     Extra Tube hold for add-on     Comment: Auto resulted       Gold Top - SST [[id number]] Collected:  02/10/20 1421    Specimen:  Blood Updated:  02/10/20 1531     Extra Tube Hold for add-ons.     Comment: Auto resulted.       Comprehensive Metabolic Panel [[id number]]  (Abnormal) Collected:  02/10/20 1421    Specimen:  Blood Updated:  02/10/20 1526     Glucose 109 mg/dL      BUN 29  mg/dL      Creatinine 1.29 mg/dL      Sodium 137 mmol/L      Potassium 4.8 mmol/L      Chloride 103 mmol/L      CO2 23.0 mmol/L      Calcium 9.4 mg/dL      Total Protein 6.4 g/dL      Albumin 4.40 g/dL      ALT (SGPT) 20 U/L      AST (SGOT) 11 U/L      Alkaline Phosphatase 151 U/L      Total Bilirubin 0.2 mg/dL      eGFR Non African Amer 41 mL/min/1.73      Globulin 2.0 gm/dL      A/G Ratio 2.2 g/dL      BUN/Creatinine Ratio 22.5     Anion Gap 11.0 mmol/L     Narrative:       GFR Normal >60  Chronic Kidney Disease <60  Kidney Failure <15      TSH [[id number]]  (Normal) Collected:  02/10/20 1415    Specimen:  Blood Updated:  02/10/20 1502     TSH 0.470 uIU/mL     Acetaminophen Level [[id number]]  (Abnormal) Collected:  02/10/20 1415    Specimen:  Blood Updated:  02/10/20 1444     Acetaminophen <5.0 mcg/mL     Salicylate Level [[id number]]  (Normal) Collected:  02/10/20 1415    Specimen:  Blood Updated:  02/10/20 1444     Salicylate <0.3 mg/dL     Troponin [[id number]]  (Normal) Collected:  02/10/20 1415    Specimen:  Blood Updated:  02/10/20 1442     Troponin T <0.010 ng/mL     Narrative:       Troponin T Reference Range:  <= 0.03 ng/mL-   Negative for AMI  >0.03 ng/mL-     Abnormal for myocardial necrosis.  Clinicians would have to utilize clinical acumen, EKG, Troponin and serial changes to determine if it is an Acute Myocardial Infarction or myocardial injury due to an underlying chronic condition.       Results may be falsely decreased if patient taking Biotin.      Ethanol [[id number]] Collected:  02/10/20 1415    Specimen:  Blood Updated:  02/10/20 1439     Ethanol <10 mg/dL      Ethanol % <0.010 %     CBC & Differential [[id number]] Collected:  02/10/20 1415    Specimen:  Blood Updated:  02/10/20 1425    Narrative:       The following orders were created for panel order CBC & Differential.  Procedure                               Abnormality         Status                     ---------                                -----------         ------                     CBC Auto Differential[[id number]]        Abnormal            Final result                 Please view results for these tests on the individual orders.    CBC Auto Differential [[id number]]  (Abnormal) Collected:  02/10/20 1415    Specimen:  Blood Updated:  02/10/20 1425     WBC 8.97 10*3/mm3      RBC 4.14 10*6/mm3      Hemoglobin 10.9 g/dL      Hematocrit 33.7 %      MCV 81.4 fL      MCH 26.3 pg      MCHC 32.3 g/dL      RDW 13.4 %      RDW-SD 39.4 fl      MPV 9.2 fL      Platelets 303 10*3/mm3      Neutrophil % 79.3 %      Lymphocyte % 12.0 %      Monocyte % 5.6 %      Eosinophil % 2.1 %      Basophil % 0.6 %      Immature Grans % 0.4 %      Neutrophils, Absolute 7.11 10*3/mm3      Lymphocytes, Absolute 1.08 10*3/mm3      Monocytes, Absolute 0.50 10*3/mm3      Eosinophils, Absolute 0.19 10*3/mm3      Basophils, Absolute 0.05 10*3/mm3      Immature Grans, Absolute 0.04 10*3/mm3      nRBC 0.0 /100 WBC           Imaging Results (Last 24 Hours)     Procedure Component Value Units Date/Time    XR Chest PA & Lateral [[id number]] Collected:  02/10/20 1506     Updated:  02/10/20 1528    Narrative:       Chest x-ray PA and lateral         CLINICAL INDICATION: Alteration mental status.        COMPARISON: Chest July 6, 2018.    FINDINGS: Cardiac silhouette is normal in size. Pulmonary  vascularity is unremarkable.     No focal infiltrate or consolidation.  No pleural effusion.  No  pneumothorax. Right humeral head prosthesis.      Impression:       CONCLUSION: No evidence of active disease.    Electronically signed by:  Jcarlos Dodge MD  2/10/2020 3:27 PM CST  Workstation: [id number]    CT Head Without Contrast [[id number]] Collected:  02/10/20 1431     Updated:  02/10/20 1504    Narrative:       EXAM DESCRIPTION: CT HEAD WO CONTRAST    CLINICAL HISTORY:  altered mental status       COMPARISON: None     DOSE LENGTH PRODUCT: 1151    CONTRAST: None    TECHNIQUE:    Axial imaging is  performed from the skull base to the vertex  without utilization of intravenous contrast. Coronal and sagittal  reformatted images are provided.  This exam was performed according to our departmental dose  optimization program, which includes automated exposure control,  adjustment of the mA and/or KV according to patient size and/or  use of iterative reconstruction technique.    FINDINGS:  No Chiari malformation.  Extra-axial spaces: Unremarkable for age.    Intracranial hemorrhage: No CT evidence of intracranial  hemorrhage or suspicious extra-axial fluid collection.  Ventricular system: No hydrocephalus.   Basal cisterns: Unremarkable.   Cerebral parenchyma: No edema or mass effect identified.  Gray-white differentiation is maintained. Age-appropriate  parenchymal volume.    Midline shift: None.    Cerebellum: Unremarkable CT appearance.   Brainstem :Unremarkable CT appearance.   Calvarium: No acute or suspicious calvarial lesion identified.    Vascular system: Unremarkable noncontrast appearance.    Paranasal sinuses and mastoid air cells: Postsurgical changes  bilaterally. There is severe subtotal mucosal opacification of  both maxillary sinuses with mucoperiosteal thickening. There is  mild scattered mucosal thickening within the ethmoid chambers. No  air-fluid levels. The mastoids are clear.  Visualized orbits: No acute CT findings identified.    Visualized upper cervical spine: Limited evaluation,  unremarkable.   Sella: Unremarkable CT appearance.    Skull base: Unremarkable.     ADDITIONAL FINDINGS: None      Impression:       No CT evidence of intracranial hemorrhage, mass effect, or an  acute large vessel distribution infarct.    Postsurgical changes involving the bilateral paranasal sinuses.  There is severe subtotal opacification of both maxillary sinuses  and mucoperiosteal thickening. Scattered mild changes within the  ethmoid sinuses. No air-fluid levels.    Electronically signed by:  Willi Doll  MD  2/10/2020 3:02 PM  CST Workstation: [id number]          Assessment/Plan       Acute metabolic encephalopathy    Polypharmacy    GAMA (acute kidney injury) (CMS/[facility])    Continue with supportive care, hold some of her many medications, provide her with IV hydration, repeat BMP in AM.    Umberto Carvalho MD  02/10/20  7:58 PM

## 2020-02-10 NOTE — ED PROVIDER NOTES
Sung Kaye presents with her  to the emergency department with complaints of auditory and visual hallucinations, and not acting right.   said he woke up this morning at 7:00, Mikki was already awake and not talking right.  He says she was seeing things, hearing things, and talking of her dead father.  On arrival to the ED, he says that she is starting to improve.  He denied any drug use including marijuana, rare alcohol intake, and that she has not been overtaking her Hycet, which was given to her for her recent tympanoplasty and cartilage graft on February 7.  She does not have her hearing aids with her, can hear okay with loud voices.      History provided by:  Patient and spouse  History limited by:  Acuity of condition   used: No    Altered Mental Status   Presenting symptoms: behavior changes and disorientation    Presenting symptoms: no confusion    Presenting symptoms comment:  Visual and auditory hallucinations.  Severity:  Moderate  Most recent episode:  Today  Episode history:  Single  Duration: Since waking up at 7:00 this morning.  Timing:  Constant  Progression:  Improving  Chronicity:  New  Context: drug use and recent change in medication    Associated symptoms: hallucinations (visual and auditory)    Associated symptoms: no abdominal pain, no agitation, no headaches, no palpitations and no rash        Review of Systems   Constitutional: Negative for activity change and appetite change.   HENT: Negative for congestion and ear pain.    Eyes: Negative for pain and discharge.   Respiratory: Negative for chest tightness and shortness of breath.    Cardiovascular: Negative for chest pain and palpitations.   Gastrointestinal: Negative for abdominal distention and abdominal pain.   Endocrine: Negative for cold intolerance and heat intolerance.   Genitourinary: Negative for difficulty urinating and dysuria.   Musculoskeletal: Negative for arthralgias and back  pain.   Skin: Negative for color change and rash.   Allergic/Immunologic: Negative for environmental allergies and food allergies.   Neurological: Negative for dizziness and headaches.   Hematological: Negative for adenopathy. Does not bruise/bleed easily.   Psychiatric/Behavioral: Positive for hallucinations (visual and auditory). Negative for agitation and confusion. The patient is nervous/anxious.        Past Medical History:   Diagnosis Date   • Anxiety    • Chronic anemia    • Chronic pain    • CTS (carpal tunnel syndrome)    • Deaf    • Depression with anxiety    • Fracture of wrist    • Gastroesophageal reflux disease    • Hypertension    • Migraine    • Obesity    • Osteoarthritis    • Perforated tympanic membrane, bilateral        Allergies   Allergen Reactions   • Codeine Other (See Comments)     Increases heart rate   • Sulfa Antibiotics Hives       Past Surgical History:   Procedure Laterality Date   • COLONOSCOPY N/A 1/19/2018   • EAR TUBES     • ENDOSCOPIC FUNCTIONAL SINUS SURGERY (FESS) Bilateral 9/5/2017   • ENDOSCOPY N/A 1/19/2018   • ENDOSCOPY N/A 6/10/2019   • KNEE ARTHROPLASTY     • RHINOPLASTY     • SHOULDER ARTHROSCOPY     • TYMPANOPLASTY Left 6/5/2018   • UPPER GASTROINTESTINAL ENDOSCOPY         Family History   Problem Relation Age of Onset   • Hypertension Mother    • Alcohol abuse Father    • Stroke Maternal Grandmother    • Irritable bowel syndrome Sister        Social History     Socioeconomic History   • Marital status:      Spouse name: Not on file   • Number of children: Not on file   • Years of education: Not on file   • Highest education level: Not on file   Tobacco Use   • Smoking status: Never Smoker   • Smokeless tobacco: Never Used   Substance and Sexual Activity   • Alcohol use: Yes   • Drug use: No   • Sexual activity: Not Currently     Partners: Male     Birth control/protection: Surgical           Objective   Physical Exam   Constitutional: She is oriented to person,  place, and time. She appears well-developed and well-nourished.   HENT:   Head: Normocephalic and atraumatic.   Eyes: Pupils are equal, round, and reactive to light. EOM are normal.   Neck: No JVD present. No tracheal deviation present. No thyromegaly present.   Cardiovascular: Normal rate, S1 normal, S2 normal, normal heart sounds and intact distal pulses.   Pulses:       Radial pulses are 2+ on the right side, and 2+ on the left side.        Dorsalis pedis pulses are 2+ on the right side, and 2+ on the left side.   Pulmonary/Chest: Effort normal and breath sounds normal.   Abdominal: Bowel sounds are normal.   Musculoskeletal: Normal range of motion.   Neurological: She is alert and oriented to person, place, and time. She has normal strength. Coordination normal. GCS eye subscore is 4. GCS verbal subscore is 5. GCS motor subscore is 6.   Speaking to people who aren't in the room and about people and items not in room. This improved over time in the room. Does have some gross muscle jerking.    Skin: Skin is warm and dry. Capillary refill takes 2 to 3 seconds.   Psychiatric: She has a normal mood and affect. Her speech is normal and behavior is normal. Thought content normal.       Procedures           ED Course  ED Course as of Feb 10 1553   Mon Feb 10, 2020   1542 Labs, urinalysis, CT scan of the head all were unremarkable with the exception of positive TH C screen and a slightly elevated creatinine.  Patient's hallucinations have somewhat resolved, however she still is not acting right.  Patient to admit for observation and frequent neuro assessments to see if we can get her back to baseline.  Hospitalist contacted for admission.    [ADRIAN]      ED Course User Index  [ADRIAN] Kiki Garay MD                      [place] Coma Scale Score: 15                  Lab Results   Component Value Date    WBC 8.97 02/10/2020    HGB 10.9 (L) 02/10/2020    HCT 33.7 (L) 02/10/2020    MCV 81.4 02/10/2020      02/10/2020     Lab Results   Component Value Date    GLUCOSE 109 (H) 02/10/2020    BUN 29 (H) 02/10/2020    CREATININE 1.29 (H) 02/10/2020    EGFRIFNONA 41 (L) 02/10/2020    BCR 22.5 02/10/2020    K 4.8 02/10/2020    CO2 23.0 02/10/2020    CALCIUM 9.4 02/10/2020    ALBUMIN 4.40 02/10/2020    AST 11 02/10/2020    ALT 20 02/10/2020   Ct Head Without Contrast    Result Date: 2/10/2020  No CT evidence of intracranial hemorrhage, mass effect, or an acute large vessel distribution infarct. Postsurgical changes involving the bilateral paranasal sinuses. There is severe subtotal opacification of both maxillary sinuses and mucoperiosteal thickening. Scattered mild changes within the ethmoid sinuses. No air-fluid levels. Electronically signed by:  Willi Doll MD  2/10/2020 3:02 PM CST Workstation: [id number]    Xr Chest Pa & Lateral    Result Date: 2/10/2020  CONCLUSION: No evidence of active disease. Electronically signed by:  Jcarlos Dodge MD  2/10/2020 3:27 PM CST Workstation: [id number]          [facility]  Number of Diagnoses or Management Options  Acute metabolic encephalopathy: new and requires workup  Auditory hallucinations: new and requires workup  Elevated serum creatinine: new and requires workup  Hallucinations, visual: new and requires workup  Marijuana abuse: minor     Amount and/or Complexity of Data Reviewed  Clinical lab tests: ordered and reviewed  Tests in the radiology section of CPT®: ordered and reviewed  Review and summarize past medical records: yes    Risk of Complications, Morbidity, and/or Mortality  Presenting problems: low  Diagnostic procedures: low  Management options: minimal  General comments: CT of the head, labs, urinalysis, UDS all done.  UDS positive for marijuana, which she and her  deny using.  She is slowly improving with reduction of hallucinations, but  states she is still not acting right.  Her creatinine is slightly elevated, with 1 previous value for comparison.  Decision to  admit for observation.     Critical Care  Total time providing critical care: < 30 minutes    Patient Progress  Patient progress: stable      Final diagnoses:   Hallucinations, visual   Auditory hallucinations   Elevated serum creatinine   Acute metabolic encephalopathy   Marijuana abuse       Kiki Garay MD PGY-1      This document has been electronically signed by Kiki Garay MD on February 10, 2020 3:53 PM         Kiki Garay MD  Resident  02/10/20 1552

## 2020-02-11 VITALS
OXYGEN SATURATION: 94 % | TEMPERATURE: 98.2 F | RESPIRATION RATE: 18 BRPM | BODY MASS INDEX: 35.47 KG/M2 | HEART RATE: 82 BPM | DIASTOLIC BLOOD PRESSURE: 84 MMHG | HEIGHT: 67 IN | SYSTOLIC BLOOD PRESSURE: 160 MMHG | WEIGHT: 226 LBS

## 2020-02-11 LAB
ANION GAP SERPL CALCULATED.3IONS-SCNC: 8 MMOL/L (ref 5–15)
BUN BLD-MCNC: 24 MG/DL (ref 8–23)
BUN/CREAT SERPL: 23.3 (ref 7–25)
CALCIUM SPEC-SCNC: 9 MG/DL (ref 8.6–10.5)
CHLORIDE SERPL-SCNC: 109 MMOL/L (ref 98–107)
CO2 SERPL-SCNC: 24 MMOL/L (ref 22–29)
CREAT BLD-MCNC: 1.03 MG/DL (ref 0.57–1)
DEPRECATED RDW RBC AUTO: 39.8 FL (ref 37–54)
ERYTHROCYTE [DISTWIDTH] IN BLOOD BY AUTOMATED COUNT: 13.3 % (ref 12.3–15.4)
GFR SERPL CREATININE-BSD FRML MDRD: 54 ML/MIN/1.73
GLUCOSE BLD-MCNC: 103 MG/DL (ref 65–99)
HCT VFR BLD AUTO: 33.9 % (ref 34–46.6)
HGB BLD-MCNC: 11 G/DL (ref 12–15.9)
MCH RBC QN AUTO: 26.4 PG (ref 26.6–33)
MCHC RBC AUTO-ENTMCNC: 32.4 G/DL (ref 31.5–35.7)
MCV RBC AUTO: 81.5 FL (ref 79–97)
PLATELET # BLD AUTO: 317 10*3/MM3 (ref 140–450)
PMV BLD AUTO: 9.4 FL (ref 6–12)
POTASSIUM BLD-SCNC: 4.9 MMOL/L (ref 3.5–5.2)
RBC # BLD AUTO: 4.16 10*6/MM3 (ref 3.77–5.28)
SODIUM BLD-SCNC: 141 MMOL/L (ref 136–145)
WBC NRBC COR # BLD: 8.23 10*3/MM3 (ref 3.4–10.8)

## 2020-02-11 PROCEDURE — G0378 HOSPITAL OBSERVATION PER HR: HCPCS

## 2020-02-11 PROCEDURE — 80048 BASIC METABOLIC PNL TOTAL CA: CPT | Performed by: INTERNAL MEDICINE

## 2020-02-11 PROCEDURE — 96361 HYDRATE IV INFUSION ADD-ON: CPT

## 2020-02-11 PROCEDURE — 85027 COMPLETE CBC AUTOMATED: CPT | Performed by: INTERNAL MEDICINE

## 2020-02-11 RX ORDER — HYDROXYZINE PAMOATE 50 MG/1
50 CAPSULE ORAL 3 TIMES DAILY PRN
Qty: 30 CAPSULE | Refills: 1 | Status: SHIPPED | OUTPATIENT
Start: 2020-02-11 | End: 2020-09-23

## 2020-02-11 RX ORDER — METOPROLOL TARTRATE 100 MG/1
50 TABLET ORAL NIGHTLY
Start: 2020-02-11 | End: 2021-02-25

## 2020-02-11 RX ORDER — HYDROCODONE BITARTRATE AND ACETAMINOPHEN 5; 325 MG/1; MG/1
1 TABLET ORAL EVERY 6 HOURS PRN
Qty: 15 TABLET | Refills: 0 | Status: SHIPPED | OUTPATIENT
Start: 2020-02-11 | End: 2020-04-22

## 2020-02-11 RX ADMIN — PANTOPRAZOLE SODIUM 40 MG: 40 TABLET, DELAYED RELEASE ORAL at 08:34

## 2020-02-11 RX ADMIN — FLUTICASONE PROPIONATE 1 SPRAY: 50 SPRAY, METERED NASAL at 08:35

## 2020-02-11 RX ADMIN — BUSPIRONE HYDROCHLORIDE 30 MG: 15 TABLET ORAL at 08:34

## 2020-02-11 NOTE — PLAN OF CARE
vss, denies pain, confusion is improving throughout the night, patient has slept less than one hour all night despite taking ambien and vistiril, states that this is normal for her and that she hasn't slept for more than an hour at a time in months

## 2020-02-11 NOTE — NURSING NOTE
Pt refusing to wear yellow non-skid socks when in bed.  Educated patient on the importance of wearing non-skid socks when out of bed to prevent risk of falling.  Pt is agreeable to wearing when up out of bed.  Socks tied to end of pts bed when in bed.

## 2020-02-11 NOTE — DISCHARGE SUMMARY
Discharge Summary  Umberto Carvalho MD  Hospitalist     Date of Discharge:  2/11/2020    Discharge Diagnosis:      Acute metabolic encephalopathy    Polypharmacy    GAMA (acute kidney injury) (CMS/[facility])      Presenting Problem/History of Present Illness  Confusion / hallucinations    Hospital Course  Patient is a 65 y.o. female admitted for confusion / hallucinations that resolved within hours once some of her medications have been changed and the dosages reduced. Her creatinine / BUN values have improved as well. She feels and acts normal, her vital signs are within normal limits and she will be discharged home. Her UDS was positive for opioids (prescribed) and THC (which she uses occasionally).    Consults:   Consults     No orders found for last 30 day(s).        Condition on Discharge:  stable    Vital Signs  Temp:  [97.7 °F (36.5 °C)-98.2 °F (36.8 °C)] 98.2 °F (36.8 °C)  Heart Rate:  [82-92] 82  Resp:  [18] 18  BP: (151-161)/(84-97) 160/84    Physical Exam:  Physical Exam   Constitutional: She is oriented to person, place, and time.   Obese    HENT:   Head: Normocephalic and atraumatic.   Eyes: Pupils are equal, round, and reactive to light. EOM are normal. No scleral icterus.   Neck: Normal range of motion. Neck supple.   Cardiovascular: Normal rate and regular rhythm.   Pulmonary/Chest: Effort normal and breath sounds normal. No stridor. No respiratory distress.   Abdominal: Soft. Bowel sounds are normal. She exhibits no distension. There is no tenderness. There is no guarding.   Musculoskeletal: Normal range of motion. She exhibits no edema or tenderness.   Neurological: She is alert and oriented to person, place, and time. She displays normal reflexes. No cranial nerve deficit. Coordination normal.   Skin: Skin is warm and dry. No rash noted. No erythema. There is pallor.   Psychiatric: She has a normal mood and affect. Her behavior is normal.   Vitals reviewed.      Discharge Disposition  Home or Self  Care    Discharge Medications     Discharge Medications      New Medications      Instructions Start Date   HYDROcodone-acetaminophen 5-325 MG per tablet  Commonly known as:  NORCO  Replaces:  HYDROcodone-acetaminophen 7.5-325 MG/15ML solution   1 tablet, Oral, Every 6 Hours PRN         Changes to Medications      Instructions Start Date   metoprolol tartrate 100 MG tablet  Commonly known as:  LOPRESSOR  What changed:  how much to take   50 mg, Oral, Nightly      rOPINIRole 4 MG tablet  Commonly known as:  REQUIP  What changed:  Another medication with the same name was removed. Continue taking this medication, and follow the directions you see here.   4 mg, Oral, Daily         Continue These Medications      Instructions Start Date   albuterol sulfate  (90 Base) MCG/ACT inhaler  Commonly known as:  PROVENTIL HFA;VENTOLIN HFA;PROAIR HFA   INHALE 2 PUFFS BY MOUTH EVERY 4 HOURS AS NEEDED FOR WHEEZING      benzonatate 100 MG capsule  Commonly known as:  TESSALON   100 mg, Oral, 3 Times Daily PRN      busPIRone 30 MG tablet  Commonly known as:  BUSPAR   30 mg, Oral, 2 Times Daily      cetirizine 10 MG tablet  Commonly known as:  ZYRTEC ALLERGY   10 mg, Oral, Daily      dexlansoprazole 60 MG capsule  Commonly known as:  DEXILANT   60 mg, Oral, Daily      eszopiclone 2 MG tablet  Commonly known as:  LUNESTA   2 mg, Oral, Nightly, Take immediately before bedtime      Fluticasone Furoate-Vilanterol 100-25 MCG/INH inhaler  Commonly known as:  BREO ELLIPTA   1 puff bid and rinse out mouth      hydrOXYzine pamoate 50 MG capsule  Commonly known as:  VISTARIL   50 mg, Oral, 3 Times Daily PRN      ipratropium 0.06 % nasal spray  Commonly known as:  ATROVENT   USE 2 SPRAYS INTO EACH NOSTRIL THREE TIMES DAILY AS DIRECTED      mometasone 50 MCG/ACT nasal spray  Commonly known as:  NASONEX   USE 2 SPRAYS IN EACH NOSTRIL AS DIRECTED BY PROVIDER TWICE DAILY      olopatadine 0.6 % solution nasal solution  Commonly known as:   PATANASE   2 sprays, Each Nare, 2 Times Daily      ondansetron 8 MG tablet  Commonly known as:  ZOFRAN   TAKE 1 TABLET BY MOUTH EVERY 8 HOURS AS NEEDED FOR NAUSEA OR VOMITING      sucralfate 1 g tablet  Commonly known as:  CARAFATE   1 g, Oral, 4 Times Daily      TRINTELLIX 10 MG tablet  Generic drug:  Vortioxetine HBr   10 mg, Oral, Daily         Stop These Medications    HYDROcodone-acetaminophen 7.5-325 MG/15ML solution  Commonly known as:  HYCET  Replaced by:  HYDROcodone-acetaminophen 5-325 MG per tablet     prazosin 5 MG capsule  Commonly known as:  MINIPRESS     WAL-PHED 30 MG tablet  Generic drug:  pseudoephedrine            Discharge Diet:   Diet Instructions     Diet: Regular      Discharge Diet:  Regular          Activity at Discharge:   Activity Instructions     Activity as Tolerated            Follow-up Appointments  Future Appointments   Date Time Provider Department Center   2/14/2020 11:00 AM Ramirez Bai MD [facility] None   2/19/2020  1:00 PM Ronald Bowser MD [facility] None   2/24/2020  2:00 PM Vickie Nunes AUD [facility] None   2/24/2020  2:45 PM Ramy Gaston MD [facility] None   3/6/2020  1:30 PM Jimmy Kelly II, MD [facility] None   4/22/2020  1:15 PM Ronald Bowser MD [facility] None   4/22/2020  1:45 PM Zelda Rich APRN [facility] None   7/13/2020  2:15 PM Ramy Gaston MD [facility] None     Additional Instructions for the Follow-ups that You Need to Schedule     Discharge Follow-up with PCP   As directed       Currently Documented PCP:    Ronald Bowser MD    PCP Phone Number:    [phone]     Follow Up Details:  in 1 week                Umberto Carvalho MD  02/11/20  3:10 PM

## 2020-02-14 ENCOUNTER — OFFICE VISIT (OUTPATIENT)
Dept: SLEEP MEDICINE | Facility: HOSPITAL | Age: 65
End: 2020-02-14

## 2020-02-14 VITALS
WEIGHT: 220.1 LBS | DIASTOLIC BLOOD PRESSURE: 82 MMHG | OXYGEN SATURATION: 97 % | SYSTOLIC BLOOD PRESSURE: 126 MMHG | HEART RATE: 115 BPM | BODY MASS INDEX: 34.55 KG/M2 | HEIGHT: 67 IN

## 2020-02-14 DIAGNOSIS — G47.33 OBSTRUCTIVE SLEEP APNEA, ADULT: Primary | ICD-10-CM

## 2020-02-14 DIAGNOSIS — G47.61 PERIODIC LIMB MOVEMENT DISORDER: ICD-10-CM

## 2020-02-14 DIAGNOSIS — G25.81 RESTLESS LEGS SYNDROME (RLS): ICD-10-CM

## 2020-02-14 DIAGNOSIS — F51.04 PSYCHOPHYSIOLOGICAL INSOMNIA: ICD-10-CM

## 2020-02-14 PROCEDURE — 99214 OFFICE O/P EST MOD 30 MIN: CPT | Performed by: INTERNAL MEDICINE

## 2020-02-14 RX ORDER — FERROUS SULFATE 325(65) MG
325 TABLET ORAL
Qty: 30 TABLET | Refills: 6 | Status: SHIPPED | OUTPATIENT
Start: 2020-02-14 | End: 2020-09-01 | Stop reason: SDUPTHER

## 2020-02-14 RX ORDER — ESZOPICLONE 3 MG/1
3 TABLET, FILM COATED ORAL NIGHTLY
Qty: 30 TABLET | Refills: 0 | Status: SHIPPED | OUTPATIENT
Start: 2020-02-14 | End: 2020-03-16

## 2020-02-14 NOTE — PROGRESS NOTES
CHIEF COMPLAINT: follow up sleep testing    LAST OV: 01/07/2020    HPI:    The patient is a 65 y.o. female.  She returns to sleep clinic to discuss the results of the recent diagnostic polysomnography performed on 01/27/2020.  The AHI/AMANDA was 9.3, REM AHI 2.1. The patient had a periodic limb movement index of 33.2.  The patient did not have any significant cardiac arrhythmias.    INTERVAL MEDICAL HISTORY: Had ear surgery and was hospitalized 4 days ago.      MEDICATIONS:   Current Outpatient Medications:   •  ALBUTEROL SULFATE  (90 Base) MCG/ACT inhaler, INHALE 2 PUFFS BY MOUTH EVERY 4 HOURS AS NEEDED FOR WHEEZING, Disp: 8.5 g, Rfl: 0  •  benzonatate (TESSALON) 100 MG capsule, Take 1 capsule by mouth 3 (Three) Times a Day As Needed for Cough., Disp: 30 capsule, Rfl: 0  •  busPIRone (BUSPAR) 30 MG tablet, Take 30 mg by mouth 2 (Two) Times a Day., Disp: , Rfl:   •  cetirizine (ZYRTEC ALLERGY) 10 MG tablet, Take 1 tablet by mouth Daily., Disp: 30 tablet, Rfl: 11  •  dexlansoprazole (DEXILANT) 60 MG capsule, Take 1 capsule by mouth Daily., Disp: 90 capsule, Rfl: 1  •  eszopiclone (LUNESTA) 2 MG tablet, Take 1 tablet by mouth Every Night. Take immediately before bedtime, Disp: 30 tablet, Rfl: 0  •  Fluticasone Furoate-Vilanterol (BREO ELLIPTA) 100-25 MCG/INH inhaler, 1 puff bid and rinse out mouth, Disp: 90 each, Rfl: 3  •  HYDROcodone-acetaminophen (NORCO) 5-325 MG per tablet, Take 1 tablet by mouth Every 6 (Six) Hours As Needed for Moderate or Severe Pain., Disp: 15 tablet, Rfl: 0  •  hydrOXYzine pamoate (VISTARIL) 50 MG capsule, Take 1 capsule by mouth 3 (Three) Times a Day As Needed for Anxiety., Disp: 30 capsule, Rfl: 1  •  ipratropium (ATROVENT) 0.06 % nasal spray, USE 2 SPRAYS INTO EACH NOSTRIL THREE TIMES DAILY AS DIRECTED, Disp: 15 mL, Rfl: 0  •  metoprolol tartrate (LOPRESSOR) 100 MG tablet, Take 0.5 tablets by mouth Every Night., Disp: , Rfl:   •  mometasone (NASONEX) 50 MCG/ACT nasal spray, USE 2  SPRAYS IN EACH NOSTRIL AS DIRECTED BY PROVIDER TWICE DAILY, Disp: 17 g, Rfl: 5  •  olopatadine (PATANASE) 0.6 % solution nasal solution, 2 sprays by Each Nare route 2 (Two) Times a Day., Disp: 30 g, Rfl: 11  •  ondansetron (ZOFRAN) 8 MG tablet, TAKE 1 TABLET BY MOUTH EVERY 8 HOURS AS NEEDED FOR NAUSEA OR VOMITING, Disp: 30 tablet, Rfl: 0  •  rOPINIRole (REQUIP) 4 MG tablet, TAKE 1 TABLET BY MOUTH DAILY, Disp: 30 tablet, Rfl: 3  •  sucralfate (CARAFATE) 1 g tablet, Take 1 tablet by mouth 4 (Four) Times a Day., Disp: 120 tablet, Rfl: 2  •  Vortioxetine HBr (TRINTELLIX) 10 MG tablet, Take 10 mg by mouth Daily., Disp: , Rfl:     PHYSICAL EXAM  Vital Signs (last 24 hours)       02/13 0700  -  02/14 0659 02/14 0700  -  02/14 1104   Most Recent    Heart Rate     115     115    BP     126/82     126/82    SpO2 (%)     97     97            02/14/20  1053   Weight: 99.8 kg (220 lb 1.6 oz)     Gen:  No acute distress, alert, oriented  Lungs:  CTA with normal effort   CV:  RRR, no M/R/G  GI:  soft, non-tender  Ext:  no c/c/e    Assessment and Plan:    1. Obstructive sleep apnea - stable chronic illness and New (to me), additional work-up planned (4)   1. Script for autocpap 5-20 cm H2O   2. RTC in 31-90 days with compliance check  2. Restless leg syndrome and PLMD - stable chronic illness and Established, stable (1)   1. On Requip 3 mg at night   2. Ferritin was 44 in 07/2019 - will resume iron with vitamin C  3. Continues on antipsychotics for depression and anxiety and PTSD  3. Insomnia - stable, had trouble at sleep study  1. Was doing counseling, cognitive behavioral therapy eva, and on Lunesta which was increased from 2 to 3 mg per night  2. If she has trouble with claustrophobia, can change Lunesta to Xanax for 1 month and then change back.  4. Depression with anxiety and PTSD - stable  1. patient referred to follow-up with my partner Dr. Kelly  5. 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  The risks of untreated sleep apnea were reviewed.  Treatment options for sleep apnea were discussed. I counseled patient on sleep hygiene, including regular sleep wake schedule and stimulus control therapy, the importance of weight reduction, and abstaining from smoking and alcohol consumption.    All of the patient's questions were answered. She states understanding and agreement with my assessment and plan as above.  Total visit time 30 min, with total of 20 minutes spent face-to-face counseling patient extensively regarding: PAP therapy and Medication changes       Patient to follow up in 31-90 days with compliance report   She agrees to return sooner on a prn basis if sx change.           This document has been electronically signed by Ramirez Bai MD on February 14, 2020           CC: Ronald Bowser MD          No ref. provider found

## 2020-02-19 ENCOUNTER — OFFICE VISIT (OUTPATIENT)
Dept: FAMILY MEDICINE CLINIC | Facility: CLINIC | Age: 65
End: 2020-02-19

## 2020-02-19 VITALS
WEIGHT: 224.3 LBS | BODY MASS INDEX: 35.2 KG/M2 | HEIGHT: 67 IN | SYSTOLIC BLOOD PRESSURE: 124 MMHG | DIASTOLIC BLOOD PRESSURE: 80 MMHG

## 2020-02-19 DIAGNOSIS — E66.01 MORBIDLY OBESE (HCC): ICD-10-CM

## 2020-02-19 DIAGNOSIS — J34.3 NASAL TURBINATE HYPERTROPHY: Chronic | ICD-10-CM

## 2020-02-19 DIAGNOSIS — R41.0 ACUTE CONFUSION: Primary | ICD-10-CM

## 2020-02-19 DIAGNOSIS — Z71.89 ADVANCE CARE PLANNING: ICD-10-CM

## 2020-02-19 PROCEDURE — 99213 OFFICE O/P EST LOW 20 MIN: CPT | Performed by: FAMILY MEDICINE

## 2020-02-19 NOTE — PROGRESS NOTES
Subjective   Mikki Joel is a 65 y.o. female.     History of Present Illness   follow-up hospitalization for surgery as well as acute confusional state.  Patient rehospitalized after surgery for acute confusional state felt to be idiosyncratic related to medication.  Cleared upon getting to the floor the night of admission.  States is never had this problem before and all medicines have remained the same.  Counseled be deferring to ENT for postop care.  Other medicines have remained the same.  Did spend some time today discussing advanced directives.  HPI    The following portions of the patient's history were reviewed and updated as appropriate: allergies, current medications, past family history, past medical history, past social history, past surgical history and problem list.    Review of Systems  Review of Systems   Constitutional: Negative for activity change, appetite change, fatigue and unexpected weight change.   HENT: Positive for hearing loss (Per ENT). Negative for trouble swallowing and voice change.    Eyes: Negative for redness and visual disturbance.   Respiratory: Negative for cough and wheezing.    Cardiovascular: Negative for chest pain and palpitations.   Gastrointestinal: Negative for abdominal pain, constipation, diarrhea, nausea and vomiting.   Genitourinary: Negative for urgency.   Musculoskeletal: Negative for joint swelling.   Neurological: Negative for syncope and headaches.   Hematological: Negative for adenopathy.   Psychiatric/Behavioral: Positive for confusion (Resolved). Negative for sleep disturbance.       Objective   Physical Exam  Physical Exam   Constitutional: She is oriented to person, place, and time. She appears well-developed.   HENT:   Head: Normocephalic.   Eyes: Pupils are equal, round, and reactive to light.   Neck: Normal range of motion. No thyromegaly present.   Cardiovascular: Normal rate, regular rhythm and intact distal pulses. Exam reveals no gallop and  "no friction rub.   No murmur heard.  Pulmonary/Chest: Effort normal.   Abdominal: Soft. She exhibits no distension and no mass. There is no tenderness.   Musculoskeletal: Normal range of motion.   Neurological: She is alert and oriented to person, place, and time. She has normal reflexes.   Skin: Skin is warm and dry.   Psychiatric: She has a normal mood and affect. Her behavior is normal. Judgment and thought content normal.         Visit Vitals  /80   Ht 170.2 cm (67\")   Wt 102 kg (224 lb 4.8 oz)   LMP  (LMP Unknown)   BMI 35.13 kg/m²     Body mass index is 35.13 kg/m².      Assessment/Plan   Mikki was seen today for follow-up.    Diagnoses and all orders for this visit:    Acute confusion    Nasal turbinate hypertrophy    Morbidly obese (CMS/[facility])    Advance care planning    Mainly on lifestyle measures deferring to ENT keep regular appointment  Advance Care Planning   ACP discussion was held with the patient during this visit. Patient does not have an advance directive, information provided.  Current outpatient and discharge medications have been reconciled for the patient.  Reviewed by: Ronald Bowser MD    "

## 2020-02-24 ENCOUNTER — OFFICE VISIT (OUTPATIENT)
Dept: OTOLARYNGOLOGY | Facility: CLINIC | Age: 65
End: 2020-02-24

## 2020-02-24 VITALS — HEIGHT: 67 IN | BODY MASS INDEX: 35.41 KG/M2 | TEMPERATURE: 97.7 F | WEIGHT: 225.6 LBS

## 2020-02-24 DIAGNOSIS — Z09 POSTOP CHECK: Primary | ICD-10-CM

## 2020-02-24 PROCEDURE — 99024 POSTOP FOLLOW-UP VISIT: CPT | Performed by: OTOLARYNGOLOGY

## 2020-02-24 NOTE — PROGRESS NOTES
The patient is doing well after right ear surgery is not unusual pain or discomfort is minimal drainage is able to hear and is able to use her hearing aid exam reveals minimal ointment the canal somewhat suction but the graft itself looks fine the  Donor sites look good too.  She is to call for questions we will recheck in 6 weeks and then will consider checking hearing the visit after that she is to call if any questions avoid blowing her nose and keep her ear dry until follow-up  She does not lateralize and air can is louder than bone on the right

## 2020-02-24 NOTE — PATIENT INSTRUCTIONS

## 2020-02-25 RX ORDER — IPRATROPIUM BROMIDE 42 UG/1
SPRAY, METERED NASAL
Qty: 15 ML | Refills: 0 | Status: SHIPPED | OUTPATIENT
Start: 2020-02-25 | End: 2020-03-18

## 2020-03-05 RX ORDER — ONDANSETRON HYDROCHLORIDE 8 MG/1
TABLET, FILM COATED ORAL
Qty: 30 TABLET | Refills: 0 | Status: SHIPPED | OUTPATIENT
Start: 2020-03-05 | End: 2020-04-14

## 2020-03-06 ENCOUNTER — OFFICE VISIT (OUTPATIENT)
Dept: SLEEP MEDICINE | Facility: HOSPITAL | Age: 65
End: 2020-03-06

## 2020-03-06 VITALS
HEIGHT: 67 IN | WEIGHT: 227 LBS | SYSTOLIC BLOOD PRESSURE: 171 MMHG | OXYGEN SATURATION: 98 % | DIASTOLIC BLOOD PRESSURE: 97 MMHG | BODY MASS INDEX: 35.63 KG/M2 | HEART RATE: 93 BPM

## 2020-03-06 DIAGNOSIS — F51.04 PSYCHOPHYSIOLOGICAL INSOMNIA: Primary | ICD-10-CM

## 2020-03-06 DIAGNOSIS — F41.1 GAD (GENERALIZED ANXIETY DISORDER): ICD-10-CM

## 2020-03-06 DIAGNOSIS — G25.81 RESTLESS LEGS SYNDROME (RLS): ICD-10-CM

## 2020-03-06 DIAGNOSIS — G47.33 OSA (OBSTRUCTIVE SLEEP APNEA): ICD-10-CM

## 2020-03-06 DIAGNOSIS — G47.61 PLMD (PERIODIC LIMB MOVEMENT DISORDER): ICD-10-CM

## 2020-03-06 DIAGNOSIS — F33.0 MDD (MAJOR DEPRESSIVE DISORDER), RECURRENT EPISODE, MILD (HCC): ICD-10-CM

## 2020-03-06 DIAGNOSIS — F51.01 PRIMARY INSOMNIA: ICD-10-CM

## 2020-03-06 DIAGNOSIS — G47.21 CIRCADIAN RHYTHM SLEEP DISORDER, DELAYED SLEEP PHASE TYPE: ICD-10-CM

## 2020-03-06 PROCEDURE — 99214 OFFICE O/P EST MOD 30 MIN: CPT | Performed by: PSYCHIATRY & NEUROLOGY

## 2020-03-06 PROCEDURE — 90833 PSYTX W PT W E/M 30 MIN: CPT | Performed by: PSYCHIATRY & NEUROLOGY

## 2020-03-06 NOTE — PROGRESS NOTES
Sleep Medicine Follow-Up Note    CC & ID:  Ms. Mikki Joel is a 65 y.o. female seen for follow-up regarding RLS, insomnia, presumed PLMD, Delayed sleep phase d/o, Poor sleep hygiene, all in the context of depression.      Treatment Summary:   --Initial Nov 17: RLS & insomnia; Ambien trial started  --F/U Feb 18: RLS controlled on Requip 1mg; Ambien not helpful; Lunesta started  --F/U Aug 18: RLS worse, Requip to 1.5mg qHS; Lunesta some help, continued.  Delayed sleep phase concern w/ sleep of 3.30A to 9.30 A.  --F/U Mar 19: RLS stable; changed to Restoril but with gait instability; Lunesta retrialed; ongoing suboptimal sleep hygiene noted.   --F/U Sep 19: increased social stressors impacting insomnia; RLS stable on 3mg qHS  --F/U Jan 20: RLS stable on 3mg qHS Requip, concern for PLMD w/ worsening from motoricaly activated medications; insomnia stable on Lunesta 1mg qHS, though sub optimal sleep hygiene; worsening depression, anxiety, PTSD.  Delayed sleep phase, stable.  Plan for f/u with me given psychiatric overlay.  --Split Study, Jan 20: AHI 9, PLMI 33 / hr.   SpO2 jovanna 89%.    --F/u Feb 20: APAP 5 - 20 cm started.  Requip 3mg continued.  Lunesta to 3mg qHS.  Delayed sleep phase stable.          HPI:   Today, patient presents unaccompanied.    Just had a right ear surgery to close hole in ear.  Had rxn to anesthetic and ended up in hospital.      Now feeling better.     Now feeling better w/ sleep.  Insomnia, down to three nights a week, with difficulties sleeping.  Some conditioned arousal continued.  Difficulties with wake up time.  She completed a six week sleep diary.      Re: sleep diary:   --Bedtimes from 11 P - 4 A; most from midnight to 2 A  --Wake times from 6A to noon; most in the 9 - 11A range  -- some nights with early nap in evening and sleep later in night.    --two nights in six weeks w/out sleep  --Technical concern: line to show when getting into bed was not routinely used    Planning on  working on wt loss this spring.      RLS - most every night.  Stable since last visit.  No addictive or complusive behaviors on requip.      Circadian Rhythm, night owl from a young age.  Stable.  Trying to get to bed earlier to get up earlier, but some difficulties.  Wake time can be as late at 3 P some days, per pt.     Depression improving.  Anxiety intermittent but improving.  Denies SI/HI/AVH.  Not interested in therapy currently due to being busy with other activities.        Treatment: APAP  -Pressure: 5 - 20 cm   -Pressure intolerance: no   -Aerophagia: no   -Air Hunger: no  -Patient's perceived outcome: benefits    Compliance Card Data: not available today    Respiratory:  -Ongoing Snoring: none with CPAP  -Mouth Breathing: no  -Dry Mouth: can  -Nocturnal Gasping: no    ROS:   -ENT: Nasal Obstruction: yes  -CONSTITUTIONAL: Weight: stable  Wt Readings from Last 5 Encounters:   03/06/20 103 kg (227 lb)   02/24/20 102 kg (225 lb 9.6 oz)   02/19/20 102 kg (224 lb 4.8 oz)   02/14/20 99.8 kg (220 lb 1.6 oz)   02/10/20 103 kg (226 lb)       Sleep Pattern:  -Bedtime: 11 P - 12 A  -Lights Out: TV x 1 hour  -Environment: Lights/TV off after an hour  -Latency: 60 - 180 min after TV off  -Awakenings: rare, to void; not nightly  -Wake Time: 9-11 A; no alarm  -Rise Time: same  -Patient's estimate of Sleep Time: 4-5 h   -Total Clock Time Spent in Bed: 9-12h    Daytime Symptoms:  -Daytime fatigue/sleepiness: fatigue  -Naps: rare  -Involuntary Dozing: no  -Driving: Difficulty with sleepiness and driving:  no              -- Close calls related to sleepiness: no              -- Accidents related to sleepiness: denies     Social History:  -Employment: retired,  assist  -Nicotine: no  -Caffeine: no  -Alcohol: rare, < 1 wk  -THC: no  -OTC/Supplements/herbals: no  -Illicits:  denies      Patient Active Problem List   Diagnosis   • Asthma   • Arthritis   • Nasal turbinate hypertrophy   • Gastroesophageal reflux  disease   • Depression with anxiety   • Insomnia   • Other chronic pain   • PTSD (post-traumatic stress disorder)   • Restless leg   • BMI 35.0-35.9,adult   • Mixed hearing loss of left ear   • Right knee pain   • Presence of right artificial knee joint   • Essential hypertension, benign   • Nausea   • Presbyesophagus   • Chronic vasomotor rhinitis   • Perforated tympanic membrane, bilateral   • Mixed conductive and sensorineural hearing loss of right ear with restricted hearing of left ear   • Morbidly obese (CMS/[facility])   • Lethargy   • Acute metabolic encephalopathy   • GAMA (acute kidney injury) (CMS/[facility])   • Polypharmacy       CMHx:  --> Other than above, denies any significant medical changes since last visit.   --> Supplemental Oxygen Use: denies      Current Outpatient Medications   Medication Sig Dispense Refill   • ALBUTEROL SULFATE  (90 Base) MCG/ACT inhaler INHALE 2 PUFFS BY MOUTH EVERY 4 HOURS AS NEEDED FOR WHEEZING 8.5 g 0   • benzonatate (TESSALON) 100 MG capsule Take 1 capsule by mouth 3 (Three) Times a Day As Needed for Cough. 30 capsule 0   • busPIRone (BUSPAR) 30 MG tablet Take 30 mg by mouth 2 (Two) Times a Day.     • cetirizine (ZYRTEC ALLERGY) 10 MG tablet Take 1 tablet by mouth Daily. 30 tablet 11   • dexlansoprazole (DEXILANT) 60 MG capsule Take 1 capsule by mouth Daily. 90 capsule 1   • eszopiclone (LUNESTA) 3 MG tablet Take 1 tablet by mouth Every Night for 30 days. Take immediately before bedtime 30 tablet 0   • ferrous sulfate 325 (65 FE) MG tablet Take 1 tablet by mouth Daily With Breakfast. Take with vitamin C to help with absorption 30 tablet 6   • Fluticasone Furoate-Vilanterol (BREO ELLIPTA) 100-25 MCG/INH inhaler 1 puff bid and rinse out mouth 90 each 3   • HYDROcodone-acetaminophen (NORCO) 5-325 MG per tablet Take 1 tablet by mouth Every 6 (Six) Hours As Needed for Moderate or Severe Pain. 15 tablet 0   • hydrOXYzine pamoate (VISTARIL) 50 MG capsule Take 1 capsule by mouth 3  "(Three) Times a Day As Needed for Anxiety. 30 capsule 1   • ipratropium (ATROVENT) 0.06 % nasal spray USE 2 SPRAYS IN EACH NOSTRIL THREE TIMES DAILY 15 mL 0   • metoprolol tartrate (LOPRESSOR) 100 MG tablet Take 0.5 tablets by mouth Every Night.     • mometasone (NASONEX) 50 MCG/ACT nasal spray USE 2 SPRAYS IN EACH NOSTRIL AS DIRECTED BY PROVIDER TWICE DAILY 17 g 5   • olopatadine (PATANASE) 0.6 % solution nasal solution 2 sprays by Each Nare route 2 (Two) Times a Day. 30 g 11   • ondansetron (ZOFRAN) 8 MG tablet TAKE 1 TABLET BY MOUTH EVERY 8 HOURS AS NEEDED FOR NAUSEA AND VOMITING 30 tablet 0   • rOPINIRole (REQUIP) 4 MG tablet TAKE 1 TABLET BY MOUTH DAILY 30 tablet 3   • sucralfate (CARAFATE) 1 g tablet Take 1 tablet by mouth 4 (Four) Times a Day. 120 tablet 2   • Vortioxetine HBr (TRINTELLIX) 10 MG tablet Take 10 mg by mouth Daily.       No current facility-administered medications for this visit.      -Notable Current Medications:  --> Buspar 10mg BID, in morning and at QHS - not typically sedating  --> Zyrtec daily at night - not typically sedating  --> Lunesta at QHS nightly  --> Vistaril TID, morning, mid afternoon, night - not typically sedating  --> Metoprolol in AM - not typically sedating/fatigue  --> Minipres - QHS  --> Requip at QHS  --> Trintellix in the morning - not typically sedating       PE:  Body mass index is 35.54 kg/m².  Vitals:    03/06/20 1337   BP: 171/97   Pulse: 93   SpO2: 98%   Weight: 103 kg (227 lb)   Height: 170.2 cm (67.01\")     Wt Readings from Last 5 Encounters:   03/06/20 103 kg (227 lb)   02/24/20 102 kg (225 lb 9.6 oz)   02/19/20 102 kg (224 lb 4.8 oz)   02/14/20 99.8 kg (220 lb 1.6 oz)   02/10/20 103 kg (226 lb)       General:  In NAD.  Head: Atraumatic  Eyes: EOMI.  CV: No Clubbing.   Pul: Respirations: unlaboured.    MS: No atrophy.  Neuro: No resting tremor.  Gait normal turning & station; unremarkable overall.  Psych: Socially appropriate.  Pleasant.  No overt dysphoria.  "        Assessment:  Ms. Mikki Joel is a 65 y.o. female who is seen for follow-up of:     --Obstructive Sleep Apnea:  Mild per PSG, on APAP 5 - 20 cm, just initiated tx two days prior, benefiting.  Has f/u w/ Dr. Bai in May for compliance check.  Encouraged pt to keep this appt.      ---Insomnia, chronic, primary: psychophysiologic component.  Benefiting from Lunesta.  Has difficulties with behaivoral interventions, emiliano wake time.  Likelly delayed sleep phase component.      Reiterated increased all cause mortality with insomnia without treatment along with increased all cause mortality with hypnotics (long term, > 6 wks) and cognitive dysfunction risk.      Stim control & fixed wake time discussed with pt, encouraged to continue working.  Pt is pre-contemplative re: further change.  Will consider what and when needed to change and will notify me if occurs or I may be of further help.      --Delayed sleep phase: chronotherapy, phototherapy and low dose, <1mg, melatonin used d/w pt.  She is interested in bright light therapy.  No contraindications per pt, and has used to good effect at home.  Encouraged to use for 30 min in AM, while working bedtime back to desired wake time.  She will consider this tx possibility     --RLS + PLMD: ferritin & iron panel, fasting given concern for iron defic due to dietary causes.  Cont d-agonist therapy.  Monitor for augmentation s/sx.       --Behavioral health overlay with TETE & MDD:  Encouraged continue f/u with MIRIAM Bowser.  Encouraged pt to re-consider outpt therapy.  Discussed overlay of insomnia & behavioral sx.        Recommendations:  1.) Behavioral recommendations for insomnia as above  2.) Chrono and photo therapy for delayed sleep phase tx.  3) Cont APAP at 5-20 cm.  4) Safe driving reviewed.  5) Follow-up in May w/ Dr. Bai, already scheduled for compliance check; sooner, if needed.  Pt to notify me if I can be of further help with insomnia.   6) Call with any  questions or concerns.     All questions answered for the patient, who indicated understanding and agreed with the plan.       Jimmy Kelly MD  03/06/2020 @ 2:06 PM  Sleep Medicine    CC: Ronald Bowser MD      Psychotherapy Note  --Total Psychotherapy Time: 19 minutes  --Participants: Patient, myself  --Current Clinical Status: improving affect  --Focus of Therapeutic Encounter: insomnia, motivation  --Intervention Type: Supportive, CBT/cognitive, ME/Motivational Enhancement, Stimulus Control, Fixed Wake Time and Sleeping and Nap Restriction  --Therapy notes: I provided reflective listening, supportive therapy, reflection, and allowed them to express affect in therapy course.  CBT re: distortions, schema, behavioral activation.  ME re: motivation/interest for change.  Stim con, sleep restriction, fixed wake time as above for insomnia.  Behavioral activation.    --DX: as above: primary insomnia, TEET & MDD  --Plan: Continue to work on developing & strengthening coping skills; correcting maladaptive schema; work on improving insomnia.   --Post therapy status: improving affect      Jimmy Kelly II, MD

## 2020-03-12 DIAGNOSIS — F51.04 PSYCHOPHYSIOLOGICAL INSOMNIA: ICD-10-CM

## 2020-03-16 RX ORDER — ESZOPICLONE 3 MG/1
TABLET, FILM COATED ORAL
Qty: 30 TABLET | Refills: 3 | Status: SHIPPED | OUTPATIENT
Start: 2020-03-16 | End: 2020-04-07 | Stop reason: SDUPTHER

## 2020-03-18 RX ORDER — IPRATROPIUM BROMIDE 42 UG/1
SPRAY, METERED NASAL
Qty: 15 ML | Refills: 0 | Status: SHIPPED | OUTPATIENT
Start: 2020-03-18 | End: 2020-04-14

## 2020-04-01 DIAGNOSIS — J30.9 ALLERGIC RHINITIS, UNSPECIFIED SEASONALITY, UNSPECIFIED TRIGGER: ICD-10-CM

## 2020-04-01 RX ORDER — CETIRIZINE HYDROCHLORIDE 10 MG/1
10 TABLET ORAL DAILY
Qty: 30 TABLET | Refills: 11 | Status: SHIPPED | OUTPATIENT
Start: 2020-04-01 | End: 2021-02-08

## 2020-04-02 DIAGNOSIS — F51.04 PSYCHOPHYSIOLOGICAL INSOMNIA: ICD-10-CM

## 2020-04-03 RX ORDER — MOMETASONE FUROATE 50 UG/1
SPRAY, METERED NASAL
Qty: 17 G | Refills: 5 | Status: SHIPPED | OUTPATIENT
Start: 2020-04-03 | End: 2020-10-20 | Stop reason: SDUPTHER

## 2020-04-07 DIAGNOSIS — F51.04 PSYCHOPHYSIOLOGICAL INSOMNIA: ICD-10-CM

## 2020-04-07 RX ORDER — ESZOPICLONE 3 MG/1
3 TABLET, FILM COATED ORAL NIGHTLY
Qty: 30 TABLET | Refills: 3 | Status: SHIPPED | OUTPATIENT
Start: 2020-04-07 | End: 2020-05-07

## 2020-04-07 RX ORDER — ESZOPICLONE 2 MG/1
TABLET, FILM COATED ORAL
Qty: 30 TABLET | Refills: 3 | OUTPATIENT
Start: 2020-04-07

## 2020-04-13 RX ORDER — ONDANSETRON HYDROCHLORIDE 8 MG/1
TABLET, FILM COATED ORAL
Qty: 30 TABLET | Refills: 0 | Status: CANCELLED | OUTPATIENT
Start: 2020-04-13

## 2020-04-14 RX ORDER — ONDANSETRON HYDROCHLORIDE 8 MG/1
TABLET, FILM COATED ORAL
Qty: 30 TABLET | Refills: 0 | Status: SHIPPED | OUTPATIENT
Start: 2020-04-14 | End: 2020-04-22 | Stop reason: SDUPTHER

## 2020-04-14 RX ORDER — IPRATROPIUM BROMIDE 42 UG/1
SPRAY, METERED NASAL
Qty: 15 ML | Refills: 0 | Status: SHIPPED | OUTPATIENT
Start: 2020-04-14 | End: 2020-05-18 | Stop reason: SDUPTHER

## 2020-04-20 ENCOUNTER — TELEPHONE (OUTPATIENT)
Dept: FAMILY MEDICINE CLINIC | Facility: CLINIC | Age: 65
End: 2020-04-20

## 2020-04-20 DIAGNOSIS — R30.0 DYSURIA: Primary | ICD-10-CM

## 2020-04-20 RX ORDER — PHENAZOPYRIDINE HYDROCHLORIDE 100 MG/1
100 TABLET, FILM COATED ORAL 3 TIMES DAILY PRN
Qty: 15 TABLET | Refills: 0 | Status: SHIPPED | OUTPATIENT
Start: 2020-04-20 | End: 2020-09-23

## 2020-04-20 RX ORDER — CIPROFLOXACIN 250 MG/1
250 TABLET, FILM COATED ORAL 2 TIMES DAILY
Qty: 10 TABLET | Refills: 0 | Status: SHIPPED | OUTPATIENT
Start: 2020-04-20 | End: 2020-09-23

## 2020-04-20 NOTE — TELEPHONE ENCOUNTER
PT CALLED AND STATED THAT SHE BELIEVES SHE DEVELOPED AN UTI OVER THE WEEKEND AND WOULD TO KNOW IF SOMETHING CAN BE CALLED IN FOR THIS.    PHARMACY: [id number] - [place], [place] - [facility] [facility] [address] [facility] AT [address] - [phone]  - [phone] FX  [phone]    PT'S CALLBACK NUMBER: [phone]

## 2020-04-22 ENCOUNTER — OFFICE VISIT (OUTPATIENT)
Dept: FAMILY MEDICINE CLINIC | Facility: CLINIC | Age: 65
End: 2020-04-22

## 2020-04-22 ENCOUNTER — OFFICE VISIT (OUTPATIENT)
Dept: GASTROENTEROLOGY | Facility: CLINIC | Age: 65
End: 2020-04-22

## 2020-04-22 VITALS
WEIGHT: 215 LBS | BODY MASS INDEX: 33.74 KG/M2 | DIASTOLIC BLOOD PRESSURE: 94 MMHG | HEIGHT: 67 IN | SYSTOLIC BLOOD PRESSURE: 164 MMHG

## 2020-04-22 VITALS
WEIGHT: 215 LBS | DIASTOLIC BLOOD PRESSURE: 94 MMHG | BODY MASS INDEX: 33.74 KG/M2 | HEIGHT: 67 IN | SYSTOLIC BLOOD PRESSURE: 149 MMHG

## 2020-04-22 DIAGNOSIS — F51.01 PRIMARY INSOMNIA: Chronic | ICD-10-CM

## 2020-04-22 DIAGNOSIS — F43.10 PTSD (POST-TRAUMATIC STRESS DISORDER): Chronic | ICD-10-CM

## 2020-04-22 DIAGNOSIS — K44.9 HIATAL HERNIA: ICD-10-CM

## 2020-04-22 DIAGNOSIS — E66.01 MORBIDLY OBESE (HCC): ICD-10-CM

## 2020-04-22 DIAGNOSIS — R11.0 NAUSEA: ICD-10-CM

## 2020-04-22 DIAGNOSIS — F41.8 DEPRESSION WITH ANXIETY: Chronic | ICD-10-CM

## 2020-04-22 DIAGNOSIS — Z12.31 VISIT FOR SCREENING MAMMOGRAM: ICD-10-CM

## 2020-04-22 DIAGNOSIS — I10 ESSENTIAL HYPERTENSION, BENIGN: Primary | Chronic | ICD-10-CM

## 2020-04-22 DIAGNOSIS — K29.50 CHRONIC GASTRITIS WITHOUT BLEEDING, UNSPECIFIED GASTRITIS TYPE: ICD-10-CM

## 2020-04-22 DIAGNOSIS — K21.00 GASTROESOPHAGEAL REFLUX DISEASE WITH ESOPHAGITIS: Primary | ICD-10-CM

## 2020-04-22 PROBLEM — H72.93: Chronic | Status: ACTIVE | Noted: 2020-01-13

## 2020-04-22 PROBLEM — N17.9 AKI (ACUTE KIDNEY INJURY) (HCC): Status: RESOLVED | Noted: 2020-02-10 | Resolved: 2020-04-22

## 2020-04-22 PROBLEM — G93.41 ACUTE METABOLIC ENCEPHALOPATHY: Status: RESOLVED | Noted: 2020-02-10 | Resolved: 2020-04-22

## 2020-04-22 PROBLEM — R53.83 LETHARGY: Status: RESOLVED | Noted: 2020-02-10 | Resolved: 2020-04-22

## 2020-04-22 PROBLEM — G89.29 OTHER CHRONIC PAIN: Chronic | Status: RESOLVED | Noted: 2018-04-03 | Resolved: 2020-04-22

## 2020-04-22 PROBLEM — Z79.899 POLYPHARMACY: Chronic | Status: RESOLVED | Noted: 2020-02-10 | Resolved: 2020-04-22

## 2020-04-22 PROCEDURE — 99442 PR PHYS/QHP TELEPHONE EVALUATION 11-20 MIN: CPT | Performed by: NURSE PRACTITIONER

## 2020-04-22 PROCEDURE — 99442 PR PHYS/QHP TELEPHONE EVALUATION 11-20 MIN: CPT | Performed by: FAMILY MEDICINE

## 2020-04-22 RX ORDER — ONDANSETRON HYDROCHLORIDE 8 MG/1
8 TABLET, FILM COATED ORAL EVERY 8 HOURS PRN
Qty: 30 TABLET | Refills: 1 | Status: SHIPPED | OUTPATIENT
Start: 2020-04-22 | End: 2020-07-01 | Stop reason: SDUPTHER

## 2020-04-22 NOTE — PROGRESS NOTES
Subjective   Mikki Joel is a 65 y.o. female. You have chosen to receive care through a telephone visit. Do you consent to use a telephone visit for your medical care today? Yes      History of Present Illness phone follow-up due to pandemic as well as inability do video.  Diagnoses of mild asthma mild hypertension obesity mild PTSD anxiety etc.  In interim had the episode in the hospital this is resolved been home trying to work on weight checks he lost a few pounds.  Pressures been varying at home from normal to slightly elevated.  Medicines have remained the same is due for a mammogram.  Is visiting with GI medicine for chronic reflux symptoms.  History noted.    The following portions of the patient's history were reviewed and updated as appropriate: allergies, current medications, past family history, past medical history, past social history, past surgical history and problem list.    Review of Systems   Constitutional: Negative for activity change, appetite change, fatigue and unexpected weight change.   HENT: Negative for trouble swallowing and voice change.    Eyes: Negative for redness and visual disturbance.   Respiratory: Negative for cough and wheezing.    Cardiovascular: Negative for chest pain and palpitations.   Gastrointestinal: Positive for abdominal pain (Recurrent dyspepsia chronic). Negative for constipation, diarrhea, nausea and vomiting.   Genitourinary: Negative for urgency.   Musculoskeletal: Negative for joint swelling.   Neurological: Negative for syncope and headaches.   Hematological: Negative for adenopathy.   Psychiatric/Behavioral: Negative for sleep disturbance. The patient is nervous/anxious (Stable on medication).        Objective   Physical Exam   Constitutional: She appears well-developed.   Neck: No thyromegaly present.   Cardiovascular: Exam reveals no gallop and no friction rub.   No murmur heard.  Abdominal: She exhibits no distension and no mass. There is no  tenderness.   Neurological: She is alert. She has normal reflexes.   Skin: Skin is warm.   Psychiatric: She has a normal mood and affect. Her behavior is normal. Judgment and thought content normal.   Normal affect       Assessment/Plan   Mikki was seen today for hypertension.    Diagnoses and all orders for this visit:    Essential hypertension, benign    Morbidly obese (CMS/HCC)    BMI 35.0-35.9,adult    PTSD (post-traumatic stress disorder)    Primary insomnia    Depression with anxiety    Visit for screening mammogram  -     Mammo Screening Digital Tomosynthesis Bilateral With CAD     on wt loss measures and programs  Also continue weight loss measures diet and exercise.  In regards to blood pressure may be developing mild tolerance to beta-blocker.  Counseled watching pressure with the summer remains elevated may have to increase over the phone.  Orders as above.  Continue seeing all subspecialist.  Recheck 6 months  This visit has been rescheduled as a phone visit to comply with patient safety concerns in accordance with CDC recommendations. Total time of discussion was 12 minutes.

## 2020-04-22 NOTE — PATIENT INSTRUCTIONS

## 2020-04-22 NOTE — PROGRESS NOTES
Chief Complaint   Patient presents with   • Heartburn       Subjective    Mikki Joel is a 65 y.o. female. she is here today for telephone encounter for follow-up.    You have chosen to receive care through a telephone visit. Do you consent to use a telephone visit for your medical care today? Yes    Patient reports symptoms have been mostly under control over the last 6 months reports she still has sporadic episodes of abdominal pain nausea and vomiting typically relieved with bowel rest and following liquid diet.  Dexilant and Carafate seem to control her symptoms better than any other PPI.  Her EGD 6/10/2019 noted esophagitis, gastritis and a medium hiatal hernia.    Heartburn   She complains of abdominal pain, belching, early satiety, heartburn and nausea. She reports no chest pain, no choking, no coughing, no dysphagia, no globus sensation, no hoarse voice or no sore throat. This is a chronic problem. The current episode started more than 1 month ago. The problem occurs occasionally. The problem has been gradually worsening. The heartburn duration is less than a minute. The heartburn is located in the substernum. The heartburn is of moderate intensity. The heartburn does not wake her from sleep. The heartburn does not limit her activity. The heartburn doesn't change with position. The symptoms are aggravated by certain foods. Associated symptoms include fatigue. Risk factors include hiatal hernia and obesity. She has tried a PPI for the symptoms. The treatment provided mild relief. Past procedures include an EGD.            The following portions of the patient's history were reviewed and updated as appropriate:   Past Medical History:   Diagnosis Date   • Anxiety    • Chronic anemia    • Chronic pain    • CTS (carpal tunnel syndrome)    • Deaf    • Depression with anxiety    • Fracture of wrist    • Gastroesophageal reflux disease    • Hypertension    • Migraine    • Obesity    • Osteoarthritis    •  Perforated tympanic membrane, bilateral      Past Surgical History:   Procedure Laterality Date   • COLONOSCOPY N/A 2018   • EAR TUBES     • ENDOSCOPIC FUNCTIONAL SINUS SURGERY (FESS) Bilateral 2017   • ENDOSCOPY N/A 2018   • ENDOSCOPY N/A 6/10/2019   • KNEE ARTHROPLASTY     • RHINOPLASTY     • SHOULDER ARTHROSCOPY     • TYMPANOPLASTY Left 2018   • TYMPANOPLASTY Right 2020    Procedure: TYMPANOPLASTY AND  CARTILAGE GRAFT;  Surgeon: Ramy Gaston MD;  Location: [facility];  Service: ENT;  Laterality: Right;   • UPPER GASTROINTESTINAL ENDOSCOPY       Family History   Problem Relation Age of Onset   • Hypertension Mother    • Alcohol abuse Father    • Stroke Maternal Grandmother    • Irritable bowel syndrome Sister      OB History        2    Para   2    Term   2            AB        Living           SAB        TAB        Ectopic        Molar        Multiple        Live Births                  Prior to Admission medications    Medication Sig Start Date End Date Taking? Authorizing Provider   ALBUTEROL SULFATE  (90 Base) MCG/ACT inhaler INHALE 2 PUFFS BY MOUTH EVERY 4 HOURS AS NEEDED FOR WHEEZING 19  Yes Ronald Bowser MD   benzonatate (TESSALON) 100 MG capsule Take 1 capsule by mouth 3 (Three) Times a Day As Needed for Cough. 20  Yes Ramy Gaston MD   busPIRone (BUSPAR) 30 MG tablet Take 30 mg by mouth 2 (Two) Times a Day. 10/1/18  Yes ProviderLucía MD   cetirizine (zyrTEC) 10 MG tablet TAKE 1 TABLET BY MOUTH DAILY 20  Yes Ronald Bowser MD   ciprofloxacin (CIPRO) 250 MG tablet Take 1 tablet by mouth 2 (Two) Times a Day. 20  Yes Ronald Bowser MD   dexlansoprazole (DEXILANT) 60 MG capsule Take 1 capsule by mouth Daily. 10/22/19  Yes Zelda Rich APRN   eszopiclone (LUNESTA) 3 MG tablet Take 1 tablet by mouth Every Night for 30 days. Take immediately before bedtime 20 Yes Ramirez Bai MD   ferrous sulfate 325 (65 FE) MG tablet  Take 1 tablet by mouth Daily With Breakfast. Take with vitamin C to help with absorption 2/14/20  Yes Ramirez Bai MD   Fluticasone Furoate-Vilanterol (BREO ELLIPTA) 100-25 MCG/INH inhaler 1 puff bid and rinse out mouth 4/22/19  Yes Ronald Bowser MD   hydrOXYzine pamoate (VISTARIL) 50 MG capsule Take 1 capsule by mouth 3 (Three) Times a Day As Needed for Anxiety. 2/11/20  Yes Umberto Carvalho MD   ipratropium (ATROVENT) 0.06 % nasal spray USE 2 SPRAYS IN EACH NOSTRIL THREE TIMES DAILY. 4/14/20  Yes Mike Slater MD   metoprolol tartrate (LOPRESSOR) 100 MG tablet Take 0.5 tablets by mouth Every Night. 2/11/20  Yes Umberto Carvalho MD   mometasone (NASONEX) 50 MCG/ACT nasal spray USE 2 SPRAYS IN EACH NOSTRIL TWICE DAILY. 4/3/20  Yes Ramy Gaston MD   olopatadine (PATANASE) 0.6 % solution nasal solution 2 sprays by Each Nare route 2 (Two) Times a Day. 1/13/20  Yes Ramy Gaston MD   ondansetron (ZOFRAN) 8 MG tablet TAKE 1 TABLET BY MOUTH EVERY 8 HOURS AS NEEDED FOR NAUSEA OR VOMITING 4/14/20  Yes Zelda Rich APRN   phenazopyridine (Pyridium) 100 MG tablet Take 1 tablet by mouth 3 (Three) Times a Day As Needed for Bladder Spasms. 4/20/20  Yes Ronald Bowser MD   rOPINIRole (REQUIP) 4 MG tablet TAKE 1 TABLET BY MOUTH DAILY 2/3/20  Yes Ramirez Bai MD   sucralfate (CARAFATE) 1 g tablet Take 1 tablet by mouth 4 (Four) Times a Day. 10/22/19  Yes Zelda Rich APRN   Vortioxetine HBr (TRINTELLIX) 10 MG tablet Take 10 mg by mouth Daily.   Yes Lucía Greene MD   HYDROcodone-acetaminophen (NORCO) 5-325 MG per tablet Take 1 tablet by mouth Every 6 (Six) Hours As Needed for Moderate or Severe Pain. 2/11/20 4/22/20  Umberto Carvalho MD     Allergies   Allergen Reactions   • Codeine Other (See Comments)     Increases heart rate   • Sulfa Antibiotics Hives     Social History     Socioeconomic History   • Marital status:      Spouse name: Not on file   • Number of children: Not on file   •  "Years of education: Not on file   • Highest education level: Not on file   Tobacco Use   • Smoking status: Never Smoker   • Smokeless tobacco: Never Used   Substance and Sexual Activity   • Alcohol use: Yes   • Drug use: No   • Sexual activity: Not Currently     Partners: Male     Birth control/protection: Surgical       Review of Systems  Review of Systems   Constitutional: Positive for appetite change and fatigue. Negative for activity change, chills, diaphoresis, fever and unexpected weight change.   HENT: Negative for hoarse voice, sore throat and trouble swallowing.    Respiratory: Negative for cough, choking and shortness of breath.    Cardiovascular: Negative for chest pain.   Gastrointestinal: Positive for abdominal pain, heartburn, nausea and vomiting. Negative for abdominal distention, anal bleeding, blood in stool, constipation, diarrhea, dysphagia and rectal pain.   Musculoskeletal: Negative for arthralgias.   Skin: Negative for pallor.   Neurological: Negative for light-headedness.        /94 (BP Location: Left arm)   Ht 170.2 cm (67\")   Wt 97.5 kg (215 lb)   LMP  (LMP Unknown)   BMI 33.67 kg/m²     Objective    Physical Exam   Psychiatric: She has a normal mood and affect. Her speech is normal.     Admission on 02/10/2020, Discharged on 02/11/2020   Component Date Value Ref Range Status   • Color, UA 02/10/2020 Yellow  Yellow, Straw, Dark Yellow, Rut Final   • Appearance, UA 02/10/2020 Clear  Clear Final   • pH, UA 02/10/2020 6.0  5.0 - 9.0 Final   • Specific Gravity, UA 02/10/2020 1.002* 1.003 - 1.030 Final    Result obtained by Refractometer   • Glucose, UA 02/10/2020 Negative  Negative Final   • Ketones, UA 02/10/2020 Negative  Negative Final   • Bilirubin, UA 02/10/2020 Negative  Negative Final   • Blood, UA 02/10/2020 Negative  Negative Final   • Protein, UA 02/10/2020 Negative  Negative Final   • Leuk Esterase, UA 02/10/2020 Negative  Negative Final   • Nitrite, UA 02/10/2020 Negative "  Negative Final   • Urobilinogen, UA 02/10/2020 0.2 E.U./dL  0.2 - 1.0 E.U./dL Final   • Troponin T 02/10/2020 <0.010  0.000 - 0.030 ng/mL Final   • THC, Screen, Urine 02/10/2020 Positive* Negative Final   • Phencyclidine (PCP), Urine 02/10/2020 Negative  Negative Final   • Cocaine Screen, Urine 02/10/2020 Negative  Negative Final   • Methamphetamine, Ur 02/10/2020 Negative  Negative Final   • Opiate Screen 02/10/2020 Positive* Negative Final   • Amphetamine Screen, Urine 02/10/2020 Negative  Negative Final   • Benzodiazepine Screen, Urine 02/10/2020 Negative  Negative Final   • Tricyclic Antidepressants Screen 02/10/2020 Negative  Negative Final   • Methadone Screen, Urine 02/10/2020 Negative  Negative Final   • Barbiturates Screen, Urine 02/10/2020 Negative  Negative Final   • Oxycodone Screen, Urine 02/10/2020 Negative  Negative Final   • Propoxyphene Screen 02/10/2020 Negative  Negative Final   • Buprenorphine, Screen, Urine 02/10/2020 Negative  Negative Final   • WBC 02/10/2020 8.97  3.40 - 10.80 10*3/mm3 Final   • RBC 02/10/2020 4.14  3.77 - 5.28 10*6/mm3 Final   • Hemoglobin 02/10/2020 10.9* 12.0 - 15.9 g/dL Final   • Hematocrit 02/10/2020 33.7* 34.0 - 46.6 % Final   • MCV 02/10/2020 81.4  79.0 - 97.0 fL Final   • MCH 02/10/2020 26.3* 26.6 - 33.0 pg Final   • MCHC 02/10/2020 32.3  31.5 - 35.7 g/dL Final   • RDW 02/10/2020 13.4  12.3 - 15.4 % Final   • RDW-SD 02/10/2020 39.4  37.0 - 54.0 fl Final   • MPV 02/10/2020 9.2  6.0 - 12.0 fL Final   • Platelets 02/10/2020 303  140 - 450 10*3/mm3 Final   • Neutrophil % 02/10/2020 79.3* 42.7 - 76.0 % Final   • Lymphocyte % 02/10/2020 12.0* 19.6 - 45.3 % Final   • Monocyte % 02/10/2020 5.6  5.0 - 12.0 % Final   • Eosinophil % 02/10/2020 2.1  0.3 - 6.2 % Final   • Basophil % 02/10/2020 0.6  0.0 - 1.5 % Final   • Immature Grans % 02/10/2020 0.4  0.0 - 0.5 % Final   • Neutrophils, Absolute 02/10/2020 7.11* 1.70 - 7.00 10*3/mm3 Final   • Lymphocytes, Absolute 02/10/2020 1.08   0.70 - 3.10 10*3/mm3 Final   • Monocytes, Absolute 02/10/2020 0.50  0.10 - 0.90 10*3/mm3 Final   • Eosinophils, Absolute 02/10/2020 0.19  0.00 - 0.40 10*3/mm3 Final   • Basophils, Absolute 02/10/2020 0.05  0.00 - 0.20 10*3/mm3 Final   • Immature Grans, Absolute 02/10/2020 0.04  0.00 - 0.05 10*3/mm3 Final   • nRBC 02/10/2020 0.0  0.0 - 0.2 /100 WBC Final   • Acetaminophen 02/10/2020 <5.0* 10.0 - 30.0 mcg/mL Final   • Salicylate 02/10/2020 <0.3  <=30.0 mg/dL Final   • Ethanol 02/10/2020 <10  0 - 10 mg/dL Final   • Ethanol % 02/10/2020 <0.010  % Final   • Extra Tube 02/10/2020 hold for add-on   Final    Auto resulted   • Extra Tube 02/10/2020 Hold for add-ons.   Final    Auto resulted.   • TSH 02/10/2020 0.470  0.270 - 4.200 uIU/mL Final   • Glucose 02/10/2020 109* 65 - 99 mg/dL Final   • BUN 02/10/2020 29* 8 - 23 mg/dL Final   • Creatinine 02/10/2020 1.29* 0.57 - 1.00 mg/dL Final   • Sodium 02/10/2020 137  136 - 145 mmol/L Final   • Potassium 02/10/2020 4.8  3.5 - 5.2 mmol/L Final   • Chloride 02/10/2020 103  98 - 107 mmol/L Final   • CO2 02/10/2020 23.0  22.0 - 29.0 mmol/L Final   • Calcium 02/10/2020 9.4  8.6 - 10.5 mg/dL Final   • Total Protein 02/10/2020 6.4  6.0 - 8.5 g/dL Final   • Albumin 02/10/2020 4.40  3.50 - 5.20 g/dL Final   • ALT (SGPT) 02/10/2020 20  1 - 33 U/L Final   • AST (SGOT) 02/10/2020 11  1 - 32 U/L Final   • Alkaline Phosphatase 02/10/2020 151* 39 - 117 U/L Final   • Total Bilirubin 02/10/2020 0.2  0.2 - 1.2 mg/dL Final   • eGFR Non African Amer 02/10/2020 41* >60 mL/min/1.73 Final   • Globulin 02/10/2020 2.0  gm/dL Final   • A/G Ratio 02/10/2020 2.2  g/dL Final   • BUN/Creatinine Ratio 02/10/2020 22.5  7.0 - 25.0 Final   • Anion Gap 02/10/2020 11.0  5.0 - 15.0 mmol/L Final   • Glucose 02/11/2020 103* 65 - 99 mg/dL Final   • BUN 02/11/2020 24* 8 - 23 mg/dL Final   • Creatinine 02/11/2020 1.03* 0.57 - 1.00 mg/dL Final   • Sodium 02/11/2020 141  136 - 145 mmol/L Final   • Potassium 02/11/2020 4.9   3.5 - 5.2 mmol/L Final   • Chloride 02/11/2020 109* 98 - 107 mmol/L Final   • CO2 02/11/2020 24.0  22.0 - 29.0 mmol/L Final   • Calcium 02/11/2020 9.0  8.6 - 10.5 mg/dL Final   • eGFR Non African Amer 02/11/2020 54* >60 mL/min/1.73 Final   • BUN/Creatinine Ratio 02/11/2020 23.3  7.0 - 25.0 Final   • Anion Gap 02/11/2020 8.0  5.0 - 15.0 mmol/L Final   • WBC 02/11/2020 8.23  3.40 - 10.80 10*3/mm3 Final   • RBC 02/11/2020 4.16  3.77 - 5.28 10*6/mm3 Final   • Hemoglobin 02/11/2020 11.0* 12.0 - 15.9 g/dL Final   • Hematocrit 02/11/2020 33.9* 34.0 - 46.6 % Final   • MCV 02/11/2020 81.5  79.0 - 97.0 fL Final   • MCH 02/11/2020 26.4* 26.6 - 33.0 pg Final   • MCHC 02/11/2020 32.4  31.5 - 35.7 g/dL Final   • RDW 02/11/2020 13.3  12.3 - 15.4 % Final   • RDW-SD 02/11/2020 39.8  37.0 - 54.0 fl Final   • MPV 02/11/2020 9.4  6.0 - 12.0 fL Final   • Platelets 02/11/2020 317  140 - 450 10*3/mm3 Final     Assessment/Plan      1. Gastroesophageal reflux disease with esophagitis    2. Chronic gastritis without bleeding, unspecified gastritis type    3. Hiatal hernia    4. Nausea    .   Continue Dexilant daily, Carafate before meals and bedtime continue to avoid gastric irritants follow standard antireflux measures.  Discussed possibility of referral to general surgeon if symptoms persist or worsen for possible hiatal hernia repair.  Patient is not interested in referral at this time.  Refill Zofran as needed for nausea.    Orders placed during this encounter include:  No orders of the defined types were placed in this encounter.    This visit has been rescheduled as a phone visit to comply with patient safety concerns in accordance with CDC recommendations. Total time of discussion was 14 minutes.    Contact office if symptoms worsen or fail to improve otherwise follow-up in 6 months.    * Surgery not found *    Review and/or summary of lab tests, radiology, procedures, medications. Review and summary of old records and obtaining of  history. The risks and benefits of my recommendations, as well as other treatment options were discussed with the patient today. Questions were answered.    New Medications Ordered This Visit   Medications   • ondansetron (ZOFRAN) 8 MG tablet     Sig: Take 1 tablet by mouth Every 8 (Eight) Hours As Needed for Nausea or Vomiting.     Dispense:  30 tablet     Refill:  1       Follow-up: Return in about 6 months (around 10/22/2020).          This document has been electronically signed by MIRIAM White on April 22, 2020 13:58             Results for orders placed or performed during the hospital encounter of 02/10/20   [facility]   Result Value Ref Range    Extra Tube Hold for add-ons.    Urinalysis With Microscopic If Indicated (No Culture) - Urine, Clean Catch   Result Value Ref Range    Color, UA Yellow Yellow, Straw, Dark Yellow, Rut    Appearance, UA Clear Clear    pH, UA 6.0 5.0 - 9.0    Specific Gravity, UA 1.002 (L) 1.003 - 1.030    Glucose, UA Negative Negative    Ketones, UA Negative Negative    Bilirubin, UA Negative Negative    Blood, UA Negative Negative    Protein, UA Negative Negative    Leuk Esterase, UA Negative Negative    Nitrite, UA Negative Negative    Urobilinogen, UA 0.2 E.U./dL 0.2 - 1.0 E.U./dL   CBC Auto Differential   Result Value Ref Range    WBC 8.97 3.40 - 10.80 10*3/mm3    RBC 4.14 3.77 - 5.28 10*6/mm3    Hemoglobin 10.9 (L) 12.0 - 15.9 g/dL    Hematocrit 33.7 (L) 34.0 - 46.6 %    MCV 81.4 79.0 - 97.0 fL    MCH 26.3 (L) 26.6 - 33.0 pg    MCHC 32.3 31.5 - 35.7 g/dL    RDW 13.4 12.3 - 15.4 %    RDW-SD 39.4 37.0 - 54.0 fl    MPV 9.2 6.0 - 12.0 fL    Platelets 303 140 - 450 10*3/mm3    Neutrophil % 79.3 (H) 42.7 - 76.0 %    Lymphocyte % 12.0 (L) 19.6 - 45.3 %    Monocyte % 5.6 5.0 - 12.0 %    Eosinophil % 2.1 0.3 - 6.2 %    Basophil % 0.6 0.0 - 1.5 %    Immature Grans % 0.4 0.0 - 0.5 %    Neutrophils, Absolute 7.11 (H) 1.70 - 7.00 10*3/mm3    Lymphocytes, Absolute 1.08 0.70 - 3.10  10*3/mm3    Monocytes, Absolute 0.50 0.10 - 0.90 10*3/mm3    Eosinophils, Absolute 0.19 0.00 - 0.40 10*3/mm3    Basophils, Absolute 0.05 0.00 - 0.20 10*3/mm3    Immature Grans, Absolute 0.04 0.00 - 0.05 10*3/mm3    nRBC 0.0 0.0 - 0.2 /100 WBC   Light Blue Top   Result Value Ref Range    Extra Tube hold for add-on    Troponin   Result Value Ref Range    Troponin T <0.010 0.000 - 0.030 ng/mL   Urine Drug Screen - Urine, Clean Catch   Result Value Ref Range    THC, Screen, Urine Positive (A) Negative    Phencyclidine (PCP), Urine Negative Negative    Cocaine Screen, Urine Negative Negative    Methamphetamine, Ur Negative Negative    Opiate Screen Positive (A) Negative    Amphetamine Screen, Urine Negative Negative    Benzodiazepine Screen, Urine Negative Negative    Tricyclic Antidepressants Screen Negative Negative    Methadone Screen, Urine Negative Negative    Barbiturates Screen, Urine Negative Negative    Oxycodone Screen, Urine Negative Negative    Propoxyphene Screen Negative Negative    Buprenorphine, Screen, Urine Negative Negative   CBC (No Diff)   Result Value Ref Range    WBC 8.23 3.40 - 10.80 10*3/mm3    RBC 4.16 3.77 - 5.28 10*6/mm3    Hemoglobin 11.0 (L) 12.0 - 15.9 g/dL    Hematocrit 33.9 (L) 34.0 - 46.6 %    MCV 81.5 79.0 - 97.0 fL    MCH 26.4 (L) 26.6 - 33.0 pg    MCHC 32.4 31.5 - 35.7 g/dL    RDW 13.3 12.3 - 15.4 %    RDW-SD 39.8 37.0 - 54.0 fl    MPV 9.4 6.0 - 12.0 fL    Platelets 317 140 - 450 10*3/mm3   TSH   Result Value Ref Range    TSH 0.470 0.270 - 4.200 uIU/mL   Ethanol   Result Value Ref Range    Ethanol <10 0 - 10 mg/dL    Ethanol % <0.010 %   Acetaminophen Level   Result Value Ref Range    Acetaminophen <5.0 (L) 10.0 - 30.0 mcg/mL   Salicylate Level   Result Value Ref Range    Salicylate <0.3 <=30.0 mg/dL   Comprehensive Metabolic Panel   Result Value Ref Range    Glucose 109 (H) 65 - 99 mg/dL    BUN 29 (H) 8 - 23 mg/dL    Creatinine 1.29 (H) 0.57 - 1.00 mg/dL    Sodium 137 136 - 145  mmol/L    Potassium 4.8 3.5 - 5.2 mmol/L    Chloride 103 98 - 107 mmol/L    CO2 23.0 22.0 - 29.0 mmol/L    Calcium 9.4 8.6 - 10.5 mg/dL    Total Protein 6.4 6.0 - 8.5 g/dL    Albumin 4.40 3.50 - 5.20 g/dL    ALT (SGPT) 20 1 - 33 U/L    AST (SGOT) 11 1 - 32 U/L    Alkaline Phosphatase 151 (H) 39 - 117 U/L    Total Bilirubin 0.2 0.2 - 1.2 mg/dL    eGFR Non African Amer 41 (L) >60 mL/min/1.73    Globulin 2.0 gm/dL    A/G Ratio 2.2 g/dL    BUN/Creatinine Ratio 22.5 7.0 - 25.0    Anion Gap 11.0 5.0 - 15.0 mmol/L   Basic Metabolic Panel   Result Value Ref Range    Glucose 103 (H) 65 - 99 mg/dL    BUN 24 (H) 8 - 23 mg/dL    Creatinine 1.03 (H) 0.57 - 1.00 mg/dL    Sodium 141 136 - 145 mmol/L    Potassium 4.9 3.5 - 5.2 mmol/L    Chloride 109 (H) 98 - 107 mmol/L    CO2 24.0 22.0 - 29.0 mmol/L    Calcium 9.0 8.6 - 10.5 mg/dL    eGFR Non African Amer 54 (L) >60 mL/min/1.73    BUN/Creatinine Ratio 23.3 7.0 - 25.0    Anion Gap 8.0 5.0 - 15.0 mmol/L   Results for orders placed or performed during the hospital encounter of 06/10/19   Tissue Pathology Exam   Result Value Ref Range    Case Report       Surgical Pathology Report                         Case: [id number]                                  Authorizing Provider:  Ravi Cavanaugh MD        Collected:           06/10/2019 04:42 PM          Ordering Location:     [facility]             Received:            06/11/2019 07:28 AM                                 [facility]                                                     Pathologist:           Deven Conner MD                                                         Specimens:   1) - Gastric, Antrum                                                                                2) - Esophagus, Distal                                                                     Final Diagnosis       1.  MUCOSA, ANTRUM OF STOMACH:  CHRONIC GASTRITIS.  NEGATIVE FOR HELICOBACTER PYLORI (HP IMMUNOSTAIN).    2.  MUCOSA,  "DISTAL ESOPHAGUS:  REACTIVE CHANGE OF SQUAMOUS MUCOSA.      Comment       Helicobacter pylori (HP) immunostain is performed (Block 1) because an appropriate inflammatory milieu is present and organisms are not seen on H & E stained slides.    HP immunostain was developed and its performance characteristics determined by [facility].  It has not been cleared or approved by the U.S. Food and Drug Administration.  The FDA has determined that such clearance or approval is not necessary.  This test is used for clinical purposes.  It should not be regarded as investigational or for research.  This laboratory is certified under the Clinical Laboratory Improvement Amendments of 1988 (CLIA-88) as qualified to perform high complexity clinical laboratory testing.    All controls show appropriate reactivity.          Gross Description       1.  The first container is labeled 'antrum of stomach\" and has nodular bits of white soft tissue measuring 0.4 cc in aggregate.  The entire specimen is embedded as 1A.    2.  The second container is labeled \"distal esophagus\" and has nodular bits of white soft tissue measuring 0.4 cc in aggregate.  The entire specimen is embedded as 2A.       *Note: Due to a large number of results and/or encounters for the requested time period, some results have not been displayed. A complete set of results can be found in Results Review.     "

## 2020-04-29 ENCOUNTER — TELEPHONE (OUTPATIENT)
Dept: OTOLARYNGOLOGY | Facility: CLINIC | Age: 65
End: 2020-04-29

## 2020-04-29 RX ORDER — AMOXICILLIN AND CLAVULANATE POTASSIUM 875; 125 MG/1; MG/1
1 TABLET, FILM COATED ORAL EVERY 12 HOURS
Qty: 28 TABLET | Refills: 0 | Status: SHIPPED | OUTPATIENT
Start: 2020-04-29 | End: 2020-05-15

## 2020-04-29 NOTE — TELEPHONE ENCOUNTER
04/29/2020, 1359 - Patient telephoned per this staff member [phone] with notification prescription medication Amoxicillin-Clavulanate (Augmentin) 875-125 MG Tablets, 1 tablet by mouth 2 times per day, #28, 0 renewals, has submitted per Dr. Ramy Gaston M.D./ENT to patient's pharmacy of [facility], [facility] [place], [place] with receipt confirmed by pharmacy on 04/29/2020 at 1:50 P.M.  Patient verbalized understanding and given reminder of clinical appointment with M.D./ENT Alek Friday, May 15, 2020 at 1:15 P.M.

## 2020-04-29 NOTE — TELEPHONE ENCOUNTER
----- Message from Nikia Chowdary sent at 4/29/2020  1:09 PM CDT -----  Regarding: SINUS   Contact: [phone]  Patient called about having a sinus infection. Mrs. Joel was wanting to know if you would please call her in an antibiotic to [facility]. Her next appt is May 2020.     Thank you

## 2020-05-15 ENCOUNTER — OFFICE VISIT (OUTPATIENT)
Dept: OTOLARYNGOLOGY | Facility: CLINIC | Age: 65
End: 2020-05-15

## 2020-05-15 ENCOUNTER — CLINICAL SUPPORT (OUTPATIENT)
Dept: AUDIOLOGY | Facility: CLINIC | Age: 65
End: 2020-05-15

## 2020-05-15 VITALS — HEIGHT: 68 IN | BODY MASS INDEX: 34.37 KG/M2 | WEIGHT: 226.8 LBS | TEMPERATURE: 97.7 F

## 2020-05-15 DIAGNOSIS — Z46.1 ENCOUNTER FOR FITTING OR ADJUSTMENT OF HEARING AID: Primary | ICD-10-CM

## 2020-05-15 DIAGNOSIS — Z09 POSTOP CHECK: Primary | ICD-10-CM

## 2020-05-15 PROCEDURE — 99024 POSTOP FOLLOW-UP VISIT: CPT | Performed by: OTOLARYNGOLOGY

## 2020-05-15 PROCEDURE — HEARINGNOCHG: Performed by: AUDIOLOGIST

## 2020-05-15 RX ORDER — VORTIOXETINE 20 MG/1
TABLET, FILM COATED ORAL
COMMUNITY
Start: 2020-04-13 | End: 2021-03-02 | Stop reason: SDUPTHER

## 2020-05-15 NOTE — PROGRESS NOTES
Patient comes in with her postop check and she is doing with her ears that are hearing slowly improving she needs an adjustment to decrease her hearing aid and she also gives an feedback she has some allergy symptoms and using antihistamines mask discussed with her how to do that she already has some nasal spray we will plan on rechecking her ears in 6 weeks because the graft still thinning still thick on the right with eardrums intact  She has any question problems she is let me know otherwise she will see the audiologist in the interim and then I will see her back of her allergy symptoms continue to be worsening she is to let me know

## 2020-05-15 NOTE — PROGRESS NOTES
HEARING AID CHECK    Name:  Mikki Joel  :  1955  Age:  65 y.o.  Date of Evaluation:  5/15/2020      HISTORY    Reason for visit:  Mikki Joel is seen today for a hearing aid check.  Patient reports the  of her left Signia hearing aid is breaking and asked if it can be fixed.    Hearing aid history:  Patient is currently wearing a  in the Canal (ARMANI) hearing aid in both ear(s).     OFFICE VISIT    During today's visit the  on the left hearing aid was changed using a #2M  and 10 mm double dome.  She reported the sound was good.  She will return for hearing aid assistance as necessary.      It was a pleasure seeing Mikki Joel in Audiology today.  It is a pleasure helping Ms. Joel with her amplification needs.             This document has been electronically signed by Shanell Berrios MS CCC-A on May 15, 2020 14:01       Shanell Berrios MS CCC-A  Licensed Audiologist    For Billing and Codin  Hearing Aid Check, Monaural - no charge

## 2020-05-15 NOTE — PATIENT INSTRUCTIONS
MyPlate from USDA    MyPlate is an outline of a general healthy diet based on the 2010 Dietary Guidelines for Americans, from the U.S. Department of Agriculture (USDA). It sets guidelines for how much food you should eat from each food group based on your age, sex, and level of physical activity.  What are tips for following MyPlate?  To follow MyPlate recommendations:  · Eat a wide variety of fruits and vegetables, grains, and protein foods.  · Serve smaller portions and eat less food throughout the day.  · Limit portion sizes to avoid overeating.  · Enjoy your food.  · Get at least 150 minutes of exercise every week. This is about 30 minutes each day, 5 or more days per week.  It can be difficult to have every meal look like MyPlate. Think about MyPlate as eating guidelines for an entire day, rather than each individual meal.  Fruits and vegetables  · Make half of your plate fruits and vegetables.  · Eat many different colors of fruits and vegetables each day.  · For a 2,000 calorie daily food plan, eat:  ? 2½ cups of vegetables every day.  ? 2 cups of fruit every day.  · 1 cup is equal to:  ? 1 cup raw or cooked vegetables.  ? 1 cup raw fruit.  ? 1 medium-sized orange, apple, or banana.  ? 1 cup 100% fruit or vegetable juice.  ? 2 cups raw leafy greens, such as lettuce, spinach, or kale.  ? ½ cup dried fruit.  Grains  · One fourth of your plate should be grains.  · Make at least half of the grains you eat each day whole grains.  · For a 2,000 calorie daily food plan, eat 6 oz of grains every day.  · 1 oz is equal to:  ? 1 slice bread.  ? 1 cup cereal.  ? ½ cup cooked rice, cereal, or pasta.  Protein  · One fourth of your plate should be protein.  · Eat a wide variety of protein foods, including meat, poultry, fish, eggs, beans, nuts, and tofu.  · For a 2,000 calorie daily food plan, eat 5½ oz of protein every day.  · 1 oz is equal to:  ? 1 oz meat, poultry, or fish.  ? ¼ cup cooked beans.  ? 1 egg.  ? ½ oz nuts  or seeds.  ? 1 Tbsp peanut butter.  Dairy  · Drink fat-free or low-fat (1%) milk.  · Eat or drink dairy as a side to meals.  · For a 2,000 calorie daily food plan, eat or drink 3 cups of dairy every day.  · 1 cup is equal to:  ? 1 cup milk, yogurt, cottage cheese, or soy milk (soy beverage).  ? 2 oz processed cheese.  ? 1½ oz natural cheese.  Fats, oils, salt, and sugars  · Only small amounts of oils are recommended.  · Avoid foods that are high in calories and low in nutritional value (empty calories), like foods high in fat or added sugars.  · Choose foods that are low in salt (sodium). Choose foods that have less than 140 milligrams (mg) of sodium per serving.  · Drink water instead of sugary drinks. Drink enough water each day to keep your urine pale yellow.  Where to find support  · Work with your health care provider or a nutrition specialist (dietitian) to develop a customized eating plan that is right for you.  · Download an eva (mobile application) to help you track your daily food intake.  Where to find more information  · Go to ChooseMyPlate.gov for more information.  · Learn more and log your daily food intake according to MyPlate using USDA's SuperTracker: www.aBIZinaBOXer.usda.gov  Summary  · MyPlate is a general guideline for healthy eating from the USDA. It is based on the 2010 Dietary Guidelines for Americans.  · In general, fruits and vegetables should take up ½ of your plate, grains should take up ¼ of your plate, and protein should take up ¼ of your plate.  This information is not intended to replace advice given to you by your health care provider. Make sure you discuss any questions you have with your health care provider.  Document Released: 01/06/2009 Document Revised: 03/19/2018 Document Reviewed: 03/19/2018  [employer] Interactive Patient Education © 2020 Elsevier Inc.

## 2020-05-18 RX ORDER — IPRATROPIUM BROMIDE 42 UG/1
SPRAY, METERED NASAL
Qty: 15 ML | Refills: 1 | Status: SHIPPED | OUTPATIENT
Start: 2020-05-18 | End: 2020-06-15

## 2020-05-18 RX ORDER — IPRATROPIUM BROMIDE 42 UG/1
SPRAY, METERED NASAL
Qty: 15 ML | Refills: 0 | OUTPATIENT
Start: 2020-05-18

## 2020-05-18 NOTE — TELEPHONE ENCOUNTER
05/18/2020, 0847 - Patient prior clinical appointment with Dr. Ramy Gaston M.D./ENT Friday, May 15, 2020 with 6 week clinical appointment with Audiology scheduled Monday, June 29, 2020 at 4:15 P.M.

## 2020-05-20 ENCOUNTER — OFFICE VISIT (OUTPATIENT)
Dept: SLEEP MEDICINE | Facility: HOSPITAL | Age: 65
End: 2020-05-20

## 2020-05-20 VITALS
WEIGHT: 225.7 LBS | SYSTOLIC BLOOD PRESSURE: 183 MMHG | OXYGEN SATURATION: 98 % | BODY MASS INDEX: 34.21 KG/M2 | HEART RATE: 81 BPM | HEIGHT: 68 IN | DIASTOLIC BLOOD PRESSURE: 91 MMHG

## 2020-05-20 DIAGNOSIS — G47.33 OBSTRUCTIVE SLEEP APNEA, ADULT: Primary | ICD-10-CM

## 2020-05-20 DIAGNOSIS — F51.04 PSYCHOPHYSIOLOGICAL INSOMNIA: ICD-10-CM

## 2020-05-20 PROCEDURE — 99214 OFFICE O/P EST MOD 30 MIN: CPT | Performed by: INTERNAL MEDICINE

## 2020-05-20 NOTE — PROGRESS NOTES
Sleep Clinic In person Follow Up    Date: 2020  Primary Care Physician: Ronald Bowser MD    Last office visit: 2020 (I reviewed the note from this day for accuracy and anything.  Any portions or data from that note that have been used in this note have been checked for accuracy and the appropriate changes have been made to this note wherever necessary.)  She saw my partner Dr. Kelly on 2020 to help with control of her depression, anxiety, delayed sleep phase syndrome, and poor sleep hygiene.  His note has been reviewed in detail.    CC: Follow up: JUNIOR      Sleep Testin. PSG on 2020, AHI of 9.3, REM AHI of 2.1, PLMI of 33.2   2. Currently on 5-20 cm H2O    Assessment and Plan:    1. Obstructive sleep apnea - stable chronic illness and Established, stable (1)  1. Compliant and improved with PAP therapy  2. Continue PAP as prescribed.   3. Script for PAP supplies  2. Insomnia - sleep onset and or maintenance - stable chronic illness and Established, stable (1)  1. On Lunesta 3 mg nightly  2. Continue cognitive behavioral therapy at and in person counseling as able and when safe   3. Depression with anxiety and PTSD, chronic medical problem, stable and being treated   4. Delayed sleep phase syndrome poor sleep hygiene, chronic medical issue, stable, no further work-up planned     Interim History:  Since the last visit:    1) mild JUNIOR -  Mikki Joel has remained compliant with CPAP. She denies mask and machine issues, dry mouth, headaches, or pressures intolerance. She denies abnormal dreams, sleep paralysis, nasal congestion, URI sx.    PAP Data:    Time frame: ~ 2020 - 2020   Compliance ~100 %  Average use on days used: 12hrs 10 min - sometimes run on in the morning  Percent of days with usage greater than or equal to 4 hours: >70%%  PAP range : 5-20 cm H2O  Average 90% pressure: 5.7 cmH2O  Leak: 17 minutes  Average AHI 2.6 events/hr  Mask type: Nasal  pillows  DME: [facility]    Bed time: 0100  Sleep latency:  minutes  Number of times awakens during the night: 3  Wake time: 9471-8529  Estimated total sleep time at night: 8 hours  Caffeine intake: 0oz of coffee, 4oz of tea, and 4oz of soda  Alcohol intake: 1 drinks per week  Nap time: none   Sleepiness with Driving: none    Vici - 1    2) Patient has RLS symptoms that are controlled    PMHx, FH, SH reviewed and pertinent changes are: unchanged from last office visit (date above)      REVIEW OF SYSTEMS:   Negative for chest pain, fever, cough, SOA, abdominal pain. Smoking:none        Exam:  There were no vitals filed for this visit.      Gen:  No acute distress, alert, oriented  Heent:   Oropharynx clear, no lesions on gross exam   PERRLA, EOMI Anicteric sclera, moist conjunctiva, no lid lag   Lungs:  CTA with normal effort   CV:  RRR, no M/R/G  GI:  soft, non-tender  Ext:  no c/c/e  Psych:  Appropriate affect      Past Medical History:   Diagnosis Date   • Anxiety    • Chronic anemia    • Chronic pain    • CTS (carpal tunnel syndrome)    • Deaf    • Depression with anxiety    • Fracture of wrist    • Gastroesophageal reflux disease    • Hypertension    • Migraine    • Obesity    • Osteoarthritis    • Perforated tympanic membrane, bilateral        Current Outpatient Medications:   •  ALBUTEROL SULFATE  (90 Base) MCG/ACT inhaler, INHALE 2 PUFFS BY MOUTH EVERY 4 HOURS AS NEEDED FOR WHEEZING, Disp: 8.5 g, Rfl: 0  •  benzonatate (TESSALON) 100 MG capsule, Take 1 capsule by mouth 3 (Three) Times a Day As Needed for Cough., Disp: 30 capsule, Rfl: 0  •  busPIRone (BUSPAR) 30 MG tablet, Take 30 mg by mouth 2 (Two) Times a Day., Disp: , Rfl:   •  cetirizine (zyrTEC) 10 MG tablet, TAKE 1 TABLET BY MOUTH DAILY, Disp: 30 tablet, Rfl: 11  •  ciprofloxacin (CIPRO) 250 MG tablet, Take 1 tablet by mouth 2 (Two) Times a Day., Disp: 10 tablet, Rfl: 0  •  dexlansoprazole (DEXILANT) 60 MG capsule, Take 1 capsule by  mouth Daily., Disp: 90 capsule, Rfl: 1  •  ferrous sulfate 325 (65 FE) MG tablet, Take 1 tablet by mouth Daily With Breakfast. Take with vitamin C to help with absorption, Disp: 30 tablet, Rfl: 6  •  Fluticasone Furoate-Vilanterol (BREO ELLIPTA) 100-25 MCG/INH inhaler, 1 puff bid and rinse out mouth, Disp: 90 each, Rfl: 3  •  hydrOXYzine pamoate (VISTARIL) 50 MG capsule, Take 1 capsule by mouth 3 (Three) Times a Day As Needed for Anxiety., Disp: 30 capsule, Rfl: 1  •  ipratropium (ATROVENT) 0.06 % nasal spray, 2 sprays each nostril 3 times per day, Disp: 15 mL, Rfl: 1  •  metoprolol tartrate (LOPRESSOR) 100 MG tablet, Take 0.5 tablets by mouth Every Night., Disp: , Rfl:   •  mometasone (NASONEX) 50 MCG/ACT nasal spray, USE 2 SPRAYS IN EACH NOSTRIL TWICE DAILY., Disp: 17 g, Rfl: 5  •  olopatadine (PATANASE) 0.6 % solution nasal solution, 2 sprays by Each Nare route 2 (Two) Times a Day., Disp: 30 g, Rfl: 11  •  ondansetron (ZOFRAN) 8 MG tablet, Take 1 tablet by mouth Every 8 (Eight) Hours As Needed for Nausea or Vomiting., Disp: 30 tablet, Rfl: 1  •  phenazopyridine (Pyridium) 100 MG tablet, Take 1 tablet by mouth 3 (Three) Times a Day As Needed for Bladder Spasms., Disp: 15 tablet, Rfl: 0  •  rOPINIRole (REQUIP) 4 MG tablet, TAKE 1 TABLET BY MOUTH DAILY, Disp: 30 tablet, Rfl: 3  •  sucralfate (CARAFATE) 1 g tablet, Take 1 tablet by mouth 4 (Four) Times a Day., Disp: 120 tablet, Rfl: 2  •  TRINTELLIX 20 MG tablet, , Disp: , Rfl:     I obtained a brief history from the patient, reviewed the medical problems and current medications, and made medical decisions regarding treatment based on that information. Total visit time 20 min, with total of 15 minutes spent counseling patient regarding:  PAP therapy and Sleep hygiene       RTC in 3 months     This document has been electronically signed by Ramirez Bai MD on May 20, 2020         CC: Ronald oBwser MD          No ref. provider found

## 2020-05-21 RX ORDER — ROPINIROLE 4 MG/1
4 TABLET, FILM COATED ORAL DAILY
Qty: 30 TABLET | Refills: 3 | Status: SHIPPED | OUTPATIENT
Start: 2020-05-21 | End: 2020-09-24 | Stop reason: SDUPTHER

## 2020-06-15 RX ORDER — IPRATROPIUM BROMIDE 42 UG/1
SPRAY, METERED NASAL
Qty: 15 ML | Refills: 0 | Status: SHIPPED | OUTPATIENT
Start: 2020-06-15 | End: 2020-06-29 | Stop reason: SDUPTHER

## 2020-06-15 NOTE — TELEPHONE ENCOUNTER
06/15/2020, 0850 - Patient's prior clinical appointment with Dr. Ramy Gaston M.D./ENT 05/15/2020 with 6 week clinical appointment with Audiology scheduled Monday, June 29, 2020 at 4:15 P.M. at [facility], [place], [place].

## 2020-06-29 ENCOUNTER — CLINICAL SUPPORT (OUTPATIENT)
Dept: AUDIOLOGY | Facility: CLINIC | Age: 65
End: 2020-06-29

## 2020-06-29 ENCOUNTER — OFFICE VISIT (OUTPATIENT)
Dept: OTOLARYNGOLOGY | Facility: CLINIC | Age: 65
End: 2020-06-29

## 2020-06-29 ENCOUNTER — DOCUMENTATION (OUTPATIENT)
Dept: OTOLARYNGOLOGY | Facility: CLINIC | Age: 65
End: 2020-06-29

## 2020-06-29 VITALS — TEMPERATURE: 97.6 F | WEIGHT: 225.6 LBS | HEIGHT: 67 IN | BODY MASS INDEX: 35.41 KG/M2

## 2020-06-29 DIAGNOSIS — H90.3 SENSORINEURAL HEARING LOSS, BILATERAL: Primary | ICD-10-CM

## 2020-06-29 DIAGNOSIS — Z09 POSTOP CHECK: Primary | ICD-10-CM

## 2020-06-29 PROCEDURE — 92567 TYMPANOMETRY: CPT | Performed by: AUDIOLOGIST

## 2020-06-29 PROCEDURE — 92557 COMPREHENSIVE HEARING TEST: CPT | Performed by: AUDIOLOGIST

## 2020-06-29 PROCEDURE — 99024 POSTOP FOLLOW-UP VISIT: CPT | Performed by: OTOLARYNGOLOGY

## 2020-06-29 RX ORDER — IPRATROPIUM BROMIDE 42 UG/1
SPRAY, METERED NASAL
Qty: 15 ML | Refills: 0 | Status: SHIPPED | OUTPATIENT
Start: 2020-06-29 | End: 2020-07-23

## 2020-06-29 RX ORDER — PRAZOSIN HYDROCHLORIDE 5 MG/1
CAPSULE ORAL
COMMUNITY
Start: 2020-06-01 | End: 2021-01-18

## 2020-06-29 RX ORDER — DEXLANSOPRAZOLE 60 MG/1
CAPSULE, DELAYED RELEASE ORAL
Qty: 90 CAPSULE | Refills: 1 | Status: SHIPPED | OUTPATIENT
Start: 2020-06-29 | End: 2020-10-28 | Stop reason: SDUPTHER

## 2020-06-29 RX ORDER — ESZOPICLONE 3 MG/1
TABLET, FILM COATED ORAL
COMMUNITY
Start: 2020-06-17 | End: 2020-10-17 | Stop reason: SDUPTHER

## 2020-06-29 NOTE — PROGRESS NOTES
The patient is doing well postop there is no evidence of infection hearing is dramatically improved but she can tell that subjectively as well as that shows up in the audiogram which I reviewed with her she does request some Atrovent for vasomotor rhinitis she doing well we will follow on the long-term and recheck in 6 months but I am pleased with a dramatic improvement in her hearing compared to where it was she still has some nerve hearing loss but her nail better hearing ear is her right versus the left.  MILLA Gaston MD

## 2020-06-29 NOTE — PROGRESS NOTES
STANDARD AUDIOMETRIC EVALUATION      Name:  Mikki Joel  :  1955  Age:  65 y.o.  Date of Evaluation:  2020      HISTORY    Reason for visit:  Mikki Joel is seen today for a hearing test at the request of Dr. Ramy Gaston.  Patient reports she had a tympanoplasty of her right ear earlier this year.  She states she has been hearing better since the surgery.     She states she may need her hearing aids adjusted.     EVALUATION    See Audiogram    RESULTS        Otoscopy and Tympanometry 226 Hz :  Right Ear:  Otoscopy:  Clear ear canal          Tympanometry:  Middle ear function within normal limits    Left Ear:   Otoscopy:  Clear ear canal        Tympanometry:  Middle ear function within normal limits    Test technique:  Standard Audiometry     Pure Tone Audiometry:   Patient responded to pure tones at 30-65 dB for 250-8000 Hz in right ear, and at 20-85 dB for 250-8000 Hz in left ear.       Speech Audiometry:        Right Ear:  Speech Reception Threshold (SRT) was obtained at 35 dBHL                 Speech Discrimination scores were 100% in quiet when words were presented at 75 dBHL       Left Ear:  Speech Reception Threshold (SRT) was obtained at 40 dBHL                 Speech Discrimination scores were 68% in quiet when words were presented at 80 dBHL    Reliability:   good    IMPRESSIONS:  1.  Tympanometry results are consistent with Middle ear function within normal limits in both ears.  2.  Pure tone results are consistent with mild to moderate sloping sensorineural hearing loss  for right ear, and mild to severe sloping mainly sensorineural hearing loss  in left ear.       RECOMMENDATIONS:  Patient is seeing the Ear Nose and Throat physician immediately following this examination.  Her hearing aids were readjusted to today's thresholds.  She will let me know if she needs further adjustments.     It was a pleasure seeing Mikki Joel in Audiology today.  We would be happy  to do further testing or discuss these test as necessary.          This document has been electronically signed by Shanell Berrios MS CCC-ERICK on June 29, 2020 16:47       Shanell Berrios MS CCC-A  Licensed Audiologist

## 2020-06-29 NOTE — PATIENT INSTRUCTIONS
MyPlate from USDA    MyPlate is an outline of a general healthy diet based on the 2010 Dietary Guidelines for Americans, from the U.S. Department of Agriculture (USDA). It sets guidelines for how much food you should eat from each food group based on your age, sex, and level of physical activity.  What are tips for following MyPlate?  To follow MyPlate recommendations:  · Eat a wide variety of fruits and vegetables, grains, and protein foods.  · Serve smaller portions and eat less food throughout the day.  · Limit portion sizes to avoid overeating.  · Enjoy your food.  · Get at least 150 minutes of exercise every week. This is about 30 minutes each day, 5 or more days per week.  It can be difficult to have every meal look like MyPlate. Think about MyPlate as eating guidelines for an entire day, rather than each individual meal.  Fruits and vegetables  · Make half of your plate fruits and vegetables.  · Eat many different colors of fruits and vegetables each day.  · For a 2,000 calorie daily food plan, eat:  ? 2½ cups of vegetables every day.  ? 2 cups of fruit every day.  · 1 cup is equal to:  ? 1 cup raw or cooked vegetables.  ? 1 cup raw fruit.  ? 1 medium-sized orange, apple, or banana.  ? 1 cup 100% fruit or vegetable juice.  ? 2 cups raw leafy greens, such as lettuce, spinach, or kale.  ? ½ cup dried fruit.  Grains  · One fourth of your plate should be grains.  · Make at least half of the grains you eat each day whole grains.  · For a 2,000 calorie daily food plan, eat 6 oz of grains every day.  · 1 oz is equal to:  ? 1 slice bread.  ? 1 cup cereal.  ? ½ cup cooked rice, cereal, or pasta.  Protein  · One fourth of your plate should be protein.  · Eat a wide variety of protein foods, including meat, poultry, fish, eggs, beans, nuts, and tofu.  · For a 2,000 calorie daily food plan, eat 5½ oz of protein every day.  · 1 oz is equal to:  ? 1 oz meat, poultry, or fish.  ? ¼ cup cooked beans.  ? 1 egg.  ? ½ oz nuts  or seeds.  ? 1 Tbsp peanut butter.  Dairy  · Drink fat-free or low-fat (1%) milk.  · Eat or drink dairy as a side to meals.  · For a 2,000 calorie daily food plan, eat or drink 3 cups of dairy every day.  · 1 cup is equal to:  ? 1 cup milk, yogurt, cottage cheese, or soy milk (soy beverage).  ? 2 oz processed cheese.  ? 1½ oz natural cheese.  Fats, oils, salt, and sugars  · Only small amounts of oils are recommended.  · Avoid foods that are high in calories and low in nutritional value (empty calories), like foods high in fat or added sugars.  · Choose foods that are low in salt (sodium). Choose foods that have less than 140 milligrams (mg) of sodium per serving.  · Drink water instead of sugary drinks. Drink enough water each day to keep your urine pale yellow.  Where to find support  · Work with your health care provider or a nutrition specialist (dietitian) to develop a customized eating plan that is right for you.  · Download an eva (mobile application) to help you track your daily food intake.  Where to find more information  · Go to ChooseMyPlate.gov for more information.  · Learn more and log your daily food intake according to MyPlate using USDA's SuperTracker: www.[employer].usda.gov  Summary  · MyPlate is a general guideline for healthy eating from the USDA. It is based on the 2010 Dietary Guidelines for Americans.  · In general, fruits and vegetables should take up ½ of your plate, grains should take up ¼ of your plate, and protein should take up ¼ of your plate.  This information is not intended to replace advice given to you by your health care provider. Make sure you discuss any questions you have with your health care provider.  Document Released: 01/06/2009 Document Revised: 01/19/2019 Document Reviewed: 03/19/2018  Elsevier Patient Education © 2020 Elsevier Inc.

## 2020-06-29 NOTE — PROGRESS NOTES
06/29/2020, 1425 - Patient telephoned per this staff member [phone].  Zero answer.  Voice message submitted with date, time, office contact information, and request to contact office at earliest convenience.     Rationale for speaking with patient - Inquire if prescription medication renewal request received per patient's pharmacy of choice, [facility] [place] for Atrovent 0.06% Nasal Spray was requested per patient or sent for renewal per pharmacy automation.  Patient prior clinical appointment with Dr. Ramy Gaston M.D./ENT 05/15/2020 with next clinical appointment scheduled Monday, June 29, 2020 at 4:15 P.M.  Audiology at 3:30 P.M.

## 2020-07-01 RX ORDER — ONDANSETRON HYDROCHLORIDE 8 MG/1
TABLET, FILM COATED ORAL
Qty: 30 TABLET | Refills: 1 | Status: SHIPPED | OUTPATIENT
Start: 2020-07-01 | End: 2020-08-26

## 2020-07-23 RX ORDER — IPRATROPIUM BROMIDE 42 UG/1
SPRAY, METERED NASAL
Qty: 15 ML | Refills: 4 | Status: SHIPPED | OUTPATIENT
Start: 2020-07-23 | End: 2021-06-09 | Stop reason: SDUPTHER

## 2020-07-23 NOTE — TELEPHONE ENCOUNTER
Patient's prior clinical appointment with Dr. Gaston 06/29/2020.  Prescription medication renewals approved per Dr. Gaston until patient's next clinical appointment scheduled Monday, January 4, 2021 at 2:15 P.M. at [facility], [place], [place].

## 2020-08-24 ENCOUNTER — OFFICE VISIT (OUTPATIENT)
Dept: SLEEP MEDICINE | Facility: HOSPITAL | Age: 65
End: 2020-08-24

## 2020-08-24 VITALS
OXYGEN SATURATION: 98 % | BODY MASS INDEX: 35.05 KG/M2 | WEIGHT: 223.3 LBS | DIASTOLIC BLOOD PRESSURE: 100 MMHG | HEIGHT: 67 IN | SYSTOLIC BLOOD PRESSURE: 177 MMHG | HEART RATE: 73 BPM

## 2020-08-24 DIAGNOSIS — G47.33 OSA (OBSTRUCTIVE SLEEP APNEA): Primary | ICD-10-CM

## 2020-08-24 DIAGNOSIS — F51.04 CHRONIC INSOMNIA: ICD-10-CM

## 2020-08-24 DIAGNOSIS — Z78.9 DIFFICULTY USING CONTINUOUS POSITIVE AIRWAY PRESSURE (CPAP) DEVICE: ICD-10-CM

## 2020-08-24 DIAGNOSIS — F39 AFFECTIVE DISORDER (HCC): ICD-10-CM

## 2020-08-24 PROCEDURE — 99214 OFFICE O/P EST MOD 30 MIN: CPT | Performed by: PSYCHIATRY & NEUROLOGY

## 2020-08-24 NOTE — PROGRESS NOTES
Sleep Medicine Follow-Up Note    CC & ID:  Ms. Mikki Joel is a 65 y.o. female seen for follow-up regarding JUNIOR & Insomnia.      Treatment Summary:   --Dx PSG in Jan 20: AHI 9, PLMI 33; started on APAP 5-20 cm  --Last seen in May 20 by Dr Bai; ongoing compliance; Insomnia on Lunesta at that time        HPI:   Today, patient presents unaccompanied     CPAP - mask issues; most nights; taking off some nights; JUNIOR impairing, constant, affecting daytime function, in the context of other sx, as below:    Insomnia: most nights; DIMS; dreading sleep; night owl can be after midnight;     Tries to unwind in bed; watching TV.    Distressing dreams; some dream enactment variable frequency.      Some EDS, napping during day.       Respiratory:  -Ongoing Snoring: w/ PAP off  -Mouth Breathing: can  -Dry Mouth: can  -Nocturnal Gasping: can    ROS:   -ENT: Nasal Obstruction: at times  -CONSTITUTIONAL: Weight: stable  Wt Readings from Last 5 Encounters:   08/24/20 101 kg (223 lb 4.8 oz)   06/29/20 102 kg (225 lb 9.6 oz)   05/20/20 102 kg (225 lb 11.2 oz)   05/15/20 103 kg (226 lb 12.8 oz)   04/22/20 97.5 kg (215 lb)       Sleep Pattern:  -Position: side  -Bedtime: 12 A  -Lights Out: variable; TV off for an hour  -Environment: Lights/TV off  -Latency: 60 min  -Awakenings: at least a few times  -Wake Time: 9-10 A  -Rise Time: same  -Patient's estimate of Sleep Time: variable  -Total Clock Time Spent in Bed: 8-9 h sleep; 9-10 h in bed    Daytime Symptoms:  -as above      Social History:  -Nicotine: no  -Caffeine: no  -Alcohol: no  -THC: no  -OTC/Supplements/herbals: sudafed in AM  -Illicits:  denies      Patient Active Problem List   Diagnosis   • Asthma   • Arthritis   • Nasal turbinate hypertrophy   • Gastroesophageal reflux disease   • Depression with anxiety   • Insomnia   • PTSD (post-traumatic stress disorder)   • Restless leg   • BMI 35.0-35.9,adult   • Mixed hearing loss of left ear   • Right knee pain   • Presence of  right artificial knee joint   • Essential hypertension, benign   • Presbyesophagus   • Chronic vasomotor rhinitis   • Perforated tympanic membrane, bilateral   • Mixed conductive and sensorineural hearing loss of right ear with restricted hearing of left ear   • Morbidly obese (CMS/HCC)       CMHx:  --> Denies any significant medical changes since last visit.   --> Supplemental Oxygen Use: denies      Current Outpatient Medications   Medication Sig Dispense Refill   • ALBUTEROL SULFATE  (90 Base) MCG/ACT inhaler INHALE 2 PUFFS BY MOUTH EVERY 4 HOURS AS NEEDED FOR WHEEZING 8.5 g 0   • benzonatate (TESSALON) 100 MG capsule Take 1 capsule by mouth 3 (Three) Times a Day As Needed for Cough. 30 capsule 0   • busPIRone (BUSPAR) 30 MG tablet Take 30 mg by mouth 2 (Two) Times a Day.     • cetirizine (zyrTEC) 10 MG tablet TAKE 1 TABLET BY MOUTH DAILY 30 tablet 11   • ciprofloxacin (CIPRO) 250 MG tablet Take 1 tablet by mouth 2 (Two) Times a Day. 10 tablet 0   • DEXILANT 60 MG capsule TAKE 1 CAPSULE BY MOUTH DAILY 90 capsule 1   • eszopiclone (LUNESTA) 3 MG tablet TK 1 T PO Q NIGHT IMMEDIATELY BEFORE BEDTIME     • ferrous sulfate 325 (65 FE) MG tablet Take 1 tablet by mouth Daily With Breakfast. Take with vitamin C to help with absorption 30 tablet 6   • Fluticasone Furoate-Vilanterol (BREO ELLIPTA) 100-25 MCG/INH inhaler 1 puff bid and rinse out mouth 90 each 3   • hydrOXYzine pamoate (VISTARIL) 50 MG capsule Take 1 capsule by mouth 3 (Three) Times a Day As Needed for Anxiety. 30 capsule 1   • ipratropium (ATROVENT) 0.06 % nasal spray INSTILL 2 SPRAYS INTO EACH NOSTRIL THREE TIMES DAILY 15 mL 4   • metoprolol tartrate (LOPRESSOR) 100 MG tablet Take 0.5 tablets by mouth Every Night.     • mometasone (NASONEX) 50 MCG/ACT nasal spray USE 2 SPRAYS IN EACH NOSTRIL TWICE DAILY. 17 g 5   • olopatadine (PATANASE) 0.6 % solution nasal solution 2 sprays by Each Nare route 2 (Two) Times a Day. 30 g 11   • ondansetron (ZOFRAN) 8  "MG tablet TAKE 1 TABLET BY MOUTH EVERY 8 HOURS AS NEEDED FOR NAUSEA OR VOMITING 30 tablet 1   • phenazopyridine (Pyridium) 100 MG tablet Take 1 tablet by mouth 3 (Three) Times a Day As Needed for Bladder Spasms. 15 tablet 0   • prazosin (MINIPRESS) 5 MG capsule      • rOPINIRole (REQUIP) 4 MG tablet TAKE 1 TABLET BY MOUTH DAILY 30 tablet 3   • sucralfate (CARAFATE) 1 g tablet Take 1 tablet by mouth 4 (Four) Times a Day. 120 tablet 2   • TRINTELLIX 20 MG tablet        No current facility-administered medications for this visit.      -Notable Current Medications:  --> Lunesta, Prazosin QHS    PE:  Body mass index is 34.97 kg/m².  Vitals:    08/24/20 1333   BP: 177/100   Pulse: 73   SpO2: 98%   Weight: 101 kg (223 lb 4.8 oz)   Height: 170.2 cm (67.01\")   On My Manual recheck 124/86.    Wt Readings from Last 5 Encounters:   08/24/20 101 kg (223 lb 4.8 oz)   06/29/20 102 kg (225 lb 9.6 oz)   05/20/20 102 kg (225 lb 11.2 oz)   05/15/20 103 kg (226 lb 12.8 oz)   04/22/20 97.5 kg (215 lb)       General:  In NAD.  Head: Atraumatic  Eyes: EOMI.  CV: No Clubbing.   Pul: Respirations: unlaboured.    MS: No atrophy.  Neuro: No resting tremor.  Gait normal turning & station; unremarkable overall.  Psych: Socially appropriate.  Pleasant.  No overt dysphoria.         Assessment & Planning:  Ms. Mikki Joel is a 65 y.o. female who is seen for follow-up of:     --Obstructive Sleep Apnea:  Sub optimally controlled  --No in lab PAP titration  --Will plan for In lab CPAP titration given issues; RBD Montage given dream enactment    ---Insomnia, chronic: stable  --Cont Lunesta  --Work on stim control & fixed wake time; work on maintenance insomnia from sub optimally tx JUNIOR    -- overlay: encoruaged outpt ; d/w pt benefits of tx of insomnia & JUNIOR to aid with BH sx.     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      -->  Safe driving reviewed.    -->  Follow-up after study; " sooner, if needed.    --> Call with any questions or concerns.      All questions answered for the patient, who indicated understanding and agreed with the plan.       Jimmy Kelly MD  08/24/2020 @ 1:44 PM  Sleep Medicine    CC: Ronald Bowser MD

## 2020-08-26 DIAGNOSIS — G47.33 OSA (OBSTRUCTIVE SLEEP APNEA): Primary | ICD-10-CM

## 2020-08-26 RX ORDER — ONDANSETRON HYDROCHLORIDE 8 MG/1
TABLET, FILM COATED ORAL
Qty: 30 TABLET | Refills: 1 | Status: SHIPPED | OUTPATIENT
Start: 2020-08-26 | End: 2020-10-12

## 2020-08-28 LAB — SARS-COV-2 N GENE RESP QL NAA+PROBE: NOT DETECTED

## 2020-08-28 PROCEDURE — 87635 SARS-COV-2 COVID-19 AMP PRB: CPT | Performed by: PSYCHIATRY & NEUROLOGY

## 2020-08-31 ENCOUNTER — HOSPITAL ENCOUNTER (OUTPATIENT)
Dept: SLEEP MEDICINE | Facility: HOSPITAL | Age: 65
Discharge: HOME | End: 2020-08-31
Admitting: PSYCHIATRY & NEUROLOGY

## 2020-08-31 DIAGNOSIS — Z78.9 DIFFICULTY USING CONTINUOUS POSITIVE AIRWAY PRESSURE (CPAP) DEVICE: ICD-10-CM

## 2020-08-31 DIAGNOSIS — G47.33 OSA (OBSTRUCTIVE SLEEP APNEA): ICD-10-CM

## 2020-08-31 PROCEDURE — 95811 POLYSOM 6/>YRS CPAP 4/> PARM: CPT

## 2020-08-31 PROCEDURE — 95811 POLYSOM 6/>YRS CPAP 4/> PARM: CPT | Performed by: PSYCHIATRY & NEUROLOGY

## 2020-09-01 RX ORDER — FERROUS SULFATE 325(65) MG
325 TABLET ORAL
Qty: 30 TABLET | Refills: 5 | Status: SHIPPED | OUTPATIENT
Start: 2020-09-01 | End: 2021-06-02 | Stop reason: SDUPTHER

## 2020-09-11 DIAGNOSIS — G47.33 OSA (OBSTRUCTIVE SLEEP APNEA): Primary | ICD-10-CM

## 2020-09-15 ENCOUNTER — TELEPHONE (OUTPATIENT)
Dept: SLEEP MEDICINE | Facility: HOSPITAL | Age: 65
End: 2020-09-15

## 2020-09-24 ENCOUNTER — TELEPHONE (OUTPATIENT)
Dept: OTOLARYNGOLOGY | Facility: CLINIC | Age: 65
End: 2020-09-24

## 2020-09-24 DIAGNOSIS — G25.81 RESTLESS LEGS SYNDROME (RLS): Primary | ICD-10-CM

## 2020-09-24 RX ORDER — ROPINIROLE 3 MG/1
3 TABLET, FILM COATED ORAL NIGHTLY
Qty: 30 TABLET | Refills: 2 | Status: SHIPPED | OUTPATIENT
Start: 2020-09-24 | End: 2020-12-11

## 2020-09-24 NOTE — TELEPHONE ENCOUNTER
09/24/2020, 0923 - Patient telephoned per this staff member [phone].  Zero answer.  Voice message submitted with notification Dr. Gaston currently out of office with Dr. Slater accepting call.  Per Dr. Slater, patient is to seek medical care at Urgent Care and discuss symptoms with Dr. Gaston upon his return to clinic.  Patient instructed to contact office with questions.

## 2020-09-24 NOTE — TELEPHONE ENCOUNTER
----- Message from Mike Slater MD sent at 9/23/2020  1:24 PM CDT -----  Regarding: RE: Dr. Gaston Patient  Contact: [phone]  Please advise the patient to go to urgent care and then discussed this with Dr. Gaston next week  ----- Message -----  From: Jaiden Avalos  Sent: 9/23/2020   1:08 PM CDT  To: Mike Slater MD, Antonina Byrd, #  Subject: Dr. Gaston Patient                               Called to say that she has another sinus infection and is wanting to know if antibiotics can be called in for her. Greenish/yellowish mucus when she blows her nose. Told Dr. Gaston is out and she said she said she would go to Urgent Care if she had to.    Uses [employer] on [facility].

## 2020-09-29 ENCOUNTER — TELEPHONE (OUTPATIENT)
Dept: OTOLARYNGOLOGY | Facility: CLINIC | Age: 65
End: 2020-09-29

## 2020-09-29 ENCOUNTER — TELEPHONE (OUTPATIENT)
Dept: SLEEP MEDICINE | Facility: HOSPITAL | Age: 65
End: 2020-09-29

## 2020-09-29 NOTE — TELEPHONE ENCOUNTER
09/29/2020, 1431 - Patient telephoned per this staff member [phone].  Zero answer.  Voice message submitted on patient self identified answering machine with request to contact office with status update regarding Sinus Infection.

## 2020-09-30 ENCOUNTER — TELEPHONE (OUTPATIENT)
Dept: OTOLARYNGOLOGY | Facility: CLINIC | Age: 65
End: 2020-09-30

## 2020-09-30 NOTE — TELEPHONE ENCOUNTER
09/30/2020, 0934 - Patient telephoned per this staff member [phone].  Zero answer.  Voice message submitted on patient's self identified answering machine with request to contact office.    Rationale for speaking with patient - Health status check regarding patient statement of Sinus Infection on 09/23/2020.

## 2020-10-12 RX ORDER — ONDANSETRON HYDROCHLORIDE 8 MG/1
TABLET, FILM COATED ORAL
Qty: 30 TABLET | Refills: 1 | Status: SHIPPED | OUTPATIENT
Start: 2020-10-12 | End: 2020-10-28 | Stop reason: SDUPTHER

## 2020-10-13 ENCOUNTER — TELEPHONE (OUTPATIENT)
Dept: OTOLARYNGOLOGY | Facility: CLINIC | Age: 65
End: 2020-10-13

## 2020-10-13 RX ORDER — CEFDINIR 300 MG/1
300 CAPSULE ORAL 2 TIMES DAILY
Qty: 42 CAPSULE | Refills: 0 | Status: SHIPPED | OUTPATIENT
Start: 2020-10-13 | End: 2020-11-09

## 2020-10-13 NOTE — TELEPHONE ENCOUNTER
----- Message from Ramy Gaston MD sent at 10/13/2020  3:43 PM CDT -----  Contact: [phone]  Omnicef 300 bid for 3 weeks , call if no better by next Tue am and f/u 7-10 after completing abx  ----- Message -----  From: Aura Elena LPN  Sent: 10/13/2020   3:25 PM CDT  To: Ilene Hill CSA, Ramy Gaston MD    10/13/2020, 1522 - I spoke with Ms. Joel.  She stated she is experiencing nasal congestion with green discharge, bad taste in mouth, pain in cheek bones and forehead, sinus headache, and malaise  X 3 weeks.  Patient stated she has been afebrile.  Patient seen at Urgent Care 09/23/2020 for Acute Sinusitis and prescribed Cefuroxime Axetil 500 MG Tablets, 1 tablet by mouth 2 times per day X 10 days.  Patient stated once prescription completed, symptoms returned.  ----- Message -----  From: Jaiden Avalos  Sent: 10/13/2020   3:00 PM CDT  To: Antonina Byrd, Ximena Young, #    Called to say that she has another sinus infection and wants to know if you can call something in for her.

## 2020-10-13 NOTE — TELEPHONE ENCOUNTER
10/13/2020, 1645 - Patient telephoned per this staff member [phone].  Patient unavailable.  Spoke with individual identifying himself as patient's , Refugio Joel.  Mr. Joel stated he will relay message to patient regarding Dr. Gaston prescribing antibiotic Cefdinir (Omnicef) 300 MG Capsules, 1 capsule by mouth 2 times per day. Prescription medication submitted to [facility], [place], [place].  Brandi Marycruz verbalized understanding.

## 2020-10-13 NOTE — TELEPHONE ENCOUNTER
----- Message from Aura Elena LPN sent at 10/13/2020  3:25 PM CDT -----  Contact: [phone]  10/13/2020, 1522 - I spoke with Ms. Joel.  She stated she is experiencing nasal congestion with green discharge, bad taste in mouth, pain in cheek bones and forehead, sinus headache, and malaise  X 3 weeks.  Patient stated she has been afebrile.  Patient seen at Urgent Care 09/23/2020 for Acute Sinusitis and prescribed Cefuroxime Axetil 500 MG Tablets, 1 tablet by mouth 2 times per day X 10 days.  Patient stated once prescription completed, symptoms returned.  ----- Message -----  From: Jaiden Avalos  Sent: 10/13/2020   3:00 PM CDT  To: Ximena Yanes, #    Called to say that she has another sinus infection and wants to know if you can call something in for her.

## 2020-10-14 ENCOUNTER — TELEPHONE (OUTPATIENT)
Dept: OTOLARYNGOLOGY | Facility: CLINIC | Age: 65
End: 2020-10-14

## 2020-10-14 NOTE — TELEPHONE ENCOUNTER
----- Message from Ramy Gaston MD sent at 10/13/2020  3:43 PM CDT -----  Contact: [phone]  Omnicef 300 bid for 3 weeks , call if no better by next Tue am and f/u 7-10 after completing abx  ----- Message -----  From: Aura Elena LPN  Sent: 10/13/2020   3:25 PM CDT  To: Ilene Hill CSA, Ramy Gaston MD    10/13/2020, 1522 - I spoke with Ms. Joel.  She stated she is experiencing nasal congestion with green discharge, bad taste in mouth, pain in cheek bones and forehead, sinus headache, and malaise  X 3 weeks.  Patient stated she has been afebrile.  Patient seen at Urgent Care 09/23/2020 for Acute Sinusitis and prescribed Cefuroxime Axetil 500 MG Tablets, 1 tablet by mouth 2 times per day X 10 days.  Patient stated once prescription completed, symptoms returned.  ----- Message -----  From: Jaiden Avalos  Sent: 10/13/2020   3:00 PM CDT  To: Antonina Byrd, Ximena Young, #    Called to say that she has another sinus infection and wants to know if you can call something in for her.

## 2020-10-14 NOTE — TELEPHONE ENCOUNTER
10/14/2020, 0848 - Patient telephoned per this staff member [phone] with notification Per Dr. Gaston for patient to schedule clinical appointment 7 - 10 days following completion of antibiotic therapy Omnicef 300 MG Capsules, 1 capsule by mouth 2 times per day X 21 days.  Patient in agreement.  Clinical appointment scheduled for Monday, November 16, 2020 at 8:00 A.M.

## 2020-10-17 DIAGNOSIS — F51.04 CHRONIC INSOMNIA: Primary | ICD-10-CM

## 2020-10-17 RX ORDER — ESZOPICLONE 3 MG/1
3 TABLET, FILM COATED ORAL NIGHTLY
Qty: 30 TABLET | Refills: 1 | Status: SHIPPED | OUTPATIENT
Start: 2020-10-17 | End: 2020-12-11

## 2020-10-17 NOTE — PROGRESS NOTES
Refill request for Lunesta, 3 mg nightly.    Ross reviewed.  Request #[id number].  Reviewed.  Unremarkable.  Scripts since March 20:  03/16/2020 Eszopiclone 3MG 1954 30 30 [facility] 1  04/19/2020 Eszopiclone 3MG 1954 30 30 [facility] 1  05/19/2020 Eszopiclone 3MG 1954 30 30 [facility] 1  06/17/2020 Eszopiclone 3MG 1954 30 30 [facility] 1  07/16/2020 Eszopiclone 3MG 1954 30 30 [facility] 1  08/15/2020 Eszopiclone 3MG 1954 30 30 [facility] 1  09/14/2020 Eszopiclone 3MG 1954 30 30 [facility] 1        Last seen by me in August.  PAP titration in the interim.  Was working on stimulus control fixed wake time last visit.    Follow-up appointment with me in December.    We will bridge to that appointment.     Jimmy Kelly II, MD  10/17/20 @ 2:44 PM CDT

## 2020-10-20 RX ORDER — MOMETASONE FUROATE 50 UG/1
SPRAY, METERED NASAL
Qty: 17 G | Refills: 5 | Status: SHIPPED | OUTPATIENT
Start: 2020-10-20 | End: 2021-06-21

## 2020-10-28 ENCOUNTER — OFFICE VISIT (OUTPATIENT)
Dept: GASTROENTEROLOGY | Facility: CLINIC | Age: 65
End: 2020-10-28

## 2020-10-28 VITALS
HEART RATE: 77 BPM | BODY MASS INDEX: 36.41 KG/M2 | SYSTOLIC BLOOD PRESSURE: 177 MMHG | WEIGHT: 232 LBS | DIASTOLIC BLOOD PRESSURE: 86 MMHG | HEIGHT: 67 IN

## 2020-10-28 DIAGNOSIS — K59.04 CHRONIC IDIOPATHIC CONSTIPATION: ICD-10-CM

## 2020-10-28 DIAGNOSIS — K21.00 GASTROESOPHAGEAL REFLUX DISEASE WITH ESOPHAGITIS WITHOUT HEMORRHAGE: Primary | ICD-10-CM

## 2020-10-28 PROCEDURE — 99213 OFFICE O/P EST LOW 20 MIN: CPT | Performed by: NURSE PRACTITIONER

## 2020-10-28 RX ORDER — DEXLANSOPRAZOLE 60 MG/1
1 CAPSULE, DELAYED RELEASE ORAL DAILY
Qty: 90 CAPSULE | Refills: 1 | Status: SHIPPED | OUTPATIENT
Start: 2020-10-28 | End: 2021-04-20 | Stop reason: SDUPTHER

## 2020-10-28 RX ORDER — ONDANSETRON HYDROCHLORIDE 8 MG/1
8 TABLET, FILM COATED ORAL EVERY 8 HOURS PRN
Qty: 30 TABLET | Refills: 3 | Status: SHIPPED | OUTPATIENT
Start: 2020-10-28 | End: 2020-12-09

## 2020-10-28 RX ORDER — SUCRALFATE 1 G/1
1 TABLET ORAL 4 TIMES DAILY PRN
Qty: 60 TABLET | Refills: 1 | Status: SHIPPED | OUTPATIENT
Start: 2020-10-28

## 2020-10-28 NOTE — PATIENT INSTRUCTIONS
Food Choices for Gastroesophageal Reflux Disease, Adult  When you have gastroesophageal reflux disease (GERD), the foods you eat and your eating habits are very important. Choosing the right foods can help ease the discomfort of GERD. Consider working with a diet and nutrition specialist (dietitian) to help you make healthy food choices.  What general guidelines should I follow?    Eating plan  · Choose healthy foods low in fat, such as fruits, vegetables, whole grains, low-fat dairy products, and lean meat, fish, and poultry.  · Eat frequent, small meals instead of three large meals each day. Eat your meals slowly, in a relaxed setting. Avoid bending over or lying down until 2-3 hours after eating.  · Limit high-fat foods such as fatty meats or fried foods.  · Limit your intake of oils, butter, and shortening to less than 8 teaspoons each day.  · Avoid the following:  ? Foods that cause symptoms. These may be different for different people. Keep a food diary to keep track of foods that cause symptoms.  ? Alcohol.  ? Drinking large amounts of liquid with meals.  ? Eating meals during the 2-3 hours before bed.  · Cook foods using methods other than frying. This may include baking, grilling, or broiling.  Lifestyle  · Maintain a healthy weight. Ask your health care provider what weight is healthy for you. If you need to lose weight, work with your health care provider to do so safely.  · Exercise for at least 30 minutes on 5 or more days each week, or as told by your health care provider.  · Avoid wearing clothes that fit tightly around your waist and chest.  · Do not use any products that contain nicotine or tobacco, such as cigarettes and e-cigarettes. If you need help quitting, ask your health care provider.  · Sleep with the head of your bed raised. Use a wedge under the mattress or blocks under the bed frame to raise the head of the bed.  What foods are not recommended?  The items listed may not be a complete  list. Talk with your dietitian about what dietary choices are best for you.  Grains  Pastries or quick breads with added fat. French toast.  Vegetables  Deep fried vegetables. French fries. Any vegetables prepared with added fat. Any vegetables that cause symptoms. For some people this may include tomatoes and tomato products, chili peppers, onions and garlic, and horseradish.  Fruits  Any fruits prepared with added fat. Any fruits that cause symptoms. For some people this may include citrus fruits, such as oranges, grapefruit, pineapple, and navarro.  Meats and other protein foods  High-fat meats, such as fatty beef or pork, hot dogs, ribs, ham, sausage, salami and lacey. Fried meat or protein, including fried fish and fried chicken. Nuts and nut butters.  Dairy  Whole milk and chocolate milk. Sour cream. Cream. Ice cream. Cream cheese. Milk shakes.  Beverages  Coffee and tea, with or without caffeine. Carbonated beverages. Sodas. Energy drinks. Fruit juice made with acidic fruits (such as orange or grapefruit). Tomato juice. Alcoholic drinks.  Fats and oils  Butter. Margarine. Shortening. Ghee.  Sweets and desserts  Chocolate and cocoa. Donuts.  Seasoning and other foods  Pepper. Peppermint and spearmint. Any condiments, herbs, or seasonings that cause symptoms. For some people, this may include molina, hot sauce, or vinegar-based salad dressings.  Summary  · When you have gastroesophageal reflux disease (GERD), food and lifestyle choices are very important to help ease the discomfort of GERD.  · Eat frequent, small meals instead of three large meals each day. Eat your meals slowly, in a relaxed setting. Avoid bending over or lying down until 2-3 hours after eating.  · Limit high-fat foods such as fatty meat or fried foods.  This information is not intended to replace advice given to you by your health care provider. Make sure you discuss any questions you have with your health care provider.  Document Released:  12/18/2006 Document Revised: 04/09/2020 Document Reviewed: 12/19/2017  Elsevier Patient Education © 2020 [employer]

## 2020-10-28 NOTE — PROGRESS NOTES
Chief Complaint   Patient presents with   • Heartburn       Subjective    Mikki Palak Joel is a 65 y.o. female. she is here today for follow-up.    Heartburn  She complains of abdominal pain, heartburn and nausea. She reports no belching, no chest pain, no choking, no coughing, no dysphagia, no early satiety, no globus sensation or no hoarse voice. This is a chronic problem. The problem occurs occasionally (episodes of vomiting 1-2 times per week ). The problem has been waxing and waning. The heartburn duration is less than a minute. The heartburn is located in the substernum. The heartburn is of moderate intensity. The heartburn does not wake her from sleep. The heartburn does not limit her activity. The heartburn changes with position. The symptoms are aggravated by exertion. Pertinent negatives include no melena or weight loss.   Constipation  This is a chronic problem. The current episode started more than 1 year ago. The problem has been rapidly worsening since onset. Her stool frequency is 2 to 3 times per week. Stool description: very hard-gets compacted at times  The patient is on a high fiber diet. She does not exercise regularly. There has been adequate water intake. Associated symptoms include abdominal pain, back pain, bloating, hemorrhoids and nausea. Pertinent negatives include no anorexia, diarrhea, difficulty urinating, fecal incontinence, fever, flatus, hematochezia, melena, rectal pain, vomiting or weight loss. Risk factors include obesity. She has tried laxatives, fiber and stool softeners (miralax, colace and senokot have not helped ) for the symptoms. The treatment provided mild relief.            The following portions of the patient's history were reviewed and updated as appropriate:   Past Medical History:   Diagnosis Date   • Anxiety    • Chronic anemia    • Chronic pain    • CTS (carpal tunnel syndrome)    • Deaf    • Depression with anxiety    • Fracture of wrist    • Gastroesophageal  reflux disease    • Hypertension    • Migraine    • Obesity    • Osteoarthritis    • Perforated tympanic membrane, bilateral      Past Surgical History:   Procedure Laterality Date   • COLONOSCOPY N/A 2018   • EAR TUBES     • ENDOSCOPIC FUNCTIONAL SINUS SURGERY (FESS) Bilateral 2017   • ENDOSCOPY N/A 2018   • ENDOSCOPY N/A 6/10/2019   • KNEE ARTHROPLASTY     • RHINOPLASTY     • SHOULDER ARTHROSCOPY     • TYMPANOPLASTY Left 2018   • TYMPANOPLASTY Right 2020    Procedure: TYMPANOPLASTY AND  CARTILAGE GRAFT;  Surgeon: Ramy Gaston MD;  Location: [facility];  Service: ENT;  Laterality: Right;   • UPPER GASTROINTESTINAL ENDOSCOPY       Family History   Problem Relation Age of Onset   • Hypertension Mother    • Alcohol abuse Father    • Stroke Maternal Grandmother    • Irritable bowel syndrome Sister      OB History        2    Para   2    Term   2            AB        Living           SAB        TAB        Ectopic        Molar        Multiple        Live Births                  Prior to Admission medications    Medication Sig Start Date End Date Taking? Authorizing Provider   ALBUTEROL SULFATE  (90 Base) MCG/ACT inhaler INHALE 2 PUFFS BY MOUTH EVERY 4 HOURS AS NEEDED FOR WHEEZING 19  Yes Ronald Bowser MD   cefdinir (OMNICEF) 300 MG capsule Take 1 capsule by mouth 2 (Two) Times a Day. 10/13/20  Yes Ramy Gaston MD   cetirizine (zyrTEC) 10 MG tablet TAKE 1 TABLET BY MOUTH DAILY 20  Yes Ronald Bowser MD   DEXILANT 60 MG capsule TAKE 1 CAPSULE BY MOUTH DAILY 20  Yes Zelda Rich APRN   eszopiclone (LUNESTA) 3 MG tablet Take 1 tablet by mouth Every Night. For insomnia.  Take immediately before to 2 hours prior to bedtime. 10/17/20  Yes Jimmy Kelly II, MD   ferrous sulfate 325 (65 FE) MG tablet Take 1 tablet by mouth Daily With Breakfast. Take with vitamin C to help with absorption 20  Yes Mila Mata APRN   Fluticasone  Furoate-Vilanterol (BREO ELLIPTA) 100-25 MCG/INH inhaler 1 puff bid and rinse out mouth 4/22/19  Yes Ronald Bowser MD   ipratropium (ATROVENT) 0.06 % nasal spray INSTILL 2 SPRAYS INTO EACH NOSTRIL THREE TIMES DAILY 7/23/20  Yes Ramy Gaston MD   metoprolol tartrate (LOPRESSOR) 100 MG tablet Take 0.5 tablets by mouth Every Night. 2/11/20  Yes Umberto Carvalho MD   mometasone (NASONEX) 50 MCG/ACT nasal spray 2 sprays into each nostril 2 times per day 10/20/20  Yes Ramy Gaston MD   olopatadine (PATANASE) 0.6 % solution nasal solution 2 sprays by Each Nare route 2 (Two) Times a Day. 1/13/20  Yes Ramy Gaston MD   ondansetron (ZOFRAN) 8 MG tablet TAKE 1 TABLET BY MOUTH EVERY 8 HOURS AS NEEDED FOR NAUSEA OR VOMITING 10/12/20  Yes Zelda Rich APRN   prazosin (MINIPRESS) 5 MG capsule  6/1/20  Yes ProviderLucía MD   rOPINIRole (REQUIP) 3 MG tablet Take 1 tablet by mouth Every Night. For Restless Leg Syndrome 9/24/20  Yes Jimmy Kelly II, MD   sucralfate (CARAFATE) 1 g tablet Take 1 tablet by mouth 4 (Four) Times a Day. 10/22/19  Yes Zelda Rich APRN   TRINTELLIX 20 MG tablet  4/13/20  Yes ProviderLucía MD     Allergies   Allergen Reactions   • Codeine Other (See Comments)     Increases heart rate   • Sulfa Antibiotics Hives     Social History     Socioeconomic History   • Marital status:      Spouse name: Not on file   • Number of children: Not on file   • Years of education: Not on file   • Highest education level: Not on file   Tobacco Use   • Smoking status: Never Smoker   • Smokeless tobacco: Never Used   Substance and Sexual Activity   • Alcohol use: Yes     Comment: 05/15/2020 - Patient states she consumes an alcoholic beverage very seldom.   • Drug use: Yes     Types: Marijuana     Comment: 05/15/2020 - Patient states she utilizes capsule form of Marijuana for pain control at night to aid in sleep.   • Sexual activity: Not Currently     Partners: Male     Birth  "control/protection: Surgical       Review of Systems  Review of Systems   Constitutional: Negative for fever and weight loss.   HENT: Negative for hoarse voice.    Respiratory: Negative for cough and choking.    Cardiovascular: Negative for chest pain.   Gastrointestinal: Positive for abdominal pain, bloating, constipation, heartburn, hemorrhoids and nausea. Negative for anorexia, diarrhea, dysphagia, flatus, hematochezia, melena, rectal pain and vomiting.   Genitourinary: Negative for difficulty urinating.   Musculoskeletal: Positive for back pain.        Ht 170.2 cm (67\")   Wt 105 kg (232 lb)   LMP  (LMP Unknown)   BMI 36.34 kg/m²     Objective    Physical Exam  Constitutional:       General: She is not in acute distress.     Appearance: Normal appearance. She is well-developed.   HENT:      Head: Normocephalic and atraumatic.   Neck:      Musculoskeletal: Normal range of motion and neck supple.      Thyroid: No thyromegaly.   Cardiovascular:      Rate and Rhythm: Normal rate and regular rhythm.      Heart sounds: Normal heart sounds.   Pulmonary:      Effort: Pulmonary effort is normal.      Breath sounds: Normal breath sounds. No wheezing, rhonchi or rales.   Abdominal:      General: Bowel sounds are normal. There is no distension.      Palpations: Abdomen is soft. Abdomen is not rigid.      Tenderness: There is abdominal tenderness in the right upper quadrant, epigastric area and left upper quadrant. There is no guarding.      Hernia: No hernia is present.   Lymphadenopathy:      Cervical: No cervical adenopathy.   Skin:     General: Skin is warm and dry.      Coloration: Skin is not pale.      Findings: No rash.   Neurological:      Mental Status: She is alert and oriented to person, place, and time.   Psychiatric:         Speech: Speech normal.         Behavior: Behavior is cooperative.       Orders Only on 08/26/2020   Component Date Value Ref Range Status   • COVID19 08/28/2020 Not Detected  Not Detected " - Ref. Range Final     Assessment/Plan      1. Gastroesophageal reflux disease with esophagitis without hemorrhage    2. Chronic idiopathic constipation    .   Continue Dexilant for reflux recommend she continue to only use Carafate as needed as it can worsen constipation.  Zofran as needed for nausea again cautioned against side effect of worsening constipation  We will start patient on Linzess daily continue to increase dietary fiber and water consumption increase activity as tolerated.    Orders placed during this encounter include:  No orders of the defined types were placed in this encounter.      * Surgery not found *    Review and/or summary of lab tests, radiology, procedures, medications. Review and summary of old records and obtaining of history. The risks and benefits of my recommendations, as well as other treatment options were discussed with the patient today. Questions were answered.    New Medications Ordered This Visit   Medications   • linaclotide (Linzess) 72 MCG capsule capsule     Sig: Take 1 capsule by mouth Every Morning Before Breakfast.     Dispense:  30 capsule     Refill:  1   • dexlansoprazole (Dexilant) 60 MG capsule     Sig: Take 1 capsule by mouth Daily.     Dispense:  90 capsule     Refill:  1   • sucralfate (CARAFATE) 1 g tablet     Sig: Take 1 tablet by mouth 4 (Four) Times a Day As Needed (Reflux).     Dispense:  60 tablet     Refill:  1   • ondansetron (ZOFRAN) 8 MG tablet     Sig: Take 1 tablet by mouth Every 8 (Eight) Hours As Needed for Nausea or Vomiting.     Dispense:  30 tablet     Refill:  3       Follow-up: Return in about 6 months (around 4/28/2021) for Recheck.          This document has been electronically signed by MIRIAM White on October 28, 2020 15:25 CDT             Results for orders placed or performed in visit on 08/26/20   COVID[facility],  [facility], NP SWAB IN TRANSPORT MEDIA 8-10 HR TAT - Swab, Nasopharynx    Specimen: Nasopharynx; Swab   Result Value Ref  Range    COVID19 Not Detected Not Detected - Ref. Range   Results for orders placed or performed during the hospital encounter of 02/10/20   [facility]   Result Value Ref Range    Extra Tube Hold for add-ons.    Urinalysis With Microscopic If Indicated (No Culture) - Urine, Clean Catch    Specimen: Urine, Clean Catch   Result Value Ref Range    Color, UA Yellow Yellow, Straw, Dark Yellow, Rut    Appearance, UA Clear Clear    pH, UA 6.0 5.0 - 9.0    Specific Gravity, UA 1.002 (L) 1.003 - 1.030    Glucose, UA Negative Negative    Ketones, UA Negative Negative    Bilirubin, UA Negative Negative    Blood, UA Negative Negative    Protein, UA Negative Negative    Leuk Esterase, UA Negative Negative    Nitrite, UA Negative Negative    Urobilinogen, UA 0.2 E.U./dL 0.2 - 1.0 E.U./dL   CBC Auto Differential    Specimen: Blood   Result Value Ref Range    WBC 8.97 3.40 - 10.80 10*3/mm3    RBC 4.14 3.77 - 5.28 10*6/mm3    Hemoglobin 10.9 (L) 12.0 - 15.9 g/dL    Hematocrit 33.7 (L) 34.0 - 46.6 %    MCV 81.4 79.0 - 97.0 fL    MCH 26.3 (L) 26.6 - 33.0 pg    MCHC 32.3 31.5 - 35.7 g/dL    RDW 13.4 12.3 - 15.4 %    RDW-SD 39.4 37.0 - 54.0 fl    MPV 9.2 6.0 - 12.0 fL    Platelets 303 140 - 450 10*3/mm3    Neutrophil % 79.3 (H) 42.7 - 76.0 %    Lymphocyte % 12.0 (L) 19.6 - 45.3 %    Monocyte % 5.6 5.0 - 12.0 %    Eosinophil % 2.1 0.3 - 6.2 %    Basophil % 0.6 0.0 - 1.5 %    Immature Grans % 0.4 0.0 - 0.5 %    Neutrophils, Absolute 7.11 (H) 1.70 - 7.00 10*3/mm3    Lymphocytes, Absolute 1.08 0.70 - 3.10 10*3/mm3    Monocytes, Absolute 0.50 0.10 - 0.90 10*3/mm3    Eosinophils, Absolute 0.19 0.00 - 0.40 10*3/mm3    Basophils, Absolute 0.05 0.00 - 0.20 10*3/mm3    Immature Grans, Absolute 0.04 0.00 - 0.05 10*3/mm3    nRBC 0.0 0.0 - 0.2 /100 WBC   Light Blue Top   Result Value Ref Range    Extra Tube hold for add-on    Troponin    Specimen: Blood   Result Value Ref Range    Troponin T <0.010 0.000 - 0.030 ng/mL   Urine Drug Screen -  Urine, Clean Catch    Specimen: Urine, Clean Catch   Result Value Ref Range    THC, Screen, Urine Positive (A) Negative    Phencyclidine (PCP), Urine Negative Negative    Cocaine Screen, Urine Negative Negative    Methamphetamine, Ur Negative Negative    Opiate Screen Positive (A) Negative    Amphetamine Screen, Urine Negative Negative    Benzodiazepine Screen, Urine Negative Negative    Tricyclic Antidepressants Screen Negative Negative    Methadone Screen, Urine Negative Negative    Barbiturates Screen, Urine Negative Negative    Oxycodone Screen, Urine Negative Negative    Propoxyphene Screen Negative Negative    Buprenorphine, Screen, Urine Negative Negative   CBC (No Diff)    Specimen: Blood   Result Value Ref Range    WBC 8.23 3.40 - 10.80 10*3/mm3    RBC 4.16 3.77 - 5.28 10*6/mm3    Hemoglobin 11.0 (L) 12.0 - 15.9 g/dL    Hematocrit 33.9 (L) 34.0 - 46.6 %    MCV 81.5 79.0 - 97.0 fL    MCH 26.4 (L) 26.6 - 33.0 pg    MCHC 32.4 31.5 - 35.7 g/dL    RDW 13.3 12.3 - 15.4 %    RDW-SD 39.8 37.0 - 54.0 fl    MPV 9.4 6.0 - 12.0 fL    Platelets 317 140 - 450 10*3/mm3   TSH    Specimen: Blood   Result Value Ref Range    TSH 0.470 0.270 - 4.200 uIU/mL   Ethanol    Specimen: Blood   Result Value Ref Range    Ethanol <10 0 - 10 mg/dL    Ethanol % <0.010 %   Acetaminophen Level    Specimen: Blood   Result Value Ref Range    Acetaminophen <5.0 (L) 10.0 - 30.0 mcg/mL   Salicylate Level    Specimen: Blood   Result Value Ref Range    Salicylate <0.3 <=30.0 mg/dL   Comprehensive Metabolic Panel    Specimen: Blood   Result Value Ref Range    Glucose 109 (H) 65 - 99 mg/dL    BUN 29 (H) 8 - 23 mg/dL    Creatinine 1.29 (H) 0.57 - 1.00 mg/dL    Sodium 137 136 - 145 mmol/L    Potassium 4.8 3.5 - 5.2 mmol/L    Chloride 103 98 - 107 mmol/L    CO2 23.0 22.0 - 29.0 mmol/L    Calcium 9.4 8.6 - 10.5 mg/dL    Total Protein 6.4 6.0 - 8.5 g/dL    Albumin 4.40 3.50 - 5.20 g/dL    ALT (SGPT) 20 1 - 33 U/L    AST (SGOT) 11 1 - 32 U/L    Alkaline  Phosphatase 151 (H) 39 - 117 U/L    Total Bilirubin 0.2 0.2 - 1.2 mg/dL    eGFR Non African Amer 41 (L) >60 mL/min/1.73    Globulin 2.0 gm/dL    A/G Ratio 2.2 g/dL    BUN/Creatinine Ratio 22.5 7.0 - 25.0    Anion Gap 11.0 5.0 - 15.0 mmol/L   Basic Metabolic Panel    Specimen: Blood   Result Value Ref Range    Glucose 103 (H) 65 - 99 mg/dL    BUN 24 (H) 8 - 23 mg/dL    Creatinine 1.03 (H) 0.57 - 1.00 mg/dL    Sodium 141 136 - 145 mmol/L    Potassium 4.9 3.5 - 5.2 mmol/L    Chloride 109 (H) 98 - 107 mmol/L    CO2 24.0 22.0 - 29.0 mmol/L    Calcium 9.0 8.6 - 10.5 mg/dL    eGFR Non African Amer 54 (L) >60 mL/min/1.73    BUN/Creatinine Ratio 23.3 7.0 - 25.0    Anion Gap 8.0 5.0 - 15.0 mmol/L   Results for orders placed or performed during the hospital encounter of 06/10/19   Tissue Pathology Exam    Specimen: A: Gastric, Antrum; Tissue    B: Esophagus, Distal; Tissue   Result Value Ref Range    Case Report       Surgical Pathology Report                         Case: [id number]                                  Authorizing Provider:  Ravi Cavanaugh MD        Collected:           06/10/2019 04:42 PM          Ordering Location:     [facility]             Received:            06/11/2019 07:28 AM                                 [facility]                                                     Pathologist:           Deven Conner MD                                                         Specimens:   1) - Gastric, Antrum                                                                                2) - Esophagus, Distal                                                                     Final Diagnosis       1.  MUCOSA, ANTRUM OF STOMACH:  CHRONIC GASTRITIS.  NEGATIVE FOR HELICOBACTER PYLORI (HP IMMUNOSTAIN).    2.  MUCOSA, DISTAL ESOPHAGUS:  REACTIVE CHANGE OF SQUAMOUS MUCOSA.      Comment       Helicobacter pylori (HP) immunostain is performed (Block 1) because an appropriate inflammatory milieu is  "present and organisms are not seen on H & E stained slides.    HP immunostain was developed and its performance characteristics determined by [facility].  It has not been cleared or approved by the U.S. Food and Drug Administration.  The FDA has determined that such clearance or approval is not necessary.  This test is used for clinical purposes.  It should not be regarded as investigational or for research.  This laboratory is certified under the Clinical Laboratory Improvement Amendments of 1988 (CLIA-88) as qualified to perform high complexity clinical laboratory testing.    All controls show appropriate reactivity.          Gross Description       1.  The first container is labeled 'antrum of stomach\" and has nodular bits of white soft tissue measuring 0.4 cc in aggregate.  The entire specimen is embedded as 1A.    2.  The second container is labeled \"distal esophagus\" and has nodular bits of white soft tissue measuring 0.4 cc in aggregate.  The entire specimen is embedded as 2A.       *Note: Due to a large number of results and/or encounters for the requested time period, some results have not been displayed. A complete set of results can be found in Results Review.     "

## 2020-11-09 ENCOUNTER — OFFICE VISIT (OUTPATIENT)
Dept: FAMILY MEDICINE CLINIC | Facility: CLINIC | Age: 65
End: 2020-11-09

## 2020-11-09 VITALS
DIASTOLIC BLOOD PRESSURE: 90 MMHG | HEIGHT: 67 IN | WEIGHT: 232 LBS | BODY MASS INDEX: 36.41 KG/M2 | SYSTOLIC BLOOD PRESSURE: 148 MMHG

## 2020-11-09 DIAGNOSIS — E66.01 MORBIDLY OBESE (HCC): Chronic | ICD-10-CM

## 2020-11-09 DIAGNOSIS — Z00.00 MEDICARE ANNUAL WELLNESS VISIT, INITIAL: ICD-10-CM

## 2020-11-09 DIAGNOSIS — S50.861A INSECT BITE OF RIGHT FOREARM, INITIAL ENCOUNTER: ICD-10-CM

## 2020-11-09 DIAGNOSIS — I10 ESSENTIAL HYPERTENSION, BENIGN: Primary | Chronic | ICD-10-CM
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
## 2020-11-09 DIAGNOSIS — Z23 NEED FOR IMMUNIZATION AGAINST INFLUENZA: ICD-10-CM

## 2020-11-09 PROBLEM — H90.A31 MIXED CONDUCTIVE AND SENSORINEURAL HEARING LOSS OF RIGHT EAR WITH RESTRICTED HEARING OF LEFT EAR: Chronic | Status: ACTIVE | Noted: 2020-01-13

## 2020-11-09 PROBLEM — J30.0 CHRONIC VASOMOTOR RHINITIS: Chronic | Status: ACTIVE | Noted: 2020-01-13

## 2020-11-09 PROCEDURE — G0438 PPPS, INITIAL VISIT: HCPCS | Performed by: FAMILY MEDICINE

## 2020-11-09 PROCEDURE — 99213 OFFICE O/P EST LOW 20 MIN: CPT | Performed by: FAMILY MEDICINE

## 2020-11-09 PROCEDURE — 90694 VACC AIIV4 NO PRSRV 0.5ML IM: CPT | Performed by: FAMILY MEDICINE

## 2020-11-09 PROCEDURE — G0008 ADMIN INFLUENZA VIRUS VAC: HCPCS | Performed by: FAMILY MEDICINE

## 2020-11-09 RX ORDER — PREDNISONE 20 MG/1
TABLET ORAL
Qty: 10 TABLET | Refills: 0 | Status: SHIPPED | OUTPATIENT
Start: 2020-11-09 | End: 2021-01-06

## 2020-11-09 NOTE — PROGRESS NOTES
The ABCs of the Annual Wellness Visit  Initial Medicare Wellness Visit    Chief Complaint   Patient presents with   • Annual Exam     medicare wellness       Subjective   History of Present Illness:  Mikki Joel is a 65 y.o. female who presents for an Initial Medicare Wellness Visit.    HEALTH RISK ASSESSMENT    Recent Hospitalizations:  Recently treated at the following:  [facility]    Current Medical Providers:  Patient Care Team:  Ronald Bowser MD as PCP - General (Family Medicine)    Smoking Status:  Social History     Tobacco Use   Smoking Status Never Smoker   Smokeless Tobacco Never Used       Alcohol Consumption:  Social History     Substance and Sexual Activity   Alcohol Use Yes    Comment: 05/15/2020 - Patient states she consumes an alcoholic beverage very seldom.       Depression Screen:   PHQ-2/PHQ-9 Depression Screening 11/9/2020   Little interest or pleasure in doing things 0   Feeling down, depressed, or hopeless 1   Total Score 1       Fall Risk Screen:  STEADI Fall Risk Assessment has not been completed.    Health Habits and Functional and Cognitive Screening:  No flowsheet data found.      Does the patient have evidence of cognitive impairment? No    Asprin use counseling:Does not need ASA (and currently is not on it)    Age-appropriate Screening Schedule:  Refer to the list below for future screening recommendations based on patient's age, sex and/or medical conditions. Orders for these recommended tests are listed in the plan section. The patient has been provided with a written plan.    Health Maintenance   Topic Date Due   • INFLUENZA VACCINE  08/01/2020   • MAMMOGRAM  05/07/2022   • COLONOSCOPY  01/19/2023   • TDAP/TD VACCINES  Discontinued   • ZOSTER VACCINE  Discontinued          The following portions of the patient's history were reviewed and updated as appropriate: allergies, current medications, past family history, past medical history, past social history,  past surgical history and problem list.    Outpatient Medications Prior to Visit   Medication Sig Dispense Refill   • ALBUTEROL SULFATE  (90 Base) MCG/ACT inhaler INHALE 2 PUFFS BY MOUTH EVERY 4 HOURS AS NEEDED FOR WHEEZING 8.5 g 0   • cetirizine (zyrTEC) 10 MG tablet TAKE 1 TABLET BY MOUTH DAILY 30 tablet 11   • dexlansoprazole (Dexilant) 60 MG capsule Take 1 capsule by mouth Daily. 90 capsule 1   • eszopiclone (LUNESTA) 3 MG tablet Take 1 tablet by mouth Every Night. For insomnia.  Take immediately before to 2 hours prior to bedtime. 30 tablet 1   • ferrous sulfate 325 (65 FE) MG tablet Take 1 tablet by mouth Daily With Breakfast. Take with vitamin C to help with absorption 30 tablet 5   • Fluticasone Furoate-Vilanterol (BREO ELLIPTA) 100-25 MCG/INH inhaler 1 puff bid and rinse out mouth 90 each 3   • ipratropium (ATROVENT) 0.06 % nasal spray INSTILL 2 SPRAYS INTO EACH NOSTRIL THREE TIMES DAILY 15 mL 4   • linaclotide (Linzess) 72 MCG capsule capsule Take 1 capsule by mouth Every Morning Before Breakfast. 30 capsule 1   • metoprolol tartrate (LOPRESSOR) 100 MG tablet Take 0.5 tablets by mouth Every Night.     • mometasone (NASONEX) 50 MCG/ACT nasal spray 2 sprays into each nostril 2 times per day 17 g 5   • olopatadine (PATANASE) 0.6 % solution nasal solution 2 sprays by Each Nare route 2 (Two) Times a Day. 30 g 11   • ondansetron (ZOFRAN) 8 MG tablet Take 1 tablet by mouth Every 8 (Eight) Hours As Needed for Nausea or Vomiting. 30 tablet 3   • prazosin (MINIPRESS) 5 MG capsule      • rOPINIRole (REQUIP) 3 MG tablet Take 1 tablet by mouth Every Night. For Restless Leg Syndrome 30 tablet 2   • sucralfate (CARAFATE) 1 g tablet Take 1 tablet by mouth 4 (Four) Times a Day As Needed (Reflux). 60 tablet 1   • TRINTELLIX 20 MG tablet      • cefdinir (OMNICEF) 300 MG capsule Take 1 capsule by mouth 2 (Two) Times a Day. 42 capsule 0     No facility-administered medications prior to visit.        Patient Active  "Problem List   Diagnosis   • Asthma   • Arthritis   • Nasal turbinate hypertrophy   • Gastroesophageal reflux disease   • Depression with anxiety   • Insomnia   • PTSD (post-traumatic stress disorder)   • Restless leg   • Mixed hearing loss of left ear   • Right knee pain   • Presence of right artificial knee joint   • Essential hypertension, benign   • Presbyesophagus   • Chronic vasomotor rhinitis   • Perforated tympanic membrane, bilateral   • Mixed conductive and sensorineural hearing loss of right ear with restricted hearing of left ear   • Morbidly obese (CMS/[facility])       Advanced Care Planning:  ACP discussion was held with the patient during this visit. Patient does not have an advance directive, information provided.    Review of Systems    Compared to one year ago, the patient feels her physical health is worse.  Compared to one year ago, the patient feels her mental health is the same.    Reviewed chart for potential of high risk medication in the elderly: yes  Reviewed chart for potential of harmful drug interactions in the elderly:yes    Objective         Vitals:    11/09/20 1245   BP: 148/90   Weight: 105 kg (232 lb)   Height: 170.2 cm (67\")   PainSc: 0-No pain       Body mass index is 36.34 kg/m².  Discussed the patient's BMI with her. The BMI is above average; BMI management plan is completed.    Physical Exam          Assessment/Plan   Medicare Risks and Personalized Health Plan  CMS Preventative Services Quick Reference  Advance Directive Discussion  Immunizations Discussed/Encouraged (specific immunizations; Influenza )  Inactivity/Sedentary  Obesity/Overweight   Infection prevention    The above risks/problems have been discussed with the patient.  Pertinent information has been shared with the patient in the After Visit Summary.  Follow up plans and orders are seen below in the Assessment/Plan Section.    Diagnoses and all orders for this visit:    1. Essential hypertension, benign (Primary)  -    "  predniSONE (DELTASONE) 20 MG tablet; 2qdx5 for arm  Dispense: 10 tablet; Refill: 0    2. Medicare annual wellness visit, initial    3. Need for immunization against influenza  -     Fluad Tri 65yr+    4. Morbidly obese (CMS/formerly [facility])    5. Insect bite of right forearm, initial encounter  -     predniSONE (DELTASONE) 20 MG tablet; 2qdx5 for arm  Dispense: 10 tablet; Refill: 0      Follow Up:  No follow-ups on file.     An After Visit Summary and PPPS were given to the patient.       Physical worse marked due to weight gain

## 2020-11-09 NOTE — PROGRESS NOTES
Subjective   Mikki Joel is a 65 y.o. female.  Reevaluation obesity hypertension arthritis diagnoses below.  Interim is done well over the summer.  Has developed some weight gain from inactivity blood pressure is also starting to creep upward.  Of  probably treat initially was with increased effort weight loss and diet first.  Medicines have remained the same.  Was bitten by an insect while riding a bicycle or equivalent yesterday.  Red swollen area of the right arm last 12 hours or so.  No shortness of breath.    History of Present Illness   HPI    The following portions of the patient's history were reviewed and updated as appropriate: allergies, current medications, past family history, past medical history, past social history, past surgical history and problem list.    Review of Systems  Review of Systems   Constitutional: Negative for activity change, appetite change, fatigue and unexpected weight change.   HENT: Negative for trouble swallowing and voice change.    Eyes: Negative for redness and visual disturbance.   Respiratory: Negative for cough and wheezing.    Cardiovascular: Negative for chest pain and palpitations.   Gastrointestinal: Negative for abdominal pain, constipation, diarrhea, nausea and vomiting.   Genitourinary: Negative for urgency.   Musculoskeletal: Positive for arthralgias. Negative for joint swelling.   Skin: Positive for rash.   Neurological: Negative for syncope and headaches.   Hematological: Negative for adenopathy.   Psychiatric/Behavioral: Negative for sleep disturbance.       Objective   Physical Exam  Physical Exam  Constitutional:       Appearance: She is well-developed.   HENT:      Head: Normocephalic.   Eyes:      Pupils: Pupils are equal, round, and reactive to light.   Neck:      Musculoskeletal: Normal range of motion.      Thyroid: No thyromegaly.   Cardiovascular:      Rate and Rhythm: Normal rate and regular rhythm.      Heart sounds: Normal heart  "sounds. No murmur. No friction rub. No gallop.    Pulmonary:      Breath sounds: Normal breath sounds.   Abdominal:      General: There is no distension.      Palpations: Abdomen is soft. There is no mass.      Tenderness: There is no abdominal tenderness.   Musculoskeletal: Normal range of motion.   Skin:     General: Skin is warm and dry.      Comments: Right upper inner forearm shows a raised red circular area consistent with envenomation approximately 5 to 6 cm in size warm to touch but no streaks.  No other lesions seen   Neurological:      Mental Status: She is alert and oriented to person, place, and time.      Deep Tendon Reflexes: Reflexes are normal and symmetric.   Psychiatric:         Mood and Affect: Mood normal.         Speech: Speech normal.         Behavior: Behavior normal.      Comments: Normal affect           Visit Vitals  /90   Ht 170.2 cm (67\")   Wt 105 kg (232 lb)   LMP  (LMP Unknown)   BMI 36.34 kg/m²     Body mass index is 36.34 kg/m².      Assessment/Plan   Diagnoses and all orders for this visit:    1. Essential hypertension, benign (Primary)  -     predniSONE (DELTASONE) 20 MG tablet; 2qdx5 for arm  Dispense: 10 tablet; Refill: 0    2. Medicare annual wellness visit, initial    3. Need for immunization against influenza  -     Fluad Tri 65yr+    4. Morbidly obese (CMS/HCC)    5. Insect bite of right forearm, initial encounter  -     predniSONE (DELTASONE) 20 MG tablet; 2qdx5 for arm  Dispense: 10 tablet; Refill: 0     on wt loss measures and programs  Counseled mainly on lifestyle measures restriction diet and exercise keep her work pressure watch at home systolic does not drop to below 130 over the next several weeks needs to let us know.  Continue medicines as outlined.  For the insect bite ice packs next 24 hours antihistamine of choice 2 or 3 days short course prednisone observation recheck 6 months  "

## 2020-11-16 ENCOUNTER — OFFICE VISIT (OUTPATIENT)
Dept: OTOLARYNGOLOGY | Facility: CLINIC | Age: 65
End: 2020-11-16

## 2020-11-16 VITALS — TEMPERATURE: 98.2 F | HEIGHT: 67 IN | BODY MASS INDEX: 37.01 KG/M2 | WEIGHT: 235.8 LBS

## 2020-11-16 DIAGNOSIS — J32.0 CHRONIC MAXILLARY ANTRITIS: Primary | ICD-10-CM

## 2020-11-16 PROCEDURE — 99213 OFFICE O/P EST LOW 20 MIN: CPT | Performed by: OTOLARYNGOLOGY

## 2020-11-16 RX ORDER — AMOXICILLIN AND CLAVULANATE POTASSIUM 875; 125 MG/1; MG/1
1 TABLET, FILM COATED ORAL EVERY 12 HOURS
Qty: 42 TABLET | Refills: 0 | Status: SHIPPED | OUTPATIENT
Start: 2020-11-16 | End: 2021-01-18

## 2020-11-16 NOTE — PATIENT INSTRUCTIONS
MyPlate from USDA    MyPlate is an outline of a general healthy diet based on the 2010 Dietary Guidelines for Americans, from the [place] Department of Agriculture (USDA). It sets guidelines for how much food you should eat from each food group based on your age, sex, and level of physical activity.  What are tips for following MyPlate?  To follow MyPlate recommendations:  · Eat a wide variety of fruits and vegetables, grains, and protein foods.  · Serve smaller portions and eat less food throughout the day.  · Limit portion sizes to avoid overeating.  · Enjoy your food.  · Get at least 150 minutes of exercise every week. This is about 30 minutes each day, 5 or more days per week.  It can be difficult to have every meal look like MyPlate. Think about MyPlate as eating guidelines for an entire day, rather than each individual meal.  Fruits and vegetables  · Make half of your plate fruits and vegetables.  · Eat many different colors of fruits and vegetables each day.  · For a 2,000 calorie daily food plan, eat:  ? 2½ cups of vegetables every day.  ? 2 cups of fruit every day.  · 1 cup is equal to:  ? 1 cup raw or cooked vegetables.  ? 1 cup raw fruit.  ? 1 medium-sized orange, apple, or banana.  ? 1 cup 100% fruit or vegetable juice.  ? 2 cups raw leafy greens, such as lettuce, spinach, or kale.  ? ½ cup dried fruit.  Grains  · One fourth of your plate should be grains.  · Make at least half of the grains you eat each day whole grains.  · For a 2,000 calorie daily food plan, eat 6 oz of grains every day.  · 1 oz is equal to:  ? 1 slice bread.  ? 1 cup cereal.  ? ½ cup cooked rice, cereal, or pasta.  Protein  · One fourth of your plate should be protein.  · Eat a wide variety of protein foods, including meat, poultry, fish, eggs, beans, nuts, and tofu.  · For a 2,000 calorie daily food plan, eat 5½ oz of protein every day.  · 1 oz is equal to:  ? 1 oz meat, poultry, or fish.  ? ¼ cup cooked beans.  ? 1 egg.  ? ½ oz nuts  or seeds.  ? 1 Tbsp peanut butter.  Dairy  · Drink fat-free or low-fat (1%) milk.  · Eat or drink dairy as a side to meals.  · For a 2,000 calorie daily food plan, eat or drink 3 cups of dairy every day.  · 1 cup is equal to:  ? 1 cup milk, yogurt, cottage cheese, or soy milk (soy beverage).  ? 2 oz processed cheese.  ? 1½ oz natural cheese.  Fats, oils, salt, and sugars  · Only small amounts of oils are recommended.  · Avoid foods that are high in calories and low in nutritional value (empty calories), like foods high in fat or added sugars.  · Choose foods that are low in salt (sodium). Choose foods that have less than 140 milligrams (mg) of sodium per serving.  · Drink water instead of sugary drinks. Drink enough water each day to keep your urine pale yellow.  Where to find support  · Work with your health care provider or a nutrition specialist (dietitian) to develop a customized eating plan that is right for you.  · Download an eva (mobile application) to help you track your daily food intake.  Where to find more information  · Go to ChooseMyPlate.gov for more information.  Summary  · MyPlate is a general guideline for healthy eating from the USDA. It is based on the 2010 Dietary Guidelines for Americans.  · In general, fruits and vegetables should take up ½ of your plate, grains should take up ¼ of your plate, and protein should take up ¼ of your plate.  This information is not intended to replace advice given to you by your health care provider. Make sure you discuss any questions you have with your health care provider.  Document Released: 01/06/2009 Document Revised: 05/21/2020 Document Reviewed: 03/19/2018  Elsevier Patient Education © 2020 [employer]

## 2020-11-16 NOTE — PROGRESS NOTES
Subjective   Mikki Joel is a 65 y.o. female.   Follow-up sinus problem    History of Present Illness     Patient had antibiotics called in for sinus infection she has been having trouble for last 2 months she did much better when she was on antibiotics and then after a few days off she started having pressure congestion may be a low-grade fever but lots of postnasal drip and bad smelling drainage does not have any ear complaints no particular any sore throat she says she is using her nasal sprays and saline rinse regularly    The following portions of the patient's history were reviewed and updated as appropriate: allergies, current medications, past family history, past medical history, past social history, past surgical history and problem list.      Current Outpatient Medications:   •  ALBUTEROL SULFATE  (90 Base) MCG/ACT inhaler, INHALE 2 PUFFS BY MOUTH EVERY 4 HOURS AS NEEDED FOR WHEEZING, Disp: 8.5 g, Rfl: 0  •  cetirizine (zyrTEC) 10 MG tablet, TAKE 1 TABLET BY MOUTH DAILY, Disp: 30 tablet, Rfl: 11  •  dexlansoprazole (Dexilant) 60 MG capsule, Take 1 capsule by mouth Daily., Disp: 90 capsule, Rfl: 1  •  eszopiclone (LUNESTA) 3 MG tablet, Take 1 tablet by mouth Every Night. For insomnia.  Take immediately before to 2 hours prior to bedtime., Disp: 30 tablet, Rfl: 1  •  ferrous sulfate 325 (65 FE) MG tablet, Take 1 tablet by mouth Daily With Breakfast. Take with vitamin C to help with absorption, Disp: 30 tablet, Rfl: 5  •  Fluticasone Furoate-Vilanterol (BREO ELLIPTA) 100-25 MCG/INH inhaler, 1 puff bid and rinse out mouth, Disp: 90 each, Rfl: 3  •  ipratropium (ATROVENT) 0.06 % nasal spray, INSTILL 2 SPRAYS INTO EACH NOSTRIL THREE TIMES DAILY, Disp: 15 mL, Rfl: 4  •  linaclotide (Linzess) 145 MCG capsule capsule, Take 1 capsule by mouth Every Morning Before Breakfast., Disp: 30 capsule, Rfl: 5  •  metoprolol tartrate (LOPRESSOR) 100 MG tablet, Take 0.5 tablets by mouth Every Night., Disp: ,  Rfl:   •  mometasone (NASONEX) 50 MCG/ACT nasal spray, 2 sprays into each nostril 2 times per day, Disp: 17 g, Rfl: 5  •  olopatadine (PATANASE) 0.6 % solution nasal solution, 2 sprays by Each Nare route 2 (Two) Times a Day., Disp: 30 g, Rfl: 11  •  ondansetron (ZOFRAN) 8 MG tablet, Take 1 tablet by mouth Every 8 (Eight) Hours As Needed for Nausea or Vomiting., Disp: 30 tablet, Rfl: 3  •  prazosin (MINIPRESS) 5 MG capsule, , Disp: , Rfl:   •  rOPINIRole (REQUIP) 3 MG tablet, Take 1 tablet by mouth Every Night. For Restless Leg Syndrome, Disp: 30 tablet, Rfl: 2  •  sucralfate (CARAFATE) 1 g tablet, Take 1 tablet by mouth 4 (Four) Times a Day As Needed (Reflux)., Disp: 60 tablet, Rfl: 1  •  TRINTELLIX 20 MG tablet, , Disp: , Rfl:   •  amoxicillin-clavulanate (AUGMENTIN) 875-125 MG per tablet, Take 1 tablet by mouth Every 12 (Twelve) Hours., Disp: 42 tablet, Rfl: 0  •  predniSONE (DELTASONE) 20 MG tablet, 2qdx5 for arm, Disp: 10 tablet, Rfl: 0    Allergies   Allergen Reactions   • Codeine Other (See Comments)     Increases heart rate   • Sulfa Antibiotics Hives             Review of Systems   Constitutional: Positive for fever.   HENT: Positive for postnasal drip and sinus pressure. Negative for ear discharge, ear pain and nosebleeds.    Hematological: Negative for adenopathy.           Objective   Physical Exam  Vitals signs and nursing note reviewed.   HENT:      Head: Normocephalic.      Right Ear: Ear canal normal.      Left Ear: Ear canal normal.      Ears:      Comments: Scarred TMs no erythema     Nose:      Comments: Small amount of crusting no blood     Mouth/Throat:      Comments: No unusual drainage  Pulmonary:      Effort: Pulmonary effort is normal.   Skin:     General: Skin is warm.   Neurological:      General: No focal deficit present.      Mental Status: She is alert.   Psychiatric:         Mood and Affect: Mood normal.             Assessment/Plan   Diagnoses and all orders for this visit:    1.  Chronic maxillary antritis (Primary)    Other orders  -     amoxicillin-clavulanate (AUGMENTIN) 875-125 MG per tablet; Take 1 tablet by mouth Every 12 (Twelve) Hours.  Dispense: 42 tablet; Refill: 0    Is a longer course of antibiotics for the different type explained use and side effects importance using probiotics take with food call if any side effects call if not improving and recheck 3 to 4 weeks after off antibiotics make sure is cleared he is agree with this and all questions answered  Follow-up is important especially in light of her prior history of sinus problems need for surgery

## 2020-12-09 RX ORDER — ONDANSETRON HYDROCHLORIDE 8 MG/1
TABLET, FILM COATED ORAL
Qty: 30 TABLET | Refills: 3 | Status: SHIPPED | OUTPATIENT
Start: 2020-12-09 | End: 2021-08-10

## 2020-12-11 ENCOUNTER — OFFICE VISIT (OUTPATIENT)
Dept: SLEEP MEDICINE | Facility: HOSPITAL | Age: 65
End: 2020-12-11

## 2020-12-11 VITALS
HEART RATE: 105 BPM | SYSTOLIC BLOOD PRESSURE: 161 MMHG | DIASTOLIC BLOOD PRESSURE: 92 MMHG | BODY MASS INDEX: 37.2 KG/M2 | OXYGEN SATURATION: 98 % | WEIGHT: 237 LBS | HEIGHT: 67 IN

## 2020-12-11 DIAGNOSIS — F51.04 CHRONIC INSOMNIA: ICD-10-CM

## 2020-12-11 DIAGNOSIS — G25.81 RESTLESS LEGS SYNDROME (RLS): Primary | ICD-10-CM

## 2020-12-11 DIAGNOSIS — E61.1 IRON DEFICIENCY: ICD-10-CM

## 2020-12-11 DIAGNOSIS — G47.33 OSA (OBSTRUCTIVE SLEEP APNEA): ICD-10-CM

## 2020-12-11 DIAGNOSIS — G47.52 DREAM ENACTMENT BEHAVIOR: ICD-10-CM

## 2020-12-11 DIAGNOSIS — F39 AFFECTIVE DISORDER (HCC): ICD-10-CM

## 2020-12-11 PROCEDURE — 99214 OFFICE O/P EST MOD 30 MIN: CPT | Performed by: PSYCHIATRY & NEUROLOGY

## 2020-12-11 PROCEDURE — 90833 PSYTX W PT W E/M 30 MIN: CPT | Performed by: PSYCHIATRY & NEUROLOGY

## 2020-12-11 RX ORDER — RAMELTEON 8 MG/1
8 TABLET ORAL NIGHTLY
Qty: 30 TABLET | Refills: 2 | Status: SHIPPED | OUTPATIENT
Start: 2020-12-11 | End: 2020-12-11

## 2020-12-11 RX ORDER — GABAPENTIN 600 MG/1
600 TABLET ORAL NIGHTLY
Qty: 30 TABLET | Refills: 1 | Status: SHIPPED | OUTPATIENT
Start: 2020-12-11 | End: 2021-02-09

## 2020-12-11 RX ORDER — RAMELTEON 8 MG/1
8 TABLET ORAL NIGHTLY PRN
Qty: 30 TABLET | Refills: 2 | Status: SHIPPED | OUTPATIENT
Start: 2020-12-11 | End: 2021-03-02 | Stop reason: SDUPTHER

## 2020-12-11 NOTE — PROGRESS NOTES
Sleep Medicine Follow-Up Note    CC & ID:  Ms. Mikki Joel is a 65 y.o. female seen for follow-up regarding JUNIOR, Insomnia & RLS.      Treatment Summary:   --JUNIOR: Dx PSG in Jan 20: AHI 9, PLMI 33   --Titration in Aug 20: no REM sleep, did well though with PLMI of 17 / hr.  APAP 5 - 12 cm.    --RLS: on Requip 3mg qHS    --Insomnia: on Lunesta, behavioral interventions    --Dream enactment behavior: infrequent, monitor.  Unable to monitor for REM without atonia. on her last titration test given lack of REM.    --Last seen in Aug 20 by me; ongoing compliance; Insomnia on Lunesta at that time; EDS, planned for in lab titration w/ results as above.         HPI:   Today, patient presents unaccompanied.  She is pleasant and engaged well.    No significant EDS.      Fighting sinus infection.     RLS - ongoing; taking iron.  +URGE; taking Requip.  No addictive or compulsive behaviors. Does note stress eating but none compulsive.  Takes Requip an hour before bed.  Most nights.    Insomnia; stable but ongoing; Lunesta latency 1-2 hours.  Laying in bed wide awake.  Worsened by RLS sx.   Takes lunest while in bed.  Some TV in bed.  Most nights.  Severe and impairing with her insomnia last happens.  Associated with her other symptoms.  There is interest in sleeping better, though low motivation for change.  We did discuss motivation versus interest in change with use of motivational enhancement.  She states she will consider further behavioral changes though still for occult given her retired status and it being cold.        Treatment: CPAP  -Pressure: 5-12 cm   -Pressure intolerance: no   -Aerophagia: no   -Air Hunger: no  -Interface: cushion, nasal  -Fit: no  -Chin strap: no  -HCC: [facility] - getting supplies  -Patient's perceived outcome: benefits      Compliance Card Data:   - Date Range: 3.5 -- 12.10.2020   - Days: 281     - % Days used: 99%  - % Days with Usage > 4 hrs: 97%  - Average usage days used: 11 h 25 m         ---> often forgets to turn off    - Setting: APAP 5 - 12 cm  - Pressure data:   - Median 6 cm  - <=90%: 7 cm  - Max: 12.5 cm    - Residual AHI: 2   - PB: 0%   - GIRISH 0.2    - Vibratory Snore Index: 3  - Average Time in Large Leak: 10 min      Respiratory:  -Ongoing Snoring: no  -Nocturnal Gasping: no    ROS:  Review of Systems   Constitutional: Negative for fever.   HENT: Negative for sore throat.    Eyes: Negative for double vision.   Respiratory: Negative for cough.    Cardiovascular: Negative for chest pain.   Gastrointestinal: Negative for abdominal pain.   Genitourinary: Negative for dysuria.   Musculoskeletal: Positive for back pain.   Neurological: Negative for seizures.   Psychiatric/Behavioral: Negative for behavioral problems.     -CONSTITUTIONAL: Weight: stable as below  Wt Readings from Last 5 Encounters:   12/11/20 108 kg (237 lb)   11/16/20 107 kg (235 lb 12.8 oz)   11/09/20 105 kg (232 lb)   10/28/20 105 kg (232 lb)   09/23/20 97.5 kg (215 lb)         Sleep Pattern:  -Position: side  -Bedtime: 12 A  -Lights Out: most of time same  -Environment: no Lights/TV  -Latency: 1-2 h   -can have conditioned arousal  -Awakenings: x3-4   -Wake Time: 7.30-9.30 A  -Rise Time: same  -Patient's estimate of Sleep Time: variable  -Total Clock Time Spent in Bed: 7.5-9.5 h    Daytime Symptoms:  -Daytime fatigue/sleepiness: none significant  -Naps: no  -Involuntary Dozing: no  -Driving: Difficulty with sleepiness and driving:  no   -- Close calls related to sleepiness: no   -- Accidents related to sleepiness: no    Social History:  -Nicotine: no  -Caffeine: no  -Alcohol: no  -THC: no  -OTC/Supplements/herbals: sudafed in AM  -Illicits:  denies      Questionnaires: ESS = 3         Patient Active Problem List   Diagnosis   • Asthma   • Arthritis   • Nasal turbinate hypertrophy   • Gastroesophageal reflux disease   • Depression with anxiety   • Insomnia   • PTSD (post-traumatic stress disorder)   • Restless leg   • Mixed  hearing loss of left ear   • Right knee pain   • Presence of right artificial knee joint   • Essential hypertension, benign   • Presbyesophagus   • Chronic vasomotor rhinitis   • Perforated tympanic membrane, bilateral   • Mixed conductive and sensorineural hearing loss of right ear with restricted hearing of left ear   • Morbidly obese (CMS/HCC)       CMHx:  --> Denies any significant medical changes since last visit.   --> Supplemental Oxygen Use: denies      Current Outpatient Medications   Medication Sig Dispense Refill   • ALBUTEROL SULFATE  (90 Base) MCG/ACT inhaler INHALE 2 PUFFS BY MOUTH EVERY 4 HOURS AS NEEDED FOR WHEEZING 8.5 g 0   • amoxicillin-clavulanate (AUGMENTIN) 875-125 MG per tablet Take 1 tablet by mouth Every 12 (Twelve) Hours. 42 tablet 0   • cetirizine (zyrTEC) 10 MG tablet TAKE 1 TABLET BY MOUTH DAILY 30 tablet 11   • dexlansoprazole (Dexilant) 60 MG capsule Take 1 capsule by mouth Daily. 90 capsule 1   • eszopiclone (LUNESTA) 3 MG tablet Take 1 tablet by mouth Every Night. For insomnia.  Take immediately before to 2 hours prior to bedtime. 30 tablet 1   • ferrous sulfate 325 (65 FE) MG tablet Take 1 tablet by mouth Daily With Breakfast. Take with vitamin C to help with absorption 30 tablet 5   • Fluticasone Furoate-Vilanterol (BREO ELLIPTA) 100-25 MCG/INH inhaler 1 puff bid and rinse out mouth 90 each 3   • ipratropium (ATROVENT) 0.06 % nasal spray INSTILL 2 SPRAYS INTO EACH NOSTRIL THREE TIMES DAILY 15 mL 4   • linaclotide (Linzess) 145 MCG capsule capsule Take 1 capsule by mouth Every Morning Before Breakfast. 30 capsule 5   • metoprolol tartrate (LOPRESSOR) 100 MG tablet Take 0.5 tablets by mouth Every Night.     • mometasone (NASONEX) 50 MCG/ACT nasal spray 2 sprays into each nostril 2 times per day 17 g 5   • olopatadine (PATANASE) 0.6 % solution nasal solution 2 sprays by Each Nare route 2 (Two) Times a Day. 30 g 11   • ondansetron (ZOFRAN) 8 MG tablet TAKE 1 TABLET BY MOUTH  "EVERY 8 HOURS AS NEEDED FOR NAUSEA OR VOMITING 30 tablet 3   • prazosin (MINIPRESS) 5 MG capsule      • predniSONE (DELTASONE) 20 MG tablet 2qdx5 for arm 10 tablet 0   • rOPINIRole (REQUIP) 3 MG tablet Take 1 tablet by mouth Every Night. For Restless Leg Syndrome 30 tablet 2   • sucralfate (CARAFATE) 1 g tablet Take 1 tablet by mouth 4 (Four) Times a Day As Needed (Reflux). 60 tablet 1   • TRINTELLIX 20 MG tablet        No current facility-administered medications for this visit.      -Notable Current Medications:  --> Luesta 3mg qHS  --> Requip 3mg QHS    PE:  Body mass index is 37.11 kg/m².  Vitals:    12/11/20 1502   BP: 161/92   Pulse: 105   SpO2: 98%   Weight: 108 kg (237 lb)   Height: 170.2 cm (67.01\")     Wt Readings from Last 5 Encounters:   12/11/20 108 kg (237 lb)   11/16/20 107 kg (235 lb 12.8 oz)   11/09/20 105 kg (232 lb)   10/28/20 105 kg (232 lb)   09/23/20 97.5 kg (215 lb)     Physical Exam  General:  In NAD.  Head: Atraumatic  Eyes: EOMI.  CV: No Clubbing.   Pul: Respirations: unlaboured.    MS: No atrophy.  Neuro: No resting tremor.  Gait normal turning & station; unremarkable overall.  Psych: Socially appropriate.  Pleasant.  No overt dysphoria.         Assessment & Planning:  Ms. Mikki Joel is a 65 y.o. female who is seen for follow-up of:     --Obstructive Sleep Apnea:  stable  --Compliant with treatment  -Benefiting from treatment  -Continue auto CPAP, 5 to 12 cm  -Patient understands to notify clinic if developing waking headaches or worsening daytime sleepiness  --Script for continued supplies provided      ---Insomnia, chronic: sub optimally controlled  --Perceived latency of 1 to 2 hours or more  -Of note, patient fell asleep as soon as the study started in lab    -We discussed and I reiterated critical need for behavioral interventions.  --Discussed with the patient the data noting increased all cause mortality with insomnia without treatment.  Compared it to the data noting " increased all cause mortality with hypnotics (long term, > 6 wks) and cognitive dysfunction risk.  Discussed benefits of behavioral therapy to treat insomnia without risks of medication and data suggesting equal efficacy at 4-6 wks of behavioral therapy (though with CBT-I which involves these functions).   --Discussed with patient the goals of behavioral intervention: sleep and napping restriction to 6 hours a day/night; stimulus control; fixed wake time at 7.30 regardless of how much or little sleep.  Encouraged sleep logs be kept.     --Patient is going to consider; contemplative re: change.  Discussed potential worsening of sleepiness and fatigue during initiation of intervention.  Need for increased vigilance with any sustained attention tasks (e.g. Driving).  All questions answered for the patient.        --Stop Lunesta    --Given lack of efficacy as well as use of a delta ligand agonist, plan to stop it    --Begin ramelteon 8 mg nightly as needed.   --Benefits, risk, alternatives and side effects discussed with patient including sedation.  Reiterated short-term therapy as well and need to use this as a bridge to aid with behavioral intervention.  She voiced understanding.  All questions answered for her and informed consent obtained.      --RLS: Suboptimally controlled  -No IRLS scale performed today due to time constraints and lack of scale availability    --Ordered Fasting Ferritin & Iron Panel    --Stop Requip given ongoing sx and high dose, augmetnation concerns    -If continue to have difficulties, could consider slow taper, but will plan to cross transition.    --Start Neurontin 600mg qEvening for restless leg symptoms.  Benefits, risk alternatives and side effects discussed with patient including sedation, scheduled medication and potential for addictive concerns.  All questions answered for the patient who is able arrive and informed decision and provide informed consent.      --Dream enactment  behavior: infrequent, stable; monitor.  Unable to monitor for REM without atonia. on her last titration test given lack of REM.  Given focus on insomnia & RLS today, was not discussed in detail.  Will let us know of any worsening sx.      --Affective d/o / BH overlay: encoruaged outpt BH; d/w pt benefits of tx of insomnia & JUNIOR to aid with BH sx.       --> MARQUISE reviewed; # [id number]; unremarkable.  Scripts since June 20:  06/17/2020 Eszopiclone 3MG 1954 30 30 MARIUSZ DIANA [facility] 1  07/16/2020 Eszopiclone 3MG 1954 30 30 MARIUSZ DIANA [facility] 1  08/15/2020 Eszopiclone 3MG 1954 30 30 MARIUSZBRANT ORTIZ [facility] 1  09/14/2020 Eszopiclone 3MG 1954 30 30 MARIUSZBRANT ORTIZ [facility] 1  10/17/2020 [facility]MG 1954 [facility]  11/17/2020 [facility] 3MG 1954 30 [facility]        -->  Safe driving reviewed.    -->  Follow-up 3 Months to evaluate effectiveness of new medication changes and behavioral interventions; sooner, if needed.    --> Call with any questions or concerns.      All questions answered for the patient, who indicated understanding and agreed with the plan.       Indu Kelly MD  12/11/20   Sleep Medicine    CC: Ronald Bowser MD         Psychotherapy Note  --Total Psychotherapy Time: 16 minutes  --Participants: Patient, myself  --Current Clinical Status: fair  --Focus of Therapeutic Encounter: insomnia, schema, motivation to change  --Intervention Type: Supportive, CBT/cognitive, ME/Motivational Enhancement, Stimulus Control, Fixed Wake Time and Sleeping and Nap Restriction  --Therapy notes: I provided reflective listening, supportive therapy, reflection, and allowed them to express affect in therapy course.  Cognitive behavioral therapy  targeting schema burden, distortions - identifying and challenging these.  ME re behavioral changes and insomnia.  Behavioral interventions as above.  Some insight to situation with dysfunctional behaviors contributing to situation.   --DX: as above  --Plan: Continue to work on developing & strengthening coping skills; correcting maladaptive schema; work on behavioral interventions for insomnia  --Post therapy status: improving affect and insight      Jimmy Kelly II, MD  12/11/20 @ 16:10 CST

## 2020-12-11 NOTE — PATIENT INSTRUCTIONS
Start neurontin before onset of restless leg symptoms at night    Use [place] as needed for sleep 1-3 hours before bed    Try the followin) Bedroom for sleep only.  No watching TV or using electronics (computer, phone, tablet etc.)  in bed.    2) Only go to bed when feeling sleepy.   If lying in bed for 15-20 minutes (estimated because the clock is turned away so you cannot see it) and you are not asleep get up and do something relaxing in a different room (reading a magazine article, solitaire with a deck of cards).  Do this in the middle of the night as well if awake.  Avoid doing work or getting on the computer.  If you must watch TV, do not watch anything too interesting or engaging (e.g. Action movie)    3) Get up at the same time seven days out of the week, 7.30 A.  If you want to sleep in on a day off, do not sleep in for more than 30 minutes.    4) Consider limiting your time in bed to 6 hours a day.    8) There are some on-line resources that do require a fee that can be of help.  Two credible websites are as follows:  Go! To Sleep by the [facility].    http://Chegg.com/cbt-online-insomnia-treatment.html

## 2020-12-15 ENCOUNTER — LAB (OUTPATIENT)
Dept: LAB | Facility: HOSPITAL | Age: 65
End: 2020-12-15

## 2020-12-15 DIAGNOSIS — E61.1 IRON DEFICIENCY: ICD-10-CM

## 2020-12-15 DIAGNOSIS — G25.81 RESTLESS LEGS SYNDROME (RLS): ICD-10-CM

## 2020-12-15 PROCEDURE — 83540 ASSAY OF IRON: CPT

## 2020-12-15 PROCEDURE — 84466 ASSAY OF TRANSFERRIN: CPT

## 2020-12-15 PROCEDURE — 36415 COLL VENOUS BLD VENIPUNCTURE: CPT

## 2020-12-15 PROCEDURE — 82728 ASSAY OF FERRITIN: CPT

## 2020-12-16 LAB
FERRITIN SERPL-MCNC: 200 NG/ML (ref 13–150)
IRON 24H UR-MRATE: 96 MCG/DL (ref 37–145)
IRON SATN MFR SERPL: 28 % (ref 20–50)
TIBC SERPL-MCNC: 349 MCG/DL (ref 298–536)
TRANSFERRIN SERPL-MCNC: 234 MG/DL (ref 200–360)

## 2020-12-19 DIAGNOSIS — I10 ESSENTIAL HYPERTENSION: ICD-10-CM

## 2020-12-21 RX ORDER — METOPROLOL SUCCINATE 100 MG/1
100 TABLET, EXTENDED RELEASE ORAL NIGHTLY
Qty: 90 TABLET | Refills: 3 | Status: SHIPPED | OUTPATIENT
Start: 2020-12-21 | End: 2021-10-19 | Stop reason: SDUPTHER

## 2021-01-04 ENCOUNTER — OFFICE VISIT (OUTPATIENT)
Dept: OTOLARYNGOLOGY | Facility: CLINIC | Age: 66
End: 2021-01-04

## 2021-01-04 VITALS — BODY MASS INDEX: 35.83 KG/M2 | TEMPERATURE: 98.7 F | WEIGHT: 236.4 LBS | HEIGHT: 68 IN

## 2021-01-04 DIAGNOSIS — J32.0 SINUSITIS, MAXILLARY, CHRONIC: Primary | ICD-10-CM

## 2021-01-04 DIAGNOSIS — H90.3 SENSORINEURAL HEARING LOSS (SNHL) OF BOTH EARS: ICD-10-CM

## 2021-01-04 PROCEDURE — 87070 CULTURE OTHR SPECIMN AEROBIC: CPT | Performed by: OTOLARYNGOLOGY

## 2021-01-04 PROCEDURE — 87205 SMEAR GRAM STAIN: CPT | Performed by: OTOLARYNGOLOGY

## 2021-01-04 PROCEDURE — 87186 SC STD MICRODIL/AGAR DIL: CPT | Performed by: OTOLARYNGOLOGY

## 2021-01-04 PROCEDURE — 87077 CULTURE AEROBIC IDENTIFY: CPT | Performed by: OTOLARYNGOLOGY

## 2021-01-04 PROCEDURE — 31231 NASAL ENDOSCOPY DX: CPT | Performed by: OTOLARYNGOLOGY

## 2021-01-04 PROCEDURE — 99213 OFFICE O/P EST LOW 20 MIN: CPT | Performed by: OTOLARYNGOLOGY

## 2021-01-04 NOTE — PROGRESS NOTES
Subjective   Mikki Joel is a 65 y.o. female.     Follow-up sinusitis and ear problems  History of Present Illness   Patient's needs hearing aids and was told by the audiologist she needs to talk to me about clinic for that separately she says she still has a calcified drain her nose she uses saline to call her antibiotics she has some mild facial pressure not particularly sick and not any fever chills she says since she is had her ear surgery there hearing is better and she has less tinnitus than she did previously but still needs hearing aids because the sensorineural hearing loss so she is concerned about getting hearing aid clearance and also treatment for residual sinusitis      The following portions of the patient's history were reviewed and updated as appropriate: allergies, current medications, past family history, past medical history, past social history, past surgical history and problem list.      Current Outpatient Medications:   •  ALBUTEROL SULFATE  (90 Base) MCG/ACT inhaler, INHALE 2 PUFFS BY MOUTH EVERY 4 HOURS AS NEEDED FOR WHEEZING, Disp: 8.5 g, Rfl: 0  •  cetirizine (zyrTEC) 10 MG tablet, TAKE 1 TABLET BY MOUTH DAILY, Disp: 30 tablet, Rfl: 11  •  dexlansoprazole (Dexilant) 60 MG capsule, Take 1 capsule by mouth Daily., Disp: 90 capsule, Rfl: 1  •  ferrous sulfate 325 (65 FE) MG tablet, Take 1 tablet by mouth Daily With Breakfast. Take with vitamin C to help with absorption, Disp: 30 tablet, Rfl: 5  •  Fluticasone Furoate-Vilanterol (BREO ELLIPTA) 100-25 MCG/INH inhaler, 1 puff bid and rinse out mouth, Disp: 90 each, Rfl: 3  •  gabapentin (Neurontin) 600 MG tablet, Take 1 tablet by mouth Every Night. For Restless leg syndrome.  Take 1-3 hours before onset of symptoms, Disp: 30 tablet, Rfl: 1  •  ipratropium (ATROVENT) 0.06 % nasal spray, INSTILL 2 SPRAYS INTO EACH NOSTRIL THREE TIMES DAILY, Disp: 15 mL, Rfl: 4  •  linaclotide (Linzess) 145 MCG capsule capsule, Take 1 capsule by  mouth Every Morning Before Breakfast., Disp: 30 capsule, Rfl: 5  •  metoprolol succinate XL (TOPROL-XL) 100 MG 24 hr tablet, Take 1 tablet by mouth Every Night., Disp: 90 tablet, Rfl: 3  •  metoprolol tartrate (LOPRESSOR) 100 MG tablet, Take 0.5 tablets by mouth Every Night., Disp: , Rfl:   •  mometasone (NASONEX) 50 MCG/ACT nasal spray, 2 sprays into each nostril 2 times per day, Disp: 17 g, Rfl: 5  •  olopatadine (PATANASE) 0.6 % solution nasal solution, 2 sprays by Each Nare route 2 (Two) Times a Day., Disp: 30 g, Rfl: 11  •  ondansetron (ZOFRAN) 8 MG tablet, TAKE 1 TABLET BY MOUTH EVERY 8 HOURS AS NEEDED FOR NAUSEA OR VOMITING, Disp: 30 tablet, Rfl: 3  •  ramelteon (Rozerem) 8 MG tablet, Take 1 tablet by mouth At Night As Needed for Sleep. For insomnia.  Take 30 min to 3 hours before bedtime., Disp: 30 tablet, Rfl: 2  •  sucralfate (CARAFATE) 1 g tablet, Take 1 tablet by mouth 4 (Four) Times a Day As Needed (Reflux)., Disp: 60 tablet, Rfl: 1  •  TRINTELLIX 20 MG tablet, , Disp: , Rfl:   •  amoxicillin-clavulanate (AUGMENTIN) 875-125 MG per tablet, Take 1 tablet by mouth Every 12 (Twelve) Hours., Disp: 42 tablet, Rfl: 0  •  prazosin (MINIPRESS) 5 MG capsule, , Disp: , Rfl:   •  predniSONE (DELTASONE) 20 MG tablet, 2qdx5 for arm, Disp: 10 tablet, Rfl: 0    Allergies   Allergen Reactions   • Codeine Other (See Comments)     Increases heart rate   • Sulfa Antibiotics Hives             Review of Systems   Constitutional: Negative for fever.   HENT: Positive for congestion, hearing loss, nosebleeds, postnasal drip and tinnitus. Negative for facial swelling.    Neurological: Negative for dizziness.   Hematological: Negative for adenopathy.           Objective   Physical Exam  Vitals signs and nursing note reviewed.   Constitutional:       Appearance: She is normal weight.   HENT:      Head: Normocephalic.      Right Ear: External ear normal.      Ears:      Comments: Scarred but intact TMs no evidence of fluid      Nose:      Comments: Small narrow nose no pus or blood but posterior ostiomeatal units difficult to evaluate see nasal endoscopy     Mouth/Throat:      Pharynx: Oropharynx is clear.   Eyes:      Conjunctiva/sclera: Conjunctivae normal.   Neck:      Musculoskeletal: Neck supple.   Pulmonary:      Effort: Pulmonary effort is normal.   Skin:     General: Skin is warm.   Neurological:      General: No focal deficit present.   Psychiatric:         Mood and Affect: Mood normal.     Procedure note nasal endoscopy was done showing open ostiomeatal unit was no crusting currently but redness and irritation of the ethmoid max her mucosa to mild degree  Are no polyps seen no blood in with use of decongestant exam was done no obvious crusting was seen within the sinus he uses the 30 degree endoscope there are no complications and no bleeding from the procedure  Audiogram reviewed revealing primarily sensorineural hearing loss which is longstanding but much improved over previous testing    Assessment/Plan   Diagnoses and all orders for this visit:    1. Sensorineural hearing loss (SNHL) of both ears (Primary)    2. Sinusitis, maxillary, chronic    Clearance for hearing aids as appropriate for this patient has longstanding hearing loss  See previous audiogram from June of this year past year nasal endoscopy done to get a culture because of failed response to maxillary sinusitis treatment of Augmentin  Continue nasal saline irrigation  We will call patient with results of culture and plan further therapy pending the results she is expected to hear from us later in the week   she understands in the future she will see Dr. Slater as I will be gone

## 2021-01-04 NOTE — PATIENT INSTRUCTIONS
MyPlate from USDA    MyPlate is an outline of a general healthy diet based on the 2010 Dietary Guidelines for Americans, from the U.S. Department of Agriculture (USDA). It sets guidelines for how much food you should eat from each food group based on your age, sex, and level of physical activity.  What are tips for following MyPlate?  To follow MyPlate recommendations:  · Eat a wide variety of fruits and vegetables, grains, and protein foods.  · Serve smaller portions and eat less food throughout the day.  · Limit portion sizes to avoid overeating.  · Enjoy your food.  · Get at least 150 minutes of exercise every week. This is about 30 minutes each day, 5 or more days per week.  It can be difficult to have every meal look like MyPlate. Think about MyPlate as eating guidelines for an entire day, rather than each individual meal.  Fruits and vegetables  · Make half of your plate fruits and vegetables.  · Eat many different colors of fruits and vegetables each day.  · For a 2,000 calorie daily food plan, eat:  ? 2½ cups of vegetables every day.  ? 2 cups of fruit every day.  · 1 cup is equal to:  ? 1 cup raw or cooked vegetables.  ? 1 cup raw fruit.  ? 1 medium-sized orange, apple, or banana.  ? 1 cup 100% fruit or vegetable juice.  ? 2 cups raw leafy greens, such as lettuce, spinach, or kale.  ? ½ cup dried fruit.  Grains  · One fourth of your plate should be grains.  · Make at least half of the grains you eat each day whole grains.  · For a 2,000 calorie daily food plan, eat 6 oz of grains every day.  · 1 oz is equal to:  ? 1 slice bread.  ? 1 cup cereal.  ? ½ cup cooked rice, cereal, or pasta.  Protein  · One fourth of your plate should be protein.  · Eat a wide variety of protein foods, including meat, poultry, fish, eggs, beans, nuts, and tofu.  · For a 2,000 calorie daily food plan, eat 5½ oz of protein every day.  · 1 oz is equal to:  ? 1 oz meat, poultry, or fish.  ? ¼ cup cooked beans.  ? 1 egg.  ? ½ oz nuts  or seeds.  ? 1 Tbsp peanut butter.  Dairy  · Drink fat-free or low-fat (1%) milk.  · Eat or drink dairy as a side to meals.  · For a 2,000 calorie daily food plan, eat or drink 3 cups of dairy every day.  · 1 cup is equal to:  ? 1 cup milk, yogurt, cottage cheese, or soy milk (soy beverage).  ? 2 oz processed cheese.  ? 1½ oz natural cheese.  Fats, oils, salt, and sugars  · Only small amounts of oils are recommended.  · Avoid foods that are high in calories and low in nutritional value (empty calories), like foods high in fat or added sugars.  · Choose foods that are low in salt (sodium). Choose foods that have less than 140 milligrams (mg) of sodium per serving.  · Drink water instead of sugary drinks. Drink enough water each day to keep your urine pale yellow.  Where to find support  · Work with your health care provider or a nutrition specialist (dietitian) to develop a customized eating plan that is right for you.  · Download an eva (mobile application) to help you track your daily food intake.  Where to find more information  · Go to ChooseMyPlate.gov for more information.  Summary  · MyPlate is a general guideline for healthy eating from the USDA. It is based on the 2010 Dietary Guidelines for Americans.  · In general, fruits and vegetables should take up ½ of your plate, grains should take up ¼ of your plate, and protein should take up ¼ of your plate.  This information is not intended to replace advice given to you by your health care provider. Make sure you discuss any questions you have with your health care provider.  Document Revised: 05/21/2020 Document Reviewed: 03/19/2018  Elsevier Patient Education © 2020 [employer] Inc.

## 2021-01-06 ENCOUNTER — TELEPHONE (OUTPATIENT)
Dept: OTOLARYNGOLOGY | Facility: CLINIC | Age: 66
End: 2021-01-06

## 2021-01-06 LAB
BACTERIA SPEC AEROBE CULT: ABNORMAL
GRAM STN SPEC: ABNORMAL
GRAM STN SPEC: ABNORMAL

## 2021-01-06 RX ORDER — LEVOFLOXACIN 500 MG/1
500 TABLET, FILM COATED ORAL DAILY
Qty: 10 TABLET | Refills: 0 | Status: SHIPPED | OUTPATIENT
Start: 2021-01-06 | End: 2021-01-18 | Stop reason: SDUPTHER

## 2021-01-06 NOTE — TELEPHONE ENCOUNTER
Attempted to call patient so staff will do so let patient know that she has a bacteria which was not sensitive to her previous antibiotic she was given we will give her only drug she can take orally that she is not allergic to which is levofloxacin blackbox warnings are to be explained to patient and how to take it to avoid milk and products and she is to give us feedback in the next 10 days call if any other concerns in the interim  Also warned about mixing with iron and sucralfate

## 2021-01-07 ENCOUNTER — TELEPHONE (OUTPATIENT)
Dept: OTOLARYNGOLOGY | Facility: CLINIC | Age: 66
End: 2021-01-07

## 2021-01-07 NOTE — TELEPHONE ENCOUNTER
01/07/2021, 1435 - Patient telephoned per this staff member [phone] with inquiry if patient received voice message submitted 01/06/2021 regarding utilization of Levaquin 500 MG Tablet antibiotic therapy.  Patient stated she did receive voice message and she has collected prescription medication from pharmacy.  Patient given reminder to contact office in 2 weeks with status of effectiveness of antibiotic therapy.  Patient verbalized understanding.

## 2021-01-18 ENCOUNTER — TELEPHONE (OUTPATIENT)
Dept: OTOLARYNGOLOGY | Facility: CLINIC | Age: 66
End: 2021-01-18

## 2021-01-18 RX ORDER — LEVOFLOXACIN 500 MG/1
500 TABLET, FILM COATED ORAL DAILY
Qty: 5 TABLET | Refills: 0 | OUTPATIENT
Start: 2021-01-18 | End: 2021-02-25

## 2021-01-18 NOTE — TELEPHONE ENCOUNTER
01/18/2021, 0821- Patient telephoned per this staff member [phone].  Zero answer.  Voice message submittetd with notification Dr. Gaston currently out of office with Dr. Slater accepting call.  Patient made aware an additional 5 day supply of Levaquin 500 MG Tablet antibiotic therapy has been submitted to pharmacy ([facility]) per Dr. Slater.  Patient instructed to contact office with questions.

## 2021-01-18 NOTE — TELEPHONE ENCOUNTER
----- Message from Mike Slater MD sent at 1/18/2021  8:05 AM CST -----  Regarding: RE: Alek pt  Contact: [phone]  Please let this patient know I sent in an additional 5 days of Levaquin  ----- Message -----  From: Ximena Young  Sent: 1/15/2021   1:21 PM CST  To: Mike Slater MD, Antonina Byrd, #  Subject: Alek pt                                        Still battling a sinus infection and wondering if she could get an extension on the Levaquin.  Not quite over it yet.    [facility]

## 2021-01-21 RX ORDER — OLOPATADINE HYDROCHLORIDE 665 UG/1
2 SPRAY NASAL 2 TIMES DAILY
Qty: 30 G | Refills: 11 | Status: SHIPPED | OUTPATIENT
Start: 2021-01-21 | End: 2021-06-21

## 2021-02-08 DIAGNOSIS — J30.9 ALLERGIC RHINITIS, UNSPECIFIED SEASONALITY, UNSPECIFIED TRIGGER: ICD-10-CM

## 2021-02-08 DIAGNOSIS — G25.81 RESTLESS LEGS SYNDROME (RLS): ICD-10-CM

## 2021-02-08 RX ORDER — CETIRIZINE HYDROCHLORIDE 10 MG/1
10 TABLET ORAL DAILY
Qty: 90 TABLET | Refills: 3 | Status: SHIPPED | OUTPATIENT
Start: 2021-02-08 | End: 2022-01-17

## 2021-02-09 RX ORDER — GABAPENTIN 600 MG/1
600 TABLET ORAL NIGHTLY
Qty: 30 TABLET | Refills: 0 | Status: SHIPPED | OUTPATIENT
Start: 2021-02-09 | End: 2021-03-02 | Stop reason: SDUPTHER

## 2021-02-10 NOTE — TELEPHONE ENCOUNTER
Chart reviewed.  MARQUISE reviewed within window.  Will refill for RLS. F/u Appt with me next month.     Jimmy Kelly II, MD  02/09/21 @ 6:05 PM CST

## 2021-02-22 ENCOUNTER — CLINICAL SUPPORT (OUTPATIENT)
Dept: AUDIOLOGY | Facility: CLINIC | Age: 66
End: 2021-02-22

## 2021-02-22 DIAGNOSIS — Z71.89 ENCOUNTER FOR HEARING AID CONSULTATION: Primary | ICD-10-CM

## 2021-02-22 PROCEDURE — HEARINGNOCHG: Performed by: AUDIOLOGIST

## 2021-02-22 NOTE — PROGRESS NOTES
HEARING AID CONSULT    Name:  Mikki Joel  :  1955  Age:  66 y.o.  Date of Evaluation:  2021      HISTORY    Reason for visit:  Mikki Joel is seen today for a hearing aid consultation at the request of herself.  A previous audiogram completed on 2020 shows mild to moderate sloping sensorineural hearing loss  for right ear, and mild to severe sloping mainly sensorineural hearing loss  in left ear.  Patient reports a positive history of ear surgeries in both ears, but most recently in her right ear.  She states she has been wearing ARMANI hearing aids since 2017, and she has recently lost her right hearing aids.  She states she is interested in new hearing aids at this time.  She currently has the [employer] insurance which has benefits for hearing aids.      Hearing aid history:  Patient is interested in hearing aids at this time.      RECOMMENDATIONS:  During the Hearing Aid discussion, Ms. Joel has chosen 2  in the Canal (ARMANI) hearing aid(s) for both ears.  The hearing aid recommended is from [employer] with the [employer] P50 312 digital circuit and 2xS receivers. The cost of this hearing aid is $1,700.00 per aid for a total of $3,400.00 .  This amount will be billed to her insurance at the time of the fitting.    The hearing aids will be ordered.  Once the hearing aids arrive, she will be contacted to make an appointment for her fitting.          It was a pleasure seeing Mikki Joel in Audiology today.  I look forward to helping Ms. Joel with her amplification needs.            This document has been electronically signed by Shanell Berrios MS CCC-ERICK on 2021 10:06 [facility]       Shanell Berrios MS CCC-A  Licensed Audiologist    For Billing and Coding:  Z71.89  Encounter for Hearing Aid Consultation - no charge

## 2021-03-02 ENCOUNTER — OFFICE VISIT (OUTPATIENT)
Dept: SLEEP MEDICINE | Facility: HOSPITAL | Age: 66
End: 2021-03-02

## 2021-03-02 VITALS
SYSTOLIC BLOOD PRESSURE: 141 MMHG | DIASTOLIC BLOOD PRESSURE: 90 MMHG | HEART RATE: 87 BPM | BODY MASS INDEX: 36.72 KG/M2 | HEIGHT: 68 IN | OXYGEN SATURATION: 96 % | WEIGHT: 242.3 LBS

## 2021-03-02 DIAGNOSIS — G25.81 RESTLESS LEGS SYNDROME (RLS): ICD-10-CM

## 2021-03-02 DIAGNOSIS — F39 AFFECTIVE DISORDER (HCC): ICD-10-CM

## 2021-03-02 DIAGNOSIS — G47.33 OSA (OBSTRUCTIVE SLEEP APNEA): Primary | ICD-10-CM

## 2021-03-02 DIAGNOSIS — F51.04 CHRONIC INSOMNIA: ICD-10-CM

## 2021-03-02 PROCEDURE — 99214 OFFICE O/P EST MOD 30 MIN: CPT | Performed by: PSYCHIATRY & NEUROLOGY

## 2021-03-02 PROCEDURE — 90833 PSYTX W PT W E/M 30 MIN: CPT | Performed by: PSYCHIATRY & NEUROLOGY

## 2021-03-02 RX ORDER — RAMELTEON 8 MG/1
8 TABLET ORAL NIGHTLY PRN
Qty: 30 TABLET | Refills: 2 | Status: SHIPPED | OUTPATIENT
Start: 2021-03-02 | End: 2021-07-23

## 2021-03-02 RX ORDER — GABAPENTIN 800 MG/1
800 TABLET ORAL NIGHTLY
Qty: 30 TABLET | Refills: 2 | Status: SHIPPED | OUTPATIENT
Start: 2021-03-09 | End: 2021-06-24 | Stop reason: SDUPTHER

## 2021-03-02 RX ORDER — VORTIOXETINE 20 MG/1
20 TABLET, FILM COATED ORAL DAILY
Qty: 30 TABLET | Refills: 0 | Status: SHIPPED | OUTPATIENT
Start: 2021-03-02 | End: 2021-04-20 | Stop reason: SDUPTHER

## 2021-03-02 NOTE — PROGRESS NOTES
Sleep Medicine Follow-Up Note    CC & ID:  Ms. Mikki Joel is a 66 y.o. female seen for follow-up regarding JUNIOR, Insomnia & RLS.      Treatment Summary:   --JUNIOR: Dx PSG in Jan 20: AHI 9, PLMI 33   --Titration in Aug 20: no REM sleep, did well though with PLMI of 17 / hr.   --On APAP 5 - 12 cm.    --RLS:    --Hx Requip 3mg qHS   --Recently on Neurontin 600mg qEvening    --Insomnia:    --Encouraged behavioral interventions   --Stopped Lunest given lack of efficacy & neurontin   --Started Ramelteon 8mg qHS    --Dream enactment behavior: infrequent, monitor.  Unable to monitor for REM without atonia (No REM sleep).    --Last seen in Ded 20 by me; APAP continued; Lunesta changed to Ramelteon & Requip changed to Neurontin        HPI:   Today, patient presents unaccompanied.  She is pleasant and engaged well.    Concerning RLS, improved; notices some problems with pain and discomfort in hips & ankles.  NO issues with gabapentin.  Taking 1-2 hours before bed.    Regarding insomnia it is ongoing, with up to several hour latency.  Often laying in bed awake with worries.  This time of year is difficult for patient as April & May are at the time of loss of daughter.  Has support network.    Sinus infx since Oct has been intermittently symptomatic.    D/w pt behaivoral health - therapy and she is interested.  She reports that she needs a refill on her Trintellix.  No SI or HI.  I discussed that I would be willing to provide a 1 month bridge for her to either see her PCP or establish with behavioral health.  She is agreeable.        Treatment: CPAP  -Pressure: 5-12 cm   -Pressure intolerance: no   -Aerophagia: no   -Air Hunger: no  -Interface: cushion, nasal  -Fit: no  -Chin strap: no  -HCC: [facility] - getting supplies  -Patient's perceived outcome: benefits      Compliance Card Data:   - Date Range: 12/1/20 -- 2/28/21   - Days: 90    - % Days used: 100%  - % Days with Usage > 4 hrs: 100%  - Average usage days used: 9 h  10 m        ---> often forgets to turn off    - Setting: APAP 5 - 12 cm  - Pressure data:   - Median 6 cm  - <=90%: 9 cm  - Max: 12.5 cm    - Residual AHI: 2   - PB: 0%   - GIRISH 0.2    - Vibratory Snore Index: 3  - Average Time in Large Leak: 10 min      Respiratory:  -Ongoing Snoring: no  -Nocturnal Gasping: no    ROS:  Review of Systems   Constitutional: Positive for fatigue.   HENT: Positive for sinus pressure (onging sinus infx, seeing PCP). Negative for sore throat.    Eyes: Negative for double vision.   Respiratory: Negative for cough.    Cardiovascular: Negative for chest pain.   Gastrointestinal: Positive for indigestion (seeing other provider).   Genitourinary: Negative for dysuria.   Musculoskeletal: Positive for back pain.   Neurological: Negative for seizures.   Psychiatric/Behavioral: Negative for behavioral problems.     -CONSTITUTIONAL: Weight: stable as below  Wt Readings from Last 5 Encounters:   02/25/21 109 kg (240 lb)   01/04/21 107 kg (236 lb 6.4 oz)   12/11/20 108 kg (237 lb)   11/16/20 107 kg (235 lb 12.8 oz)   11/09/20 105 kg (232 lb)         Sleep Pattern:  -Position: side  -Bedtime: 12 A  -Lights Out: most of time same  -Environment: no Lights/TV  -Latency: 1-2 h   -can have conditioned arousal  -Awakenings: x3-4   -Wake Time: 7.30-9.30 A  -Rise Time: same  -Patient's estimate of Sleep Time: variable  -Total Clock Time Spent in Bed: 7.5-9.5 h    Daytime Symptoms:  -Daytime fatigue/sleepiness: none significant  -Naps: no  -Involuntary Dozing: no  -Driving: Difficulty with sleepiness and driving:  no   -- Close calls related to sleepiness: no   -- Accidents related to sleepiness: no    Social History:  -Nicotine: no  -Caffeine: no  -Alcohol: no  -THC: no  -OTC/Supplements/herbals: sudafed in AM  -Illicits:  denies      Questionnaires: ESS = 3         Patient Active Problem List   Diagnosis   • Asthma   • Arthritis   • Nasal turbinate hypertrophy   • Gastroesophageal reflux disease   •  Depression with anxiety   • Insomnia   • PTSD (post-traumatic stress disorder)   • Restless leg   • Mixed hearing loss of left ear   • Right knee pain   • Presence of right artificial knee joint   • Essential hypertension, benign   • Presbyesophagus   • Chronic vasomotor rhinitis   • Perforated tympanic membrane, bilateral   • Mixed conductive and sensorineural hearing loss of right ear with restricted hearing of left ear   • Morbidly obese (CMS/HCC)       CMHx:  --> Denies any significant medical changes since last visit.   --> Supplemental Oxygen Use: denies      Current Outpatient Medications   Medication Sig Dispense Refill   • ALBUTEROL SULFATE  (90 Base) MCG/ACT inhaler INHALE 2 PUFFS BY MOUTH EVERY 4 HOURS AS NEEDED FOR WHEEZING 8.5 g 0   • brompheniramine-pseudoephedrine-DM 30-2-10 MG/5ML syrup Take 5 mL by mouth 4 (Four) Times a Day As Needed for Congestion, Cough or Allergies. 120 mL 0   • cetirizine (zyrTEC) 10 MG tablet TAKE 1 TABLET BY MOUTH DAILY 90 tablet 3   • dexlansoprazole (Dexilant) 60 MG capsule Take 1 capsule by mouth Daily. 90 capsule 1   • ferrous sulfate 325 (65 FE) MG tablet Take 1 tablet by mouth Daily With Breakfast. Take with vitamin C to help with absorption 30 tablet 5   • Fluticasone Furoate-Vilanterol (BREO ELLIPTA) 100-25 MCG/INH inhaler 1 puff bid and rinse out mouth 90 each 3   • gabapentin (NEURONTIN) 600 MG tablet TAKE 1 TABLET BY MOUTH EVERY NIGHT. FOR RESTLESS LEG SYNDROME. TAKE 1-3 HOURS BEFORE ONSET OF SYMPTOMS 30 tablet 0   • ipratropium (ATROVENT) 0.06 % nasal spray INSTILL 2 SPRAYS INTO EACH NOSTRIL THREE TIMES DAILY 15 mL 4   • levoFLOXacin (LEVAQUIN) 500 MG tablet Take 1 tablet by mouth Daily for 10 days. 10 tablet 0   • linaclotide (Linzess) 145 MCG capsule capsule Take 1 capsule by mouth Every Morning Before Breakfast. 30 capsule 5   • metoprolol succinate XL (TOPROL-XL) 100 MG 24 hr tablet Take 1 tablet by mouth Every Night. 90 tablet 3   • mometasone  "(NASONEX) 50 MCG/ACT nasal spray 2 sprays into each nostril 2 times per day 17 g 5   • olopatadine (PATANASE) 0.6 % solution nasal solution 2 sprays by Each Nare route 2 (Two) Times a Day. 30 g 11   • ondansetron (ZOFRAN) 8 MG tablet TAKE 1 TABLET BY MOUTH EVERY 8 HOURS AS NEEDED FOR NAUSEA OR VOMITING 30 tablet 3   • ramelteon (Rozerem) 8 MG tablet Take 1 tablet by mouth At Night As Needed for Sleep. For insomnia.  Take 30 min to 3 hours before bedtime. 30 tablet 2   • sucralfate (CARAFATE) 1 g tablet Take 1 tablet by mouth 4 (Four) Times a Day As Needed (Reflux). 60 tablet 1   • TRINTELLIX 20 MG tablet        No current facility-administered medications for this visit.      -Notable Current Medications:  --> Ramelteon 8mg qHS  --> Requip 3mg QHS    PE:  Body mass index is 36.85 kg/m².  Vitals:    03/02/21 1445 03/02/21 1522   BP: (!) 191/104 141/90   Pulse: 87    SpO2: 96%    Weight: 110 kg (242 lb 4.8 oz)    Height: 172.7 cm (67.99\")    manual recheck    Wt Readings from Last 5 Encounters:   02/25/21 109 kg (240 lb)   01/04/21 107 kg (236 lb 6.4 oz)   12/11/20 108 kg (237 lb)   11/16/20 107 kg (235 lb 12.8 oz)   11/09/20 105 kg (232 lb)     Physical Exam  General:  In NAD.  Head: Atraumatic  Eyes: EOMI.  CV: No Clubbing.   Pul: Respirations: unlaboured.    MS: No atrophy.  Neuro: No resting tremor.  Gait normal turning & station; unremarkable overall.  Psych: Socially appropriate.  Pleasant.  No overt dysphoria.         Assessment & Planning:  Ms. Mikki Joel is a 66 y.o. female who is seen for follow-up of:     --Obstructive Sleep Apnea:    --Compliant with treatment  -Benefiting from treatment  --Having awakenings at night  -Increase Auto CPAP to 8-15 cm  -Patient understands to notify clinic if developing waking headaches or worsening daytime sleepiness  --Script for continued supplies provided      ---Insomnia, chronic: sub optimally controlled  --Perceived latency of 1 to 2 hours or more  -Of note, " patient fell asleep as soon as the study started in lab    -We discussed and I reiterated critical need for behavioral interventions.  --Reiterated the data noting increased all cause mortality with insomnia without treatment.  Compared it to the data noting increased all cause mortality with hypnotics (long term, > 6 wks) and cognitive dysfunction risk.  Discussed benefits of behavioral therapy to treat insomnia without risks of medication and data suggesting equal efficacy at 4-6 wks of behavioral therapy (though with CBT-I which involves these functions).   --Discussed with patient the goals of behavioral intervention: sleep and napping restriction to 6 hours a day/night; stimulus control; fixed wake time at 7.30 A regardless of how much or little sleep.  Encouraged sleep logs be kept.     --Patient is going to consider; contemplative re: change.  Discussed potential worsening of sleepiness and fatigue during initiation of intervention.  Need for increased vigilance with any sustained attention tasks (e.g. Driving).  All questions answered for the patient.        --Cont Ramelteon 8 mg nightly as needed.         --RLS: Suboptimally controlled  --Titrate Neurontin to 800mg qEvening for restless leg symptoms.    --Ongoing symptoms more consistent with Hip and back pain.      --Dream enactment behavior: infrequent, stable; monitor.  Unable to monitor for REM without atonia. on her last titration test given lack of REM.  Given focus on insomnia & RLS today, was not discussed in detail.  Will let us know of any worsening sx.      --Affective d/o / BH overlay: encoruaged outpt BH; d/w pt benefits of tx of insomnia & JUNIOR to aid with BH sx.    --Resources to outpt  provided on AVS   --One time bridge of Trintellix to cover to being seen         -->  Safe driving reviewed.    -->  Follow-up 3 Months to evaluate effectiveness of medication changes and behavioral interventions; sooner, if needed.    --> Call with any  questions or concerns.      All questions answered for the patient, who indicated understanding and agreed with the plan.       Jimmy Kelly MD  03/02/21   Sleep Medicine    CC: Ronald Bowser MD        Psychotherapy Note  --Total Psychotherapy Time: 17 minutes  --Participants: Patient, myself  --Current Clinical Status: fair  --Focus of Therapeutic Encounter: insight, schema, education  --Intervention Type: Supportive, CBT/cognitive, Psycho-Educational, Insight Focused, Stimulus Control, Fixed Wake Time and Sleeping and Nap Restriction  --Therapy notes: I provided reflective listening, supportive therapy, reflection, and allowed them to express affect in therapy course.  Cognitive behavioral therapy targeting schema burden, distortions - identifying and challenging these.  Educational regarding impact of schema onto her situation.  Reiterated need for behavioral interventions for insomnia as above.  Insight decreased awareness about situation.  Worked on and processed feelings of guilt and schema around loss of daughter and the tyranny of the shoulds.  --DX: as above  --Plan: Continue to work on developing & strengthening coping skills; correcting maladaptive schema; work on insight; will establish with outpatient behavioral health  --Post therapy status: improving affect and insight

## 2021-03-02 NOTE — PATIENT INSTRUCTIONS
1. Have increased Gabapentin to 800mg taken 1-3 hours before bed  2. Increase CPAP pressure to help with awakenings at night  3. Refilled Ramelton  4. 30 day supply of Trintellix to bridge to either PCP or psychiatry visit  5. Call outpatient behavioral health for therapy and medications as below:    ---  Outpatient Behavioral Health resources in the region include the following.  They will need to be called by you to set up an appointment.  If there are any issues or concerns, kindly contact [phone].    [facility]: [address], [facility], 7th floor, [phone]; has same day access care 8.30-11A   -[place]: [address], [phone]; Same day care 8.30-11A   -[place]: [address]; [phone]; Same day 8.30-11A   -[place]: [address]; [phone]    [facility]: [address]; [phone]   -[place]: [address]; [phone]   [facility]: [address]; [phone]   [facility]: [address];  [phone]    [facility]: [address]; [phone]

## 2021-03-03 ENCOUNTER — IMMUNIZATION (OUTPATIENT)
Dept: VACCINE CLINIC | Facility: HOSPITAL | Age: 66
End: 2021-03-03

## 2021-03-03 PROCEDURE — 0001A: CPT | Performed by: NURSE PRACTITIONER

## 2021-03-03 PROCEDURE — 91300 HC SARSCOV02 VAC 30MCG/0.3ML IM: CPT | Performed by: NURSE PRACTITIONER

## 2021-03-09 ENCOUNTER — CLINICAL SUPPORT (OUTPATIENT)
Dept: AUDIOLOGY | Facility: CLINIC | Age: 66
End: 2021-03-09

## 2021-03-09 DIAGNOSIS — Z46.1 ENCOUNTER FOR FITTING OR ADJUSTMENT OF HEARING AID: Primary | ICD-10-CM

## 2021-03-09 PROCEDURE — V5261 HEARING AID, DIGIT, BIN, BTE: HCPCS | Performed by: AUDIOLOGIST

## 2021-03-09 NOTE — PROGRESS NOTES
HEARING AID FITTING    Name:  Mikki Joel  :  1955  Age:  66 y.o.  Date of Evaluation:  3/9/2021      HISTORY    Reason for visit:  Mikki Joel is seen today for a hearing aid fitting.  The hearing aids to be fit are  in the Canal (ARMANI) hearing aids from [employer] with the [employer] P50 312 digital circuit.    Right Hearing Aid Serial Number: [id number]  Left Hearing Aid Serial Number: [id number]      Warranty Expiration:  's warranty extends through 2022 which covers repairs, loss and damage.    Battery Size:  312    The hearing aids were fit to Ms. Joel's ears.  Patient reported the fit was comfortable and the sound was good.  Patient was instructed on use and care of the hearing instruments.  The hearing aids were paired to her cell phone.  The necessary paperwork was signed.  All questions were answered at this time.  The cost of this hearing aid is $1,700.00 per aid for a total of $3,400.00.  Patient's insurance will be billed for the cost of the hearing aid(s).    Ms. Joel will return in 2 weeks for a hearing aid follow up.  At that time we will make adjustments to the hearing aid(s) and address problems as necessary.      It was a pleasure seeing Mikki Joel in Audiology today.  It is a pleasure helping Ms. Joel with her amplification needs.             This document has been electronically signed by Shanell Berrios MS CCC-A on 2021 11:41 CST        Shanell Berrios MS CCC-A  Licensed Audiologist       For Billing and Coding:  Please bill patient's insurance for following charges:    Hearing Aid, Digital Binaural BTE - $3,400.00

## 2021-03-24 ENCOUNTER — CLINICAL SUPPORT (OUTPATIENT)
Dept: AUDIOLOGY | Facility: CLINIC | Age: 66
End: 2021-03-24

## 2021-03-24 ENCOUNTER — IMMUNIZATION (OUTPATIENT)
Dept: VACCINE CLINIC | Facility: HOSPITAL | Age: 66
End: 2021-03-24

## 2021-03-24 DIAGNOSIS — Z46.1 ENCOUNTER FOR FITTING OR ADJUSTMENT OF HEARING AID: Primary | ICD-10-CM

## 2021-03-24 PROCEDURE — 91300 HC SARSCOV02 VAC 30MCG/0.3ML IM: CPT | Performed by: THORACIC SURGERY (CARDIOTHORACIC VASCULAR SURGERY)

## 2021-03-24 PROCEDURE — HEARINGNOCHG: Performed by: AUDIOLOGIST

## 2021-03-24 PROCEDURE — 0002A: CPT | Performed by: THORACIC SURGERY (CARDIOTHORACIC VASCULAR SURGERY)

## 2021-03-24 NOTE — PROGRESS NOTES
HEARING AID CHECK    Name:  Mikki Joel  :  1955  Age:  66 y.o.  Date of Evaluation:  3/24/2021      HISTORY    Reason for visit:  Mikki Joel is seen today for a hearing aid follow up.  Patient reports she is hearing well with her new hearing aids.  She states her left hearing aid sounds a little too sharp in the high pitches at times.  She states for the last couple days, her right hearing aid would shut down and then turn back on.     Hearing aid history:  Patient is currently wearing a  in the Canal (ARMANI) hearing aid in both ear(s).     OFFICE VISIT    During today's visit computer was used to decrease high frequency sounds slightly in her left hearing aid.  Compression was adjusted in the right hearing aid to see if this solves the problem of the aid shutting down.  If that problem continues, it is recommended she bring the right hearing aid back to me so I can send it in for malfunction.  She will let me know if this needs to be done.     It was a pleasure seeing Mikki Joel in Audiology today.  It is a pleasure helping Ms. Joel with her amplification needs.             This document has been electronically signed by Shanell Berrios MS CCC-A on 2021 16:08 CDT       Shanell Berrios MS CCC-A  Licensed Audiologist    For Billing and Codin  Hearing Aid Check, Binaural - no charge

## 2021-04-20 ENCOUNTER — OFFICE VISIT (OUTPATIENT)
Dept: FAMILY MEDICINE CLINIC | Facility: CLINIC | Age: 66
End: 2021-04-20

## 2021-04-20 ENCOUNTER — OFFICE VISIT (OUTPATIENT)
Dept: GASTROENTEROLOGY | Facility: CLINIC | Age: 66
End: 2021-04-20

## 2021-04-20 VITALS
BODY MASS INDEX: 35.92 KG/M2 | SYSTOLIC BLOOD PRESSURE: 132 MMHG | HEIGHT: 68 IN | DIASTOLIC BLOOD PRESSURE: 82 MMHG | WEIGHT: 237 LBS

## 2021-04-20 VITALS
DIASTOLIC BLOOD PRESSURE: 84 MMHG | HEART RATE: 80 BPM | HEIGHT: 68 IN | SYSTOLIC BLOOD PRESSURE: 169 MMHG | WEIGHT: 239.2 LBS | BODY MASS INDEX: 36.25 KG/M2

## 2021-04-20 DIAGNOSIS — J45.20 MILD INTERMITTENT ASTHMA, UNSPECIFIED WHETHER COMPLICATED: Chronic | ICD-10-CM

## 2021-04-20 DIAGNOSIS — K59.04 CHRONIC IDIOPATHIC CONSTIPATION: ICD-10-CM

## 2021-04-20 DIAGNOSIS — E66.01 MORBIDLY OBESE (HCC): ICD-10-CM

## 2021-04-20 DIAGNOSIS — F41.8 DEPRESSION WITH ANXIETY: Chronic | ICD-10-CM

## 2021-04-20 DIAGNOSIS — K21.00 GASTROESOPHAGEAL REFLUX DISEASE WITH ESOPHAGITIS WITHOUT HEMORRHAGE: Primary | ICD-10-CM

## 2021-04-20 DIAGNOSIS — Z12.31 BREAST CANCER SCREENING BY MAMMOGRAM: ICD-10-CM

## 2021-04-20 DIAGNOSIS — R51.9 NONINTRACTABLE HEADACHE, UNSPECIFIED CHRONICITY PATTERN, UNSPECIFIED HEADACHE TYPE: ICD-10-CM

## 2021-04-20 DIAGNOSIS — F39 AFFECTIVE DISORDER (HCC): ICD-10-CM

## 2021-04-20 DIAGNOSIS — Z13.220 SCREENING CHOLESTEROL LEVEL: ICD-10-CM

## 2021-04-20 DIAGNOSIS — I10 ESSENTIAL HYPERTENSION, BENIGN: Primary | Chronic | ICD-10-CM

## 2021-04-20 DIAGNOSIS — J30.89 SEASONAL ALLERGIC RHINITIS DUE TO OTHER ALLERGIC TRIGGER: ICD-10-CM

## 2021-04-20 PROCEDURE — 99213 OFFICE O/P EST LOW 20 MIN: CPT | Performed by: NURSE PRACTITIONER

## 2021-04-20 PROCEDURE — 99214 OFFICE O/P EST MOD 30 MIN: CPT | Performed by: FAMILY MEDICINE

## 2021-04-20 RX ORDER — VORTIOXETINE 20 MG/1
20 TABLET, FILM COATED ORAL DAILY
Qty: 90 TABLET | Refills: 1 | Status: SHIPPED | OUTPATIENT
Start: 2021-04-20 | End: 2021-10-19 | Stop reason: SDUPTHER

## 2021-04-20 RX ORDER — DEXLANSOPRAZOLE 60 MG/1
1 CAPSULE, DELAYED RELEASE ORAL DAILY
Qty: 90 CAPSULE | Refills: 1 | Status: SHIPPED | OUTPATIENT
Start: 2021-04-20 | End: 2021-10-12

## 2021-04-20 RX ORDER — PREDNISONE 20 MG/1
TABLET ORAL
Qty: 10 TABLET | Refills: 0 | Status: SHIPPED | OUTPATIENT
Start: 2021-04-20 | End: 2021-06-21

## 2021-04-20 NOTE — PROGRESS NOTES
Subjective   Mikki Joel is a 66 y.o. female.  Reevaluation hypertension obesity allergic rhinitis asthma chronic dysthymia diagnoses listed below.  In interim continues on medication needs refills.  Time for lab work next time.  Is had some sinus headache and drainage.  Medicines have remained the same.  Does need a mammogram and May.  Overall stable.    History of Present Illness   HPI    The following portions of the patient's history were reviewed and updated as appropriate: allergies, current medications, past family history, past medical history, past social history, past surgical history and problem list.    Review of Systems  Review of Systems   Constitutional: Negative for activity change, appetite change, fatigue and unexpected weight change.   HENT: Positive for congestion and sinus pressure. Negative for trouble swallowing and voice change.    Eyes: Negative for redness and visual disturbance.   Respiratory: Negative for cough and wheezing.    Cardiovascular: Negative for chest pain and palpitations.   Gastrointestinal: Negative for abdominal pain, constipation, diarrhea, nausea and vomiting.   Genitourinary: Negative for urgency.   Musculoskeletal: Negative for joint swelling.   Neurological: Positive for headaches. Negative for syncope.   Hematological: Negative for adenopathy.   Psychiatric/Behavioral: Negative for sleep disturbance.       Objective   Physical Exam  Physical Exam  Constitutional:       Appearance: She is well-developed.   HENT:      Head: Normocephalic.      Right Ear: Tympanic membrane, ear canal and external ear normal.      Left Ear: Tympanic membrane, ear canal and external ear normal.      Nose: Mucosal edema present.   Eyes:      Pupils: Pupils are equal, round, and reactive to light.   Neck:      Thyroid: No thyromegaly.   Cardiovascular:      Rate and Rhythm: Normal rate and regular rhythm.      Heart sounds: Normal heart sounds. No murmur heard.   No friction rub. No  "gallop.    Pulmonary:      Breath sounds: Normal breath sounds.   Abdominal:      General: There is no distension.      Palpations: Abdomen is soft. There is no mass.      Tenderness: There is no abdominal tenderness.   Musculoskeletal:         General: Normal range of motion.      Cervical back: Normal range of motion.   Skin:     General: Skin is warm and dry.   Neurological:      Mental Status: She is alert and oriented to person, place, and time.      Deep Tendon Reflexes: Reflexes are normal and symmetric.   Psychiatric:         Attention and Perception: Attention normal.         Mood and Affect: Mood normal.         Speech: Speech normal.      Comments: Normal affect           Visit Vitals  /82   Ht 172.7 cm (68\")   Wt 108 kg (237 lb)   LMP  (LMP Unknown)   BMI 36.04 kg/m²     Body mass index is 36.04 kg/m².      Assessment/Plan   Diagnoses and all orders for this visit:    1. Essential hypertension, benign (Primary)  -     Magnesium  -     Comprehensive Metabolic Panel    2. Mild intermittent asthma, unspecified whether complicated    3. Morbidly obese (CMS/[facility])    4. Screening cholesterol level  -     Lipid Panel With LDL / HDL Ratio    5. Breast cancer screening by mammogram  -     Mammo Screening Digital Tomosynthesis Bilateral With CAD; Future    6. Seasonal allergic rhinitis due to other allergic trigger  -     predniSONE (DELTASONE) 20 MG tablet; 2qdx 5 for headache and congestion  Dispense: 10 tablet; Refill: 0    7. Nonintractable headache, unspecified chronicity pattern, unspecified headache type  -     predniSONE (DELTASONE) 20 MG tablet; 2qdx 5 for headache and congestion  Dispense: 10 tablet; Refill: 0    8. Depression with anxiety    9. Affective disorder (CMS/HCC)  -     Trintellix 20 MG tablet; Take 20 mg by mouth Daily. For Mood.  Dispense: 90 tablet; Refill: 1     on wt loss measures and programs  Short-term medication Priya pot for the sinus refill medication counseled mainly " on lifestyle measures orders as above recheck 6 months be time for subsequent Medicare

## 2021-04-20 NOTE — PROGRESS NOTES
Chief Complaint   Patient presents with   • Heartburn       Subjective    Mikki Palak Joel is a 66 y.o. female. she is here today for follow-up.    History of Present Illness  66-year-old female presents for recheck regarding heartburn and constipation.  Reports with excellent she has been doing very well only occasionally when she eats she will have reflux symptoms and typically if she eats something more spicy or greasy.  Reports she still having continual nausea is concerned it is related to her Trintellix going to talk to her PCP about that now.  States vomiting episodes have been better she has not vomited very much recently at all.  We started patient on Linzess which had to be increased to 145 and states that is working very well for her denies any melena or hematochezia.  He would like to lose weight but reports she has not been able to with Covid her weight is down 3 pounds from last check.        The following portions of the patient's history were reviewed and updated as appropriate:   Past Medical History:   Diagnosis Date   • Anxiety    • Chronic anemia    • Chronic pain    • CTS (carpal tunnel syndrome)    • Deaf    • Depression with anxiety    • Fracture of wrist    • Gastroesophageal reflux disease    • Hypertension    • Migraine    • Obesity    • Osteoarthritis    • Perforated tympanic membrane, bilateral      Past Surgical History:   Procedure Laterality Date   • COLONOSCOPY N/A 1/19/2018   • EAR TUBES     • ENDOSCOPIC FUNCTIONAL SINUS SURGERY (FESS) Bilateral 9/5/2017   • ENDOSCOPY N/A 1/19/2018   • ENDOSCOPY N/A 6/10/2019   • KNEE ARTHROPLASTY     • RHINOPLASTY     • SHOULDER ARTHROSCOPY     • TYMPANOPLASTY Left 6/5/2018   • TYMPANOPLASTY Right 2/7/2020    Procedure: TYMPANOPLASTY AND  CARTILAGE GRAFT;  Surgeon: Ramy Gaston MD;  Location: [facility];  Service: ENT;  Laterality: Right;   • UPPER GASTROINTESTINAL ENDOSCOPY       Family History   Problem Relation Age of Onset   •  Hypertension Mother    • Alcohol abuse Father    • Stroke Maternal Grandmother    • Irritable bowel syndrome Sister      OB History        2    Para   2    Term   2            AB        Living           SAB        TAB        Ectopic        Molar        Multiple        Live Births                  Prior to Admission medications    Medication Sig Start Date End Date Taking? Authorizing Provider   ALBUTEROL SULFATE  (90 Base) MCG/ACT inhaler INHALE 2 PUFFS BY MOUTH EVERY 4 HOURS AS NEEDED FOR WHEEZING 19  Yes Ronald Bowser MD   brompheniramine-pseudoephedrine-DM 30-2-10 MG/5ML syrup Take 5 mL by mouth 4 (Four) Times a Day As Needed for Congestion, Cough or Allergies. 21  Yes Gary Sandoval MD   cetirizine (zyrTEC) 10 MG tablet TAKE 1 TABLET BY MOUTH DAILY 21  Yes Ronald Bowser MD   dexlansoprazole (Dexilant) 60 MG capsule Take 1 capsule by mouth Daily. 10/28/20  Yes Zelda Rich APRN   ferrous sulfate 325 (65 FE) MG tablet Take 1 tablet by mouth Daily With Breakfast. Take with vitamin C to help with absorption 20  Yes Mila Mata APRN   gabapentin (NEURONTIN) 800 MG tablet Take 1 tablet by mouth Every Night. For Restless leg syndrome.  Take 1-3 hours before onset of symptoms 3/9/21  Yes Jimmy Kelly II, MD   ipratropium (ATROVENT) 0.06 % nasal spray INSTILL 2 SPRAYS INTO EACH NOSTRIL THREE TIMES DAILY 20  Yes Ramy Gaston MD   linaclotide (Linzess) 145 MCG capsule capsule Take 1 capsule by mouth Every Morning Before Breakfast. 11/10/20  Yes Zelda Rich APRN   metoprolol succinate XL (TOPROL-XL) 100 MG 24 hr tablet Take 1 tablet by mouth Every Night. 20  Yes Ronald Bowser MD   mometasone (NASONEX) 50 MCG/ACT nasal spray 2 sprays into each nostril 2 times per day 10/20/20  Yes Ramy Gaston MD   olopatadine (PATANASE) 0.6 % solution nasal solution 2 sprays by Each Nare route 2 (Two) Times a Day. 21  Yes Ramy Gaston MD    ondansetron (ZOFRAN) 8 MG tablet TAKE 1 TABLET BY MOUTH EVERY 8 HOURS AS NEEDED FOR NAUSEA OR VOMITING 12/9/20  Yes Zelda Rich APRN   ramelteon (Rozerem) 8 MG tablet Take 1 tablet by mouth At Night As Needed for Sleep. For insomnia.  Take 30 min to 3 hours before bedtime. 3/2/21  Yes Jimmy Kelly II, MD   sucralfate (CARAFATE) 1 g tablet Take 1 tablet by mouth 4 (Four) Times a Day As Needed (Reflux). 10/28/20  Yes Zelda Rich APRN   Trintellix 20 MG tablet Take 20 mg by mouth Daily. For Mood. 3/2/21  Yes Jimmy Kelly II, MD     Allergies   Allergen Reactions   • Codeine Other (See Comments)     Increases heart rate   • Sulfa Antibiotics Hives     Social History     Socioeconomic History   • Marital status:      Spouse name: Not on file   • Number of children: Not on file   • Years of education: Not on file   • Highest education level: Not on file   Tobacco Use   • Smoking status: Never Smoker   • Smokeless tobacco: Never Used   Vaping Use   • Vaping Use: Never used   Substance and Sexual Activity   • Alcohol use: Yes     Comment: 05/15/2020 - Patient states she consumes an alcoholic beverage very seldom.   • Drug use: Yes     Types: Marijuana     Comment: 05/15/2020 - Patient states she utilizes capsule form of Marijuana for pain control at night to aid in sleep.   • Sexual activity: Not Currently     Partners: Male     Birth control/protection: Surgical     Comment:        Review of Systems  Review of Systems   Constitutional: Positive for fatigue. Negative for activity change, appetite change, chills, diaphoresis, fever and unexpected weight change.   HENT: Negative for sore throat and trouble swallowing.    Respiratory: Negative for shortness of breath.    Gastrointestinal: Positive for nausea. Negative for abdominal distention, abdominal pain, anal bleeding, blood in stool, constipation, diarrhea, rectal pain and vomiting.   Musculoskeletal: Negative for arthralgias.  "  Skin: Negative for pallor.   Neurological: Negative for light-headedness.        /84 (BP Location: Right arm)   Pulse 80   Ht 172.7 cm (68\")   Wt 109 kg (239 lb 3.2 oz)   LMP  (LMP Unknown)   BMI 36.37 kg/m²     Objective    Physical Exam  Constitutional:       General: She is not in acute distress.     Appearance: Normal appearance. She is well-developed.   HENT:      Head: Normocephalic and atraumatic.   Neck:      Thyroid: No thyromegaly.   Cardiovascular:      Rate and Rhythm: Normal rate and regular rhythm.      Heart sounds: Normal heart sounds.   Pulmonary:      Effort: Pulmonary effort is normal.      Breath sounds: Normal breath sounds. No wheezing, rhonchi or rales.   Abdominal:      General: Bowel sounds are normal. There is no distension.      Palpations: Abdomen is soft. Abdomen is not rigid.      Tenderness: There is no abdominal tenderness. There is no guarding.      Hernia: No hernia is present.   Musculoskeletal:      Cervical back: Normal range of motion and neck supple.   Lymphadenopathy:      Cervical: No cervical adenopathy.   Skin:     General: Skin is warm and dry.      Coloration: Skin is not pale.      Findings: No rash.   Neurological:      Mental Status: She is alert and oriented to person, place, and time.   Psychiatric:         Speech: Speech normal.         Behavior: Behavior is cooperative.       Office Visit on 01/04/2021   Component Date Value Ref Range Status   • Wound Culture 01/04/2021 Light growth (2+) Klebsiella oxytoca*  Final   • Gram Stain 01/04/2021 Rare (1+) WBCs seen   Final   • Gram Stain 01/04/2021 No organisms seen   Final     Assessment/Plan      1. Gastroesophageal reflux disease with esophagitis without hemorrhage    2. Chronic idiopathic constipation    .   Continue Dexilant daily avoid gastric irritants follow standard antireflux measures weight loss encouraged.  Continue Linzess 145 increase water and dietary fiber encourage physical activity as " tolerated.    Orders placed during this encounter include:  No orders of the defined types were placed in this encounter.      * Surgery not found *    Review and/or summary of lab tests, radiology, procedures, medications. Review and summary of old records and obtaining of history. The risks and benefits of my recommendations, as well as other treatment options were discussed with the patient today. Questions were answered.    New Medications Ordered This Visit   Medications   • linaclotide (Linzess) 145 MCG capsule capsule     Sig: Take 1 capsule by mouth Every Morning Before Breakfast.     Dispense:  30 capsule     Refill:  5   • dexlansoprazole (Dexilant) 60 MG capsule     Sig: Take 1 capsule by mouth Daily.     Dispense:  90 capsule     Refill:  1       Follow-up: Return in about 6 months (around 10/20/2021) for Recheck.          This document has been electronically signed by MIRIAM White on April 20, 2021 13:40 CDT           I spent 14 minutes caring for Mikki on this date of service. This time includes time spent by me in the following activities:preparing for the visit, reviewing tests, obtaining and/or reviewing a separately obtained history, performing a medically appropriate examination and/or evaluation , counseling and educating the patient/family/caregiver, ordering medications, tests, or procedures, referring and communicating with other health care professionals , documenting information in the medical record and care coordination    Results for orders placed or performed in visit on 01/04/21   Wound Culture - Wound, Nares    Specimen: Nares; Wound   Result Value Ref Range    Wound Culture Light growth (2+) Klebsiella oxytoca (A)     Gram Stain Rare (1+) WBCs seen     Gram Stain No organisms seen        Susceptibility    Klebsiella oxytoca - CHAR*     Ampicillin >=32 Resistant ug/ml     Ampicillin + Sulbactam 16 Intermediate ug/ml     Cefepime <=1 Susceptible ug/ml     Ceftazidime <=1  Susceptible ug/ml     Ceftriaxone <=1 Susceptible ug/ml     Gentamicin <=1 Susceptible ug/ml     Levofloxacin <=0.12 Susceptible ug/ml     Piperacillin + Tazobactam <=4 Susceptible ug/ml     Tetracycline <=1 Susceptible ug/ml     Trimethoprim + Sulfamethoxazole <=20 Susceptible ug/ml     * Cefazolin sensitivity will not be reported for Enterobacteriaceae in non-urine isolates. If cefazolin is preferred, please call the microbiology lab to request an E-test.  With the exception of urinary-sourced infections, aminoglycosides should not be used as monotherapy.   Results for orders placed or performed in visit on 12/15/20   Iron Profile    Specimen: Blood   Result Value Ref Range    Iron 96 37 - 145 mcg/dL    Iron Saturation 28 20 - 50 %    Transferrin 234 200 - 360 mg/dL    TIBC 349 298 - 536 mcg/dL   Ferritin Level    Specimen: Blood   Result Value Ref Range    Ferritin 200.00 (H) 13.00 - 150.00 ng/mL   Results for orders placed or performed in visit on 08/26/20   COVID-19, [facility] IN-HOUSE, NP SWAB IN TRANSPORT MEDIA 8-10 HR TAT - Swab, Nasopharynx    Specimen: Nasopharynx; Swab   Result Value Ref Range    COVID19 Not Detected Not Detected - Ref. Range   Results for orders placed or performed during the hospital encounter of 02/10/20   Gold Top - SST   Result Value Ref Range    Extra Tube Hold for add-ons.    Urinalysis With Microscopic If Indicated (No Culture) - Urine, Clean Catch    Specimen: Urine, Clean Catch   Result Value Ref Range    Color, UA Yellow Yellow, Straw, Dark Yellow, Rut    Appearance, UA Clear Clear    pH, UA 6.0 5.0 - 9.0    Specific Gravity, UA 1.002 (L) 1.003 - 1.030    Glucose, UA Negative Negative    Ketones, UA Negative Negative    Bilirubin, UA Negative Negative    Blood, UA Negative Negative    Protein, UA Negative Negative    Leuk Esterase, UA Negative Negative    Nitrite, UA Negative Negative    Urobilinogen, UA 0.2 E.U./dL 0.2 - 1.0 E.U./dL   CBC Auto Differential    Specimen: Blood    Result Value Ref Range    WBC 8.97 3.40 - 10.80 10*3/mm3    RBC 4.14 3.77 - 5.28 10*6/mm3    Hemoglobin 10.9 (L) 12.0 - 15.9 g/dL    Hematocrit 33.7 (L) 34.0 - 46.6 %    MCV 81.4 79.0 - 97.0 fL    MCH 26.3 (L) 26.6 - 33.0 pg    MCHC 32.3 31.5 - 35.7 g/dL    RDW 13.4 12.3 - 15.4 %    RDW-SD 39.4 37.0 - 54.0 fl    MPV 9.2 6.0 - 12.0 fL    Platelets 303 140 - 450 10*3/mm3    Neutrophil % 79.3 (H) 42.7 - 76.0 %    Lymphocyte % 12.0 (L) 19.6 - 45.3 %    Monocyte % 5.6 5.0 - 12.0 %    Eosinophil % 2.1 0.3 - 6.2 %    Basophil % 0.6 0.0 - 1.5 %    Immature Grans % 0.4 0.0 - 0.5 %    Neutrophils, Absolute 7.11 (H) 1.70 - 7.00 10*3/mm3    Lymphocytes, Absolute 1.08 0.70 - 3.10 10*3/mm3    Monocytes, Absolute 0.50 0.10 - 0.90 10*3/mm3    Eosinophils, Absolute 0.19 0.00 - 0.40 10*3/mm3    Basophils, Absolute 0.05 0.00 - 0.20 10*3/mm3    Immature Grans, Absolute 0.04 0.00 - 0.05 10*3/mm3    nRBC 0.0 0.0 - 0.2 /100 WBC   Light Blue Top   Result Value Ref Range    Extra Tube hold for add-on    Troponin    Specimen: Blood   Result Value Ref Range    Troponin T <0.010 0.000 - 0.030 ng/mL   Urine Drug Screen - Urine, Clean Catch    Specimen: Urine, Clean Catch   Result Value Ref Range    THC, Screen, Urine Positive (A) Negative    Phencyclidine (PCP), Urine Negative Negative    Cocaine Screen, Urine Negative Negative    Methamphetamine, Ur Negative Negative    Opiate Screen Positive (A) Negative    Amphetamine Screen, Urine Negative Negative    Benzodiazepine Screen, Urine Negative Negative    Tricyclic Antidepressants Screen Negative Negative    Methadone Screen, Urine Negative Negative    Barbiturates Screen, Urine Negative Negative    Oxycodone Screen, Urine Negative Negative    Propoxyphene Screen Negative Negative    Buprenorphine, Screen, Urine Negative Negative   CBC (No Diff)    Specimen: Blood   Result Value Ref Range    WBC 8.23 3.40 - 10.80 10*3/mm3    RBC 4.16 3.77 - 5.28 10*6/mm3    Hemoglobin 11.0 (L) 12.0 - 15.9 g/dL     Hematocrit 33.9 (L) 34.0 - 46.6 %    MCV 81.5 79.0 - 97.0 fL    MCH 26.4 (L) 26.6 - 33.0 pg    MCHC 32.4 31.5 - 35.7 g/dL    RDW 13.3 12.3 - 15.4 %    RDW-SD 39.8 37.0 - 54.0 fl    MPV 9.4 6.0 - 12.0 fL    Platelets 317 140 - 450 10*3/mm3   TSH    Specimen: Blood   Result Value Ref Range    TSH 0.470 0.270 - 4.200 uIU/mL   Ethanol    Specimen: Blood   Result Value Ref Range    Ethanol <10 0 - 10 mg/dL    Ethanol % <0.010 %   Acetaminophen Level    Specimen: Blood   Result Value Ref Range    Acetaminophen <5.0 (L) 10.0 - 30.0 mcg/mL   Salicylate Level    Specimen: Blood   Result Value Ref Range    Salicylate <0.3 <=30.0 mg/dL   Comprehensive Metabolic Panel    Specimen: Blood   Result Value Ref Range    Glucose 109 (H) 65 - 99 mg/dL    BUN 29 (H) 8 - 23 mg/dL    Creatinine 1.29 (H) 0.57 - 1.00 mg/dL    Sodium 137 136 - 145 mmol/L    Potassium 4.8 3.5 - 5.2 mmol/L    Chloride 103 98 - 107 mmol/L    CO2 23.0 22.0 - 29.0 mmol/L    Calcium 9.4 8.6 - 10.5 mg/dL    Total Protein 6.4 6.0 - 8.5 g/dL    Albumin 4.40 3.50 - 5.20 g/dL    ALT (SGPT) 20 1 - 33 U/L    AST (SGOT) 11 1 - 32 U/L    Alkaline Phosphatase 151 (H) 39 - 117 U/L    Total Bilirubin 0.2 0.2 - 1.2 mg/dL    eGFR Non African Amer 41 (L) >60 mL/min/1.73    Globulin 2.0 gm/dL    A/G Ratio 2.2 g/dL    BUN/Creatinine Ratio 22.5 7.0 - 25.0    Anion Gap 11.0 5.0 - 15.0 mmol/L   Basic Metabolic Panel    Specimen: Blood   Result Value Ref Range    Glucose 103 (H) 65 - 99 mg/dL    BUN 24 (H) 8 - 23 mg/dL    Creatinine 1.03 (H) 0.57 - 1.00 mg/dL    Sodium 141 136 - 145 mmol/L    Potassium 4.9 3.5 - 5.2 mmol/L    Chloride 109 (H) 98 - 107 mmol/L    CO2 24.0 22.0 - 29.0 mmol/L    Calcium 9.0 8.6 - 10.5 mg/dL    eGFR Non African Amer 54 (L) >60 mL/min/1.73    BUN/Creatinine Ratio 23.3 7.0 - 25.0    Anion Gap 8.0 5.0 - 15.0 mmol/L     *Note: Due to a large number of results and/or encounters for the requested time period, some results have not been displayed. A complete  set of results can be found in Results Review.

## 2021-05-03 RX ORDER — LINACLOTIDE 145 UG/1
CAPSULE, GELATIN COATED ORAL
Qty: 30 CAPSULE | Refills: 5 | Status: SHIPPED | OUTPATIENT
Start: 2021-05-03 | End: 2021-08-10

## 2021-05-28 DIAGNOSIS — R51.9 NONINTRACTABLE HEADACHE, UNSPECIFIED CHRONICITY PATTERN, UNSPECIFIED HEADACHE TYPE: ICD-10-CM

## 2021-05-28 DIAGNOSIS — J30.89 SEASONAL ALLERGIC RHINITIS DUE TO OTHER ALLERGIC TRIGGER: ICD-10-CM

## 2021-05-28 RX ORDER — PREDNISONE 20 MG/1
TABLET ORAL
Qty: 10 TABLET | Refills: 0 | OUTPATIENT
Start: 2021-05-28

## 2021-06-02 ENCOUNTER — TELEPHONE (OUTPATIENT)
Dept: FAMILY MEDICINE CLINIC | Facility: CLINIC | Age: 66
End: 2021-06-02

## 2021-06-02 RX ORDER — FERROUS SULFATE 325(65) MG
325 TABLET ORAL
Qty: 30 TABLET | Refills: 5 | Status: SHIPPED | OUTPATIENT
Start: 2021-06-02 | End: 2022-06-14 | Stop reason: SDUPTHER

## 2021-06-02 NOTE — TELEPHONE ENCOUNTER
PATIENT REQUESTING DR SANTO SEND IN RX FOR HER ALLERGIES/SINUS.  PT STATES SHE HAS HAD A SINUS HEADACHE FOR ALMOST A WEEK AND HER NORMAL DAILY ALLERGY MEDICATION ISN'T WORKING.  PLEASE CALL PATIENT AND LET HER KNOW.    JEANINE LEVI

## 2021-06-09 RX ORDER — IPRATROPIUM BROMIDE 42 UG/1
SPRAY, METERED NASAL
Qty: 15 ML | Refills: 4 | Status: SHIPPED | OUTPATIENT
Start: 2021-06-09 | End: 2021-06-21 | Stop reason: ALTCHOICE

## 2021-06-21 ENCOUNTER — E-VISIT (OUTPATIENT)
Dept: FAMILY MEDICINE CLINIC | Facility: TELEHEALTH | Age: 66
End: 2021-06-21

## 2021-06-21 ENCOUNTER — TELEPHONE (OUTPATIENT)
Dept: FAMILY MEDICINE CLINIC | Facility: TELEHEALTH | Age: 66
End: 2021-06-21

## 2021-06-21 DIAGNOSIS — J01.90 ACUTE SINUSITIS, RECURRENCE NOT SPECIFIED, UNSPECIFIED LOCATION: Primary | ICD-10-CM

## 2021-06-21 PROCEDURE — 99422 OL DIG E/M SVC 11-20 MIN: CPT | Performed by: NURSE PRACTITIONER

## 2021-06-21 RX ORDER — MOMETASONE FUROATE 50 UG/1
2 SPRAY, METERED NASAL DAILY
Qty: 17 G | Refills: 0 | Status: SHIPPED | OUTPATIENT
Start: 2021-06-21 | End: 2022-10-18 | Stop reason: SDUPTHER

## 2021-06-21 RX ORDER — PREDNISONE 20 MG/1
40 TABLET ORAL DAILY
Qty: 10 TABLET | Refills: 0 | Status: SHIPPED | OUTPATIENT
Start: 2021-06-21 | End: 2021-07-09

## 2021-06-21 RX ORDER — OLOPATADINE HYDROCHLORIDE 665 UG/1
2 SPRAY NASAL 2 TIMES DAILY
Qty: 30.5 G | Refills: 0 | Status: SHIPPED | OUTPATIENT
Start: 2021-06-21 | End: 2021-08-10

## 2021-06-21 RX ORDER — AZITHROMYCIN 250 MG/1
TABLET, FILM COATED ORAL
Qty: 6 TABLET | Refills: 0 | Status: SHIPPED | OUTPATIENT
Start: 2021-06-21 | End: 2021-07-09

## 2021-06-21 NOTE — PROGRESS NOTES
Mikki Joel    1955  [id number]    I have reviewed the e-Visit questionnaire and patient's answers, my assessment and plan are as follows:      HPI  Mikki Joel is a 66 y.o. with congested nose, pain around the nose and face, headache and yellow green nasal discharge. Reports symptoms for 1-2 weeks. Nonsmoker. Pain increases when pushing on the sinus areas. Low grade fever less than 100.4. Reports she has had 5 nasal surgeries in the past and gets frequent sinus infections. Reports she has tried OTC pain medication, nasal spray, allergy medication ans sinus rinses.     Review of Systems - History obtained from chart review and the patient  General ROS: positive for  - fever  ENT ROS: positive for - headaches, nasal congestion, nasal discharge and sinus pain  Allergy and Immunology ROS: positive for - nasal congestion  Respiratory ROS: negative for - cough  Gastrointestinal ROS: no abdominal pain, change in bowel habits, or black or bloody stools      There are no diagnoses linked to this encounter.    Any medications prescribed have been sent electronically to   [id number] - [place], [place] - [address] [place] [address] AT [facility] [address] - [phone]  - [phone] [address] [address]  Phone: [phone] Fax: [phone]    [facility] [place] [id number]  Phone: [phone] Fax: [phone]      Time Documentation  Counseled patient  Counseling topics: diagnosis, treatment options, follow up plan and return instructions  Total encounter time: counseling time more than 50% of visit: 15 minutes        Rehana Rossi, MIRIAM  06/21/21  03:30 EDT

## 2021-06-21 NOTE — PROGRESS NOTES
"Mikki Joel    1955  [id number]    I have reviewed the e-Visit questionnaire and patient's answers, my assessment and plan are as follows:      HPI  Mikki Joel is a 66 y.o. with complaints of sinus infection. She has had symptoms for 2 weeks. \" I have had 5 nasal surgeries over the last 20 year. I have always had frequent sinus infections.  I do the prescribe nose sprays, am doing nasal rinsing and prednisone and and strong antibiotics. zpack and amoxicillin have never worked.\" She has \"I have running low grade off and on for a couple of weeks, usually when I am the most congested a having  the bad sinus/migraine headache, drain down my throat and most the time I have god awful smell in my nose and a terrible taste in my mouth.\"  Negative COVID-19 test on 3/17/2021   COVID-19 immunizations     Review of Systems - ENT ROS: positive for - headaches, nasal congestion, nasal discharge, sinus pain,   \"Awful smell\" and \"terrible taste\" and fever of less than 100.4 degress  negative for - hearing change, sneezing, sore throat, vertigo or cough, chest pain, loss of taste and smell      Diagnoses and all orders for this visit:    1. Acute sinusitis, recurrence not specified, unspecified location (Primary)    Other orders  -     azithromycin (Zithromax Z-Sina) 250 MG tablet; Take 2 tablets by mouth on day 1, then 1 tablet daily on days 2-5  Dispense: 6 tablet; Refill: 0  -     predniSONE (DELTASONE) 20 MG tablet; Take 2 tablets by mouth Daily. X 3 days hen 1 tab x 3 days then 1/2 tab x 2 days  Dispense: 10 tablet; Refill: 0  -     olopatadine (PATANASE) 0.6 % solution nasal solution; 2 sprays by Each Nare route 2 (Two) Times a Day.  Dispense: 30.5 g; Refill: 0  -     mometasone (NASONEX) 50 MCG/ACT nasal spray; 2 sprays into the nostril(s) as directed by provider Daily.  Dispense: 17 g; Refill: 0    COVID-19 (PFIZER) vaccines 3/3/2021 and 3/24/2021    Any medications prescribed have been sent " electronically to   [id number] - [place], [place] - [address] [facility] AT [facility] OF  [address] - [phone]  - [phone] FX  [address] [facility] [address]  Phone: [phone] Fax: [phone]    [address] [address]  Phone: [phone] Fax: [phone]      Time Documentation  Counseled patient  Counseling topics: diagnosis, treatment options, follow up plan and return instructions  Total encounter time: counseling time more than 50% of visit: 17        MIRIAM Arellano  06/21/21  12:40 EDT

## 2021-06-21 NOTE — PATIENT INSTRUCTIONS
Drink plenty of fluids   Continue nasal rinses  If symptoms do not improve in 3-5 days follow up with your primary care provider or urgent care for further evaluation     Sinusitis, Adult  Sinusitis is soreness and swelling (inflammation) of your sinuses. Sinuses are hollow spaces in the bones around your face. They are located:  · Around your eyes.  · In the middle of your forehead.  · Behind your nose.  · In your cheekbones.  Your sinuses and nasal passages are lined with a fluid called mucus. Mucus drains out of your sinuses. Swelling can trap mucus in your sinuses. This lets germs (bacteria, virus, or fungus) grow, which leads to infection. Most of the time, this condition is caused by a virus.  What are the causes?  This condition is caused by:  · Allergies.  · Asthma.  · Germs.  · Things that block your nose or sinuses.  · Growths in the nose (nasal polyps).  · Chemicals or irritants in the air.  · Fungus (rare).  What increases the risk?  You are more likely to develop this condition if:  · You have a weak body defense system (immune system).  · You do a lot of swimming or diving.  · You use nasal sprays too much.  · You smoke.  What are the signs or symptoms?  The main symptoms of this condition are pain and a feeling of pressure around the sinuses. Other symptoms include:  · Stuffy nose (congestion).  · Runny nose (drainage).  · Swelling and warmth in the sinuses.  · Headache.  · Toothache.  · A cough that may get worse at night.  · Mucus that collects in the throat or the back of the nose (postnasal drip).  · Being unable to smell and taste.  · Being very tired (fatigue).  · A fever.  · Sore throat.  · Bad breath.  How is this diagnosed?  This condition is diagnosed based on:  · Your symptoms.  · Your medical history.  · A physical exam.  · Tests to find out if your condition is short-term (acute) or long-term (chronic). Your doctor may:  ? Check your nose for growths (polyps).  ? Check your sinuses using  a tool that has a light (endoscope).  ? Check for allergies or germs.  ? Do imaging tests, such as an MRI or CT scan.  How is this treated?  Treatment for this condition depends on the cause and whether it is short-term or long-term.  · If caused by a virus, your symptoms should go away on their own within 10 days. You may be given medicines to relieve symptoms. They include:  ? Medicines that shrink swollen tissue in the nose.  ? Medicines that treat allergies (antihistamines).  ? A spray that treats swelling of the nostrils.   ? Rinses that help get rid of thick mucus in your nose (nasal saline washes).  · If caused by bacteria, your doctor may wait to see if you will get better without treatment. You may be given antibiotic medicine if you have:  ? A very bad infection.  ? A weak body defense system.  · If caused by growths in the nose, you may need to have surgery.  Follow these instructions at home:  Medicines  · Take, use, or apply over-the-counter and prescription medicines only as told by your doctor. These may include nasal sprays.  · If you were prescribed an antibiotic medicine, take it as told by your doctor. Do not stop taking the antibiotic even if you start to feel better.  Hydrate and humidify    · Drink enough water to keep your pee (urine) pale yellow.  · Use a cool mist humidifier to keep the humidity level in your home above 50%.  · Breathe in steam for 10-15 minutes, 3-4 times a day, or as told by your doctor. You can do this in the bathroom while a hot shower is running.  · Try not to spend time in cool or dry air.  Rest  · Rest as much as you can.  · Sleep with your head raised (elevated).  · Make sure you get enough sleep each night.  General instructions    · Put a warm, moist washcloth on your face 3-4 times a day, or as often as told by your doctor. This will help with discomfort.  · Wash your hands often with soap and water. If there is no soap and water, use hand .  · Do not  smoke. Avoid being around people who are smoking (secondhand smoke).  · Keep all follow-up visits as told by your doctor. This is important.  Contact a doctor if:  · You have a fever.  · Your symptoms get worse.  · Your symptoms do not get better within 10 days.  Get help right away if:  · You have a very bad headache.  · You cannot stop throwing up (vomiting).  · You have very bad pain or swelling around your face or eyes.  · You have trouble seeing.  · You feel confused.  · Your neck is stiff.  · You have trouble breathing.  Summary  · Sinusitis is swelling of your sinuses. Sinuses are hollow spaces in the bones around your face.  · This condition is caused by tissues in your nose that become inflamed or swollen. This traps germs. These can lead to infection.  · If you were prescribed an antibiotic medicine, take it as told by your doctor. Do not stop taking it even if you start to feel better.  · Keep all follow-up visits as told by your doctor. This is important.  This information is not intended to replace advice given to you by your health care provider. Make sure you discuss any questions you have with your health care provider.  Document Revised: 05/20/2019 Document Reviewed: 05/20/2019  [employer] Patient Education © 2021 [employer]

## 2021-06-23 ENCOUNTER — CLINICAL SUPPORT (OUTPATIENT)
Dept: AUDIOLOGY | Facility: CLINIC | Age: 66
End: 2021-06-23

## 2021-06-23 DIAGNOSIS — Z46.1 ENCOUNTER FOR FITTING OR ADJUSTMENT OF HEARING AID: Primary | ICD-10-CM

## 2021-06-23 PROCEDURE — HEARINGNOCHG: Performed by: AUDIOLOGIST

## 2021-06-23 NOTE — PROGRESS NOTES
HEARING AID CHECK    Name:  Mikki Joel  :  1955  Age:  66 y.o.  Date of Evaluation:  2021      HISTORY    Reason for visit:  Mikki Joel is seen today for a hearing aid check.  Patient reports the  wire on her left hearing aid is broken and needs to be fixed.    Hearing aid history:  Patient is currently wearing a  in the Canal (ARMANI) hearing aid in both ear(s).     OFFICE VISIT    During today's visit  on left hearing aid was replaced using a #2xS  and medium closed dome.  She reported the fit is comfortable and sound is good.  Since her hearing aids are still under warranty, there is no charge for this in-house repair.  She will return for hearing aid assistance as necessary.    It was a pleasure seeing Mikki Joel in Audiology today.  It is a pleasure helping Ms. Joel with her amplification needs.             This document has been electronically signed by Shanell Berrios MS CCC-A on 2021 13:16 CDT       Shanell Berrios MS CCC-A  Licensed Audiologist    For Billing and Codin  Hearing Aid Check, Monaural - no charge

## 2021-06-24 ENCOUNTER — TELEPHONE (OUTPATIENT)
Dept: SLEEP MEDICINE | Facility: HOSPITAL | Age: 66
End: 2021-06-24

## 2021-06-24 DIAGNOSIS — G25.81 RESTLESS LEGS SYNDROME (RLS): ICD-10-CM

## 2021-06-24 RX ORDER — GABAPENTIN 800 MG/1
800 TABLET ORAL NIGHTLY
Qty: 30 TABLET | Refills: 2 | Status: SHIPPED | OUTPATIENT
Start: 2021-06-24 | End: 2021-09-20 | Stop reason: SDUPTHER

## 2021-06-24 NOTE — TELEPHONE ENCOUNTER
LEFT VOICEMAIL LETTING PATIENT KNOW THAT  DR MENDOZA SENT IN HER NORMAL DOSE OF GABAPENTIN AND THAT HE IS OUT UNTIL NEXT WEEK AND AT THAT TIME WHEN HE RETURNS H WILL GIVE HER A CALL TO  DISCUSS MEDS

## 2021-06-24 NOTE — PROGRESS NOTES
Refill request for Gabapentin for RLS.  Chart reviewed.      MARQUISE reviewed.  Request #[id number].  Unremarkable.  Scripts since Jan 21:  01/10/2021 Gabapentin 600MG 1955 30 30 REJI SHANTE [facility] 1  02/09/2021 Gabapentin 600MG 1955 30 30 REJI FREY [facility] 1  03/02/2021 Gabapentin 800MG 1955 30 30 REJI SHANTE [facility] 1  04/01/2021 Gabapentin 800MG 1955 30 30 REJI FREY [facility] 1  05/24/2021 Gabapentin 800MG 1955 30 30 REJI FREY [facility] 1    Refill sent to [id number] - [place], [place] - [address] AT [facility] [address].    Jimmy Kelly II, MD  06/24/21 @ 6:05 PM CST

## 2021-07-07 ENCOUNTER — OFFICE VISIT (OUTPATIENT)
Dept: OTOLARYNGOLOGY | Facility: CLINIC | Age: 66
End: 2021-07-07

## 2021-07-07 VITALS — HEIGHT: 67 IN | OXYGEN SATURATION: 96 % | WEIGHT: 246 LBS | HEART RATE: 87 BPM | BODY MASS INDEX: 38.61 KG/M2

## 2021-07-07 DIAGNOSIS — J32.4 CHRONIC PANSINUSITIS: Primary | ICD-10-CM

## 2021-07-07 DIAGNOSIS — H74.03 TYMPANOSCLEROSIS OF BOTH EARS: ICD-10-CM

## 2021-07-07 PROCEDURE — 99214 OFFICE O/P EST MOD 30 MIN: CPT | Performed by: OTOLARYNGOLOGY

## 2021-07-07 PROCEDURE — 31237 NSL/SINS NDSC SURG BX POLYPC: CPT | Performed by: OTOLARYNGOLOGY

## 2021-07-09 NOTE — PROGRESS NOTES
Subjective   Mikki Joel is a 66 y.o. female.       History of Present Illness   Former patient of Dr. Gaston reports she has had multiple previous sinus surgeries (5 total).  Has also had bilateral tympanoplasty's.  Has trouble with chronic foul-smelling rhinorrhea.  Recently was treated with Zithromax and prednisone which briefly helped but symptoms have recurred.  Is on mometasone and Patanase and also uses large volume saline rinses.      The following portions of the patient's history were reviewed and updated as appropriate: allergies, current medications, past family history, past medical history, past social history, past surgical history and problem list.     reports that she has never smoked. She has never used smokeless tobacco. She reports current alcohol use. She reports current drug use. Drug: Marijuana.   Patient is not a tobacco user and has not been counseled for use of tobacco products      Review of Systems        Objective   Physical Exam  Ears: External ears no deformity.  Canals no discharge.  Tympanic membranes are intact with tympanosclerosis no infection or effusion  Nares: Boggy mucosa.  No discharge or polyps seen on anterior rhinoscopy but crusting is noted on the middle turbinates bilaterally  Oral cavity: Lips and gums without lesions.  Tongue and floor of mouth without lesions.  Parotid and submandibular ducts unobstructed.  No mucosal lesions on the buccal mucosa or vestibule of the mouth.  Pharynx: No erythema or exudate  Neck: No lymphadenopathy.  No thyromegaly.  Trachea and larynx midline.  No masses in the parotid or submandibular glands.  Nasal endoscopy debridement is performed: Chai-Synephrine and Xylocaine are instilled the nares bilaterally.  0° scope is passed into each nostril.  The inferior, middle, and superior turbinates as well as nasal septum and nasopharynx are examined.  Pertinent findings include: Extensive postsurgical changes.  Widely patent maxillary  sinus antrostomies but both maxillary sinuses show crusted inspissated secretions.  These are able to be debrided with suction.  The middle meatal clefts also show extensive crusting is debrided with suction.  No polyps noted.      Assessment/Plan   Diagnoses and all orders for this visit:    1. Chronic pansinusitis (Primary)    2. Tympanosclerosis of both ears      Plan: Nasal debridement as described above.  Explained to the patient that it appears she has impaired mucociliary clearance with recurrent crusting that subsequently leads to the foul-smelling discharge.  This is not an obstructive process that would lend itself to additional surgery.  I think she should stop the Patanase as this is likely causing additional thickening and drying and just use the mometasone along with large volume saline lavage.  I have instructed her in the technique for lavage to try to make sure the saline gets into the maxillary sinuses.  We will reevaluate in 1 month, sooner for problems.

## 2021-07-13 ENCOUNTER — OFFICE VISIT (OUTPATIENT)
Dept: PODIATRY | Facility: CLINIC | Age: 66
End: 2021-07-13

## 2021-07-13 VITALS
SYSTOLIC BLOOD PRESSURE: 177 MMHG | WEIGHT: 248.2 LBS | BODY MASS INDEX: 38.96 KG/M2 | OXYGEN SATURATION: 93 % | DIASTOLIC BLOOD PRESSURE: 92 MMHG | HEART RATE: 99 BPM | HEIGHT: 67 IN

## 2021-07-13 DIAGNOSIS — M20.5X2 HALLUX LIMITUS OF LEFT FOOT: ICD-10-CM

## 2021-07-13 DIAGNOSIS — M79.671 BILATERAL FOOT PAIN: Primary | ICD-10-CM

## 2021-07-13 DIAGNOSIS — M72.2 PLANTAR FASCIITIS: ICD-10-CM

## 2021-07-13 DIAGNOSIS — M79.672 BILATERAL FOOT PAIN: Primary | ICD-10-CM

## 2021-07-13 PROCEDURE — 99204 OFFICE O/P NEW MOD 45 MIN: CPT | Performed by: PODIATRIST

## 2021-07-13 PROCEDURE — 20550 NJX 1 TENDON SHEATH/LIGAMENT: CPT | Performed by: PODIATRIST

## 2021-07-13 RX ORDER — BUPIVACAINE HYDROCHLORIDE 5 MG/ML
5 INJECTION, SOLUTION PERINEURAL ONCE
Status: DISCONTINUED | OUTPATIENT
Start: 2021-07-13 | End: 2021-08-10

## 2021-07-13 RX ORDER — DEXAMETHASONE SODIUM PHOSPHATE 10 MG/ML
5 INJECTION INTRAMUSCULAR; INTRAVENOUS ONCE
Status: DISCONTINUED | OUTPATIENT
Start: 2021-07-13 | End: 2021-08-10

## 2021-07-13 RX ORDER — IPRATROPIUM BROMIDE 42 UG/1
2 SPRAY, METERED NASAL 2 TIMES DAILY
COMMUNITY
Start: 2021-07-12 | End: 2022-10-18 | Stop reason: SDUPTHER

## 2021-07-13 RX ORDER — MELOXICAM 15 MG/1
15 TABLET ORAL DAILY
Qty: 30 TABLET | Refills: 1 | Status: SHIPPED | OUTPATIENT
Start: 2021-07-13 | End: 2021-09-14

## 2021-07-13 RX ORDER — TRIAMCINOLONE ACETONIDE 40 MG/ML
10 INJECTION, SUSPENSION INTRA-ARTICULAR; INTRAMUSCULAR ONCE
Status: DISCONTINUED | OUTPATIENT
Start: 2021-07-13 | End: 2021-08-10

## 2021-07-13 NOTE — PROGRESS NOTES
Mikki Joel  1955  66 y.o. female     Bilateral foot pain; L>R; xray obtained today    7/13/2021  Chief Complaint   Patient presents with   • Left Foot - Pain   • Right Foot - Pain       History of Present Illness    66-year-old female presents to clinic today with chief complaint of foot pain.  Patient states that the left foot hurts worse than the right foot.  Pain is localized to 2 separate areas in the left foot.  There are no associated injuries or trauma.  She does have history of bunion surgery bilateral many years ago.  She has no other complaints today.      Past Medical History:   Diagnosis Date   • Anxiety    • Asthma    • Chronic anemia    • Chronic pain    • CTS (carpal tunnel syndrome)    • Deaf    • Depression with anxiety    • Fracture of wrist    • Gastroesophageal reflux disease    • Hypertension    • Migraine    • Obesity    • Osteoarthritis    • Perforated tympanic membrane, bilateral    • Plantar fasciitis          Past Surgical History:   Procedure Laterality Date   • COLONOSCOPY N/A 1/19/2018   • EAR TUBES     • ENDOSCOPIC FUNCTIONAL SINUS SURGERY (FESS) Bilateral 9/5/2017   • ENDOSCOPY N/A 1/19/2018   • ENDOSCOPY N/A 6/10/2019   • KNEE ARTHROPLASTY     • RHINOPLASTY     • SHOULDER ARTHROSCOPY     • TYMPANOPLASTY Left 6/5/2018   • TYMPANOPLASTY Right 2/7/2020    Procedure: TYMPANOPLASTY AND  CARTILAGE GRAFT;  Surgeon: Ramy Gaston MD;  Location: [facility];  Service: ENT;  Laterality: Right;   • UPPER GASTROINTESTINAL ENDOSCOPY           Family History   Problem Relation Age of Onset   • Hypertension Mother    • Alcohol abuse Father    • Heart disease Father    • Cancer Father    • Stroke Maternal Grandmother    • Irritable bowel syndrome Sister    • Osteoporosis Maternal Grandfather        Allergies   Allergen Reactions   • Codeine Other (See Comments)     Increases heart rate   • Sulfa Antibiotics Hives       Social History     Socioeconomic History   • Marital status:       Spouse name: Not on file   • Number of children: Not on file   • Years of education: Not on file   • Highest education level: Not on file   Tobacco Use   • Smoking status: Never Smoker   • Smokeless tobacco: Never Used   Vaping Use   • Vaping Use: Never used   Substance and Sexual Activity   • Alcohol use: Yes     Comment: 05/15/2020 - Patient states she consumes an alcoholic beverage very seldom.   • Drug use: Yes     Types: Marijuana     Comment: 05/15/2020 - Patient states she utilizes capsule form of Marijuana for pain control at night to aid in sleep.   • Sexual activity: Not Currently     Partners: Male     Birth control/protection: Surgical     Comment:          Current Outpatient Medications   Medication Sig Dispense Refill   • ALBUTEROL SULFATE  (90 Base) MCG/ACT inhaler INHALE 2 PUFFS BY MOUTH EVERY 4 HOURS AS NEEDED FOR WHEEZING 8.5 g 0   • cetirizine (zyrTEC) 10 MG tablet TAKE 1 TABLET BY MOUTH DAILY 90 tablet 3   • dexlansoprazole (Dexilant) 60 MG capsule Take 1 capsule by mouth Daily. 90 capsule 1   • ferrous sulfate 325 (65 FE) MG tablet Take 1 tablet by mouth Daily With Breakfast. Take with vitamin C to help with absorption 30 tablet 5   • gabapentin (NEURONTIN) 800 MG tablet Take 1 tablet by mouth Every Night. For Restless leg syndrome.  Take 1-3 hours before onset of symptoms 30 tablet 2   • Linzess 145 MCG capsule capsule TAKE 1 CAPSULE BY MOUTH EVERY MORNING BEFORE BREAKFAST 30 capsule 5   • metoprolol succinate XL (TOPROL-XL) 100 MG 24 hr tablet Take 1 tablet by mouth Every Night. 90 tablet 3   • mometasone (NASONEX) 50 MCG/ACT nasal spray 2 sprays into the nostril(s) as directed by provider Daily. 17 g 0   • olopatadine (PATANASE) 0.6 % solution nasal solution 2 sprays by Each Nare route 2 (Two) Times a Day. 30.5 g 0   • ondansetron (ZOFRAN) 8 MG tablet TAKE 1 TABLET BY MOUTH EVERY 8 HOURS AS NEEDED FOR NAUSEA OR VOMITING 30 tablet 3   • ramelteon (Rozerem) 8 MG tablet  "Take 1 tablet by mouth At Night As Needed for Sleep. For insomnia.  Take 30 min to 3 hours before bedtime. 30 tablet 2   • sucralfate (CARAFATE) 1 g tablet Take 1 tablet by mouth 4 (Four) Times a Day As Needed (Reflux). 60 tablet 1   • Trintellix 20 MG tablet Take 20 mg by mouth Daily. For Mood. 90 tablet 1   • ipratropium (ATROVENT) 0.06 % nasal spray      • meloxicam (MOBIC) 15 MG tablet Take 1 tablet by mouth Daily. 30 tablet 1     Current Facility-Administered Medications   Medication Dose Route Frequency Provider Last Rate Last Admin   • bupivacaine (MARCAINE) 0.5 % injection 1 mL  5 mg Injection Once Brando Brice DPM       • dexamethasone (DECADRON) injection 5 mg  5 mg Intramuscular Once Brando Brice DPM       • triamcinolone acetonide (KENALOG-40) injection 10 mg  10 mg Intramuscular Once Brando Brice, DPBRYAN             OBJECTIVE    /92   Pulse 99   Ht 170.2 cm (67\")   Wt 113 kg (248 lb 3.2 oz)   LMP  (LMP Unknown)   SpO2 93%   BMI 38.87 kg/m²       Review of Systems   Constitutional: Positive for unexpected weight change.   HENT: Positive for hearing loss.    Eyes: Positive for visual disturbance.   Respiratory: Negative.    Cardiovascular: Negative.    Gastrointestinal: Negative.    Endocrine: Positive for heat intolerance.   Genitourinary: Negative.    Musculoskeletal: Positive for arthralgias and joint swelling.        Bilateral foot pain L>R  Ankle pain  Joint pain   Skin: Negative.    Neurological: Positive for headaches.   Psychiatric/Behavioral:        Depression   All other systems reviewed and are negative.      Physical Exam  Vitals reviewed.   Constitutional:       General: She is not in acute distress.     Appearance: She is well-developed.   HENT:      Head: Normocephalic and atraumatic.      Nose: Nose normal.   Eyes:      Conjunctiva/sclera: Conjunctivae normal.      Pupils: Pupils are equal, round, and reactive to light.   Pulmonary:      Effort: Pulmonary effort is " normal. No respiratory distress.      Breath sounds: No wheezing.   Musculoskeletal:         General: Tenderness present. No deformity. Normal range of motion.   Skin:     General: Skin is warm and dry.      Capillary Refill: Capillary refill takes less than 2 seconds.   Neurological:      Mental Status: She is alert and oriented to person, place, and time.   Psychiatric:         Behavior: Behavior normal.         Thought Content: Thought content normal.       Lower Extremity:      Cardiovascular:    DP/PT pulses palpable    CFT brisk  to all digits  Skin temp is warm to warm from proximal tibia to distal digits  Pedal hair growth present.   No erythema or edema noted   Musculoskeletal:  Muscle strength is 5/5 for all muscle groups tested   Pain on palpation to the medial tubercle of the left calcaneus.  Negative lateral squeeze test.  Mild residual HAV deformity bilateral.  Pain on palpation to the prominent medial eminence bilateral.  Range of motion of the first MTP is decreased bilateral with pain on the left.  Dermatological:   Skin is warm, dry and intact    No subcutaneous nodules or masses noted    No open wounds noted   Neurological:   Sensation intact to light touch          Procedures    Plantar Fasciits Injection  Date/Time: 07/13/21  Performed by: SALIMA SYED  Authorized by: SALIMA SYED   Consent: Verbal consent obtained. Written consent obtained.  Risks and benefits: risks, benefits and alternatives were discussed  Consent given by: patient  Patient identity confirmed: verbally with patient  Indications: pain relief  Nerve block body site: left  heel.  Sedation:  Patient sedated: no    Patient position: sitting  Needle size: 25 G  Local anesthetic: 0.5% Marcaine plain, Kenalog 40 mg/ml , Decadron 4 mg/mL.   Outcome: pain improved  Patient tolerance: Patient tolerated the procedure well with no immediate complications            ASSESSMENT AND PLAN    Diagnoses and all orders for this  visit:    1. Bilateral foot pain (Primary)  -     XR Foot 3+ View Bilateral  -     bupivacaine (MARCAINE) 0.5 % injection 1 mL  -     dexamethasone (DECADRON) injection 5 mg  -     triamcinolone acetonide (KENALOG-40) injection 10 mg    2. Plantar fasciitis  -     bupivacaine (MARCAINE) 0.5 % injection 1 mL  -     dexamethasone (DECADRON) injection 5 mg  -     triamcinolone acetonide (KENALOG-40) injection 10 mg    3. Hallux limitus of left foot  -     bupivacaine (MARCAINE) 0.5 % injection 1 mL  -     dexamethasone (DECADRON) injection 5 mg  -     triamcinolone acetonide (KENALOG-40) injection 10 mg  -     Case Request; Standing  -     ceFAZolin (ANCEF) 2 g in sodium chloride 0.9 % 100 mL IVPB  -     Case Request    Other orders  -     meloxicam (MOBIC) 15 MG tablet; Take 1 tablet by mouth Daily.  Dispense: 30 tablet; Refill: 1  -     Follow Anesthesia Guidelines / Standing Orders; Standing  -     Verify NPO Status; Standing  -     Obtain informed consent (if not collected inpatient or PAT); Standing  -     Notify Physician - Standard; Standing  -     Follow Anesthesia Guidelines / Standing Orders; Future      - Comprehensive foot and ankle exam performed  - X-rays taken and reviewed.  No acute osseous or articular abnormalities.  Residual HAV deformity bilateral with degeneration noted to the first MTP bilateral.  Hardware has been removed from the left foot.  Hardware remains intact to the right distal first metatarsal head and proximal phalanx.  Significant degenerative changes noted to the midfoot bilateral.  Inferior calcaneal spurring bilateral.  - Steroid injection left heel  - Rx for Mobic  - Patient advised to stretch, ice and to make appropriate shoe gear changes to include wearing athletic type shoes with supportive insoles. Patient was given written instructions on how to correctly perform the stretching of the Achilles tendon/calf stretches, and the heel spur/plantar fasciitis regimen. Limit bare foot  walking.    - Recommended over-the-counter insole such as power steps, spenco or walk fit  to properly support the arch in order to alleviate the tension and stress on the plantar fascia associated with normal daily walking. Patient was educated on the break-in period for new arch supports.  -Treatment of first MTP arthritis discussed in detail.  Both conservative and surgical treatment options were discussed.  Patient elected for surgical intervention.  -Surgical plan is left first metatarsophalangeal joint arthrodesis  -Risks and benefits of the procedure including but not limited to postoperative infection, incisional dehiscence, numbness, swelling, residual pain and postoperative blood clot discussed in detail.  No guarantees were given or implied.  -Tentative date for the surgery August 12, 2021  -All questions were answered  -Recheck following surgery           This document has been electronically signed by Brando Brice DPM on July 13, 2021 17:38 CDT     7/13/2021  17:38 CDT

## 2021-07-14 PROBLEM — M20.5X2 HALLUX LIMITUS OF LEFT FOOT: Status: ACTIVE | Noted: 2021-07-14

## 2021-07-22 DIAGNOSIS — F51.04 CHRONIC INSOMNIA: ICD-10-CM

## 2021-07-23 RX ORDER — RAMELTEON 8 MG/1
TABLET ORAL
Qty: 30 TABLET | Refills: 2 | Status: SHIPPED | OUTPATIENT
Start: 2021-07-23 | End: 2021-08-10

## 2021-08-10 ENCOUNTER — LAB (OUTPATIENT)
Dept: LAB | Facility: HOSPITAL | Age: 66
End: 2021-08-10

## 2021-08-10 ENCOUNTER — PRE-ADMISSION TESTING (OUTPATIENT)
Dept: PREADMISSION TESTING | Facility: HOSPITAL | Age: 66
End: 2021-08-10

## 2021-08-10 VITALS
HEART RATE: 71 BPM | DIASTOLIC BLOOD PRESSURE: 100 MMHG | SYSTOLIC BLOOD PRESSURE: 180 MMHG | HEIGHT: 68 IN | RESPIRATION RATE: 20 BRPM | OXYGEN SATURATION: 98 % | WEIGHT: 246 LBS | BODY MASS INDEX: 37.28 KG/M2

## 2021-08-10 DIAGNOSIS — Z01.818 PREOP TESTING: Primary | ICD-10-CM

## 2021-08-10 LAB
QT INTERVAL: 410 MS
QTC INTERVAL: 419 MS

## 2021-08-10 PROCEDURE — 87635 SARS-COV-2 COVID-19 AMP PRB: CPT

## 2021-08-10 PROCEDURE — 93010 ELECTROCARDIOGRAM REPORT: CPT | Performed by: INTERNAL MEDICINE

## 2021-08-10 PROCEDURE — C9803 HOPD COVID-19 SPEC COLLECT: HCPCS

## 2021-08-10 PROCEDURE — 93005 ELECTROCARDIOGRAM TRACING: CPT

## 2021-08-10 RX ORDER — OLOPATADINE HYDROCHLORIDE 665 UG/1
SPRAY NASAL 3 TIMES DAILY
COMMUNITY
End: 2021-09-28

## 2021-08-10 RX ORDER — ALBUTEROL SULFATE 90 UG/1
2 AEROSOL, METERED RESPIRATORY (INHALATION) EVERY 4 HOURS PRN
COMMUNITY

## 2021-08-10 RX ORDER — RAMELTEON 8 MG/1
8 TABLET ORAL NIGHTLY
COMMUNITY
End: 2021-11-14

## 2021-08-10 RX ORDER — SODIUM CHLORIDE, SODIUM GLUCONATE, SODIUM ACETATE, POTASSIUM CHLORIDE AND MAGNESIUM CHLORIDE 526; 502; 368; 37; 30 MG/100ML; MG/100ML; MG/100ML; MG/100ML; MG/100ML
1000 INJECTION, SOLUTION INTRAVENOUS CONTINUOUS PRN
Status: CANCELLED | OUTPATIENT
Start: 2021-08-12

## 2021-08-10 RX ORDER — ONDANSETRON HYDROCHLORIDE 8 MG/1
TABLET, FILM COATED ORAL EVERY 8 HOURS PRN
COMMUNITY
End: 2021-09-15

## 2021-08-10 NOTE — DISCHARGE INSTRUCTIONS
[facility]  Pre-op Information and Guidelines    You will be called after 2 p.m. the day before your surgery (Friday for Monday surgery) and notified of your time for arrival and approximate surgery time.  If you have not received a call by 4P.M., please contact Same Day Surgery at [phone] of if outside [facility] call [phone].    Please Follow these Important Safety Guidelines:    • The morning of your procedure, take only the medications listed below with   A sip of water:_____________________________________________       ______________________________________________    • DO NOT eat or drink anything after 12:00 midnight the night before surgery  Specific instructions concerning drinking clear liquids will be discussed during  the pre-surgery instruction call the day before your surgery.    • If you take a blood thinner (ex. Plavix, Coumadin, aspirin), ask your doctor when to stop it before surgery  STOP DATE: _________________    • Only 2 visitors are allowed in patient rooms at a time  Your visitors will be asked to wait in the lobby until the admission process is complete with the exception of a parent with a child and patients in need of special assistance.    • YOU CANNOT DRIVE YOURSELF HOME  You must be accompanied by someone who will be responsible for driving you home after surgery and for your care at home.    • DO NOT chew gum, use breath mints, hard candy, or smoke the day of surgery  • DO NOT drink alcohol for at least 24 hours before your surgery  • DO NOT wear any jewelry and remove all body piercing before coming to the hospital  • DO NOT wear make-up to the hospital  • If you are having surgery on an extremity (arm/leg/foot) remove nail polish/artificial nails on the surgical side  • Clothing, glasses, contacts, dentures, and hairpieces must be removed before surgery  • Bathe the night before or the morning of your surgery and do not use powders/lotions on  skin.

## 2021-08-11 LAB — SARS-COV-2 N GENE RESP QL NAA+PROBE: NOT DETECTED

## 2021-08-12 ENCOUNTER — HOSPITAL ENCOUNTER (OUTPATIENT)
Facility: HOSPITAL | Age: 66
Setting detail: HOSPITAL OUTPATIENT SURGERY
Discharge: HOME | End: 2021-08-12
Attending: PODIATRIST | Admitting: PODIATRIST

## 2021-08-12 ENCOUNTER — APPOINTMENT (OUTPATIENT)
Dept: GENERAL RADIOLOGY | Facility: HOSPITAL | Age: 66
End: 2021-08-12

## 2021-08-12 ENCOUNTER — ANESTHESIA EVENT (OUTPATIENT)
Dept: PERIOP | Facility: HOSPITAL | Age: 66
End: 2021-08-12

## 2021-08-12 ENCOUNTER — ANESTHESIA (OUTPATIENT)
Dept: PERIOP | Facility: HOSPITAL | Age: 66
End: 2021-08-12

## 2021-08-12 VITALS
HEART RATE: 83 BPM | OXYGEN SATURATION: 95 % | TEMPERATURE: 97.2 F | HEIGHT: 68 IN | BODY MASS INDEX: 37.12 KG/M2 | DIASTOLIC BLOOD PRESSURE: 82 MMHG | SYSTOLIC BLOOD PRESSURE: 192 MMHG | WEIGHT: 244.93 LBS | RESPIRATION RATE: 16 BRPM

## 2021-08-12 DIAGNOSIS — M20.5X2 HALLUX LIMITUS OF LEFT FOOT: ICD-10-CM

## 2021-08-12 LAB
AMPHET+METHAMPHET UR QL: NEGATIVE
AMPHETAMINES UR QL: NEGATIVE
BARBITURATES UR QL SCN: NEGATIVE
BENZODIAZ UR QL SCN: NEGATIVE
BUPRENORPHINE SERPL-MCNC: NEGATIVE NG/ML
CANNABINOIDS SERPL QL: POSITIVE
COCAINE UR QL: NEGATIVE
METHADONE UR QL SCN: NEGATIVE
OPIATES UR QL: NEGATIVE
OXYCODONE UR QL SCN: NEGATIVE
PCP UR QL SCN: NEGATIVE
PROPOXYPH UR QL: NEGATIVE
TRICYCLICS UR QL SCN: NEGATIVE

## 2021-08-12 PROCEDURE — C1713 ANCHOR/SCREW BN/BN,TIS/BN: HCPCS | Performed by: PODIATRIST

## 2021-08-12 PROCEDURE — 76000 FLUOROSCOPY <1 HR PHYS/QHP: CPT

## 2021-08-12 PROCEDURE — 25010000002 DEXAMETHASONE PER 1 MG: Performed by: NURSE ANESTHETIST, CERTIFIED REGISTERED

## 2021-08-12 PROCEDURE — 25010000002 CEFAZOLIN PER 500 MG: Performed by: PODIATRIST

## 2021-08-12 PROCEDURE — 25010000002 ONDANSETRON PER 1 MG: Performed by: NURSE ANESTHETIST, CERTIFIED REGISTERED

## 2021-08-12 PROCEDURE — 25010000002 ROPIVACAINE PER 1 MG: Performed by: ANESTHESIOLOGY

## 2021-08-12 PROCEDURE — 25010000002 FENTANYL CITRATE (PF) 50 MCG/ML SOLUTION: Performed by: NURSE ANESTHETIST, CERTIFIED REGISTERED

## 2021-08-12 PROCEDURE — 25010000002 MIDAZOLAM PER 1 MG: Performed by: NURSE ANESTHETIST, CERTIFIED REGISTERED

## 2021-08-12 PROCEDURE — 28750 FUSION OF BIG TOE JOINT: CPT | Performed by: PODIATRIST

## 2021-08-12 PROCEDURE — C1769 GUIDE WIRE: HCPCS | Performed by: PODIATRIST

## 2021-08-12 PROCEDURE — 25010000002 PROPOFOL 10 MG/ML EMULSION: Performed by: NURSE ANESTHETIST, CERTIFIED REGISTERED

## 2021-08-12 PROCEDURE — 80306 DRUG TEST PRSMV INSTRMNT: CPT | Performed by: ANESTHESIOLOGY

## 2021-08-12 PROCEDURE — 76942 ECHO GUIDE FOR BIOPSY: CPT | Performed by: PODIATRIST

## 2021-08-12 PROCEDURE — 25010000002 SUCCINYLCHOLINE PER 20 MG: Performed by: NURSE ANESTHETIST, CERTIFIED REGISTERED

## 2021-08-12 DEVICE — IMPLANTABLE DEVICE: Type: IMPLANTABLE DEVICE | Site: FOOT | Status: FUNCTIONAL

## 2021-08-12 DEVICE — SCRW CORT 3X16MM: Type: IMPLANTABLE DEVICE | Status: FUNCTIONAL

## 2021-08-12 DEVICE — SCRW CORT 3X20MM: Type: IMPLANTABLE DEVICE | Status: FUNCTIONAL

## 2021-08-12 RX ORDER — ACETAMINOPHEN 650 MG/1
650 SUPPOSITORY RECTAL ONCE AS NEEDED
Status: DISCONTINUED | OUTPATIENT
Start: 2021-08-12 | End: 2021-08-12 | Stop reason: HOSPADM

## 2021-08-12 RX ORDER — BACITRACIN ZINC 500 [USP'U]/G
OINTMENT TOPICAL AS NEEDED
Status: DISCONTINUED | OUTPATIENT
Start: 2021-08-12 | End: 2021-08-12 | Stop reason: HOSPADM

## 2021-08-12 RX ORDER — NALOXONE HCL 0.4 MG/ML
0.4 VIAL (ML) INJECTION AS NEEDED
Status: DISCONTINUED | OUTPATIENT
Start: 2021-08-12 | End: 2021-08-12 | Stop reason: HOSPADM

## 2021-08-12 RX ORDER — BUPIVACAINE HYDROCHLORIDE 5 MG/ML
INJECTION, SOLUTION EPIDURAL; INTRACAUDAL AS NEEDED
Status: DISCONTINUED | OUTPATIENT
Start: 2021-08-12 | End: 2021-08-12 | Stop reason: HOSPADM

## 2021-08-12 RX ORDER — EPHEDRINE SULFATE 50 MG/ML
5 INJECTION, SOLUTION INTRAVENOUS ONCE AS NEEDED
Status: DISCONTINUED | OUTPATIENT
Start: 2021-08-12 | End: 2021-08-12 | Stop reason: HOSPADM

## 2021-08-12 RX ORDER — ROPIVACAINE HYDROCHLORIDE 5 MG/ML
INJECTION, SOLUTION EPIDURAL; INFILTRATION; PERINEURAL
Status: COMPLETED | OUTPATIENT
Start: 2021-08-12 | End: 2021-08-12

## 2021-08-12 RX ORDER — ONDANSETRON 2 MG/ML
4 INJECTION INTRAMUSCULAR; INTRAVENOUS ONCE AS NEEDED
Status: DISCONTINUED | OUTPATIENT
Start: 2021-08-12 | End: 2021-08-12 | Stop reason: HOSPADM

## 2021-08-12 RX ORDER — ACETAMINOPHEN 325 MG/1
650 TABLET ORAL ONCE AS NEEDED
Status: DISCONTINUED | OUTPATIENT
Start: 2021-08-12 | End: 2021-08-12 | Stop reason: HOSPADM

## 2021-08-12 RX ORDER — DIPHENHYDRAMINE HYDROCHLORIDE 50 MG/ML
12.5 INJECTION INTRAMUSCULAR; INTRAVENOUS
Status: DISCONTINUED | OUTPATIENT
Start: 2021-08-12 | End: 2021-08-12 | Stop reason: HOSPADM

## 2021-08-12 RX ORDER — PROPOFOL 10 MG/ML
VIAL (ML) INTRAVENOUS AS NEEDED
Status: DISCONTINUED | OUTPATIENT
Start: 2021-08-12 | End: 2021-08-12 | Stop reason: SURG

## 2021-08-12 RX ORDER — MIDAZOLAM HYDROCHLORIDE 1 MG/ML
INJECTION INTRAMUSCULAR; INTRAVENOUS AS NEEDED
Status: DISCONTINUED | OUTPATIENT
Start: 2021-08-12 | End: 2021-08-12 | Stop reason: SURG

## 2021-08-12 RX ORDER — MEPERIDINE HYDROCHLORIDE 25 MG/ML
12.5 INJECTION INTRAMUSCULAR; INTRAVENOUS; SUBCUTANEOUS
Status: DISCONTINUED | OUTPATIENT
Start: 2021-08-12 | End: 2021-08-12 | Stop reason: HOSPADM

## 2021-08-12 RX ORDER — PROMETHAZINE HYDROCHLORIDE 25 MG/1
25 TABLET ORAL ONCE AS NEEDED
Status: DISCONTINUED | OUTPATIENT
Start: 2021-08-12 | End: 2021-08-12 | Stop reason: HOSPADM

## 2021-08-12 RX ORDER — FENTANYL CITRATE 50 UG/ML
INJECTION, SOLUTION INTRAMUSCULAR; INTRAVENOUS AS NEEDED
Status: DISCONTINUED | OUTPATIENT
Start: 2021-08-12 | End: 2021-08-12 | Stop reason: SURG

## 2021-08-12 RX ORDER — OXYCODONE AND ACETAMINOPHEN 7.5; 325 MG/1; MG/1
1 TABLET ORAL EVERY 6 HOURS PRN
Qty: 30 TABLET | Refills: 0 | Status: SHIPPED | OUTPATIENT
Start: 2021-08-12 | End: 2021-08-18 | Stop reason: SDUPTHER

## 2021-08-12 RX ORDER — FLUMAZENIL 0.1 MG/ML
0.2 INJECTION INTRAVENOUS AS NEEDED
Status: DISCONTINUED | OUTPATIENT
Start: 2021-08-12 | End: 2021-08-12 | Stop reason: HOSPADM

## 2021-08-12 RX ORDER — PROMETHAZINE HYDROCHLORIDE 25 MG/1
25 SUPPOSITORY RECTAL ONCE AS NEEDED
Status: DISCONTINUED | OUTPATIENT
Start: 2021-08-12 | End: 2021-08-12 | Stop reason: HOSPADM

## 2021-08-12 RX ORDER — ROCURONIUM BROMIDE 10 MG/ML
INJECTION, SOLUTION INTRAVENOUS AS NEEDED
Status: DISCONTINUED | OUTPATIENT
Start: 2021-08-12 | End: 2021-08-12 | Stop reason: SURG

## 2021-08-12 RX ORDER — DEXAMETHASONE SODIUM PHOSPHATE 4 MG/ML
INJECTION, SOLUTION INTRA-ARTICULAR; INTRALESIONAL; INTRAMUSCULAR; INTRAVENOUS; SOFT TISSUE AS NEEDED
Status: DISCONTINUED | OUTPATIENT
Start: 2021-08-12 | End: 2021-08-12 | Stop reason: SURG

## 2021-08-12 RX ORDER — LIDOCAINE HYDROCHLORIDE 20 MG/ML
INJECTION, SOLUTION INFILTRATION; PERINEURAL AS NEEDED
Status: DISCONTINUED | OUTPATIENT
Start: 2021-08-12 | End: 2021-08-12 | Stop reason: SURG

## 2021-08-12 RX ORDER — SODIUM CHLORIDE, SODIUM GLUCONATE, SODIUM ACETATE, POTASSIUM CHLORIDE AND MAGNESIUM CHLORIDE 526; 502; 368; 37; 30 MG/100ML; MG/100ML; MG/100ML; MG/100ML; MG/100ML
1000 INJECTION, SOLUTION INTRAVENOUS CONTINUOUS PRN
Status: DISCONTINUED | OUTPATIENT
Start: 2021-08-12 | End: 2021-08-12 | Stop reason: HOSPADM

## 2021-08-12 RX ORDER — BUPIVACAINE HCL/0.9 % NACL/PF 0.1 %
2 PLASTIC BAG, INJECTION (ML) EPIDURAL ONCE
Status: COMPLETED | OUTPATIENT
Start: 2021-08-12 | End: 2021-08-12

## 2021-08-12 RX ORDER — EPHEDRINE SULFATE 50 MG/ML
INJECTION, SOLUTION INTRAVENOUS AS NEEDED
Status: DISCONTINUED | OUTPATIENT
Start: 2021-08-12 | End: 2021-08-12 | Stop reason: SURG

## 2021-08-12 RX ORDER — ONDANSETRON 2 MG/ML
INJECTION INTRAMUSCULAR; INTRAVENOUS AS NEEDED
Status: DISCONTINUED | OUTPATIENT
Start: 2021-08-12 | End: 2021-08-12 | Stop reason: SURG

## 2021-08-12 RX ORDER — SUCCINYLCHOLINE CHLORIDE 20 MG/ML
INJECTION INTRAMUSCULAR; INTRAVENOUS AS NEEDED
Status: DISCONTINUED | OUTPATIENT
Start: 2021-08-12 | End: 2021-08-12 | Stop reason: SURG

## 2021-08-12 RX ADMIN — FENTANYL CITRATE 50 MCG: 50 INJECTION INTRAMUSCULAR; INTRAVENOUS at 12:43

## 2021-08-12 RX ADMIN — SODIUM CHLORIDE, SODIUM GLUCONATE, SODIUM ACETATE, POTASSIUM CHLORIDE AND MAGNESIUM CHLORIDE 1000 ML: 526; 502; 368; 37; 30 INJECTION, SOLUTION INTRAVENOUS at 09:14

## 2021-08-12 RX ADMIN — LIDOCAINE HYDROCHLORIDE 20 MG: 20 INJECTION, SOLUTION INFILTRATION; PERINEURAL at 13:36

## 2021-08-12 RX ADMIN — MIDAZOLAM HYDROCHLORIDE 1 MG: 2 INJECTION, SOLUTION INTRAMUSCULAR; INTRAVENOUS at 12:09

## 2021-08-12 RX ADMIN — PROPOFOL 150 MG: 10 INJECTION, EMULSION INTRAVENOUS at 12:17

## 2021-08-12 RX ADMIN — ONDANSETRON 4 MG: 2 INJECTION INTRAMUSCULAR; INTRAVENOUS at 13:35

## 2021-08-12 RX ADMIN — SUCCINYLCHOLINE CHLORIDE 160 MG: 20 INJECTION, SOLUTION INTRAMUSCULAR; INTRAVENOUS at 12:17

## 2021-08-12 RX ADMIN — DEXAMETHASONE SODIUM PHOSPHATE 4 MG: 4 INJECTION, SOLUTION INTRAMUSCULAR; INTRAVENOUS at 12:33

## 2021-08-12 RX ADMIN — EPHEDRINE SULFATE 10 MG: 50 INJECTION INTRAVENOUS at 13:03

## 2021-08-12 RX ADMIN — LIDOCAINE HYDROCHLORIDE 80 MG: 20 INJECTION, SOLUTION INFILTRATION; PERINEURAL at 12:17

## 2021-08-12 RX ADMIN — ROCURONIUM BROMIDE 5 MG: 50 INJECTION INTRAVENOUS at 12:17

## 2021-08-12 RX ADMIN — FENTANYL CITRATE 50 MCG: 50 INJECTION INTRAMUSCULAR; INTRAVENOUS at 12:17

## 2021-08-12 RX ADMIN — PROPOFOL 50 MG: 10 INJECTION, EMULSION INTRAVENOUS at 13:31

## 2021-08-12 RX ADMIN — MIDAZOLAM HYDROCHLORIDE 1 MG: 2 INJECTION, SOLUTION INTRAMUSCULAR; INTRAVENOUS at 12:13

## 2021-08-12 RX ADMIN — SODIUM CHLORIDE, SODIUM GLUCONATE, SODIUM ACETATE, POTASSIUM CHLORIDE AND MAGNESIUM CHLORIDE: 526; 502; 368; 37; 30 INJECTION, SOLUTION INTRAVENOUS at 12:58

## 2021-08-12 RX ADMIN — Medication 2 G: at 12:22

## 2021-08-12 RX ADMIN — ROPIVACAINE HYDROCHLORIDE 30 ML: 5 INJECTION, SOLUTION EPIDURAL; INFILTRATION; PERINEURAL at 10:43

## 2021-08-12 NOTE — DISCHARGE INSTRUCTIONS
Discharge home  Resume normal diet  Keep dressing clean,dry,and intact  Ice and elevate on blue foam elevator  Non weight bearing to left lower extremity with cam boot  Percocet 7.5mg as needed for pain  Follow up Monday 8/16/21          This document has been electronically signed by Brando Brice DPM on August 12, 2021 13:58 CDT

## 2021-08-12 NOTE — BRIEF OP NOTE
TOE INTERPHALANGEAL JOINT/METATARSOPHALANGEAL JOINT FUSION  Progress Note    Mikki Joel  8/12/2021    Pre-op Diagnosis:   Hallux limitus of left foot [M20.5X2]       Post-Op Diagnosis Codes:     * Hallux limitus of left foot [M20.5X2]    Procedure/CPT® Codes:        Procedure(s):  LEFT FIRST METATARSOPHALANGEAL JOINT ARTHRODESIS    Surgeon(s):  Brando Brice DPM    Anesthesia: General    Staff:   Circulator: Elmer Sanders Jr., RN; Ilene Pace RN  Scrub Person: Jesus Alberto Cortes Stephanie  Vendor Representative: Samanta Villar         Estimated Blood Loss: minimal    Urine Voided: * No values recorded between 8/12/2021 12:09 PM and 8/12/2021  1:46 PM *    Specimens:                None          Drains: * No LDAs found *    Findings: consistent with pre-op dx     Complications: none           Brando Brice DPM     Date: 8/12/2021  Time: 13:55 CDT

## 2021-08-12 NOTE — ANESTHESIA PROCEDURE NOTES
Peripheral Block      Patient reassessed immediately prior to procedure    Patient location during procedure: holding area  Start time: 8/12/2021 10:36 AM  Stop time: 8/12/2021 10:44 AM  Reason for block: at surgeon's request and post-op pain management  Performed by  CRNA: Refugio Faria SRNA  Assisted by: Bernard Esteban MD  Preanesthetic Checklist  Completed: patient identified, IV checked, site marked, risks and benefits discussed, surgical consent, monitors and equipment checked, pre-op evaluation and timeout performed  Prep:  Pt Position: right lateral decubitus  Sterile barriers:cap, gloves, mask and washed/disinfected hands  Prep: ChloraPrep  Patient monitoring: blood pressure monitoring and continuous pulse oximetry  Procedure  Sedation:no  Performed under: local infiltration  Guidance:ultrasound guided  ULTRASOUND INTERPRETATION.  Using ultrasound guidance a 21 G gauge needle was placed in close proximity to the sciatic nerve, at which point, under ultrasound guidance anesthetic was injected in the area of the nerve and spread of the anesthesia was seen on ultrasound in close proximity thereto.  There were no abnormalities seen on ultrasound; a digital image was taken; and the patient tolerated the procedure with no complications. Images:still images not obtained    Laterality:left  Block Type:popliteal  Injection Technique:single-shot  Needle Type:echogenic  Needle Gauge:20 G      Medications Used: ropivacaine (NAROPIN) 0.5 % injection, 30 mL  Med admintered at 8/12/2021 10:43 AM      Medications  Comment:Pt ID'd  Ultrasound guided  Needle seen throughout  Local infiltration appropriate    Post Assessment  Injection Assessment: negative aspiration for heme, no paresthesia on injection and incremental injection  Patient Tolerance:comfortable throughout block  Complications:no

## 2021-08-12 NOTE — ANESTHESIA PROCEDURE NOTES
Airway  Urgency: elective    Date/Time: 8/12/2021 12:18 PM  Difficult airway    General Information and Staff    Patient location during procedure: OR    Indications and Patient Condition  Indications for airway management: airway protection    Preoxygenated: yes  MILS maintained throughout  Mask difficulty assessment: 0 - not attempted    Final Airway Details  Final airway type: endotracheal airway      Successful airway: ETT  Cuffed: yes   Successful intubation technique: video laryngoscopy  Facilitating devices/methods: intubating stylet and cricoid pressure  Endotracheal tube insertion site: oral  Blade: Jennifer  Blade size: 3  ETT size (mm): 7.5  Cormack-Lehane Classification: grade IIa - partial view of glottis  Placement verified by: chest auscultation and capnometry   Measured from: teeth  ETT/EBT  to teeth (cm): 19  Number of attempts at approach: 1  Assessment: lips, teeth, and gum same as pre-op and atraumatic intubation

## 2021-08-12 NOTE — INTERVAL H&P NOTE
H&P reviewed. The patient was examined and there are no changes to the H&P.            This document has been electronically signed by Brando Brice DPM on August 12, 2021 09:21 CDT

## 2021-08-12 NOTE — ANESTHESIA POSTPROCEDURE EVALUATION
Patient: Mikki Joel    Procedure Summary     Date: 08/12/21 Room / Location: [facility] OR 11 / [facility] OR    Anesthesia Start: 1210 Anesthesia Stop: 1350    Procedure: LEFT FIRST METATARSOPHALANGEAL JOINT ARTHRODESIS (Left Foot) Diagnosis:       Hallux limitus of left foot      (Hallux limitus of left foot [M20.5X2])    Surgeons: Brando Brice DPM Provider: Bernard Esteban MD    Anesthesia Type: general with block ASA Status: 3          Anesthesia Type: general with block    Vitals  No vitals data found for the desired time range.          Post Anesthesia Care and Evaluation    Patient location during evaluation: PACU  Patient participation: complete - patient participated  Level of consciousness: awake and alert  Pain score: 0  Pain management: adequate  Airway patency: patent  Anesthetic complications: No anesthetic complications  PONV Status: none  Cardiovascular status: acceptable  Respiratory status: acceptable, face mask and spontaneous ventilation  Hydration status: acceptable    Comments: ---------------------------               08/12/21                      0900         ---------------------------   BP:        (!) 198/99       Pulse:         67           Resp:          18           Temp:   97.3 °F (36.3 °C)   SpO2:          97%         ---------------------------

## 2021-08-12 NOTE — ANESTHESIA PREPROCEDURE EVALUATION
Anesthesia Evaluation     history of anesthetic complications: PONV  NPO Solid Status: > 8 hours  NPO Liquid Status: > 2 hours           Airway   Mallampati: II  TM distance: >3 FB  Neck ROM: full  Possible difficult intubation  Dental    (+) poor dentition        Pulmonary - normal exam    breath sounds clear to auscultation  (+) asthma,sleep apnea on CPAP,   (-) not a smoker    ROS comment: snores  Cardiovascular - normal exam    ECG reviewed  Patient on routine beta blocker and Beta blocker given within 24 hours of surgery  Rhythm: regular  Rate: normal    (+) hypertension poorly controlled less than 2 medications,   (-) valvular problems/murmurs, dysrhythmias, angina, cardiac stents, DVT, hyperlipidemia    ROS comment: Normal sinus rhythm  Normal ECG  When compared with ECG of 10-FEB-2020 14:54,  Fusion complexes are no longer Present  T wave inversion no longer evident in Lateral leads    Neuro/Psych  (+) headaches (migraines weekly), psychiatric history Anxiety and Depression,     (-) seizures, TIA, CVA, weakness, numbness  GI/Hepatic/Renal/Endo    (+) obesity,  GERD well controlled,    (-) hepatitis, liver disease, no renal disease, diabetes, no thyroid disorder    Musculoskeletal     (+) arthralgias, chronic pain,   Abdominal   (+) obese,    Substance History   (+) alcohol use (occ), drug use (marijuana)     OB/GYN          Other   arthritis,      (-) history of cancer  ROS/Med Hx Other: Gabapentin used as a sleep aid                  Anesthesia Plan    ASA 3     general with block   (Wang, scop patch  Popliteal nerve block discussed and patient agrees to proceed)  intravenous induction     Anesthetic plan, all risks, benefits, and alternatives have been provided, discussed and informed consent has been obtained with: patient and spouse/significant other.

## 2021-08-13 ENCOUNTER — TELEPHONE (OUTPATIENT)
Dept: PODIATRY | Facility: CLINIC | Age: 66
End: 2021-08-13

## 2021-08-13 RX ORDER — HYDROXYZINE PAMOATE 50 MG/1
50 CAPSULE ORAL 3 TIMES DAILY PRN
Qty: 90 CAPSULE | Refills: 0 | Status: SHIPPED | OUTPATIENT
Start: 2021-08-13 | End: 2021-09-28

## 2021-08-13 NOTE — TELEPHONE ENCOUNTER
PT CALLED TO SAY THAT THE PERCOCET SHE WAS PRESCRIBED AFTER SURGERY ON 8/12/2021 MAKES HER EXTREMELY ITCHY. SHE SAID THAT SHE HAS TAKEN BENADRYL BUT IT DOES NOT SEEM TO HELP. SHE WOULD LIKE SOMEONE TO CALL HER BACK AND LET HER KNOW IF SHE NEEDS A DIFFERENT PRESCRIPTION OR IF THERE IS ANOTHER MEDICINE TO HELP WITH THE ITCHING. HER NUMBER IS [phone] OR HER  ROSIO [phone].

## 2021-08-15 NOTE — OP NOTE
Date of Procedure: 08/12/2021     SURGEON: Brando Brice DPM      PREOPERATIVE DIAGNOSIS:   1.  Hallux limitus left foot      POSTOPERATIVE DIAGNOSIS: Same as preop     PROCEDURES PERFORMED:   1.  Left first metatarsophalangeal joint arthrodesis     ANESTHESIA: General     HEMOSTASIS: Pneumatic ankle  tourniquet set at 250 mmHg.      ESTIMATED BLOOD LOSS: 10 mL.      SPECIMEN: none     MATERIALS: arthrex maxforce first MTP locking plate with locking and nonlocking screws, 3.5 headless compression screw      INJECTABLES: 20 cc half percent Marcaine plain     COMPLICATIONS: None.      CONDITION: Stable.      INDICATIONS FOR PROCEDURE: This is a patient of mine who has  painful hallux limitus to her left foot.    She agrees to undergo the surgical intervention at this time. Consent is signed and documented in the chart. History and physical exam were reviewed prior to patient being taken to the operating room and n.p.o. status was confirmed. No guarantees were given or implied regarding the outcome of this treatment.      DESCRIPTION OF PROCEDURE: After mild sedation was administered by the anesthesia team in the preoperative holding area the patient was transported to the operating room and placed in a supine position.  General anesthesia was then administered and the right foot was prepped and draped in usual sterile technique and a timeout was performed to confirm the correct patient, correct site, and correct procedure.      Attention was then directed to the left foot which was exsanguinated using an Esmarch bandage and tourniquet wrapped inflated to 250mmHg.  Next a linear incision made along the dorsal medial aspect of the first MTP.  Sharp and blunt dissection carried down to the level of the first metatarsal phalangeal joint.  Care was taken to avoid any vital neurovascular structures.  The proximal phalanx and distal first metatarsal were exposed.  Utilizing the Kae cup and cone reamers the articular  cartilage was denuded from the head of the first metatarsal and the base of the proximal phalanx.  Surfaces were then fenestrated with a 6.2 K wire. The arthrodesis site was reduced and fixated utilizing a 3.5 headless compression screw inserted from distal medial to proximal lateral.  Intraoperative fluoroscopy was used to ensure that the screw was well-placed and the adequate compression of the joint was obtained.  Next a arthrex maxforce first MTP locking plate with locking and nonlocking screws was applied to the dorsal aspect of the joint.  Again intraoperative fluoroscopy was used throughout the entire process to ensure that plate was well-placed and toe was well reduced.  The operative site was flushed and closed with 3-0 Vicryl, 4-0 Monocryl and 4-0 nylon.  A sterile dressing was applied.  Tourniquet was deflated and rapid hyperemic response noted to distal digits.  Patient tolerated the procedure and anesthesia well and was transported from the operating room to the PACU with vital signs stable and neurovascular status intact to the operative extremity.     The plan is to discharge this patient home once stable per anesthesia.  She can resume her normal diet.  She  is to be nonweightbearing to the right lower extremity.  She will follow-up in podiatry clinic within 1 week          This document has been electronically signed by Brando Brice DPM on August 15, 2021 09:08 CDT

## 2021-08-16 ENCOUNTER — OFFICE VISIT (OUTPATIENT)
Dept: PODIATRY | Facility: CLINIC | Age: 66
End: 2021-08-16

## 2021-08-16 VITALS
DIASTOLIC BLOOD PRESSURE: 78 MMHG | WEIGHT: 244.93 LBS | HEIGHT: 68 IN | SYSTOLIC BLOOD PRESSURE: 142 MMHG | BODY MASS INDEX: 37.12 KG/M2 | OXYGEN SATURATION: 95 % | HEART RATE: 81 BPM

## 2021-08-16 DIAGNOSIS — M20.5X2 HALLUX LIMITUS OF LEFT FOOT: Primary | ICD-10-CM

## 2021-08-16 PROCEDURE — 99024 POSTOP FOLLOW-UP VISIT: CPT | Performed by: PODIATRIST

## 2021-08-16 NOTE — PROGRESS NOTES
Mikki Joel  1955  66 y.o. female     Post op left foot.    8/17/2021  Chief Complaint   Patient presents with   • Left Foot - Post-op Follow-up, Dressing Change       History of Present Illness    Patient presents for her first postop visit.  Patient had left first MTP arthrodesis on August 12.      Past Medical History:   Diagnosis Date   • Anxiety    • Asthma    • Chronic anemia    • Chronic pain    • CTS (carpal tunnel syndrome)    • Deaf    • Depression with anxiety    • Fracture of wrist    • Gastroesophageal reflux disease    • Hypertension    • Migraine    • Obesity    • Osteoarthritis    • Perforated tympanic membrane, bilateral    • Plantar fasciitis    • PONV (postoperative nausea and vomiting)    • Sleep apnea     sleep with c-pap         Past Surgical History:   Procedure Laterality Date   • COLONOSCOPY N/A 1/19/2018   • EAR TUBES     • ENDOSCOPIC FUNCTIONAL SINUS SURGERY (FESS) Bilateral 9/5/2017   • ENDOSCOPY N/A 1/19/2018   • ENDOSCOPY N/A 6/10/2019   • KNEE ARTHROPLASTY Right    • LAPAROSCOPIC TUBAL LIGATION     • RHINOPLASTY     • TOTAL SHOULDER ARTHROPLASTY Right    • TYMPANOPLASTY Left 6/5/2018   • TYMPANOPLASTY Right 2/7/2020    Procedure: TYMPANOPLASTY AND  CARTILAGE GRAFT;  Surgeon: Ramy Gaston MD;  Location: [facility];  Service: ENT;  Laterality: Right;   • UPPER GASTROINTESTINAL ENDOSCOPY           Family History   Problem Relation Age of Onset   • Hypertension Mother    • Alcohol abuse Father    • Heart disease Father    • Cancer Father    • Stroke Maternal Grandmother    • Irritable bowel syndrome Sister    • Osteoporosis Maternal Grandfather        Allergies   Allergen Reactions   • Codeine Other (See Comments)     Increases heart rate   • Sulfa Antibiotics Hives       Social History     Socioeconomic History   • Marital status:      Spouse name: Not on file   • Number of children: Not on file   • Years of education: Not on file   • Highest education level: Not  on file   Tobacco Use   • Smoking status: Never Smoker   • Smokeless tobacco: Never Used   Vaping Use   • Vaping Use: Never used   Substance and Sexual Activity   • Alcohol use: Yes     Comment: socially   • Drug use: Yes     Types: Marijuana   • Sexual activity: Defer         Current Outpatient Medications   Medication Sig Dispense Refill   • albuterol sulfate  (90 Base) MCG/ACT inhaler Inhale 2 puffs Every 4 (Four) Hours As Needed for Wheezing.     • cetirizine (zyrTEC) 10 MG tablet TAKE 1 TABLET BY MOUTH DAILY 90 tablet 3   • dexlansoprazole (Dexilant) 60 MG capsule Take 1 capsule by mouth Daily. 90 capsule 1   • ferrous sulfate 325 (65 FE) MG tablet Take 1 tablet by mouth Daily With Breakfast. Take with vitamin C to help with absorption 30 tablet 5   • gabapentin (NEURONTIN) 800 MG tablet Take 1 tablet by mouth Every Night. For Restless leg syndrome.  Take 1-3 hours before onset of symptoms 30 tablet 2   • hydrOXYzine pamoate (Vistaril) 50 MG capsule Take 1 capsule by mouth 3 (Three) Times a Day As Needed for Itching. 90 capsule 0   • ipratropium (ATROVENT) 0.06 % nasal spray 2 sprays into the nostril(s) as directed by provider 2 (two) times a day.     • linaclotide (Linzess) 145 MCG capsule capsule Take 145 mcg by mouth Every Morning Before Breakfast.     • meloxicam (MOBIC) 15 MG tablet Take 1 tablet by mouth Daily. 30 tablet 1   • metoprolol succinate XL (TOPROL-XL) 100 MG 24 hr tablet Take 1 tablet by mouth Every Night. 90 tablet 3   • mometasone (NASONEX) 50 MCG/ACT nasal spray 2 sprays into the nostril(s) as directed by provider Daily. 17 g 0   • olopatadine (PATANASE) 0.6 % solution nasal solution by Each Nare route 3 (Three) Times a Day.     • ondansetron (ZOFRAN) 8 MG tablet Take  by mouth Every 8 (Eight) Hours As Needed for Nausea or Vomiting.     • oxyCODONE-acetaminophen (Percocet) 7.5-325 MG per tablet Take 1 tablet by mouth Every 6 (Six) Hours As Needed for Moderate Pain . 30 tablet 0   •  "ramelteon (Rozerem) 8 MG tablet Take 8 mg by mouth Every Night.     • sucralfate (CARAFATE) 1 g tablet Take 1 tablet by mouth 4 (Four) Times a Day As Needed (Reflux). 60 tablet 1   • Trintellix 20 MG tablet Take 20 mg by mouth Daily. For Mood. 90 tablet 1     No current facility-administered medications for this visit.         OBJECTIVE    /78   Pulse 81   Ht 172.7 cm (68\")   Wt 111 kg (244 lb 14.9 oz)   LMP  (LMP Unknown)   SpO2 95%   BMI 37.24 kg/m²       Review of Systems   Constitutional: Negative.    Respiratory: Negative.    Gastrointestinal: Negative.    Musculoskeletal:        Left foot pain    Psychiatric/Behavioral: Negative.        Physical Exam  Vitals reviewed.   Constitutional:       General: She is not in acute distress.     Appearance: She is well-developed.   HENT:      Head: Normocephalic and atraumatic.   Pulmonary:      Effort: Pulmonary effort is normal. No respiratory distress.      Breath sounds: No wheezing.   Skin:     General: Skin is warm.      Capillary Refill: Capillary refill takes less than 2 seconds.   Neurological:      Mental Status: She is alert and oriented to person, place, and time.   Psychiatric:         Behavior: Behavior normal.         Thought Content: Thought content normal.       Left Lower Extremity: CFT immediate to distal digits.  Moderate edema and ecchymosis.  Negative Homans.  Sensation intact to light touch.  Hallux rectus.  Incision site well approximated with no signs of dehiscence.      Procedures        ASSESSMENT AND PLAN    Diagnoses and all orders for this visit:    1. Hallux limitus of left foot (Primary)      -Patient is doing well postoperatively.  Dressing change performed.  Keep clean, dry and intact.  Nonweightbearing left lower extremity.  Recheck 1 week          This document has been electronically signed by Brando Brice DPM on August 17, 2021 09:17 CDT     8/17/2021  09:17 CDT    "

## 2021-08-18 DIAGNOSIS — M20.5X2 HALLUX LIMITUS OF LEFT FOOT: ICD-10-CM

## 2021-08-18 RX ORDER — OXYCODONE AND ACETAMINOPHEN 7.5; 325 MG/1; MG/1
1 TABLET ORAL EVERY 6 HOURS PRN
Qty: 30 TABLET | Refills: 0 | Status: SHIPPED | OUTPATIENT
Start: 2021-08-18 | End: 2021-09-28

## 2021-08-25 ENCOUNTER — OFFICE VISIT (OUTPATIENT)
Dept: PODIATRY | Facility: CLINIC | Age: 66
End: 2021-08-25

## 2021-08-25 VITALS
OXYGEN SATURATION: 95 % | WEIGHT: 244 LBS | SYSTOLIC BLOOD PRESSURE: 136 MMHG | BODY MASS INDEX: 36.98 KG/M2 | HEIGHT: 68 IN | DIASTOLIC BLOOD PRESSURE: 82 MMHG | HEART RATE: 83 BPM

## 2021-08-25 DIAGNOSIS — M20.5X2 HALLUX LIMITUS OF LEFT FOOT: Primary | ICD-10-CM

## 2021-08-25 PROCEDURE — 99024 POSTOP FOLLOW-UP VISIT: CPT | Performed by: PODIATRIST

## 2021-08-25 NOTE — PROGRESS NOTES
Mikki Joel  1955  66 y.o. female     Post op left foot.    8/27/2021  Chief Complaint   Patient presents with   • Left Foot - Follow-up, Post-op       History of Present Illness    Patient presents for her second postop visit.  Patient had left first MTP arthrodesis on August 12.      Past Medical History:   Diagnosis Date   • Anxiety    • Asthma    • Chronic anemia    • Chronic pain    • CTS (carpal tunnel syndrome)    • Deaf    • Depression with anxiety    • Fracture of wrist    • Gastroesophageal reflux disease    • Hypertension    • Migraine    • Obesity    • Osteoarthritis    • Perforated tympanic membrane, bilateral    • Plantar fasciitis    • PONV (postoperative nausea and vomiting)    • Sleep apnea     sleep with c-pap         Past Surgical History:   Procedure Laterality Date   • COLONOSCOPY N/A 1/19/2018   • EAR TUBES     • ENDOSCOPIC FUNCTIONAL SINUS SURGERY (FESS) Bilateral 9/5/2017   • ENDOSCOPY N/A 1/19/2018   • ENDOSCOPY N/A 6/10/2019   • KNEE ARTHROPLASTY Right    • LAPAROSCOPIC TUBAL LIGATION     • RHINOPLASTY     • TOE FUSION Left 8/12/2021    Procedure: LEFT FIRST METATARSOPHALANGEAL JOINT ARTHRODESIS;  Surgeon: Brando Brice DPM;  Location: [facility];  Service: Podiatry;  Laterality: Left;   • TOTAL SHOULDER ARTHROPLASTY Right    • TYMPANOPLASTY Left 6/5/2018   • TYMPANOPLASTY Right 2/7/2020    Procedure: TYMPANOPLASTY AND  CARTILAGE GRAFT;  Surgeon: Ramy Gaston MD;  Location: [facility];  Service: ENT;  Laterality: Right;   • UPPER GASTROINTESTINAL ENDOSCOPY           Family History   Problem Relation Age of Onset   • Hypertension Mother    • Alcohol abuse Father    • Heart disease Father    • Cancer Father    • Stroke Maternal Grandmother    • Irritable bowel syndrome Sister    • Osteoporosis Maternal Grandfather        Allergies   Allergen Reactions   • Codeine Other (See Comments)     Increases heart rate   • Sulfa Antibiotics Hives       Social History      Socioeconomic History   • Marital status:      Spouse name: Not on file   • Number of children: Not on file   • Years of education: Not on file   • Highest education level: Not on file   Tobacco Use   • Smoking status: Never Smoker   • Smokeless tobacco: Never Used   Vaping Use   • Vaping Use: Never used   Substance and Sexual Activity   • Alcohol use: Yes     Comment: socially   • Drug use: Yes     Types: Marijuana   • Sexual activity: Defer         Current Outpatient Medications   Medication Sig Dispense Refill   • albuterol sulfate  (90 Base) MCG/ACT inhaler Inhale 2 puffs Every 4 (Four) Hours As Needed for Wheezing.     • cetirizine (zyrTEC) 10 MG tablet TAKE 1 TABLET BY MOUTH DAILY 90 tablet 3   • dexlansoprazole (Dexilant) 60 MG capsule Take 1 capsule by mouth Daily. 90 capsule 1   • ferrous sulfate 325 (65 FE) MG tablet Take 1 tablet by mouth Daily With Breakfast. Take with vitamin C to help with absorption 30 tablet 5   • gabapentin (NEURONTIN) 800 MG tablet Take 1 tablet by mouth Every Night. For Restless leg syndrome.  Take 1-3 hours before onset of symptoms 30 tablet 2   • hydrOXYzine pamoate (Vistaril) 50 MG capsule Take 1 capsule by mouth 3 (Three) Times a Day As Needed for Itching. 90 capsule 0   • ipratropium (ATROVENT) 0.06 % nasal spray 2 sprays into the nostril(s) as directed by provider 2 (two) times a day.     • linaclotide (Linzess) 145 MCG capsule capsule Take 145 mcg by mouth Every Morning Before Breakfast.     • meloxicam (MOBIC) 15 MG tablet Take 1 tablet by mouth Daily. 30 tablet 1   • metoprolol succinate XL (TOPROL-XL) 100 MG 24 hr tablet Take 1 tablet by mouth Every Night. 90 tablet 3   • mometasone (NASONEX) 50 MCG/ACT nasal spray 2 sprays into the nostril(s) as directed by provider Daily. 17 g 0   • olopatadine (PATANASE) 0.6 % solution nasal solution by Each Nare route 3 (Three) Times a Day.     • ondansetron (ZOFRAN) 8 MG tablet Take  by mouth Every 8 (Eight) Hours  "As Needed for Nausea or Vomiting.     • oxyCODONE-acetaminophen (Percocet) 7.5-325 MG per tablet Take 1 tablet by mouth Every 6 (Six) Hours As Needed for Moderate Pain . 30 tablet 0   • ramelteon (Rozerem) 8 MG tablet Take 8 mg by mouth Every Night.     • sucralfate (CARAFATE) 1 g tablet Take 1 tablet by mouth 4 (Four) Times a Day As Needed (Reflux). 60 tablet 1   • Trintellix 20 MG tablet Take 20 mg by mouth Daily. For Mood. 90 tablet 1     No current facility-administered medications for this visit.         OBJECTIVE    /82   Pulse 83   Ht 172.7 cm (68\")   Wt 111 kg (244 lb)   LMP  (LMP Unknown)   SpO2 95%   BMI 37.10 kg/m²       Review of Systems   Constitutional: Negative.    Respiratory: Negative.    Gastrointestinal: Negative.    Musculoskeletal:        Left foot pain    Psychiatric/Behavioral: Negative.        Physical Exam  Vitals reviewed.   Constitutional:       General: She is not in acute distress.     Appearance: She is well-developed.   HENT:      Head: Normocephalic and atraumatic.   Pulmonary:      Effort: Pulmonary effort is normal. No respiratory distress.      Breath sounds: No wheezing.   Skin:     General: Skin is warm.      Capillary Refill: Capillary refill takes less than 2 seconds.   Neurological:      Mental Status: She is alert and oriented to person, place, and time.   Psychiatric:         Behavior: Behavior normal.         Thought Content: Thought content normal.       Left Lower Extremity: CFT immediate to distal digits.  Moderate edema and ecchymosis.  Negative Homans.  Sensation intact to light touch.  Hallux rectus.  Incision site well approximated with no signs of dehiscence.      Procedures        ASSESSMENT AND PLAN    Diagnoses and all orders for this visit:    1. Hallux limitus of left foot (Primary)      -Patient is doing well postoperatively.  We will keep suggested an additional week.  Okay to shower.  Site care as instructed.  Weightbearing in cam boot.  Recheck " 1 week          This document has been electronically signed by Brando Brice DPM on August 27, 2021 12:14 CDT     8/27/2021  12:14 CDT    Answers for HPI/ROS submitted by the patient on 8/18/2021  Please describe your symptoms.: Post-Op  Have you had these symptoms before?: No  How long have you been having these symptoms?: 1-2 weeks  Please list any medications you are currently taking for this condition.: Please see [facility] recorders  Please describe any probable cause for these symptoms. : n/a  What is the primary reason for your visit?: Other

## 2021-09-01 ENCOUNTER — OFFICE VISIT (OUTPATIENT)
Dept: PODIATRY | Facility: CLINIC | Age: 66
End: 2021-09-01

## 2021-09-01 VITALS
HEART RATE: 70 BPM | HEIGHT: 68 IN | DIASTOLIC BLOOD PRESSURE: 84 MMHG | OXYGEN SATURATION: 96 % | SYSTOLIC BLOOD PRESSURE: 150 MMHG | WEIGHT: 244 LBS | BODY MASS INDEX: 36.98 KG/M2

## 2021-09-01 DIAGNOSIS — M20.5X2 HALLUX LIMITUS OF LEFT FOOT: Primary | ICD-10-CM

## 2021-09-01 PROCEDURE — 99024 POSTOP FOLLOW-UP VISIT: CPT | Performed by: PODIATRIST

## 2021-09-01 NOTE — PROGRESS NOTES
Mikki Joel  1955  66 y.o. female     Post op left foot.    9/1/2021  Chief Complaint   Patient presents with   • Left Foot - Follow-up, Post-op       History of Present Illness    Patient presents for her third postop visit.  Patient had left first MTP arthrodesis on August 12.      Past Medical History:   Diagnosis Date   • Anxiety    • Asthma    • Chronic anemia    • Chronic pain    • CTS (carpal tunnel syndrome)    • Deaf    • Depression with anxiety    • Fracture of wrist    • Gastroesophageal reflux disease    • Hypertension    • Migraine    • Obesity    • Osteoarthritis    • Perforated tympanic membrane, bilateral    • Plantar fasciitis    • PONV (postoperative nausea and vomiting)    • Sleep apnea     sleep with c-pap         Past Surgical History:   Procedure Laterality Date   • COLONOSCOPY N/A 1/19/2018   • EAR TUBES     • ENDOSCOPIC FUNCTIONAL SINUS SURGERY (FESS) Bilateral 9/5/2017   • ENDOSCOPY N/A 1/19/2018   • ENDOSCOPY N/A 6/10/2019   • KNEE ARTHROPLASTY Right    • LAPAROSCOPIC TUBAL LIGATION     • RHINOPLASTY     • TOE FUSION Left 8/12/2021    Procedure: LEFT FIRST METATARSOPHALANGEAL JOINT ARTHRODESIS;  Surgeon: Brando Brice DPM;  Location: [facility];  Service: Podiatry;  Laterality: Left;   • TOTAL SHOULDER ARTHROPLASTY Right    • TYMPANOPLASTY Left 6/5/2018   • TYMPANOPLASTY Right 2/7/2020    Procedure: TYMPANOPLASTY AND  CARTILAGE GRAFT;  Surgeon: Ramy Gaston MD;  Location: [facility];  Service: ENT;  Laterality: Right;   • UPPER GASTROINTESTINAL ENDOSCOPY           Family History   Problem Relation Age of Onset   • Hypertension Mother    • Alcohol abuse Father    • Heart disease Father    • Cancer Father    • Stroke Maternal Grandmother    • Irritable bowel syndrome Sister    • Osteoporosis Maternal Grandfather        Allergies   Allergen Reactions   • Codeine Other (See Comments)     Increases heart rate   • Sulfa Antibiotics Hives       Social History     Socioeconomic  History   • Marital status:      Spouse name: Not on file   • Number of children: Not on file   • Years of education: Not on file   • Highest education level: Not on file   Tobacco Use   • Smoking status: Never Smoker   • Smokeless tobacco: Never Used   Vaping Use   • Vaping Use: Never used   Substance and Sexual Activity   • Alcohol use: Yes     Comment: socially   • Drug use: Yes     Types: Marijuana   • Sexual activity: Defer         Current Outpatient Medications   Medication Sig Dispense Refill   • albuterol sulfate  (90 Base) MCG/ACT inhaler Inhale 2 puffs Every 4 (Four) Hours As Needed for Wheezing.     • cetirizine (zyrTEC) 10 MG tablet TAKE 1 TABLET BY MOUTH DAILY 90 tablet 3   • dexlansoprazole (Dexilant) 60 MG capsule Take 1 capsule by mouth Daily. 90 capsule 1   • ferrous sulfate 325 (65 FE) MG tablet Take 1 tablet by mouth Daily With Breakfast. Take with vitamin C to help with absorption 30 tablet 5   • gabapentin (NEURONTIN) 800 MG tablet Take 1 tablet by mouth Every Night. For Restless leg syndrome.  Take 1-3 hours before onset of symptoms 30 tablet 2   • hydrOXYzine pamoate (Vistaril) 50 MG capsule Take 1 capsule by mouth 3 (Three) Times a Day As Needed for Itching. 90 capsule 0   • ipratropium (ATROVENT) 0.06 % nasal spray 2 sprays into the nostril(s) as directed by provider 2 (two) times a day.     • linaclotide (Linzess) 145 MCG capsule capsule Take 145 mcg by mouth Every Morning Before Breakfast.     • meloxicam (MOBIC) 15 MG tablet Take 1 tablet by mouth Daily. 30 tablet 1   • metoprolol succinate XL (TOPROL-XL) 100 MG 24 hr tablet Take 1 tablet by mouth Every Night. 90 tablet 3   • mometasone (NASONEX) 50 MCG/ACT nasal spray 2 sprays into the nostril(s) as directed by provider Daily. 17 g 0   • olopatadine (PATANASE) 0.6 % solution nasal solution by Each Nare route 3 (Three) Times a Day.     • ondansetron (ZOFRAN) 8 MG tablet Take  by mouth Every 8 (Eight) Hours As Needed for  "Nausea or Vomiting.     • ramelteon (Rozerem) 8 MG tablet Take 8 mg by mouth Every Night.     • sucralfate (CARAFATE) 1 g tablet Take 1 tablet by mouth 4 (Four) Times a Day As Needed (Reflux). 60 tablet 1   • Trintellix 20 MG tablet Take 20 mg by mouth Daily. For Mood. 90 tablet 1   • oxyCODONE-acetaminophen (Percocet) 7.5-325 MG per tablet Take 1 tablet by mouth Every 6 (Six) Hours As Needed for Moderate Pain . 30 tablet 0     No current facility-administered medications for this visit.         OBJECTIVE    /84   Pulse 70   Ht 172.7 cm (68\")   Wt 111 kg (244 lb)   LMP  (LMP Unknown)   SpO2 96%   BMI 37.10 kg/m²       Review of Systems   Constitutional: Negative.    Respiratory: Negative.    Gastrointestinal: Negative.    Musculoskeletal:        Left foot pain    Psychiatric/Behavioral: Negative.        Physical Exam  Vitals reviewed.   Constitutional:       General: She is not in acute distress.     Appearance: She is well-developed.   HENT:      Head: Normocephalic and atraumatic.   Pulmonary:      Effort: Pulmonary effort is normal. No respiratory distress.      Breath sounds: No wheezing.   Skin:     General: Skin is warm.      Capillary Refill: Capillary refill takes less than 2 seconds.   Neurological:      Mental Status: She is alert and oriented to person, place, and time.   Psychiatric:         Behavior: Behavior normal.         Thought Content: Thought content normal.       Left Lower Extremity: CFT immediate to distal digits.  Minimal edema.  Negative Homans.  Sensation intact to light touch.  Hallux rectus.  Incision site well approximated.      Procedures        ASSESSMENT AND PLAN    Diagnoses and all orders for this visit:    1. Hallux limitus of left foot (Primary)      -Patient is doing well postoperatively.  Sutures removed.  Weightbearing cam boot.  Recheck 2 weeks, repeat radiographs          This document has been electronically signed by Brando Brice DPM on September 1, 2021 " 08:15 CDT     9/1/2021  08:15 CDT    Answers for HPI/ROS submitted by the patient on 8/18/2021  Please describe your symptoms.: Post-Op  Have you had these symptoms before?: No  How long have you been having these symptoms?: 1-2 weeks  Please list any medications you are currently taking for this condition.: Please see [facility] recorders  Please describe any probable cause for these symptoms. : n/a  What is the primary reason for your visit?: Other      Answers for HPI/ROS submitted by the patient on 8/27/2021  Please describe your symptoms.: Post op  Have you had these symptoms before?: No  How long have you been having these symptoms?: 1-4 days  What is the primary reason for your visit?: Other

## 2021-09-03 RX ORDER — BUPIVACAINE HYDROCHLORIDE 5 MG/ML
5 INJECTION, SOLUTION PERINEURAL ONCE
Status: COMPLETED | OUTPATIENT
Start: 2021-09-03 | End: 2021-09-03

## 2021-09-03 RX ORDER — DEXAMETHASONE SODIUM PHOSPHATE 10 MG/ML
5 INJECTION INTRAMUSCULAR; INTRAVENOUS ONCE
Status: COMPLETED | OUTPATIENT
Start: 2021-09-03 | End: 2021-09-03

## 2021-09-03 RX ORDER — TRIAMCINOLONE ACETONIDE 40 MG/ML
10 INJECTION, SUSPENSION INTRA-ARTICULAR; INTRAMUSCULAR ONCE
Status: COMPLETED | OUTPATIENT
Start: 2021-09-03 | End: 2021-09-03

## 2021-09-03 RX ADMIN — BUPIVACAINE HYDROCHLORIDE 1 ML: 5 INJECTION, SOLUTION PERINEURAL at 16:17

## 2021-09-03 RX ADMIN — TRIAMCINOLONE ACETONIDE 10 MG: 40 INJECTION, SUSPENSION INTRA-ARTICULAR; INTRAMUSCULAR at 16:18

## 2021-09-03 RX ADMIN — DEXAMETHASONE SODIUM PHOSPHATE 5 MG: 10 INJECTION INTRAMUSCULAR; INTRAVENOUS at 16:19

## 2021-09-09 RX ORDER — DEXAMETHASONE SODIUM PHOSPHATE 4 MG/ML
4 INJECTION, SOLUTION INTRA-ARTICULAR; INTRALESIONAL; INTRAMUSCULAR; INTRAVENOUS; SOFT TISSUE ONCE
Status: COMPLETED | OUTPATIENT
Start: 2021-09-09 | End: 2021-09-09

## 2021-09-09 RX ORDER — TRIAMCINOLONE ACETONIDE 40 MG/ML
40 INJECTION, SUSPENSION INTRA-ARTICULAR; INTRAMUSCULAR ONCE
Status: COMPLETED | OUTPATIENT
Start: 2021-09-09 | End: 2021-09-09

## 2021-09-09 RX ADMIN — TRIAMCINOLONE ACETONIDE 40 MG: 40 INJECTION, SUSPENSION INTRA-ARTICULAR; INTRAMUSCULAR at 13:41

## 2021-09-09 RX ADMIN — DEXAMETHASONE SODIUM PHOSPHATE 4 MG: 4 INJECTION, SOLUTION INTRA-ARTICULAR; INTRALESIONAL; INTRAMUSCULAR; INTRAVENOUS; SOFT TISSUE at 13:40

## 2021-09-14 RX ORDER — MELOXICAM 15 MG/1
15 TABLET ORAL DAILY
Qty: 30 TABLET | Refills: 1 | Status: SHIPPED | OUTPATIENT
Start: 2021-09-14 | End: 2021-11-08

## 2021-09-14 RX ORDER — HYDROXYZINE PAMOATE 50 MG/1
CAPSULE ORAL
Qty: 90 CAPSULE | Refills: 0 | OUTPATIENT
Start: 2021-09-14

## 2021-09-15 ENCOUNTER — OFFICE VISIT (OUTPATIENT)
Dept: PODIATRY | Facility: CLINIC | Age: 66
End: 2021-09-15

## 2021-09-15 VITALS
HEIGHT: 68 IN | HEART RATE: 86 BPM | SYSTOLIC BLOOD PRESSURE: 160 MMHG | BODY MASS INDEX: 36.98 KG/M2 | DIASTOLIC BLOOD PRESSURE: 92 MMHG | OXYGEN SATURATION: 97 % | WEIGHT: 244 LBS

## 2021-09-15 DIAGNOSIS — M20.5X2 HALLUX LIMITUS OF LEFT FOOT: Primary | ICD-10-CM

## 2021-09-15 PROCEDURE — 99024 POSTOP FOLLOW-UP VISIT: CPT | Performed by: PODIATRIST

## 2021-09-15 RX ORDER — ONDANSETRON HYDROCHLORIDE 8 MG/1
TABLET, FILM COATED ORAL
Qty: 30 TABLET | Refills: 0 | Status: SHIPPED | OUTPATIENT
Start: 2021-09-15 | End: 2021-10-26

## 2021-09-15 NOTE — PROGRESS NOTES
Mikki Joel  1955  66 y.o. female     Post op left foot.    9/15/2021  Chief Complaint   Patient presents with   • Left Foot - Follow-up, Post-op       History of Present Illness    Patient presents for her fourth postop visit.  Patient had left first MTP arthrodesis on August 12.      Past Medical History:   Diagnosis Date   • Anxiety    • Asthma    • Chronic anemia    • Chronic pain    • CTS (carpal tunnel syndrome)    • Deaf    • Depression with anxiety    • Fracture of wrist    • Gastroesophageal reflux disease    • Hypertension    • Migraine    • Obesity    • Osteoarthritis    • Perforated tympanic membrane, bilateral    • Plantar fasciitis    • PONV (postoperative nausea and vomiting)    • Sleep apnea     sleep with c-pap         Past Surgical History:   Procedure Laterality Date   • COLONOSCOPY N/A 1/19/2018   • EAR TUBES     • ENDOSCOPIC FUNCTIONAL SINUS SURGERY (FESS) Bilateral 9/5/2017   • ENDOSCOPY N/A 1/19/2018   • ENDOSCOPY N/A 6/10/2019   • KNEE ARTHROPLASTY Right    • LAPAROSCOPIC TUBAL LIGATION     • RHINOPLASTY     • TOE FUSION Left 8/12/2021    Procedure: LEFT FIRST METATARSOPHALANGEAL JOINT ARTHRODESIS;  Surgeon: Brando Brice DPM;  Location: [facility];  Service: Podiatry;  Laterality: Left;   • TOTAL SHOULDER ARTHROPLASTY Right    • TYMPANOPLASTY Left 6/5/2018   • TYMPANOPLASTY Right 2/7/2020    Procedure: TYMPANOPLASTY AND  CARTILAGE GRAFT;  Surgeon: Ramy Gaston MD;  Location: [facility];  Service: ENT;  Laterality: Right;   • UPPER GASTROINTESTINAL ENDOSCOPY           Family History   Problem Relation Age of Onset   • Hypertension Mother    • Alcohol abuse Father    • Heart disease Father    • Cancer Father    • Stroke Maternal Grandmother    • Irritable bowel syndrome Sister    • Osteoporosis Maternal Grandfather        Allergies   Allergen Reactions   • Codeine Other (See Comments)     Increases heart rate   • Sulfa Antibiotics Hives       Social History      Socioeconomic History   • Marital status:      Spouse name: Not on file   • Number of children: Not on file   • Years of education: Not on file   • Highest education level: Not on file   Tobacco Use   • Smoking status: Never Smoker   • Smokeless tobacco: Never Used   Vaping Use   • Vaping Use: Never used   Substance and Sexual Activity   • Alcohol use: Yes     Comment: socially   • Drug use: Yes     Types: Marijuana   • Sexual activity: Defer         Current Outpatient Medications   Medication Sig Dispense Refill   • albuterol sulfate  (90 Base) MCG/ACT inhaler Inhale 2 puffs Every 4 (Four) Hours As Needed for Wheezing.     • cetirizine (zyrTEC) 10 MG tablet TAKE 1 TABLET BY MOUTH DAILY 90 tablet 3   • dexlansoprazole (Dexilant) 60 MG capsule Take 1 capsule by mouth Daily. 90 capsule 1   • ferrous sulfate 325 (65 FE) MG tablet Take 1 tablet by mouth Daily With Breakfast. Take with vitamin C to help with absorption 30 tablet 5   • gabapentin (NEURONTIN) 800 MG tablet Take 1 tablet by mouth Every Night. For Restless leg syndrome.  Take 1-3 hours before onset of symptoms 30 tablet 2   • hydrOXYzine pamoate (Vistaril) 50 MG capsule Take 1 capsule by mouth 3 (Three) Times a Day As Needed for Itching. 90 capsule 0   • ipratropium (ATROVENT) 0.06 % nasal spray 2 sprays into the nostril(s) as directed by provider 2 (two) times a day.     • linaclotide (Linzess) 145 MCG capsule capsule Take 145 mcg by mouth Every Morning Before Breakfast.     • meloxicam (MOBIC) 15 MG tablet TAKE 1 TABLET BY MOUTH DAILY 30 tablet 1   • metoprolol succinate XL (TOPROL-XL) 100 MG 24 hr tablet Take 1 tablet by mouth Every Night. 90 tablet 3   • mometasone (NASONEX) 50 MCG/ACT nasal spray 2 sprays into the nostril(s) as directed by provider Daily. 17 g 0   • olopatadine (PATANASE) 0.6 % solution nasal solution by Each Nare route 3 (Three) Times a Day.     • ondansetron (ZOFRAN) 8 MG tablet TAKE 1 TABLET BY MOUTH EVERY 8 HOURS  "AS NEEDED FOR NAUSEA OR VOMITING 30 tablet 0   • ramelteon (Rozerem) 8 MG tablet Take 8 mg by mouth Every Night.     • sucralfate (CARAFATE) 1 g tablet Take 1 tablet by mouth 4 (Four) Times a Day As Needed (Reflux). 60 tablet 1   • Trintellix 20 MG tablet Take 20 mg by mouth Daily. For Mood. 90 tablet 1   • oxyCODONE-acetaminophen (Percocet) 7.5-325 MG per tablet Take 1 tablet by mouth Every 6 (Six) Hours As Needed for Moderate Pain . 30 tablet 0     No current facility-administered medications for this visit.         OBJECTIVE    /92   Pulse 86   Ht 172.7 cm (68\")   Wt 111 kg (244 lb)   LMP  (LMP Unknown)   SpO2 97%   BMI 37.10 kg/m²       Review of Systems   Constitutional: Negative.    Respiratory: Negative.    Gastrointestinal: Negative.    Musculoskeletal:        Left foot pain    Psychiatric/Behavioral: Negative.        Physical Exam  Vitals reviewed.   Constitutional:       General: She is not in acute distress.     Appearance: She is well-developed.   HENT:      Head: Normocephalic and atraumatic.   Pulmonary:      Effort: Pulmonary effort is normal. No respiratory distress.      Breath sounds: No wheezing.   Skin:     General: Skin is warm.      Capillary Refill: Capillary refill takes less than 2 seconds.   Neurological:      Mental Status: She is alert and oriented to person, place, and time.   Psychiatric:         Behavior: Behavior normal.         Thought Content: Thought content normal.       Left Lower Extremity: CFT immediate to distal digits.  Minimal edema.  Negative Homans.  Sensation intact to light touch.  Hallux rectus.  Incision site healed.       Procedures        ASSESSMENT AND PLAN    Diagnoses and all orders for this visit:    1. Hallux limitus of left foot (Primary)  -     XR Foot 3+ View Left      -Patient is doing well postoperatively.  Repeat radiographs taken and reviewed.  Continue weightbearing in cam boot.  Recheck 2 weeks.  Plan to transition patient to regular shoe " gear.          This document has been electronically signed by Brando Brice DPM on September 15, 2021 14:26 CDT     9/15/2021  14:26 CDT    Answers for HPI/ROS submitted by the patient on 8/18/2021  Please describe your symptoms.: Post-Op  Have you had these symptoms before?: No  How long have you been having these symptoms?: 1-2 weeks  Please list any medications you are currently taking for this condition.: Please see [facility] recorders  Please describe any probable cause for these symptoms. : n/a  What is the primary reason for your visit?: Other      Answers for HPI/ROS submitted by the patient on 8/27/2021  Please describe your symptoms.: Post op  Have you had these symptoms before?: No  How long have you been having these symptoms?: 1-4 days  What is the primary reason for your visit?: Other      Answers for HPI/ROS submitted by the patient on 9/13/2021  Please describe your symptoms.: N/A  Have you had these symptoms before?: Yes  How long have you been having these symptoms?: Greater than 2 weeks  Please list any medications you are currently taking for this condition.: N/A  Please describe any probable cause for these symptoms. : N/A  What is the primary reason for your visit?: Other

## 2021-09-20 DIAGNOSIS — G25.81 RESTLESS LEGS SYNDROME (RLS): ICD-10-CM

## 2021-09-20 RX ORDER — GABAPENTIN 800 MG/1
800 TABLET ORAL NIGHTLY
Qty: 30 TABLET | Refills: 0 | Status: SHIPPED | OUTPATIENT
Start: 2021-09-20 | End: 2021-10-17 | Stop reason: SDUPTHER

## 2021-09-20 NOTE — PROGRESS NOTES
Refill request for Neurontin 800mg qHS for RLS.      Chart reviewed.  Has f/u w/ me later this month.    MARQUISE reviewed w/in window (6/24/21).     Have renewed.  #30, no refill given upcoming appt to bridge there.  Sent to [id number] - [place], [place] .     Jimmy Kelly II, MD  09/20/21 @ 5:35 PM CDT

## 2021-09-28 ENCOUNTER — OFFICE VISIT (OUTPATIENT)
Dept: SLEEP MEDICINE | Facility: HOSPITAL | Age: 66
End: 2021-09-28

## 2021-09-28 ENCOUNTER — DOCUMENTATION (OUTPATIENT)
Dept: SLEEP MEDICINE | Facility: HOSPITAL | Age: 66
End: 2021-09-28

## 2021-09-28 VITALS
WEIGHT: 238.4 LBS | OXYGEN SATURATION: 98 % | BODY MASS INDEX: 36.13 KG/M2 | DIASTOLIC BLOOD PRESSURE: 100 MMHG | SYSTOLIC BLOOD PRESSURE: 166 MMHG | HEART RATE: 78 BPM | HEIGHT: 68 IN

## 2021-09-28 DIAGNOSIS — F51.04 CHRONIC INSOMNIA: ICD-10-CM

## 2021-09-28 DIAGNOSIS — F39 AFFECTIVE DISORDER (HCC): ICD-10-CM

## 2021-09-28 DIAGNOSIS — G25.81 RESTLESS LEGS SYNDROME (RLS): Primary | ICD-10-CM

## 2021-09-28 DIAGNOSIS — G47.52 DREAM ENACTMENT BEHAVIOR: ICD-10-CM

## 2021-09-28 DIAGNOSIS — G47.33 OSA (OBSTRUCTIVE SLEEP APNEA): ICD-10-CM

## 2021-09-28 PROCEDURE — 99214 OFFICE O/P EST MOD 30 MIN: CPT | Performed by: PSYCHIATRY & NEUROLOGY

## 2021-09-28 RX ORDER — GABAPENTIN 100 MG/1
100 CAPSULE ORAL NIGHTLY
Qty: 21 CAPSULE | Refills: 0 | Status: SHIPPED | OUTPATIENT
Start: 2021-09-28 | End: 2021-10-17

## 2021-09-28 RX ORDER — HYDROXYZINE PAMOATE 50 MG/1
50 CAPSULE ORAL NIGHTLY PRN
Qty: 30 CAPSULE | Refills: 2 | Status: SHIPPED | OUTPATIENT
Start: 2021-09-28 | End: 2021-11-23 | Stop reason: SDUPTHER

## 2021-09-28 NOTE — PROGRESS NOTES
MARQUISE reviewed today: #[id number].    Standing Rx for Neurontin by me.  Oxycodone 7.5mg previously in Aug 21 by Dr Brice s/p foot surgery.     Jimmy Kelly II, MD  09/28/21 @ 2:32 PM CDT

## 2021-09-28 NOTE — PROGRESS NOTES
"Sleep Medicine Follow-Up Note    CC & ID:  Ms. Mikki Joel is a 66 y.o. female seen for follow-up regarding JUNIOR, Insomnia & RLS.      Treatment Summary:   --Last seen in May 21:     --JUNIOR: Dx PSG in Jan 20: AHI 9, PLMI 33   --Titration in Aug 20: no REM sleep, did well though with PLMI of 17.   --Increased APAP 8-15 cm.    --RLS:    --Hx Requip 3mg qHS   --Neurontin titrated to 800mg qEvening  --Ongoing symptoms more consistent with Hip and back pain.    --Insomnia:    --Encouraged behavioral interventions   --Hx Lunest d/c'd given lack of efficacy & neurontin   --Cont Ramelteon 8mg qHS    --Dream enactment behavior: infrequent, monitor.  Unable to monitor for REM without atonia (No REM sleep).           HPI:   Today, patient presents unaccompanied.  She is pleasant and engaged well.    Concerning insomnia, stable;  BT 2 Am  Lat 15 min to hours  Aw x 2  WT/RT 10 A - some times up earlier   Schedule isn't bothersome for pt  Late at night is her \"decompression\" time / self care time  Rozarem is being used; not certain how helpful  Hx of Lunesta  Also of Hydroxyzine use, that was helpful  Complicated by chronic pain - osteoarthrisis in hip, shoulders, feet    RLS controlled on neurontin; no URGE sx generally; rare sx.    Re: JUNIOR; not on PAP since recall; Feels like not getting good, deep sleep.  She is not using SoClean, etc.  We discussed risks and benefits.  Reviewed prior PSG.  Lower AHI of 9.  She did voice improvement on PAP.  PAP is abt 1 yr old.  She is interested in retrying.     Dream enactment is stable; has been yelling or fighting movements; no injuries or falls, etc.; frequency is rare; last dream enactment is 6 months ago; no Fhx of PKD or tremor        Treatment: CPAP  -Pressure:8-15 cm   -Pressure intolerance: no   -Aerophagia: no   -Air Hunger: no  -Interface: cushion, nasal  -Fit: no  -Chin strap: no  -HCC: [facility] - getting supplies  -Patient's perceived outcome: benefits        PRIOR " Compliance Card Data:   - Date Range: 12/1/20 -- 2/28/21   - Days: 90    - % Days used: 100%  - % Days with Usage > 4 hrs: 100%  - Average usage days used: 9 h 10 m        ---> often forgets to turn off    - Setting: APAP 5 - 12 cm  - Pressure data:   - Median 6 cm  - <=90%: 9 cm  - Max: 12.5 cm    - Residual AHI: 2   - PB: 0%   - GIRISH 0.2    - Vibratory Snore Index: 3  - Average Time in Large Leak: 10 min      ROS:  Review of Systems   Constitutional: Negative for fever.   HENT: Negative for sore throat (rare. allergy related). Sinus pressure: onging sinus infx, seeing PCP.    Respiratory: Negative for cough.    Cardiovascular: Negative for chest pain.   Gastrointestinal: Indigestion: seeing other provider.   Neurological: Negative for seizures.   Psychiatric/Behavioral: Negative for behavioral problems.     -CONSTITUTIONAL: Weight: stable as below  Wt Readings from Last 5 Encounters:   09/28/21 108 kg (238 lb 6.4 oz)   09/15/21 111 kg (244 lb)   09/01/21 111 kg (244 lb)   08/25/21 111 kg (244 lb)   08/16/21 111 kg (244 lb 14.9 oz)            Daytime Symptoms:  -Daytime fatigue/sleepiness: none significant  -Naps: no  -Involuntary Dozing: no  -Driving: Difficulty with sleepiness and driving:  no   -- Close calls related to sleepiness: no   -- Accidents related to sleepiness: no      Social History:  -Nicotine: no  -Caffeine: no  -Alcohol: no  -THC: no  -OTC/Supplements/herbals: sudafed in AM  -Illicits:  denies      Questionnaires: ESS = 0  Last ESS = 3         Patient Active Problem List   Diagnosis   • Asthma   • Arthritis   • Nasal turbinate hypertrophy   • Gastroesophageal reflux disease   • Depression with anxiety   • Insomnia   • PTSD (post-traumatic stress disorder)   • Restless leg   • Mixed hearing loss of left ear   • Right knee pain   • Presence of right artificial knee joint   • Essential hypertension, benign   • Presbyesophagus   • Chronic vasomotor rhinitis   • Perforated tympanic membrane, bilateral    • Mixed conductive and sensorineural hearing loss of right ear with restricted hearing of left ear   • Morbidly obese (CMS/HCC)   • Hallux limitus of left foot       CMHx:  --Foot surgery, well healing  --> Denies any significant medical changes since last visit.   --> Supplemental Oxygen Use: denies      Current Outpatient Medications   Medication Sig Dispense Refill   • albuterol sulfate  (90 Base) MCG/ACT inhaler Inhale 2 puffs Every 4 (Four) Hours As Needed for Wheezing.     • cetirizine (zyrTEC) 10 MG tablet TAKE 1 TABLET BY MOUTH DAILY 90 tablet 3   • dexlansoprazole (Dexilant) 60 MG capsule Take 1 capsule by mouth Daily. 90 capsule 1   • ferrous sulfate 325 (65 FE) MG tablet Take 1 tablet by mouth Daily With Breakfast. Take with vitamin C to help with absorption 30 tablet 5   • gabapentin (NEURONTIN) 800 MG tablet Take 1 tablet by mouth Every Night. For Restless leg syndrome.  Take 1-3 hours before onset of symptoms 30 tablet 0   • ipratropium (ATROVENT) 0.06 % nasal spray 2 sprays into the nostril(s) as directed by provider 2 (two) times a day.     • linaclotide (Linzess) 145 MCG capsule capsule Take 145 mcg by mouth Every Morning Before Breakfast.     • meloxicam (MOBIC) 15 MG tablet TAKE 1 TABLET BY MOUTH DAILY 30 tablet 1   • metoprolol succinate XL (TOPROL-XL) 100 MG 24 hr tablet Take 1 tablet by mouth Every Night. 90 tablet 3   • mometasone (NASONEX) 50 MCG/ACT nasal spray 2 sprays into the nostril(s) as directed by provider Daily. 17 g 0   • ondansetron (ZOFRAN) 8 MG tablet TAKE 1 TABLET BY MOUTH EVERY 8 HOURS AS NEEDED FOR NAUSEA OR VOMITING 30 tablet 0   • ramelteon (Rozerem) 8 MG tablet Take 8 mg by mouth Every Night.     • sucralfate (CARAFATE) 1 g tablet Take 1 tablet by mouth 4 (Four) Times a Day As Needed (Reflux). 60 tablet 1   • Trintellix 20 MG tablet Take 20 mg by mouth Daily. For Mood. 90 tablet 1   • gabapentin (NEURONTIN) 100 MG capsule Take 1 capsule by mouth Every Night. Take  "with 800mg to make total dose of 900mg for RLS. 21 capsule 0   • hydrOXYzine pamoate (Vistaril) 50 MG capsule Take 1 capsule by mouth At Night As Needed (Sleep or anxiety). 30 capsule 2     No current facility-administered medications for this visit.     -Notable Current Medications:  --> Ramelteon 8mg qHS  --> Zyrtec daily; Neurontin 800mg qHS (taken ~3 hrs before bed) + no longer on Opioid (used for pain control after foot surgery)      PE:  Body mass index is 36.26 kg/m².  Vitals:    09/28/21 1319   BP: 166/100   Pulse: 78   SpO2: 98%   Weight: 108 kg (238 lb 6.4 oz)   Height: 172.7 cm (67.99\")   Auto machine; no sx today; will monitor; BP was elevated in Sept visit in [facility].t     Wt Readings from Last 5 Encounters:   09/28/21 108 kg (238 lb 6.4 oz)   09/15/21 111 kg (244 lb)   09/01/21 111 kg (244 lb)   08/25/21 111 kg (244 lb)   08/16/21 111 kg (244 lb 14.9 oz)     Physical Exam  General:  In NAD.  Head: Atraumatic  Eyes: EOMI.  CV: No Clubbing.   Pul: Respirations: unlaboured.    MS: No atrophy.  Neuro: No resting tremor.  Gait and ambulation with boot on L ft.  Psych: Socially appropriate.  Pleasant.  No overt dysphoria.         Assessment & Planning:  Ms. Mikki Joel is a 66 y.o. female who is seen for follow-up of:     --Obstructive Sleep Apnea:  Sub optimally controlled  --Is interested in using PAP w/ recall  -Benefiting from treatment  --Cont Auto CPAP to 8-15 cm  -Patient understands to notify clinic if developing waking headaches or worsening daytime sleepiness  --Advised patient of new safety recall of [employer] CPAP/BiPAP/AVAPS machines. We discussed the risk versus benefit of continuing usage of PAP therapy. The company has recommended that patients stop usage of their current machines. Instructed patient to call [employer] ([phone]) to check if the machine is under warranty for repair or replacement. [place] machine with serial number on website: " [email or web address] Found[employer]/src-updates. Follow up with [employer] company regarding any further questions, or for consideration for new machine once eligible. Advised patient to avoid unapproved ozone-type CPAP . Advised to call us if any further questions or problems.        ---Insomnia, chronic: stable but sub optimally controlled  --Perceived latency of 1 to 2 hours or more  -Of note, patient fell asleep as soon as the study started in lab  --Likely Delayed sleep phase component but with residual initial insomnia.  -We discussed and I reiterated critical need for behavioral interventions.  --Reiterated the data noting increased all cause mortality with insomnia without treatment.  Compared it to the data noting increased all cause mortality with hypnotics (long term, > 6 wks) and cognitive dysfunction risk.  Discussed benefits of behavioral therapy to treat insomnia without risks of medication and data suggesting equal efficacy at 4-6 wks of behavioral therapy (though with CBT-I which involves these functions).   --Discussed with patient the goals of behavioral intervention: sleep and napping restriction to 6 hours a day/night; stimulus control; fixed wake time at 7.30 A regardless of how much or little sleep.  Encouraged sleep logs be kept.     --Patient is going to consider; contemplative re: change.  Discussed potential worsening of sleepiness and fatigue during initiation of intervention.  Need for increased vigilance with any sustained attention tasks (e.g. Driving).  All questions answered for the patient.        --Cont Ramelteon 8 mg nightly as needed.   --Have added Vistaril 50mg qHS PRN sleep or sleep related anxiety given benefits from use after surgery.  B/R/A/SE d/w pt, informed consent obtained.  In particular monitor for sedation, dryness, diminished cognitive sx.  All questions answered for Ms Joel.         --RLS: Improved but with mild residual sx  --Titrate Neurontin to 900mg qEvening for restless leg  symptoms.   --> Refilled a week ago; added 100mg dose to make total 900.    --Co-morbid symptoms consistent with Hip, shoulder, and back pain in setting of osteoarthrisis.      --Dream enactment behavior: infrequent, stable; monitor.  Unable to monitor for REM without atonia. on her last titration test given lack of REM.  No FHx of PKD or ET.  Will let us know of any worsening sx.      --Affective d/o / BH overlay: improving; not addressed today    --Elevated BP: asx today; cont to monitor.      -->  Safe driving reviewed and encouraged.    -->  Follow-up 1 Months to evaluate effectiveness of medication changes (Vistaril & Gabapentin); sooner, if needed.  If doing well at that visit, would plan for 6-8 month visits.    --> Call with any questions or concerns.      All questions answered for the patient, who indicated understanding and agreed with the plan.       Jimmy Kelly MD  09/28/21   Sleep Medicine    CC: Ronald Bowser MD

## 2021-09-29 ENCOUNTER — OFFICE VISIT (OUTPATIENT)
Dept: PODIATRY | Facility: CLINIC | Age: 66
End: 2021-09-29

## 2021-09-29 VITALS — HEART RATE: 85 BPM | BODY MASS INDEX: 36.07 KG/M2 | OXYGEN SATURATION: 95 % | WEIGHT: 238 LBS | HEIGHT: 68 IN

## 2021-09-29 DIAGNOSIS — M20.5X2 HALLUX LIMITUS OF LEFT FOOT: Primary | ICD-10-CM

## 2021-09-29 PROCEDURE — 99024 POSTOP FOLLOW-UP VISIT: CPT | Performed by: PODIATRIST

## 2021-09-29 NOTE — PROGRESS NOTES
Mikki Joel  1955  66 y.o. female     Post op left foot.    9/29/2021  Chief Complaint   Patient presents with   • Left Foot - Follow-up       History of Present Illness    Patient presents for her fifth postop visit.  Patient had left first MTP arthrodesis on August 12.      Past Medical History:   Diagnosis Date   • Anxiety    • Asthma    • Chronic anemia    • Chronic pain    • CTS (carpal tunnel syndrome)    • Deaf    • Depression with anxiety    • Fracture of wrist    • Gastroesophageal reflux disease    • Hypertension    • Migraine    • Obesity    • Osteoarthritis    • Perforated tympanic membrane, bilateral    • Plantar fasciitis    • PONV (postoperative nausea and vomiting)    • Sleep apnea     sleep with c-pap         Past Surgical History:   Procedure Laterality Date   • COLONOSCOPY N/A 1/19/2018   • EAR TUBES     • ENDOSCOPIC FUNCTIONAL SINUS SURGERY (FESS) Bilateral 9/5/2017   • ENDOSCOPY N/A 1/19/2018   • ENDOSCOPY N/A 6/10/2019   • KNEE ARTHROPLASTY Right    • LAPAROSCOPIC TUBAL LIGATION     • RHINOPLASTY     • TOE FUSION Left 8/12/2021    Procedure: LEFT FIRST METATARSOPHALANGEAL JOINT ARTHRODESIS;  Surgeon: Brando Brice DPM;  Location: [facility];  Service: Podiatry;  Laterality: Left;   • TOTAL SHOULDER ARTHROPLASTY Right    • TYMPANOPLASTY Left 6/5/2018   • TYMPANOPLASTY Right 2/7/2020    Procedure: TYMPANOPLASTY AND  CARTILAGE GRAFT;  Surgeon: Ramy Gaston MD;  Location: [facility];  Service: ENT;  Laterality: Right;   • UPPER GASTROINTESTINAL ENDOSCOPY           Family History   Problem Relation Age of Onset   • Hypertension Mother    • Alcohol abuse Father    • Heart disease Father    • Cancer Father    • Stroke Maternal Grandmother    • Irritable bowel syndrome Sister    • Osteoporosis Maternal Grandfather        Allergies   Allergen Reactions   • Codeine Other (See Comments)     Increases heart rate   • Sulfa Antibiotics Hives       Social History     Socioeconomic History    • Marital status:      Spouse name: Not on file   • Number of children: Not on file   • Years of education: Not on file   • Highest education level: Not on file   Tobacco Use   • Smoking status: Never Smoker   • Smokeless tobacco: Never Used   Vaping Use   • Vaping Use: Never used   Substance and Sexual Activity   • Alcohol use: Yes     Comment: socially   • Drug use: Yes     Types: Marijuana   • Sexual activity: Defer         Current Outpatient Medications   Medication Sig Dispense Refill   • albuterol sulfate  (90 Base) MCG/ACT inhaler Inhale 2 puffs Every 4 (Four) Hours As Needed for Wheezing.     • cetirizine (zyrTEC) 10 MG tablet TAKE 1 TABLET BY MOUTH DAILY 90 tablet 3   • dexlansoprazole (Dexilant) 60 MG capsule Take 1 capsule by mouth Daily. 90 capsule 1   • ferrous sulfate 325 (65 FE) MG tablet Take 1 tablet by mouth Daily With Breakfast. Take with vitamin C to help with absorption 30 tablet 5   • gabapentin (NEURONTIN) 100 MG capsule Take 1 capsule by mouth Every Night. Take with 800mg to make total dose of 900mg for RLS. 21 capsule 0   • gabapentin (NEURONTIN) 800 MG tablet Take 1 tablet by mouth Every Night. For Restless leg syndrome.  Take 1-3 hours before onset of symptoms 30 tablet 0   • hydrOXYzine pamoate (Vistaril) 50 MG capsule Take 1 capsule by mouth At Night As Needed (Sleep or anxiety). 30 capsule 2   • ipratropium (ATROVENT) 0.06 % nasal spray 2 sprays into the nostril(s) as directed by provider 2 (two) times a day.     • linaclotide (Linzess) 145 MCG capsule capsule Take 145 mcg by mouth Every Morning Before Breakfast.     • meloxicam (MOBIC) 15 MG tablet TAKE 1 TABLET BY MOUTH DAILY 30 tablet 1   • metoprolol succinate XL (TOPROL-XL) 100 MG 24 hr tablet Take 1 tablet by mouth Every Night. 90 tablet 3   • mometasone (NASONEX) 50 MCG/ACT nasal spray 2 sprays into the nostril(s) as directed by provider Daily. 17 g 0   • ondansetron (ZOFRAN) 8 MG tablet TAKE 1 TABLET BY MOUTH  "EVERY 8 HOURS AS NEEDED FOR NAUSEA OR VOMITING 30 tablet 0   • ramelteon (Rozerem) 8 MG tablet Take 8 mg by mouth Every Night.     • sucralfate (CARAFATE) 1 g tablet Take 1 tablet by mouth 4 (Four) Times a Day As Needed (Reflux). 60 tablet 1   • Trintellix 20 MG tablet Take 20 mg by mouth Daily. For Mood. 90 tablet 1     No current facility-administered medications for this visit.         OBJECTIVE    Pulse 85   Ht 172.7 cm (67.99\")   Wt 108 kg (238 lb)   LMP  (LMP Unknown)   SpO2 95%   BMI 36.20 kg/m²       Review of Systems   Constitutional: Negative.    Respiratory: Negative.    Gastrointestinal: Negative.    Musculoskeletal:        Left foot pain    Psychiatric/Behavioral: Negative.        Physical Exam  Vitals reviewed.   Constitutional:       General: She is not in acute distress.     Appearance: She is well-developed.   HENT:      Head: Normocephalic and atraumatic.   Pulmonary:      Effort: Pulmonary effort is normal. No respiratory distress.      Breath sounds: No wheezing.   Skin:     General: Skin is warm.      Capillary Refill: Capillary refill takes less than 2 seconds.   Neurological:      Mental Status: She is alert and oriented to person, place, and time.   Psychiatric:         Behavior: Behavior normal.         Thought Content: Thought content normal.       Left Lower Extremity: CFT immediate to distal digits.  Minimal edema.  Negative Homans.  Sensation intact to light touch.  Hallux rectus.  Incision site healed.       Procedures        ASSESSMENT AND PLAN    Diagnoses and all orders for this visit:    1. Hallux limitus of left foot (Primary)      -Patient is doing well postoperatively.  Transition to regular shoe gear.  Progress activity as tolerated.  Recheck 4 weeks, repeat radiographs          This document has been electronically signed by Brando Brice DPM on September 29, 2021 14:26 CDT     9/29/2021  14:26 CDT    Answers for HPI/ROS submitted by the patient on 8/18/2021  Please " describe your symptoms.: Post-Op  Have you had these symptoms before?: No  How long have you been having these symptoms?: 1-2 weeks  Please list any medications you are currently taking for this condition.: Please see [facility] recorders  Please describe any probable cause for these symptoms. : n/a  What is the primary reason for your visit?: Other      Answers for HPI/ROS submitted by the patient on 8/27/2021  Please describe your symptoms.: Post op  Have you had these symptoms before?: No  How long have you been having these symptoms?: 1-4 days  What is the primary reason for your visit?: Other      Answers for HPI/ROS submitted by the patient on 9/13/2021  Please describe your symptoms.: N/A  Have you had these symptoms before?: Yes  How long have you been having these symptoms?: Greater than 2 weeks  Please list any medications you are currently taking for this condition.: N/A  Please describe any probable cause for these symptoms. : N/A  What is the primary reason for your visit?: Other

## 2021-09-30 ENCOUNTER — OFFICE VISIT (OUTPATIENT)
Dept: OTOLARYNGOLOGY | Facility: CLINIC | Age: 66
End: 2021-09-30

## 2021-09-30 VITALS — OXYGEN SATURATION: 97 % | BODY MASS INDEX: 36.28 KG/M2 | WEIGHT: 239.4 LBS | HEIGHT: 68 IN

## 2021-09-30 DIAGNOSIS — J32.4 CHRONIC PANSINUSITIS: Primary | ICD-10-CM

## 2021-09-30 PROCEDURE — 31237 NSL/SINS NDSC SURG BX POLYPC: CPT | Performed by: OTOLARYNGOLOGY

## 2021-10-04 NOTE — PROGRESS NOTES
Subjective   Mikki Joel is a 66 y.o. female.       History of Present Illness   Patient is status post 5 previous sinus surgeries.  Is also had bilateral tympanoplasty's.  Was noted to have extensive postsurgical changes with widely patent maxillary antrostomies but substantial crusting.  I advised large volume saline lavage and she says she is doing this but is still getting intermittent foul-smelling drainage.      The following portions of the patient's history were reviewed and updated as appropriate: allergies, current medications, past family history, past medical history, past social history, past surgical history and problem list.     reports that she has never smoked. She has never used smokeless tobacco. She reports current alcohol use of about 2.0 standard drinks of alcohol per week. She reports that she does not use drugs.   Patient is not a tobacco user and has not been counseled for use of tobacco products      Review of Systems        Objective   Physical Exam  Nasal endoscopy is performed: Chai-Synephrine and Xylocaine are instilled the nares bilaterally.  0° scope is passed into each nostril.  The inferior, middle, and superior turbinates as well as nasal septum and nasopharynx are examined.  Pertinent findings include: Extensive postsurgical changes.  Clefts and maxillary sinuses show layers of crusted and inspissated secretions that are debrided and removed using forceps and curved suction.  Mucosa is mildly chronically inflamed      Assessment/Plan   Diagnoses and all orders for this visit:    1. Chronic pansinusitis (Primary)      Plan: Told the patient I still do not see anything that might be surgery.  Offered her the opportunity to be evaluated at [place] she does want to pursue that at this point.  The large volume saline lavage and have told her to try adding a small amount of baby shampoo to the saline solution to try to serve as a surfactant to loosen the secretions.  I will  see her again in 3 months problems.

## 2021-10-12 RX ORDER — DEXLANSOPRAZOLE 60 MG/1
1 CAPSULE, DELAYED RELEASE ORAL DAILY
Qty: 90 CAPSULE | Refills: 1 | Status: SHIPPED | OUTPATIENT
Start: 2021-10-12 | End: 2022-04-25 | Stop reason: SDUPTHER

## 2021-10-15 ENCOUNTER — DOCUMENTATION (OUTPATIENT)
Dept: BEHAVIORAL HEALTH | Facility: CLINIC | Age: 66
End: 2021-10-15

## 2021-10-15 NOTE — PROGRESS NOTES
Notified by sleep clinic patient requested call back.  Him to call this morning.  No answer.  Left generic voicemail requesting call back here to behavioral health since on service today.  I will wait to hear back from patient to ensure optimal treatment planning.     Jimmy Kelly II, MD  10/15/21 @ 8:39 AM CDT

## 2021-10-17 DIAGNOSIS — G25.81 RESTLESS LEGS SYNDROME (RLS): ICD-10-CM

## 2021-10-17 RX ORDER — GABAPENTIN 600 MG/1
1200 TABLET ORAL NIGHTLY
Qty: 60 TABLET | Refills: 0 | Status: SHIPPED | OUTPATIENT
Start: 2021-10-17 | End: 2021-11-19

## 2021-10-18 ENCOUNTER — TELEPHONE (OUTPATIENT)
Dept: SLEEP MEDICINE | Facility: HOSPITAL | Age: 66
End: 2021-10-18

## 2021-10-18 NOTE — PROGRESS NOTES
Refill request for Neurontin.  Per last note titrating dose, increase to 1200mg qHS/qPM for RLS.      MARQUISE reviewed w/in window.     Jimmy Kelly II, MD  10/17/21 @ 7:01 PM CDT

## 2021-10-18 NOTE — TELEPHONE ENCOUNTER
Spoke with pt let her know DR MENDOZA  Has tried reaching  Her but had left message to let her know he sent in gabapentin 1200mg max  For her as well as she should maintain on the hydroxyzine 50mg but certainly no more than 75mg with her current meds  Patient understood

## 2021-10-19 ENCOUNTER — LAB (OUTPATIENT)
Dept: LAB | Facility: HOSPITAL | Age: 66
End: 2021-10-19

## 2021-10-19 ENCOUNTER — OFFICE VISIT (OUTPATIENT)
Dept: FAMILY MEDICINE CLINIC | Facility: CLINIC | Age: 66
End: 2021-10-19

## 2021-10-19 VITALS
SYSTOLIC BLOOD PRESSURE: 132 MMHG | HEIGHT: 68 IN | WEIGHT: 237.4 LBS | DIASTOLIC BLOOD PRESSURE: 82 MMHG | BODY MASS INDEX: 35.98 KG/M2

## 2021-10-19 DIAGNOSIS — I10 ESSENTIAL HYPERTENSION: ICD-10-CM

## 2021-10-19 DIAGNOSIS — Z23 NEED FOR VACCINATION: ICD-10-CM

## 2021-10-19 DIAGNOSIS — F12.90 MARIJUANA USE: ICD-10-CM

## 2021-10-19 DIAGNOSIS — J45.20 MILD INTERMITTENT ASTHMA, UNSPECIFIED WHETHER COMPLICATED: Chronic | ICD-10-CM

## 2021-10-19 DIAGNOSIS — I10 ESSENTIAL HYPERTENSION, BENIGN: Primary | Chronic | ICD-10-CM

## 2021-10-19 DIAGNOSIS — F41.8 DEPRESSION WITH ANXIETY: Chronic | ICD-10-CM

## 2021-10-19 DIAGNOSIS — F43.10 PTSD (POST-TRAUMATIC STRESS DISORDER): Chronic | ICD-10-CM

## 2021-10-19 DIAGNOSIS — F39 AFFECTIVE DISORDER (HCC): ICD-10-CM

## 2021-10-19 DIAGNOSIS — F51.01 PRIMARY INSOMNIA: Chronic | ICD-10-CM

## 2021-10-19 DIAGNOSIS — E66.01 MORBIDLY OBESE (HCC): Chronic | ICD-10-CM

## 2021-10-19 LAB
ALBUMIN SERPL-MCNC: 4.5 G/DL (ref 3.5–5.2)
ALBUMIN/GLOB SERPL: 2 G/DL
ALP SERPL-CCNC: 123 U/L (ref 39–117)
ALT SERPL W P-5'-P-CCNC: 20 U/L (ref 1–33)
ANION GAP SERPL CALCULATED.3IONS-SCNC: 12.6 MMOL/L (ref 5–15)
AST SERPL-CCNC: 14 U/L (ref 1–32)
BILIRUB SERPL-MCNC: 0.4 MG/DL (ref 0–1.2)
BUN SERPL-MCNC: 22 MG/DL (ref 8–23)
BUN/CREAT SERPL: 16.8 (ref 7–25)
CALCIUM SPEC-SCNC: 9.3 MG/DL (ref 8.6–10.5)
CHLORIDE SERPL-SCNC: 103 MMOL/L (ref 98–107)
CHOLEST SERPL-MCNC: 210 MG/DL (ref 0–200)
CO2 SERPL-SCNC: 26.4 MMOL/L (ref 22–29)
CREAT SERPL-MCNC: 1.31 MG/DL (ref 0.57–1)
GFR SERPL CREATININE-BSD FRML MDRD: 40 ML/MIN/1.73
GLOBULIN UR ELPH-MCNC: 2.2 GM/DL
GLUCOSE SERPL-MCNC: 99 MG/DL (ref 65–99)
HDLC SERPL-MCNC: 42 MG/DL (ref 40–60)
LDLC SERPL CALC-MCNC: 131 MG/DL (ref 0–100)
LDLC/HDLC SERPL: 3.01 {RATIO}
MAGNESIUM SERPL-MCNC: 2.2 MG/DL (ref 1.6–2.4)
POTASSIUM SERPL-SCNC: 4.3 MMOL/L (ref 3.5–5.2)
PROT SERPL-MCNC: 6.7 G/DL (ref 6–8.5)
SODIUM SERPL-SCNC: 142 MMOL/L (ref 136–145)
TRIGL SERPL-MCNC: 207 MG/DL (ref 0–150)
VLDLC SERPL-MCNC: 37 MG/DL (ref 5–40)

## 2021-10-19 PROCEDURE — 80053 COMPREHEN METABOLIC PANEL: CPT | Performed by: FAMILY MEDICINE

## 2021-10-19 PROCEDURE — 80061 LIPID PANEL: CPT | Performed by: FAMILY MEDICINE

## 2021-10-19 PROCEDURE — 36415 COLL VENOUS BLD VENIPUNCTURE: CPT | Performed by: FAMILY MEDICINE

## 2021-10-19 PROCEDURE — 90662 IIV NO PRSV INCREASED AG IM: CPT | Performed by: FAMILY MEDICINE

## 2021-10-19 PROCEDURE — 99214 OFFICE O/P EST MOD 30 MIN: CPT | Performed by: FAMILY MEDICINE

## 2021-10-19 PROCEDURE — G0008 ADMIN INFLUENZA VIRUS VAC: HCPCS | Performed by: FAMILY MEDICINE

## 2021-10-19 PROCEDURE — 83735 ASSAY OF MAGNESIUM: CPT | Performed by: FAMILY MEDICINE

## 2021-10-19 RX ORDER — METOPROLOL SUCCINATE 100 MG/1
100 TABLET, EXTENDED RELEASE ORAL NIGHTLY
Qty: 90 TABLET | Refills: 3 | Status: SHIPPED | OUTPATIENT
Start: 2021-10-19 | End: 2022-01-17

## 2021-10-19 RX ORDER — VORTIOXETINE 20 MG/1
20 TABLET, FILM COATED ORAL DAILY
Qty: 90 TABLET | Refills: 1 | Status: SHIPPED | OUTPATIENT
Start: 2021-10-19 | End: 2022-10-21

## 2021-10-19 NOTE — PROGRESS NOTES
Answers for HPI/ROS submitted by the patient on 10/14/2021  What is the primary reason for your visit?: High Blood Pressure    Subjective   Mikki Palak Joel is a 66 y.o. female.  Evaluation hypertension morbid obesity mild depressive disorder anxiety diagnoses listed below.  Antrums not lost any weight taking all regular medicines had foot surgery sinus surgery as mentioned.  Did not get lab done last time we will do today.  Has also for flu vaccine.  Due for Covid booster next month or earlier.  States having some mild allergy symptoms otherwise feels well on current medication.    History of Present Illness   HPI    The following portions of the patient's history were reviewed and updated as appropriate: allergies, current medications, past family history, past medical history, past social history, past surgical history and problem list.    Review of Systems  Review of Systems   Constitutional: Negative for activity change, appetite change, fatigue and unexpected weight change.   HENT: Positive for rhinorrhea. Negative for trouble swallowing and voice change.    Eyes: Negative for redness and visual disturbance.   Respiratory: Negative for cough and wheezing.    Cardiovascular: Negative for chest pain and palpitations.   Gastrointestinal: Negative for abdominal pain, constipation, diarrhea, nausea and vomiting.   Genitourinary: Negative for urgency.   Musculoskeletal: Negative for joint swelling.   Neurological: Negative for syncope and headaches.   Hematological: Negative for adenopathy.   Psychiatric/Behavioral: Negative for sleep disturbance.       Objective   Physical Exam  Physical Exam  Constitutional:       Appearance: She is well-developed.   HENT:      Head: Normocephalic.   Eyes:      Pupils: Pupils are equal, round, and reactive to light.   Neck:      Thyroid: No thyromegaly.   Cardiovascular:      Rate and Rhythm: Normal rate and regular rhythm.      Heart sounds: Normal heart sounds. No murmur  "heard.  No friction rub. No gallop.    Pulmonary:      Breath sounds: Normal breath sounds.   Abdominal:      General: There is no distension.      Palpations: Abdomen is soft. There is no mass.      Tenderness: There is no abdominal tenderness.   Musculoskeletal:         General: Normal range of motion.      Cervical back: Normal range of motion.   Skin:     General: Skin is warm and dry.   Neurological:      Mental Status: She is alert and oriented to person, place, and time.      Deep Tendon Reflexes: Reflexes are normal and symmetric.   Psychiatric:         Mood and Affect: Mood normal.         Behavior: Behavior normal.         Thought Content: Thought content normal.         Judgment: Judgment normal.           Visit Vitals  /82   Ht 172.7 cm (68\")   Wt 108 kg (237 lb 6.4 oz)   LMP  (LMP Unknown)   BMI 36.10 kg/m²     Body mass index is 36.1 kg/m².  /82   Ht 172.7 cm (68\")   Wt 108 kg (237 lb 6.4 oz)   LMP  (LMP Unknown)   BMI 36.10 kg/m²       Assessment/Plan   Diagnoses and all orders for this visit:    1. Essential hypertension, benign (Primary)    2. Morbidly obese (HCC)    3. PTSD (post-traumatic stress disorder)    4. Depression with anxiety    5. Mild intermittent asthma, unspecified whether complicated    6. Primary insomnia    7. Marijuana use    8. Need for vaccination  -     Fluzone High-Dose 65+yrs    9. Affective disorder (HCC)  -     Trintellix 20 MG tablet; Take 20 mg by mouth Daily. For Mood.  Dispense: 90 tablet; Refill: 1    10. Essential hypertension  -     metoprolol succinate XL (TOPROL-XL) 100 MG 24 hr tablet; Take 1 tablet by mouth Every Night.  Dispense: 90 tablet; Refill: 3     on wt loss measures and programs  Counseled mainly on lifestyle measures diet exercise salt restriction immunization updates etc.  Orders as above recheck 6 months will be time for a subsequent Medicare  "

## 2021-10-20 ENCOUNTER — OFFICE VISIT (OUTPATIENT)
Dept: GASTROENTEROLOGY | Facility: CLINIC | Age: 66
End: 2021-10-20

## 2021-10-20 VITALS
HEIGHT: 68 IN | HEART RATE: 73 BPM | BODY MASS INDEX: 36.43 KG/M2 | SYSTOLIC BLOOD PRESSURE: 179 MMHG | WEIGHT: 240.4 LBS | DIASTOLIC BLOOD PRESSURE: 81 MMHG

## 2021-10-20 DIAGNOSIS — K59.04 CHRONIC IDIOPATHIC CONSTIPATION: ICD-10-CM

## 2021-10-20 DIAGNOSIS — K21.00 GASTROESOPHAGEAL REFLUX DISEASE WITH ESOPHAGITIS WITHOUT HEMORRHAGE: Primary | ICD-10-CM

## 2021-10-20 PROCEDURE — 99213 OFFICE O/P EST LOW 20 MIN: CPT | Performed by: NURSE PRACTITIONER

## 2021-10-20 NOTE — PROGRESS NOTES
Chief Complaint   Patient presents with   • Heartburn       Subjective    Mikki Joel is a 66 y.o. female. she is here today for follow-up.    History of Present Illness  66-year-old female presents to discuss heartburn and constipation.  States she has been doing very well recently denies any breakthrough symptoms or excessive nausea or vomiting.  She has not required Carafate on a regular basis in some time.  She reports constipation is well controlled with Linzess daily.  States it has been causing a little too much diarrhea and would like to decrease dose if possible.  She is trying to follow a high-fiber diet increase water consumption.  She is still working on losing weight       The following portions of the patient's history were reviewed and updated as appropriate:   Past Medical History:   Diagnosis Date   • Allergic rhinitis All my life    chronic infection   • Anxiety    • Asthma    • Chronic anemia    • Chronic pain    • CTS (carpal tunnel syndrome)    • Deaf    • Depression with anxiety    • Fracture of wrist    • Gastroesophageal reflux disease    • Hypertension    • Migraine    • Obesity    • Osteoarthritis    • Perforated tympanic membrane, bilateral    • Plantar fasciitis    • PONV (postoperative nausea and vomiting)    • Sleep apnea     sleep with c-pap     Past Surgical History:   Procedure Laterality Date   • ADENOIDECTOMY  1959   • COLONOSCOPY N/A 1/19/2018   • EAR TUBES     • ENDOSCOPIC FUNCTIONAL SINUS SURGERY (FESS) Bilateral 9/5/2017   • ENDOSCOPY N/A 1/19/2018   • ENDOSCOPY N/A 6/10/2019   • KNEE ARTHROPLASTY Right    • LAPAROSCOPIC TUBAL LIGATION     • RHINOPLASTY     • SINUS SURGERY  1988, 2002, 2006, 2018   • TOE FUSION Left 8/12/2021    Procedure: LEFT FIRST METATARSOPHALANGEAL JOINT ARTHRODESIS;  Surgeon: Brando Brice DPM;  Location: Health system;  Service: Podiatry;  Laterality: Left;   • TONSILLECTOMY  1959   • TOTAL SHOULDER ARTHROPLASTY Right    • TYMPANOPLASTY Left  2018   • TYMPANOPLASTY Right 2020    Procedure: TYMPANOPLASTY AND  CARTILAGE GRAFT;  Surgeon: Ramy Gaston MD;  Location: [facility];  Service: ENT;  Laterality: Right;   • UPPER GASTROINTESTINAL ENDOSCOPY       Family History   Problem Relation Age of Onset   • Hypertension Mother    • Alcohol abuse Father    • Heart disease Father    • Cancer Father    • Stroke Maternal Grandmother    • Irritable bowel syndrome Sister    • Osteoporosis Maternal Grandfather      OB History        2    Para   2    Term   2            AB        Living           SAB        IAB        Ectopic        Molar        Multiple        Live Births                  Prior to Admission medications    Medication Sig Start Date End Date Taking? Authorizing Provider   albuterol sulfate  (90 Base) MCG/ACT inhaler Inhale 2 puffs Every 4 (Four) Hours As Needed for Wheezing.    ProviderLucía MD   cetirizine (zyrTEC) 10 MG tablet TAKE 1 TABLET BY MOUTH DAILY 21   Ronald Bowser MD   Dexilant 60 MG capsule TAKE 1 CAPSULE BY MOUTH DAILY 10/12/21   Zelda Rich APRN   ferrous sulfate 325 (65 FE) MG tablet Take 1 tablet by mouth Daily With Breakfast. Take with vitamin C to help with absorption 21   Mila Lang APRN   gabapentin (NEURONTIN) 600 MG tablet Take 2 tablets by mouth Every Night. For Restless leg syndrome.  Take 1-3 hours before onset of symptoms 10/17/21   Jimmy Kelly II, MD   hydrOXYzine pamoate (Vistaril) 50 MG capsule Take 1 capsule by mouth At Night As Needed (Sleep or anxiety). 21   Jimmy Kelly II, MD   ipratropium (ATROVENT) 0.06 % nasal spray 2 sprays into the nostril(s) as directed by provider 2 (two) times a day. 21   Lucía Greene MD   linaclotide (Linzess) 145 MCG capsule capsule Take 145 mcg by mouth Every Morning Before Breakfast.    Lucía Greene MD   meloxicam (MOBIC) 15 MG tablet TAKE 1 TABLET BY MOUTH DAILY 21   Yuniel  Brando STRICKLAND DPM   metoprolol succinate XL (TOPROL-XL) 100 MG 24 hr tablet Take 1 tablet by mouth Every Night. 10/19/21   Ronald Bowser MD   mometasone (NASONEX) 50 MCG/ACT nasal spray 2 sprays into the nostril(s) as directed by provider Daily. 6/21/21   Germaine Mcclelland APRN   ondansetron (ZOFRAN) 8 MG tablet TAKE 1 TABLET BY MOUTH EVERY 8 HOURS AS NEEDED FOR NAUSEA OR VOMITING 9/15/21   Zelda Rich APRN   ramelteon (Rozerem) 8 MG tablet Take 8 mg by mouth Every Night.    Provider, MD Lucía   sucralfate (CARAFATE) 1 g tablet Take 1 tablet by mouth 4 (Four) Times a Day As Needed (Reflux). 10/28/20   Zelda Rich APRN   Trintellix 20 MG tablet Take 20 mg by mouth Daily. For Mood. 10/19/21   Ronald Bowser MD     Allergies   Allergen Reactions   • Codeine Other (See Comments)     Increases heart rate   • Sulfa Antibiotics Hives     Social History     Socioeconomic History   • Marital status:    Tobacco Use   • Smoking status: Never Smoker   • Smokeless tobacco: Never Used   • Tobacco comment: Never had the desire to try smoking   Vaping Use   • Vaping Use: Never used   Substance and Sexual Activity   • Alcohol use: Yes     Alcohol/week: 2.0 standard drinks     Types: 1 Glasses of wine, 1 Standard drinks or equivalent per week     Comment: per week maybe if that much   • Drug use: Never     Types: Marijuana   • Sexual activity: Defer       Review of Systems  Review of Systems   Constitutional: Positive for fatigue. Negative for activity change, appetite change, chills, diaphoresis, fever and unexpected weight change.   HENT: Negative for sore throat and trouble swallowing.    Respiratory: Negative for shortness of breath.    Gastrointestinal: Positive for abdominal pain and constipation. Negative for abdominal distention, anal bleeding, blood in stool, diarrhea, nausea, rectal pain and vomiting.   Musculoskeletal: Negative for arthralgias.   Skin: Negative for pallor.   Neurological: Negative for  "light-headedness.        /81 (BP Location: Left arm)   Pulse 73   Ht 172.7 cm (68\")   Wt 109 kg (240 lb 6.4 oz)   LMP  (LMP Unknown)   BMI 36.55 kg/m²     Objective    Physical Exam  Constitutional:       General: She is not in acute distress.     Appearance: Normal appearance. She is normal weight. She is not ill-appearing.   HENT:      Head: Normocephalic and atraumatic.   Pulmonary:      Effort: Pulmonary effort is normal.   Abdominal:      General: Bowel sounds are normal. There is no distension.      Palpations: Abdomen is soft. There is no mass.      Tenderness: There is no abdominal tenderness.   Neurological:      Mental Status: She is alert.       Admission on 08/12/2021, Discharged on 08/12/2021   Component Date Value Ref Range Status   • THC, Screen, Urine 08/12/2021 Positive* Negative Final   • Phencyclidine (PCP), Urine 08/12/2021 Negative  Negative Final   • Cocaine Screen, Urine 08/12/2021 Negative  Negative Final   • Methamphetamine, Ur 08/12/2021 Negative  Negative Final   • Opiate Screen 08/12/2021 Negative  Negative Final   • Amphetamine Screen, Urine 08/12/2021 Negative  Negative Final   • Benzodiazepine Screen, Urine 08/12/2021 Negative  Negative Final   • Tricyclic Antidepressants Screen 08/12/2021 Negative  Negative Final   • Methadone Screen, Urine 08/12/2021 Negative  Negative Final   • Barbiturates Screen, Urine 08/12/2021 Negative  Negative Final   • Oxycodone Screen, Urine 08/12/2021 Negative  Negative Final   • Propoxyphene Screen 08/12/2021 Negative  Negative Final   • Buprenorphine, Screen, Urine 08/12/2021 Negative  Negative Final     Assessment/Plan      1. Gastroesophageal reflux disease with esophagitis without hemorrhage    2. Chronic idiopathic constipation    .   Continue Dexilant daily and Carafate as needed.  Decrease Linzess to 72 due to side effect with 145.  Follow-up in 6 months for recheck return office therapy    Orders placed during this encounter " include:  No orders of the defined types were placed in this encounter.      * Surgery not found *    Review and/or summary of lab tests, radiology, procedures, medications. Review and summary of old records and obtaining of history. The risks and benefits of my recommendations, as well as other treatment options were discussed with the patient today. Questions were answered.    New Medications Ordered This Visit   Medications   • linaclotide (Linzess) 72 MCG capsule capsule     Sig: Take 1 capsule by mouth Every Morning Before Breakfast.     Dispense:  30 capsule     Refill:  5       Follow-up: Return in about 4 weeks (around 11/17/2021) for Recheck, After test.          This document has been electronically signed by MIRIAM White on October 22, 2021 10:07 CDT           I spent 20 minutes caring for Mikki on this date of service. This time includes time spent by me in the following activities:preparing for the visit, reviewing tests, obtaining and/or reviewing a separately obtained history, performing a medically appropriate examination and/or evaluation , counseling and educating the patient/family/caregiver, ordering medications, tests, or procedures, referring and communicating with other health care professionals , documenting information in the medical record and care coordination    Results for orders placed or performed during the hospital encounter of 08/12/21   Urine Drug Screen - Urine, Clean Catch    Specimen: Urine, Clean Catch   Result Value Ref Range    THC, Screen, Urine Positive (A) Negative    Phencyclidine (PCP), Urine Negative Negative    Cocaine Screen, Urine Negative Negative    Methamphetamine, Ur Negative Negative    Opiate Screen Negative Negative    Amphetamine Screen, Urine Negative Negative    Benzodiazepine Screen, Urine Negative Negative    Tricyclic Antidepressants Screen Negative Negative    Methadone Screen, Urine Negative Negative    Barbiturates Screen, Urine Negative  Negative    Oxycodone Screen, Urine Negative Negative    Propoxyphene Screen Negative Negative    Buprenorphine, Screen, Urine Negative Negative   Results for orders placed or performed in visit on 08/10/21   ECG 12 Lead   Result Value Ref Range    QT Interval 410 ms    QTC Interval 419 ms   Results for orders placed or performed in visit on 08/10/21   COVID-19, [facility] IN-HOUSE, NP SWAB IN TRANSPORT MEDIA 8-10 HR TAT - Swab, Nasopharynx    Specimen: Nasopharynx; Swab   Result Value Ref Range    COVID19 Not Detected Not Detected - Ref. Range   Results for orders placed or performed in visit on 04/20/21   Lipid Panel With LDL / HDL Ratio    Specimen: Blood   Result Value Ref Range    Total Cholesterol 210 (H) 0 - 200 mg/dL    Triglycerides 207 (H) 0 - 150 mg/dL    HDL Cholesterol 42 40 - 60 mg/dL    LDL Cholesterol  131 (H) 0 - 100 mg/dL    VLDL Cholesterol 37 5 - 40 mg/dL    LDL/HDL Ratio 3.01    Magnesium    Specimen: Blood   Result Value Ref Range    Magnesium 2.2 1.6 - 2.4 mg/dL   Comprehensive Metabolic Panel    Specimen: Blood   Result Value Ref Range    Glucose 99 65 - 99 mg/dL    BUN 22 8 - 23 mg/dL    Creatinine 1.31 (H) 0.57 - 1.00 mg/dL    Sodium 142 136 - 145 mmol/L    Potassium 4.3 3.5 - 5.2 mmol/L    Chloride 103 98 - 107 mmol/L    CO2 26.4 22.0 - 29.0 mmol/L    Calcium 9.3 8.6 - 10.5 mg/dL    Total Protein 6.7 6.0 - 8.5 g/dL    Albumin 4.50 3.50 - 5.20 g/dL    ALT (SGPT) 20 1 - 33 U/L    AST (SGOT) 14 1 - 32 U/L    Alkaline Phosphatase 123 (H) 39 - 117 U/L    Total Bilirubin 0.4 0.0 - 1.2 mg/dL    eGFR Non African Amer 40 (L) >60 mL/min/1.73    Globulin 2.2 gm/dL    A/G Ratio 2.0 g/dL    BUN/Creatinine Ratio 16.8 7.0 - 25.0    Anion Gap 12.6 5.0 - 15.0 mmol/L   Results for orders placed or performed in visit on 01/04/21   Wound Culture - Wound, Nares    Specimen: Nares; Wound   Result Value Ref Range    Wound Culture Light growth (2+) Klebsiella oxytoca (A)     Gram Stain Rare (1+) WBCs seen      Gram Stain No organisms seen        Susceptibility    Klebsiella oxytoca - CHAR*     Ampicillin >=32 Resistant ug/ml     Ampicillin + Sulbactam 16 Intermediate ug/ml     Cefepime <=1 Susceptible ug/ml     Ceftazidime <=1 Susceptible ug/ml     Ceftriaxone <=1 Susceptible ug/ml     Gentamicin <=1 Susceptible ug/ml     Levofloxacin <=0.12 Susceptible ug/ml     Piperacillin + Tazobactam <=4 Susceptible ug/ml     Tetracycline <=1 Susceptible ug/ml     Trimethoprim + Sulfamethoxazole <=20 Susceptible ug/ml     * Cefazolin sensitivity will not be reported for Enterobacteriaceae in non-urine isolates. If cefazolin is preferred, please call the microbiology lab to request an E-test.  With the exception of urinary-sourced infections, aminoglycosides should not be used as monotherapy.   Results for orders placed or performed in visit on 12/15/20   Iron Profile    Specimen: Blood   Result Value Ref Range    Iron 96 37 - 145 mcg/dL    Iron Saturation 28 20 - 50 %    Transferrin 234 200 - 360 mg/dL    TIBC 349 298 - 536 mcg/dL   Ferritin Level    Specimen: Blood   Result Value Ref Range    Ferritin 200.00 (H) 13.00 - 150.00 ng/mL   Results for orders placed or performed in visit on 08/26/20   COVID-19, [facility] IN-HOUSE, NP SWAB IN TRANSPORT MEDIA 8-10 HR TAT - Swab, Nasopharynx    Specimen: Nasopharynx; Swab   Result Value Ref Range    COVID19 Not Detected Not Detected - Ref. Range   Results for orders placed or performed during the hospital encounter of 02/10/20   [facility]   Result Value Ref Range    Extra Tube Hold for add-ons.    Urinalysis With Microscopic If Indicated (No Culture) - Urine, Clean Catch    Specimen: Urine, Clean Catch   Result Value Ref Range    Color, UA Yellow Yellow, Straw, Dark Yellow, Rut    Appearance, UA Clear Clear    pH, UA 6.0 5.0 - 9.0    Specific Gravity, UA 1.002 (L) 1.003 - 1.030    Glucose, UA Negative Negative    Ketones, UA Negative Negative    Bilirubin, UA Negative Negative    Blood,  UA Negative Negative    Protein, UA Negative Negative    Leuk Esterase, UA Negative Negative    Nitrite, UA Negative Negative    Urobilinogen, UA 0.2 E.U./dL 0.2 - 1.0 E.U./dL   CBC Auto Differential    Specimen: Blood   Result Value Ref Range    WBC 8.97 3.40 - 10.80 10*3/mm3    RBC 4.14 3.77 - 5.28 10*6/mm3    Hemoglobin 10.9 (L) 12.0 - 15.9 g/dL    Hematocrit 33.7 (L) 34.0 - 46.6 %    MCV 81.4 79.0 - 97.0 fL    MCH 26.3 (L) 26.6 - 33.0 pg    MCHC 32.3 31.5 - 35.7 g/dL    RDW 13.4 12.3 - 15.4 %    RDW-SD 39.4 37.0 - 54.0 fl    MPV 9.2 6.0 - 12.0 fL    Platelets 303 140 - 450 10*3/mm3    Neutrophil % 79.3 (H) 42.7 - 76.0 %    Lymphocyte % 12.0 (L) 19.6 - 45.3 %    Monocyte % 5.6 5.0 - 12.0 %    Eosinophil % 2.1 0.3 - 6.2 %    Basophil % 0.6 0.0 - 1.5 %    Immature Grans % 0.4 0.0 - 0.5 %    Neutrophils, Absolute 7.11 (H) 1.70 - 7.00 10*3/mm3    Lymphocytes, Absolute 1.08 0.70 - 3.10 10*3/mm3    Monocytes, Absolute 0.50 0.10 - 0.90 10*3/mm3    Eosinophils, Absolute 0.19 0.00 - 0.40 10*3/mm3    Basophils, Absolute 0.05 0.00 - 0.20 10*3/mm3    Immature Grans, Absolute 0.04 0.00 - 0.05 10*3/mm3    nRBC 0.0 0.0 - 0.2 /100 WBC   Light Blue Top   Result Value Ref Range    Extra Tube hold for add-on    Troponin    Specimen: Blood   Result Value Ref Range    Troponin T <0.010 0.000 - 0.030 ng/mL   Urine Drug Screen - Urine, Clean Catch    Specimen: Urine, Clean Catch   Result Value Ref Range    THC, Screen, Urine Positive (A) Negative    Phencyclidine (PCP), Urine Negative Negative    Cocaine Screen, Urine Negative Negative    Methamphetamine, Ur Negative Negative    Opiate Screen Positive (A) Negative    Amphetamine Screen, Urine Negative Negative    Benzodiazepine Screen, Urine Negative Negative    Tricyclic Antidepressants Screen Negative Negative    Methadone Screen, Urine Negative Negative    Barbiturates Screen, Urine Negative Negative    Oxycodone Screen, Urine Negative Negative    Propoxyphene Screen Negative  Negative    Buprenorphine, Screen, Urine Negative Negative     *Note: Due to a large number of results and/or encounters for the requested time period, some results have not been displayed. A complete set of results can be found in Results Review.

## 2021-10-20 NOTE — PATIENT INSTRUCTIONS
MyPlate from USDA    MyPlate is an outline of a general healthy diet based on the 2010 Dietary Guidelines for Americans, from the U.[place]. Department of Agriculture (USDA). It sets guidelines for how much food you should eat from each food group based on your age, sex, and level of physical activity.  What are tips for following MyPlate?  To follow MyPlate recommendations:  · Eat a wide variety of fruits and vegetables, grains, and protein foods.  · Serve smaller portions and eat less food throughout the day.  · Limit portion sizes to avoid overeating.  · Enjoy your food.  · Get at least 150 minutes of exercise every week. This is about 30 minutes each day, 5 or more days per week.  It can be difficult to have every meal look like MyPlate. Think about MyPlate as eating guidelines for an entire day, rather than each individual meal.  Fruits and vegetables  · Make half of your plate fruits and vegetables.  · Eat many different colors of fruits and vegetables each day.  · For a 2,000 calorie daily food plan, eat:  ? 2½ cups of vegetables every day.  ? 2 cups of fruit every day.  · 1 cup is equal to:  ? 1 cup raw or cooked vegetables.  ? 1 cup raw fruit.  ? 1 medium-sized orange, apple, or banana.  ? 1 cup 100% fruit or vegetable juice.  ? 2 cups raw leafy greens, such as lettuce, spinach, or kale.  ? ½ cup dried fruit.  Grains  · One fourth of your plate should be grains.  · Make at least half of the grains you eat each day whole grains.  · For a 2,000 calorie daily food plan, eat 6 oz of grains every day.  · 1 oz is equal to:  ? 1 slice bread.  ? 1 cup cereal.  ? ½ cup cooked rice, cereal, or pasta.  Protein  · One fourth of your plate should be protein.  · Eat a wide variety of protein foods, including meat, poultry, fish, eggs, beans, nuts, and tofu.  · For a 2,000 calorie daily food plan, eat 5½ oz of protein every day.  · 1 oz is equal to:  ? 1 oz meat, poultry, or fish.  ? ¼ cup cooked beans.  ? 1 egg.  ? ½ oz nuts  or seeds.  ? 1 Tbsp peanut butter.  Dairy  · Drink fat-free or low-fat (1%) milk.  · Eat or drink dairy as a side to meals.  · For a 2,000 calorie daily food plan, eat or drink 3 cups of dairy every day.  · 1 cup is equal to:  ? 1 cup milk, yogurt, cottage cheese, or soy milk (soy beverage).  ? 2 oz processed cheese.  ? 1½ oz natural cheese.  Fats, oils, salt, and sugars  · Only small amounts of oils are recommended.  · Avoid foods that are high in calories and low in nutritional value (empty calories), like foods high in fat or added sugars.  · Choose foods that are low in salt (sodium). Choose foods that have less than 140 milligrams (mg) of sodium per serving.  · Drink water instead of sugary drinks. Drink enough water each day to keep your urine pale yellow.  Where to find support  · Work with your health care provider or a nutrition specialist (dietitian) to develop a customized eating plan that is right for you.  · Download an eva (mobile application) to help you track your daily food intake.  Where to find more information  · Go to ChooseMyPlate.gov for more information.  Summary  · MyPlate is a general guideline for healthy eating from the USDA. It is based on the 2010 Dietary Guidelines for Americans.  · In general, fruits and vegetables should take up ½ of your plate, grains should take up ¼ of your plate, and protein should take up ¼ of your plate.  This information is not intended to replace advice given to you by your health care provider. Make sure you discuss any questions you have with your health care provider.  Document Revised: 05/21/2020 Document Reviewed: 03/19/2018  Elsevier Patient Education © 2021 [employer]

## 2021-10-26 RX ORDER — ONDANSETRON HYDROCHLORIDE 8 MG/1
TABLET, FILM COATED ORAL
Qty: 30 TABLET | Refills: 0 | Status: SHIPPED | OUTPATIENT
Start: 2021-10-26 | End: 2021-12-16

## 2021-10-27 ENCOUNTER — OFFICE VISIT (OUTPATIENT)
Dept: PODIATRY | Facility: CLINIC | Age: 66
End: 2021-10-27

## 2021-10-27 VITALS — OXYGEN SATURATION: 98 % | WEIGHT: 240 LBS | BODY MASS INDEX: 36.37 KG/M2 | HEIGHT: 68 IN | HEART RATE: 104 BPM

## 2021-10-27 DIAGNOSIS — M20.5X2 HALLUX LIMITUS OF LEFT FOOT: Primary | ICD-10-CM

## 2021-10-27 PROCEDURE — 99024 POSTOP FOLLOW-UP VISIT: CPT | Performed by: PODIATRIST

## 2021-10-27 RX ORDER — OLOPATADINE HYDROCHLORIDE 665 UG/1
SPRAY NASAL
COMMUNITY
Start: 2021-10-25

## 2021-10-27 NOTE — PROGRESS NOTES
Mikki Joel  1955  66 y.o. female     Four week recheck post op appointment of left hallux limitus     10/27/2021  Chief Complaint   Patient presents with   • Left Foot - Post-op       History of Present Illness    Patient presents for her  postop visit.  Patient had left first MTP arthrodesis on August 12.  Patient is ambulating in regular shoe gear.  Relates to swelling and discomfort with prolonged weightbearing    Past Medical History:   Diagnosis Date   • Allergic rhinitis All my life    chronic infection   • Anxiety    • Asthma    • Chronic anemia    • Chronic pain    • CTS (carpal tunnel syndrome)    • Deaf    • Depression with anxiety    • Fracture of wrist    • Gastroesophageal reflux disease    • Hypertension    • Migraine    • Obesity    • Osteoarthritis    • Perforated tympanic membrane, bilateral    • Plantar fasciitis    • PONV (postoperative nausea and vomiting)    • Sleep apnea     sleep with c-pap         Past Surgical History:   Procedure Laterality Date   • ADENOIDECTOMY  1959   • COLONOSCOPY N/A 1/19/2018   • EAR TUBES     • ENDOSCOPIC FUNCTIONAL SINUS SURGERY (FESS) Bilateral 9/5/2017   • ENDOSCOPY N/A 1/19/2018   • ENDOSCOPY N/A 6/10/2019   • KNEE ARTHROPLASTY Right    • LAPAROSCOPIC TUBAL LIGATION     • RHINOPLASTY     • SINUS SURGERY  1988, 2002, 2006, 2018   • TOE FUSION Left 8/12/2021    Procedure: LEFT FIRST METATARSOPHALANGEAL JOINT ARTHRODESIS;  Surgeon: Brando Brice DPM;  Location: [facility];  Service: Podiatry;  Laterality: Left;   • TONSILLECTOMY  1959   • TOTAL SHOULDER ARTHROPLASTY Right    • TYMPANOPLASTY Left 6/5/2018   • TYMPANOPLASTY Right 2/7/2020    Procedure: TYMPANOPLASTY AND  CARTILAGE GRAFT;  Surgeon: Ramy Gaston MD;  Location: [facility];  Service: ENT;  Laterality: Right;   • UPPER GASTROINTESTINAL ENDOSCOPY           Family History   Problem Relation Age of Onset   • Hypertension Mother    • Alcohol abuse Father    • Heart disease Father    •  Cancer Father    • Stroke Maternal Grandmother    • Irritable bowel syndrome Sister    • Osteoporosis Maternal Grandfather        Allergies   Allergen Reactions   • Codeine Other (See Comments)     Increases heart rate   • Sulfa Antibiotics Hives       Social History     Socioeconomic History   • Marital status:    Tobacco Use   • Smoking status: Never Smoker   • Smokeless tobacco: Never Used   • Tobacco comment: Never had the desire to try smoking   Vaping Use   • Vaping Use: Never used   Substance and Sexual Activity   • Alcohol use: Yes     Alcohol/week: 2.0 standard drinks     Types: 1 Glasses of wine, 1 Standard drinks or equivalent per week     Comment: per week maybe if that much   • Drug use: Never     Types: Marijuana   • Sexual activity: Defer         Current Outpatient Medications   Medication Sig Dispense Refill   • albuterol sulfate  (90 Base) MCG/ACT inhaler Inhale 2 puffs Every 4 (Four) Hours As Needed for Wheezing.     • cetirizine (zyrTEC) 10 MG tablet TAKE 1 TABLET BY MOUTH DAILY 90 tablet 3   • Dexilant 60 MG capsule TAKE 1 CAPSULE BY MOUTH DAILY 90 capsule 1   • ferrous sulfate 325 (65 FE) MG tablet Take 1 tablet by mouth Daily With Breakfast. Take with vitamin C to help with absorption 30 tablet 5   • gabapentin (NEURONTIN) 600 MG tablet Take 2 tablets by mouth Every Night. For Restless leg syndrome.  Take 1-3 hours before onset of symptoms 60 tablet 0   • hydrOXYzine pamoate (Vistaril) 50 MG capsule Take 1 capsule by mouth At Night As Needed (Sleep or anxiety). 30 capsule 2   • ipratropium (ATROVENT) 0.06 % nasal spray 2 sprays into the nostril(s) as directed by provider 2 (two) times a day.     • linaclotide (Linzess) 72 MCG capsule capsule Take 1 capsule by mouth Every Morning Before Breakfast. 30 capsule 5   • meloxicam (MOBIC) 15 MG tablet TAKE 1 TABLET BY MOUTH DAILY 30 tablet 1   • metoprolol succinate XL (TOPROL-XL) 100 MG 24 hr tablet Take 1 tablet by mouth Every Night. 90  "tablet 3   • mometasone (NASONEX) 50 MCG/ACT nasal spray 2 sprays into the nostril(s) as directed by provider Daily. 17 g 0   • olopatadine (PATANASE) 0.6 % solution nasal solution      • ondansetron (ZOFRAN) 8 MG tablet TAKE 1 TABLET BY MOUTH EVERY 8 HOURS AS NEEDED FOR NAUSEA OR VOMITING 30 tablet 0   • ramelteon (Rozerem) 8 MG tablet Take 8 mg by mouth Every Night.     • sucralfate (CARAFATE) 1 g tablet Take 1 tablet by mouth 4 (Four) Times a Day As Needed (Reflux). 60 tablet 1   • Trintellix 20 MG tablet Take 20 mg by mouth Daily. For Mood. 90 tablet 1     No current facility-administered medications for this visit.         OBJECTIVE    Pulse 104   Ht 172.7 cm (68\")   Wt 109 kg (240 lb)   LMP  (LMP Unknown)   SpO2 98%   BMI 36.49 kg/m²       Review of Systems   Constitutional: Negative.    Respiratory: Negative.    Gastrointestinal: Negative.    Musculoskeletal:        Left foot pain    Skin: Negative.    Psychiatric/Behavioral: Negative.        Physical Exam  Vitals reviewed.   Constitutional:       General: She is not in acute distress.     Appearance: She is well-developed.   HENT:      Head: Normocephalic and atraumatic.   Pulmonary:      Effort: Pulmonary effort is normal. No respiratory distress.      Breath sounds: No wheezing.   Skin:     General: Skin is warm.      Capillary Refill: Capillary refill takes less than 2 seconds.   Neurological:      Mental Status: She is alert and oriented to person, place, and time.   Psychiatric:         Behavior: Behavior normal.         Thought Content: Thought content normal.       Left Lower Extremity: CFT immediate to distal digits.  Minimal edema.  Negative Homans.  Sensation intact to light touch.  Hallux rectus.  Incision site healed.       Procedures        ASSESSMENT AND PLAN    Diagnoses and all orders for this visit:    1. Hallux limitus of left foot (Primary)  -     XR Foot 3+ View Left      -Patient is doing well postoperatively. Progress activity as " tolerated.  Recheck 4 weeks, repeat radiographs          This document has been electronically signed by Brando Brice DPM on October 27, 2021 18:40 CDT     10/27/2021  18:40 CDT    Answers for HPI/ROS submitted by the patient on 8/18/2021  Please describe your symptoms.: Post-Op  Have you had these symptoms before?: No  How long have you been having these symptoms?: 1-2 weeks  Please list any medications you are currently taking for this condition.: Please see [facility] recorders  Please describe any probable cause for these symptoms. : n/a  What is the primary reason for your visit?: Other      Answers for HPI/ROS submitted by the patient on 8/27/2021  Please describe your symptoms.: Post op  Have you had these symptoms before?: No  How long have you been having these symptoms?: 1-4 days  What is the primary reason for your visit?: Other      Answers for HPI/ROS submitted by the patient on 9/13/2021  Please describe your symptoms.: N/A  Have you had these symptoms before?: Yes  How long have you been having these symptoms?: Greater than 2 weeks  Please list any medications you are currently taking for this condition.: N/A  Please describe any probable cause for these symptoms. : N/A  What is the primary reason for your visit?: Other

## 2021-11-08 RX ORDER — MELOXICAM 15 MG/1
15 TABLET ORAL DAILY
Qty: 30 TABLET | Refills: 1 | Status: SHIPPED | OUTPATIENT
Start: 2021-11-08 | End: 2021-12-06 | Stop reason: ALTCHOICE

## 2021-11-14 RX ORDER — RAMELTEON 8 MG/1
TABLET ORAL
Qty: 30 TABLET | Refills: 2 | Status: SHIPPED | OUTPATIENT
Start: 2021-11-14 | End: 2022-02-07

## 2021-11-16 DIAGNOSIS — G25.81 RESTLESS LEGS SYNDROME (RLS): ICD-10-CM

## 2021-11-19 ENCOUNTER — OFFICE VISIT (OUTPATIENT)
Dept: FAMILY MEDICINE CLINIC | Facility: CLINIC | Age: 66
End: 2021-11-19

## 2021-11-19 VITALS
HEIGHT: 65 IN | DIASTOLIC BLOOD PRESSURE: 82 MMHG | WEIGHT: 237 LBS | BODY MASS INDEX: 39.49 KG/M2 | SYSTOLIC BLOOD PRESSURE: 142 MMHG

## 2021-11-19 DIAGNOSIS — L29.9 PRURITUS: ICD-10-CM

## 2021-11-19 DIAGNOSIS — R21 RASH: Primary | ICD-10-CM

## 2021-11-19 PROCEDURE — 99213 OFFICE O/P EST LOW 20 MIN: CPT | Performed by: FAMILY MEDICINE

## 2021-11-19 RX ORDER — GABAPENTIN 600 MG/1
TABLET ORAL
Qty: 60 TABLET | Refills: 1 | Status: SHIPPED | OUTPATIENT
Start: 2021-11-19 | End: 2022-01-20 | Stop reason: SDUPTHER

## 2021-11-19 RX ORDER — PREDNISONE 20 MG/1
TABLET ORAL
Qty: 15 TABLET | Refills: 1 | Status: SHIPPED | OUTPATIENT
Start: 2021-11-19 | End: 2022-01-17 | Stop reason: SDUPTHER

## 2021-11-19 NOTE — PROGRESS NOTES
Subjective   Mikki Joel is a 66 y.o. female.  Requesting evaluation week or less of diffuse pruritus mild diffuse itching of the skin and rash.  Has not had a real changes anything all medicines remain the same for the past 4 months or so.  Does live around a lot of harvested feels.  Has not changed her filters at home.  Is more diffuse itching itching eyes ears etc. no real shortness of breath.  Was seen in urgent care as mentioned given a shot of Kenalog and marginally effective.  Still taking antihistamines.  History noted.  Never had this type of problem other than environmental in the past    History of Present Illness   HPI    The following portions of the patient's history were reviewed and updated as appropriate: allergies, current medications, past family history, past medical history, past social history, past surgical history and problem list.    Review of Systems  Review of Systems   Constitutional: Negative for activity change, appetite change, fatigue and unexpected weight change.   HENT: Negative for trouble swallowing and voice change.    Eyes: Negative for redness and visual disturbance.   Respiratory: Negative for cough and wheezing.    Cardiovascular: Negative for chest pain and palpitations.   Gastrointestinal: Negative for abdominal pain, constipation, diarrhea, nausea and vomiting.   Genitourinary: Negative for urgency.   Musculoskeletal: Negative for joint swelling.   Skin: Positive for rash.   Neurological: Negative for syncope and headaches.   Hematological: Negative for adenopathy.   Psychiatric/Behavioral: Negative for sleep disturbance.       Objective   Physical Exam  Physical Exam  Constitutional:       Appearance: She is well-developed.   HENT:      Head: Normocephalic.   Eyes:      Pupils: Pupils are equal, round, and reactive to light.   Neck:      Thyroid: No thyromegaly.   Cardiovascular:      Rate and Rhythm: Normal rate and regular rhythm.      Heart sounds: Normal  "heart sounds. No murmur heard.  No friction rub. No gallop.    Pulmonary:      Breath sounds: Normal breath sounds.   Abdominal:      General: There is no distension.      Palpations: Abdomen is soft. There is no mass.      Tenderness: There is no abdominal tenderness.   Musculoskeletal:         General: Normal range of motion.      Cervical back: Normal range of motion.   Skin:     General: Skin is warm and dry.      Comments: Diffuse slight reticular rash arms back neck chest normal urticaria just more subcutaneous irritation.  Also has had steroid shot.   Neurological:      Mental Status: She is alert and oriented to person, place, and time.      Deep Tendon Reflexes: Reflexes are normal and symmetric.   Psychiatric:         Mood and Affect: Mood is anxious.         Speech: Speech normal.         Behavior: Behavior normal.         Thought Content: Thought content normal.         Cognition and Memory: Cognition normal.           Visit Vitals  /82   Ht 165.1 cm (65\")   Wt 108 kg (237 lb)   LMP  (LMP Unknown)   BMI 39.44 kg/m²     Body mass index is 39.44 kg/m².    /82   Ht 165.1 cm (65\")   Wt 108 kg (237 lb)   LMP  (LMP Unknown)   BMI 39.44 kg/m²     Assessment/Plan   Diagnoses and all orders for this visit:    1. Rash (Primary)  -     predniSONE (DELTASONE) 20 MG tablet; One twice daily for 5 days and one daily for 5 days stop for skin  Dispense: 15 tablet; Refill: 1    2. Pruritus  -     predniSONE (DELTASONE) 20 MG tablet; One twice daily for 5 days and one daily for 5 days stop for skin  Dispense: 15 tablet; Refill: 1    Suspect more environmental doubt medication counseled on cool water mild soap changing air filters environmental control.  Extended tapering dose of prednisone Benadryl around-the-clock for several days symptomatic relief discussed rechecks directed  "

## 2021-11-19 NOTE — TELEPHONE ENCOUNTER
Refill for Neurontin 1200mg qHS for RLS.     Chart reviewed.  Ross reviewed w/in window (9.21).    Have renewed Rx.

## 2021-11-23 ENCOUNTER — OFFICE VISIT (OUTPATIENT)
Dept: SLEEP MEDICINE | Facility: HOSPITAL | Age: 66
End: 2021-11-23

## 2021-11-23 VITALS
OXYGEN SATURATION: 98 % | HEIGHT: 65 IN | WEIGHT: 240.7 LBS | SYSTOLIC BLOOD PRESSURE: 142 MMHG | HEART RATE: 74 BPM | DIASTOLIC BLOOD PRESSURE: 92 MMHG | BODY MASS INDEX: 40.1 KG/M2

## 2021-11-23 DIAGNOSIS — G47.52 DREAM ENACTMENT BEHAVIOR: ICD-10-CM

## 2021-11-23 DIAGNOSIS — R03.0 ELEVATED BP WITHOUT DIAGNOSIS OF HYPERTENSION: ICD-10-CM

## 2021-11-23 DIAGNOSIS — G25.81 RESTLESS LEGS SYNDROME (RLS): ICD-10-CM

## 2021-11-23 DIAGNOSIS — F39 AFFECTIVE DISORDER (HCC): ICD-10-CM

## 2021-11-23 DIAGNOSIS — F51.04 CHRONIC INSOMNIA: ICD-10-CM

## 2021-11-23 DIAGNOSIS — G47.33 OSA (OBSTRUCTIVE SLEEP APNEA): Primary | ICD-10-CM

## 2021-11-23 PROCEDURE — 99214 OFFICE O/P EST MOD 30 MIN: CPT | Performed by: PSYCHIATRY & NEUROLOGY

## 2021-11-23 RX ORDER — HYDROXYZINE PAMOATE 50 MG/1
50 CAPSULE ORAL 3 TIMES DAILY PRN
Qty: 90 CAPSULE | Refills: 0 | Status: SHIPPED | OUTPATIENT
Start: 2021-11-23 | End: 2021-12-16

## 2021-11-23 NOTE — PROGRESS NOTES
"Sleep Medicine Follow-Up Note    CC & ID:  Ms. Mikki Joel is a 66 y.o. female seen for follow-up regarding JUNIOR, Insomnia & RLS.      Treatment Summary:   --Last seen in May 21:     --JUNIOR: Dx PSG in Jan 20: AHI 9, PLMI 33   --Titration in Aug 20: no REM sleep, did well though with PLMI of 17.   --Increased APAP 8-15 cm.    --RLS:    --Hx Requip 3mg qHS   --Neurontin titrated to 800mg qEvening  --Ongoing symptoms more consistent with Hip and back pain.    --Insomnia:    --Encouraged behavioral interventions   --Hx Lunest d/c'd given lack of efficacy & neurontin   --Cont Ramelteon 8mg qHS    --Dream enactment behavior: infrequent, monitor.  Unable to monitor for REM without atonia (No REM sleep).           HPI:   Today, patient presents unaccompanied.  She is pleasant and engaged well.    Doing well with sleep overall and RLS sx.  No dream enactment.      Has developed diffuse itching several days ago.  Saw PCP.  Elevated BP today -auto and manual initially; resolved with sitting after I took her BP.  No HA/CP/CT/VC.    Concerning sleep pattern:  BT 3-4 A  Latency no problems  WT 10-11 A  Sleeping 7-8 A  Schedule isn't bothersome for pt    No URGE sx    No dream enactment    Using Vistaril TID for allergies, need refills; was recommended by UC to increase for short term.      As I previously noted:  Late at night is her \"decompression\" time / self care time  Rozarem is being used; not certain how helpful  Hx of Lunesta  Also of Hydroxyzine use, that was helpful  Complicated by chronic pain - osteoarthrisis in hip, shoulders, feet          Treatment: CPAP  -Pressure:8-15 cm   -Pressure intolerance: no   -Aerophagia: no   -Air Hunger: no  -Interface: cushion, nasal  -Fit: no  -Chin strap: no  -HCC: [facility] - getting supplies  -Patient's perceived outcome: benefits        PRIOR Compliance Card Data:   - Date Range: 12/1/20 -- 2/28/21   - Days: 90    - % Days used: 100%  - % Days with Usage > 4 hrs: 100%  - " Average usage days used: 9 h 10 m        ---> often forgets to turn off    - Setting: APAP 5 - 12 cm  - Pressure data:   - Median 6 cm  - <=90%: 9 cm  - Max: 12.5 cm    - Residual AHI: 2   - PB: 0%   - GIRISH 0.2    - Vibratory Snore Index: 3  - Average Time in Large Leak: 10 min      ROS:  Review of Systems   Constitutional: Negative for fever.   HENT: Negative for sore throat (rare. allergy related). Sinus pressure: onging sinus infx, seeing PCP.    Respiratory: Negative for cough.    Cardiovascular: Negative for chest pain.   Gastrointestinal: Indigestion: seeing other provider.   Neurological: Negative for seizures.   Psychiatric/Behavioral: Negative for behavioral problems.     -CONSTITUTIONAL: Weight: stable as below  Wt Readings from Last 5 Encounters:   11/23/21 109 kg (240 lb 11.2 oz)   11/19/21 108 kg (237 lb)   11/17/21 110 kg (241 lb 12.8 oz)   10/27/21 109 kg (240 lb)   10/20/21 109 kg (240 lb 6.4 oz)         Daytime Symptoms:  -Daytime fatigue/sleepiness: none significant  -Naps: no  -Involuntary Dozing: no  -Driving: Difficulty with sleepiness and driving:  no   -- Close calls related to sleepiness: no   -- Accidents related to sleepiness: no      Social History:  -Nicotine: no  -Caffeine: no  -Alcohol: no  -THC: no  -OTC/Supplements/herbals: sudafed in AM  -Illicits:  denies      Questionnaires: ESS = 0  Last ESS = 3         Patient Active Problem List   Diagnosis   • Asthma   • Arthritis   • Nasal turbinate hypertrophy   • Gastroesophageal reflux disease   • Depression with anxiety   • Insomnia   • PTSD (post-traumatic stress disorder)   • Restless leg   • Mixed hearing loss of left ear   • Right knee pain   • Presence of right artificial knee joint   • Essential hypertension, benign   • Presbyesophagus   • Chronic vasomotor rhinitis   • Perforated tympanic membrane, bilateral   • Mixed conductive and sensorineural hearing loss of right ear with restricted hearing of left ear   • Morbidly obese (HCC)    • Hallux limitus of left foot   • Marijuana use       CMHx:  --Foot surgery, well healing  --> Denies any significant medical changes since last visit.   --> Supplemental Oxygen Use: denies      Current Outpatient Medications   Medication Sig Dispense Refill   • albuterol sulfate  (90 Base) MCG/ACT inhaler Inhale 2 puffs Every 4 (Four) Hours As Needed for Wheezing.     • cetirizine (zyrTEC) 10 MG tablet TAKE 1 TABLET BY MOUTH DAILY 90 tablet 3   • Dexilant 60 MG capsule TAKE 1 CAPSULE BY MOUTH DAILY 90 capsule 1   • ferrous sulfate 325 (65 FE) MG tablet Take 1 tablet by mouth Daily With Breakfast. Take with vitamin C to help with absorption 30 tablet 5   • gabapentin (NEURONTIN) 600 MG tablet TAKE 2 TABLETS BY MOUTH EVERY NIGHT AS NEEDED FOR RESTLESS LEGS. TAKE 1 TO 3 HOURS BEFORE ONSET OF SYMPTOMS 60 tablet 1   • hydrOXYzine pamoate (Vistaril) 50 MG capsule Take 1 capsule by mouth At Night As Needed (Sleep or anxiety). 30 capsule 2   • ipratropium (ATROVENT) 0.06 % nasal spray 2 sprays into the nostril(s) as directed by provider 2 (two) times a day.     • linaclotide (Linzess) 72 MCG capsule capsule Take 1 capsule by mouth Every Morning Before Breakfast. 30 capsule 5   • meloxicam (MOBIC) 15 MG tablet TAKE 1 TABLET BY MOUTH DAILY 30 tablet 1   • metoprolol succinate XL (TOPROL-XL) 100 MG 24 hr tablet Take 1 tablet by mouth Every Night. 90 tablet 3   • mometasone (NASONEX) 50 MCG/ACT nasal spray 2 sprays into the nostril(s) as directed by provider Daily. 17 g 0   • olopatadine (PATANASE) 0.6 % solution nasal solution      • ondansetron (ZOFRAN) 8 MG tablet TAKE 1 TABLET BY MOUTH EVERY 8 HOURS AS NEEDED FOR NAUSEA OR VOMITING 30 tablet 0   • predniSONE (DELTASONE) 20 MG tablet One twice daily for 5 days and one daily for 5 days stop for skin 15 tablet 1   • ramelteon (ROZEREM) 8 MG tablet TAKE 1 TABLET BY MOUTH EVERY EVENING 30 MINUTES TO 3 HOURS BEFORE BEDTIME 30 tablet 2   • sucralfate (CARAFATE) 1 g  "tablet Take 1 tablet by mouth 4 (Four) Times a Day As Needed (Reflux). 60 tablet 1   • Trintellix 20 MG tablet Take 20 mg by mouth Daily. For Mood. 90 tablet 1     No current facility-administered medications for this visit.     -Notable Current Medications:  --> Ramelteon 8mg qHS  --> Zyrtec daily; Neurontin 1200mg qHS (taken 1-3 hrs before bed) + no longer on Opioid (used for pain control after foot surgery)      PE:  Body mass index is 40.05 kg/m².  Vitals:    11/23/21 1344 11/23/21 1353   BP: (!) 233/136 142/92   BP Location:  Right arm   Patient Position:  Sitting   Cuff Size:  Adult   Pulse: 93 74   SpO2: 98%    Weight: 109 kg (240 lb 11.2 oz)    Height: 165.1 cm (65\")    Auto machine first then manual by CNA; after discussion, waiting 5 min and taken at level of heart, 142/92.       Wt Readings from Last 5 Encounters:   11/23/21 109 kg (240 lb 11.2 oz)   11/19/21 108 kg (237 lb)   11/17/21 110 kg (241 lb 12.8 oz)   10/27/21 109 kg (240 lb)   10/20/21 109 kg (240 lb 6.4 oz)     Physical Exam  General:  In NAD.  Head: Atraumatic  Eyes: EOMI.  CV: No Clubbing.   Pul: Respirations: unlaboured.    MS: No atrophy.  Neuro: No resting tremor.    Psych: Socially appropriate.  Pleasant.  No overt dysphoria.         Assessment & Planning:  Ms. Mikki Joel is a 66 y.o. female who is seen for follow-up of:     --Obstructive Sleep Apnea:  Stable  --Is interested in using PAP w/ recall  -Benefiting from treatment  --Cont Auto CPAP 8-15 cm  -Patient understands to notify clinic if developing waking headaches or worsening daytime sleepiness  --Reiterated ongoing safety recall of [employer] CPAP/BiPAP/AVAPS machines. We discussed the risk versus benefit of continuing usage of PAP therapy. The company has recommended that patients stop usage of their current machines. Instructed patient to call [employer] ([phone]) to check if the machine is under warranty for repair or replacement. [place] " machine with serial number on website: www.[employer]slist/src-updates. Follow up with [employer] company regarding any further questions, or for consideration for new machine once eligible. Advised patient to avoid unapproved ozone-type CPAP . Advised to call us if any further questions or problems.        ---Insomnia, chronic: resolved and stable  --Perceived latency of 1 to 2 hours or more  -Of note, patient fell asleep as soon as the study started in lab  --Delayed sleep phase component but with residual initial insomnia that has improved.  -We discussed and I reiterated critical need for behavioral interventions.  --Discussed with patient the goals of behavioral intervention: stim control.  --Patient is going to consider; contemplative re: change.      --Cont Ramelteon 8 mg nightly as needed.     --Refilled Vistaril 50mg qHS PRN sleep or sleep related anxiet + BID PRN dosing for itching per her recent UC visit.   --Plan for reduction to 50mg qHS PRN after itching resolves        --RLS: resolved and stable  --Cont Neurontin 1200mg qEvening for restless leg symptoms.   --> Refilled a week ago; added 100mg dose to make total 900.    --Co-morbid symptoms consistent with Hip, shoulder, and back pain in setting of osteoarthrisis.      --Dream enactment behavior: stable, none recent; monitor.  Unable to monitor for REM without atonia. on her last titration test given lack of REM.  No FHx of PKD or ET.  Will let us know of any worsening sx.      --Affective d/o / BH overlay: improving; not addressed today    --Elevated BP: initially elevated but now resolved; cont to monitor.  --Itching: continued f/u with PCP    -->  Safe driving reviewed and encouraged.    -->  Follow-up in 12 months given improvement and stability.    --> Call with any questions or concerns.      All questions answered for the patient, who indicated understanding and agreed with the plan.       Jimmy Kelly MD  11/23/21   Sleep Medicine    CC:  Ronald Bowser MD

## 2021-12-06 ENCOUNTER — OFFICE VISIT (OUTPATIENT)
Dept: PODIATRY | Facility: CLINIC | Age: 66
End: 2021-12-06

## 2021-12-06 VITALS — BODY MASS INDEX: 39.99 KG/M2 | WEIGHT: 240 LBS | HEART RATE: 101 BPM | HEIGHT: 65 IN | OXYGEN SATURATION: 97 %

## 2021-12-06 DIAGNOSIS — M20.5X2 HALLUX LIMITUS OF LEFT FOOT: Primary | ICD-10-CM

## 2021-12-06 PROCEDURE — 99213 OFFICE O/P EST LOW 20 MIN: CPT | Performed by: PODIATRIST

## 2021-12-06 RX ORDER — NABUMETONE 750 MG/1
750 TABLET, FILM COATED ORAL 2 TIMES DAILY
Qty: 60 TABLET | Refills: 1 | Status: SHIPPED | OUTPATIENT
Start: 2021-12-06 | End: 2022-02-09

## 2021-12-06 NOTE — PROGRESS NOTES
Mikki Joel  1955  66 y.o. female     Four week recheck post op appointment of left hallux limitus     12/7/2021  Chief Complaint   Patient presents with   • Left Foot - Follow-up, Post-op       History of Present Illness    Patient presents for follow up. Patient had left first MTP arthrodesis on August 12.  Patient is ambulating in regular shoe gear.  States that the pain to her surgical site has increased [place]    Past Medical History:   Diagnosis Date   • Allergic rhinitis All my life    chronic infection   • Anxiety    • Asthma    • Chronic anemia    • Chronic pain    • CTS (carpal tunnel syndrome)    • Deaf    • Depression with anxiety    • Fracture of wrist    • Gastroesophageal reflux disease    • Hypertension    • Migraine    • Obesity    • Osteoarthritis    • Perforated tympanic membrane, bilateral    • Plantar fasciitis    • PONV (postoperative nausea and vomiting)    • Sleep apnea     sleep with c-pap         Past Surgical History:   Procedure Laterality Date   • ADENOIDECTOMY  1959   • COLONOSCOPY N/A 1/19/2018   • EAR TUBES     • ENDOSCOPIC FUNCTIONAL SINUS SURGERY (FESS) Bilateral 9/5/2017   • ENDOSCOPY N/A 1/19/2018   • ENDOSCOPY N/A 6/10/2019   • KNEE ARTHROPLASTY Right    • LAPAROSCOPIC TUBAL LIGATION     • RHINOPLASTY     • SINUS SURGERY  1988, 2002, 2006, 2018   • TOE FUSION Left 8/12/2021    Procedure: LEFT FIRST METATARSOPHALANGEAL JOINT ARTHRODESIS;  Surgeon: Brando Brice DPM;  Location: [facility];  Service: Podiatry;  Laterality: Left;   • TONSILLECTOMY  1959   • TOTAL SHOULDER ARTHROPLASTY Right    • TYMPANOPLASTY Left 6/5/2018   • TYMPANOPLASTY Right 2/7/2020    Procedure: TYMPANOPLASTY AND  CARTILAGE GRAFT;  Surgeon: Ramy Gaston MD;  Location: [facility];  Service: ENT;  Laterality: Right;   • UPPER GASTROINTESTINAL ENDOSCOPY           Family History   Problem Relation Age of Onset   • Hypertension Mother    • Alcohol abuse Father    •  Heart disease Father    • Cancer Father    • Stroke Maternal Grandmother    • Irritable bowel syndrome Sister    • Osteoporosis Maternal Grandfather        Allergies   Allergen Reactions   • Codeine Other (See Comments)     Increases heart rate   • Sulfa Antibiotics Hives       Social History     Socioeconomic History   • Marital status:    Tobacco Use   • Smoking status: Never Smoker   • Smokeless tobacco: Never Used   • Tobacco comment: Never had the desire to try smoking   Vaping Use   • Vaping Use: Never used   Substance and Sexual Activity   • Alcohol use: Yes     Alcohol/week: 2.0 standard drinks     Types: 1 Glasses of wine, 1 Standard drinks or equivalent per week     Comment: per week maybe if that much   • Drug use: Never     Types: Marijuana   • Sexual activity: Defer         Current Outpatient Medications   Medication Sig Dispense Refill   • albuterol sulfate  (90 Base) MCG/ACT inhaler Inhale 2 puffs Every 4 (Four) Hours As Needed for Wheezing.     • cetirizine (zyrTEC) 10 MG tablet TAKE 1 TABLET BY MOUTH DAILY 90 tablet 3   • Dexilant 60 MG capsule TAKE 1 CAPSULE BY MOUTH DAILY 90 capsule 1   • ferrous sulfate 325 (65 FE) MG tablet Take 1 tablet by mouth Daily With Breakfast. Take with vitamin C to help with absorption 30 tablet 5   • gabapentin (NEURONTIN) 600 MG tablet TAKE 2 TABLETS BY MOUTH EVERY NIGHT AS NEEDED FOR RESTLESS LEGS. TAKE 1 TO 3 HOURS BEFORE ONSET OF SYMPTOMS 60 tablet 1   • hydrOXYzine pamoate (Vistaril) 50 MG capsule Take 1 capsule by mouth 3 (Three) Times a Day As Needed (Sleep or anxiety or itching). 90 capsule 0   • ipratropium (ATROVENT) 0.06 % nasal spray 2 sprays into the nostril(s) as directed by provider 2 (two) times a day.     • linaclotide (Linzess) 72 MCG capsule capsule Take 1 capsule by mouth Every Morning Before Breakfast. 30 capsule 5   • metoprolol succinate XL (TOPROL-XL) 100 MG 24 hr tablet Take 1 tablet by mouth Every Night. 90 tablet 3   •  "mometasone (NASONEX) 50 MCG/ACT nasal spray 2 sprays into the nostril(s) as directed by provider Daily. 17 g 0   • olopatadine (PATANASE) 0.6 % solution nasal solution      • ondansetron (ZOFRAN) 8 MG tablet TAKE 1 TABLET BY MOUTH EVERY 8 HOURS AS NEEDED FOR NAUSEA OR VOMITING 30 tablet 0   • predniSONE (DELTASONE) 20 MG tablet One twice daily for 5 days and one daily for 5 days stop for skin 15 tablet 1   • ramelteon (ROZEREM) 8 MG tablet TAKE 1 TABLET BY MOUTH EVERY EVENING 30 MINUTES TO 3 HOURS BEFORE BEDTIME 30 tablet 2   • sucralfate (CARAFATE) 1 g tablet Take 1 tablet by mouth 4 (Four) Times a Day As Needed (Reflux). 60 tablet 1   • Trintellix 20 MG tablet Take 20 mg by mouth Daily. For Mood. 90 tablet 1   • nabumetone (RELAFEN) 750 MG tablet Take 1 tablet by mouth 2 (Two) Times a Day. 60 tablet 1     No current facility-administered medications for this visit.         OBJECTIVE    Pulse 101   Ht 165.1 cm (65\")   Wt 109 kg (240 lb)   LMP  (LMP Unknown)   SpO2 97%   BMI 39.94 kg/m²       Review of Systems   Constitutional: Negative.    Respiratory: Negative.    Gastrointestinal: Negative.    Musculoskeletal:        Left foot pain    Skin: Negative.    Psychiatric/Behavioral: Negative.        Physical Exam  Vitals reviewed.   Constitutional:       General: She is not in acute distress.     Appearance: She is well-developed.   HENT:      Head: Normocephalic and atraumatic.   Pulmonary:      Effort: Pulmonary effort is normal. No respiratory distress.      Breath sounds: No wheezing.   Musculoskeletal:         General: Tenderness present.   Skin:     General: Skin is warm.      Capillary Refill: Capillary refill takes less than 2 seconds.   Neurological:      Mental Status: She is alert and oriented to person, place, and time.   Psychiatric:         Behavior: Behavior normal.         Thought Content: Thought content normal.       Left Lower Extremity: CFT immediate to distal digits. Edema left medial foot.  " Negative Homans.  Sensation intact to light touch.  Hallux rectus.  Pain on palpation to first ray.      Procedures        ASSESSMENT AND PLAN    Diagnoses and all orders for this visit:    1. Hallux limitus of left foot (Primary)  -     XR Foot 3+ View Left    Other orders  -     nabumetone (RELAFEN) 750 MG tablet; Take 1 tablet by mouth 2 (Two) Times a Day.  Dispense: 60 tablet; Refill: 1      -Repeat radiographs taken reviewed.  Concern for nondisplaced fracture proximal to the metallic plate on the first ray.  Patient to return to cam boot.  Weightbearing in cam boot only.  Rx for Relafen.  Recheck 2 weeks, repeat radiographs          This document has been electronically signed by Brando Brice DPM on December 7, 2021 19:35 CST     12/7/2021  19:35 CST    Answers for HPI/ROS submitted by the patient on 8/18/2021  Please describe your symptoms.: Post-Op  Have you had these symptoms before?: No  How long have you been having these symptoms?: 1-2 weeks  Please list any medications you are currently taking for this condition.: Please see [facility] recorders  Please describe any probable cause for these symptoms. : n/a  What is the primary reason for your visit?: Other      Answers for HPI/ROS submitted by the patient on 8/27/2021  Please describe your symptoms.: Post op  Have you had these symptoms before?: No  How long have you been having these symptoms?: 1-4 days  What is the primary reason for your visit?: Other      Answers for HPI/ROS submitted by the patient on 9/13/2021  Please describe your symptoms.: N/A  Have you had these symptoms before?: Yes  How long have you been having these symptoms?: Greater than 2 weeks  Please list any medications you are currently taking for this condition.: N/A  Please describe any probable cause for these symptoms. : N/A  What is the primary reason for your visit?: Other

## 2021-12-16 DIAGNOSIS — F51.04 CHRONIC INSOMNIA: ICD-10-CM

## 2021-12-16 RX ORDER — ONDANSETRON HYDROCHLORIDE 8 MG/1
TABLET, FILM COATED ORAL
Qty: 30 TABLET | Refills: 0 | Status: SHIPPED | OUTPATIENT
Start: 2021-12-16 | End: 2022-03-03

## 2021-12-16 RX ORDER — HYDROXYZINE PAMOATE 50 MG/1
50 CAPSULE ORAL NIGHTLY PRN
Qty: 30 CAPSULE | Refills: 2 | Status: SHIPPED | OUTPATIENT
Start: 2021-12-16 | End: 2022-02-21

## 2021-12-17 NOTE — TELEPHONE ENCOUNTER
Refill request for Vistaril.  When last seen in clinic had renewed at #90 for pruritis and insomnia.  Plan to transition back to [facility] PRN.  Cont current f/u planning.     Jimmy Kelly II, MD  12/16/21 @ 9:13 PM CST

## 2021-12-20 ENCOUNTER — OFFICE VISIT (OUTPATIENT)
Dept: PODIATRY | Facility: CLINIC | Age: 66
End: 2021-12-20

## 2021-12-20 VITALS
WEIGHT: 240 LBS | SYSTOLIC BLOOD PRESSURE: 212 MMHG | BODY MASS INDEX: 39.99 KG/M2 | OXYGEN SATURATION: 95 % | HEART RATE: 114 BPM | HEIGHT: 65 IN | DIASTOLIC BLOOD PRESSURE: 142 MMHG

## 2021-12-20 DIAGNOSIS — S92.312D CLOSED DISPLACED FRACTURE OF FIRST METATARSAL BONE OF LEFT FOOT WITH ROUTINE HEALING, SUBSEQUENT ENCOUNTER: Primary | ICD-10-CM

## 2021-12-20 PROCEDURE — 28470 CLTX METATARSAL FX WO MNP EA: CPT | Performed by: PODIATRIST

## 2021-12-20 NOTE — PROGRESS NOTES
Mikki Joel  1955  66 y.o. female       12/20/2021  Chief Complaint   Patient presents with   • Left Foot - Follow-up       History of Present Illness    Patient presents for follow up.  She is ambulating in cam boot.  States that the left foot feels better in the boot.    Past Medical History:   Diagnosis Date   • Allergic rhinitis All my life    chronic infection   • Anxiety    • Asthma    • Chronic anemia    • Chronic pain    • CTS (carpal tunnel syndrome)    • Deaf    • Depression with anxiety    • Fracture of wrist    • Gastroesophageal reflux disease    • Hypertension    • Migraine    • Obesity    • Osteoarthritis    • Perforated tympanic membrane, bilateral    • Plantar fasciitis    • PONV (postoperative nausea and vomiting)    • Sleep apnea     sleep with c-pap         Past Surgical History:   Procedure Laterality Date   • ADENOIDECTOMY  1959   • COLONOSCOPY N/A 1/19/2018   • EAR TUBES     • ENDOSCOPIC FUNCTIONAL SINUS SURGERY (FESS) Bilateral 9/5/2017   • ENDOSCOPY N/A 1/19/2018   • ENDOSCOPY N/A 6/10/2019   • KNEE ARTHROPLASTY Right    • LAPAROSCOPIC TUBAL LIGATION     • RHINOPLASTY     • SINUS SURGERY  1988, 2002, 2006, 2018   • TOE FUSION Left 8/12/2021    Procedure: LEFT FIRST METATARSOPHALANGEAL JOINT ARTHRODESIS;  Surgeon: Brando Brice DPM;  Location: [facility];  Service: Podiatry;  Laterality: Left;   • TONSILLECTOMY  1959   • TOTAL SHOULDER ARTHROPLASTY Right    • TYMPANOPLASTY Left 6/5/2018   • TYMPANOPLASTY Right 2/7/2020    Procedure: TYMPANOPLASTY AND  CARTILAGE GRAFT;  Surgeon: Ramy Gaston MD;  Location: [facility];  Service: ENT;  Laterality: Right;   • UPPER GASTROINTESTINAL ENDOSCOPY           Family History   Problem Relation Age of Onset   • Hypertension Mother    • Alcohol abuse Father    • Heart disease Father    • Cancer Father    • Stroke Maternal Grandmother    • Irritable bowel syndrome Sister    • Osteoporosis Maternal Grandfather        Allergies   Allergen  Reactions   • Codeine Other (See Comments)     Increases heart rate   • Sulfa Antibiotics Hives       Social History     Socioeconomic History   • Marital status:    Tobacco Use   • Smoking status: Never Smoker   • Smokeless tobacco: Never Used   • Tobacco comment: Never had the desire to try smoking   Vaping Use   • Vaping Use: Never used   Substance and Sexual Activity   • Alcohol use: Yes     Alcohol/week: 2.0 standard drinks     Types: 1 Glasses of wine, 1 Standard drinks or equivalent per week     Comment: per week maybe if that much   • Drug use: Never     Types: Marijuana   • Sexual activity: Defer         Current Outpatient Medications   Medication Sig Dispense Refill   • albuterol sulfate  (90 Base) MCG/ACT inhaler Inhale 2 puffs Every 4 (Four) Hours As Needed for Wheezing.     • cetirizine (zyrTEC) 10 MG tablet TAKE 1 TABLET BY MOUTH DAILY 90 tablet 3   • Dexilant 60 MG capsule TAKE 1 CAPSULE BY MOUTH DAILY 90 capsule 1   • ferrous sulfate 325 (65 FE) MG tablet Take 1 tablet by mouth Daily With Breakfast. Take with vitamin C to help with absorption 30 tablet 5   • gabapentin (NEURONTIN) 600 MG tablet TAKE 2 TABLETS BY MOUTH EVERY NIGHT AS NEEDED FOR RESTLESS LEGS. TAKE 1 TO 3 HOURS BEFORE ONSET OF SYMPTOMS 60 tablet 1   • hydrOXYzine pamoate (VISTARIL) 50 MG capsule Take 1 capsule by mouth At Night As Needed (insomnia). 30 capsule 2   • ipratropium (ATROVENT) 0.06 % nasal spray 2 sprays into the nostril(s) as directed by provider 2 (two) times a day.     • linaclotide (Linzess) 72 MCG capsule capsule Take 1 capsule by mouth Every Morning Before Breakfast. 30 capsule 5   • metoprolol succinate XL (TOPROL-XL) 100 MG 24 hr tablet Take 1 tablet by mouth Every Night. 90 tablet 3   • mometasone (NASONEX) 50 MCG/ACT nasal spray 2 sprays into the nostril(s) as directed by provider Daily. 17 g 0   • nabumetone (RELAFEN) 750 MG tablet Take 1 tablet by mouth 2 (Two) Times a Day. 60 tablet 1   •  "olopatadine (PATANASE) 0.6 % solution nasal solution      • ondansetron (ZOFRAN) 8 MG tablet TAKE 1 TABLET BY MOUTH EVERY 8 HOURS AS NEEDED FOR NAUSEA OR VOMITING 30 tablet 0   • predniSONE (DELTASONE) 20 MG tablet One twice daily for 5 days and one daily for 5 days stop for skin 15 tablet 1   • ramelteon (ROZEREM) 8 MG tablet TAKE 1 TABLET BY MOUTH EVERY EVENING 30 MINUTES TO 3 HOURS BEFORE BEDTIME 30 tablet 2   • sucralfate (CARAFATE) 1 g tablet Take 1 tablet by mouth 4 (Four) Times a Day As Needed (Reflux). 60 tablet 1   • Trintellix 20 MG tablet Take 20 mg by mouth Daily. For Mood. 90 tablet 1     No current facility-administered medications for this visit.         OBJECTIVE    BP (!) 212/142 Comment: Will recheck before she leaves office  Pulse 114   Ht 165.1 cm (65\")   Wt 109 kg (240 lb)   LMP  (LMP Unknown)   SpO2 95%   BMI 39.94 kg/m²       Review of Systems   Constitutional: Negative.    Respiratory: Negative.    Gastrointestinal: Negative.    Musculoskeletal:        Left foot pain    Skin: Negative.    Psychiatric/Behavioral: Negative.        Physical Exam  Vitals reviewed.   Constitutional:       General: She is not in acute distress.     Appearance: She is well-developed.   HENT:      Head: Normocephalic and atraumatic.   Pulmonary:      Effort: Pulmonary effort is normal. No respiratory distress.      Breath sounds: No wheezing.   Musculoskeletal:         General: Tenderness present.   Skin:     General: Skin is warm.      Capillary Refill: Capillary refill takes less than 2 seconds.   Neurological:      Mental Status: She is alert and oriented to person, place, and time.   Psychiatric:         Behavior: Behavior normal.         Thought Content: Thought content normal.       Left Lower Extremity: CFT immediate to distal digits. Edema left medial foot.  Negative Homans.  Sensation intact to light touch.  Hallux rectus.  Pain on palpation to proximal first ray.      Procedures        ASSESSMENT AND " PLAN    Diagnoses and all orders for this visit:    1. Closed displaced fracture of first metatarsal bone of left foot with routine healing, subsequent encounter (Primary)  -     XR Foot 3+ View Left      -Repeat radiographs taken reviewed.  Showing slightly displaced first metatarsal fracture.  Continue cam boot.  Recheck 3 weeks, repeat radiographs          This document has been electronically signed by Brando Brice DPM on December 20, 2021 14:29 CST     12/20/2021  14:29 CST    Answers for HPI/ROS submitted by the patient on 8/18/2021  Please describe your symptoms.: Post-Op  Have you had these symptoms before?: No  How long have you been having these symptoms?: 1-2 weeks  Please list any medications you are currently taking for this condition.: Please see [facility] recorders  Please describe any probable cause for these symptoms. : n/a  What is the primary reason for your visit?: Other      Answers for HPI/ROS submitted by the patient on 8/27/2021  Please describe your symptoms.: Post op  Have you had these symptoms before?: No  How long have you been having these symptoms?: 1-4 days  What is the primary reason for your visit?: Other      Answers for HPI/ROS submitted by the patient on 9/13/2021  Please describe your symptoms.: N/A  Have you had these symptoms before?: Yes  How long have you been having these symptoms?: Greater than 2 weeks  Please list any medications you are currently taking for this condition.: N/A  Please describe any probable cause for these symptoms. : N/A  What is the primary reason for your visit?: Other

## 2022-01-10 ENCOUNTER — OFFICE VISIT (OUTPATIENT)
Dept: PODIATRY | Facility: CLINIC | Age: 67
End: 2022-01-10

## 2022-01-10 VITALS — BODY MASS INDEX: 39.99 KG/M2 | WEIGHT: 240 LBS | HEART RATE: 94 BPM | HEIGHT: 65 IN | OXYGEN SATURATION: 98 %

## 2022-01-10 DIAGNOSIS — S92.312D CLOSED DISPLACED FRACTURE OF FIRST METATARSAL BONE OF LEFT FOOT WITH ROUTINE HEALING, SUBSEQUENT ENCOUNTER: Primary | ICD-10-CM

## 2022-01-10 PROCEDURE — 99024 POSTOP FOLLOW-UP VISIT: CPT | Performed by: PODIATRIST

## 2022-01-10 NOTE — PROGRESS NOTES
Mikki Joel  1955  66 y.o. female     Recheck left foot     1/10/2022  Chief Complaint   Patient presents with   • Left Foot - Follow-up       History of Present Illness    Patient presents for follow up.  She is ambulating in cam boot.      Past Medical History:   Diagnosis Date   • Allergic rhinitis All my life    chronic infection   • Anxiety    • Asthma    • Chronic anemia    • Chronic pain    • CTS (carpal tunnel syndrome)    • Deaf    • Depression with anxiety    • Fracture of wrist    • Gastroesophageal reflux disease    • Hypertension    • Migraine    • Obesity    • Osteoarthritis    • Perforated tympanic membrane, bilateral    • Plantar fasciitis    • PONV (postoperative nausea and vomiting)    • Sleep apnea     sleep with c-pap         Past Surgical History:   Procedure Laterality Date   • ADENOIDECTOMY  1959   • COLONOSCOPY N/A 1/19/2018   • EAR TUBES     • ENDOSCOPIC FUNCTIONAL SINUS SURGERY (FESS) Bilateral 9/5/2017   • ENDOSCOPY N/A 1/19/2018   • ENDOSCOPY N/A 6/10/2019   • KNEE ARTHROPLASTY Right    • LAPAROSCOPIC TUBAL LIGATION     • RHINOPLASTY     • SINUS SURGERY  1988, 2002, 2006, 2018   • TOE FUSION Left 8/12/2021    Procedure: LEFT FIRST METATARSOPHALANGEAL JOINT ARTHRODESIS;  Surgeon: Brando Brice DPM;  Location: [facility];  Service: Podiatry;  Laterality: Left;   • TONSILLECTOMY  1959   • TOTAL SHOULDER ARTHROPLASTY Right    • TYMPANOPLASTY Left 6/5/2018   • TYMPANOPLASTY Right 2/7/2020    Procedure: TYMPANOPLASTY AND  CARTILAGE GRAFT;  Surgeon: Ramy Gaston MD;  Location: [facility];  Service: ENT;  Laterality: Right;   • UPPER GASTROINTESTINAL ENDOSCOPY           Family History   Problem Relation Age of Onset   • Hypertension Mother    • Alcohol abuse Father    • Heart disease Father    • Cancer Father    • Stroke Maternal Grandmother    • Irritable bowel syndrome Sister    • Osteoporosis Maternal Grandfather        Allergies   Allergen Reactions   • Codeine Other (See  Comments)     Increases heart rate   • Sulfa Antibiotics Hives       Social History     Socioeconomic History   • Marital status:    Tobacco Use   • Smoking status: Never Smoker   • Smokeless tobacco: Never Used   • Tobacco comment: Never had the desire to try smoking   Vaping Use   • Vaping Use: Never used   Substance and Sexual Activity   • Alcohol use: Yes     Alcohol/week: 2.0 standard drinks     Types: 1 Glasses of wine, 1 Standard drinks or equivalent per week     Comment: per week maybe if that much   • Drug use: Never     Types: Marijuana   • Sexual activity: Defer         Current Outpatient Medications   Medication Sig Dispense Refill   • albuterol sulfate  (90 Base) MCG/ACT inhaler Inhale 2 puffs Every 4 (Four) Hours As Needed for Wheezing.     • cetirizine (zyrTEC) 10 MG tablet TAKE 1 TABLET BY MOUTH DAILY 90 tablet 3   • Dexilant 60 MG capsule TAKE 1 CAPSULE BY MOUTH DAILY 90 capsule 1   • ferrous sulfate 325 (65 FE) MG tablet Take 1 tablet by mouth Daily With Breakfast. Take with vitamin C to help with absorption 30 tablet 5   • gabapentin (NEURONTIN) 600 MG tablet TAKE 2 TABLETS BY MOUTH EVERY NIGHT AS NEEDED FOR RESTLESS LEGS. TAKE 1 TO 3 HOURS BEFORE ONSET OF SYMPTOMS 60 tablet 1   • hydrOXYzine pamoate (VISTARIL) 50 MG capsule Take 1 capsule by mouth At Night As Needed (insomnia). 30 capsule 2   • ipratropium (ATROVENT) 0.06 % nasal spray 2 sprays into the nostril(s) as directed by provider 2 (two) times a day.     • linaclotide (Linzess) 72 MCG capsule capsule Take 1 capsule by mouth Every Morning Before Breakfast. 30 capsule 5   • metoprolol succinate XL (TOPROL-XL) 100 MG 24 hr tablet Take 1 tablet by mouth Every Night. 90 tablet 3   • mometasone (NASONEX) 50 MCG/ACT nasal spray 2 sprays into the nostril(s) as directed by provider Daily. 17 g 0   • nabumetone (RELAFEN) 750 MG tablet Take 1 tablet by mouth 2 (Two) Times a Day. 60 tablet 1   • olopatadine (PATANASE) 0.6 % solution  "nasal solution      • ondansetron (ZOFRAN) 8 MG tablet TAKE 1 TABLET BY MOUTH EVERY 8 HOURS AS NEEDED FOR NAUSEA OR VOMITING 30 tablet 0   • predniSONE (DELTASONE) 20 MG tablet One twice daily for 5 days and one daily for 5 days stop for skin 15 tablet 1   • ramelteon (ROZEREM) 8 MG tablet TAKE 1 TABLET BY MOUTH EVERY EVENING 30 MINUTES TO 3 HOURS BEFORE BEDTIME 30 tablet 2   • sucralfate (CARAFATE) 1 g tablet Take 1 tablet by mouth 4 (Four) Times a Day As Needed (Reflux). 60 tablet 1   • Trintellix 20 MG tablet Take 20 mg by mouth Daily. For Mood. 90 tablet 1     No current facility-administered medications for this visit.         OBJECTIVE    Pulse 94   Ht 165.1 cm (65\")   Wt 109 kg (240 lb)   LMP  (LMP Unknown)   SpO2 98%   BMI 39.94 kg/m²       Review of Systems   Constitutional: Negative.    Respiratory: Negative.    Gastrointestinal: Negative.    Musculoskeletal:        Left foot discomfort   Skin: Negative.    Psychiatric/Behavioral: Negative.        Physical Exam  Vitals reviewed.   Constitutional:       General: She is not in acute distress.     Appearance: She is well-developed.   HENT:      Head: Normocephalic and atraumatic.   Pulmonary:      Effort: Pulmonary effort is normal. No respiratory distress.      Breath sounds: No wheezing.   Skin:     General: Skin is warm.      Capillary Refill: Capillary refill takes less than 2 seconds.   Neurological:      Mental Status: She is alert and oriented to person, place, and time.   Psychiatric:         Behavior: Behavior normal.         Thought Content: Thought content normal.       Left Lower Extremity: CFT immediate to distal digits. Edema left medial foot.  Negative Homans.  Sensation intact to light touch.  Hallux rectus.  No pain on palpation.       Procedures        ASSESSMENT AND PLAN    Diagnoses and all orders for this visit:    1. Closed displaced fracture of first metatarsal bone of left foot with routine healing, subsequent encounter " (Primary)  -     XR Foot 3+ View Left      -Repeat radiographs taken reviewed.  Healing slightly displaced first metatarsal fracture.  Discontinue cam boot.  Transition to supportive shoe gear.  Recheck 4 weeks, repeat radiographs          This document has been electronically signed by Brando Brice DPM on January 10, 2022 14:09 CST     1/10/2022  14:09 CST    Answers for HPI/ROS submitted by the patient on 8/18/2021  Please describe your symptoms.: Post-Op  Have you had these symptoms before?: No  How long have you been having these symptoms?: 1-2 weeks  Please list any medications you are currently taking for this condition.: Please see [facility] recorders  Please describe any probable cause for these symptoms. : n/a  What is the primary reason for your visit?: Other      Answers for HPI/ROS submitted by the patient on 8/27/2021  Please describe your symptoms.: Post op  Have you had these symptoms before?: No  How long have you been having these symptoms?: 1-4 days  What is the primary reason for your visit?: Other      Answers for HPI/ROS submitted by the patient on 9/13/2021  Please describe your symptoms.: N/A  Have you had these symptoms before?: Yes  How long have you been having these symptoms?: Greater than 2 weeks  Please list any medications you are currently taking for this condition.: N/A  Please describe any probable cause for these symptoms. : N/A  What is the primary reason for your visit?: Other

## 2022-01-15 ENCOUNTER — E-VISIT (OUTPATIENT)
Dept: FAMILY MEDICINE CLINIC | Facility: TELEHEALTH | Age: 67
End: 2022-01-15

## 2022-01-15 DIAGNOSIS — J30.9 ALLERGIC RHINITIS, UNSPECIFIED SEASONALITY, UNSPECIFIED TRIGGER: ICD-10-CM

## 2022-01-15 PROCEDURE — BRIGHTMDVISIT: Performed by: NURSE PRACTITIONER

## 2022-01-15 NOTE — E-VISIT TREATED
Chief Complaint: Coronavirus (COVID-19), cold, sinus pain, allergy, or flu   Patient introduction   Patient is 67-year-old female who reports cough, fever (which may have resolved; see below), congestion, sore throat, voice hoarseness, and headache that started 6-9 days ago.   Patient has not requested COVID testing.   Coronavirus Disease 2019 (COVID-19) exposure, testing history, vaccination status, and vaccine injection site symptoms:    No known exposure to a confirmed or suspected case of COVID-19.    No recent travel outside of their local community.    Patient had a viral lab test > 3 months ago. Test result was negative.    Reports receiving 3 doses of the COVID-19 vaccine.    Received the Pfizer vaccine for the first dose.    Received the Pfizer vaccine for the second dose.    Received the Pfizer vaccine for the third dose.    Received their most recent dose of the vaccine > 14 days ago.   Warning. The following may warrant further investigation:    Age 65 or older    Hypertension    Asthma    BMI of 30 to 39    Headache described as severe (7-9 on a scale of 1-10)   When asked why they're seeking care online today, patient reports they want a specific treatment or medication. Patient writes: sinus infection   Patient-submitted comments:   Patient writes: No, hopefully to get medications to help over my caugh, hedache, fever and plugged ears..   Patient did not request an excuse note.   General presentation   Patient denies improvement of symptoms. Symptoms came on gradually.   Fever:    Reports 1-3 days of measured fever.    Reports current measured fever, but none at initial symptom onset.    Highest temperature of 101.6F-101.9F.   Sinus and nasal symptoms:    Reports postnasal drip.    Reports 1-3 episodes of antibiotic treatment for sinus infection in the last year.    Reports congestion with sinus pain or pressure on or around their forehead, cheeks, and upper teeth or jaw.    Patient first noticed  sinus pain 5-9 days ago.    Sinus pain is worse with Valsalva.    Denies rhinitis.    Denies itchy nose or sneezing.    Denies nasal drainage.    Denies history of unhealed nasal septal ulcer/nasal wound.    Denies history of deviated septum or nasal polyps.   Sore throat:    Reports sore throat.    Reports previous tonsillectomy.    Denies recent strep exposure.    Patient does not think they have strep.    Patient is able to swallow liquids and solid foods with ease.    Reports mild hoarseness. Patient doesn't believe hoarseness is due to voice strain.    Denies muffled voice.   Head and body aches:    Reports headache, described as severe (7-9 on a scale of 1-10).    Denies sweats.    Denies chills.    Denies myalgia.    Denies fatigue.   Dizziness:    Reports mild dizziness that does not interfere with daily activities.   Cough:    Reports cough.    Cough is worse during the day and at night/while sleeping.    Cough is mildly productive of sputum.    Describes color of mucus as green.   Wheezing and SOB:    Reports having asthma.    Reports wheezing.    Reports using an albuterol inhaler for asthma.    Reports using albuterol every 6 hours or longer.    Denies COPD diagnosis.    Denies shortness of breath.    Denies current cold symptoms aggravate their asthma.   Chest pain:    Denies chest pain.   Allergies:    Reports history of allergies.    Patient does not think symptoms are allergy-related.    Patient has known seasonal allergies.   Flu exposure:    Denies recent exposure to confirmed flu diagnosis.    Reports a flu vaccine in the last 1-3 months.   Patient denies the following red flags:    Changes in alertness or awareness    Symptoms suggesting respiratory distress    Symptoms suggesting airway obstruction    Symptoms suggesting intracranial hemorrhage    Decreased urination   Pregnancy/menstrual status/breastfeeding:    Patient is postmenopausal   Self-exam:    No difficulty moving their chin toward  their chest    No palatal petechiae    Ability to open mouth normally    Denies antibiotic treatment for similar symptoms within the past month   Current medications   Denies taking over-the-counter medication for current symptoms.   Reports taking nabumetone 750 MG [Relafen], Gabapentin, chlorthalidone / metoprolol Extended Release Oral Tablet, Albuterol (Eqv-Proventil HFA), Albuterol-Ipratropium Bromide, clotrimazole / gentamicin / mometasone, Allergy Relief (Cetirizine), and Olopatadine.   Medication allergies   None reported.   Medication contraindication review   Reports history positive for hypertension. Therefore, the following medication(s) will not be prescribed:    Metoclopramide    Acetaminophen-diphenhydramine-phenylephrine    Aspirin-chlorpheniramine-phenylephrine   Denies history of metoclopramide-associated dystonic reaction and tardive dyskinesia.   No known history of amoxicillin-clavulanate-associated cholestatic jaundice or hepatic impairment.   No known history of azithromycin-associated cholestatic jaundice or hepatic impairment.   Past medical history   Immune conditions: Denies immunocompromising conditions. Denies history of cancer.   Social history   Non-smoker.   Assessment   Bacterial sinusitis. Ruled out: Traumatic laryngitis.   This is the likely diagnosis based on patient's interview responses, including:    Congestion or sinus pressure    Duration of symptoms > 5 days    No improvement of symptoms   HHS required information for COVID-19 lab data reporting (if test is ordered):   Symptoms:    Fever    Cough    Headache    Sore throat    Nasal congestion   Symptom onset: 6-9 days ago ago    Healthcare worker? No  Resident in a congregate care setting? No  Previous history of COVID-19 testing: Patient had a viral lab test > 3 months ago. Test result was negative.     Plan   Medications:    benzonatate 200 mg capsule RX 200mg 1 cap PO tid PRN 7d for cough. Do not chew or cut these  capsules. Amount is 21 cap.    amoxicillin 875 mg-potassium clavulanate 125 mg tablet RX 875mg/125mg 1 tab PO bid 10d for infection. This medication is an antibiotic. Take it exactly as directed. You must finish the entire course of medication, even if you feel better after the first few days of treatment. Amount is 20 tab.    fluticasone 50 mcg/actuation nasal spray,suspension OTC 50mcg 2 sprays each nostril nasal qd 30d for nasal symptoms due to allergies or sinusitis. Fluticasone needs to be used every day to be effective. It may take up to a week for the full effects of the medication to be seen. Brands to look for include Flonase. Amount is 16 g.   The patient's prescriptions will be sent to:   [id number]   [address] [facility] [id number]   Phone: [phone]     Fax: [phone]   Patient informed to purchase OTC medication.   Education:    Condition and causes    Prevention    Treatment and self-care    When to call provider      ----------   Electronically signed by MIRIAM Tavares on 2022-01-15 at 14:15PM   ----------   Patient Interview Transcript:   Why are you getting care through [facility] today? We can't guarantee a specific treatment or test. Your provider will decide what's best for you. Select all that apply.    I want a specific treatment or medication   Not selected:    I want to know if I have a cold or something more serious    I want to know if I need to be seen by a provider    I need a doctor's note    I want to be tested for COVID-19    I want to get the COVID-19 vaccine    I think I'm having side effects from the COVID-19 vaccine    I just want to feel better!    None of the above   Tell us which specific treatment or medication you'd like. Your provider will make the final decision on which treatment is best for your condition.    sinus infection   Which of these symptoms are bothering you? Select all that apply.    Cough    Fever    Stuffed-up nose or  sinuses    Sore throat    Hoarse voice or loss of voice    Headache   Not selected:    Shortness of breath    Runny nose    Itchy or watery eyes    Itchy nose or sneezing    Loss of smell or taste    Sweats    Chills    Muscle or body aches    Fatigue or tiredness    Nausea or vomiting    Diarrhea    I don't have any of these symptoms   Before we learn more about why you're here, we'll get some information related to COVID-19. We'll ask about risk factors, testing, vaccination status, vaccine injection site symptoms, and exposure. Do you have any of these conditions? If so, you may be at increased risk for complications from COVID-19. Select all that apply.    None of the above   Not selected:    Chronic lung disease, such as cystic fibrosis or interstitial fibrosis    Heart disease, such as congenital heart disease, congestive heart failure, or coronary artery disease    Disorder of the brain, spinal cord, or nerves and muscles, such as dementia, cerebral palsy, epilepsy, muscular dystrophy, or developmental delay    Metabolic disorder or mitochondrial disease    Cerebrovascular disease, such as stroke or another condition affecting the blood vessels or blood supply to the brain   Do you live in a group care setting? Examples include: - Nursing home - Residential care - Psychiatric treatment facility - Group home - [facility] - Board and care home - Homeless shelter - Foster care setting Select one.    No   Not selected:    Yes   Have you ever been tested for COVID-19? Select one.    Yes   Not selected:    No   When was your most recent COVID-19 test? Select one.    More than 3 months ago   Not selected:    Today    Yesterday    2 to 4 days ago    5 to 7 days ago    7 to 14 days ago    15 to 30 days ago    1 to 3 months ago   What type of COVID-19 test did you most recently have? There are two types of COVID-19 tests: - Viral tests check if you're currently infected with COVID-19. For these tests, a nose swab or  saliva sample is taken. Viral tests include self-tests and tests done at a doctor's office, lab, or testing site. - Antibody tests check if you've been infected in the past. For these tests, your blood is drawn. Antibody tests can only be done at a doctor's office, lab, or testing site. Select one.    Viral test at a doctor's office, lab, or testing site   Not selected:    Viral self-test    Antibody test   What was the result of your most recent COVID-19 test? Select one.    Negative   Not selected:    Positive    I'm not sure   Have you gotten the COVID-19 vaccine? Select one.    Yes   Not selected:    No   How many doses of the COVID-19 vaccine have you gotten? This includes boosters. Select one.    3 doses   Not selected:    1 dose    2 doses   Which COVID-19 vaccine did you get for your first dose? Check your Vaccination Record Card under Product Name/. Select one.    Pfizer-BioNTech (Pfizer)   Not selected:    Mert & Mert's Wm Vaccine (J&J/Wm)    Moderna   Which COVID-19 vaccine did you get for your second dose? Check your Vaccination Record Card under Product Name/. Select one.    Pfizer-BioNTech (Pfizer)   Not selected:    Mert & Mert's Wm Vaccine (J&J/Wm)    Moderna   Which COVID-19 vaccine did you get for your third dose? Check your Vaccination Record Card under Product Name/. Select one.    Pfizer-BioNTech (Pfizer)   Not selected:    Mert & Mert's Wm Vaccine (J&J/Wm)    Moderna   When did you get your most recent dose of the COVID-19 vaccine?    More than 14 days ago   Not selected:    Less than 48 hours (2 days) ago    48 to 72 hours (3 days) ago    3 to 5 days ago    5 to 7 days ago    7 to 14 days ago   In the last 14 days, have you traveled outside of your local community? This includes travel by car, RV, bus, train, or plane. Travel increases your chances of getting and spreading COVID-19. Select one.    No   Not  "selected:    Yes   In the last 14 days, have you had close contact with someone who has coronavirus (COVID-19)? \"Close contact\" means any of these: - Living in the same household as someone with COVID-19. - Caring for someone with COVID-19. - Being within 6 feet of someone with COVID-19 for a total of at least 15 minutes over a 24-hour period. For example, three 5-minute exposures for a total of 15 minutes. - Being in direct contact with respiratory droplets from someone with COVID-19 (being coughed on, kissing, sharing utensils). Select one.    No, not that I know of   Not selected:    Yes, a confirmed case    Yes, a suspected case   Thanks for completing our COVID-19 questions. Now we'll return to your symptoms. When did your symptoms start? If you know the exact date your symptoms started, choose Other and enter the month and day. Select one.    6 to 9 days ago   Not selected:    Less than 48 hours ago    3 to 5 days ago    10 to 14 days ago    2 to 4 weeks ago    More than a month ago    Other (specify)   Did your symptoms come on suddenly or gradually? Select one.    Gradually   Not selected:    Suddenly    I'm not sure   Have your symptoms improved at all since they began? Select one.    No   Not selected:    Yes, but they haven't gone away completely    Yes, but then they came back worse than before    I'm not sure   You mentioned having a fever. Do you have a fever now? Select one.    Yes, but I didn't have one when my symptoms started   Not selected:    Yes, and I've had one since my symptoms started    No, it's gone now    I'm not sure   Did you take your temperature with a thermometer? Select one.    Yes   Not selected:    No, but it felt mild    No, but it felt high   What was the highest reading on the thermometer? Select one.    101.6 to 101.9F   Not selected:    Below 100.4F    100.4 to 101.5F    102.0 to 103.0F    Above 103.0F   How long have you had a fever? Select one.    1 to 3 days   Not " "selected:    Less than 24 hours    4 or more days   You mentioned having a headache. On a scale of 1 to 10, how severe is your headache pain? Select one.    Severe (7 to 9)   Not selected:    Mild (1 to 3)    Moderate (4 to 6)    Unbearable (10)    The worst headache of my life (10+)   Do you cough so hard that it's made you gag or vomit? By gag, we mean has your coughing made you choke or dry heave? Select all that apply.    No   Not selected:    Yes, my coughing has made me gag    Yes, my coughing has made me vomit   When is your cough the worst? Select all that apply.    During the day    At nighttime, or while I'm sleeping   Not selected:    In the morning, or when I wake up    I'm not sure   Are you coughing up mucus or phlegm? Select one.    Yes, a little   Not selected:    No, my cough is dry    Yes, a lot   What color is most of the mucus or phlegm that you're coughing up? Select one.    Green   Not selected:    Clear    White/frothy    Yellow    Red or pink    I'm not sure   You mentioned having a stuffy nose or sinus congestion. Do you feel pain or pressure in your sinuses?    Yes   Not selected:    No    I'm not sure   Where do you feel sinus pain or pressure?    In my forehead    In my cheeks    In my upper teeth or jaw   Not selected:    Around my eyes    Behind my nose    I'm not sure   When did you first notice your sinus pain or pressure? Select one.    5 to 9 days ago   Not selected:    Less than 5 days ago    10 to 14 days ago    2 to 4 weeks ago    1 month ago or longer   Does coughing, sneezing, or leaning forward make your sinuses feel worse? Select one.    Yes   Not selected:    No    I'm not sure   What color is your nasal drainage? Select one.    My nose is stuffed but not draining or running   Not selected:    Clear    White    Yellow    Green    I'm not sure   Is there any drainage (mucus) going down the back of your throat? This kind of drainage is also called \"postnasal drip.\" Select " one.    Yes   Not selected:    No    I'm not sure   Can you swallow liquids and solid foods? A sore throat may be painful when swallowing, but it shouldn't prevent you from swallowing. Select one.    Yes, with ease   Not selected:    Yes, but it's uncomfortable    Yes, but it's painful    It's hard to swallow anything because it feels like liquids and food get stuck in my throat    No, I can't swallow anything, liquid or solid foods   Since your symptoms started, have you felt dizzy? Select one.    Yes, but I can continue with my regular daily activities   Not selected:    Yes, and it makes it hard to stand, walk, or do daily activities    No   Do you have chest pain? You might also feel it as discomfort, aching, tightness, or squeezing in the chest. Select one.    No   Not selected:    Yes   Have you urinated at least 3 times in the last 24 hours? Select one.    Yes   Not selected:    No    I'm not sure   Changes in alertness or awareness may mean you need emergency care. Since your symptoms started, have you had any of these? Select all that apply.    None of the above   Not selected:    Confusion    Slurred speech    Not knowing where you are or what day it is    Difficulty staying conscious    Fainting or passing out   Do your symptoms include a whistling sound, or wheezing, when you breathe? Select one.    Yes   Not selected:    No    I'm not sure   Early in this interview, you told us you were hoarse or you'd lost your voice. How would you describe the changes to your voice? Select one.    It just sounds a little raspy   Not selected:    It's harder than usual to talk    I can barely talk at all   Is it possible that you strained your voice? Singing, yelling, or talking more or louder than usual can cause voice strain. Select one.    No   Not selected:    Yes    I'm not sure   Do you have any of these symptoms in your ear(s)? Select all that apply.    Fullness    Plugged or blocked sensation   Not selected:    " Pain    Pressure    Crackling or popping    None of the above   Can you move your chin toward your chest?    Yes   Not selected:    No, my neck is too stiff   Try opening your mouth as wide as you can. Are you able to open your mouth all the way as you normally would? Select one.    Yes   Not selected:    No    I'm not sure   Does your voice sound muffled? \"Muffled\" here does not mean hoarse or scratchy. By \"muffled,\" we mean that it sounds like you're speaking with a mouth full of hot food. Select one.    No   Not selected:    Yes    I'm not sure   Are your tonsils larger than usual?    I've had my tonsils removed   Not selected:    Yes    No    I'm not sure   Are there red spots on the roof of your mouth or the back of your throat?    No   Not selected:    Yes    I'm not sure   People with a very high body mass index (BMI) are at higher risk for developing complications from the flu and severe illness from COVID-19. To determine your BMI, we need to know your weight and height. Please enter your weight (in pounds).    Weight   Please enter your height.    Height   In the past 2 weeks, has anyone around you (such as at school, work, or home) had a confirmed diagnosis of strep throat? A confirmed diagnosis means that a throat swab and lab test were done to verify a strep throat infection. Select one.    No   Not selected:    Yes    I'm not sure   Do you think you might have strep throat? Select one.    No   Not selected:    Yes    I'm not sure   In the past week, has anyone around you (such as at school, work, or home) had a confirmed diagnosis of the flu? A confirmed diagnosis means that a nose swab was done to verify a flu infection. Select all that apply.    No   Not selected:    I live with someone who has the flu    I've been within touching distance of someone who has the flu    I've walked by, or sat about 3 feet away from, someone who has the flu    I've been in the same building as someone who has the " flu    I'm not sure   Have you ever been diagnosed with asthma? Select one.    Yes   Not selected:    No   Are your cold symptoms making your asthma worse? Select one.    No   Not selected:    Yes   What medications or inhalers are you currently using for your asthma? Select all that apply.    Albuterol inhaler, as needed (such as ProAir, Proventil, Ventolin)   Not selected:    Inhaled steroid (such as Qvar, Pulmicort, Flovent)    Inhaled steroid with long-acting bronchodilator (such as Advair, Dulera, Symbicort)    I'm not sure    I'm not taking any medications for my asthma    I usually take medications for my asthma, but I ran out   How often are you using albuterol? If you're using albuterol very frequently, you'll need to be seen in person. Select one.    Every 6 hours or longer   Not selected:    More than once an hour    Every 1 to 2 hours    Every 2 to 3 hours    Every 3 to 4 hours    Every 4 to 6 hours   Would you like a prescription for albuterol? Select one.    No   Not selected:    Yes   Have you ever been diagnosed with chronic obstructive pulmonary disease (COPD)? Select one.    No   Not selected:    Yes    I'm not sure   In the last year, how many times were you treated with antibiotics for a sinus infection? Select one.    1 to 3 times   Not selected:    None    4 or more times   Have you been diagnosed with a deviated septum or nasal polyps? The nose is divided into two nostrils by the septum. A crooked septum is called a deviated septum. Nasal polyps are growths inside the nose or sinuses. Select one.    No   Not selected:    Yes, but I had surgery to treat them    Yes, I have a deviated septum    Yes, I have nasal polyps    Yes, I have a deviated septum and nasal polyps    I'm not sure   Do you have a sore inside your nose that won't heal? Select one.    No   Not selected:    Yes    I'm not sure   Do you have allergies (pollen, dust mites, mold, animal dander)? Select one.    Yes   Not selected:     No    I'm not sure   What kind of allergies do you have? Select all that apply.    Seasonal allergies (hay fever)   Not selected:    Pet allergies    Dust allergies    None of the above    I'm not sure   Do you think your symptoms could be allergy-related? Select one.    No   Not selected:    Yes    I'm not sure   Have you had a flu shot this season? Select one.    Yes, 1 to 3 months ago   Not selected:    Yes, less than 2 weeks ago    Yes, 2 to 4 weeks ago    Yes, 3 to 6 months ago    Yes, more than 6 months ago    I'm not sure    No   The flu and COVID-19 can be more serious for people with certain conditions or characteristics. These questions help us figure out if you or anyone you live with is at higher risk for complications from these infections. Do either of these statements apply to you? Select all that apply.    None of the above   Not selected:    I'm  or Native [place]    I'm a healthcare worker   Do you smoke tobacco? Select one.    No, never   Not selected:    Yes, every day    Yes, some days    No, I quit   Some conditions can put you at risk for more serious infections. Do any of these apply to you? Select all that apply.    None of the above   Not selected:    I've been hospitalized within the last 5 days    I have diabetes    I'm in close contact with a child in    Are you currently being treated for any of these conditions? Scroll to see all options. Select all that apply.    High blood pressure   Not selected:    Aspirin triad (also known as Samter's triad or ASA triad)    Asthma or hives from taking aspirin or other NSAIDs, such as ibuprofen or naproxen    Blockage or narrowing of the blood vessels of the heart    Blood dyscrasia, such anemia, leukemia, lymphoma, or myeloma    Bone marrow depression    Catecholamine-releasing paraganglioma    Blood clotting disorder    Congenital long QT syndrome    Depression    Difficulty urinating or completely emptying your bladder     Uncorrected electrolyte abnormalities    Fungal infection    Gastrointestinal (GI) bleeding    Gastrointestinal (GI) obstruction    G6PD deficiency    Recent heart attack    Irregular heartbeat or heart rhythm    Kidney disease or hemodialysis    Mononucleosis (mono)    Myasthenia gravis    Parkinson's disease    Pheochromocytoma    Reye syndrome    Seizure disorder    Ulcerative colitis    None of the above   Do you have any of these conditions that can affect the immune system? Scroll to see all options. Select all that apply.    None of these   Not selected:    History of bone marrow transplant    Chronic kidney disease    Chronic liver disease (including cirrhosis)    HIV/AIDS    Inflammatory bowel disease (Crohn's disease or ulcerative colitis)    Lupus    Moderate to severe plaque psoriasis    Multiple sclerosis    Rheumatoid arthritis    Sickle cell anemia    Alpha or beta thalassemia    History of solid organ transplant (kidney, liver, or heart)    History of spleen removal    An autoimmune disorder not listed here    A condition requiring treatment with long-term use of oral steroids (such as prednisone, prednisolone, or dexamethasone)   Have you ever been diagnosed with cancer? Select one.    No   Not selected:    Yes, I have cancer now    Yes, but I'm in remission   Have you ever had either of these conditions? Select all that apply.    No   Not selected:    Metoclopramide-associated dystonic reaction    Tardive dyskinesia   Do any of these apply to the people who live with you? Select all that apply.    None of the above   Not selected:    A child under the age of 5    An adult 65 or older    A person who is pregnant    A person who has given birth, had a miscarriage, had a pregnancy loss, or had an  in the last 2 weeks    An  or Native [place]   Does any member of your household have any of these medical conditions? Select all that apply.    None of the above   Not selected:     Asthma    Disorders of the brain, spinal cord, or nerves and muscles, such as dementia, cerebral palsy, epilepsy, muscular dystrophy, or developmental delay    Chronic lung disease, such as COPD or cystic fibrosis    Heart disease, such as congenital heart disease, congestive heart failure, or coronary artery disease    Cerebrovascular disease, such as stroke or another condition affecting the blood vessels or blood supply to the brain    Blood disorders, such as sickle cell disease    Diabetes    Metabolic disorders such as inherited metabolic disorders or mitochondrial disease    Kidney disorders    Liver disorders    Weakened immune system due to illness or medications such as chemotherapy or steroids    Children under the age of 19 who are on long-term aspirin therapy    Extreme obesity (BMI > 40)   Have you gone through menopause? Select one.    Yes   Not selected:    No   Just a few more questions about medications, and then you're finished. Have you used any non-prescription medications or nasal sprays for your current symptoms? Examples include saline sprays, decongestants, NyQuil, and Tylenol. Select one.    No   Not selected:    Yes   In the past month, have you taken antibiotics for similar symptoms? Examples of antibiotics include amoxicillin, amoxicillin-clavulanate (Augmentin), penicillin, cefdinir (Omnicef), doxycycline, and clindamycin (Cleocin). Select one.    No   Not selected:    Yes    I'm not sure   Have you taken any monoamine oxidase inhibitor (MAOI) medications in the last 14 days? Examples include rasagiline (Azilect), selegiline (Eldepryl, Zelapar), isocarboxazid (Marplan), phenelzine (Nardil), and tranylcypromine (Parnate). Select one.    No   Not selected:    Yes    I'm not sure   Do you take Kynmobi or Apokyn (apomorphine)? Select one.    No   Not selected:    Yes    I'm not sure   Are you taking any other medications or supplements? On the next screen, you need to list all vitamins,  "supplements, non-prescription medications (such as aspirin or Aleve), and prescription medications that you're taking. Select one.    Yes   Not selected:    Yes, but I'm not sure what they are    No   Have you ever had an allergic or bad reaction to any medication? Select one.    Yes   Not selected:    No   Have you had an allergic or bad reaction to any of these medications? Select all that apply.    None of the above   Not selected:    Baloxavir (Xofluza)    Benzonatate (Tessalon Perles)    Fluconazole, itraconazole, or terconazole (brands include Diflucan, Sporanox, Terazol)    Oseltamivir (Tamiflu) or zanamivir (Relenza)    I'm not sure   Have you had an allergic or bad reaction to any of these antibiotic medications? Select all that apply.    None of the above   Not selected:    Penicillin or any \"-cillin\" antibiotic, such as amoxicillin, ampicillin, dicloxacillin, nafcillin, or piperacillin (Brands include Augmentin, Unasyn, and Zosyn)    Tetracycline or any \"-cycline\" antibiotic, such as doxycycline, demeclocycline, minocycline (Brands include Declomycin, Doryx, Dynacin, Oracea, Monodox, Panmycin, and Vibramycin)    Ciprofloxacin or any \"-floxacin\" antibiotic, such as gemifloxacin, levofloxacin, moxifloxacin, or ofloxacin (Brands include Factive, Cipro, Floxin, and Levaquin)    Cephalexin or any \"cef-\" antibiotic, such as cefazolin, cefdinir, cefuroxime, ceftriaxone, ceftazidime, or cefepime (Brands include Ancef, Ceftin, Fortaz, Keflex, Maxipime, Rocephin, and Simplicef)    Azithromycin or any \"-thromycin\" antibiotic, such as erythromycin or clarithromycin (Brands include Biaxin, Erythrocin, Z-yobani, and Zithromax)    Clindamycin or lincomycin (Brands include Cleocin and Lincocin)    I'm not sure   Have you had an allergic or bad reaction to any of these medications? Select all that apply.    None of the above   Not selected:    Albuterol or a similar medication    Corticosteroid (steroid) medication, " including topical steroids, inhaled steroids, nasal steroids, or oral steroids (budesonide, ciclesonide, dexamethasone, flunisolide, fluticasone, methylprednisolone, triamcinolone, prednisone (or brand names Alvesco, Deltasone, Flovent, Medrol, Nasacort, Rhinocort, or Veramyst)    Metoclopramide (Reglan)    Ondansetron (Zuplenz, Zofran ODT, Zofran)    Prochlorperazine (Compazine)    I'm not sure   Have you had an allergic or bad reaction to any of these eye drops or nasal sprays? Scroll to see all options. Select all that apply.    None of the above   Not selected:    Azelastine (Astelin, Astepro, Optivar)    Cromolyn (Crolom, NasalCrom)    Ipratropium (Atrovent)    Ketotifen (Alaway, Zaditor)    Pheniramine/naphazoline (Naphcon-A, Opcon-A, Visine-A)    Olopatadine (Pataday, Patanol, Pazeo)    I'm not sure   Have you had an allergic or bad reaction to any of these non-prescription medications? Scroll to see all options. Select all that apply.    None of the above   Not selected:    Acetaminophen (Tylenol)    Aspirin    Cetirizine (Zyrtec)    Chlorpheniramine (Aller-chlor, Chlor-Trimeton)    Dextromethorphan (Delsym, Robitussin, Vicks DayQuil Cough)    Diphenhydramine (Benadryl)    Fexofenadine (Allegra)    Guaifenesin (Mucinex)    Guaifenesin/dextromethorphan (Delsym DM, Mucinex DM, Robitussin DM)    Ibuprofen (Advil, Motrin, Midol)    Loratadine (Alavert, Claritin)    Oxymetazoline (Afrin)    Phenylephrine (Sudafed)    I'm not sure   Are you allergic to milk or to the proteins found in milk (for example, whey or casein)? A milk allergy is different from lactose intolerance. Select one.    No   Not selected:    Yes    I'm not sure   Have you ever had jaundice or liver problems as a result of taking amoxicillin-clavulanate (Augmentin)? Jaundice is a condition in which the skin and the whites of the eyes turn yellow. Select all that apply.    No, not that I know of   Not selected:    Yes, jaundice    Yes, liver  problems   Have you ever had jaundice or liver problems as a result of taking azithromycin (Zithromax, Zmax)? Jaundice is a condition in which the skin and the whites of the eyes turn yellow. Select all that apply.    No, not that I know of   Not selected:    Yes, jaundice    Yes, liver problems   Do you need a doctor's note? A doctor's note confirms that you received care today and states when you can return to school or work. It does not contain information about your diagnosis or treatment plan. Your provider will make the final decision on whether to give you a doctor's note and for how long. Doctor's notes CANNOT be backdated. We can't provide medical leave paperwork through this type of visit. If more paperwork is needed to request time off, contact your primary care provider. Select one.    No   Not selected:    Today only (1 day)    Today and tomorrow (2 days)    3 days    7 days    10 days    14 days   Is there anything else you'd like to tell us about your symptoms?    No, hopefully to get medications to help over my caugh, hedache, fever and plugged ears.   ----------   Medical history   The following information was received from the EMR on January 15, 2022.   Allergies:    SULFA ANTIBIOTICS   - Allergy Type: medication   - Reaction: Hives   - Severity:   - Status: active    CODEINE   - Allergy Type: medication   - Reaction: Other (See Comments)   - Severity:   - Status: active   Medications:    MOMETASONE FUROATE 50 MCG/ACT NA SUSP   - Route: Nasal   - Start Date: June 21, 2021   - End Date: None   - Status: active    HYDROXYZINE PAMOATE 50 MG PO CAPS   - Route: Oral   - Start Date: December 16, 2021   - End Date: None   - Status: active    NABUMETONE 750 MG PO TABS   - Route: Oral   - Start Date: December 06, 2021   - End Date: None   - Status: active    PREDNISONE 20 MG PO TABS   - Route:   - Start Date: November 19, 2021   - End Date: None   - Status: active    FERROUS SULFATE 325 (65 FE) MG PO  TABS   - Route: Oral   - Start Date: June 02, 2021   - End Date: None   - Status: active    TRINTELLIX 20 MG PO TABS   - Route: Oral   - Start Date: October 19, 2021   - End Date: None   - Status: active    METOPROLOL SUCCINATE  MG PO TB24   - Route: Oral   - Start Date: October 19, 2021   - End Date: None   - Status: active    DEXILANT 60 MG PO CPDR   - Route: Oral   - Start Date: October 12, 2021   - End Date: None   - Status: active    RAMELTEON 8 MG PO TABS   - Route:   - Start Date: November 14, 2021   - End Date: None   - Status: active    IPRATROPIUM BROMIDE 0.06 % NA SOLN   - Route: Nasal   - Start Date: July 13, 2021   - End Date: None   - Status: active    ONDANSETRON HCL 8 MG PO TABS   - Route:   - Start Date: December 16, 2021   - End Date: None   - Status: active    ALBUTEROL SULFATE  (90 BASE) MCG/ACT IN AERS   - Route: Inhalation   - Start Date: August 10, 2021   - End Date: None   - Status: active    CETIRIZINE HCL 10 MG PO TABS   - Route: Oral   - Start Date: February 08, 2021   - End Date: None   - Status: active    OLOPATADINE HCL 0.6 % NA SOLN   - Route:   - Start Date: October 27, 2021   - End Date: None   - Status: active    LINACLOTIDE 72 MCG PO CAPS   - Route: Oral   - Start Date: October 20, 2021   - End Date: None   - Status: active    GABAPENTIN 600 MG PO TABS   - Route:   - Start Date: November 19, 2021   - End Date: None   - Status: active    SUCRALFATE 1 G PO TABS   - Route: Oral   - Start Date: October 28, 2020   - End Date: None   - Status: active   Problem list:    High blood pressure   - Category: Problem List Item   - Health Status:   - Start Date: August 01, 2017   - End Date: October 30, 2018   - Status: resolved    Asthma   - Category: Problem List Item   - Health Status:   - Start Date: August 01, 2017   - End Date: None   - Status: active    Arthritis   - Category: Problem List Item   - Health Status:   - Start Date: August 01, 2017   - End Date: None   - Status:  active    Sinusitis   - Category: Problem List Item   - Health Status:   - Start Date: August 24, 2017   - End Date: October 30, 2018   - Status: resolved    Nasal septal deformity   - Category: Problem List Item   - Health Status:   - Start Date: September 05, 2017   - End Date: April 30, 2018   - Status: resolved    Nasal turbinate hypertrophy   - Category: Problem List Item   - Health Status:   - Start Date: September 05, 2017   - End Date: None   - Status: active    Epigastric pain   - Category: Problem List Item   - Health Status:   - Start Date: November 28, 2017   - End Date: April 30, 2018   - Status: resolved    Encounter for colonoscopy due to history of colonic polyp   - Category: Problem List Item   - Health Status:   - Start Date: November 28, 2017   - End Date: October 30, 2018   - Status: resolved    Gastroesophageal reflux disease   - Category: Problem List Item   - Health Status:   - Start Date: November 28, 2017   - End Date: None   - Status: active    Depression with anxiety   - Category: Problem List Item   - Health Status:   - Start Date: April 03, 2018   - End Date: None   - Status: active    Insomnia   - Category: Problem List Item   - Health Status:   - Start Date: April 03, 2018   - End Date: None   - Status: active    Other chronic pain   - Category: Problem List Item   - Health Status:   - Start Date: April 03, 2018   - End Date: April 22, 2020   - Status: resolved    PTSD (post-traumatic stress disorder)   - Category: Problem List Item   - Health Status:   - Start Date: April 03, 2018   - End Date: None   - Status: active    Restless leg   - Category: Problem List Item   - Health Status:   - Start Date: April 03, 2018   - End Date: None   - Status: active    BMI 35.0-35.9,adult   - Category: Problem List Item   - Health Status:   - Start Date: April 30, 2018   - End Date: November 09, 2020   - Status: resolved    Perforation of left tympanic membrane   - Category: Problem List Item    - Health Status:   - Start Date: May 14, 2018   - End Date: April 22, 2019   - Status: resolved    Mixed hearing loss of left ear   - Category: Problem List Item   - Health Status:   - Start Date: May 14, 2018   - End Date: None   - Status: active    Right knee pain   - Category: Problem List Item   - Health Status:   - Start Date: May 15, 2018   - End Date: None   - Status: active    Presence of right artificial knee joint   - Category: Problem List Item   - Health Status:   - Start Date: May 15, 2018   - End Date: None   - Status: active    Essential hypertension, benign   - Category: Problem List Item   - Health Status:   - Start Date: October 30, 2018   - End Date: None   - Status: active    Epigastric pain   - Category: Problem List Item   - Health Status:   - Start Date: May 09, 2019   - End Date: October 22, 2019   - Status: resolved    Nausea   - Category: Problem List Item   - Health Status:   - Start Date: May 09, 2019   - End Date: April 22, 2020   - Status: resolved    Presbyesophagus   - Category: Problem List Item   - Health Status:   - Start Date: May 09, 2019   - End Date: None   - Status: active    Chronic vasomotor rhinitis   - Category: Problem List Item   - Health Status:   - Start Date: January 13, 2020   - End Date: None   - Status: active    Perforated tympanic membrane, bilateral   - Category: Problem List Item   - Health Status:   - Start Date: January 13, 2020   - End Date: None   - Status: active    Mixed conductive and sensorineural hearing loss of right ear with restricted hearing of left ear   - Category: Problem List Item   - Health Status:   - Start Date: January 13, 2020   - End Date: None   - Status: active    Morbidly obese (HCC)   - Category: Problem List Item   - Health Status:   - Start Date: January 30, 2020   - End Date: None   - Status: active    Lethargy   - Category: Problem List Item   - Health Status:   - Start Date: February 10, 2020   - End Date: April 22, 2020    - Status: resolved    Acute metabolic encephalopathy   - Category: Problem List Item   - Health Status:   - Start Date: February 10, 2020   - End Date: April 22, 2020   - Status: resolved    GAMA (acute kidney injury) (HCC)   - Category: Problem List Item   - Health Status:   - Start Date: February 10, 2020   - End Date: April 22, 2020   - Status: resolved    Polypharmacy   - Category: Problem List Item   - Health Status:   - Start Date: February 10, 2020   - End Date: April 22, 2020   - Status: resolved    Hallux limitus of left foot   - Category: Problem List Item   - Health Status:   - Start Date: July 14, 2021   - End Date: None   - Status: active    Marijuana use   - Category: Problem List Item   - Health Status:   - Start Date: October 19, 2021   - End Date: None   - Status: active

## 2022-01-15 NOTE — EXTERNAL PATIENT INSTRUCTIONS
Diagnosis   Bacterial sinusitis   My name is Kathryn Conteh, and I'm a healthcare provider at [facility]. I'm sorry you're not feeling well. I reviewed your interview, and I see that you have bacterial sinusitis.   To prevent the spread of illness to others, I recommend that you stay home and away from other people as much as possible while you're sick.   Medications   Your pharmacy   [id number] [address] [phone]     Prescription      Benzonatate (200mg): Take 1 capsule by mouth 3 times a day as needed for cough. Do not chew or cut these capsules.      Amoxicillin-clavulanate (875mg/125mg): Take 1 tablet by mouth twice a day for 10 days for infection. This medication is an antibiotic. Take it exactly as directed. You must finish the entire course of medication, even if you feel better after the first few days of treatment.    Start taking the antibiotics I've prescribed right away. You need to finish the entire course of antibiotics, even if you start to feel better before the pills run out.   Non-prescription      Fluticasone (50mcg): Spray 2 times in each nostril daily for nasal symptoms due to allergies or sinusitis. Fluticasone needs to be used every day to be effective. It may take up to a week for the full effects of the medication to be seen. Brands to look for include Flonase.   Some women develop yeast infections after taking antibiotics. If you develop a yeast infection, you can treat it with antifungal creams or suppositories. These are available without a prescription at [employer] and many supermarkets.   About your diagnosis   The sinuses are hollow spaces connected to the nasal passages. Sinusitis occurs when the sinuses swell and block the drainage of fluid and mucus from the nose, causing pain, pressure, and congestion. Fatigue, difficulty sleeping, or decreased appetite may accompany your symptoms.   More than 90% of sinus infections are  caused by viruses. However, in certain cases, a sinus infection may be caused by bacteria. Bacterial sinus infections usually look like one of the following cases:    Severe sinus symptoms with a fever over 102F.    Sinus symptoms that have not improved at all after 10 days.    Cold symptoms that slowly improve but then worsen again after 5 or 6 days, usually with a high fever, headache, or nasal discharge.   What to expect   If you follow this treatment plan, you should start to feel better within a few days.   You can return to your normal activities when ALL of the following are true:    You've been fever-free for more than 24 hours without using fever-reducing medications such as Tylenol    Your other symptoms have improved    It's been at least 5 full days since your symptoms first started   When to seek care   Call us at [phone]   with any sudden or unexpected symptoms.    Symptoms that last longer than 10 to 14 days.    Symptoms that get better for a few days, and then suddenly get worse.    Fever that measures over 103F or continues for more than 3 days.    Any vision changes.    A worsening headache.    Stiff neck.    Swelling of your forehead or eyes.    Coughing up red or bloody mucus.    Swallowing becomes extremely difficult or impossible.    More than 5 episodes of diarrhea in a day.    More than 5 episodes of vomiting in a day.    Severe shortness of breath.    Severe chest pain   Other treatment    Rest! Your body needs rest to recover and fight infection.    Drink plenty of water to stay hydrated.    Use steam to soothe your sinuses: Breathe it in from a shower or a bowl of hot water. Placing a warm, moist washcloth over your nose and forehead may help relieve the sinus pain and pressure.    Try non-prescription saline nasal sprays to help your nasal symptoms. Try using a Neti Pot to flush out your stuffy nose and sinuses. Neti Pots are available at any drugstore without a prescription.     Avoid smoke and air pollution. Smoke can make infections worse.   Prevention    Avoid close contact with other people when you're sick.    Cover your mouth and nose when you cough or sneeze. Use a tissue or cough into your elbow. Make sure that used tissues go directly into the trash.    Avoid touching your eyes, nose, or mouth while you're sick.    Wash your hands often, especially after coughing, sneezing, or blowing your nose. If soap and water are not available, use an alcohol-based hand .    If you or someone in your home or workplace is sick, disinfect commonly used items. This includes door handles, tables, computers, remotes, and pens.    Coronavirus (COVID-19) information   Common symptoms of COVID-19 include fever, cough, shortness of breath, fatigue, muscle or body aches, headaches, new loss of sense of taste or smell, sore throat, stuffy or runny nose, nausea or vomiting, and diarrhea. Most people who get COVID-19 have mild symptoms and can rest at home until they get better. Elderly people and those with chronic medical problems may be at risk for more serious complications.   FAQs about the COVID-19 vaccine   There are three authorized COVID-19 vaccines: [employer]s Wm Vaccine (J&J/Wm), Moderna, and Pfizer-BioNTech (Pfizer). The J&J/Wm and Moderna vaccines are approved for use in people aged 18 and older. The Pfizer vaccine is approved for those aged 5 and older. All three are available at no cost. Even if you don't have health insurance, you can still get the COVID-19 vaccine for free.   Which vaccine is the best? Which vaccine should I get?   All three vaccines are highly effective. Even if you get COVID after being vaccinated, all of the vaccines help prevent severe disease, hospitalization, and complications.   Most people should get whichever vaccine is first available to them. However, women younger than 50 years old should consider the rare risk of blood clots  with low platelets after vaccination with the J&J/Wm vaccine. This risk hasn't been seen with the other two vaccines.   Are the vaccines safe?   Yes. Hundreds of millions of people in the [place] have already safely received COVID-19 vaccines. As part of Phase 3 clinical trials in the [place] and other countries, researchers collected safety and efficacy data for all three vaccines. These clinical trials follow strict standards. Before a vaccine is approved, the manufacturing company must submit data to the Food and Drug Administration (FDA) for review. Tens of thousands of volunteers participated in the clinical trials for the vaccines. The FDA continues to monitor safety data as the vaccines are given to the general population.   Do I need the vaccine if I've already had COVID?   Yes. Vaccination helps protect you even if you've already had COVID.   If you had COVID-19 and had symptoms, wait to get vaccinated until ALL of the following are true:    5 full days since your symptoms started    24 hours with no fever, without the use of fever-reducing medications    Your other COVID-19 symptoms are improving   If you tested positive for COVID-19 but did not have symptoms, you can get vaccinated after 5 full days have passed since you had a positive test, as long as you don't develop symptoms.   If you had COVID-19 and were treated with monoclonal antibodies, you should wait 90 days before getting a COVID-19 vaccination.   How many doses of the vaccine do I need?   J&J/Wm: one dose.   Moderna: two doses, spaced 4 weeks apart.   Pfizer vaccine: two doses, spaced 3 weeks apart.   When am I considered fully vaccinated?   J&J/Wm: 14 days after you get the shot.   Moderna: 14 days after your second dose.   Pfizer: 14 days after your second dose.   What if I miss the second dose of the Moderna or Pfizer vaccine?   Contact your healthcare provider to discuss your options. While one dose of the vaccine may provide some  protection against COVID, you need both doses for maximum protection.   What are the common side effects of the vaccine?   A sore arm, tiredness, headache, and muscle pain may occur within two days of getting the vaccine and last a day or two. For the Moderna or Pfizer vaccines, side effects are more common after the second dose. People over the age of 55 are less likely to have side effects than younger people.   After I'm fully vaccinated, can I still get or spread COVID?   Yes, but your disease should be milder, and your risk of serious illness, hospitalization, and complications will be much lower. And being vaccinated reduces the risk of spreading the disease if you get it.   After I'm fully vaccinated, can I go back to normal?   You should still wear a mask indoors in public if:    It's required by laws, rules, regulations, or local guidance.    You have a weakened immune system.    Your age puts you at increased risk of severe disease.    You have a medical condition that puts you at increased risk of severe disease.    Someone in your household has a weakened immune system, is at increased risk for severe disease, or is unvaccinated.    You're in an area of high transmission.   For travel information, see the CDC's latest guidance at https://www.cdc.gov  .   Everyone who tests positive for COVID, regardless of vaccination or booster status, should:    Stay home for at least 5 days. Day 1 is the first full day after your symptoms started. If you never develop symptoms, day 1 is the first full day after the sample was taken for the test.    If you have symptoms, continue to stay home until you're fever free for 24 hours without the use of fever-reducing medicines and your other symptoms have improved.    If you never develop symptoms, you may leave your house after day 5.    You should wear a well-fitting mask around others at home and in public until day 10, even if you don't have symptoms or your symptoms  "are improving.    Don't travel until after at least day 10.    Don't go to places where you can't wear a mask, such as restaurants, bars, or gyms.   If you're exposed to COVID, follow the advice below based on your vaccination status.   If any of these apply:    You're 18 or older and have had all recommended vaccine doses, including boosters and additional primary shots for certain immunocompromised people    You're 5 to 17 years old and have had 2 doses of the Pfizer vaccine    You've had COVID-19 within the last 90 days, confirmed by a nose or throat swab test   Then:    Wear a well-fitting mask around others for 10 days. The date of last close contact is considered day 0.    Get tested at least 5 days after you last had close contact with someone with COVID-19.   If any of these apply:    You're 18 or older, have had two doses of the Pfizer or Moderna vaccine, but have NOT gotten a recommended booster shot    You got a dose of the J&J/Wm vaccine more than 2 months ago, but you have NOT gotten a recommended booster shot    You're not vaccinated   Then:    Stay home for at least 5 days after your last close contact with a person who has COVID-19. The date of last close contact is considered day 0.    Wear a well-fitting mask for 10 days when around others at home or in public.    Watch for fever, cough, shortness of breath, or other COVID-19 symptoms for 10 days after your last close contact with someone with COVID-19.    If you develop symptoms, get tested right away.    If you don't develop symptoms, get tested at least 5 days after your last close contact with someone with COVID-19.   For more information about exposure, isolation, and quarantine, see https://www.cdc.gov/coronavirus/2019-ncov/your-health/quarantine-isolation.html.   I'm fully vaccinated but have heard about a \"third dose\" and an \"additional primary shot.\" Do these apply to me?   A third dose or an additional primary shot is needed for some " "immunocompromised people who have a moderately to severely weakened immune system.   If any of these situations apply, you have a moderately to severely weakened immune system:    You're getting active cancer treatment for a cancer or tumor of the blood    You've had an organ transplant and are taking medicine to suppress your immune system    You've had a stem cell transplant within the last 2 years    You're taking medicine to suppress your immune system, such as high-dose corticosteroids    You have moderate to severe primary immunodeficiency (such as DiGeorge syndrome, Wiskott-Georgia syndrome)    You have advanced or untreated HIV infection   If you got the Pfizer vaccine and are 5 years old or older, AND it's been at least 28 days since your second shot, you should get an additional primary shot of the Pfizer vaccine.   If you got the Moderna vaccine and are 18 years old or older, AND it's been at least 28 days since your second shot, you should get an additional primary shot of the Moderna vaccine.   If you got the J&J/Wm vaccine, no additional primary shot is recommended at this time.   If you think you need an additional primary shot, speak with your care team.   I'm fully vaccinated but have heard about \"boosters.\" Do I need a booster shot?   Everyone 12 and older should get a booster shot. Immunity from vaccinations can decrease over time, and a booster renews the effect of the vaccination. If you're 12 to 17 years old, you can get the Pfizer booster. If you're 18 or older, you can get the Pfizer or Moderna booster.   If you got the J&J/Wm vaccine AND it's been at least 2 months since your shot, you should get a booster shot now.   If you got the Pfizer vaccine AND it's been at least 5 months since your second dose, you should get a booster shot now.   If you got the Moderna vaccine AND it's been at least 5 months since your second dose, you should get a booster shot now.   If you'd like more " information on where and how to get a booster shot, speak with your care team.   General information about COVID-19   What should I do if I'm exposed to someone with COVID-19?   In general, you need to be in close contact with someone who has COVID-19 to get infected. Close contact means:    Living in the same household as someone with COVID-19.    Caring for someone with COVID-19.    Being within 6 feet of someone with COVID-19 for a total of at least 15 minutes over a 24-hour period.    Being in direct contact with respiratory droplets from someone with COVID-19 (for example, being coughed on, kissing, or sharing utensils).   Everyone who tests positive for COVID, regardless of vaccination or booster status, should:    Stay home for at least 5 days. Day 1 is the first full day after your symptoms started. If you never develop symptoms, day 1 is the first full day after the sample was taken for the test.    If you have symptoms, continue to stay home until you're fever free for 24 hours without the use of fever-reducing medicines and your other symptoms have improved.    If you never develop symptoms, you may leave your house after day 5.    You should wear a well-fitting mask around others at home and in public until day 10, even if you don't have symptoms or if your symptoms are improving.    Don't travel until after at least day 10.    Don't go to places where you can't wear a mask, such as restaurants, bars, or gyms.   If you're exposed to COVID, follow the advice below based on your vaccination status.   If any of these apply:    You're 18 or older and have had all recommended vaccine doses, including boosters and additional primary shots for certain immunocompromised people    You're 5 to 17 years old and have had 2 doses of the Pfizer vaccine    You've had COVID-19 within the last 90 days, confirmed by a nose or throat swab test   Then:    Wear a well-fitting mask around others for 10 days. The date of last  close contact is considered day 0.    Get tested at least 5 days after you last had close contact with someone with COVID-19.   If any of these apply:    You're 18 or older, have had two doses of the Pfizer or Moderna vaccine, but have NOT gotten a recommended booster shot    You got a dose of the J&J/Wm vaccine more than 2 months ago, but you have NOT gotten a recommended booster shot    You're not vaccinated   Then:    Stay home for at least 5 days after your last close contact with a person who has COVID-19. The date of last close contact is considered day 0.    Wear a well-fitting mask for 10 days when around others at home or in public.    Watch for fever, cough, shortness of breath, or other COVID-19 symptoms for 10 days after your last close contact with someone with COVID-19.    If you develop symptoms, get tested right away.    If you don't develop symptoms, get tested at least 5 days after your last close contact with someone with COVID-19.   What if I develop symptoms of COVID-19?   Get tested right away if you develop symptoms.   Everyone who tests positive, regardless of vaccination or booster status, should:    Stay home for at least 5 days. Day 1 is the first full day after your symptoms started. If you never develop symptoms, day 1 is the first full day after the sample was taken for the test.    If you have symptoms, continue to stay home until you're fever free for 24 hours without the use of fever-reducing medicines and your other symptoms have improved.    If you never develop symptoms, you may leave your house after day 5.    You should wear a well-fitting mask around others at home and in public until day 10, even if you don't have symptoms or if your symptoms are improving.    Don't travel until after at least day 10.    Don't go to places where you can't wear a mask, such as restaurants, bars, or gyms.   CALL your healthcare provider or clinic right away to discuss next steps if you have  any of these risk factors:    Age 65 or older    Pregnant    Chronic medical condition such as diabetes, liver disease, kidney disease requiring dialysis, heart disease, high blood pressure, severe obesity, or lung disease (including moderate to severe asthma)    A medical condition that affects your immune system    Taking a medication that affects your immune system   Otherwise, if your symptoms are mild, you don't need to call your healthcare provider or be seen for an exam. You can recover at home. Over-the-counter cold medications can help ease symptoms.   To prevent the spread of COVID-19 to the people and animals in your household:    Stay in a specific room away from other people in your home, and use a separate bathroom if possible    Wear a mask when close contact with household members can't be avoided   When to get care   Call your healthcare provider immediately if you have any of the following:    Fever over 103F    Fever that doesn't come down after taking medications such as Tylenol or ibuprofen    Fever that returns after being gone for more than 24 hours    Fever lasting more than 4 days    Worsening shortness of breath or difficulty breathing   Go to your nearest ER or call 911 if you have any of the following:    Shortness of breath that makes it hard to do simple things like get dressed, bathe, or comb your hair    Persistent chest pain or chest tightness    New confusion or difficulty staying alert    Bluish color to the lips or face    Flu vaccine information   Getting a flu vaccine this year is more important than ever. The vaccine not only protects you and the people around you from the flu, it also helps reduce the strain on healthcare systems responding to the COVID-19 pandemic.   Who should get a flu vaccine?   Everyone 6 months of age and older should get a yearly flu vaccine.   When should I get vaccinated?   You should get a flu vaccine by the end of October. Once you're vaccinated, it  takes about two weeks for antibodies to develop and protect you against the flu. That's why it's important to get vaccinated as soon as possible.   After October, is it too late to get vaccinated?   No. You should still get vaccinated. As long as the flu viruses are still in your community, flu vaccines will remain available, even into January of next year or later.   Why do I need a flu vaccine EVERY year?   Flu viruses are constantly changing, so flu vaccines are usually updated from one season to the next. Your protection from the flu vaccine also lessens over time.   Is the flu vaccine safe?   Yes. Over the last 50 years, hundreds of millions of Americans have safely received the flu vaccines.   What are the side effects of flu vaccines?   You CANNOT get the flu from a flu vaccine. Common side effects of the flu shot include soreness, redness and/or swelling where the shot was given, low grade fever, and aches. Common side effects of the nasal spray flu vaccine for adults include runny nose, headaches, sore throat, and cough. For children, side effects include wheezing, vomiting, muscle aches, and fever.   Does the flu vaccine increase your risk of getting COVID-19?   No. There is no evidence that getting a flu vaccine increases your risk of getting COVID-19.   Is it safe to get the flu vaccine along with a COVID-19 vaccine?   Yes. It's safe to get the flu vaccine with a COVID vaccine or booster.   Contact your healthcare provider TODAY for details on when and where to get your flu vaccine.   Your provider   Your diagnosis was provided by Kathryn Conteh, a member of your trusted care team at [facility].   If you have any questions, call us at [phone]  .

## 2022-01-16 DIAGNOSIS — I10 ESSENTIAL HYPERTENSION: ICD-10-CM

## 2022-01-17 DIAGNOSIS — R21 RASH: ICD-10-CM

## 2022-01-17 DIAGNOSIS — L29.9 PRURITUS: ICD-10-CM

## 2022-01-17 RX ORDER — CETIRIZINE HYDROCHLORIDE 10 MG/1
TABLET ORAL
Qty: 90 TABLET | Refills: 3 | Status: SHIPPED | OUTPATIENT
Start: 2022-01-17 | End: 2023-01-23

## 2022-01-17 RX ORDER — PREDNISONE 20 MG/1
TABLET ORAL
Qty: 15 TABLET | Refills: 1 | Status: SHIPPED | OUTPATIENT
Start: 2022-01-17 | End: 2022-05-02

## 2022-01-17 RX ORDER — IPRATROPIUM BROMIDE 42 UG/1
SPRAY, METERED NASAL
Qty: 15 ML | OUTPATIENT
Start: 2022-01-17

## 2022-01-17 RX ORDER — METOPROLOL SUCCINATE 100 MG/1
100 TABLET, EXTENDED RELEASE ORAL NIGHTLY
Qty: 90 TABLET | Refills: 3 | Status: SHIPPED | OUTPATIENT
Start: 2022-01-17 | End: 2022-09-08

## 2022-01-17 NOTE — TELEPHONE ENCOUNTER
Patient was a no-show for her follow-up visit.  We will not refill medications until she schedules a follow-up

## 2022-01-20 DIAGNOSIS — G25.81 RESTLESS LEGS SYNDROME (RLS): ICD-10-CM

## 2022-01-20 RX ORDER — GABAPENTIN 600 MG/1
1200 TABLET ORAL NIGHTLY
Qty: 60 TABLET | Refills: 2 | Status: SHIPPED | OUTPATIENT
Start: 2022-01-20 | End: 2022-04-18 | Stop reason: SDUPTHER

## 2022-01-20 NOTE — PROGRESS NOTES
Gabapentin renewal for RLS.     [facility] reviewed; # [id number]; unremarkable; standing rx for Neurontin.  Last three months rx:  10/18/2021 Gabapentin 600MG 1955 60 30 Jimmy Kelly Ii [facility] 1  11/19/2021 Gabapentin 600MG 1955 60 30 Jimmy Kelly Ii [facility] 1  12/22/2021 Gabapentin 600MG 1955 60 30 Jimmy Kelly I[facility] 1    Doing well at last visit in Nov 21.      Will renew, 600mg, #60 w/ 2 refills. F/u in Nov 22.      Jimmy Kelly II, MD  01/20/22 @ 2:10 PM CST

## 2022-02-07 ENCOUNTER — OFFICE VISIT (OUTPATIENT)
Dept: PODIATRY | Facility: CLINIC | Age: 67
End: 2022-02-07

## 2022-02-07 VITALS — OXYGEN SATURATION: 99 % | HEIGHT: 65 IN | BODY MASS INDEX: 39.99 KG/M2 | WEIGHT: 240 LBS | HEART RATE: 97 BPM

## 2022-02-07 DIAGNOSIS — S92.312D CLOSED DISPLACED FRACTURE OF FIRST METATARSAL BONE OF LEFT FOOT WITH ROUTINE HEALING, SUBSEQUENT ENCOUNTER: Primary | ICD-10-CM

## 2022-02-07 PROCEDURE — 99024 POSTOP FOLLOW-UP VISIT: CPT | Performed by: PODIATRIST

## 2022-02-07 RX ORDER — RAMELTEON 8 MG/1
TABLET ORAL
Qty: 30 TABLET | Refills: 2 | Status: SHIPPED | OUTPATIENT
Start: 2022-02-07 | End: 2022-04-12

## 2022-02-07 NOTE — PROGRESS NOTES
Mikki Joel  1955  67 y.o. female     Recheck left foot. Xray today    2/7/2022  Chief Complaint   Patient presents with   • Left Foot - Follow-up       History of Present Illness    Patient presents for follow up.  She is ambulating in regular shoe gear pain-free.    Past Medical History:   Diagnosis Date   • Allergic rhinitis All my life    chronic infection   • Anxiety    • Asthma    • Chronic anemia    • Chronic pain    • CTS (carpal tunnel syndrome)    • Deaf    • Depression with anxiety    • Fracture of wrist    • Gastroesophageal reflux disease    • Hypertension    • Migraine    • Obesity    • Osteoarthritis    • Perforated tympanic membrane, bilateral    • Plantar fasciitis    • PONV (postoperative nausea and vomiting)    • Sleep apnea     sleep with c-pap         Past Surgical History:   Procedure Laterality Date   • ADENOIDECTOMY  1959   • COLONOSCOPY N/A 1/19/2018   • EAR TUBES     • ENDOSCOPIC FUNCTIONAL SINUS SURGERY (FESS) Bilateral 9/5/2017   • ENDOSCOPY N/A 1/19/2018   • ENDOSCOPY N/A 6/10/2019   • KNEE ARTHROPLASTY Right    • LAPAROSCOPIC TUBAL LIGATION     • RHINOPLASTY     • SINUS SURGERY  1988, 2002, 2006, 2018   • TOE FUSION Left 8/12/2021    Procedure: LEFT FIRST METATARSOPHALANGEAL JOINT ARTHRODESIS;  Surgeon: Brando Brice DPM;  Location: [facility];  Service: Podiatry;  Laterality: Left;   • TONSILLECTOMY  1959   • TOTAL SHOULDER ARTHROPLASTY Right    • TYMPANOPLASTY Left 6/5/2018   • TYMPANOPLASTY Right 2/7/2020    Procedure: TYMPANOPLASTY AND  CARTILAGE GRAFT;  Surgeon: Ramy Gaston MD;  Location: [facility];  Service: ENT;  Laterality: Right;   • UPPER GASTROINTESTINAL ENDOSCOPY           Family History   Problem Relation Age of Onset   • Hypertension Mother    • Alcohol abuse Father    • Heart disease Father    • Cancer Father    • Stroke Maternal Grandmother    • Irritable bowel syndrome Sister    • Osteoporosis Maternal Grandfather        Allergies   Allergen  Reactions   • Codeine Other (See Comments)     Increases heart rate   • Sulfa Antibiotics Hives       Social History     Socioeconomic History   • Marital status:    Tobacco Use   • Smoking status: Never Smoker   • Smokeless tobacco: Never Used   • Tobacco comment: Never had the desire to try smoking   Vaping Use   • Vaping Use: Never used   Substance and Sexual Activity   • Alcohol use: Yes     Alcohol/week: 2.0 standard drinks     Types: 1 Glasses of wine, 1 Standard drinks or equivalent per week     Comment: per week maybe if that much   • Drug use: Never     Types: Marijuana   • Sexual activity: Defer         Current Outpatient Medications   Medication Sig Dispense Refill   • albuterol sulfate  (90 Base) MCG/ACT inhaler Inhale 2 puffs Every 4 (Four) Hours As Needed for Wheezing.     • cetirizine (zyrTEC) 10 MG tablet TAKE ONE TABLET BY MOUTH DAILY 90 tablet 3   • Dexilant 60 MG capsule TAKE 1 CAPSULE BY MOUTH DAILY 90 capsule 1   • ferrous sulfate 325 (65 FE) MG tablet Take 1 tablet by mouth Daily With Breakfast. Take with vitamin C to help with absorption 30 tablet 5   • gabapentin (NEURONTIN) 600 MG tablet Take 2 tablets by mouth Every Night. 60 tablet 2   • hydrOXYzine pamoate (VISTARIL) 50 MG capsule Take 1 capsule by mouth At Night As Needed (insomnia). 30 capsule 2   • ipratropium (ATROVENT) 0.06 % nasal spray 2 sprays into the nostril(s) as directed by provider 2 (two) times a day.     • linaclotide (Linzess) 72 MCG capsule capsule Take 1 capsule by mouth Every Morning Before Breakfast. 30 capsule 5   • metoprolol succinate XL (TOPROL-XL) 100 MG 24 hr tablet TAKE 1 TABLET BY MOUTH EVERY NIGHT 90 tablet 3   • mometasone (NASONEX) 50 MCG/ACT nasal spray 2 sprays into the nostril(s) as directed by provider Daily. 17 g 0   • nabumetone (RELAFEN) 750 MG tablet Take 1 tablet by mouth 2 (Two) Times a Day. 60 tablet 1   • olopatadine (PATANASE) 0.6 % solution nasal solution      • ondansetron  "(ZOFRAN) 8 MG tablet TAKE 1 TABLET BY MOUTH EVERY 8 HOURS AS NEEDED FOR NAUSEA OR VOMITING 30 tablet 0   • predniSONE (DELTASONE) 20 MG tablet One twice daily for 5 days and one daily for 5 days stop for skin 15 tablet 1   • ramelteon (ROZEREM) 8 MG tablet TAKE 1 TABLET BY MOUTH EVERY EVENING 30 MINS TO 3 HOURS BEFORE BEDTIME 30 tablet 2   • sucralfate (CARAFATE) 1 g tablet Take 1 tablet by mouth 4 (Four) Times a Day As Needed (Reflux). 60 tablet 1   • Trintellix 20 MG tablet Take 20 mg by mouth Daily. For Mood. 90 tablet 1     No current facility-administered medications for this visit.         OBJECTIVE    Pulse 97   Ht 165.1 cm (65\")   Wt 109 kg (240 lb)   LMP  (LMP Unknown)   SpO2 99%   BMI 39.94 kg/m²       Review of Systems   Constitutional: Negative.    Respiratory: Negative.    Gastrointestinal: Negative.    Musculoskeletal: Negative.    Skin: Negative.    Psychiatric/Behavioral: Negative.        Physical Exam  Vitals reviewed.   Constitutional:       General: She is not in acute distress.     Appearance: She is well-developed.   HENT:      Head: Normocephalic and atraumatic.   Pulmonary:      Effort: Pulmonary effort is normal. No respiratory distress.      Breath sounds: No wheezing.   Skin:     General: Skin is warm.      Capillary Refill: Capillary refill takes less than 2 seconds.   Neurological:      Mental Status: She is alert and oriented to person, place, and time.   Psychiatric:         Behavior: Behavior normal.         Thought Content: Thought content normal.       Left Lower Extremity: CFT immediate to distal digits. No edema.  Negative Homans.  Sensation intact to light touch.  Hallux rectus.  No pain on palpation.       Procedures        ASSESSMENT AND PLAN    Diagnoses and all orders for this visit:    1. Closed displaced fracture of first metatarsal bone of left foot with routine healing, subsequent encounter (Primary)  -     XR Foot 3+ View Left      -Repeat radiographs taken reviewed. " First metatarsal fracture healing well. Progress activity as tolerated.  Recheck as needed.          This document has been electronically signed by Brando Brice DPM on February 7, 2022 14:23 CST     2/7/2022  14:23 CST    Answers for HPI/ROS submitted by the patient on 8/18/2021  Please describe your symptoms.: Post-Op  Have you had these symptoms before?: No  How long have you been having these symptoms?: 1-2 weeks  Please list any medications you are currently taking for this condition.: Please see [facility] recorders  Please describe any probable cause for these symptoms. : n/a  What is the primary reason for your visit?: Other      Answers for HPI/ROS submitted by the patient on 8/27/2021  Please describe your symptoms.: Post op  Have you had these symptoms before?: No  How long have you been having these symptoms?: 1-4 days  What is the primary reason for your visit?: Other      Answers for HPI/ROS submitted by the patient on 9/13/2021  Please describe your symptoms.: N/A  Have you had these symptoms before?: Yes  How long have you been having these symptoms?: Greater than 2 weeks  Please list any medications you are currently taking for this condition.: N/A  Please describe any probable cause for these symptoms. : N/A  What is the primary reason for your visit?: Other

## 2022-02-09 RX ORDER — NABUMETONE 750 MG/1
TABLET, FILM COATED ORAL
Qty: 60 TABLET | Refills: 1 | Status: SHIPPED | OUTPATIENT
Start: 2022-02-09 | End: 2022-04-11

## 2022-02-20 DIAGNOSIS — F51.04 CHRONIC INSOMNIA: ICD-10-CM

## 2022-02-21 RX ORDER — HYDROXYZINE PAMOATE 50 MG/1
CAPSULE ORAL
Qty: 30 CAPSULE | Refills: 2 | Status: SHIPPED | OUTPATIENT
Start: 2022-02-21 | End: 2022-04-12

## 2022-03-03 RX ORDER — ONDANSETRON HYDROCHLORIDE 8 MG/1
TABLET, FILM COATED ORAL
Qty: 30 TABLET | Refills: 0 | Status: SHIPPED | OUTPATIENT
Start: 2022-03-03 | End: 2022-04-18

## 2022-04-11 DIAGNOSIS — F51.04 CHRONIC INSOMNIA: ICD-10-CM

## 2022-04-11 RX ORDER — NABUMETONE 750 MG/1
TABLET, FILM COATED ORAL
Qty: 60 TABLET | Refills: 1 | Status: SHIPPED | OUTPATIENT
Start: 2022-04-11 | End: 2022-06-10

## 2022-04-12 RX ORDER — HYDROXYZINE PAMOATE 50 MG/1
CAPSULE ORAL
Qty: 30 CAPSULE | Refills: 2 | Status: SHIPPED | OUTPATIENT
Start: 2022-04-12 | End: 2022-05-23 | Stop reason: SDUPTHER

## 2022-04-12 RX ORDER — RAMELTEON 8 MG/1
TABLET ORAL
Qty: 30 TABLET | Refills: 2 | Status: SHIPPED | OUTPATIENT
Start: 2022-04-12 | End: 2022-07-11

## 2022-04-14 ENCOUNTER — CLINICAL SUPPORT (OUTPATIENT)
Dept: AUDIOLOGY | Facility: CLINIC | Age: 67
End: 2022-04-14

## 2022-04-14 DIAGNOSIS — Z46.1 ENCOUNTER FOR FITTING OR ADJUSTMENT OF HEARING AID: Primary | ICD-10-CM

## 2022-04-14 PROCEDURE — HEARINGNOCHG: Performed by: AUDIOLOGIST

## 2022-04-15 NOTE — PROGRESS NOTES
HEARING AID CHECK    Name:  Mikki Joel  :  1955  Age:  67 y.o.  Date of Evaluation:  4/15/2022      HISTORY    Reason for visit:  Mikik Joel is seen today for a hearing aid warranty check.  Patient reports her hearing aids stop working at various random times.  She states she needs her hearing aids cleaned, and she would like them to be turned up.      Hearing aid history:  Patient is currently wearing a  in the Canal (ARMANI) hearing aid in both ears ear(s).     OFFICE VISIT    During today's visit hearing aids were cleaned and checked.  Wax traps and ear domes were changed using medium closed domes.  Listening check revealed hearing aids in good working condition.  Computer was used to increase master gain, more In the left hearing aid.  She reported the sound is better.      Her warranty options were discussed.  She has chosen to extend the 's warranty.  The new warranty expires 2023.  Her cost of $380.00 was paid at this time.  She will return in 1 year for another warranty check, or sooner if she is having any problems.     It was a pleasure seeing Mikki Joel in Audiology today.  It is a pleasure helping Ms. Joel with her amplification needs.             This document has been electronically signed by Shanell Berrios MS CCC-A on April 15, 2022 11:11 CDT       Shanell Berrios MS CCC-A  Licensed Audiologist    For Billing and Codin  Hearing Aid Check, Binaural - no charge

## 2022-04-18 DIAGNOSIS — G25.81 RESTLESS LEGS SYNDROME (RLS): ICD-10-CM

## 2022-04-18 RX ORDER — GABAPENTIN 600 MG/1
1200 TABLET ORAL NIGHTLY
Qty: 60 TABLET | Refills: 2 | Status: SHIPPED | OUTPATIENT
Start: 2022-04-18 | End: 2022-07-10

## 2022-04-18 RX ORDER — ONDANSETRON HYDROCHLORIDE 8 MG/1
TABLET, FILM COATED ORAL
Qty: 30 TABLET | Refills: 0 | Status: SHIPPED | OUTPATIENT
Start: 2022-04-18 | End: 2022-05-19

## 2022-04-18 NOTE — PROGRESS NOTES
Refill request for Gabapentin 1200mg for RLS.    MARQUISE reviewed:  MARQUISE Patient Controlled Substance Report (from 4/18/2021 to 4/18/2022)    Dispensed  Strength Quantity Days Supply Provider Pharmacy   03/19/2022 Gabapentin 600MG 60 each 30 [facility]   02/20/2022 Gabapentin 600MG 60 each 30 [facility]   01/20/2022 Gabapentin 600MG 60 each 30 [facility]                     Have renewed.  [id number] [address] AT [facility] [address] - [phone] PH    #60, 2 refills.     Indu Mendoza II, MD  04/18/22 @ 6:22 PM CDT

## 2022-04-23 RX ORDER — RAMELTEON 8 MG/1
TABLET ORAL
Qty: 30 TABLET | Refills: 2 | OUTPATIENT
Start: 2022-04-23

## 2022-04-25 ENCOUNTER — OFFICE VISIT (OUTPATIENT)
Dept: GASTROENTEROLOGY | Facility: CLINIC | Age: 67
End: 2022-04-25

## 2022-04-25 DIAGNOSIS — K21.00 GASTROESOPHAGEAL REFLUX DISEASE WITH ESOPHAGITIS WITHOUT HEMORRHAGE: Primary | ICD-10-CM

## 2022-04-25 DIAGNOSIS — K59.04 CHRONIC IDIOPATHIC CONSTIPATION: ICD-10-CM

## 2022-04-25 PROCEDURE — 99213 OFFICE O/P EST LOW 20 MIN: CPT | Performed by: NURSE PRACTITIONER

## 2022-04-25 RX ORDER — DEXLANSOPRAZOLE 60 MG/1
1 CAPSULE, DELAYED RELEASE ORAL DAILY
Qty: 90 CAPSULE | Refills: 1 | Status: SHIPPED | OUTPATIENT
Start: 2022-04-25 | End: 2022-10-07 | Stop reason: SDUPTHER

## 2022-04-25 NOTE — PROGRESS NOTES
Chief Complaint   Patient presents with   • Heartburn       Subjective    Mikki Palak Joel is a 67 y.o. female. she is here today for follow-up.    67-year-old female presents for recheck regarding heartburn and constipation.  Reports current medication regimen is working well for her.  Heartburn  She complains of abdominal pain and heartburn. She reports no belching, no chest pain, no choking, no coughing, no dysphagia, no early satiety, no globus sensation, no hoarse voice or no nausea. This is a chronic problem. The problem occurs occasionally. The problem has been waxing and waning. The heartburn duration is several minutes. The heartburn does not wake her from sleep. The heartburn does not limit her activity. The heartburn doesn't change with position. The symptoms are aggravated by certain foods. Associated symptoms include fatigue. Pertinent negatives include no melena. She has tried a PPI for the symptoms.   Constipation  This is a chronic problem. The current episode started more than 1 year ago. The problem has been waxing and waning since onset. Her stool frequency is 4 to 5 times per week. The stool is described as formed. The patient is on a high fiber diet. She does not exercise regularly. There has been adequate water intake. Associated symptoms include abdominal pain, bloating and flatus. Pertinent negatives include no anorexia, back pain, diarrhea, difficulty urinating, fecal incontinence, fever, hematochezia, hemorrhoids, melena, nausea, rectal pain or vomiting. She has tried laxatives, fiber, diet changes and stool softeners for the symptoms. The treatment provided no relief.            The following portions of the patient's history were reviewed and updated as appropriate:   Past Medical History:   Diagnosis Date   • Allergic rhinitis All my life    chronic infection   • Anxiety    • Asthma    • Chronic anemia    • Chronic pain    • CTS (carpal tunnel syndrome)    • Deaf    • Depression with  anxiety    • Fracture of wrist    • Gastroesophageal reflux disease    • Hypertension    • Migraine    • Obesity    • Osteoarthritis    • Perforated tympanic membrane, bilateral    • Plantar fasciitis    • PONV (postoperative nausea and vomiting)    • Sleep apnea     sleep with c-pap     Past Surgical History:   Procedure Laterality Date   • ADENOIDECTOMY     • COLONOSCOPY N/A 2018   • EAR TUBES     • ENDOSCOPIC FUNCTIONAL SINUS SURGERY (FESS) Bilateral 2017   • ENDOSCOPY N/A 2018   • ENDOSCOPY N/A 6/10/2019   • KNEE ARTHROPLASTY Right    • LAPAROSCOPIC TUBAL LIGATION     • RHINOPLASTY     • SINUS SURGERY  , , ,    • TOE FUSION Left 2021    Procedure: LEFT FIRST METATARSOPHALANGEAL JOINT ARTHRODESIS;  Surgeon: Brando Brice DPM;  Location: [facility];  Service: Podiatry;  Laterality: Left;   • TONSILLECTOMY     • TOTAL SHOULDER ARTHROPLASTY Right    • TYMPANOPLASTY Left 2018   • TYMPANOPLASTY Right 2020    Procedure: TYMPANOPLASTY AND  CARTILAGE GRAFT;  Surgeon: Ramy Gaston MD;  Location: [facility];  Service: ENT;  Laterality: Right;   • UPPER GASTROINTESTINAL ENDOSCOPY       Family History   Problem Relation Age of Onset   • Hypertension Mother    • Alcohol abuse Father    • Heart disease Father    • Cancer Father    • Stroke Maternal Grandmother    • Irritable bowel syndrome Sister    • Osteoporosis Maternal Grandfather      OB History        2    Para   2    Term   2            AB        Living           SAB        IAB        Ectopic        Molar        Multiple        Live Births                  Prior to Admission medications    Medication Sig Start Date End Date Taking? Authorizing Provider   albuterol sulfate  (90 Base) MCG/ACT inhaler Inhale 2 puffs Every 4 (Four) Hours As Needed for Wheezing.   Yes Provider, MD Lucía   cetirizine (zyrTEC) 10 MG tablet TAKE ONE TABLET BY MOUTH DAILY 22  Yes Ronald Bowser MD   Dexilant  60 MG capsule TAKE 1 CAPSULE BY MOUTH DAILY 10/12/21  Yes Zelda Rich APRN   ferrous sulfate 325 (65 FE) MG tablet Take 1 tablet by mouth Daily With Breakfast. Take with vitamin C to help with absorption 6/2/21  Yes Mila Lang APRN   gabapentin (NEURONTIN) 600 MG tablet Take 2 tablets by mouth Every Night. For Restless Leg Syndrome. 4/18/22  Yes Jimmy Kelly II, MD   hydrOXYzine pamoate (VISTARIL) 50 MG capsule TAKE 1 CAPSULE BY MOUTH AT NIGHT AS NEEDED FOR INSOMNIA 4/12/22  Yes Jimmy Kelly II, MD   ipratropium (ATROVENT) 0.06 % nasal spray 2 sprays into the nostril(s) as directed by provider 2 (two) times a day. 7/12/21  Yes Lucía Greene MD   linaclotide (Linzess) 72 MCG capsule capsule Take 1 capsule by mouth Every Morning Before Breakfast. 10/20/21  Yes Zelda Rich APRN   metoprolol succinate XL (TOPROL-XL) 100 MG 24 hr tablet TAKE 1 TABLET BY MOUTH EVERY NIGHT 1/17/22  Yes Ronald Bowser MD   mometasone (NASONEX) 50 MCG/ACT nasal spray 2 sprays into the nostril(s) as directed by provider Daily. 6/21/21  Yes Germaine Mcclelland APRN   nabumetone (RELAFEN) 750 MG tablet TAKE 1 TABLET BY MOUTH TWICE DAILY 4/11/22  Yes Brando Brice DPM   olopatadine (PATANASE) 0.6 % solution nasal solution  10/25/21  Yes ProviderLucía MD   ondansetron (ZOFRAN) 8 MG tablet TAKE 1 TABLET BY MOUTH EVERY 8 HOURS AS NEEDED FOR NAUSEA OR VOMITING 4/18/22  Yes Zelda Rich APRN   predniSONE (DELTASONE) 20 MG tablet One twice daily for 5 days and one daily for 5 days stop for skin 1/17/22  Yes Ronald Bowser MD   ramelteon (ROZEREM) 8 MG tablet TAKE ONE TABLET BY MOUTH EVERY EVENING 30 MINUTES TO 3 HOURS BEFORE BEDTIME 4/12/22  Yes Jimmy Kelly II, MD   sucralfate (CARAFATE) 1 g tablet Take 1 tablet by mouth 4 (Four) Times a Day As Needed (Reflux). 10/28/20  Yes Zelda Rich APRN   Trintellix 20 MG tablet Take 20 mg by mouth Daily. For Mood. 10/19/21  Yes Ronald Bowser  "MD BRANT     Allergies   Allergen Reactions   • Codeine Other (See Comments)     Increases heart rate   • Sulfa Antibiotics Hives     Social History     Socioeconomic History   • Marital status:    Tobacco Use   • Smoking status: Never Smoker   • Smokeless tobacco: Never Used   • Tobacco comment: Never had the desire to try smoking   Vaping Use   • Vaping Use: Never used   Substance and Sexual Activity   • Alcohol use: Yes     Alcohol/week: 2.0 standard drinks     Types: 1 Glasses of wine, 1 Standard drinks or equivalent per week     Comment: per week maybe if that much   • Drug use: Never     Types: Marijuana   • Sexual activity: Defer       Review of Systems  Review of Systems   Constitutional: Positive for fatigue. Negative for activity change, appetite change, chills, diaphoresis, fever and unexpected weight change.   HENT: Negative for hoarse voice and trouble swallowing.    Respiratory: Negative for cough and choking.    Cardiovascular: Negative for chest pain.   Gastrointestinal: Positive for abdominal pain, bloating, constipation, flatus and heartburn. Negative for abdominal distention, anal bleeding, anorexia, blood in stool, diarrhea, dysphagia, hematochezia, hemorrhoids, melena, nausea, rectal pain and vomiting.   Genitourinary: Negative for difficulty urinating.   Musculoskeletal: Negative for back pain.        /92 (BP Location: Left arm)   Pulse 89   Ht 165.1 cm (65\")   Wt 115 kg (252 lb 12.8 oz)   LMP  (LMP Unknown)   BMI 42.07 kg/m²     Objective    Physical Exam  Constitutional:       General: She is not in acute distress.     Appearance: Normal appearance. She is normal weight. She is not ill-appearing.   HENT:      Head: Normocephalic and atraumatic.   Pulmonary:      Effort: Pulmonary effort is normal.   Abdominal:      General: Abdomen is flat. Bowel sounds are normal. There is no distension.      Palpations: Abdomen is soft. There is no mass.      Tenderness: There is no abdominal " tenderness.   Neurological:      Mental Status: She is alert.       Admission on 08/12/2021, Discharged on 08/12/2021   Component Date Value Ref Range Status   • THC, Screen, Urine 08/12/2021 Positive (A) Negative Final   • Phencyclidine (PCP), Urine 08/12/2021 Negative  Negative Final   • Cocaine Screen, Urine 08/12/2021 Negative  Negative Final   • Methamphetamine, Ur 08/12/2021 Negative  Negative Final   • Opiate Screen 08/12/2021 Negative  Negative Final   • Amphetamine Screen, Urine 08/12/2021 Negative  Negative Final   • Benzodiazepine Screen, Urine 08/12/2021 Negative  Negative Final   • Tricyclic Antidepressants Screen 08/12/2021 Negative  Negative Final   • Methadone Screen, Urine 08/12/2021 Negative  Negative Final   • Barbiturates Screen, Urine 08/12/2021 Negative  Negative Final   • Oxycodone Screen, Urine 08/12/2021 Negative  Negative Final   • Propoxyphene Screen 08/12/2021 Negative  Negative Final   • Buprenorphine, Screen, Urine 08/12/2021 Negative  Negative Final     Assessment/Plan      1. Gastroesophageal reflux disease with esophagitis without hemorrhage    2. Chronic idiopathic constipation    .   Continue Dexilant daily avoid gastric irritants encouraged to try to keep losing weight.  Continue Linzess daily increase dietary fiber, adequate fluid consumption and physical activity as tolerated unless contraindicated.    Orders placed during this encounter include:  No orders of the defined types were placed in this encounter.      * Surgery not found *    Review and/or summary of lab tests, radiology, procedures, medications. Review and summary of old records and obtaining of history. The risks and benefits of my recommendations, as well as other treatment options were discussed with the patient today. Questions were answered.    New Medications Ordered This Visit   Medications   • dexlansoprazole (Dexilant) 60 MG capsule     Sig: Take 1 capsule by mouth Daily.     Dispense:  90 capsule      Refill:  1   • linaclotide (Linzess) 72 MCG capsule capsule     Sig: Take 1 capsule by mouth Every Morning Before Breakfast.     Dispense:  30 capsule     Refill:  5       Follow-up: Return in about 6 months (around 10/25/2022) for Recheck.          This document has been electronically signed by MIRIAM White on April 28, 2022 17:57 CDT           I spent 15 minutes caring for Mikki on this date of service. This time includes time spent by me in the following activities:preparing for the visit, reviewing tests, obtaining and/or reviewing a separately obtained history, performing a medically appropriate examination and/or evaluation , counseling and educating the patient/family/caregiver, ordering medications, tests, or procedures, referring and communicating with other health care professionals , documenting information in the medical record and care coordination    Results for orders placed or performed during the hospital encounter of 08/12/21   Urine Drug Screen - Urine, Clean Catch    Specimen: Urine, Clean Catch   Result Value Ref Range    THC, Screen, Urine Positive (A) Negative    Phencyclidine (PCP), Urine Negative Negative    Cocaine Screen, Urine Negative Negative    Methamphetamine, Ur Negative Negative    Opiate Screen Negative Negative    Amphetamine Screen, Urine Negative Negative    Benzodiazepine Screen, Urine Negative Negative    Tricyclic Antidepressants Screen Negative Negative    Methadone Screen, Urine Negative Negative    Barbiturates Screen, Urine Negative Negative    Oxycodone Screen, Urine Negative Negative    Propoxyphene Screen Negative Negative    Buprenorphine, Screen, Urine Negative Negative   Results for orders placed or performed in visit on 08/10/21   ECG 12 Lead   Result Value Ref Range    QT Interval 410 ms    QTC Interval 419 ms   Results for orders placed or performed in visit on 08/10/21   COVID-19, [facility] IN-HOUSE, NP SWAB IN TRANSPORT MEDIA 8-10 HR TAT - Swab,  Nasopharynx    Specimen: Nasopharynx; Swab   Result Value Ref Range    COVID19 Not Detected Not Detected - Ref. Range   Results for orders placed or performed in visit on 04/20/21   Lipid Panel With LDL / HDL Ratio    Specimen: Blood   Result Value Ref Range    Total Cholesterol 210 (H) 0 - 200 mg/dL    Triglycerides 207 (H) 0 - 150 mg/dL    HDL Cholesterol 42 40 - 60 mg/dL    LDL Cholesterol  131 (H) 0 - 100 mg/dL    VLDL Cholesterol 37 5 - 40 mg/dL    LDL/HDL Ratio 3.01    Magnesium    Specimen: Blood   Result Value Ref Range    Magnesium 2.2 1.6 - 2.4 mg/dL   Comprehensive Metabolic Panel    Specimen: Blood   Result Value Ref Range    Glucose 99 65 - 99 mg/dL    BUN 22 8 - 23 mg/dL    Creatinine 1.31 (H) 0.57 - 1.00 mg/dL    Sodium 142 136 - 145 mmol/L    Potassium 4.3 3.5 - 5.2 mmol/L    Chloride 103 98 - 107 mmol/L    CO2 26.4 22.0 - 29.0 mmol/L    Calcium 9.3 8.6 - 10.5 mg/dL    Total Protein 6.7 6.0 - 8.5 g/dL    Albumin 4.50 3.50 - 5.20 g/dL    ALT (SGPT) 20 1 - 33 U/L    AST (SGOT) 14 1 - 32 U/L    Alkaline Phosphatase 123 (H) 39 - 117 U/L    Total Bilirubin 0.4 0.0 - 1.2 mg/dL    eGFR Non African Amer 40 (L) >60 mL/min/1.73    Globulin 2.2 gm/dL    A/G Ratio 2.0 g/dL    BUN/Creatinine Ratio 16.8 7.0 - 25.0    Anion Gap 12.6 5.0 - 15.0 mmol/L   Results for orders placed or performed in visit on 01/04/21   Wound Culture - Wound, Nares    Specimen: Nares; Wound   Result Value Ref Range    Wound Culture Light growth (2+) Klebsiella oxytoca (A)     Gram Stain Rare (1+) WBCs seen     Gram Stain No organisms seen        Susceptibility    Klebsiella oxytoca - CHAR*     Ampicillin  Resistant ug/ml     Ampicillin + Sulbactam  Intermediate ug/ml     Cefepime  Susceptible ug/ml     Ceftazidime  Susceptible ug/ml     Ceftriaxone  Susceptible ug/ml     Gentamicin  Susceptible ug/ml     Levofloxacin  Susceptible ug/ml     Piperacillin + Tazobactam  Susceptible ug/ml     Tetracycline  Susceptible ug/ml     Trimethoprim +  Sulfamethoxazole  Susceptible ug/ml     * Cefazolin sensitivity will not be reported for Enterobacteriaceae in non-urine isolates. If cefazolin is preferred, please call the microbiology lab to request an E-test.  With the exception of urinary-sourced infections, aminoglycosides should not be used as monotherapy.   Results for orders placed or performed in visit on 12/15/20   Iron Profile    Specimen: Blood   Result Value Ref Range    Iron 96 37 - 145 mcg/dL    Iron Saturation 28 20 - 50 %    Transferrin 234 200 - 360 mg/dL    TIBC 349 298 - 536 mcg/dL   Ferritin Level    Specimen: Blood   Result Value Ref Range    Ferritin 200.00 (H) 13.00 - 150.00 ng/mL   Results for orders placed or performed in visit on 08/26/20   COVID-19, [facility] IN-HOUSE, NP SWAB IN TRANSPORT MEDIA 8-10 HR TAT - Swab, Nasopharynx    Specimen: Nasopharynx; Swab   Result Value Ref Range    COVID19 Not Detected Not Detected - Ref. Range   Results for orders placed or performed during the hospital encounter of 02/10/20   Gold Top - SST   Result Value Ref Range    Extra Tube Hold for add-ons.    Urinalysis With Microscopic If Indicated (No Culture) - Urine, Clean Catch    Specimen: Urine, Clean Catch   Result Value Ref Range    Color, UA Yellow Yellow, Straw, Dark Yellow, Rut    Appearance, UA Clear Clear    pH, UA 6.0 5.0 - 9.0    Specific Gravity, UA 1.002 (L) 1.003 - 1.030    Glucose, UA Negative Negative    Ketones, UA Negative Negative    Bilirubin, UA Negative Negative    Blood, UA Negative Negative    Protein, UA Negative Negative    Leuk Esterase, UA Negative Negative    Nitrite, UA Negative Negative    Urobilinogen, UA 0.2 E.U./dL 0.2 - 1.0 E.U./dL   CBC Auto Differential    Specimen: Blood   Result Value Ref Range    WBC 8.97 3.40 - 10.80 10*3/mm3    RBC 4.14 3.77 - 5.28 10*6/mm3    Hemoglobin 10.9 (L) 12.0 - 15.9 g/dL    Hematocrit 33.7 (L) 34.0 - 46.6 %    MCV 81.4 79.0 - 97.0 fL    MCH 26.3 (L) 26.6 - 33.0 pg    MCHC 32.3 31.5 - 35.7  g/dL    RDW 13.4 12.3 - 15.4 %    RDW-SD 39.4 37.0 - 54.0 fl    MPV 9.2 6.0 - 12.0 fL    Platelets 303 140 - 450 10*3/mm3    Neutrophil % 79.3 (H) 42.7 - 76.0 %    Lymphocyte % 12.0 (L) 19.6 - 45.3 %    Monocyte % 5.6 5.0 - 12.0 %    Eosinophil % 2.1 0.3 - 6.2 %    Basophil % 0.6 0.0 - 1.5 %    Immature Grans % 0.4 0.0 - 0.5 %    Neutrophils, Absolute 7.11 (H) 1.70 - 7.00 10*3/mm3    Lymphocytes, Absolute 1.08 0.70 - 3.10 10*3/mm3    Monocytes, Absolute 0.50 0.10 - 0.90 10*3/mm3    Eosinophils, Absolute 0.19 0.00 - 0.40 10*3/mm3    Basophils, Absolute 0.05 0.00 - 0.20 10*3/mm3    Immature Grans, Absolute 0.04 0.00 - 0.05 10*3/mm3    nRBC 0.0 0.0 - 0.2 /100 WBC   Light Blue Top   Result Value Ref Range    Extra Tube hold for add-on    Troponin    Specimen: Blood   Result Value Ref Range    Troponin T <0.010 0.000 - 0.030 ng/mL   Urine Drug Screen - Urine, Clean Catch    Specimen: Urine, Clean Catch   Result Value Ref Range    THC, Screen, Urine Positive (A) Negative    Phencyclidine (PCP), Urine Negative Negative    Cocaine Screen, Urine Negative Negative    Methamphetamine, Ur Negative Negative    Opiate Screen Positive (A) Negative    Amphetamine Screen, Urine Negative Negative    Benzodiazepine Screen, Urine Negative Negative    Tricyclic Antidepressants Screen Negative Negative    Methadone Screen, Urine Negative Negative    Barbiturates Screen, Urine Negative Negative    Oxycodone Screen, Urine Negative Negative    Propoxyphene Screen Negative Negative    Buprenorphine, Screen, Urine Negative Negative     *Note: Due to a large number of results and/or encounters for the requested time period, some results have not been displayed. A complete set of results can be found in Results Review.

## 2022-04-28 VITALS
BODY MASS INDEX: 42.12 KG/M2 | WEIGHT: 252.8 LBS | HEART RATE: 89 BPM | DIASTOLIC BLOOD PRESSURE: 92 MMHG | SYSTOLIC BLOOD PRESSURE: 180 MMHG | HEIGHT: 65 IN

## 2022-05-02 ENCOUNTER — OFFICE VISIT (OUTPATIENT)
Dept: FAMILY MEDICINE CLINIC | Facility: CLINIC | Age: 67
End: 2022-05-02

## 2022-05-02 VITALS
HEIGHT: 65 IN | BODY MASS INDEX: 41.65 KG/M2 | DIASTOLIC BLOOD PRESSURE: 90 MMHG | SYSTOLIC BLOOD PRESSURE: 155 MMHG | WEIGHT: 250 LBS

## 2022-05-02 DIAGNOSIS — E66.01 MORBIDLY OBESE: Chronic | ICD-10-CM

## 2022-05-02 DIAGNOSIS — F41.8 DEPRESSION WITH ANXIETY: Chronic | ICD-10-CM

## 2022-05-02 DIAGNOSIS — Z13.220 SCREENING CHOLESTEROL LEVEL: ICD-10-CM

## 2022-05-02 DIAGNOSIS — I10 ESSENTIAL HYPERTENSION, BENIGN: Primary | Chronic | ICD-10-CM

## 2022-05-02 DIAGNOSIS — Z12.31 SCREENING MAMMOGRAM FOR BREAST CANCER: ICD-10-CM

## 2022-05-02 PROCEDURE — 99214 OFFICE O/P EST MOD 30 MIN: CPT | Performed by: FAMILY MEDICINE

## 2022-05-02 RX ORDER — CHLORTHALIDONE 25 MG/1
25 TABLET ORAL DAILY
Qty: 30 TABLET | Refills: 1 | Status: SHIPPED | OUTPATIENT
Start: 2022-05-02 | End: 2022-05-23 | Stop reason: SDUPTHER

## 2022-05-02 NOTE — PROGRESS NOTES
Subjective   Mikki Joel is a 67 y.o. female.  Reevaluation hypertension morbid obesity generalized anxiety disorder depression chronic headaches diagnoses below in the interim blood pressures creeping upwards on single agent beta-blockers going to second agent.  Last lab work done on 6 months ago revealed LDL of 130 creatinine 1.3.  Is due for mammogram in July.  10 he had issues with chronic headaches which are multifactorial.  Continues on behavioral health medicines as outlined.    History of Present Illness   HPI    The following portions of the patient's history were reviewed and updated as appropriate: allergies, current medications, past family history, past medical history, past social history, past surgical history and problem list.    Review of Systems  Review of Systems   Constitutional: Negative for activity change, appetite change, fatigue and unexpected weight change.   HENT: Negative for trouble swallowing and voice change.    Eyes: Negative for redness and visual disturbance.   Respiratory: Negative for cough and wheezing.    Cardiovascular: Negative for chest pain and palpitations.   Gastrointestinal: Negative for abdominal pain, constipation, diarrhea, nausea and vomiting.   Genitourinary: Negative for urgency.   Musculoskeletal: Positive for arthralgias. Negative for joint swelling.   Neurological: Positive for headaches. Negative for syncope.   Hematological: Negative for adenopathy.   Psychiatric/Behavioral: Negative for sleep disturbance.       Objective   Physical Exam  Physical Exam  Constitutional:       Appearance: She is well-developed.   HENT:      Head: Normocephalic.   Eyes:      Pupils: Pupils are equal, round, and reactive to light.   Neck:      Thyroid: No thyromegaly.   Cardiovascular:      Rate and Rhythm: Normal rate and regular rhythm.      Heart sounds: Normal heart sounds. No murmur heard.    No friction rub. No gallop.   Pulmonary:      Breath sounds: Normal breath  "sounds.   Abdominal:      General: There is no distension.      Palpations: Abdomen is soft. There is no mass.      Tenderness: There is no abdominal tenderness.   Musculoskeletal:         General: Normal range of motion.      Cervical back: Normal range of motion.      Comments: Trace dependent edema   Skin:     General: Skin is warm and dry.   Neurological:      Mental Status: She is alert and oriented to person, place, and time.      Deep Tendon Reflexes: Reflexes are normal and symmetric.   Psychiatric:         Attention and Perception: Attention normal.         Mood and Affect: Mood normal.         Speech: Speech normal.         Behavior: Behavior normal.         Thought Content: Thought content normal.         Cognition and Memory: Cognition normal.           Visit Vitals  /90   Ht 165.1 cm (65\")   Wt 113 kg (250 lb)   LMP  (LMP Unknown)   BMI 41.60 kg/m²     Body mass index is 41.6 kg/m².  /90   Ht 165.1 cm (65\")   Wt 113 kg (250 lb)   LMP  (LMP Unknown)   BMI 41.60 kg/m²       Assessment/Plan   Diagnoses and all orders for this visit:    1. Essential hypertension, benign (Primary)  -     Magnesium; Future  -     Comprehensive Metabolic Panel; Future  -     chlorthalidone (HYGROTON) 25 MG tablet; Take 1 tablet by mouth Daily. For bp till seen again  Dispense: 30 tablet; Refill: 1    2. Morbidly obese (HCC)    3. Depression with anxiety    4. Screening cholesterol level  -     Lipid Panel With LDL / HDL Ratio; Future    5. Screening mammogram for breast cancer  -     Mammo Screening Digital Tomosynthesis Bilateral With CAD; Future     on wt loss measures and programs  Counseled on lifestyle measures diet restriction and salt exercise as before.  Add chlorthalidone low-dose to metoprolol.  Recheck in 3 weeks on blood pressure to make adjustments counseled on high potassium foods.  At that time will also be time for subsequent Medicare  Answers for HPI/ROS submitted by the patient on " 4/25/2022  Please describe your symptoms.: follow up appointment.  Have you had these symptoms before?: Yes  How long have you been having these symptoms?: Greater than 2 weeks  Please list any medications you are currently taking for this condition.: see my medical history  What is the primary reason for your visit?: Other

## 2022-05-16 DIAGNOSIS — F51.04 CHRONIC INSOMNIA: ICD-10-CM

## 2022-05-16 RX ORDER — HYDROXYZINE PAMOATE 50 MG/1
CAPSULE ORAL
Qty: 30 CAPSULE | Refills: 2 | OUTPATIENT
Start: 2022-05-16

## 2022-05-16 NOTE — TELEPHONE ENCOUNTER
90 day supply (30 days w/ 2 refills) provided in April.  Duplicate error.     Jimmy Kelly II, MD  05/16/22 @ 12:06 PM CDT

## 2022-05-19 RX ORDER — ONDANSETRON HYDROCHLORIDE 8 MG/1
TABLET, FILM COATED ORAL
Qty: 30 TABLET | Refills: 0 | Status: SHIPPED | OUTPATIENT
Start: 2022-05-19 | End: 2022-08-22 | Stop reason: SDUPTHER

## 2022-05-23 ENCOUNTER — OFFICE VISIT (OUTPATIENT)
Dept: FAMILY MEDICINE CLINIC | Facility: CLINIC | Age: 67
End: 2022-05-23

## 2022-05-23 VITALS
SYSTOLIC BLOOD PRESSURE: 144 MMHG | DIASTOLIC BLOOD PRESSURE: 84 MMHG | BODY MASS INDEX: 39.82 KG/M2 | HEIGHT: 65 IN | WEIGHT: 239 LBS

## 2022-05-23 DIAGNOSIS — F51.04 CHRONIC INSOMNIA: ICD-10-CM

## 2022-05-23 DIAGNOSIS — I10 ESSENTIAL HYPERTENSION, BENIGN: Primary | Chronic | ICD-10-CM

## 2022-05-23 DIAGNOSIS — Z00.00 MEDICARE ANNUAL WELLNESS VISIT, SUBSEQUENT: ICD-10-CM

## 2022-05-23 PROBLEM — M20.5X2 HALLUX LIMITUS OF LEFT FOOT: Chronic | Status: ACTIVE | Noted: 2021-07-14

## 2022-05-23 PROCEDURE — 99213 OFFICE O/P EST LOW 20 MIN: CPT | Performed by: FAMILY MEDICINE

## 2022-05-23 PROCEDURE — G0439 PPPS, SUBSEQ VISIT: HCPCS | Performed by: FAMILY MEDICINE

## 2022-05-23 PROCEDURE — 1159F MED LIST DOCD IN RCRD: CPT | Performed by: FAMILY MEDICINE

## 2022-05-23 PROCEDURE — 1126F AMNT PAIN NOTED NONE PRSNT: CPT | Performed by: FAMILY MEDICINE

## 2022-05-23 RX ORDER — HYDROXYZINE PAMOATE 50 MG/1
CAPSULE ORAL
Qty: 90 CAPSULE | Refills: 5 | Status: SHIPPED | OUTPATIENT
Start: 2022-05-23 | End: 2022-10-04

## 2022-05-23 RX ORDER — CHLORTHALIDONE 25 MG/1
25 TABLET ORAL DAILY
Qty: 90 TABLET | Refills: 3 | Status: SHIPPED | OUTPATIENT
Start: 2022-05-23 | End: 2022-10-19

## 2022-05-23 NOTE — PROGRESS NOTES
"Subjective   Mikki Joel is a 67 y.o. female.  Follow-up hypertension add-on medicine.  The setting chlorthalidone systolic blood pressures dropped 11 points without side effect.  Is also starting to lose weight.  Is requesting increase in Vistaril to 3 times daily as needed itching and sleep.  Otherwise feels well.  We will continue same recheck in 5months with lab prior    History of Present Illness   HPI    The following portions of the patient's history were reviewed and updated as appropriate: allergies, current medications, past family history, past medical history, past social history, past surgical history and problem list.    Review of Systems  Review of Systems   Constitutional: Negative for activity change, appetite change, fatigue and unexpected weight change.   HENT: Negative for trouble swallowing and voice change.    Eyes: Negative for redness and visual disturbance.   Respiratory: Negative for cough and wheezing.    Cardiovascular: Negative for chest pain and palpitations.   Gastrointestinal: Negative for abdominal pain, constipation, diarrhea, nausea and vomiting.   Genitourinary: Negative for urgency.   Musculoskeletal: Negative for joint swelling.   Skin: Positive for rash (Chronic pruritus).   Neurological: Negative for syncope and headaches.   Hematological: Negative for adenopathy.   Psychiatric/Behavioral: Negative for sleep disturbance.       Objective   Physical Exam  Physical Exam  Vitals reviewed.   Constitutional:       Appearance: Normal appearance. She is obese.   Neurological:      Mental Status: She is alert.   Psychiatric:         Mood and Affect: Mood normal.         Behavior: Behavior normal.         Thought Content: Thought content normal.         Judgment: Judgment normal.           Visit Vitals  /84   Ht 165.1 cm (65\")   Wt 108 kg (239 lb)   LMP  (LMP Unknown)   BMI 39.77 kg/m²     Body mass index is 39.77 kg/m².  /84   Ht 165.1 cm (65\")   Wt 108 kg (239 " lb)   LMP  (LMP Unknown)   BMI 39.77 kg/m²       Assessment/Plan   Diagnoses and all orders for this visit:    1. Essential hypertension, benign (Primary)  -     chlorthalidone (HYGROTON) 25 MG tablet; Take 1 tablet by mouth Daily. For bp till seen again  Dispense: 90 tablet; Refill: 3    2. Medicare annual wellness visit, subsequent    3. Chronic insomnia  -     hydrOXYzine pamoate (VISTARIL) 50 MG capsule; 1 3 times daily and or nightly as needed itching and sleep  Dispense: 90 capsule; Refill: 5    Counseled on same counseled on high potassium foods.  Recheck again 5 months lab prior

## 2022-05-23 NOTE — PROGRESS NOTES
The ABCs of the Annual Wellness Visit  Subsequent Medicare Wellness Visit    Chief Complaint   Patient presents with   • Hypertension      Subjective    History of Present Illness:  Mikki Joel is a 67 y.o. female who presents for a Subsequent Medicare Wellness Visit.    The following portions of the patient's history were reviewed and   updated as appropriate: allergies, current medications, past family history, past medical history, past social history, past surgical history and problem list.    Compared to one year ago, the patient feels her physical   health is the same.    Compared to one year ago, the patient feels her mental   health is the same.    Recent Hospitalizations:  She was not admitted to the hospital during the last year.       Current Medical Providers:  Patient Care Team:  Ronald Bowser MD as PCP - General (Family Medicine)    Outpatient Medications Prior to Visit   Medication Sig Dispense Refill   • albuterol sulfate  (90 Base) MCG/ACT inhaler Inhale 2 puffs Every 4 (Four) Hours As Needed for Wheezing.     • cetirizine (zyrTEC) 10 MG tablet TAKE ONE TABLET BY MOUTH DAILY 90 tablet 3   • dexlansoprazole (Dexilant) 60 MG capsule Take 1 capsule by mouth Daily. 90 capsule 1   • ferrous sulfate 325 (65 FE) MG tablet Take 1 tablet by mouth Daily With Breakfast. Take with vitamin C to help with absorption 30 tablet 5   • gabapentin (NEURONTIN) 600 MG tablet Take 2 tablets by mouth Every Night. For Restless Leg Syndrome. 60 tablet 2   • ipratropium (ATROVENT) 0.06 % nasal spray 2 sprays into the nostril(s) as directed by provider 2 (two) times a day.     • linaclotide (Linzess) 72 MCG capsule capsule Take 1 capsule by mouth Every Morning Before Breakfast. 30 capsule 5   • metoprolol succinate XL (TOPROL-XL) 100 MG 24 hr tablet TAKE 1 TABLET BY MOUTH EVERY NIGHT 90 tablet 3   • mometasone (NASONEX) 50 MCG/ACT nasal spray 2 sprays into the nostril(s) as directed by provider Daily. 17 g 0    • nabumetone (RELAFEN) 750 MG tablet TAKE 1 TABLET BY MOUTH TWICE DAILY 60 tablet 1   • olopatadine (PATANASE) 0.6 % solution nasal solution      • ondansetron (ZOFRAN) 8 MG tablet TAKE 1 TABLET BY MOUTH EVERY 8 HOURS AS NEEDED FOR NAUSEA OR VOMITING 30 tablet 0   • ramelteon (ROZEREM) 8 MG tablet TAKE ONE TABLET BY MOUTH EVERY EVENING 30 MINUTES TO 3 HOURS BEFORE BEDTIME 30 tablet 2   • sucralfate (CARAFATE) 1 g tablet Take 1 tablet by mouth 4 (Four) Times a Day As Needed (Reflux). 60 tablet 1   • Trintellix 20 MG tablet Take 20 mg by mouth Daily. For Mood. 90 tablet 1   • chlorthalidone (HYGROTON) 25 MG tablet Take 1 tablet by mouth Daily. For bp till seen again 30 tablet 1   • hydrOXYzine pamoate (VISTARIL) 50 MG capsule TAKE 1 CAPSULE BY MOUTH AT NIGHT AS NEEDED FOR INSOMNIA 30 capsule 2     No facility-administered medications prior to visit.       No opioid medication identified on active medication list. I have reviewed chart for other potential  high risk medication/s and harmful drug interactions in the elderly.          Aspirin is not on active medication list.  Aspirin use is not indicated based on review of current medical condition/s. Risk of harm outweighs potential benefits.  .    Patient Active Problem List   Diagnosis   • Asthma   • Arthritis   • Nasal turbinate hypertrophy   • Gastroesophageal reflux disease   • Depression with anxiety   • Insomnia   • PTSD (post-traumatic stress disorder)   • Restless leg   • Mixed hearing loss of left ear   • Right knee pain   • Presence of right artificial knee joint   • Essential hypertension, benign   • Presbyesophagus   • Chronic vasomotor rhinitis   • Perforated tympanic membrane, bilateral   • Mixed conductive and sensorineural hearing loss of right ear with restricted hearing of left ear   • Morbidly obese (HCC)   • Hallux limitus of left foot   • Marijuana use     Advance Care Planning  Advance Directive is not on file.  ACP discussion was held with the  "patient during this visit. Patient does not have an advance directive, information provided.          Objective    Vitals:    05/23/22 1022   BP: 144/84   Weight: 108 kg (239 lb)   Height: 165.1 cm (65\")   PainSc: 0-No pain     Class 2 Severe Obesity (BMI >=35 and <=39.9). Obesity-related health conditions include the following: hypertension. Obesity is improving with treatment. BMI is is above average; BMI management plan is completed. We discussed portion control and increasing exercise.  Does the patient have evidence of cognitive impairment? No    Physical Exam            HEALTH RISK ASSESSMENT    Smoking Status:  Social History     Tobacco Use   Smoking Status Never Smoker   Smokeless Tobacco Never Used   Tobacco Comment    Never had the desire to try smoking     Alcohol Consumption:  Social History     Substance and Sexual Activity   Alcohol Use Yes   • Alcohol/week: 2.0 standard drinks   • Types: 1 Glasses of wine, 1 Standard drinks or equivalent per week    Comment: per week maybe if that much     Fall Risk Screen:    JUAN A Fall Risk Assessment was completed, and patient is at LOW risk for falls.Assessment completed on:5/2/2022    Depression Screening:  PHQ-2/PHQ-9 Depression Screening 5/2/2022   Retired PHQ-9 Total Score -   Retired Total Score -   Little Interest or Pleasure in Doing Things 0-->not at all   Feeling Down, Depressed or Hopeless 1-->several days   PHQ-9: Brief Depression Severity Measure Score 1       Health Habits and Functional and Cognitive Screening:  No flowsheet data found.    Age-appropriate Screening Schedule:  Refer to the list below for future screening recommendations based on patient's age, sex and/or medical conditions. Orders for these recommended tests are listed in the plan section. The patient has been provided with a written plan.    Health Maintenance   Topic Date Due   • INFLUENZA VACCINE  08/01/2022   • MAMMOGRAM  07/13/2023   • PAP SMEAR  Discontinued   • DXA SCAN  " Discontinued   • TDAP/TD VACCINES  Discontinued   • ZOSTER VACCINE  Discontinued              Assessment & Plan   CMS Preventative Services Quick Reference  Risk Factors Identified During Encounter  Obesity/Overweight   The above risks/problems have been discussed with the patient.  Follow up actions/plans if indicated are seen below in the Assessment/Plan Section.  Pertinent information has been shared with the patient in the After Visit Summary.    Diagnoses and all orders for this visit:    1. Essential hypertension, benign (Primary)  -     chlorthalidone (HYGROTON) 25 MG tablet; Take 1 tablet by mouth Daily. For bp till seen again  Dispense: 90 tablet; Refill: 3    2. Medicare annual wellness visit, subsequent    3. Chronic insomnia  -     hydrOXYzine pamoate (VISTARIL) 50 MG capsule; 1 3 times daily and or nightly as needed itching and sleep  Dispense: 90 capsule; Refill: 5        Follow Up:   No follow-ups on file.     An After Visit Summary and PPPS were made available to the patient.

## 2022-06-05 ENCOUNTER — E-VISIT (OUTPATIENT)
Dept: FAMILY MEDICINE CLINIC | Facility: TELEHEALTH | Age: 67
End: 2022-06-05

## 2022-06-05 PROCEDURE — BRIGHTMDVISIT: Performed by: NURSE PRACTITIONER

## 2022-06-05 NOTE — E-VISIT TREATED
Chief Complaint: Coronavirus (COVID-19), cold, sinus pain, allergy, or flu   Patient introduction   Patient is 67-year-old female who reports congestion, itchy or watery eyes, itchy nose or sneezing, headache, and fatigue that started 10-14 days ago.   When asked why they're seeking care online today, patient reports they just want to feel better.   Patient has not requested COVID testing.   COVID-19 exposure, testing history, and vaccination status:    No known exposure to a confirmed or suspected case of COVID-19.    No recent travel outside of their local community.    Patient had a viral lab test > 3 months ago. Test result was negative.    Reports receiving 2 doses of the COVID-19 vaccine (Pfizer, Pfizer). Received their most recent dose of the vaccine > 14 days ago.   Risk factors for severe disease from COVID-19 infection:    Age 65 or older    BMI >= 25    Asthma   Warning. The following may warrant further investigation:    Hypertension   Patient-submitted comments: I just want to get rid of my headache and sinus pressure, I have tried OTC pain meds and OTC allergy and decongestion meds. I just some relief. .   Patient did not request an excuse note.   General presentation   Patient denies improvement of symptoms. Symptoms came on gradually.   Fever:    Denies fever.   Sinus and nasal symptoms:    Reports itchy nose or sneezing.    Reports green nasal drainage.    Nasal drainage is thick.    Reports postnasal drip.    Reports 1-3 episodes of antibiotic treatment for sinus infection in the last year.    Reports history of surgically corrected septal deviation or nasal polyps.    Reports congestion with sinus pain or pressure on or around their eyes, nose, cheeks, and upper teeth or jaw.    Patient first noticed sinus pain 5-9 days ago.    Sinus pain is worse with Valsalva.    Denies nasal discharge.    Denies history of unhealed nasal septal ulcer/nasal wound.   Sore throat:    Denies sore throat.   Head  and body aches:    Reports headache, described as moderate (4-6 on a scale of 1-10).    Reports fatigue.    Denies sweats.    Denies chills.    Denies myalgia.   Cough:    Denies cough.   Wheezing and SOB:    Reports having asthma.    Reports using an albuterol inhaler for asthma.    Reports using albuterol every 6 hours or longer.    Patient requests a prescription of albuterol in the form of an inhaler.    Denies COPD diagnosis.    Denies wheezing.    Denies shortness of breath.    Denies current cold symptoms aggravate their asthma.   Chest pain:    Denies chest pain.   Ear pressure/pain:    Reports pressure, fullness, and crackling or popping.   Dizziness:    Denies dizziness.   Allergies:    Reports history of allergies.    Patient has known seasonal allergies and known dust allergies.   Flu exposure:    Denies recent exposure to confirmed flu diagnosis.    Reports a flu vaccine more than 6 months ago.   Patient denies the following red flags:    Changes in alertness or awareness    Symptoms suggesting respiratory distress    Symptoms suggesting intracranial hemorrhage    Decreased urination   Pregnancy/menstrual status/breastfeeding:    Patient is postmenopausal   Self-exam:    No difficulty moving their chin toward their chest    Neck lymph nodes feel normal    Mild periorbital edema    Denies antibiotic treatment for similar symptoms within the past month   Current medications   Reports taking over-the-counter medication for current symptoms. Patient has taken cetirizine, diphenhydramine, fluticasone, ibuprofen, loratadine, phenylephrine, and triamcinolone.   Reports taking Nasalcrom, Hydrochlorothiazide / Metoprolol Oral Tablet, Chlorthalidone, dexlansoprazole 60 MG, gabapentin, ramelteon 8 MG Oral Tablet, and Hydroxyzine.   Medication allergies   None reported.   Medication contraindication review   Reports history positive for depression and hypertension. Therefore, the following medication(s) will not be  prescribed:    Metoclopramide    Acetaminophen-diphenhydramine-phenylephrine    Aspirin-chlorpheniramine-phenylephrine   Denies history of metoclopramide-associated dystonic reaction and tardive dyskinesia.   No known history of amoxicillin-clavulanate-associated cholestatic jaundice or hepatic impairment.   No known history of azithromycin-associated cholestatic jaundice or hepatic impairment.   Past medical history   Immune conditions: Denies immunocompromising conditions. Denies history of cancer.   Social history   High-risk household contacts: Patient's household includes one or more members of a group with risk factors for influenza complications, including a person >= 65 years.   Non-smoker.   Assessment   Bacterial sinusitis. Ruled out: Traumatic laryngitis.   This is the likely diagnosis based on patient's interview responses, including:    Congestion or sinus pressure    Thick nasal discharge    Yellow or green mucus    Duration of symptoms > 10 days    No improvement of symptoms   HHS required information for COVID-19 lab data reporting (if test is ordered):   Symptoms:    Fatigue    Headache    Nasal congestion    Itchy nose or sneezing    Itchy or watery eyes   Symptom onset: 10-14 days ago ago  Pregnant? No  Healthcare worker? No  Resident in a congregate care setting? No  Previous history of COVID-19 testing: Patient had a viral lab test > 3 months ago. Test result was negative.     Plan   Medications:    amoxicillin 875 mg-potassium clavulanate 125 mg tablet RX 875mg/125mg 1 tab PO bid 10d for infection. This medication is an antibiotic. Take it exactly as directed. You must finish the entire course of medication, even if you feel better after the first few days of treatment. Amount is 20 tab.    fluticasone 50 mcg/actuation nasal spray,suspension OTC 50mcg 2 sprays each nostril nasal qd 30d for nasal symptoms due to allergies or sinusitis. Fluticasone needs to be used every day to be effective. It  may take up to a week for the full effects of the medication to be seen. Brands to look for include Flonase. Amount is 16 g.   The patient's prescription will be sent to:   [email or web address][id number]   [address] [id number]   Phone: [phone]     Fax: [phone]   Patient informed to purchase OTC medication.   Education:    Condition and causes    Prevention    Treatment and self-care    When to call provider   ----------   Electronically signed by MIRIAM Dimas on 2022-06-05 at 12:53PM   ----------   Patient Interview Transcript:   Why are you getting care through eVisit today? We can't guarantee a specific treatment or test. Your provider will decide what's best for you. Select all that apply.    I just want to feel better!   Not selected:    I want to know if I have a cold or something more serious    I want to know if I need to be seen by a provider    I need a doctor's note    I want to be tested for COVID-19    I want to get the COVID-19 vaccine    I think I'm having side effects from the COVID-19 vaccine    None of the above   COVID-19 symptoms are similar to symptoms of other illnesses. This makes it difficult to diagnose. Please carefully consider each question and answer as best you can. This will help your provider make the right diagnosis. Which of these symptoms are bothering you? Select all that apply.    Stuffed-up nose or sinuses    Itchy or watery eyes    Itchy nose or sneezing    Headache    Fatigue or tiredness   Not selected:    Cough    Shortness of breath    Fever    Runny nose    Loss of smell or taste    Sore throat    Hoarse voice or loss of voice    Sweats    Chills    Muscle or body aches    Nausea or vomiting    Diarrhea    I don't have any of these symptoms   Do you have any of these conditions? These conditions can put you at increased risk for severe disease if you're infected with COVID-19. Select all that apply.    None of the above   Not  selected:    Chronic lung disease, such as cystic fibrosis or interstitial fibrosis    Heart disease, such as congenital heart disease, congestive heart failure, or coronary artery disease    Disorder of the brain, spinal cord, or nerves and muscles, such as dementia, cerebral palsy, epilepsy, muscular dystrophy, or developmental delay    Metabolic disorder or mitochondrial disease    Cerebrovascular disease, such as stroke or another condition affecting the blood vessels or blood supply to the brain    Down syndrome    Mood disorder, including depression or schizophrenia spectrum disorders    Substance use disorder, such as alcohol, opioid, or cocaine use disorder    Tuberculosis   Do you live in a group care setting? Examples include: - Nursing home - Residential care - Psychiatric treatment facility - Group home - [facility] - [facility] and care home - Homeless shelter - Foster care setting Select one.    No   Not selected:    Yes   Have you ever been tested for COVID-19? Select one.    Yes   Not selected:    No   When was your most recent COVID-19 test? Select one.    More than 3 months ago   Not selected:    Within the last week (specify date as MM/DD/YY)    7 to 14 days ago    15 to 30 days ago    1 to 3 months ago   What type of COVID-19 test did you most recently have? There are two types of COVID-19 tests: - Viral tests check if you're currently infected with COVID-19. For these tests, a nose swab or saliva sample is taken. Viral tests include self-tests and tests done at a doctor's office, lab, or testing site. - Antibody tests check if you've been infected in the past. For these tests, your blood is drawn. Antibody tests can only be done at a doctor's office, lab, or testing site. Select one.    Viral test at a doctor's office, lab, or testing site   Not selected:    Viral self-test    Antibody test   What was the result of your most recent COVID-19 test? Select one.    Negative   Not selected:    Positive    " I'm not sure   Have you gotten the COVID-19 vaccine? Select one.    Yes   Not selected:    No   How many doses of the COVID-19 vaccine have you gotten? This includes boosters as well as third or fourth doses for those who are immunocompromised. Select one.    2 doses   Not selected:    1 dose    3 doses    4 doses   Which COVID-19 vaccine did you get for your first dose? Check your Vaccination Record Card under Product Name/. Select one.    Pfizer-BioNTech (Pfizer)   Not selected:    Mert & Mert's Wm Vaccine (J&J/Wm)    Moderna   Which COVID-19 vaccine did you get for your second dose? Check your Vaccination Record Card under Product Name/. Select one.    Pfizer-BioNTech (Pfizer)   Not selected:    Mert & Mert's Wm Vaccine (J&J/Wm)    Moderna   When did you get your most recent dose of the COVID-19 vaccine?    More than 14 days ago   Not selected:    Less than 48 hours (2 days) ago    48 to 72 hours (3 days) ago    3 to 5 days ago    5 to 7 days ago    7 to 14 days ago   In the last 14 days, have you traveled outside of your local community? This includes travel by car, RV, bus, train, or plane. Travel increases your chances of getting and spreading COVID-19. Select one.    No   Not selected:    Yes   In the last 14 days, have you had close contact with someone who has coronavirus (COVID-19)? \"Close contact\" means any of these: - Living in the same household as someone with COVID-19. - Caring for someone with COVID-19. - Being within 6 feet of someone with COVID-19 for a total of at least 15 minutes over a 24-hour period. For example, three 5-minute exposures for a total of 15 minutes. - Being in direct contact with respiratory droplets from someone with COVID-19 (being coughed on, kissing, sharing utensils). Select one.    No, not that I know of   Not selected:    Yes, a confirmed case    Yes, a suspected case   When did your current symptoms start? Select " "one.    10 to 14 days ago   Not selected:    Less than 48 hours ago    3 to 5 days ago    6 to 9 days ago    2 to 4 weeks ago    More than a month ago   Do you know the exact date your symptoms started? If so, enter the date as MM/DD/YY. Select one.    No   Not selected:    Yes (specify)   Did your symptoms come on suddenly or gradually? Select one.    Gradually   Not selected:    Suddenly    I'm not sure   Have your symptoms improved at all since they began? Select one.    No   Not selected:    Yes, but they haven't gone away completely    Yes, but then they came back worse than before    I'm not sure   You mentioned having a headache. On a scale of 1 to 10, how severe is your headache pain? Select one.    Moderate (4 to 6)   Not selected:    Mild (1 to 3)    Severe (7 to 9)    Unbearable (10)    The worst headache of my life (10+)   You mentioned having a stuffy nose or sinus congestion. Do you feel pain or pressure in your sinuses?    Yes   Not selected:    No    I'm not sure   Where do you feel sinus pain or pressure?    Around my eyes    Behind my nose    In my cheeks    In my upper teeth or jaw   Not selected:    In my forehead    I'm not sure   When did you first notice your sinus pain or pressure? Select one.    5 to 9 days ago   Not selected:    Less than 5 days ago    10 to 14 days ago    2 to 4 weeks ago    1 month ago or longer   Does coughing, sneezing, or leaning forward make your sinuses feel worse? Select one.    Yes   Not selected:    No    I'm not sure   What color is your nasal drainage? Select one.    Green   Not selected:    Clear    White    Yellow    My nose is stuffed but not draining or running    I'm not sure   Is your nasal drainage thick or thin? Select one.    Thick   Not selected:    Thin    I'm not sure   Is there any drainage (mucus) going down the back of your throat? This kind of drainage is also called \"postnasal drip.\" Select one.    Yes   Not selected:    No    I'm not sure   " Since your symptoms started, have you felt dizzy? Select one.    No   Not selected:    Yes, but I can continue with my regular daily activities    Yes, and it makes it hard to stand, walk, or do daily activities   Do you have chest pain? You might also feel it as discomfort, aching, tightness, or squeezing in the chest. Select one.    No   Not selected:    Yes   Have you urinated at least 3 times in the last 24 hours? Select one.    Yes   Not selected:    No    I'm not sure   Changes in alertness or awareness may mean you need emergency care. Since your symptoms started, have you had any of these? Select all that apply.    None of the above   Not selected:    Confusion    Slurred speech    Not knowing where you are or what day it is    Difficulty staying conscious    Fainting or passing out   Do your symptoms include a whistling sound, or wheezing, when you breathe? Select one.    No   Not selected:    Yes    I'm not sure   Are your eyelids or the areas around your eyes puffy? Select one.    Yes, but I can easily open my eye(s)   Not selected:    Yes, and it's hard to open my eye(s)    Yes, and my eye(s) are completely swollen shut    No   Do you have any of these symptoms in your ear(s)? Select all that apply.    Pressure    Fullness    Crackling or popping   Not selected:    Pain    Plugged or blocked sensation    None of the above   Can you move your chin toward your chest?    Yes   Not selected:    No, my neck is too stiff   Are your glands/lymph nodes swollen, or does it hurt when you touch them?    No   Not selected:    Yes    I'm not sure   People with a very high body mass index (BMI) are at higher risk for developing complications from the flu and severe illness from COVID-19. To determine your BMI, we need to know your weight and height. Please enter your weight (in pounds).    Weight   Please enter your height.    Height   In the past week, has anyone around you (such as at school, work, or home) had a  confirmed diagnosis of the flu? A confirmed diagnosis means that a nose swab was done to verify a flu infection. Select all that apply.    No   Not selected:    I live with someone who has the flu    I've been within touching distance of someone who has the flu    I've walked by, or sat about 3 feet away from, someone who has the flu    I've been in the same building as someone who has the flu    I'm not sure   Have you ever been diagnosed with asthma? Select one.    Yes   Not selected:    No   Are your cold symptoms making your asthma worse? Select one.    No   Not selected:    Yes   What medications or inhalers are you currently using for your asthma? Select all that apply.    Albuterol inhaler, as needed (such as ProAir, Proventil, Ventolin)   Not selected:    Inhaled steroid (such as Qvar, Pulmicort, Flovent)    Inhaled steroid with long-acting bronchodilator (such as Advair, Dulera, Symbicort)    I'm not sure    I'm not taking any medications for my asthma    I usually take medications for my asthma, but I ran out   How often are you using albuterol? If you're using albuterol very frequently, you'll need to be seen in person. Select one.    Every 6 hours or longer   Not selected:    More than once an hour    Every 1 to 2 hours    Every 2 to 3 hours    Every 3 to 4 hours    Every 4 to 6 hours   Would you like a prescription for albuterol? Select one.    Yes   Not selected:    No   What form of albuterol do you need? Select one.    Inhaler   Not selected:    Nebulizer solution   Have you ever been diagnosed with chronic obstructive pulmonary disease (COPD)? Select one.    No   Not selected:    Yes    I'm not sure   In the last year, how many times were you treated with antibiotics for a sinus infection? Select one.    1 to 3 times   Not selected:    None    4 or more times   Have you been diagnosed with a deviated septum or nasal polyps? The nose is divided into two nostrils by the septum. A crooked septum is  called a deviated septum. Nasal polyps are growths inside the nose or sinuses. Select one.    Yes, but I had surgery to treat them   Not selected:    Yes, I have a deviated septum    Yes, I have nasal polyps    Yes, I have a deviated septum and nasal polyps    No    I'm not sure   Do you have a sore inside your nose that won't heal? Select one.    No   Not selected:    Yes    I'm not sure   Do you have allergies (pollen, dust mites, mold, animal dander)? Select one.    Yes   Not selected:    No    I'm not sure   What kind of allergies do you have? Select all that apply.    Seasonal allergies (hay fever)    Dust allergies   Not selected:    Pet allergies    None of the above    I'm not sure   Do you think your symptoms could be allergy-related? Select one.    I'm not sure   Not selected:    Yes    No   Have you had a flu shot this season? Select one.    Yes, more than 6 months ago   Not selected:    Yes, less than 2 weeks ago    Yes, 2 to 4 weeks ago    Yes, 1 to 3 months ago    Yes, 3 to 6 months ago    I'm not sure    No   The flu and COVID-19 can be more serious for people with certain conditions or characteristics. These questions help us figure out if you or anyone you live with is at higher risk for complications from these infections. Do either of these statements apply to you? Select all that apply.    None of the above   Not selected:    I'm  or Native [place]    I'm a healthcare worker   Do you smoke tobacco? Select one.    No, never   Not selected:    Yes, every day    Yes, some days    No, I quit   Some conditions can put you at risk for more serious infections. Do any of these apply to you? Select all that apply.    None of the above   Not selected:    I've been hospitalized within the last 5 days    I have diabetes    I'm in close contact with a child in    Are you currently being treated for any of these conditions? Scroll to see all options. Select all that apply.    Depression     High blood pressure   Not selected:    Aspirin triad (also known as Samter's triad or ASA triad)    Asthma or hives from taking aspirin or other NSAIDs, such as ibuprofen or naproxen    Blockage or narrowing of the blood vessels of the heart    Blood dyscrasia, such anemia, leukemia, lymphoma, or myeloma    Bone marrow depression    Catecholamine-releasing paraganglioma    Blood clotting disorder    Congenital long QT syndrome    Difficulty urinating or completely emptying your bladder    Uncorrected electrolyte abnormalities    Fungal infection    Gastrointestinal (GI) bleeding    Gastrointestinal (GI) obstruction    G6PD deficiency    Recent heart attack    Irregular heartbeat or heart rhythm    Kidney disease or hemodialysis    Mononucleosis (mono)    Myasthenia gravis    Parkinson's disease    Pheochromocytoma    Reye syndrome    Seizure disorder    Ulcerative colitis    None of the above   Do you have any of these conditions that can affect the immune system? Scroll to see all options. Select all that apply.    None of these   Not selected:    History of bone marrow transplant    Chronic kidney disease    Chronic liver disease (including cirrhosis)    HIV/AIDS    Inflammatory bowel disease (Crohn's disease or ulcerative colitis)    Lupus    Moderate to severe plaque psoriasis    Multiple sclerosis    Rheumatoid arthritis    Sickle cell anemia    Alpha or beta thalassemia    History of solid organ transplant (kidney, liver, or heart)    History of spleen removal    An autoimmune disorder not listed here    A condition requiring treatment with long-term use of oral steroids (such as prednisone, prednisolone, or dexamethasone)   Have you ever been diagnosed with cancer? Select one.    No   Not selected:    Yes, I have cancer now    Yes, but I'm in remission   Have you ever had either of these conditions? Select all that apply.    No   Not selected:    Metoclopramide-associated dystonic reaction    Tardive  dyskinesia   Have you gone through menopause? Select one.    Yes   Not selected:    No    I'm going through it now   Do any of these apply to the people who live with you? Select all that apply.    An adult 65 or older   Not selected:    A child under the age of 5    A person who is pregnant    A person who has given birth, had a miscarriage, had a pregnancy loss, or had an  in the last 2 weeks    An  or Native [place]    None of the above   Does any member of your household have any of these medical conditions? Select all that apply.    None of the above   Not selected:    Asthma    Disorders of the brain, spinal cord, or nerves and muscles, such as dementia, cerebral palsy, epilepsy, muscular dystrophy, or developmental delay    Chronic lung disease, such as COPD or cystic fibrosis    Heart disease, such as congenital heart disease, congestive heart failure, or coronary artery disease    Cerebrovascular disease, such as stroke or another condition affecting the blood vessels or blood supply to the brain    Blood disorders, such as sickle cell disease    Diabetes    Metabolic disorders such as inherited metabolic disorders or mitochondrial disease    Kidney disorders    Liver disorders    Weakened immune system due to illness or medications such as chemotherapy or steroids    Children under the age of 19 who are on long-term aspirin therapy    Extreme obesity (BMI > 40)   Just a few more questions about medications, and then you're finished. Have you used any non-prescription medications or nasal sprays for your current symptoms? Examples include saline sprays, decongestants, NyQuil, and Tylenol. Select one.    Yes   Not selected:    No   Which of these non-prescription medications have you tried? Scroll to see all options. Select all that apply.    Cetirizine (Zyrtec)    Diphenhydramine (Benadryl)    Fluticasone (Flonase)    Ibuprofen (Advil, Motrin, Midol)    Loratadine (Alavert,  Claritin)    Phenylephrine (Sudafed)    Triamcinolone (Nasacort)   Not selected:    Acetaminophen (Tylenol)    Budesonide (Rhinocort)    Chlorpheniramine (Aller-chlor, Chlor-Trimeton)    Cromolyn (NasalCrom)    Dextromethorphan (Delsym, Robitussin, Vicks DayQuil Cough)    Fexofenadine (Allegra)    Guaifenesin (Mucinex)    Guaifenesin/dextromethorphan (Delsym DM, Mucinex DM, Robitussin DM)    Ketotifen (Alaway, Zaditor)    Naphazoline-pheniramine (Naphcon-A, Opcon-A, Visine-A)    Omeprazole (Prilosec)    Oxymetazoline (Afrin)    None of the above   In the past month, have you taken antibiotics for similar symptoms? Examples of antibiotics include amoxicillin, amoxicillin-clavulanate (Augmentin), penicillin, cefdinir (Omnicef), doxycycline, and clindamycin (Cleocin). Select one.    No   Not selected:    Yes    I'm not sure   Have you taken any monoamine oxidase inhibitor (MAOI) medications in the last 14 days? Examples include rasagiline (Azilect), selegiline (Eldepryl, Zelapar), isocarboxazid (Marplan), phenelzine (Nardil), and tranylcypromine (Parnate). Select one.    No   Not selected:    Yes    I'm not sure   Do you take Kynmobi or Apokyn (apomorphine)? Select one.    No   Not selected:    Yes    I'm not sure   Are you taking any other medications or supplements? On the next screen, you need to list all vitamins, supplements, non-prescription medications (such as aspirin or Aleve), and prescription medications that you're taking. Select one.    Yes   Not selected:    Yes, but I'm not sure what they are    No   Have you ever had an allergic or bad reaction to any medication? Select one.    Yes   Not selected:    No   Have you had an allergic or bad reaction to any of these medications? Select all that apply.    No, not that I know of   Not selected:    Baloxavir (Xofluza)    Benzonatate (Tessalon Perles)    Fluconazole, itraconazole, or terconazole (brands include Diflucan, Sporanox, Terazol)    Oseltamivir  "(Tamiflu) or zanamivir (Relenza)    Paxlovid, nirmatrelvir, or ritonavir (Norvir)   Have you had an allergic or bad reaction to any of these antibiotic medications? Select all that apply.    None of the above   Not selected:    Penicillin or any \"-cillin\" antibiotic, such as amoxicillin, ampicillin, dicloxacillin, nafcillin, or piperacillin (Brands include Augmentin, Unasyn, and Zosyn)    Tetracycline or any \"-cycline\" antibiotic, such as doxycycline, demeclocycline, minocycline (Brands include Declomycin, Doryx, Dynacin, Oracea, Monodox, Panmycin, and Vibramycin)    Ciprofloxacin or any \"-floxacin\" antibiotic, such as gemifloxacin, levofloxacin, moxifloxacin, or ofloxacin (Brands include Factive, Cipro, Floxin, and Levaquin)    Cephalexin or any \"cef-\" antibiotic, such as cefazolin, cefdinir, cefuroxime, ceftriaxone, ceftazidime, or cefepime (Brands include Ancef, Ceftin, Fortaz, Keflex, Maxipime, Rocephin, and Simplicef)    Azithromycin or any \"-thromycin\" antibiotic, such as erythromycin or clarithromycin (Brands include Biaxin, Erythrocin, Z-yobani, and Zithromax)    Clindamycin or lincomycin (Brands include Cleocin and Lincocin)    I'm not sure   Have you had an allergic or bad reaction to any of these medications? Select all that apply.    None of the above   Not selected:    Albuterol or a similar medication    Corticosteroid (steroid) medication, including topical steroids, inhaled steroids, nasal steroids, or oral steroids (budesonide, ciclesonide, dexamethasone, flunisolide, fluticasone, methylprednisolone, triamcinolone, prednisone (or brand names Alvesco, Deltasone, Flovent, Medrol, Nasacort, Rhinocort, or Veramyst)    Metoclopramide (Reglan)    Ondansetron (Zuplenz, Zofran ODT, Zofran)    Prochlorperazine (Compazine)    I'm not sure   Have you had an allergic or bad reaction to any of these eye drops or nasal sprays? Scroll to see all options. Select all that apply.    None of the above   Not selected:   "  Azelastine (Astelin, Astepro, Optivar)    Cromolyn (Crolom, NasalCrom)    Ipratropium (Atrovent)    Ketotifen (Alaway, Zaditor)    Pheniramine/naphazoline (Naphcon-A, Opcon-A, Visine-A)    Olopatadine (Pataday, Patanol, Pazeo)    I'm not sure   Have you had an allergic or bad reaction to any of these non-prescription medications? Scroll to see all options. Select all that apply.    None of the above   Not selected:    Acetaminophen (Tylenol)    Aspirin    Cetirizine (Zyrtec)    Chlorpheniramine (Aller-chlor, Chlor-Trimeton)    Dextromethorphan (Delsym, Robitussin, Vicks DayQuil Cough)    Diphenhydramine (Benadryl)    Fexofenadine (Allegra)    Guaifenesin (Mucinex)    Guaifenesin/dextromethorphan (Delsym DM, Mucinex DM, Robitussin DM)    Ibuprofen (Advil, Motrin, Midol)    Loratadine (Alavert, Claritin)    Oxymetazoline (Afrin)    Phenylephrine (Sudafed)    I'm not sure   Are you allergic to milk or to the proteins found in milk (for example, whey or casein)? A milk allergy is different from lactose intolerance. Select one.    No   Not selected:    Yes    I'm not sure   Have you ever had jaundice or liver problems as a result of taking amoxicillin-clavulanate (Augmentin)? Jaundice is a condition in which the skin and the whites of the eyes turn yellow. Select all that apply.    No, not that I know of   Not selected:    Yes, jaundice    Yes, liver problems   Have you ever had jaundice or liver problems as a result of taking azithromycin (Zithromax, Zmax)? Jaundice is a condition in which the skin and the whites of the eyes turn yellow. Select all that apply.    No, not that I know of   Not selected:    Yes, jaundice    Yes, liver problems   Do you need a doctor's note? A doctor's note confirms that you received care today and states when you can return to school or work. It does not contain information about your diagnosis or treatment plan. Your provider will make the final decision on whether to give you a doctor's  note and for how long. Doctor's notes CANNOT be backdated. We can't provide medical leave paperwork through this type of visit. If more paperwork is needed to request time off, contact your primary care provider. Select one.    No   Not selected:    Today only (1 day)    Today and tomorrow (2 days)    3 days    7 days    10 days    14 days   Is there anything else you'd like to tell us about your symptoms?    I just want to get rid of my headache and sinus pressure, I have tried OTC pain meds and OTC allergy and decongestion meds. I just some relief.   ----------   Medical history   Medical history data does not currently exist for this patient.

## 2022-06-05 NOTE — EXTERNAL PATIENT INSTRUCTIONS
Note   Drink plenty of water Over the counter pain relievers okay If symptoms do not improve in 3-5 days follow up with your primary care provider or urgent care   Diagnosis   Bacterial sinusitis   My name is Germaine Mcclelland, and I'm a healthcare provider at [facility]. I reviewed your interview, and I see that you have bacterial sinusitis.   To prevent the spread of illness to others, I recommend that you stay home and away from other people as much as possible while you're sick.   Medications   Your pharmacy   [id number] [address] [id number] [phone]     Prescription   Amoxicillin-clavulanate (875mg/125mg): Take 1 tablet by mouth twice a day for 10 days for infection. This medication is an antibiotic. Take it exactly as directed. You must finish the entire course of medication, even if you feel better after the first few days of treatment.    Start taking the antibiotics I've prescribed right away. You need to finish the entire course of antibiotics, even if you start to feel better before the pills run out.   Non-prescription   Fluticasone (50mcg): Spray 2 times in each nostril daily for nasal symptoms due to allergies or sinusitis. Fluticasone needs to be used every day to be effective. It may take up to a week for the full effects of the medication to be seen. Brands to look for include Flonase.   Some women develop yeast infections after taking antibiotics. If you develop a yeast infection, you can treat it with antifungal creams or suppositories. These are available without a prescription at [facility] and many supermarkets.   About your diagnosis   The sinuses are hollow spaces connected to the nasal passages. Sinusitis occurs when the sinuses swell and block the drainage of fluid and mucus from the nose, causing pain, pressure, and congestion. Fatigue, difficulty sleeping, or decreased appetite may accompany your symptoms.   More than 90% of sinus  infections are caused by viruses. However, in certain cases, a sinus infection may be caused by bacteria. Bacterial sinus infections usually look like one of the following cases:    Severe sinus symptoms with a fever over 102F.    Sinus symptoms that have not improved at all after 10 days.    Cold symptoms that slowly improve but then worsen again after 5 or 6 days, usually with a high fever, headache, or nasal discharge.   What to expect   If you follow this treatment plan, you should start to feel better within a few days.   You can return to your normal activities when ALL of the following are true:    You've been fever-free for more than 24 hours without using fever-reducing medications such as Tylenol    Your other symptoms have improved    It's been at least 5 full days since your symptoms first started   When to seek care   Call us at [phone]   with any sudden or unexpected symptoms.    Symptoms that last longer than 10 to 14 days.    Symptoms that get better for a few days, and then suddenly get worse.    Fever that measures over 103F or continues for more than 3 days.    Any vision changes.    Your headache worsens.    Stiff neck.    Swelling of your forehead or eyes.    Coughing up red or bloody mucus.    Swallowing becomes extremely difficult or impossible.    More than 5 episodes of diarrhea in a day.    More than 5 episodes of vomiting in a day.    Severe shortness of breath.    Severe chest pain   Other treatment    Rest! Your body needs rest to recover and fight infection.    Drink plenty of water to stay hydrated.    Use steam to soothe your sinuses: Breathe it in from a shower or a bowl of hot water. Placing a warm, moist washcloth over your nose and forehead may help relieve the sinus pain and pressure.    Try non-prescription saline nasal sprays to help your nasal symptoms. Try using a Neti Pot to flush out your stuffy nose and sinuses. Neti Pots are available at any drugstore without a  prescription.    Avoid smoke and air pollution. Smoke can make infections worse.   Prevention    Avoid close contact with other people when you're sick.    Cover your mouth and nose when you cough or sneeze. Use a tissue or cough into your elbow. Make sure that used tissues go directly into the trash.    Avoid touching your eyes, nose, or mouth while you're sick.    Wash your hands often, especially after coughing, sneezing, or blowing your nose. If soap and water are not available, use an alcohol-based hand .    If you or someone in your home or workplace is sick, disinfect commonly used items. This includes door handles, tables, computers, remotes, and pens.    Coronavirus (COVID-19) information   Common symptoms of COVID-19 include fever, cough, shortness of breath, fatigue, muscle or body aches, headaches, new loss of sense of taste or smell, sore throat, stuffy or runny nose, nausea or vomiting, and diarrhea. Most people who get COVID-19 have mild symptoms and can rest at home until they get better. Elderly people and those with chronic medical problems may be at risk for more serious complications.   FAQs about the COVID-19 vaccine   There are three authorized COVID-19 vaccines: [employer]'s Wm Vaccine (J&J/Wm), Moderna, and Pfizer-BioNTech (Pfizer). The J&J/Wm and Moderna vaccines are approved for use in people aged 18 and older. The Pfizer vaccine is approved for those aged 5 and older. All three are available at no cost. Even if you don't have health insurance, you can still get the COVID-19 vaccine for free.   Which vaccine is the best? Which vaccine should I get?   All three vaccines are highly effective. Even if you get COVID-19 after being vaccinated, all of the vaccines help prevent severe disease, hospitalization, and complications.   Most people should get whichever vaccine is first available to them. However, women younger than 50 years old should consider the rare  risk of blood clots with low platelets after vaccination with the J&J/Wm vaccine. This risk hasn't been seen with the other two vaccines.   Are the vaccines safe?   Yes. Hundreds of millions of people in the [place] have already safely received COVID-19 vaccines. As part of Phase 3 clinical trials in the [place] and other countries, researchers collected safety and efficacy data for all three vaccines. These clinical trials follow strict standards. Before a vaccine is approved, the manufacturing company must submit data to the Food and Drug Administration (FDA) for review. Tens of thousands of volunteers participated in the clinical trials for the vaccines. The FDA continues to monitor safety data as the vaccines are given to the general population.   Do I need the vaccine if I've already had COVID?   Yes. Vaccination helps protect you even if you've already had COVID.   If you had COVID-19 and had symptoms, wait to get vaccinated until ALL of the following are true:    5 full days since your symptoms started    24 hours with no fever, without the use of fever-reducing medications    Your other COVID-19 symptoms are improving   If you tested positive for COVID-19 but did not have symptoms, you can get vaccinated after 5 full days have passed since you had a positive test, as long as you don't develop symptoms.   If you had COVID-19 and were treated with monoclonal antibodies, you should wait 90 days before getting a COVID-19 vaccination.   How many doses of the vaccine do I need?   Visit www.cdc.gov/coronavirus/2019-ncov/vaccines/stay-up-to-date.html   to find out how to stay up to date with your COVID-19 vaccines.   I'm immunocompromised. How many doses of the vaccine do I need?   For information on how immunocompromised people can stay up to date with their COVID-19 vaccines, visit www.cdc.gov/coronavirus/2019-ncov/vaccines/recommendations/immuno.html  .   What are the common side effects of the vaccine?   A sore arm,  tiredness, headache, and muscle pain may occur within two days of getting the vaccine and last a day or two. For the Moderna or Pfizer vaccines, side effects are more common after the second dose. People over the age of 55 are less likely to have side effects than younger people.   After I'm up to date on vaccines, can I still get or spread COVID?   Yes, but your disease should be milder. And your risk of serious illness, hospitalization, and complications will be much lower, especially if you're up to date. Being vaccinated reduces the risk of spreading the disease if you get it.   After I'm up to date on vaccines, can I go back to normal?   You should still wear a mask indoors in public if:    It's required by laws, rules, regulations, or local guidance.    You have a weakened immune system.    Your age puts you at increased risk of severe disease.    You have a medical condition that puts you at increased risk of severe disease.    Someone in your household has a weakened immune system, is at increased risk for severe disease, or is unvaccinated.    You're in an area of high transmission.   Where can I get a COVID-19 vaccine?   Visit [facility]'s website for more information. To find a COVID-19 vaccination site near you, visit www.vaccines.gov/  , call [phone]  , or text your zip code to [id number] (Verified Identity Pass). Message and data rates may apply.   What about travel?   Travel increases your risk of exposure to COVID-19. For more information, see www.cdc.gov/coronavirus/2019-ncov/travelers/index.html  .   I've had close contact with someone who has COVID. Do I need to quarantine, and if so, for how long?   For the most current answer, including a calculator to determine whether you need to stay home and for how long, visit www.cdc.gov/coronavirus/2019-ncov/your-health/quarantine-isolation.html  .   I've had a positive test result for COVID. How long do I need to isolate?   For the latest recommendations,  including a calculator to determine how long you need to stay home, visit www.cdc.gov/coronavirus/2019-ncov/your-health/quarantine-isolation.html  .   What if I develop symptoms that might be from COVID?   For the latest recommendations on what to do if you're sick, including when to seek emergency care, visit www.cdc.gov/coronavirus/2019-ncov/if-you-are-sick/steps-when-sick.html  .    Flu vaccine information   Who should get a flu vaccine?   Everyone 6 months of age and older should get a yearly flu vaccine.   When should I get vaccinated?   You should get a flu vaccine by the end of October. Once you're vaccinated, it takes about two weeks for antibodies to develop and protect you against the flu. That's why it's important to get vaccinated as soon as possible.   After October, is it too late to get vaccinated?   No. You should still get vaccinated. As long as the flu viruses are still in your community, flu vaccines will remain available, even into January of next year or later.   Why do I need a flu vaccine EVERY year?   Flu viruses are constantly changing, so flu vaccines are usually updated from one season to the next. Your protection from the flu vaccine also lessens over time.   Is the flu vaccine safe?   Yes. Over the last 50 years, hundreds of millions of Americans have safely received the flu vaccines.   What are the side effects of flu vaccines?   You CANNOT get the flu from a flu vaccine. Common side effects of the flu shot include soreness, redness and/or swelling where the shot was given, low grade fever, and aches. Common side effects of the nasal spray flu vaccine for adults include runny nose, headaches, sore throat, and cough. For children, side effects include wheezing, vomiting, muscle aches, and fever.   Does the flu vaccine increase your risk of getting COVID-19?   No. There is no evidence that getting a flu vaccine increases your risk of getting COVID-19.   Is it safe to get the flu vaccine  along with a COVID-19 vaccine?   Yes. It's safe to get the flu vaccine with a COVID-19 vaccine or booster.   Contact your healthcare provider TODAY for details on when and where to get your flu vaccine.   Your provider   Your diagnosis was provided by Germaine Mcclelland, a member of your trusted care team at [facility].   If you have any questions, call us at [phone]  .

## 2022-06-10 RX ORDER — NABUMETONE 750 MG/1
TABLET, FILM COATED ORAL
Qty: 60 TABLET | Refills: 1 | Status: SHIPPED | OUTPATIENT
Start: 2022-06-10 | End: 2022-08-10

## 2022-06-14 RX ORDER — FERROUS SULFATE 325(65) MG
325 TABLET ORAL
Qty: 30 TABLET | Refills: 5 | Status: SHIPPED | OUTPATIENT
Start: 2022-06-14 | End: 2023-02-03

## 2022-06-14 NOTE — PROGRESS NOTES
[facility] for ferrous sulfate 325 mg tablets. Will refill today with 5 refills.

## 2022-07-06 DIAGNOSIS — G25.81 RESTLESS LEGS SYNDROME (RLS): ICD-10-CM

## 2022-07-10 RX ORDER — GABAPENTIN 600 MG/1
1200 TABLET ORAL NIGHTLY
Qty: 60 TABLET | Refills: 2 | Status: SHIPPED | OUTPATIENT
Start: 2022-07-10 | End: 2022-10-11

## 2022-07-10 NOTE — TELEPHONE ENCOUNTER
Refill request for Gabapentin for RLS.      Chart reviewed.  MARQUISE reviewed.   MARQUISE Patient Controlled Substance Report (from 7/10/2021 to 7/6/2022)    Dispensed  Strength Quantity Days Supply Provider Pharmacy   06/14/2022 Gabapentin 600MG 60 each 30 [facility]   05/18/2022 Gabapentin 600MG 60 each 30 [facility]   04/19/2022 Gabapentin 600MG 60 each 30 [facility]            Refilled for 90 day supply. Has f/u in Nov 22.  Cont current tx plan.     Indu Mendoza II, MD  07/10/22 @ 10:20 AM CDT

## 2022-07-11 RX ORDER — RAMELTEON 8 MG/1
TABLET ORAL
Qty: 30 TABLET | Refills: 2 | Status: SHIPPED | OUTPATIENT
Start: 2022-07-11 | End: 2022-09-27

## 2022-07-13 ENCOUNTER — TELEPHONE (OUTPATIENT)
Dept: FAMILY MEDICINE CLINIC | Facility: CLINIC | Age: 67
End: 2022-07-13

## 2022-07-13 NOTE — TELEPHONE ENCOUNTER
Pt is wanting to see about switching from Trintellix to another medication. Pt states the Trintellix is causing her to have an upset stomach.

## 2022-08-09 DIAGNOSIS — S92.312D CLOSED DISPLACED FRACTURE OF FIRST METATARSAL BONE OF LEFT FOOT WITH ROUTINE HEALING, SUBSEQUENT ENCOUNTER: Primary | ICD-10-CM

## 2022-08-10 RX ORDER — NABUMETONE 750 MG/1
TABLET, FILM COATED ORAL
Qty: 60 TABLET | Refills: 1 | Status: SHIPPED | OUTPATIENT
Start: 2022-08-10 | End: 2022-10-06

## 2022-08-22 RX ORDER — ONDANSETRON HYDROCHLORIDE 8 MG/1
8 TABLET, FILM COATED ORAL EVERY 8 HOURS PRN
Qty: 30 TABLET | Refills: 0 | Status: SHIPPED | OUTPATIENT
Start: 2022-08-22 | End: 2022-10-26 | Stop reason: SDUPTHER

## 2022-09-08 DIAGNOSIS — I10 ESSENTIAL HYPERTENSION: ICD-10-CM

## 2022-09-08 RX ORDER — METOPROLOL SUCCINATE 100 MG/1
100 TABLET, EXTENDED RELEASE ORAL NIGHTLY
Qty: 90 TABLET | Refills: 3 | Status: SHIPPED | OUTPATIENT
Start: 2022-09-08 | End: 2022-10-31

## 2022-09-20 ENCOUNTER — E-VISIT (OUTPATIENT)
Dept: FAMILY MEDICINE CLINIC | Facility: TELEHEALTH | Age: 67
End: 2022-09-20

## 2022-09-20 PROCEDURE — BRIGHTMDVISIT: Performed by: NURSE PRACTITIONER

## 2022-09-20 NOTE — E-VISIT TREATED
Chief Complaint: Coronavirus (COVID-19), cold, sinus pain, allergy, or flu   Patient introduction   Patient is 67-year-old female with congestion, itchy or watery eyes, voice hoarseness, headache, fatigue, and nausea or vomiting that started 10 to 14 days ago.   When asked why they are seeking care online today, patient responds that they just want to feel better.   Patient has not requested COVID testing.   COVID-19 exposure, testing history, and vaccination status:    No known exposure to a person with a confirmed or suspected case of COVID-19.    No recent travel outside of their local community.    Patient had a self-test 7 to 14 days ago. Test result was negative.    Received 2 doses of the COVID-19 vaccine (Pfizer, Pfizer).   Received their most recent dose of the vaccine more than 14 days ago.   Risk factors for severe disease from COVID-19 infection:    Age 65 or older.    BMI >= 25.    Asthma.   Warning. The following may warrant further investigation:    Hypertension.    Headache described as severe (7 to 9 on a scale of 1 to 10).    Dizziness that makes it hard to stand, walk, or do daily activities.   Patient-submitted comments: the antibiotics zpack and amoxicillin does not work on me..   Patient did not request an excuse note.   General presentation   No improvement of symptoms. Symptoms came on gradually.   Fever:    No fever.   Sinus and nasal symptoms:    Green nasal drainage.    Nasal drainage is thick.    Postnasal drip.    1 to 3 episodes of antibiotic treatment for sinus infection in the last year.    Congestion with sinus pain or pressure on or around the forehead and upper teeth or jaw.    Patient first noticed sinus pain 5 to 9 days ago.    Sinus pain is worse with Valsalva.    No nasal discharge.    No itchy nose or sneezing.    No history of unhealed nasal septal ulcer/nasal wound.    No history of deviated septum or nasal polyps.   Throat symptoms:    Voice is mildly hoarse. Patient  does not believe hoarseness is due to voice strain.    No sore throat.   Head and body aches:    Headache, described as severe (7 to 9 on a scale of 1 to 10).    Fatigue.    No sweats.    No chills.    No myalgia.   Cough:    No cough.   Wheezing and shortness of breath:    Has asthma diagnosis.    Using an albuterol inhaler for asthma.    Using albuterol every 6 hours or longer.    No COPD diagnosis.    No wheezing.    No shortness of breath.    Current cold symptoms do not aggravate their asthma.   Chest pain:    No chest pain.   Ear symptoms:    Current symptoms include pressure and fullness in the ear(s).   Dizziness:    Dizziness that interferes with daily activities.   Allergies:    Patient has known seasonal allergies, known pet allergies, and known dust allergies.   Flu exposure:    No recent exposure to a person with a confirmed flu diagnosis.    Received a flu vaccine more than 6 months ago.   Review of red flags/alarm symptoms:    No changes in alertness or awareness.    No symptoms suggesting respiratory distress.    No symptoms suggesting intracranial hemorrhage.    No decreased urination.   Pregnancy/menstrual status/breastfeeding:    Patient is postmenopausal.   Self-exam:    No difficulty moving their chin toward their chest.    Neck lymph nodes feel normal.    Mild periorbital edema.    Has not taken antibiotics for similar symptoms within the past month.   Current medications   Patient is taking over-the-counter medications for current symptoms, including budesonide, cetirizine, diphenhydramine, fluticasone, phenylephrine, and triamcinolone.   Currently taking nabumetone 500 MG [Relafen], Hydrochlorothiazide / Metoprolol, ramelteon 8 MG, gabapentin 100 MG Oral Tablet, Ascorbic Acid / Ferrous fumarate Oral Product, Hydroxyzine, Chlorthalidone, dexlansoprazole, linaclotide, Cetirizine, vortioxetine, and Albuterol.   Medication allergies   No relevant drug allergies.   Medication contraindication  review   Patient has history of depression and hypertension. Therefore, the following medication(s) will not be prescribed:    Metoclopramide.    Acetaminophen-diphenhydramine-phenylephrine.    Aspirin-chlorpheniramine-phenylephrine.   No history of metoclopramide-associated dystonic reaction and tardive dyskinesia.   No known history of amoxicillin-clavulanate-associated cholestatic jaundice or hepatic impairment.   No known history of azithromycin-associated cholestatic jaundice or hepatic impairment.   Past medical history   Immune conditions: No immunocompromising conditions. No history of cancer.   Social history   High-risk household contacts: Patient's household includes one or more members of a group with risk factors for influenza complications, including a person 65 years or older.   Never smoked tobacco.   Assessment   Bacterial sinusitis. Ruled out: Traumatic laryngitis.   This is the likely diagnosis based on patient's interview responses and symptoms, including:    Congestion or sinus pressure.    Thick nasal discharge.    Yellow or green mucus.    Duration of symptoms longer than 10 days.    Symptoms have not improved.   [facility] required information for COVID-19 lab data reporting (if test is ordered):   Symptoms:    Fatigue.    Headache.    Hoarse voice or loss of voice.    Nasal congestion.    Nausea or vomiting.    Itchy or watery eyes.   Symptom onset: 10 to 14 days ago ago  Pregnant? No.  Healthcare worker? No.  Resident in a congregate care setting? No.  Previous history of COVID-19 testing: Patient had a self-test 7 to 14 days ago. Test result was negative.     Plan   Medications:    Mucus Relief  mg tablet, extended release RX 600mg 1 tab PO q12h PRN 7d for cough and congestion. Amount is 14 tab.    doxycycline monohydrate 100 mg tablet RX 100mg 1 tab PO bid 10d for infection. This medication is an antibiotic. Take it exactly as directed. You must finish the entire course of medication, even  if you feel better after the first few days of treatment. Amount is 20 tab.   The patient's prescriptions will be sent to:   [id number]   [address] [place] [id number]   Phone: [phone]     Fax: [phone]   Education:    Condition and causes    Prevention    Treatment and self-care    When to call provider   ----------   Electronically signed by MIRIAM Higginbotham on 2022-09-20 at 15:19PM   ----------   Patient Interview Transcript:   Why are you getting care through eVisit today? We can't guarantee a specific treatment or test. Your provider will decide what's best for you. Select all that apply.    I just want to feel better!   Not selected:    I want to know if I have a cold or something more serious    I want to know if I need to be seen by a provider    I need a doctor's note    I want to be tested for COVID-19    I want to get the COVID-19 vaccine    I think I'm having side effects from the COVID-19 vaccine    None of the above   COVID-19 symptoms are similar to symptoms of other illnesses. This makes it difficult to diagnose. Please carefully consider each question and answer as best you can. This will help your provider make the right diagnosis. Which of these symptoms are bothering you? Select all that apply.    Stuffed-up nose or sinuses    Itchy or watery eyes    Hoarse voice or loss of voice    Headache    Fatigue or tiredness    Nausea or vomiting   Not selected:    Cough    Shortness of breath    Fever    Runny nose    Itchy nose or sneezing    Loss of smell or taste    Sore throat    Sweats    Chills    Muscle or body aches    Diarrhea    I don't have any of these symptoms   Do you have any of these conditions? These conditions can put you at increased risk for severe disease if you're infected with COVID-19. Select all that apply.    None of the above   Not selected:    Chronic lung disease, such as cystic fibrosis or interstitial fibrosis    Heart disease, such  as congenital heart disease, congestive heart failure, or coronary artery disease    Disorder of the brain, spinal cord, or nerves and muscles, such as dementia, cerebral palsy, epilepsy, muscular dystrophy, or developmental delay    Metabolic disorder or mitochondrial disease    Cerebrovascular disease, such as stroke or another condition affecting the blood vessels or blood supply to the brain    Down syndrome    Mood disorder, including depression or schizophrenia spectrum disorders    Substance use disorder, such as alcohol, opioid, or cocaine use disorder    Tuberculosis   Do you live in a group care setting? Examples include: - Nursing home - Residential care - Psychiatric treatment facility - Group home - [facility] - [facility] and care home - Homeless shelter - Foster care setting Select one.    No   Not selected:    Yes   Have you ever been tested for COVID-19? Select one.    Yes   Not selected:    No   When was your most recent COVID-19 test? Select one.    7 to 14 days ago   Not selected:    Within the last week (specify date as MM/DD/YY)    15 to 30 days ago    1 to 3 months ago    More than 3 months ago   What type of COVID-19 test did you most recently have? There are two types of COVID-19 tests: - Viral tests check if you're currently infected with COVID-19. For these tests, a nose swab or saliva sample is taken. Viral tests include self-tests and tests done at a doctor's office, lab, or testing site. - Antibody tests check if you've been infected in the past. For these tests, your blood is drawn. Antibody tests can only be done at a doctor's office, lab, or testing site. Select one.    Viral self-test   Not selected:    Viral test at a doctor's office, lab, or testing site    Antibody test   What was the result of your most recent COVID-19 test? Select one.    Negative   Not selected:    Positive    I'm not sure   Have you gotten the COVID-19 vaccine? Select one.    Yes   Not selected:    No   How many  "doses of the COVID-19 vaccine have you gotten? This includes boosters as well as third or fourth doses for those who are immunocompromised. Select one.    2 doses   Not selected:    1 dose    3 doses    4 doses   1st dose    Pfizer   Not selected:    J&J/Wm    Moderna    Novavax   2nd dose    Pfizer   Not selected:    J&J/Wm    Moderna    Novavax   When did you get your most recent dose of the COVID-19 vaccine?    More than 14 days ago   Not selected:    Less than 48 hours (2 days) ago    48 to 72 hours (3 days) ago    3 to 5 days ago    5 to 7 days ago    7 to 14 days ago   In the last 14 days, have you traveled outside of your local community? This includes travel by car, RV, bus, train, or plane. Travel increases your chances of getting and spreading COVID-19. Select one.    No   Not selected:    Yes   In the last 14 days, have you had close contact with someone who has coronavirus (COVID-19)? \"Close contact\" means any of these: - Living in the same household as someone with COVID-19. - Caring for someone with COVID-19. - Being within 6 feet of someone with COVID-19 for a total of at least 15 minutes over a 24-hour period. For example, three 5-minute exposures for a total of 15 minutes. - Being in direct contact with respiratory droplets from someone with COVID-19 (being coughed on, kissing, sharing utensils). Select one.    No, not that I know of   Not selected:    Yes, a confirmed case    Yes, a suspected case   When did your current symptoms start? Select one.    10 to 14 days ago   Not selected:    Less than 48 hours ago    3 to 5 days ago    6 to 9 days ago    2 to 4 weeks ago    More than a month ago   Do you know the exact date your symptoms started? If so, enter the date as MM/DD/YY. Select one.    No   Not selected:    Yes (specify)   Did your symptoms come on suddenly or gradually? Select one.    Gradually   Not selected:    Suddenly    I'm not sure   Have your symptoms improved at all since " "they began? Select one.    No   Not selected:    Yes, but they haven't gone away completely    Yes, but then they came back worse than before    I'm not sure   You mentioned having a headache. On a scale of 1 to 10, how severe is your headache pain? Select one.    Severe (7 to 9)   Not selected:    Mild (1 to 3)    Moderate (4 to 6)    Unbearable (10)    The worst headache of my life (10+)   You mentioned having a stuffy nose or sinus congestion. Do you feel pain or pressure in your sinuses?    Yes   Not selected:    No    I'm not sure   Where do you feel sinus pain or pressure?    In my forehead    In my upper teeth or jaw   Not selected:    Around my eyes    Behind my nose    In my cheeks    I'm not sure   When did you first notice your sinus pain or pressure? Select one.    5 to 9 days ago   Not selected:    Less than 5 days ago    10 to 14 days ago    2 to 4 weeks ago    1 month ago or longer   Does coughing, sneezing, or leaning forward make your sinuses feel worse? Select one.    Yes   Not selected:    No    I'm not sure   What color is your nasal drainage? Select one.    Green   Not selected:    Clear    White    Yellow    My nose is stuffed but not draining or running    I'm not sure   Is your nasal drainage thick or thin? Select one.    Thick   Not selected:    Thin    I'm not sure   Is there any drainage (mucus) going down the back of your throat? This kind of drainage is also called \"postnasal drip.\" Select one.    Yes   Not selected:    No    I'm not sure   Since your symptoms started, have you felt dizzy? Select one.    Yes, and it makes it hard to stand, walk, or do daily activities   Not selected:    Yes, but I can continue with my regular daily activities    No   Do you have chest pain? You might also feel it as discomfort, aching, tightness, or squeezing in the chest. Select one.    No   Not selected:    Yes   Have you urinated at least 3 times in the last 24 hours? Select one.    Yes   Not " selected:    No    I'm not sure   Changes in alertness or awareness may mean you need emergency care. Since your symptoms started, have you had any of these? Select all that apply.    None of the above   Not selected:    Confusion    Slurred speech    Not knowing where you are or what day it is    Difficulty staying conscious    Fainting or passing out   Do your symptoms include a whistling sound, or wheezing, when you breathe? Select one.    No   Not selected:    Yes    I'm not sure   Early in this interview, you told us you were hoarse or you'd lost your voice. How would you describe the changes to your voice? Select one.    It just sounds a little raspy   Not selected:    It's harder than usual to talk    I can barely talk at all   Is it possible that you strained your voice? Singing, yelling, or talking more or louder than usual can cause voice strain. Select one.    No   Not selected:    Yes    I'm not sure   Are your eyelids or the areas around your eyes puffy? Select one.    Yes, but I can easily open my eye(s)   Not selected:    Yes, and it's hard to open my eye(s)    Yes, and my eye(s) are completely swollen shut    No   Do you have any of these symptoms in your ear(s)? Select all that apply.    Pressure    Fullness   Not selected:    Pain    Crackling or popping    Plugged or blocked sensation    None of the above   Can you move your chin toward your chest?    Yes   Not selected:    No, my neck is too stiff   Are your glands/lymph nodes swollen, or does it hurt when you touch them?    No   Not selected:    Yes    I'm not sure   People with a very high body mass index (BMI) are at higher risk for developing complications from the flu and severe illness from COVID-19. To determine your BMI, we need to know your weight and height. Please enter your weight (in pounds).    Weight   Please enter your height.    Height   In the past week, has anyone around you (such as at school, work, or home) had a confirmed  diagnosis of the flu? A confirmed diagnosis means that a nose swab was done to verify a flu infection. Select all that apply.    No   Not selected:    I live with someone who has the flu    I've been within touching distance of someone who has the flu    I've walked by, or sat about 3 feet away from, someone who has the flu    I've been in the same building as someone who has the flu    I'm not sure   Have you ever been diagnosed with asthma? Select one.    Yes   Not selected:    No   Are your cold symptoms making your asthma worse? Select one.    No   Not selected:    Yes   What medications or inhalers are you currently using for your asthma? Select all that apply.    Albuterol inhaler, as needed (such as ProAir, Proventil, Ventolin)   Not selected:    Inhaled steroid (such as Qvar, Pulmicort, Flovent)    Inhaled steroid with long-acting bronchodilator (such as Advair, Dulera, Symbicort)    I'm not sure    I'm not taking any medications for my asthma    I usually take medications for my asthma, but I ran out   How often are you using albuterol? If you're using albuterol very frequently, you'll need to be seen in person. Select one.    Every 6 hours or longer   Not selected:    More than once an hour    Every 1 to 2 hours    Every 2 to 3 hours    Every 3 to 4 hours    Every 4 to 6 hours   Would you like a prescription for albuterol? Select one.    No   Not selected:    Yes   Have you ever been diagnosed with chronic obstructive pulmonary disease (COPD)? Select one.    No   Not selected:    Yes    I'm not sure   In the last year, how many times were you treated with antibiotics for a sinus infection? Select one.    1 to 3 times   Not selected:    None    4 or more times   Have you been diagnosed with a deviated septum or nasal polyps? The nose is divided into two nostrils by the septum. A crooked septum is called a deviated septum. Nasal polyps are growths inside the nose or sinuses. Select one.    No   Not  selected:    Yes, but I had surgery to treat them    Yes, I have a deviated septum    Yes, I have nasal polyps    Yes, I have a deviated septum and nasal polyps    I'm not sure   Do you have a sore inside your nose that won't heal? Select one.    No   Not selected:    Yes    I'm not sure   Do you have allergies (pollen, dust mites, mold, animal dander)? Select one.    Yes   Not selected:    No    I'm not sure   What kind of allergies do you have? Select all that apply.    Seasonal allergies (hay fever)    Pet allergies    Dust allergies   Not selected:    None of the above    I'm not sure   Do you think your symptoms could be allergy-related? Select one.    I'm not sure   Not selected:    Yes    No   Have you had a flu shot this season? Select one.    Yes, more than 6 months ago   Not selected:    Yes, less than 2 weeks ago    Yes, 2 to 4 weeks ago    Yes, 1 to 3 months ago    Yes, 3 to 6 months ago    I'm not sure    No   The flu and COVID-19 can be more serious for people with certain conditions or characteristics. These questions help us figure out if you or anyone you live with is at higher risk for complications from these infections. Do either of these statements apply to you? Select all that apply.    None of the above   Not selected:    I'm  or Native [place]    I'm a healthcare worker   Do you smoke tobacco? Select one.    No   Not selected:    Yes, every day    Yes, some days    No, I quit   Do you have any of these conditions that can affect the immune system? Scroll to see all options. Select all that apply.    None of these   Not selected:    History of bone marrow transplant    Chronic kidney disease    Chronic liver disease (including cirrhosis)    HIV/AIDS    Inflammatory bowel disease (Crohn's disease or ulcerative colitis)    Lupus    Moderate to severe plaque psoriasis    Multiple sclerosis    Rheumatoid arthritis    Sickle cell anemia    Alpha or beta thalassemia    History of  solid organ transplant (kidney, liver, or heart)    History of spleen removal    An autoimmune disorder not listed here    A condition requiring treatment with long-term use of oral steroids (such as prednisone, prednisolone, or dexamethasone)   Have you ever been diagnosed with cancer? Select one.    No   Not selected:    Yes, I have cancer now    Yes, but I'm in remission   Some conditions can put you at risk for more serious infections. Do any of these apply to you? Select all that apply.    None of the above   Not selected:    I've been hospitalized within the last 5 days    I have diabetes    I'm in close contact with a child in    Are you currently being treated for any of these conditions? Scroll to see all options. Select all that apply.    Depression    High blood pressure   Not selected:    Aspirin triad (also known as Samter's triad or ASA triad)    Asthma or hives from taking aspirin or other NSAIDs, such as ibuprofen or naproxen    Blockage or narrowing of the blood vessels of the heart    Blood dyscrasia, such anemia, leukemia, lymphoma, or myeloma    Bone marrow depression    Catecholamine-releasing paraganglioma    Blood clotting disorder    Congenital long QT syndrome    Difficulty urinating or completely emptying your bladder    Uncorrected electrolyte abnormalities    Fungal infection    Gastrointestinal (GI) bleeding    Gastrointestinal (GI) obstruction    G6PD deficiency    Recent heart attack    Irregular heartbeat or heart rhythm    Mononucleosis (mono)    Myasthenia gravis    Parkinson's disease    Pheochromocytoma    Reye syndrome    Seizure disorder    Ulcerative colitis    None of the above   Have you ever had either of these conditions? Select all that apply.    No   Not selected:    Metoclopramide-associated dystonic reaction    Tardive dyskinesia   Have you gone through menopause? Select one.    Yes   Not selected:    No    I'm going through it now   Do any of these apply to the  people who live with you? Select all that apply.    An adult 65 or older   Not selected:    A child under the age of 5    A person who is pregnant    A person who has given birth, had a miscarriage, had a pregnancy loss, or had an  in the last 2 weeks    An  or Native [place]    None of the above   Does any member of your household have any of these medical conditions? Select all that apply.    None of the above   Not selected:    Asthma    Disorders of the brain, spinal cord, or nerves and muscles, such as dementia, cerebral palsy, epilepsy, muscular dystrophy, or developmental delay    Chronic lung disease, such as COPD or cystic fibrosis    Heart disease, such as congenital heart disease, congestive heart failure, or coronary artery disease    Cerebrovascular disease, such as stroke or another condition affecting the blood vessels or blood supply to the brain    Blood disorders, such as sickle cell disease    Diabetes    Metabolic disorders such as inherited metabolic disorders or mitochondrial disease    Kidney disorders    Liver disorders    Weakened immune system due to illness or medications such as chemotherapy or steroids    Children under the age of 19 who are on long-term aspirin therapy    Extreme obesity (BMI > 40)   Just a few more questions about medications, and then you're finished. Have you used any non-prescription medications or nasal sprays for your current symptoms? Examples include saline sprays, decongestants, NyQuil, and Tylenol. Select one.    Yes   Not selected:    No   Which of these non-prescription medications have you tried? Scroll to see all options. Select all that apply.    Budesonide (Rhinocort)    Cetirizine (Zyrtec)    Diphenhydramine (Benadryl)    Fluticasone (Flonase)    Phenylephrine (Sudafed)    Triamcinolone (Nasacort)   Not selected:    Acetaminophen (Tylenol)    Chlorpheniramine (Aller-chlor, Chlor-Trimeton)    Cromolyn (NasalCrom)    " Dextromethorphan (Delsym, Robitussin, Vicks DayQuil Cough)    Fexofenadine (Allegra)    Guaifenesin (Mucinex)    Guaifenesin/dextromethorphan (Delsym DM, Mucinex DM, Robitussin DM)    Ibuprofen (Advil, Motrin, Midol)    Ketotifen (Alaway, Zaditor)    Loratadine (Alavert, Claritin)    Naphazoline-pheniramine (Naphcon-A, Opcon-A, Visine-A)    Omeprazole (Prilosec)    Oxymetazoline (Afrin)    None of the above   In the past month, have you taken antibiotics for similar symptoms? Examples of antibiotics include amoxicillin, amoxicillin-clavulanate (Augmentin), penicillin, cefdinir (Omnicef), doxycycline, and clindamycin (Cleocin). Select one.    No   Not selected:    Yes    I'm not sure   Have you taken any monoamine oxidase inhibitor (MAOI) medications in the last 14 days? Examples include rasagiline (Azilect), selegiline (Eldepryl, Zelapar), isocarboxazid (Marplan), phenelzine (Nardil), and tranylcypromine (Parnate). Select one.    No, not that I know of   Not selected:    Yes   Do you take Kynmobi or Apokyn (apomorphine)? Select one.    No   Not selected:    Yes    I'm not sure   Are you taking any other medications, vitamins, or supplements? Select one.    Yes   Not selected:    No   Have you ever had an allergic or bad reaction to any medication? Select one.    Yes   Not selected:    No   Have you had an allergic or bad reaction to any of these medications? Select all that apply.    No, not that I know of   Not selected:    Baloxavir (Xofluza)    Benzonatate (Tessalon Perles)    Fluconazole, itraconazole, or terconazole (brands include Diflucan, Sporanox, Terazol)    Oseltamivir (Tamiflu) or zanamivir (Relenza)    Paxlovid, nirmatrelvir, or ritonavir (Norvir)   Have you had an allergic or bad reaction to any of these antibiotic medications? Select all that apply.    None of the above   Not selected:    Penicillin or any \"-cillin\" antibiotic, such as amoxicillin, ampicillin, dicloxacillin, nafcillin, or " "piperacillin (Brands include Augmentin, Unasyn, and Zosyn)    Tetracycline or any \"-cycline\" antibiotic, such as doxycycline, demeclocycline, minocycline (Brands include Declomycin, Doryx, Dynacin, Oracea, Monodox, Panmycin, and Vibramycin)    Ciprofloxacin or any \"-floxacin\" antibiotic, such as gemifloxacin, levofloxacin, moxifloxacin, or ofloxacin (Brands include Factive, Cipro, Floxin, and Levaquin)    Cephalexin or any \"cef-\" antibiotic, such as cefazolin, cefdinir, cefuroxime, ceftriaxone, ceftazidime, or cefepime (Brands include Ancef, Ceftin, Fortaz, Keflex, Maxipime, Rocephin, and Simplicef)    Azithromycin or any \"-thromycin\" antibiotic, such as erythromycin or clarithromycin (Brands include Biaxin, Erythrocin, Z-yobani, and Zithromax)    Clindamycin or lincomycin (Brands include Cleocin and Lincocin)    I'm not sure   Have you had an allergic or bad reaction to any of these medications? Select all that apply.    None of the above   Not selected:    Albuterol or a similar medication    Corticosteroid (steroid) medication, including topical steroids, inhaled steroids, nasal steroids, or oral steroids (budesonide, ciclesonide, dexamethasone, flunisolide, fluticasone, methylprednisolone, triamcinolone, prednisone (or brand names Alvesco, Deltasone, Flovent, Medrol, Nasacort, Rhinocort, or Veramyst)    Metoclopramide (Reglan)    Ondansetron (Zuplenz, Zofran ODT, Zofran)    Prochlorperazine (Compazine)    I'm not sure   Have you had an allergic or bad reaction to any of these eye drops or nasal sprays? Scroll to see all options. Select all that apply.    None of the above   Not selected:    Azelastine (Astelin, Astepro, Optivar)    Cromolyn (Crolom, NasalCrom)    Ipratropium (Atrovent)    Ketotifen (Alaway, Zaditor)    Pheniramine/naphazoline (Naphcon-A, Opcon-A, Visine-A)    Olopatadine (Pataday, Patanol, Pazeo)    I'm not sure   Have you had an allergic or bad reaction to any of these non-prescription " medications? Scroll to see all options. Select all that apply.    None of the above   Not selected:    Acetaminophen (Tylenol)    Aspirin    Cetirizine (Zyrtec)    Chlorpheniramine (Aller-chlor, Chlor-Trimeton)    Dextromethorphan (Delsym, Robitussin, Vicks DayQuil Cough)    Diphenhydramine (Benadryl)    Fexofenadine (Allegra)    Guaifenesin (Mucinex)    Guaifenesin/dextromethorphan (Delsym DM, Mucinex DM, Robitussin DM)    Ibuprofen (Advil, Motrin, Midol)    Loratadine (Alavert, Claritin)    Oxymetazoline (Afrin)    Phenylephrine (Sudafed)    I'm not sure   Are you allergic to milk or to the proteins found in milk (for example, whey or casein)? A milk allergy is different from lactose intolerance. Select one.    No   Not selected:    Yes    I'm not sure   Have you ever had jaundice or liver problems as a result of taking amoxicillin-clavulanate (Augmentin)? Jaundice is a condition in which the skin and the whites of the eyes turn yellow. Select all that apply.    No, not that I know of   Not selected:    Yes, jaundice    Yes, liver problems   Have you ever had jaundice or liver problems as a result of taking azithromycin (Zithromax, Zmax)? Jaundice is a condition in which the skin and the whites of the eyes turn yellow. Select all that apply.    No, not that I know of   Not selected:    Yes, jaundice    Yes, liver problems   Do you need a doctor's note? A doctor's note confirms that you received care today and states when you can return to school or work. It does not contain information about your diagnosis or treatment plan. Your provider will make the final decision on whether to give you a doctor's note and for how long. Doctor's notes CANNOT be backdated. We can't provide medical leave paperwork through this type of visit. If more paperwork is needed to request time off, contact your primary care provider. Select one.    No   Not selected:    Today only (1 day)    Today and tomorrow (2 days)    3 days    5  days    7 days    10 days    14 days   Is there anything else you'd like to tell us about your symptoms?    the antibiotics zpack and amoxicillin does not work on me.   ----------   Medical history   Medical history data does not currently exist for this patient.

## 2022-09-20 NOTE — EXTERNAL PATIENT INSTRUCTIONS
Diagnosis   Bacterial sinusitis   My name is Juli Sparrow, and I'm a healthcare provider at [facility]. I reviewed your interview, and I see that you have bacterial sinusitis.   To prevent the spread of illness to others, I recommend that you stay home and away from other people as much as possible while you're sick.   Medications   Your pharmacy   [id number] [address] [phone]     Prescription   Mucus Relief ER oral tablet, extended release (600mg): Take 1 tablet by mouth every 12 hours as needed for cough and congestion.   Doxycycline monohydrate (100mg): Take 1 tablet by mouth twice a day for 10 days for infection. This medication is an antibiotic. Take it exactly as directed. You must finish the entire course of medication, even if you feel better after the first few days of treatment.    Start taking the antibiotics I've prescribed right away. You need to finish the entire course of antibiotics, even if you start to feel better before the pills run out.   Some women develop yeast infections after taking antibiotics. If you develop a yeast infection, you can treat it with antifungal creams or suppositories. These are available without a prescription at [employer] and many supermarkets.   About your diagnosis   The sinuses are hollow spaces connected to the nasal passages. Sinusitis occurs when the sinuses swell and block the drainage of fluid and mucus from the nose, causing pain, pressure, and congestion. Fatigue, difficulty sleeping, or decreased appetite may accompany your symptoms.   More than 90% of sinus infections are caused by viruses. However, in certain cases, a sinus infection may be caused by bacteria. Bacterial sinus infections usually look like one of the following cases:    Severe sinus symptoms with a fever over 102F.    Sinus symptoms that have not improved at all after 10 days.    Cold symptoms that slowly improve but then worsen again  after 5 or 6 days, usually with a high fever, headache, or nasal discharge.   What to expect   If you follow this treatment plan, you should start to feel better within a few days.   You can return to your normal activities when ALL of the following are true:    You've been fever-free for more than 24 hours without using fever-reducing medications such as Tylenol    Your other symptoms have improved    It's been at least 5 full days since your symptoms first started   When to seek care   Call us at [phone]   with any sudden or unexpected symptoms.    Symptoms that last longer than 10 to 14 days.    Symptoms that get better for a few days, and then suddenly get worse.    Fever that measures over 103F or continues for more than 3 days.    Any vision changes.    A worsening headache.    Stiff neck.    Swelling of your forehead or eyes.    Coughing up red or bloody mucus.    Swallowing becomes extremely difficult or impossible.    More than 5 episodes of diarrhea in a day.    More than 5 episodes of vomiting in a day.    Severe shortness of breath.    Severe chest pain   Other treatment    Rest! Your body needs rest to recover and fight infection.    Drink plenty of water to stay hydrated.    Use steam to soothe your sinuses: Breathe it in from a shower or a bowl of hot water. Placing a warm, moist washcloth over your nose and forehead may help relieve the sinus pain and pressure.    Try non-prescription saline nasal sprays to help your nasal symptoms. Try using a Neti Pot to flush out your stuffy nose and sinuses. Neti Pots are available at any drugstore without a prescription.    Avoid smoke and air pollution. Smoke can make infections worse.   Prevention    Avoid close contact with other people when you're sick.    Cover your mouth and nose when you cough or sneeze. Use a tissue or cough into your elbow. Make sure that used tissues go directly into the trash.    Avoid touching your eyes, nose, or mouth while  you're sick.    Wash your hands often, especially after coughing, sneezing, or blowing your nose. If soap and water are not available, use an alcohol-based hand .    If you or someone in your home or workplace is sick, disinfect commonly used items. This includes door handles, tables, computers, remotes, and pens.    Coronavirus (COVID-19) information   Common symptoms of COVID-19 include fever, cough, shortness of breath, fatigue, muscle or body aches, headaches, new loss of sense of taste or smell, sore throat, stuffy or runny nose, nausea or vomiting, and diarrhea. Most people who get COVID-19 have mild symptoms and can rest at home until they get better. Elderly people and those with chronic medical problems may be at risk for more serious complications.   FAQs about the COVID-19 vaccine   There are four authorized COVID-19 vaccines: Mert & J C Lads's Wm Vaccine (J&J/Wm), Moderna, Novavax, and Pfizer-BioNTech (Pfizer). The J&J/Wm and Novavax vaccines are approved for use in people aged 18 and older. The Moderna and Pfizer vaccines are approved for those aged 6 months and older. All four are available at no cost. Even if you don't have health insurance, you can still get the COVID-19 vaccine for free.   Which vaccine is the best? Which vaccine should I get?   All four vaccines are highly effective. Even if you get COVID-19 after being vaccinated, all of the vaccines help prevent severe disease, hospitalization, and complications.   Most people should get whichever vaccine is first available to them. However, women younger than 50 years old should consider the rare risk of blood clots with low platelets after vaccination with the J&J/Wm vaccine. This risk hasn't been seen with the other three vaccines.   Are the vaccines safe?   Yes. Hundreds of millions of people in the [place] have already safely received COVID-19 vaccines. As part of Phase 3 clinical trials in the [place] and other  countries, researchers collected safety and efficacy data for all four vaccines. These clinical trials follow strict standards. Before a vaccine is approved, the manufacturing company must submit data to the Food and Drug Administration (FDA) for review. Tens of thousands of volunteers participated in the clinical trials for the vaccines. The FDA continues to monitor safety data as the vaccines are given to the general population.   Do I need the vaccine if I've already had COVID?   Yes. Vaccination helps protect you even if you've already had COVID.   If you had COVID-19 and had symptoms, wait to get vaccinated until ALL of the following are true:    5 full days since your symptoms started    24 hours with no fever, without the use of fever-reducing medications    Your other COVID-19 symptoms are improving   If you tested positive for COVID-19 but did not have symptoms, you can get vaccinated after 5 full days have passed since you had a positive test, as long as you don't develop symptoms.   If you had COVID-19 and were treated with monoclonal antibodies, you should wait 90 days before getting a COVID-19 vaccination.   How many doses of the vaccine do I need?   Visit www.cdc.gov/coronavirus/2019-ncov/vaccines/stay-up-to-date.html   to find out how to stay up to date with your COVID-19 vaccines.   I'm immunocompromised. How many doses of the vaccine do I need?   For information on how immunocompromised people can stay up to date with their COVID-19 vaccines, visit www.cdc.gov/coronavirus/2019-ncov/vaccines/recommendations/immuno.html  .   What are the common side effects of the vaccine?   A sore arm, tiredness, headache, and muscle pain may occur within two days of getting the vaccine and last a day or two. For the Moderna or Pfizer vaccines, side effects are more common after the second dose. People over the age of 55 are less likely to have side effects than younger people.   After I'm up to date on vaccines, can  I still get or spread COVID?   Yes, but your disease should be milder. And your risk of serious illness, hospitalization, and complications will be much lower, especially if you're up to date. Being vaccinated reduces the risk of spreading the disease if you get it.   After I'm up to date on vaccines, can I go back to normal?   You should still wear a mask indoors in public if:    It's required by laws, rules, regulations, or local guidance.    You have a weakened immune system.    Your age puts you at increased risk of severe disease.    You have a medical condition that puts you at increased risk of severe disease.    Someone in your household has a weakened immune system, is at increased risk for severe disease, or is unvaccinated.    You're in an area of high transmission.   Where can I get a COVID-19 vaccine?   Visit [facility] [employer]'s website for more information. To find a COVID-19 vaccination site near you, visit www.[place].gov/  , call [phone]  , or text your zip code to [address]19766 ([employer]). Message and data rates may apply.   What about travel?   Travel increases your risk of exposure to COVID-19. For more information, see www.cdc.gov/coronavirus/2019-ncov/travelers/index.html  .   I've had close contact with someone who has COVID. Do I need to quarantine, and if so, for how long?   For the most current answer, including a calculator to determine whether you need to stay home and for how long, visit www.cdc.gov/coronavirus/2019-ncov/your-health/quarantine-isolation.html  .   I've had a positive test result for COVID. How long do I need to isolate?   For the latest recommendations, including a calculator to determine how long you need to stay home, visit www.cdc.gov/coronavirus/2019-ncov/your-health/quarantine-isolation.html  .   What if I develop symptoms that might be from COVID?   For the latest recommendations on what to do if you're sick, including when to seek emergency care, visit  www.cdc.gov/coronavirus/2019-ncov/if-you-are-sick/steps-when-sick.html  .    Flu vaccine information   Who should get a flu vaccine?   Everyone 6 months of age and older should get a yearly flu vaccine.   When should I get vaccinated?   You should get a flu vaccine by the end of October. Once you're vaccinated, it takes about two weeks for antibodies to develop and protect you against the flu. That's why it's important to get vaccinated as soon as possible.   After October, is it too late to get vaccinated?   No. You should still get vaccinated. As long as the flu viruses are still in your community, flu vaccines will remain available, even into January of next year or later.   Why do I need a flu vaccine EVERY year?   Flu viruses are constantly changing, so flu vaccines are usually updated from one season to the next. Your protection from the flu vaccine also lessens over time.   Is the flu vaccine safe?   Yes. Over the last 50 years, hundreds of millions of Americans have safely received the flu vaccines.   What are the side effects of flu vaccines?   You CANNOT get the flu from a flu vaccine. Common side effects of the flu shot include soreness, redness and/or swelling where the shot was given, low grade fever, and aches. Common side effects of the nasal spray flu vaccine for adults include runny nose, headaches, sore throat, and cough. For children, side effects include wheezing, vomiting, muscle aches, and fever.   Does the flu vaccine increase your risk of getting COVID-19?   No. There is no evidence that getting a flu vaccine increases your risk of getting COVID-19.   Is it safe to get the flu vaccine along with a COVID-19 vaccine?   Yes. It's safe to get the flu vaccine with a COVID-19 vaccine or booster.   Contact your healthcare provider TODAY for details on when and where to get your flu vaccine.   Your provider   Your diagnosis was provided by Juli Sparrow, a member of your trusted care team at [facility].   If you have any questions, call us at [phone]  .

## 2022-09-27 RX ORDER — RAMELTEON 8 MG/1
TABLET ORAL
Qty: 30 TABLET | Refills: 2 | Status: SHIPPED | OUTPATIENT
Start: 2022-09-27 | End: 2022-12-14

## 2022-10-03 DIAGNOSIS — F51.04 CHRONIC INSOMNIA: ICD-10-CM

## 2022-10-04 RX ORDER — HYDROXYZINE PAMOATE 50 MG/1
CAPSULE ORAL
Qty: 90 CAPSULE | Refills: 5 | Status: SHIPPED | OUTPATIENT
Start: 2022-10-04 | End: 2023-01-27

## 2022-10-06 DIAGNOSIS — S92.312D CLOSED DISPLACED FRACTURE OF FIRST METATARSAL BONE OF LEFT FOOT WITH ROUTINE HEALING, SUBSEQUENT ENCOUNTER: ICD-10-CM

## 2022-10-06 RX ORDER — NABUMETONE 750 MG/1
TABLET, FILM COATED ORAL
Qty: 60 TABLET | Refills: 1 | Status: SHIPPED | OUTPATIENT
Start: 2022-10-06 | End: 2022-12-05

## 2022-10-07 RX ORDER — DEXLANSOPRAZOLE 60 MG/1
1 CAPSULE, DELAYED RELEASE ORAL DAILY
Qty: 90 CAPSULE | Refills: 1 | Status: SHIPPED | OUTPATIENT
Start: 2022-10-07 | End: 2023-03-22 | Stop reason: SDUPTHER

## 2022-10-11 DIAGNOSIS — G25.81 RESTLESS LEGS SYNDROME (RLS): ICD-10-CM

## 2022-10-11 RX ORDER — GABAPENTIN 600 MG/1
TABLET ORAL
Qty: 60 TABLET | Refills: 2 | Status: SHIPPED | OUTPATIENT
Start: 2022-10-11 | End: 2023-01-03 | Stop reason: SDUPTHER

## 2022-10-11 NOTE — TELEPHONE ENCOUNTER
Refill for Neurontin for RLS.      Chart reviewed.     MARQUISE reviewed; unchanged. Scripts since July 22:  MARQUISE Patient Controlled Substance Report (from 10/11/2021 to 10/11/2022)    Dispensed  Strength Quantity Days Supply Provider Pharmacy   09/13/2022 Gabapentin 600MG 60 each 30 INDU MENDOZA II   08/14/2022 Gabapentin 600MG 60 each 30 INDU MENDOZA II   07/28/2022 Hydrocodone Bitartrate/Ac 325MG/5MG 18 each 3 ANDREASON,RENEE [facility]   07/14/2022 Gabapentin 600MG 60 each 30 INDU MENDOZA II          Refill for #60, two refills sent to [id number] - [address] AT [facility] [address].        Indu Mendoza II, MD  10/11/22 @ 6:09 PM CDT

## 2022-10-14 ENCOUNTER — LAB (OUTPATIENT)
Dept: LAB | Facility: HOSPITAL | Age: 67
End: 2022-10-14

## 2022-10-14 DIAGNOSIS — I10 ESSENTIAL HYPERTENSION, BENIGN: Chronic | ICD-10-CM

## 2022-10-14 DIAGNOSIS — Z13.220 SCREENING CHOLESTEROL LEVEL: ICD-10-CM

## 2022-10-14 PROCEDURE — 83735 ASSAY OF MAGNESIUM: CPT

## 2022-10-14 PROCEDURE — 80061 LIPID PANEL: CPT

## 2022-10-14 PROCEDURE — 36415 COLL VENOUS BLD VENIPUNCTURE: CPT

## 2022-10-14 PROCEDURE — 80053 COMPREHEN METABOLIC PANEL: CPT

## 2022-10-15 LAB
ALBUMIN SERPL-MCNC: 4.7 G/DL (ref 3.5–5.2)
ALBUMIN/GLOB SERPL: 2.2 G/DL
ALP SERPL-CCNC: 120 U/L (ref 39–117)
ALT SERPL W P-5'-P-CCNC: 22 U/L (ref 1–33)
ANION GAP SERPL CALCULATED.3IONS-SCNC: 11.9 MMOL/L (ref 5–15)
AST SERPL-CCNC: 15 U/L (ref 1–32)
BILIRUB SERPL-MCNC: <0.2 MG/DL (ref 0–1.2)
BUN SERPL-MCNC: 15 MG/DL (ref 8–23)
BUN/CREAT SERPL: 11.1 (ref 7–25)
CALCIUM SPEC-SCNC: 8.5 MG/DL (ref 8.6–10.5)
CHLORIDE SERPL-SCNC: 99 MMOL/L (ref 98–107)
CHOLEST SERPL-MCNC: 237 MG/DL (ref 0–200)
CO2 SERPL-SCNC: 27.1 MMOL/L (ref 22–29)
CREAT SERPL-MCNC: 1.35 MG/DL (ref 0.57–1)
EGFRCR SERPLBLD CKD-EPI 2021: 42.9 ML/MIN/1.73
GLOBULIN UR ELPH-MCNC: 2.1 GM/DL
GLUCOSE SERPL-MCNC: 111 MG/DL (ref 65–99)
HDLC SERPL-MCNC: 47 MG/DL (ref 40–60)
LDLC SERPL CALC-MCNC: 161 MG/DL (ref 0–100)
LDLC/HDLC SERPL: 3.37 {RATIO}
MAGNESIUM SERPL-MCNC: 1.9 MG/DL (ref 1.6–2.4)
POTASSIUM SERPL-SCNC: 4.2 MMOL/L (ref 3.5–5.2)
PROT SERPL-MCNC: 6.8 G/DL (ref 6–8.5)
SODIUM SERPL-SCNC: 138 MMOL/L (ref 136–145)
TRIGL SERPL-MCNC: 158 MG/DL (ref 0–150)
VLDLC SERPL-MCNC: 29 MG/DL (ref 5–40)

## 2022-10-16 ENCOUNTER — E-VISIT (OUTPATIENT)
Dept: FAMILY MEDICINE CLINIC | Facility: TELEHEALTH | Age: 67
End: 2022-10-16
Payer: MEDICARE

## 2022-10-16 PROCEDURE — BRIGHTMDVISIT: Performed by: NURSE PRACTITIONER

## 2022-10-16 NOTE — E-VISIT TREATED
Chief Complaint: Coronavirus (COVID-19), cold, sinus pain, allergy, or flu   Patient introduction   Patient is 67-year-old female with congestion, headache, fatigue, and nausea or vomiting that started 6 to 9 days ago.   COVID-19 exposure, testing history, and vaccination status:    No known exposure to a person with a confirmed or suspected case of COVID-19.    No recent travel outside of their local community.    Patient had a self-test 7 to 14 days ago. Test result was negative.    Received 3 doses of the COVID-19 vaccine (Pfizer, Pfizer, Pfizer).   Received their most recent dose of the vaccine more than 14 days ago.   Risk factors for severe disease from COVID-19 infection:    Age 65 or older.    BMI >= 25.    Asthma.   Warning. The following may warrant further investigation:    Hypertension.   Patient-submitted comments: Please my chart for my medications..   Patient did not request an excuse note.   General presentation   No improvement of symptoms. Symptoms came on gradually.   Fever:    No fever.   Sinus and nasal symptoms:    Postnasal drip.    1 to 3 episodes of antibiotic treatment for sinus infection in the last year.    History of surgically corrected septal deviation or nasal polyps.    Congestion with sinus pain or pressure on or around the cheeks.    Patient first noticed sinus pain 5 to 9 days ago.    Sinus pain is worse with Valsalva.    No nasal discharge.    No itchy nose or sneezing.    No nasal drainage.    No history of unhealed nasal septal ulcer/nasal wound.   Throat symptoms:    No sore throat.   Head and body aches:    Headache, described as moderate (4 to 6 on a scale of 1 to 10).    Fatigue.    No sweats.    No chills.    No myalgia.   Cough:    No cough.   Wheezing and shortness of breath:    Has asthma diagnosis.    Using an albuterol inhaler for asthma.    Using albuterol every 6 hours or longer.    Patient requests a prescription of albuterol in the form of an inhaler.    No  COPD diagnosis.    No wheezing.    No shortness of breath.    Current cold symptoms do not aggravate their asthma.   Chest pain:    No chest pain.   Ear symptoms:    Current symptoms include pressure, fullness, and crackling or popping in the ear(s).   Dizziness:    Mild dizziness that does not interfere with daily activities.   Allergies:    Patient has known seasonal allergies, known pet allergies, and known dust allergies.    Patient thinks symptoms are allergy-related.   Flu exposure:    No recent known exposure to a person with a confirmed flu diagnosis.    Received a flu vaccine more than 6 months ago.   Review of red flags/alarm symptoms:    No changes in alertness or awareness.    No symptoms suggesting respiratory distress.    No symptoms suggesting intracranial hemorrhage.    No decreased urination.   Risk factors for antibiotic resistance:    Antibiotic use for similar symptoms within the last 30 days.   Pregnancy/menstrual status/breastfeeding:    Patient is postmenopausal.   Self-exam:    No difficulty moving their chin toward their chest.    Neck lymph nodes feel normal.    Has taken antibiotics for similar symptoms within the past month.   Current medications   Patient is taking over-the-counter medications for current symptoms, including budesonide, cetirizine, diphenhydramine, fluticasone, and guaifenesin.   Currently taking medications or supplements, but did not provide the list of what they are taking.   Medication allergies    Macrolides.   Medication contraindication review   Patient has history of depression and hypertension. Therefore, the following medication(s) will not be prescribed:    Metoclopramide.    Acetaminophen-diphenhydramine-phenylephrine.    Aspirin-chlorpheniramine-phenylephrine.   No history of metoclopramide-associated dystonic reaction and tardive dyskinesia.   No known history of amoxicillin-clavulanate-associated cholestatic jaundice or hepatic impairment.   Past medical  history   Immune conditions: No immunocompromising conditions. No history of cancer.   Social history   High-risk household contacts: Patient's household includes one or more members of a group with risk factors for influenza complications, including a person 65 years or older.   Never smoked tobacco.   Assessment   Allergic rhinitis. Ruled out: Traumatic laryngitis.   This is the likely diagnosis based on patient's interview responses and symptoms, including:    Congestion.    Postnasal drip.    Patient's belief their symptoms are allergy-related.    Ear symptoms including pressure, fullness, and crackling or popping.   Plan   Medications:    albuterol sulfate HFA 90 mcg/actuation aerosol inhaler RX 90mcg/puff 2 puffs inhaled q6h PRN 10d for wheezing. If symptoms are severe, may use as often as every 4 hours. Amount is 18 g.    methylprednisolone 4 mg tablets in a dose pack RX 4mg 1 tab PO qid 6d for allergies. On day 1, take 2 tablets by mouth before breakfast, 1 tablet by mouth after lunch, 1 tablet by mouth after dinner, and 2 tablets by mouth at bedtime. On day 2, take 1 tablet by mouth before breakfast, 1 tablet by mouth after lunch, 1 tablet by mouth after dinner, and 2 tablets by mouth at bedtime. On day 3, take 1 tablet by mouth before breakfast, 1 tablet by mouth after lunch, 1 tablet by mouth after dinner, and 1 tablet by mouth at bedtime. On day 4, take 1 tablet by mouth before breakfast, 1 tablet by mouth after lunch, and 1 tablet by mouth at bedtime. On day 5, take 1 tablet by mouth before breakfast and 1 tablet by mouth at bedtime. On day 6, take 1 tablet by mouth before breakfast. Amount is 21 tab.   The patient's prescriptions will be sent to:   [id number]   [address] [facility] [id number]   Phone: [phone]     Fax: [phone]   Education:    Condition and causes    Prevention    Treatment and self-care    When to call provider   ----------   Electronically signed  by MIRIAM Tavares on 2022-10-16 at 17:10PM   ----------   Patient Interview Transcript:   Please carefully consider each question and answer as best you can. This helps your provider give you the best care. Which of these symptoms are bothering you? Select all that apply.    Stuffed-up nose or sinuses    Headache    Fatigue or tiredness    Nausea or vomiting   Not selected:    Cough    Shortness of breath    Fever    Runny nose    Itchy or watery eyes    Itchy nose or sneezing    Loss of smell or taste    Sore throat    Hoarse voice or loss of voice    Sweats    Chills    Muscle or body aches    Diarrhea    I don't have any of these symptoms   Since your current symptoms started, have you been tested for COVID-19? Select one.    Yes   Not selected:    No   When was your most recent COVID-19 test? Select one.    7 to 14 days ago   Not selected:    Within the last week (specify date as MM/DD/YY)    15 to 30 days ago    More than 1 month ago   What type of COVID-19 test did you most recently have? There are two types of COVID-19 tests: - Viral tests check if you're currently infected with COVID-19. For these tests, a nose swab or saliva sample is taken. Viral tests include self-tests and tests done at a doctor's office, lab, or testing site. - Antibody tests check if you've been infected in the past. For these tests, your blood is drawn. Antibody tests can only be done at a doctor's office, lab, or testing site. Select one.    Viral self-test   Not selected:    Viral test at a doctor's office, lab, or testing site    Antibody test   What was the result of your most recent COVID-19 test? Select one.    Negative   Not selected:    Positive    I'm not sure   Have you gotten the COVID-19 vaccine? Select one.    Yes   Not selected:    No   How many total doses of the COVID-19 vaccine have you gotten? This includes boosters as well as additional doses for those who are immunocompromised. Select one.    3 doses   Not  "selected:    1 dose    2 doses    4 doses    5 doses   1st dose    Pfizer   Not selected:    J&J/Wm    Moderna    Novavax   2nd dose    Pfizer   Not selected:    J&J/Wm    Moderna    Novavax   3rd dose    Pfizer   Not selected:    J&J/Wm    Moderna    Novavax   When did you get your most recent dose of the COVID-19 vaccine?    More than 14 days ago   Not selected:    Less than 48 hours (2 days) ago    48 to 72 hours (3 days) ago    3 to 5 days ago    5 to 7 days ago    7 to 14 days ago   In the last 14 days, have you traveled outside of your local community? This includes travel by car, RV, bus, train, or plane. Travel increases your chances of getting and spreading COVID-19. Select one.    No   Not selected:    Yes   In the last 14 days, have you had close contact with someone who has coronavirus (COVID-19)? \"Close contact\" means any of these: - Living in the same household as someone with COVID-19. - Caring for someone with COVID-19. - Being within 6 feet of someone with COVID-19 for a total of at least 15 minutes over a 24-hour period. For example, three 5-minute exposures for a total of 15 minutes. - Being in direct contact with respiratory droplets from someone with COVID-19 (being coughed on, kissing, sharing utensils). Select one.    No, not that I know of   Not selected:    Yes, a confirmed case    Yes, a suspected case   When did your current symptoms start? Select one.    6 to 9 days ago   Not selected:    Less than 48 hours ago    3 to 5 days ago    10 to 14 days ago    2 to 4 weeks ago    More than a month ago   Do you know the exact date your symptoms started? If so, enter the date as MM/DD/YY. Select one.    No   Not selected:    Yes (specify)   Did your symptoms come on suddenly or gradually? Select one.    Gradually   Not selected:    Suddenly    I'm not sure   Have your symptoms improved at all since they began? Select one.    No   Not selected:    Yes, but they haven't gone away " "completely    Yes, but then they came back worse than before    I'm not sure   You mentioned having a headache. On a scale of 1 to 10, how severe is your headache pain? Select one.    Moderate (4 to 6)   Not selected:    Mild (1 to 3)    Severe (7 to 9)    Unbearable (10)    The worst headache of my life (10+)   You mentioned having a stuffy nose or sinus congestion. Do you feel pain or pressure in your sinuses?    Yes   Not selected:    No   Where do you feel sinus pain or pressure?    In my cheeks   Not selected:    In my forehead    Around my eyes    Behind my nose    In my upper teeth or jaw    I'm not sure   When did you first notice your sinus pain or pressure? Select one.    5 to 9 days ago   Not selected:    Less than 5 days ago    10 to 14 days ago    2 to 4 weeks ago    1 month ago or longer   Does coughing, sneezing, or leaning forward make your sinuses feel worse? Select one.    Yes   Not selected:    No   What color is your nasal drainage? Select one.    My nose is stuffed but not draining or running   Not selected:    Clear    White    Yellow    Green    I'm not sure   Is there any drainage (mucus) going down the back of your throat? This kind of drainage is also called \"postnasal drip.\" Select one.    Yes   Not selected:    No, not that I know of   Since your symptoms started, have you felt dizzy? Select one.    Yes, but I can continue with my regular daily activities   Not selected:    Yes, and it makes it hard to stand, walk, or do daily activities    No   Do you have chest pain? You might also feel it as discomfort, aching, tightness, or squeezing in the chest. Select one.    No   Not selected:    Yes   Have you urinated at least 3 times in the last 24 hours? Select one.    Yes   Not selected:    No   Changes in alertness or awareness may mean you need emergency care. Since your symptoms started, have you had any of these? Select all that apply.    None of the above   Not selected:    Confusion   "  Slurred speech    Not knowing where you are or what day it is    Difficulty staying conscious    Fainting or passing out   Do your symptoms include a whistling sound, or wheezing, when you breathe? Select one.    No   Not selected:    Yes    I'm not sure   Do you have any of these symptoms in your ear(s)? Select all that apply.    Pressure    Fullness    Crackling or popping   Not selected:    Pain    Plugged or blocked sensation    None of the above   Can you move your chin toward your chest?    Yes   Not selected:    No, my neck is too stiff   Are your glands/lymph nodes swollen, or does it hurt when you touch them?    No, not that I can tell   Not selected:    Yes   In the past week, has anyone around you (such as at school, work, or home) had a confirmed diagnosis of the flu? A confirmed diagnosis means that a nose swab was done to verify a flu infection. Select all that apply.    No, not that I know of   Not selected:    I live with someone who has the flu    I've been within touching distance of someone who has the flu    I've walked by, or sat about 3 feet away from, someone who has the flu    I've been in the same building as someone who has the flu   Have you ever been diagnosed with asthma? Select one.    Yes   Not selected:    No   Are your cold symptoms making your asthma worse? Select one.    No   Not selected:    Yes   What medications or inhalers are you currently using for your asthma? Select all that apply.    Albuterol inhaler, as needed (such as ProAir, Proventil, Ventolin)   Not selected:    Inhaled steroid (such as Qvar, Pulmicort, Flovent)    Inhaled steroid with long-acting bronchodilator (such as Advair, Dulera, Symbicort)    I'm not sure    I'm not taking any medications for my asthma    I usually take medications for my asthma, but I ran out   How often are you using albuterol? If you're using albuterol very frequently, you'll need to be seen in person. Select one.    Every 6 hours or  longer   Not selected:    More than once an hour    Every 1 to 2 hours    Every 2 to 3 hours    Every 3 to 4 hours    Every 4 to 6 hours   Do you need a refill of albuterol? Select one.    Yes   Not selected:    No   What form of albuterol do you need? Select one.    Inhaler   Not selected:    Nebulizer solution   Have you ever been diagnosed with chronic obstructive pulmonary disease (COPD)? Select one.    No, not that I know of   Not selected:    Yes   In the past month, have you taken antibiotics for similar symptoms? Examples of antibiotics include amoxicillin, amoxicillin-clavulanate (Augmentin), penicillin, cefdinir (Omnicef), doxycycline, and clindamycin (Cleocin). Select one.    Yes   Not selected:    No   In the last year, how many times were you treated with antibiotics for a sinus infection? Select one.    1 to 3 times   Not selected:    None    4 or more times   Have you been diagnosed with a deviated septum or nasal polyps? The nose is divided into two nostrils by the septum. A crooked septum is called a deviated septum. Nasal polyps are growths inside the nose or sinuses. Select one.    Yes, but I had surgery to treat them   Not selected:    Yes, I have a deviated septum    Yes, I have nasal polyps    Yes, I have a deviated septum and nasal polyps    No, not that I know of   Do you have a sore inside your nose that won't heal? Select one.    No, not that I know of   Not selected:    Yes   Do you have allergies (pollen, dust mites, mold, animal dander)? Select one.    Yes   Not selected:    No, not that I know of   What kind of allergies do you have? Select all that apply.    Seasonal allergies (hay fever)    Pet allergies    Dust allergies   Not selected:    None of the above    I'm not sure   Do you think your symptoms could be allergy-related? Select one.    Yes   Not selected:    No    I'm not sure   Have you had a flu shot this season? Select one.    Yes, more than 6 months ago   Not selected:     Yes, less than 2 weeks ago    Yes, 2 to 4 weeks ago    Yes, 1 to 3 months ago    Yes, 3 to 6 months ago    No, not that I know of   Have you gone through menopause? Select one.    Yes   Not selected:    No    I'm going through it now   The flu and COVID-19 can be more serious for people with certain conditions or characteristics. These questions help us figure out if you or anyone you live with is at higher risk for complications from these infections. Do either of these statements apply to you? Select all that apply.    None of the above   Not selected:    I'm  or Native [place]    I'm a healthcare worker   Do you smoke tobacco? Select one.    No   Not selected:    Yes, every day    Yes, some days    No, I quit   Do you have any of these conditions? Select all that apply.    None of the above   Not selected:    Chronic lung disease, such as cystic fibrosis or interstitial fibrosis    Heart disease, such as congenital heart disease, congestive heart failure, or coronary artery disease    Disorder of the brain, spinal cord, or nerves and muscles, such as dementia, cerebral palsy, epilepsy, muscular dystrophy, or developmental delay    Metabolic disorder or mitochondrial disease    Cerebrovascular disease, such as stroke or another condition affecting the blood vessels or blood supply to the brain    Down syndrome    Mood disorder, including depression or schizophrenia spectrum disorders    Substance use disorder, such as alcohol, opioid, or cocaine use disorder    Tuberculosis   Do you live in a group care setting? Examples include: - Nursing home - Residential care - Psychiatric treatment facility - Group home - Dormitory - Board and care home - Homeless shelter - Foster care setting Select one.    No   Not selected:    Yes   Are you a healthcare worker? Select one.    No   Not selected:    Yes   People with a very high body mass index (BMI) are at higher risk for developing complications from the  flu and severe illness from COVID-19. To determine your BMI, we need to know your weight and height. Please enter your weight (in pounds).    Weight   Please enter your height.    Height   Do you have any of these conditions that can affect the immune system? Scroll to see all options. Select all that apply.    None of these   Not selected:    History of bone marrow transplant    Chronic kidney disease    Chronic liver disease (including cirrhosis)    HIV/AIDS    Inflammatory bowel disease (Crohn's disease or ulcerative colitis)    Lupus    Moderate to severe plaque psoriasis    Multiple sclerosis    Rheumatoid arthritis    Sickle cell anemia    Alpha or beta thalassemia    History of solid organ transplant (kidney, liver, or heart)    History of spleen removal    An autoimmune disorder not listed here    A condition requiring treatment with long-term use of oral steroids (such as prednisone, prednisolone, or dexamethasone)   Have you ever been diagnosed with cancer? Select one.    No   Not selected:    Yes, I have cancer now    Yes, but I'm in remission   Do any of these apply to you? Select all that apply.    None of the above   Not selected:    I've been hospitalized within the last 5 days    I have diabetes    I'm in close contact with a child in    Do any of these apply to the people who live with you? Select all that apply.    An adult 65 or older   Not selected:    A child under the age of 5    A person who is pregnant    A person who has given birth, had a miscarriage, had a pregnancy loss, or had an  in the last 2 weeks    An  or Native [place]    None of the above   Does any member of your household have any of these medical conditions? Select all that apply.    None of the above   Not selected:    Asthma    Disorders of the brain, spinal cord, or nerves and muscles, such as dementia, cerebral palsy, epilepsy, muscular dystrophy, or developmental delay    Chronic lung disease,  such as COPD or cystic fibrosis    Heart disease, such as congenital heart disease, congestive heart failure, or coronary artery disease    Cerebrovascular disease, such as stroke or another condition affecting the blood vessels or blood supply to the brain    Blood disorders, such as sickle cell disease    Diabetes    Metabolic disorders such as inherited metabolic disorders or mitochondrial disease    Kidney disorders    Liver disorders    Weakened immune system due to illness or medications such as chemotherapy or steroids    Children under the age of 19 who are on long-term aspirin therapy    Extreme obesity (BMI > 40)   Do you have any of these conditions? Scroll to see all options. Select all that apply.    Depression    High blood pressure   Not selected:    Aspirin triad (also known as Samter's triad or ASA triad)    Asthma or hives from taking aspirin or other NSAIDs, such as ibuprofen or naproxen    Blockage or narrowing of the blood vessels of the heart    Blood dyscrasia, such anemia, leukemia, lymphoma, or myeloma    Bone marrow depression    Catecholamine-releasing paraganglioma    Blood clotting disorder    Congenital long QT syndrome    Difficulty urinating or completely emptying your bladder    Uncorrected electrolyte abnormalities    Fungal infection    Gastrointestinal (GI) bleeding    Gastrointestinal (GI) obstruction    G6PD deficiency    Recent heart attack    Irregular heartbeat or heart rhythm    Mononucleosis (mono)    Myasthenia gravis    Parkinson's disease    Pheochromocytoma    Reye syndrome    Seizure disorder    Ulcerative colitis    None of the above   Have you ever had either of these conditions? Select all that apply.    No   Not selected:    Metoclopramide-associated dystonic reaction    Tardive dyskinesia   Just a few more questions about medications, and then you're finished. Have you used any non-prescription medications or nasal sprays for your current symptoms? Examples  include saline sprays, decongestants, NyQuil, and Tylenol. Select one.    Yes   Not selected:    No   Which of these non-prescription medications have you tried? Scroll to see all options. Select all that apply.    Budesonide (Rhinocort)    Cetirizine (Zyrtec)    Diphenhydramine (Benadryl)    Fluticasone (Flonase)    Guaifenesin (Mucinex)   Not selected:    Acetaminophen (Tylenol)    Chlorpheniramine (Aller-chlor, Chlor-Trimeton)    Cromolyn (NasalCrom)    Dextromethorphan (Delsym, Robitussin, Vicks DayQuil Cough)    Fexofenadine (Allegra)    Guaifenesin/dextromethorphan (Delsym DM, Mucinex DM, Robitussin DM)    Ibuprofen (Advil, Motrin, Midol)    Ketotifen (Alaway, Zaditor)    Loratadine (Alavert, Claritin)    Naphazoline-pheniramine (Naphcon-A, Opcon-A, Visine-A)    Omeprazole (Prilosec)    Oxymetazoline (Afrin)    Phenylephrine (Sudafed)    Triamcinolone (Nasacort)    None of the above   Have you taken any monoamine oxidase inhibitor (MAOI) medications in the last 14 days? Examples include rasagiline (Azilect), selegiline (Eldepryl, Zelapar), isocarboxazid (Marplan), phenelzine (Nardil), and tranylcypromine (Parnate). Select one.    No, not that I know of   Not selected:    Yes   Do you take Kynmobi or Apokyn (apomorphine)? Select one.    No   Not selected:    Yes   Are you taking any other medications, vitamins, or supplements? Select one.    Yes   Not selected:    No   Have you ever had an allergic or bad reaction to any medication? Select one.    Yes   Not selected:    No   Have you had an allergic or bad reaction to any of these medications? Select all that apply.    No, not that I know of   Not selected:    Baloxavir (Xofluza)    Benzonatate (Tessalon Perles)    Fluconazole, itraconazole, or terconazole (brands include Diflucan, Sporanox, Terazol)    Oseltamivir (Tamiflu) or zanamivir (Relenza)    Paxlovid, nirmatrelvir, or ritonavir (Norvir)   Have you had an allergic or bad reaction to any of these  "antibiotic medications? Select all that apply.    Azithromycin or any \"-thromycin\" antibiotic, such as erythromycin or clarithromycin (Brands include Biaxin, Erythrocin, Z-yobani, and Zithromax)   Not selected:    Penicillin or any \"-cillin\" antibiotic, such as amoxicillin, ampicillin, dicloxacillin, nafcillin, or piperacillin (Brands include Augmentin, Unasyn, and Zosyn)    Tetracycline or any \"-cycline\" antibiotic, such as doxycycline, demeclocycline, minocycline (Brands include Declomycin, Doryx, Dynacin, Oracea, Monodox, Panmycin, and Vibramycin)    Ciprofloxacin or any \"-floxacin\" antibiotic, such as gemifloxacin, levofloxacin, moxifloxacin, or ofloxacin (Brands include Factive, Cipro, Floxin, and Levaquin)    Cephalexin or any \"cef-\" antibiotic, such as cefazolin, cefdinir, cefuroxime, ceftriaxone, ceftazidime, or cefepime (Brands include Ancef, Ceftin, Fortaz, Keflex, Maxipime, Rocephin, and Simplicef)    Clindamycin or lincomycin (Brands include Cleocin and Lincocin)    No, not that I know of   Have you had an allergic or bad reaction to any of these medications? Select all that apply.    No, not that I know of   Not selected:    Albuterol or a similar medication    Corticosteroid (steroid) medication, including topical steroids, inhaled steroids, nasal steroids, or oral steroids (budesonide, ciclesonide, dexamethasone, flunisolide, fluticasone, methylprednisolone, triamcinolone, prednisone (or brand names Alvesco, Deltasone, Flovent, Medrol, Nasacort, Rhinocort, or Veramyst)    Metoclopramide (Reglan)    Ondansetron (Zuplenz, Zofran ODT, Zofran)    Prochlorperazine (Compazine)   Have you had an allergic or bad reaction to any of these eye drops or nasal sprays? Scroll to see all options. Select all that apply.    No, not that I know of   Not selected:    Azelastine (Astelin, Astepro, Optivar)    Cromolyn (Crolom, NasalCrom)    Ipratropium (Atrovent)    Ketotifen (Alaway, Zaditor)    Pheniramine/naphazoline " (Naphcon-A, Opcon-A, Visine-A)    Olopatadine (Pataday, Patanol, Pazeo)   Have you had an allergic or bad reaction to any of these non-prescription medications? Scroll to see all options. Select all that apply.    No, not that I know of   Not selected:    Acetaminophen (Tylenol)    Aspirin    Cetirizine (Zyrtec)    Dextromethorphan (Delsym, Robitussin, Vicks DayQuil Cough)    Diphenhydramine (Benadryl)    Fexofenadine (Allegra)    Guaifenesin (Mucinex)    Dextromethorphan (Delsym)    Ibuprofen (Advil, Motrin, Midol)    Loratadine (Alavert, Claritin)    Oxymetazoline (Afrin)    Phenylephrine (Sudafed)   Are you allergic to milk or to the proteins found in milk (for example, whey or casein)? A milk allergy is different from lactose intolerance. Select one.    No, not that I know of   Not selected:    Yes   Have you ever had jaundice or liver problems as a result of taking amoxicillin-clavulanate (Augmentin)? Jaundice is a condition in which the skin and the whites of the eyes turn yellow. Select all that apply.    No, not that I know of   Not selected:    Yes, jaundice    Yes, liver problems   Do you need a doctor's note? A doctor's note confirms that you received care today and states when you can return to school or work. It does not contain information about your diagnosis or treatment plan. Your provider will make the final decision on whether to give you a doctor's note and for how long. Doctor's notes CANNOT be backdated. We can't provide medical leave paperwork through this type of visit. If more paperwork is needed to request time off, contact your primary care provider. Select one.    No   Not selected:    Today only (1 day)    Today and tomorrow (2 days)    3 days    5 days    7 days    10 days    14 days   Is there anything else you'd like to tell us about your symptoms?    Please my chart for my medications.   ----------   Medical history   The following information was received from the EMR on October 16,  2022.   Allergies:    SULFA ANTIBIOTICS   - Allergy Type: Medication   - Reaction: Hives   - Severity: None   - Clinical Status: Active   - Verification Status: Confirmed    CODEINE   - Allergy Type: Medication   - Reaction: Other (See Comments)   - Severity: None   - Clinical Status: Active   - Verification Status: Confirmed   Medications:    linaclotide (LINZESS) capsule   - Route: Oral   - Start Date: April 25, 2022   - End Date: None   - Status: Active    ramelteon (ROZEREM) tablet 8 mg   - Route:   - Start Date: September 27, 2022   - End Date: None   - Status: Active    mometasone (NASONEX) nasal spray   - Route: Nasal   - Start Date: June 21, 2021   - End Date: None   - Status: Active    chlorthalidone (HYGROTEN) tablet   - Route: Oral   - Start Date: May 23, 2022   - End Date: None   - Status: Active    TRINTELLIX 20 MG PO TABS   - Route: Oral   - Start Date: October 19, 2021   - End Date: None   - Status: Active    hydrOXYzine pamoate (VISTARIL) capsule   - Route:   - Start Date: October 04, 2022   - End Date: None   - Status: Active    nabumetone (RELAFEN) tablet 500   - Route:   - Start Date: October 06, 2022   - End Date: None   - Status: Active    ipratropium (ATROVENT) nasal spray 0.06%   - Route: Nasal   - Start Date: July 13, 2021   - End Date: None   - Status: Active    gabapentin (NEURONTIN) tablet (doses 600mg or greater)   - Route:   - Start Date: October 11, 2022   - End Date: None   - Status: Active    ondansetron (ZOFRAN) tablet   - Route: Oral   - Start Date: August 22, 2022   - End Date: None   - Status: Active    albuterol (PROVENTIL HFA;VENTOLIN HFA;PROAIR HFA) inhaler   - Route: Inhalation   - Start Date: August 10, 2021   - End Date: None   - Status: Active    cetirizine (zyrTEC) tablet   - Route:   - Start Date: January 17, 2022   - End Date: None   - Status: Active    olopatadine (PATANASE) nasal solution 0.6%   - Route:   - Start Date: October 27, 2021   - End Date: None   - Status:  Active    metoprolol succinate (TOPROL-XL) 24 hr tablet   - Route: Oral   - Start Date: September 08, 2022   - End Date: None   - Status: Active    ferrous sulfate tablet   - Route: Oral   - Start Date: June 14, 2022   - End Date: None   - Status: Active    dexlansoprazole (DEXILANT) capsule   - Route: Oral   - Start Date: October 07, 2022   - End Date: None   - Status: Active    sucralfate (CARAFATE) tablet 1 gram   - Route: Oral   - Start Date: October 28, 2020   - End Date: None   - Status: Active   Problem list:    High blood pressure   - Category: Problem List Item   - Health Status:   - Start Date: August 01, 2017   - End Date: October 30, 2018   - Status: Resolved    Asthma   - Category: Problem List Item   - Health Status:   - Start Date: August 01, 2017   - End Date: None   - Status: Active    Arthritis   - Category: Problem List Item   - Health Status:   - Start Date: August 01, 2017   - End Date: None   - Status: Active    Sinusitis   - Category: Problem List Item   - Health Status:   - Start Date: August 24, 2017   - End Date: October 30, 2018   - Status: Resolved    Nasal septal deformity   - Category: Problem List Item   - Health Status:   - Start Date: September 05, 2017   - End Date: April 30, 2018   - Status: Resolved    Nasal turbinate hypertrophy   - Category: Problem List Item   - Health Status:   - Start Date: September 05, 2017   - End Date: None   - Status: Active    Epigastric pain   - Category: Problem List Item   - Health Status:   - Start Date: November 28, 2017   - End Date: April 30, 2018   - Status: Resolved    Encounter for colonoscopy due to history of colonic polyp   - Category: Problem List Item   - Health Status:   - Start Date: November 28, 2017   - End Date: October 30, 2018   - Status: Resolved    Gastroesophageal reflux disease   - Category: Problem List Item   - Health Status:   - Start Date: November 28, 2017   - End Date: None   - Status: Active    Depression with anxiety    - Category: Problem List Item   - Health Status:   - Start Date: April 03, 2018   - End Date: None   - Status: Active    Insomnia   - Category: Problem List Item   - Health Status:   - Start Date: April 03, 2018   - End Date: None   - Status: Active    Other chronic pain   - Category: Problem List Item   - Health Status:   - Start Date: April 03, 2018   - End Date: April 22, 2020   - Status: Resolved    PTSD (post-traumatic stress disorder)   - Category: Problem List Item   - Health Status:   - Start Date: April 03, 2018   - End Date: None   - Status: Active    Restless leg   - Category: Problem List Item   - Health Status:   - Start Date: April 03, 2018   - End Date: None   - Status: Active    BMI 35.0-35.9,adult   - Category: Problem List Item   - Health Status:   - Start Date: April 30, 2018   - End Date: November 09, 2020   - Status: Resolved    Perforation of left tympanic membrane   - Category: Problem List Item   - Health Status:   - Start Date: May 14, 2018   - End Date: April 22, 2019   - Status: Resolved    Mixed hearing loss of left ear   - Category: Problem List Item   - Health Status:   - Start Date: May 14, 2018   - End Date: None   - Status: Active    Right knee pain   - Category: Problem List Item   - Health Status:   - Start Date: May 15, 2018   - End Date: None   - Status: Active    Presence of right artificial knee joint   - Category: Problem List Item   - Health Status:   - Start Date: May 15, 2018   - End Date: None   - Status: Active    Essential hypertension, benign   - Category: Problem List Item   - Health Status:   - Start Date: October 30, 2018   - End Date: None   - Status: Active    Epigastric pain   - Category: Problem List Item   - Health Status:   - Start Date: May 09, 2019   - End Date: October 22, 2019   - Status: Resolved    Nausea   - Category: Problem List Item   - Health Status:   - Start Date: May 09, 2019   - End Date: April 22, 2020   - Status: Resolved    Presbyesophagus    - Category: Problem List Item   - Health Status:   - Start Date: May 09, 2019   - End Date: None   - Status: Active    Chronic vasomotor rhinitis   - Category: Problem List Item   - Health Status:   - Start Date: January 13, 2020   - End Date: None   - Status: Active    Perforated tympanic membrane, bilateral   - Category: Problem List Item   - Health Status:   - Start Date: January 13, 2020   - End Date: None   - Status: Active    Mixed conductive and sensorineural hearing loss of right ear with restricted hearing of left ear   - Category: Problem List Item   - Health Status:   - Start Date: January 13, 2020   - End Date: None   - Status: Active    Morbidly obese (HCC)   - Category: Problem List Item   - Health Status:   - Start Date: January 30, 2020   - End Date: None   - Status: Active    Lethargy   - Category: Problem List Item   - Health Status:   - Start Date: February 10, 2020   - End Date: April 22, 2020   - Status: Resolved    Acute metabolic encephalopathy   - Category: Problem List Item   - Health Status:   - Start Date: February 10, 2020   - End Date: April 22, 2020   - Status: Resolved    GAMA (acute kidney injury) (HCC)   - Category: Problem List Item   - Health Status:   - Start Date: February 10, 2020   - End Date: April 22, 2020   - Status: Resolved    Polypharmacy   - Category: Problem List Item   - Health Status:   - Start Date: February 10, 2020   - End Date: April 22, 2020   - Status: Resolved    Hallux limitus of left foot   - Category: Problem List Item   - Health Status:   - Start Date: July 14, 2021   - End Date: None   - Status: Active    Marijuana use   - Category: Problem List Item   - Health Status:   - Start Date: October 19, 2021   - End Date: None   - Status: Active

## 2022-10-18 ENCOUNTER — OFFICE VISIT (OUTPATIENT)
Dept: FAMILY MEDICINE CLINIC | Facility: CLINIC | Age: 67
End: 2022-10-18

## 2022-10-18 VITALS
BODY MASS INDEX: 36.99 KG/M2 | WEIGHT: 222 LBS | SYSTOLIC BLOOD PRESSURE: 148 MMHG | DIASTOLIC BLOOD PRESSURE: 80 MMHG | HEIGHT: 65 IN

## 2022-10-18 DIAGNOSIS — Z23 NEED FOR VACCINATION: ICD-10-CM

## 2022-10-18 DIAGNOSIS — E78.2 MIXED HYPERLIPIDEMIA: Chronic | ICD-10-CM

## 2022-10-18 DIAGNOSIS — I10 ESSENTIAL HYPERTENSION, BENIGN: Primary | Chronic | ICD-10-CM

## 2022-10-18 DIAGNOSIS — E66.01 MORBIDLY OBESE: Chronic | ICD-10-CM

## 2022-10-18 DIAGNOSIS — J30.89 SEASONAL ALLERGIC RHINITIS DUE TO OTHER ALLERGIC TRIGGER: ICD-10-CM

## 2022-10-18 DIAGNOSIS — Z12.31 SCREENING MAMMOGRAM FOR BREAST CANCER: ICD-10-CM

## 2022-10-18 PROBLEM — H72.93: Chronic | Status: RESOLVED | Noted: 2020-01-13 | Resolved: 2022-10-18

## 2022-10-18 PROCEDURE — 90662 IIV NO PRSV INCREASED AG IM: CPT | Performed by: FAMILY MEDICINE

## 2022-10-18 PROCEDURE — G0008 ADMIN INFLUENZA VIRUS VAC: HCPCS | Performed by: FAMILY MEDICINE

## 2022-10-18 PROCEDURE — 99214 OFFICE O/P EST MOD 30 MIN: CPT | Performed by: FAMILY MEDICINE

## 2022-10-18 RX ORDER — MOMETASONE FUROATE 50 UG/1
2 SPRAY, METERED NASAL DAILY
Qty: 3 EACH | Refills: 3 | Status: SHIPPED | OUTPATIENT
Start: 2022-10-18 | End: 2022-12-16

## 2022-10-18 RX ORDER — ATORVASTATIN CALCIUM 10 MG/1
10 TABLET, FILM COATED ORAL DAILY
Qty: 90 TABLET | Refills: 3 | Status: SHIPPED | OUTPATIENT
Start: 2022-10-18

## 2022-10-18 RX ORDER — IPRATROPIUM BROMIDE 42 UG/1
2 SPRAY, METERED NASAL 2 TIMES DAILY
Qty: 3 EACH | Refills: 3 | Status: SHIPPED | OUTPATIENT
Start: 2022-10-18

## 2022-10-18 RX ORDER — PREDNISONE 10 MG/1
TABLET ORAL
Qty: 10 TABLET | Refills: 0 | Status: SHIPPED | OUTPATIENT
Start: 2022-10-18 | End: 2022-12-05

## 2022-10-18 RX ORDER — METHYLPREDNISOLONE 4 MG/1
TABLET ORAL 2 TIMES DAILY
COMMUNITY
End: 2022-10-18

## 2022-10-18 NOTE — PROGRESS NOTES
Answers for HPI/ROS submitted by the patient on 10/14/2022  Please describe your symptoms.: Follow up appointment  Have you had these symptoms before?: Yes  How long have you been having these symptoms?: 1-2 weeks  Please list any medications you are currently taking for this condition.: Please see my chart for medications  What is the primary reason for your visit?: Other    Subjective   Mikki Joel is a 67 y.o. female.  Reevaluation of hypertension borderline high blood pressure morbid obesity asthma diagnoses below in the interim allergies of flares bilateral no sprays as given up high-dose Medrol Dosepak over E-visit which is caused side effects and needs to stop.  Lab work revealed LDL cholesterol jumped up to 160 Jairo have to be treated.  Is continued to lose weight however.  Trying to be more healthy.  Medicines otherwise has remained the same.  Chart reviewed.  History of Present Illness   HPI    The following portions of the patient's history were reviewed and updated as appropriate: allergies, current medications, past family history, past medical history, past social history, past surgical history and problem list.    Review of Systems  Review of Systems   Constitutional: Negative for activity change, appetite change, fatigue and unexpected weight change.   HENT: Positive for congestion, postnasal drip and rhinorrhea. Negative for trouble swallowing and voice change.    Eyes: Negative for redness and visual disturbance.   Respiratory: Negative for cough and wheezing.    Cardiovascular: Negative for chest pain and palpitations.   Gastrointestinal: Negative for abdominal pain, constipation, diarrhea, nausea and vomiting.   Genitourinary: Negative for urgency.   Musculoskeletal: Negative for joint swelling.   Neurological: Positive for headaches. Negative for syncope.   Hematological: Negative for adenopathy.   Psychiatric/Behavioral: Negative for sleep disturbance.       Objective   Physical  Exam  Physical Exam  Constitutional:       Appearance: She is well-developed.   HENT:      Head: Normocephalic.      Right Ear: External ear normal. Tympanic membrane is retracted.      Left Ear: Tympanic membrane is retracted.      Ears:      Comments: Both drums scar     Nose: Mucosal edema present.   Eyes:      Pupils: Pupils are equal, round, and reactive to light.   Neck:      Thyroid: No thyromegaly.   Cardiovascular:      Rate and Rhythm: Normal rate and regular rhythm.      Heart sounds: Normal heart sounds. No murmur heard.    No friction rub. No gallop.   Pulmonary:      Breath sounds: Normal breath sounds.   Abdominal:      General: There is no distension.      Palpations: Abdomen is soft. There is no mass.      Tenderness: There is no abdominal tenderness.   Musculoskeletal:         General: Normal range of motion.      Cervical back: Normal range of motion.      Comments: 2+ pulses plus DTRs no edema   Skin:     General: Skin is warm and dry.   Neurological:      Mental Status: She is alert and oriented to person, place, and time.      Deep Tendon Reflexes: Reflexes are normal and symmetric.   Psychiatric:         Attention and Perception: Attention normal.         Mood and Affect: Mood is anxious.         Speech: Speech normal.         Behavior: Behavior normal.         Thought Content: Thought content normal.         Cognition and Memory: Cognition normal.       Results for orders placed or performed in visit on 10/14/22   Lipid Panel With LDL / HDL Ratio    Specimen: Blood   Result Value Ref Range    Total Cholesterol 237 (H) 0 - 200 mg/dL    Triglycerides 158 (H) 0 - 150 mg/dL    HDL Cholesterol 47 40 - 60 mg/dL    LDL Cholesterol  161 (H) 0 - 100 mg/dL    VLDL Cholesterol 29 5 - 40 mg/dL    LDL/HDL Ratio 3.37    Magnesium    Specimen: Blood   Result Value Ref Range    Magnesium 1.9 1.6 - 2.4 mg/dL   Comprehensive Metabolic Panel    Specimen: Blood   Result Value Ref Range    Glucose 111 (H) 65 - 99  "mg/dL    BUN 15 8 - 23 mg/dL    Creatinine 1.35 (H) 0.57 - 1.00 mg/dL    Sodium 138 136 - 145 mmol/L    Potassium 4.2 3.5 - 5.2 mmol/L    Chloride 99 98 - 107 mmol/L    CO2 27.1 22.0 - 29.0 mmol/L    Calcium 8.5 (L) 8.6 - 10.5 mg/dL    Total Protein 6.8 6.0 - 8.5 g/dL    Albumin 4.70 3.50 - 5.20 g/dL    ALT (SGPT) 22 1 - 33 U/L    AST (SGOT) 15 1 - 32 U/L    Alkaline Phosphatase 120 (H) 39 - 117 U/L    Total Bilirubin <0.2 0.0 - 1.2 mg/dL    Globulin 2.1 gm/dL    A/G Ratio 2.2 g/dL    BUN/Creatinine Ratio 11.1 7.0 - 25.0    Anion Gap 11.9 5.0 - 15.0 mmol/L    eGFR 42.9 (L) >60.0 mL/min/1.73           Visit Vitals  /80   Ht 165.1 cm (65\")   Wt 101 kg (222 lb)   LMP  (LMP Unknown)   BMI 36.94 kg/m²     Body mass index is 36.94 kg/m².    /80   Ht 165.1 cm (65\")   Wt 101 kg (222 lb)   LMP  (LMP Unknown)   BMI 36.94 kg/m²     Assessment/Plan   Diagnoses and all orders for this visit:    1. Essential hypertension, benign (Primary)    2. Morbidly obese (HCC)    3. Mixed hyperlipidemia  -     atorvastatin (Lipitor) 10 MG tablet; Take 1 tablet by mouth Daily.  Dispense: 90 tablet; Refill: 3    4. Need for vaccination  -     Fluzone High-Dose 65+yrs    5. Seasonal allergic rhinitis due to other allergic trigger  -     predniSONE (DELTASONE) 10 MG tablet; 1 twice daily for 5 days for allergies  Dispense: 10 tablet; Refill: 0  -     ipratropium (ATROVENT) 0.06 % nasal spray; 2 sprays into the nostril(s) as directed by provider 2 (Two) Times a Day.  Dispense: 3 each; Refill: 3  -     mometasone (NASONEX) 50 MCG/ACT nasal spray; 2 sprays into the nostril(s) as directed by provider Daily.  Dispense: 3 each; Refill: 3    Counseled Priya pot use continuing steroid use.  Start low-dose Lipitor.  Counseled continue good lifestyle changes.  Physical all subspecialist.  Recheck in 6 months  "

## 2022-10-19 DIAGNOSIS — I10 ESSENTIAL HYPERTENSION, BENIGN: Chronic | ICD-10-CM

## 2022-10-19 DIAGNOSIS — F39 AFFECTIVE DISORDER: ICD-10-CM

## 2022-10-19 RX ORDER — CHLORTHALIDONE 25 MG/1
TABLET ORAL
Qty: 90 TABLET | Refills: 3 | Status: SHIPPED | OUTPATIENT
Start: 2022-10-19

## 2022-10-20 RX ORDER — ONDANSETRON HYDROCHLORIDE 8 MG/1
8 TABLET, FILM COATED ORAL EVERY 8 HOURS PRN
Qty: 30 TABLET | Refills: 0 | OUTPATIENT
Start: 2022-10-20

## 2022-10-21 RX ORDER — FLUOXETINE HYDROCHLORIDE 40 MG/1
40 CAPSULE ORAL DAILY
Qty: 90 CAPSULE | Refills: 1 | Status: SHIPPED | OUTPATIENT
Start: 2022-10-21 | End: 2023-03-20

## 2022-10-24 RX ORDER — VORTIOXETINE 20 MG/1
20 TABLET, FILM COATED ORAL DAILY
Qty: 90 TABLET | Refills: 1 | OUTPATIENT
Start: 2022-10-24

## 2022-10-26 ENCOUNTER — OFFICE VISIT (OUTPATIENT)
Dept: GASTROENTEROLOGY | Facility: CLINIC | Age: 67
End: 2022-10-26

## 2022-10-26 VITALS
DIASTOLIC BLOOD PRESSURE: 113 MMHG | HEIGHT: 65 IN | HEART RATE: 103 BPM | WEIGHT: 227.8 LBS | BODY MASS INDEX: 37.95 KG/M2 | SYSTOLIC BLOOD PRESSURE: 177 MMHG

## 2022-10-26 DIAGNOSIS — K59.04 CHRONIC IDIOPATHIC CONSTIPATION: ICD-10-CM

## 2022-10-26 DIAGNOSIS — K21.00 GASTROESOPHAGEAL REFLUX DISEASE WITH ESOPHAGITIS WITHOUT HEMORRHAGE: Primary | ICD-10-CM

## 2022-10-26 PROCEDURE — 99213 OFFICE O/P EST LOW 20 MIN: CPT | Performed by: NURSE PRACTITIONER

## 2022-10-26 RX ORDER — ONDANSETRON HYDROCHLORIDE 8 MG/1
8 TABLET, FILM COATED ORAL EVERY 8 HOURS PRN
Qty: 30 TABLET | Refills: 0 | Status: SHIPPED | OUTPATIENT
Start: 2022-10-26 | End: 2022-12-20 | Stop reason: SDUPTHER

## 2022-10-26 NOTE — PROGRESS NOTES
"                                                                                                                  Chief Complaint   Patient presents with   • Follow-up     reflux       Subjective    Mikki Joel is a 67 y.o. female. she is here today for follow-up.                                                                  Assessment & Plan                                     1. Gastroesophageal reflux disease with esophagitis without hemorrhage    2. Chronic idiopathic constipation        Follow-up: Return in about 6 months (around 4/26/2023) for Recheck, After test.     HPI    67-year-old female presents for 6-month recheck regarding heartburn and constipation.  States with Linzess bowel movements are 2-3 times a week and would like to try increasing dosage.  States reflux has been well controlled with Exelon daily has occasional nausea for which she will take Zofran still has Carafate but does not take it on a regular basis at all.  Lab work was checked by PCP recently and was stable.  She is due for colonoscopy in 2023 for surveillance.  Blood pressure is up today but reports it is due to the steroid pack she is on.    Review of Systems  Review of Systems   Constitutional: Negative for activity change, appetite change, chills, diaphoresis, fatigue, fever and unexpected weight change.   HENT: Negative for sore throat and trouble swallowing.    Respiratory: Negative for shortness of breath.    Gastrointestinal: Positive for abdominal pain, constipation (controlled on linzess now about 2 times per week. ) and nausea. Negative for abdominal distention, anal bleeding, blood in stool, diarrhea, rectal pain and vomiting.   Musculoskeletal: Negative for arthralgias.   Skin: Negative for pallor.   Neurological: Negative for light-headedness.       BP (!) 177/113   Pulse 103   Ht 165.1 cm (65\")   Wt 103 kg (227 lb 12.8 oz)   LMP  (LMP Unknown)   BMI 37.91 kg/m²     Objective      Physical " Exam  Constitutional:       General: She is not in acute distress.     Appearance: Normal appearance. She is obese. She is not ill-appearing.   HENT:      Head: Normocephalic and atraumatic.   Pulmonary:      Effort: Pulmonary effort is normal.   Abdominal:      General: Abdomen is flat. Bowel sounds are normal. There is no distension.      Palpations: Abdomen is soft. There is no mass.      Tenderness: There is generalized abdominal tenderness.   Neurological:      Mental Status: She is alert.               The following portions of the patient's history were reviewed and updated as appropriate:   Past Medical History:   Diagnosis Date   • Allergic rhinitis All my life    chronic infection   • Anxiety    • Asthma    • Chronic anemia    • Chronic pain    • CTS (carpal tunnel syndrome)    • Deaf    • Depression with anxiety    • Fracture of wrist    • Gastroesophageal reflux disease    • Hypertension    • Migraine    • Obesity    • Osteoarthritis    • Perforated tympanic membrane, bilateral    • Plantar fasciitis    • PONV (postoperative nausea and vomiting)    • Sleep apnea     sleep with c-pap     Past Surgical History:   Procedure Laterality Date   • ADENOIDECTOMY  1959   • COLONOSCOPY N/A 1/19/2018   • EAR TUBES     • ENDOSCOPIC FUNCTIONAL SINUS SURGERY (FESS) Bilateral 9/5/2017   • ENDOSCOPY N/A 1/19/2018   • ENDOSCOPY N/A 6/10/2019   • KNEE ARTHROPLASTY Right    • LAPAROSCOPIC TUBAL LIGATION     • RHINOPLASTY     • SINUS SURGERY  1988, 2002, 2006, 2018   • TOE FUSION Left 8/12/2021    Procedure: LEFT FIRST METATARSOPHALANGEAL JOINT ARTHRODESIS;  Surgeon: Brando Brice DPM;  Location: [facility];  Service: Podiatry;  Laterality: Left;   • TONSILLECTOMY  1959   • TOTAL SHOULDER ARTHROPLASTY Right    • TYMPANOPLASTY Left 6/5/2018   • TYMPANOPLASTY Right 2/7/2020    Procedure: TYMPANOPLASTY AND  CARTILAGE GRAFT;  Surgeon: Ramy Gaston MD;  Location: [facility];  Service: ENT;  Laterality: Right;   • UPPER  GASTROINTESTINAL ENDOSCOPY       Family History   Problem Relation Age of Onset   • Hypertension Mother    • Alcohol abuse Father    • Heart disease Father    • Cancer Father    • Stroke Maternal Grandmother    • Irritable bowel syndrome Sister    • Osteoporosis Maternal Grandfather      OB History        2    Para   2    Term   2            AB        Living           SAB        IAB        Ectopic        Molar        Multiple        Live Births                  Allergies   Allergen Reactions   • Codeine Other (See Comments)     Increases heart rate   • Sulfa Antibiotics Hives     Social History     Socioeconomic History   • Marital status:    Tobacco Use   • Smoking status: Never     Passive exposure: Past   • Smokeless tobacco: Never   • Tobacco comments:     Passive for 29 years   Vaping Use   • Vaping Use: Never used   Substance and Sexual Activity   • Alcohol use: Yes     Alcohol/week: 2.0 standard drinks     Types: 1 Glasses of wine, 1 Standard drinks or equivalent per week     Comment: or less   • Drug use: Yes     Frequency: 5.0 times per week     Types: Marijuana   • Sexual activity: Defer     Current Medications:  Prior to Admission medications    Medication Sig Start Date End Date Taking? Authorizing Provider   albuterol sulfate  (90 Base) MCG/ACT inhaler Inhale 2 puffs Every 4 (Four) Hours As Needed for Wheezing.   Yes Provider, MD Lucaí   atorvastatin (Lipitor) 10 MG tablet Take 1 tablet by mouth Daily. 10/18/22  Yes Ronald Bowser MD   cetirizine (zyrTEC) 10 MG tablet TAKE ONE TABLET BY MOUTH DAILY 22  Yes Ronald Bowser MD   chlorthalidone (HYGROTON) 25 MG tablet TAKE 1 TABLET BY MOUTH DAILY 10/19/22  Yes Ronald Bowser MD   dexlansoprazole (Dexilant) 60 MG capsule Take 1 capsule by mouth Daily. 10/7/22  Yes Zelda Rich APRN   ferrous sulfate 325 (65 FE) MG tablet Take 1 tablet by mouth Daily With Breakfast. Take with vitamin C to help with absorption 22   Yes Mila Lang APRN   FLUoxetine (PROzac) 40 MG capsule Take 1 capsule by mouth Daily. 10/21/22  Yes Ronald Bowser MD   gabapentin (NEURONTIN) 600 MG tablet TAKE 2 TABLETS BY MOUTH AT NIGHT FOR RESTLESS LEG SYNDROME 10/11/22  Yes Jimmy Kelly II, MD   hydrOXYzine pamoate (VISTARIL) 50 MG capsule TAKE 1 CAPSULE BY MOUTH THREE TIMES DAILY EVERY NIGHT AS NEEDED FOR ITCHING OR SLEEP 10/4/22  Yes Ronald Bowser MD   ipratropium (ATROVENT) 0.06 % nasal spray 2 sprays into the nostril(s) as directed by provider 2 (Two) Times a Day. 10/18/22  Yes Ronald Bowser MD   linaclotide (Linzess) 72 MCG capsule capsule Take 1 capsule by mouth Every Morning Before Breakfast. 4/25/22  Yes Zelda Rich APRN   metoprolol succinate XL (TOPROL-XL) 100 MG 24 hr tablet TAKE 1 TABLET BY MOUTH EVERY NIGHT 9/8/22  Yes Ronald Bowser MD   mometasone (NASONEX) 50 MCG/ACT nasal spray 2 sprays into the nostril(s) as directed by provider Daily. 10/18/22  Yes Ronald Bowser MD   nabumetone (RELAFEN) 750 MG tablet TAKE 1 TABLET BY MOUTH TWICE DAILY 10/6/22  Yes Brando Brice DPM   olopatadine (PATANASE) 0.6 % solution nasal solution  10/25/21  Yes ProviderLucía MD   ondansetron (ZOFRAN) 8 MG tablet Take 1 tablet by mouth Every 8 (Eight) Hours As Needed for Nausea or Vomiting. 8/22/22  Yes Zelda Rich APRN   predniSONE (DELTASONE) 10 MG tablet 1 twice daily for 5 days for allergies 10/18/22  Yes Ronald Bowser MD   ramelteon (ROZEREM) 8 MG tablet TAKE 1 TABLET BY MOUTH EVERY EVENING UP TO 3 HOURS BEFORE BEDTIME 9/27/22  Yes Jimmy Kelly II, MD   sucralfate (CARAFATE) 1 g tablet Take 1 tablet by mouth 4 (Four) Times a Day As Needed (Reflux). 10/28/20  Yes Rich, Zelda A, APRN     Orders placed during this encounter include:  No orders of the defined types were placed in this encounter.    * Surgery not found *  New Medications Ordered This Visit   Medications   • ondansetron (ZOFRAN) 8 MG tablet     Sig: Take  1 tablet by mouth Every 8 (Eight) Hours As Needed for Nausea or Vomiting.     Dispense:  30 tablet     Refill:  0   • linaclotide (Linzess) 72 MCG capsule capsule     Sig: Take 2 capsules by mouth Every Morning Before Breakfast.     Dispense:  30 capsule     Refill:  5         Review and/or summary of lab tests, radiology, procedures, medications. Review and summary of old records and obtaining of history. The risks and benefits of my recommendations, as well as other treatment options were discussed . Any questions/concerned were answered. Patient voiced understanding and agreement.          This document has been electronically signed by MRIIAM White on October 26, 2022 13:53 CDT                                               Results for orders placed or performed in visit on 10/14/22   Lipid Panel With LDL / HDL Ratio    Specimen: Blood   Result Value Ref Range    Total Cholesterol 237 (H) 0 - 200 mg/dL    Triglycerides 158 (H) 0 - 150 mg/dL    HDL Cholesterol 47 40 - 60 mg/dL    LDL Cholesterol  161 (H) 0 - 100 mg/dL    VLDL Cholesterol 29 5 - 40 mg/dL    LDL/HDL Ratio 3.37    Magnesium    Specimen: Blood   Result Value Ref Range    Magnesium 1.9 1.6 - 2.4 mg/dL   Comprehensive Metabolic Panel    Specimen: Blood   Result Value Ref Range    Glucose 111 (H) 65 - 99 mg/dL    BUN 15 8 - 23 mg/dL    Creatinine 1.35 (H) 0.57 - 1.00 mg/dL    Sodium 138 136 - 145 mmol/L    Potassium 4.2 3.5 - 5.2 mmol/L    Chloride 99 98 - 107 mmol/L    CO2 27.1 22.0 - 29.0 mmol/L    Calcium 8.5 (L) 8.6 - 10.5 mg/dL    Total Protein 6.8 6.0 - 8.5 g/dL    Albumin 4.70 3.50 - 5.20 g/dL    ALT (SGPT) 22 1 - 33 U/L    AST (SGOT) 15 1 - 32 U/L    Alkaline Phosphatase 120 (H) 39 - 117 U/L    Total Bilirubin <0.2 0.0 - 1.2 mg/dL    Globulin 2.1 gm/dL    A/G Ratio 2.2 g/dL    BUN/Creatinine Ratio 11.1 7.0 - 25.0    Anion Gap 11.9 5.0 - 15.0 mmol/L    eGFR 42.9 (L) >60.0 mL/min/1.73   Results for orders placed or performed during the  hospital encounter of 08/12/21   Urine Drug Screen - Urine, Clean Catch    Specimen: Urine, Clean Catch   Result Value Ref Range    THC, Screen, Urine Positive (A) Negative    Phencyclidine (PCP), Urine Negative Negative    Cocaine Screen, Urine Negative Negative    Methamphetamine, Ur Negative Negative    Opiate Screen Negative Negative    Amphetamine Screen, Urine Negative Negative    Benzodiazepine Screen, Urine Negative Negative    Tricyclic Antidepressants Screen Negative Negative    Methadone Screen, Urine Negative Negative    Barbiturates Screen, Urine Negative Negative    Oxycodone Screen, Urine Negative Negative    Propoxyphene Screen Negative Negative    Buprenorphine, Screen, Urine Negative Negative   Results for orders placed or performed in visit on 08/10/21   ECG 12 Lead   Result Value Ref Range    QT Interval 410 ms    QTC Interval 419 ms   Results for orders placed or performed in visit on 08/10/21   COVID-19,  [facility] IN-HOUSE, NP SWAB IN TRANSPORT MEDIA 8-10 HR TAT - Swab, Nasopharynx    Specimen: Nasopharynx; Swab   Result Value Ref Range    COVID19 Not Detected Not Detected - Ref. Range   Results for orders placed or performed in visit on 04/20/21   Lipid Panel With LDL / HDL Ratio    Specimen: Blood   Result Value Ref Range    Total Cholesterol 210 (H) 0 - 200 mg/dL    Triglycerides 207 (H) 0 - 150 mg/dL    HDL Cholesterol 42 40 - 60 mg/dL    LDL Cholesterol  131 (H) 0 - 100 mg/dL    VLDL Cholesterol 37 5 - 40 mg/dL    LDL/HDL Ratio 3.01    Magnesium    Specimen: Blood   Result Value Ref Range    Magnesium 2.2 1.6 - 2.4 mg/dL   Comprehensive Metabolic Panel    Specimen: Blood   Result Value Ref Range    Glucose 99 65 - 99 mg/dL    BUN 22 8 - 23 mg/dL    Creatinine 1.31 (H) 0.57 - 1.00 mg/dL    Sodium 142 136 - 145 mmol/L    Potassium 4.3 3.5 - 5.2 mmol/L    Chloride 103 98 - 107 mmol/L    CO2 26.4 22.0 - 29.0 mmol/L    Calcium 9.3 8.6 - 10.5 mg/dL    Total Protein 6.7 6.0 - 8.5 g/dL    Albumin  4.50 3.50 - 5.20 g/dL    ALT (SGPT) 20 1 - 33 U/L    AST (SGOT) 14 1 - 32 U/L    Alkaline Phosphatase 123 (H) 39 - 117 U/L    Total Bilirubin 0.4 0.0 - 1.2 mg/dL    eGFR Non African Amer 40 (L) >60 mL/min/1.73    Globulin 2.2 gm/dL    A/G Ratio 2.0 g/dL    BUN/Creatinine Ratio 16.8 7.0 - 25.0    Anion Gap 12.6 5.0 - 15.0 mmol/L   Results for orders placed or performed in visit on 01/04/21   Wound Culture - Wound, Nares    Specimen: Nares; Wound   Result Value Ref Range    Wound Culture Light growth (2+) Klebsiella oxytoca (A)     Gram Stain Rare (1+) WBCs seen     Gram Stain No organisms seen        Susceptibility    Klebsiella oxytoca - CHAR*     Ampicillin  Resistant ug/ml     Ampicillin + Sulbactam  Intermediate ug/ml     Cefepime  Susceptible ug/ml     Ceftazidime  Susceptible ug/ml     Ceftriaxone  Susceptible ug/ml     Gentamicin  Susceptible ug/ml     Levofloxacin  Susceptible ug/ml     Piperacillin + Tazobactam  Susceptible ug/ml     Tetracycline  Susceptible ug/ml     Trimethoprim + Sulfamethoxazole  Susceptible ug/ml     * Cefazolin sensitivity will not be reported for Enterobacteriaceae in non-urine isolates. If cefazolin is preferred, please call the microbiology lab to request an E-test.  With the exception of urinary-sourced infections, aminoglycosides should not be used as monotherapy.   Results for orders placed or performed in visit on 12/15/20   Iron Profile    Specimen: Blood   Result Value Ref Range    Iron 96 37 - 145 mcg/dL    Iron Saturation 28 20 - 50 %    Transferrin 234 200 - 360 mg/dL    TIBC 349 298 - 536 mcg/dL   Ferritin Level    Specimen: Blood   Result Value Ref Range    Ferritin 200.00 (H) 13.00 - 150.00 ng/mL   Results for orders placed or performed in visit on 08/26/20   COVID-19, [facility] IN-HOUSE, NP SWAB IN TRANSPORT MEDIA 8-10 HR TAT - Swab, Nasopharynx    Specimen: Nasopharynx; Swab   Result Value Ref Range    COVID19 Not Detected Not Detected - Ref. Range   Results for orders  placed or performed during the hospital encounter of 02/10/20   [facility] Top - SST   Result Value Ref Range    Extra Tube Hold for add-ons.    Urinalysis With Microscopic If Indicated (No Culture) - Urine, Clean Catch    Specimen: Urine, Clean Catch   Result Value Ref Range    Color, UA Yellow Yellow, Straw, Dark Yellow, Rut    Appearance, UA Clear Clear    pH, UA 6.0 5.0 - 9.0    Specific Gravity, UA 1.002 (L) 1.003 - 1.030    Glucose, UA Negative Negative    Ketones, UA Negative Negative    Bilirubin, UA Negative Negative    Blood, UA Negative Negative    Protein, UA Negative Negative    Leuk Esterase, UA Negative Negative    Nitrite, UA Negative Negative    Urobilinogen, UA 0.2 E.U./dL 0.2 - 1.0 E.U./dL   CBC Auto Differential    Specimen: Blood   Result Value Ref Range    WBC 8.97 3.40 - 10.80 10*3/mm3    RBC 4.14 3.77 - 5.28 10*6/mm3    Hemoglobin 10.9 (L) 12.0 - 15.9 g/dL    Hematocrit 33.7 (L) 34.0 - 46.6 %    MCV 81.4 79.0 - 97.0 fL    MCH 26.3 (L) 26.6 - 33.0 pg    MCHC 32.3 31.5 - 35.7 g/dL    RDW 13.4 12.3 - 15.4 %    RDW-SD 39.4 37.0 - 54.0 fl    MPV 9.2 6.0 - 12.0 fL    Platelets 303 140 - 450 10*3/mm3    Neutrophil % 79.3 (H) 42.7 - 76.0 %    Lymphocyte % 12.0 (L) 19.6 - 45.3 %    Monocyte % 5.6 5.0 - 12.0 %    Eosinophil % 2.1 0.3 - 6.2 %    Basophil % 0.6 0.0 - 1.5 %    Immature Grans % 0.4 0.0 - 0.5 %    Neutrophils, Absolute 7.11 (H) 1.70 - 7.00 10*3/mm3    Lymphocytes, Absolute 1.08 0.70 - 3.10 10*3/mm3    Monocytes, Absolute 0.50 0.10 - 0.90 10*3/mm3    Eosinophils, Absolute 0.19 0.00 - 0.40 10*3/mm3    Basophils, Absolute 0.05 0.00 - 0.20 10*3/mm3    Immature Grans, Absolute 0.04 0.00 - 0.05 10*3/mm3    nRBC 0.0 0.0 - 0.2 /100 WBC     *Note: Due to a large number of results and/or encounters for the requested time period, some results have not been displayed. A complete set of results can be found in Results Review.

## 2022-10-30 DIAGNOSIS — I10 ESSENTIAL HYPERTENSION: ICD-10-CM

## 2022-10-31 RX ORDER — METOPROLOL SUCCINATE 100 MG/1
100 TABLET, EXTENDED RELEASE ORAL NIGHTLY
Qty: 90 TABLET | Refills: 3 | Status: SHIPPED | OUTPATIENT
Start: 2022-10-31

## 2022-11-09 DIAGNOSIS — F51.04 CHRONIC INSOMNIA: ICD-10-CM

## 2022-11-11 RX ORDER — HYDROXYZINE PAMOATE 50 MG/1
CAPSULE ORAL
Qty: 30 CAPSULE | OUTPATIENT
Start: 2022-11-11

## 2022-11-11 NOTE — TELEPHONE ENCOUNTER
Refill in Oct 22 w/ five refills per chart by Dr. Bowser.  Appears to be automated refill request by Nereyda.     Jimmy Kelly II, MD  11/11/22 @ 10:31 AM CST

## 2022-11-26 ENCOUNTER — E-VISIT (OUTPATIENT)
Dept: FAMILY MEDICINE CLINIC | Facility: TELEHEALTH | Age: 67
End: 2022-11-26
Payer: MEDICARE

## 2022-11-26 PROCEDURE — BRIGHTMDVISIT: Performed by: NURSE PRACTITIONER

## 2022-11-27 NOTE — E-VISIT TREATED
Chief Complaint: Coronavirus (COVID-19), cold, sinus pain, allergy, or flu   Patient introduction   Patient is 67-year-old female with cough, congestion, voice hoarseness, and fatigue that started more than 1 month ago.   Systemic symptoms come and go.   COVID-19 exposure, testing history, and vaccination status:    No known exposure to a person with a confirmed or suspected case of COVID-19.    No recent travel outside of their local community.    Patient had a self-test 7 to 14 days ago. Test result was negative.    Received 3 doses of the COVID-19 vaccine (Pfizer, Pfizer, Pfizer).   Received their most recent dose of the vaccine more than 14 days ago.   Risk factors for severe disease from COVID-19 infection:    Age 65 or older.    BMI >= 25.    Asthma.   Warning. The following may warrant further investigation:    Hypertension.    Intermittent fatigue lasting longer than 2 weeks.   Patient did not request an excuse note.   General presentation   No improvement of symptoms. Symptoms came on gradually.   Fever:    No fever.   Sinus and nasal symptoms:    Yellow nasal drainage.    Nasal drainage is thick.    Postnasal drip.    1 to 3 episodes of antibiotic treatment for sinus infection in the last year.    History of surgically corrected septal deviation or nasal polyps.    Congestion with sinus pain or pressure on or around the forehead, eyes, cheeks, and upper teeth or jaw.    Patient first noticed sinus pain 10 to 14 days ago.    Sinus pain is worse with Valsalva.    No nasal discharge.    No itchy nose or sneezing.    No history of unhealed nasal septal ulcer/nasal wound.   Throat symptoms:    Voice is mildly hoarse. Patient does not believe hoarseness is due to voice strain.    No sore throat.   Head and body aches:    Fatigue.    No headache.    No sweats.    No chills.    No myalgia.   Cough:    Cough is worse during the day.    Cough is not productive of sputum.   Wheezing and shortness of breath:    Has  asthma diagnosis.    Using an albuterol inhaler for asthma.    Using albuterol every 6 hours or longer.    No COPD diagnosis.    No wheezing.    No shortness of breath.    Current cold symptoms do not aggravate their asthma.   Chest pain:    No chest pain.   Ear symptoms:    Current symptoms include pressure, fullness, crackling or popping, and a plugged or blocked sensation in the ear(s).   Dizziness:    No dizziness.   Allergies:    Patient has known seasonal allergies, known pet allergies, and known dust allergies.    Patient does not think symptoms are allergy-related.   Flu exposure:    No recent known exposure to a person with a confirmed flu diagnosis.    Received a flu vaccine in the last 1 to 3 months.   Patient is taking over-the-counter medications for current symptoms, including budesonide, cetirizine, cromolyn, diphenhydramine, fluticasone, guaifenesin, oxymetazoline, phenylephrine, and triamcinolone.   Review of red flags/alarm symptoms:    No changes in alertness or awareness.    No symptoms suggesting respiratory distress.    No persistent headache, sweats, chills, myalgia, or fatigue lasting longer than 2 weeks.    No decreased urination.   Pregnancy/menstrual status/breastfeeding:    Patient is postmenopausal.   Self-exam:    Height: 172 centimeters    Weight: 98.8 kilograms    No difficulty moving their chin toward their chest.    Neck lymph nodes feel normal.    Has not taken antibiotics for similar symptoms within the past month.   Current medications   Currently taking atorvastatin 10 MG tablet, ramelteon 8 MG tablet, metoprolol succinate  MG 24 hr tablet, hydrOXYzine pamoate 50 MG capsule, nabumetone 750 MG tablet, mometasone 50 MCG/ACT nasal spray, FLUoxetine 40 MG capsule, gabapentin 600 MG tablet, albuterol sulfate  (90 Base) MCG/ACT inhaler, ipratropium 0.06 % nasal spray, cetirizine 10 MG tablet, olopatadine 0.6 % solution nasal solution, linaclotide 145 MCG capsule capsule,  ferrous sulfate 325 (65 FE) MG tablet, dexlansoprazole 60 MG capsule, chlorthalidone 25 MG tablet, ondansetron 8 MG tablet, and multi-vitimans.   Medication allergies    Macrolides.   Medication contraindication review   Patient has history of depression and hypertension. Therefore, the following medication(s) will not be prescribed:    Metoclopramide.    Acetaminophen-diphenhydramine-phenylephrine.    Aspirin-chlorpheniramine-phenylephrine.   No history of metoclopramide-associated dystonic reaction and tardive dyskinesia.   No known history of amoxicillin-clavulanate-associated cholestatic jaundice or hepatic impairment.   Past medical history   Immune conditions: No immunocompromising conditions. No history of cancer.   Social history   High-risk household contacts: Patient's household includes one or more members of a group with risk factors for influenza complications, including a person 65 years or older.   Never smoked tobacco.   Assessment   Bacterial sinusitis. Ruled out: Traumatic laryngitis.   This is the likely diagnosis based on patient's interview responses and symptoms, including:    Congestion or sinus pressure.    Thick nasal discharge.    Yellow or green mucus.    Duration of symptoms longer than 10 days.    Sinus pressure lasting longer than 10 days.    Symptoms have not improved.   Plan   Medications:    amoxicillin 875 mg-potassium clavulanate 125 mg tablet RX 875mg/125mg 1 tab PO bid 7d for infection. This medication is an antibiotic. Take it exactly as directed. You must finish the entire course of medication, even if you feel better after the first few days of treatment. Amount is 14 tab.   The patient's prescription will be sent to:   [id number]   [address] [facility] [id number]   Phone: [phone]     Fax: [phone]   Education:    Condition and causes    Prevention    Treatment and self-care    When to call provider   ----------   Electronically signed by  Germaine Alvarezkland, APRN on 2022-11-26 at 20:28PM   ----------   Patient Interview Transcript:   Please carefully consider each question and answer as best you can. This helps your provider give you the best care. Which of these symptoms are bothering you? Select all that apply.    Cough    Stuffed-up nose or sinuses    Hoarse voice or loss of voice    Fatigue or tiredness   Not selected:    Shortness of breath    Fever    Runny nose    Itchy or watery eyes    Itchy nose or sneezing    Loss of smell or taste    Sore throat    Headache    Sweats    Chills    Muscle or body aches    Nausea or vomiting    Diarrhea    I don't have any of these symptoms   Since your current symptoms started, have you been tested for COVID-19? Select one.    Yes   Not selected:    No   When was your most recent COVID-19 test? Select one.    7 to 14 days ago   Not selected:    Within the last week (specify date as MM/DD/YY)    15 to 30 days ago    More than 1 month ago   What type of COVID-19 test did you most recently have? There are two types of COVID-19 tests: - Viral tests check if you're currently infected with COVID-19. For these tests, a nose swab or saliva sample is taken. Viral tests include self-tests and tests done at a doctor's office, lab, or testing site. - Antibody tests check if you've been infected in the past. For these tests, your blood is drawn. Antibody tests can only be done at a doctor's office, lab, or testing site. Select one.    Viral self-test   Not selected:    Viral test at a doctor's office, lab, or testing site    Antibody test   What was the result of your most recent COVID-19 test? Select one.    Negative   Not selected:    Positive    I'm not sure   Have you gotten the COVID-19 vaccine? Select one.    Yes   Not selected:    No   How many total doses of the COVID-19 vaccine have you gotten? This includes boosters as well as additional doses for those who are immunocompromised. Select one.    3 doses   Not  "selected:    1 dose    2 doses    4 doses    5 doses   1st dose    Pfizer   Not selected:    J&J/Wm    Moderna    Novavax   2nd dose    Pfizer   Not selected:    J&J/Wm    Moderna    Novavax   3rd dose    Pfizer   Not selected:    J&J/Wm    Moderna    Novavax   When did you get your most recent dose of the COVID-19 vaccine?    More than 14 days ago   Not selected:    Less than 48 hours (2 days) ago    48 to 72 hours (3 days) ago    3 to 5 days ago    5 to 7 days ago    7 to 14 days ago   In the last 14 days, have you traveled outside of your local community? This includes travel by car, RV, bus, train, or plane. Travel increases your chances of getting and spreading COVID-19. Select one.    No   Not selected:    Yes   In the last 14 days, have you had close contact with someone who has coronavirus (COVID-19)? \"Close contact\" means any of these: - Living in the same household as someone with COVID-19. - Caring for someone with COVID-19. - Being within 6 feet of someone with COVID-19 for a total of at least 15 minutes over a 24-hour period. For example, three 5-minute exposures for a total of 15 minutes. - Being in direct contact with respiratory droplets from someone with COVID-19 (being coughed on, kissing, sharing utensils). Select one.    No, not that I know of   Not selected:    Yes, a confirmed case    Yes, a suspected case   When did your current symptoms start? Select one.    More than a month ago   Not selected:    Less than 48 hours ago    3 to 5 days ago    6 to 9 days ago    10 to 14 days ago    2 to 3 weeks ago    3 to 4 weeks ago   Did your symptoms come on suddenly or gradually? Select one.    Gradually   Not selected:    Suddenly    I'm not sure   Have your symptoms improved at all since they began? Select one.    No   Not selected:    Yes, but they haven't gone away completely    Yes, but then they came back worse than before    I'm not sure   You mentioned having sweats, chills, aches, " "fatigue, or headache for two weeks or more. Have you had these symptoms constantly during that time? Select one.    No   Not selected:    Yes   Do you cough so hard that it's made you gag or vomit? By gag, we mean has your coughing made you choke or dry heave? Select all that apply.    No   Not selected:    Yes, my coughing has made me gag    Yes, my coughing has made me vomit   When is your cough the worst? Select all that apply.    During the day   Not selected:    In the morning, or when I wake up    At nighttime, or while I'm sleeping    I haven't noticed a difference depending on time of day   Are you coughing up mucus or phlegm? Select one.    No, my cough is dry   Not selected:    Yes, a little    Yes, a lot   You mentioned having a stuffy nose or sinus congestion. Do you feel pain or pressure in your sinuses?    Yes   Not selected:    No   Where do you feel sinus pain or pressure?    In my forehead    Around my eyes    In my cheeks    In my upper teeth or jaw   Not selected:    Behind my nose    I'm not sure   When did you first notice your sinus pain or pressure? Select one.    10 to 14 days ago   Not selected:    Less than 5 days ago    5 to 9 days ago    2 to 4 weeks ago    1 month ago or longer   Does coughing, sneezing, or leaning forward make your sinuses feel worse? Select one.    Yes   Not selected:    No   What color is your nasal drainage? Select one.    Yellow   Not selected:    Clear    White    Green    My nose is stuffed but not draining or running    I'm not sure   Is your nasal drainage thick or thin? Select one.    Thick   Not selected:    Thin   Is there any drainage (mucus) going down the back of your throat? This kind of drainage is also called \"postnasal drip.\" Select one.    Yes   Not selected:    No, not that I know of   Since your symptoms started, have you felt dizzy? Select one.    No   Not selected:    Yes, but I can continue with my regular daily activities    Yes, and it makes it " hard to stand, walk, or do daily activities   Do you have chest pain? You might also feel it as discomfort, aching, tightness, or squeezing in the chest. Select one.    No   Not selected:    Yes   Have you urinated at least 3 times in the last 24 hours? Select one.    Yes   Not selected:    No   Changes in alertness or awareness may mean you need emergency care. Since your symptoms started, have you had any of these? Select all that apply.    None of the above   Not selected:    Confusion    Slurred speech    Not knowing where you are or what day it is    Difficulty staying conscious    Fainting or passing out   Do your symptoms include a whistling sound, or wheezing, when you breathe? Select one.    No   Not selected:    Yes    I'm not sure   Early in this interview, you told us you were hoarse or you'd lost your voice. How would you describe the changes to your voice? Select one.    It just sounds a little raspy   Not selected:    It's harder than usual to talk    I can barely talk at all   Is it possible that you strained your voice? Singing, yelling, or talking more or louder than usual can cause voice strain. Select one.    No, not that I know of   Not selected:    Yes   Do you have any of these symptoms in your ear(s)? Select all that apply.    Pressure    Fullness    Crackling or popping    Plugged or blocked sensation   Not selected:    Pain    None of the above   Can you move your chin toward your chest?    Yes   Not selected:    No, my neck is too stiff   Are your glands/lymph nodes swollen, or does it hurt when you touch them?    No, not that I can tell   Not selected:    Yes   In the past week, has anyone around you (such as at school, work, or home) had a confirmed diagnosis of the flu? A confirmed diagnosis means that a nose swab was done to verify a flu infection. Select all that apply.    No, not that I know of   Not selected:    I live with someone who has the flu    I've been within touching  distance of someone who has the flu    I've walked by, or sat about 3 feet away from, someone who has the flu    I've been in the same building as someone who has the flu   Have you ever been diagnosed with asthma? Select one.    Yes   Not selected:    No   Are your cold symptoms making your asthma worse? Select one.    No   Not selected:    Yes   What medications or inhalers are you currently using for your asthma? Select all that apply.    Albuterol inhaler, as needed (such as ProAir, Proventil, Ventolin)   Not selected:    Inhaled steroid (such as Qvar, Pulmicort, Flovent)    Inhaled steroid with long-acting bronchodilator (such as Advair, Dulera, Symbicort)    I'm not sure    I'm not taking any medications for my asthma    I usually take medications for my asthma, but I ran out   How often are you using albuterol? If you're using albuterol very frequently, you'll need to be seen in person. Select one.    Every 6 hours or longer   Not selected:    More than once an hour    Every 1 to 2 hours    Every 2 to 3 hours    Every 3 to 4 hours    Every 4 to 6 hours   Do you need a refill of albuterol? Select one.    No   Not selected:    Yes   Have you ever been diagnosed with chronic obstructive pulmonary disease (COPD)? Select one.    No, not that I know of   Not selected:    Yes   In the past month, have you taken antibiotics for similar symptoms? Examples of antibiotics include amoxicillin, amoxicillin-clavulanate (Augmentin), penicillin, cefdinir (Omnicef), doxycycline, and clindamycin (Cleocin). Select one.    No   Not selected:    Yes   In the last year, how many times were you treated with antibiotics for a sinus infection? Select one.    1 to 3 times   Not selected:    None    4 or more times   Have you been diagnosed with a deviated septum or nasal polyps? The nose is divided into two nostrils by the septum. A crooked septum is called a deviated septum. Nasal polyps are growths inside the nose or sinuses. Select  one.    Yes, but I had surgery to treat them   Not selected:    Yes, I have a deviated septum    Yes, I have nasal polyps    Yes, I have a deviated septum and nasal polyps    No, not that I know of   Do you have a sore inside your nose that won't heal? Select one.    No, not that I know of   Not selected:    Yes   Do you have allergies (pollen, dust mites, mold, animal dander)? Select one.    Yes   Not selected:    No, not that I know of   What kind of allergies do you have? Select all that apply.    Seasonal allergies (hay fever)    Pet allergies    Dust allergies   Not selected:    None of the above    I'm not sure   Do you think your symptoms could be allergy-related? Select one.    No   Not selected:    Yes    I'm not sure   Have you had a flu shot this season? Select one.    Yes, 1 to 3 months ago   Not selected:    Yes, less than 2 weeks ago    Yes, 2 to 4 weeks ago    Yes, 3 to 6 months ago    Yes, more than 6 months ago    No, not that I know of   Have you gone through menopause? Select one.    Yes   Not selected:    No    I'm going through it now   The flu and COVID-19 can be more serious for people with certain conditions or characteristics. These questions help us figure out if you or anyone you live with is at higher risk for complications from these infections. Do either of these statements apply to you? Select all that apply.    None of the above   Not selected:    I'm  or Native [place]    I'm a healthcare worker   Do you smoke tobacco? Select one.    No   Not selected:    Yes, every day    Yes, some days    No, I quit   Do you have any of these conditions? Select all that apply.    None of the above   Not selected:    Chronic lung disease, such as cystic fibrosis or interstitial fibrosis    Heart disease, such as congenital heart disease, congestive heart failure, or coronary artery disease    Disorder of the brain, spinal cord, or nerves and muscles, such as dementia, cerebral palsy,  epilepsy, muscular dystrophy, or developmental delay    Metabolic disorder or mitochondrial disease    Cerebrovascular disease, such as stroke or another condition affecting the blood vessels or blood supply to the brain    Down syndrome    Mood disorder, including depression or schizophrenia spectrum disorders    Substance use disorder, such as alcohol, opioid, or cocaine use disorder    Tuberculosis   Do you live in a group care setting? Examples include: - Nursing home - Residential care - Psychiatric treatment facility - [facility] - [facility] - Board and care home - Homeless shelter - Foster care setting Select one.    No   Not selected:    Yes   Are you a healthcare worker? Select one.    No   Not selected:    Yes   People with a very high body mass index (BMI) are at higher risk for developing complications from the flu and severe illness from COVID-19. To determine your BMI, we need to know your weight and height. Please enter your weight (in pounds).    Weight   Please enter your height.    Height   Do you have any of these conditions that can affect the immune system? Scroll to see all options. Select all that apply.    None of these   Not selected:    History of bone marrow transplant    Chronic kidney disease    Chronic liver disease (including cirrhosis)    HIV/AIDS    Inflammatory bowel disease (Crohn's disease or ulcerative colitis)    Lupus    Moderate to severe plaque psoriasis    Multiple sclerosis    Rheumatoid arthritis    Sickle cell anemia    Alpha or beta thalassemia    History of solid organ transplant (kidney, liver, or heart)    History of spleen removal    An autoimmune disorder not listed here    A condition requiring treatment with long-term use of oral steroids (such as prednisone, prednisolone, or dexamethasone)   Have you ever been diagnosed with cancer? Select one.    No   Not selected:    Yes, I have cancer now    Yes, but I'm in remission   Do any of these apply to you? Select all  that apply.    None of the above   Not selected:    I've been hospitalized within the last 5 days    I have diabetes    I'm in close contact with a child in    Do any of these apply to the people who live with you? Select all that apply.    An adult 65 or older   Not selected:    A child under the age of 5    A person who is pregnant    A person who has given birth, had a miscarriage, had a pregnancy loss, or had an  in the last 2 weeks    An  or Native [place]    None of the above   Does any member of your household have any of these medical conditions? Select all that apply.    None of the above   Not selected:    Asthma    Disorders of the brain, spinal cord, or nerves and muscles, such as dementia, cerebral palsy, epilepsy, muscular dystrophy, or developmental delay    Chronic lung disease, such as COPD or cystic fibrosis    Heart disease, such as congenital heart disease, congestive heart failure, or coronary artery disease    Cerebrovascular disease, such as stroke or another condition affecting the blood vessels or blood supply to the brain    Blood disorders, such as sickle cell disease    Diabetes    Metabolic disorders such as inherited metabolic disorders or mitochondrial disease    Kidney disorders    Liver disorders    Weakened immune system due to illness or medications such as chemotherapy or steroids    Children under the age of 19 who are on long-term aspirin therapy    Extreme obesity (BMI > 40)   Do you have any of these conditions? Scroll to see all options. Select all that apply.    Depression    High blood pressure   Not selected:    Aspirin triad (also known as Samter's triad or ASA triad)    Asthma or hives from taking aspirin or other NSAIDs, such as ibuprofen or naproxen    Blockage or narrowing of the blood vessels of the heart    Blood dyscrasia, such anemia, leukemia, lymphoma, or myeloma    Bone marrow depression    Catecholamine-releasing paraganglioma     Blood clotting disorder    Congenital long QT syndrome    Difficulty urinating or completely emptying your bladder    Uncorrected electrolyte abnormalities    Fungal infection    Gastrointestinal (GI) bleeding    Gastrointestinal (GI) obstruction    G6PD deficiency    Recent heart attack    Irregular heartbeat or heart rhythm    Mononucleosis (mono)    Myasthenia gravis    Parkinson's disease    Pheochromocytoma    Reye syndrome    Seizure disorder    Ulcerative colitis    None of the above   Have you ever had either of these conditions? Select all that apply.    No   Not selected:    Metoclopramide-associated dystonic reaction    Tardive dyskinesia   Just a few more questions about medications, and then you're finished. Have you used any non-prescription medications or nasal sprays for your current symptoms? Examples include saline sprays, decongestants, NyQuil, and Tylenol. Select one.    Yes   Not selected:    No   Which of these non-prescription medications have you tried? Scroll to see all options. Select all that apply.    Budesonide (Rhinocort)    Cetirizine (Zyrtec)    Cromolyn (NasalCrom)    Diphenhydramine (Benadryl)    Fluticasone (Flonase)    Guaifenesin (Mucinex)    Oxymetazoline (Afrin)    Phenylephrine (Sudafed)    Triamcinolone (Nasacort)   Not selected:    Acetaminophen (Tylenol)    Chlorpheniramine (Aller-chlor, Chlor-Trimeton)    Dextromethorphan (Delsym, Robitussin, Vicks DayQuil Cough)    Fexofenadine (Allegra)    Guaifenesin/dextromethorphan (Delsym DM, Mucinex DM, Robitussin DM)    Ibuprofen (Advil, Motrin, Midol)    Ketotifen (Alaway, Zaditor)    Loratadine (Alavert, Claritin)    Naphazoline-pheniramine (Naphcon-A, Opcon-A, Visine-A)    Omeprazole (Prilosec)    None of the above   Have you taken any monoamine oxidase inhibitor (MAOI) medications in the last 14 days? Examples include rasagiline (Azilect), selegiline (Eldepryl, Zelapar), isocarboxazid (Marplan), phenelzine (Nardil), and  "tranylcypromine (Parnate). Select one.    No, not that I know of   Not selected:    Yes   Do you take Kynmobi or Apokyn (apomorphine)? Select one.    No   Not selected:    Yes   Are you still taking these medications listed in your medical record? If you're not taking any of these, click Next. Select all that apply.    atorvastatin 10 MG tablet    ramelteon 8 MG tablet    metoprolol succinate  MG 24 hr tablet    hydrOXYzine pamoate 50 MG capsule    nabumetone 750 MG tablet    mometasone 50 MCG/ACT nasal spray    FLUoxetine 40 MG capsule    gabapentin 600 MG tablet    albuterol sulfate  (90 Base) MCG/ACT inhaler    ipratropium 0.06 % nasal spray    cetirizine 10 MG tablet    olopatadine 0.6 % solution nasal solution    linaclotide 145 MCG capsule capsule    ferrous sulfate 325 (65 FE) MG tablet    dexlansoprazole 60 MG capsule    chlorthalidone 25 MG tablet    ondansetron 8 MG tablet   Are you taking any other medications, vitamins, or supplements? Select one.    Yes   Not selected:    No   Have you ever had an allergic or bad reaction to any medication? Select one.    Yes   Not selected:    No   Have you had an allergic or bad reaction to any of these medications? Select all that apply.    No, not that I know of   Not selected:    Baloxavir (Xofluza)    Benzonatate (Tessalon Perles)    Fluconazole, itraconazole, or terconazole (brands include Diflucan, Sporanox, Terazol)    Oseltamivir (Tamiflu) or zanamivir (Relenza)    Paxlovid, nirmatrelvir, or ritonavir (Norvir)   Have you had an allergic or bad reaction to any of these antibiotic medications? Select all that apply.    Azithromycin or any \"-thromycin\" antibiotic, such as erythromycin or clarithromycin (Brands include Biaxin, Erythrocin, Z-yobani, and Zithromax)   Not selected:    Penicillin or any \"-cillin\" antibiotic, such as amoxicillin, ampicillin, dicloxacillin, nafcillin, or piperacillin (Brands include Augmentin, Unasyn, and Zosyn)   " " Tetracycline or any \"-cycline\" antibiotic, such as doxycycline, demeclocycline, minocycline (Brands include Declomycin, Doryx, Dynacin, Oracea, Monodox, Panmycin, and Vibramycin)    Ciprofloxacin or any \"-floxacin\" antibiotic, such as gemifloxacin, levofloxacin, moxifloxacin, or ofloxacin (Brands include Factive, Cipro, Floxin, and Levaquin)    Cephalexin or any \"cef-\" antibiotic, such as cefazolin, cefdinir, cefuroxime, ceftriaxone, ceftazidime, or cefepime (Brands include Ancef, Ceftin, Fortaz, Keflex, Maxipime, Rocephin, and Simplicef)    Clindamycin or lincomycin (Brands include Cleocin and Lincocin)    No, not that I know of   Have you had an allergic or bad reaction to any of these medications? Select all that apply.    No, not that I know of   Not selected:    Albuterol or a similar medication    Atropine    Corticosteroid (steroid) medication, including topical steroids, inhaled steroids, nasal steroids, or oral steroids (budesonide, ciclesonide, dexamethasone, flunisolide, fluticasone, methylprednisolone, triamcinolone, prednisone (or brand names Alvesco, Deltasone, Flovent, Medrol, Nasacort, Rhinocort, or Veramyst)    Metoclopramide (Reglan)    Ondansetron (Zuplenz, Zofran ODT, Zofran)    Prochlorperazine (Compazine)   Have you had an allergic or bad reaction to any of these eye drops, nasal sprays, or inhalers? Scroll to see all options. Select all that apply.    No, not that I know of   Not selected:    Azelastine (Astelin, Astepro, Optivar)    Cromolyn (Crolom, NasalCrom)    Ipratropium (Atrovent)    Ketotifen (Alaway, Zaditor)    Pheniramine/naphazoline (Naphcon-A, Opcon-A, Visine-A)    Olopatadine (Pataday, Patanol, Pazeo)   Have you had an allergic or bad reaction to any of these non-prescription medications? Scroll to see all options. Select all that apply.    No, not that I know of   Not selected:    Acetaminophen (Tylenol)    Aspirin    Cetirizine (Zyrtec)    Dextromethorphan (Delsym, Robitussin, " Vicks DayQuil Cough)    Diphenhydramine (Benadryl)    Fexofenadine (Allegra)    Guaifenesin (Mucinex)    Dextromethorphan (Delsym)    Ibuprofen (Advil, Motrin, Midol)    Loratadine (Alavert, Claritin)    Oxymetazoline (Afrin)    Phenylephrine (Sudafed)   Are you allergic to milk or to the proteins found in milk (for example, whey or casein)? A milk allergy is different from lactose intolerance. Select one.    No, not that I know of   Not selected:    Yes   Have you ever had jaundice or liver problems as a result of taking amoxicillin-clavulanate (Augmentin)? Jaundice is a condition in which the skin and the whites of the eyes turn yellow. Select all that apply.    No, not that I know of   Not selected:    Yes, jaundice    Yes, liver problems   Do you need a doctor's note? A doctor's note confirms that you received care today and states when you can return to school or work. It does not contain information about your diagnosis or treatment plan. Your provider will make the final decision on whether to give you a doctor's note and for how long. Doctor's notes CANNOT be backdated. We can't provide medical leave paperwork through this type of visit. If more paperwork is needed to request time off, contact your primary care provider. Select one.    No   Not selected:    Today only (1 day)    Today and tomorrow (2 days)    3 days    5 days    7 days    10 days    14 days   Is there anything else you'd like to tell us about your symptoms?   The patient did not enter any additional information.   ----------   Medical history   Medical history data does not currently exist for this patient.

## 2022-11-27 NOTE — EXTERNAL PATIENT INSTRUCTIONS
Note   Drink plenty of water Over the counter pain relievers okay If symptoms do not improve in 3-5 days follow up with your primary care provider or urgent care   Diagnosis   Bacterial sinusitis   My name is Germaine Mcclelland, and I'm a healthcare provider at [facility]. I reviewed your interview, and I see that you have bacterial sinusitis.   To prevent the spread of illness to others, I recommend that you stay home and away from other people as much as possible while you're sick. When you need to be around other people, consider wearing a face mask.   Medications   Your pharmacy   [id number] [address] [phone]     Prescription   Amoxicillin-clavulanate (875mg/125mg): Take 1 tablet by mouth twice a day for 7 days for infection. This medication is an antibiotic. Take it exactly as directed. You must finish the entire course of medication, even if you feel better after the first few days of treatment.    Start taking the antibiotics I've prescribed right away. You need to finish the entire course of antibiotics, even if you start to feel better before the pills run out.    Some people develop a yeast infection after taking antibiotics. If you get a yeast infection, you can treat it with antifungal creams or suppositories. These are available without a prescription at [employer] and many supermarkets.   About your diagnosis   The sinuses are hollow spaces connected to the nasal passages. Sinusitis occurs when the sinuses swell and block the drainage of fluid and mucus from the nose, causing pain, pressure, and congestion. Fatigue, difficulty sleeping, or decreased appetite may accompany your symptoms.   More than 90% of sinus infections are caused by viruses. However, in certain cases, a sinus infection may be caused by bacteria. Bacterial sinus infections usually look like one of the following cases:    Severe sinus symptoms with a fever over 102F.     Sinus symptoms that have not improved at all after 10 days.    Cold symptoms that slowly improve but then worsen again after 5 or 6 days, usually with a high fever, headache, or nasal discharge.   What to expect   If you follow this treatment plan, you should start to feel better within a few days.   When to seek care   Call us at [phone]   with any sudden or unexpected symptoms.    Symptoms that last longer than 10 to 14 days.    Symptoms that get better for a few days, and then suddenly get worse.    Fever that measures over 103F or continues for more than 3 days.    Any vision changes.    A worsening headache.    Stiff neck.    Swelling of your forehead or eyes.    Coughing up red or bloody mucus.    Swallowing becomes extremely difficult or impossible.    More than 5 episodes of diarrhea in a day.    More than 5 episodes of vomiting in a day.    Severe shortness of breath.    Severe chest pain   Other treatment    Rest! Your body needs rest to recover and fight infection.    Drink plenty of water to stay hydrated.    Use steam to soothe your sinuses: Breathe it in from a shower or a bowl of hot water. Placing a warm, moist washcloth over your nose and forehead may help relieve the sinus pain and pressure.    Try non-prescription saline nasal sprays to help your nasal symptoms. Try using a Neti Pot to flush out your stuffy nose and sinuses. Neti Pots are available at any drugstore without a prescription.    Avoid smoke and air pollution. Smoke can make infections worse.   Prevention    Avoid close contact with other people when you're sick.    Cover your mouth and nose when you cough or sneeze. Use a tissue or cough into your elbow. Make sure that used tissues go directly into the trash.    Avoid touching your eyes, nose, or mouth while you're sick.    Wash your hands often, especially after coughing, sneezing, or blowing your nose. If soap and water are not available, use an alcohol-based hand  .    If you or someone in your home or workplace is sick, disinfect commonly used items. This includes door handles, tables, computers, remotes, and pens.    Coronavirus (COVID-19) information   Common symptoms of COVID-19 include fever, cough, shortness of breath, fatigue, muscle or body aches, headaches, new loss of sense of taste or smell, sore throat, stuffy or runny nose, nausea or vomiting, and diarrhea. Most people who get COVID-19 have mild symptoms and can rest at home until they get better. Elderly people and those with chronic medical problems may be at risk for more serious complications.   FAQs about the COVID-19 vaccine   There are four authorized COVID-19 vaccines: Mert Coshared's Wm Vaccine (J&J/Wm), Moderna, Novavax, and Pfizer-BioNTech (Pfizer). The J&J/Wm and Novavax vaccines are approved for use in people aged 18 and older. The Moderna and Pfizer vaccines are approved for those aged 6 months and older. All four are available at no cost. Even if you don't have health insurance, you can still get the COVID-19 vaccine for free.   Which vaccine is the best? Which vaccine should I get?   All four vaccines are highly effective. Even if you get COVID-19 after being vaccinated, all of the vaccines help prevent severe disease, hospitalization, and complications.   Most people should get whichever vaccine is first available to them. However, women younger than 50 years old should consider the rare risk of blood clots with low platelets after vaccination with the J&J/Wm vaccine. This risk hasn't been seen with the other three vaccines.   Are the vaccines safe?   Yes. Hundreds of millions of people in the [place] have already safely received COVID-19 vaccines under the most intense safety monitoring in the history of the [place].   Do I need the vaccine if I've already had COVID?   Yes. Vaccination helps protect you even if you've already had COVID.   If you had COVID-19 and had  symptoms, wait to get vaccinated until you've recovered and completed your isolation period.   If you tested positive for COVID-19 but did not have symptoms, you can get vaccinated after 5 full days have passed since you had a positive test, as long as you don't develop symptoms.   How many doses of the vaccine do I need?   Visit www.cdc.gov/coronavirus/2019-ncov/vaccines/stay-up-to-date.html   to find out how to stay up to date with your COVID-19 vaccines.   I'm immunocompromised. How many doses of the vaccine do I need?   For information on how immunocompromised people can stay up to date with their COVID-19 vaccines, visit www.cdc.gov/coronavirus/2019-ncov/vaccines/recommendations/immuno.html  .   What are the common side effects of the vaccine?   A sore arm, tiredness, headache, and muscle pain may occur within two days of getting the vaccine and last a day or two. For the Moderna or Pfizer vaccines, side effects are more common after the second dose. People over the age of 55 are less likely to have side effects than younger people.   After I'm up to date on vaccines, can I still get or spread COVID?   Yes, you can still get COVID, but your disease should be milder. And your risk of serious illness, hospitalization, and complications will be much lower, especially if you're up to date. Unfortunately, you can still spread COVID if you've been vaccinated. That's why it's important to follow isolation guidelines if you get sick or test positive.   After I'm up to date on vaccines, can I go back to normal?   You should still wear a mask indoors in public if:    It's required by laws, rules, regulations, or local guidance.    You have a weakened immune system.    Your age puts you at increased risk of severe disease.    You have a medical condition that puts you at increased risk of severe disease.    Someone in your household has a weakened immune system, is at increased risk for severe disease, or is  unvaccinated.    You're in an area of high transmission.   Where can I get a COVID-19 vaccine?   Visit [facility]'s website for more information. To find a COVID-19 vaccination site near you, visit www.vaccines.gov/  , call [phone]  , or text your zip code to 307283 ([place]). Message and data rates may apply.   What about travel?   Travel increases your risk of exposure to COVID-19. For more information, see www.cdc.gov/coronavirus/2019-ncov/travelers/index.html  .   I've had close contact with someone who has COVID. Do I need to quarantine, and if so, for how long?   For the most current answer, including a calculator to determine whether you need to stay home and for how long, visit www.cdc.gov/coronavirus/2019-ncov/your-health/quarantine-isolation.html  .   I've tested positive for COVID. How long do I need to isolate?   For the latest recommendations, including a calculator to determine how long you need to stay home, visit www.cdc.gov/coronavirus/2019-ncov/your-health/quarantine-isolation.html  .   What if I develop symptoms that might be from COVID?   For the latest recommendations on what to do if you're sick, including when to seek emergency care, visit www.cdc.gov/coronavirus/2019-ncov/if-you-are-sick/steps-when-sick.html  .    Flu vaccine information   Who should get a flu vaccine?   Everyone 6 months of age and older should get a yearly flu vaccine.   When should I get vaccinated?   You should get a flu vaccine by the end of October. Once you're vaccinated, it takes about two weeks for antibodies to develop and protect you against the flu. That's why it's important to get vaccinated as soon as possible.   After October, is it too late to get vaccinated?   No. You should still get vaccinated. As long as the flu viruses are still in your community, flu vaccines will remain available, even into January of next year or later.   Why do I need a flu vaccine EVERY year?   Flu viruses are constantly  changing, so flu vaccines are usually updated from one season to the next. Your protection from the flu vaccine also lessens over time.   Is the flu vaccine safe?   Yes. Over the last 50 years, hundreds of millions of Americans have safely received the flu vaccines.   What are the side effects of flu vaccines?   You CANNOT get the flu from a flu vaccine. Common side effects of the flu shot include soreness, redness and/or swelling where the shot was given, low grade fever, and aches. Common side effects of the nasal spray flu vaccine for adults include runny nose, headaches, sore throat, and cough. For children, side effects include wheezing, vomiting, muscle aches, and fever.   Does the flu vaccine increase your risk of getting COVID-19?   No. There is no evidence that getting a flu vaccine increases your risk of getting COVID-19.   Is it safe to get the flu vaccine along with a COVID-19 vaccine?   Yes. It's safe to get the flu vaccine with a COVID-19 vaccine or booster.   Contact your healthcare provider TODAY for details on when and where to get your flu vaccine.   Your provider   Your diagnosis was provided by Germaine Mcclelland, a member of your trusted care team at [facility].   If you have any questions, call us at [phone]  .

## 2022-12-05 ENCOUNTER — OFFICE VISIT (OUTPATIENT)
Dept: FAMILY MEDICINE CLINIC | Facility: CLINIC | Age: 67
End: 2022-12-05

## 2022-12-05 VITALS
HEIGHT: 65 IN | DIASTOLIC BLOOD PRESSURE: 82 MMHG | SYSTOLIC BLOOD PRESSURE: 136 MMHG | WEIGHT: 233 LBS | BODY MASS INDEX: 38.82 KG/M2

## 2022-12-05 DIAGNOSIS — S92.312D CLOSED DISPLACED FRACTURE OF FIRST METATARSAL BONE OF LEFT FOOT WITH ROUTINE HEALING, SUBSEQUENT ENCOUNTER: Primary | ICD-10-CM

## 2022-12-05 DIAGNOSIS — M54.50 ACUTE BILATERAL LOW BACK PAIN WITHOUT SCIATICA: Primary | ICD-10-CM

## 2022-12-05 DIAGNOSIS — M76.32 ILIOTIBIAL BAND SYNDROME, LEFT: ICD-10-CM

## 2022-12-05 DIAGNOSIS — M76.31 ILIOTIBIAL BAND SYNDROME, RIGHT: ICD-10-CM

## 2022-12-05 PROCEDURE — 99213 OFFICE O/P EST LOW 20 MIN: CPT | Performed by: FAMILY MEDICINE

## 2022-12-05 RX ORDER — NABUMETONE 750 MG/1
TABLET, FILM COATED ORAL
Qty: 60 TABLET | Refills: 1 | Status: SHIPPED | OUTPATIENT
Start: 2022-12-05 | End: 2023-01-27

## 2022-12-05 RX ORDER — CYCLOBENZAPRINE HCL 5 MG
5 TABLET ORAL 3 TIMES DAILY PRN
Qty: 30 TABLET | Refills: 1 | Status: SHIPPED | OUTPATIENT
Start: 2022-12-05 | End: 2022-12-27

## 2022-12-05 NOTE — PROGRESS NOTES
"Subjective   Mikki Palak Joel is a 67 y.o. female.  Requesting evaluation 2-week history of bilateral low back pain hip pain going down both sides of the legs and trochanter area.  Doing a lot of housework.  Pain radiating mainly down the trochanters.  Worse with standing and sitting.  Tried anti-inflammatory to no effect.  History noted    History of Present Illness   HPI    The following portions of the patient's history were reviewed and updated as appropriate: allergies, current medications, past family history, past medical history, past social history, past surgical history and problem list.    Review of Systems  Review of Systems   Constitutional: Negative for activity change, appetite change, fatigue and unexpected weight change.   HENT: Negative for trouble swallowing and voice change.    Eyes: Negative for redness and visual disturbance.   Respiratory: Negative for cough and wheezing.    Cardiovascular: Negative for chest pain and palpitations.   Gastrointestinal: Negative for abdominal pain, constipation, diarrhea, nausea and vomiting.   Genitourinary: Negative for urgency.   Musculoskeletal: Positive for arthralgias and back pain. Negative for joint swelling.   Neurological: Negative for syncope and headaches.   Hematological: Negative for adenopathy.   Psychiatric/Behavioral: Negative for sleep disturbance.       Objective   Physical Exam  Physical Exam  Constitutional:       Appearance: Normal appearance. She is obese.   Musculoskeletal:      Comments: Bilateral trochanters tender to deep palpation.  Mild stiffness to back to full range of motion especially rotation right.  Trochanters tender right greater than left internal rotation tender right left not as tender.  2+ pulses 2+ DTRs get up and go 3-5   Neurological:      Mental Status: She is alert.   Psychiatric:      Comments: No distress           Visit Vitals  /82   Ht 165.1 cm (65\")   Wt 106 kg (233 lb)   LMP  (LMP Unknown)   BMI 38.77 " "kg/m²     Body mass index is 38.77 kg/m².  /82   Ht 165.1 cm (65\")   Wt 106 kg (233 lb)   LMP  (LMP Unknown)   BMI 38.77 kg/m²       Assessment/Plan   Diagnoses and all orders for this visit:    1. Acute bilateral low back pain without sciatica (Primary)  -     XR Hips Bilateral With or Without Pelvis 2 View  -     Ambulatory Referral to Physical Therapy Evaluate and treat  -     cyclobenzaprine (FLEXERIL) 5 MG tablet; Take 1 tablet by mouth 3 (Three) Times a Day As Needed for Muscle Spasms.  Dispense: 30 tablet; Refill: 1    2. Iliotibial band syndrome, right  -     XR Hips Bilateral With or Without Pelvis 2 View  -     Ambulatory Referral to Physical Therapy Evaluate and treat  -     cyclobenzaprine (FLEXERIL) 5 MG tablet; Take 1 tablet by mouth 3 (Three) Times a Day As Needed for Muscle Spasms.  Dispense: 30 tablet; Refill: 1    3. Iliotibial band syndrome, left  -     XR Hips Bilateral With or Without Pelvis 2 View  -     Ambulatory Referral to Physical Therapy Evaluate and treat  -     cyclobenzaprine (FLEXERIL) 5 MG tablet; Take 1 tablet by mouth 3 (Three) Times a Day As Needed for Muscle Spasms.  Dispense: 30 tablet; Refill: 1    Symptoms and exam consistent with trochanteric bursitis suspect pelvic tilt counseled on same x-ray to the hips to rule out degeneration otherwise heat rest massage stretching physical therapy muscle relaxants keep regular follow  "

## 2022-12-09 ENCOUNTER — TELEPHONE (OUTPATIENT)
Dept: FAMILY MEDICINE CLINIC | Facility: CLINIC | Age: 67
End: 2022-12-09

## 2022-12-09 NOTE — TELEPHONE ENCOUNTER
Mrs. Joel returning a call from the nurse on 12-8-22 she thinks this may be concerning her xray results. Please call back @ [phone] and leave a detailed message on the voicemail if she does not answer.

## 2022-12-12 ENCOUNTER — TELEPHONE (OUTPATIENT)
Dept: FAMILY MEDICINE CLINIC | Facility: CLINIC | Age: 67
End: 2022-12-12

## 2022-12-14 ENCOUNTER — OFFICE VISIT (OUTPATIENT)
Dept: OTOLARYNGOLOGY | Facility: CLINIC | Age: 67
End: 2022-12-14

## 2022-12-14 VITALS — OXYGEN SATURATION: 96 % | WEIGHT: 235.2 LBS | HEIGHT: 65 IN | HEART RATE: 106 BPM | BODY MASS INDEX: 39.18 KG/M2

## 2022-12-14 DIAGNOSIS — J32.8 OTHER CHRONIC SINUSITIS: ICD-10-CM

## 2022-12-14 DIAGNOSIS — H60.392 OTHER INFECTIVE ACUTE OTITIS EXTERNA OF LEFT EAR: ICD-10-CM

## 2022-12-14 DIAGNOSIS — J32.4 CHRONIC PANSINUSITIS: Primary | ICD-10-CM

## 2022-12-14 DIAGNOSIS — H61.22 IMPACTED CERUMEN OF LEFT EAR: ICD-10-CM

## 2022-12-14 PROCEDURE — 69210 REMOVE IMPACTED EAR WAX UNI: CPT | Performed by: OTOLARYNGOLOGY

## 2022-12-14 PROCEDURE — 99214 OFFICE O/P EST MOD 30 MIN: CPT | Performed by: OTOLARYNGOLOGY

## 2022-12-14 PROCEDURE — 31237 NSL/SINS NDSC SURG BX POLYPC: CPT | Performed by: OTOLARYNGOLOGY

## 2022-12-14 RX ORDER — RAMELTEON 8 MG/1
TABLET ORAL
Qty: 30 TABLET | Refills: 2 | Status: SHIPPED | OUTPATIENT
Start: 2022-12-14 | End: 2023-02-26

## 2022-12-14 RX ORDER — CIPROFLOXACIN HYDROCHLORIDE 3.5 MG/ML
SOLUTION/ DROPS TOPICAL
Qty: 10 ML | Refills: 0 | Status: SHIPPED | OUTPATIENT
Start: 2022-12-14 | End: 2023-01-16

## 2022-12-14 RX ORDER — DOXYCYCLINE HYCLATE 100 MG/1
100 CAPSULE ORAL 2 TIMES DAILY
Qty: 20 CAPSULE | Refills: 0 | Status: SHIPPED | OUTPATIENT
Start: 2022-12-14 | End: 2022-12-24

## 2022-12-14 RX ORDER — PREDNISONE 10 MG/1
TABLET ORAL
Qty: 63 TABLET | Refills: 0 | Status: SHIPPED | OUTPATIENT
Start: 2022-12-14 | End: 2023-01-01

## 2022-12-14 NOTE — PROGRESS NOTES
"Chief complaint: Sinus problem    Assessment and Plan:  67-year-old female with significant ear and sinus surgical history, evidence of prior anterior ethmoidectomy and maxillary antrostomy today with chronic maxillary sinusitis, left cerumen impaction and left otitis externa on exam today, removed    -Start ciprofloxacin drops 4 drops twice daily to the left ear 2 times daily for 7 days, keep the ear dry otherwise using a cotton with Vaseline or a fitted earplug with showers.  Okay to continue using your hearing aids.  -For your chronic sinus disease, start doxycycline 100 mg twice daily for 10 days, start prednisone taper 60 mg tapering every 3 days, we will also send off for medicated irrigations.  I would like you to change from the no phage to the [employer] sinus irrigations as you can get a more forceful saline stream and have better control over the force of the stream with the squeeze bottle.  I will have you add budesonide and tobramycin to these irrigations, the compounding pharmacy should contact you within the next several days about shipping and payment.  -Follow-up in 1 month to recheck, we will consider obtaining a CT scan at that time to evaluate for the unopen sinuses, we have the potential of revision surgery to further open the sinuses if we have incomplete control at that time.  We will also consider sending laboratory evaluation for underlying immunocompromise status given the long-term ongoing infection within the bilateral maxillary sinuses    History of present illness:    Ms. Diaz is a 67-year-old female presenting today for evaluation of pansinusitis.  She has previously been seen by my partner and has undergone at least 5 previous sinus surgeries as well as bilateral tympanoplasty in the past.  The patient presents today for persistent nasal symptoms including nasal congestion, foul smell, rhinorrhea and postnasal drip.  She states that she frequently gets \"sinus headaches\".  She denies " any otorrhea or otalgia.  She states that she has hearing loss on both sides as well as tinnitus on both sides that is high-frequency and intermittent in nature.  She does use hearing aids bilaterally.  She states that she places them far within the ear canal, which may account for her cerumen impaction.  She has stopped using Q-tips due to concerns for cerumen impaction previously.  Regarding her sinuses, she has tried Flonase, Astepro, mometasone, Atrovent, over-the-counter antihistamine medications, saline mist, and saline irrigations in the past.  She states that previously with her sinus surgeries she has had resolution of symptoms for several months, but her symptoms always slowly and progressively return over time.  Currently for her nose she takes olopatadine nasal spray, mometasone nasal spray, and cetirizine.  She states that she frequently has a foul odor in her nose, and as such she is unsure if she has any change to her sense of smell as she often just notices the bad smell.  She denies any alteration in her sense of taste.  Her last ear surgery was around 2020 and her last sinus surgery was around 2017, per patient report.  No other acute ENT concerns today.    Vital Signs   Vitals:    12/14/22 1403   Pulse: 106   SpO2: 96%     Physical Exam:  General: NAD, awake and alert  Head: normocephalic, atraumatic  Eyes: EOMI, sclerae white, conjunctivae pink  Ears: pinnae intact without masses or lesions   Right: TM intact without injection or effusion   Left: TM intact without injection or effusion  Nose: external straight without deformity   Mucosa pale and dry, no visible edema, no polyps seen.  Purulent debris, foul crusting bilaterally within the maxillary sinus ostia which are widely patent from prior surgery   Septum: midline with small anterior perforation that is clean   Turbinates: not hypertrophied  OC/OP: mucosa moist and pink, no masses or lesions, tongue is midline and mobile. Tonsils 2+ without  exudate. Uvula single and elevates symmetrically.  Neck: supple, no masses or lesions. Thyroid without palpable masses.  Neuro: CN II - XII grossly intact, no focal deficits    Procedure: Removal of impacted cerumen, left  Indication: impacted cerumen, left  EBL: None  Anesthesia: None  Complications: None  Surgeon: Marlene Bauer MD    Description:  After informed consent was verbally obtained from the patient, they are placed in a reclined position with the head turned to the contralateral side.  Using a binocular microscope and a 4 mm ear speculum Left canal was cleared of wax using suction and wire loop.  There was purulent material noticed beyond the cerumen inferiorly with some keratin debris, this was also removed with suction.  The underlying canal appeared irritated and slightly moist.  The patient tolerated the procedure well and without known complications.  This concluded the procedure.    Procedure: Rigid Nasal Endoscopy  Indications: Other chronic sinusitis, pansinusitis  Anesthesia: Local  Surgeon: Marlene Bauer MD  Estimated Blood Loss: none  Complications: none    Description:  Rigid nasal endoscopy - 30 degree endoscope(s) with debridement  Informed consent was verbally obtained.  The nose was decongested with topical Neosynephrine and anesthetized with 4% lidocaine solution.  The endoscope was then placed into bilateral naris and advanced to the nasopharynx.  The endoscope was removed and advanced into the middle meatus. The endoscope was finally withdrawn at the conclusion of the exam.  Findings are listed below:    Nasal cavity:  No masses or lesions, there is bilateral purulent debris within the maxillary sinuses with significant foul crusting of the bilateral maxillary antrostomies, debrided with suction and bayonette forceps  Middle meatus: No polyps or pus.  Uncinate absent on the left partially present on the right.  There appeared to be the ethmoids partially present posteriorly and  laterally on both sides, these have been partially removed, there is scarring of the middle meatus on the right side there does not appear to be any significant debris or purulence within the ethmoid cavity bilaterally.  The frontal sinuses do not appear to have been opened.  The sphenoid sinuses do not appear to have been opened.  Mucosa:  Pale and dry.  No significant edema.  No polyps.  Turbinates: Middle normal.  Inferior normal and decongests nicely   Septum: Midline there is a small anterior perforation that is clean without crusting or bleeding.  Nasopharynx: Normal adenoid pad and normal Eustachian tube orifices    Results Review:  Primary care physician's last note from 12/5/2022 reviewed today and demonstrates iliotibial band syndrome, no current complaints about her sinuses at that visit, prior visit did demonstrate allergic rhinitis for which a steroid burst and nasal steroid spray were given.  Patient's last visit with Dr. Slater 9/30/2021 reviewed today and demonstrates chronic pansinusitis with a previous history of 5 previous sinus surgeries as well as bilateral tympanoplasty recommendations at that visit were for sinus irrigations.  Sinus endoscopy at that time demonstrated widely patent maxillary antrostomies with extensive postsurgical changes as well as layers of crusting and inspissated secretions.    Review of Systems:  Positive ROS items: Nasal congestion  Otherwise, a 14 system ROS is negative except as pertinent positives are mentioned above.    Histories:  Allergies   Allergen Reactions   • Codeine Other (See Comments)     Increases heart rate   • Sulfa Antibiotics Hives       Prior to Admission medications    Medication Sig Start Date End Date Taking? Authorizing Provider   albuterol sulfate  (90 Base) MCG/ACT inhaler Inhale 2 puffs Every 4 (Four) Hours As Needed for Wheezing.   Yes Provider, MD Lucía   atorvastatin (Lipitor) 10 MG tablet Take 1 tablet by mouth Daily. 10/18/22   Yes Ronald Bowser MD   cetirizine (zyrTEC) 10 MG tablet TAKE ONE TABLET BY MOUTH DAILY 1/17/22  Yes Ronald Bowser MD   chlorthalidone (HYGROTON) 25 MG tablet TAKE 1 TABLET BY MOUTH DAILY 10/19/22  Yes Ronald Bowser MD   cyclobenzaprine (FLEXERIL) 5 MG tablet Take 1 tablet by mouth 3 (Three) Times a Day As Needed for Muscle Spasms. 12/5/22  Yes Ronald Bowser MD   dexlansoprazole (Dexilant) 60 MG capsule Take 1 capsule by mouth Daily. 10/7/22  Yes Zelda Rich APRN   ferrous sulfate 325 (65 FE) MG tablet Take 1 tablet by mouth Daily With Breakfast. Take with vitamin C to help with absorption 6/14/22  Yes Mila Lang APRN   FLUoxetine (PROzac) 40 MG capsule Take 1 capsule by mouth Daily. 10/21/22  Yes Ronald Bowser MD   gabapentin (NEURONTIN) 600 MG tablet TAKE 2 TABLETS BY MOUTH AT NIGHT FOR RESTLESS LEG SYNDROME 10/11/22  Yes Jimmy Kelly II, MD   hydrOXYzine pamoate (VISTARIL) 50 MG capsule TAKE 1 CAPSULE BY MOUTH THREE TIMES DAILY EVERY NIGHT AS NEEDED FOR ITCHING OR SLEEP 10/4/22  Yes Ronald Bowser MD   ipratropium (ATROVENT) 0.06 % nasal spray 2 sprays into the nostril(s) as directed by provider 2 (Two) Times a Day. 10/18/22  Yes Ronald Bowser MD   linaclotide (Linzess) 145 MCG capsule capsule Take 1 capsule by mouth Every Morning Before Breakfast. 11/4/22  Yes Zelda Rich APRN   metoprolol succinate XL (TOPROL-XL) 100 MG 24 hr tablet TAKE 1 TABLET BY MOUTH EVERY NIGHT 10/31/22  Yes Ronald Bowser MD   mometasone (NASONEX) 50 MCG/ACT nasal spray 2 sprays into the nostril(s) as directed by provider Daily. 10/18/22  Yes Ronald Bowser MD   nabumetone (RELAFEN) 750 MG tablet TAKE 1 TABLET BY MOUTH TWICE DAILY 12/5/22  Yes Brando Brice DPM   olopatadine (PATANASE) 0.6 % solution nasal solution  10/25/21  Yes Provider, MD Lucía   ondansetron (ZOFRAN) 8 MG tablet Take 1 tablet by mouth Every 8 (Eight) Hours As Needed for Nausea or Vomiting. 10/26/22  Yes Zelda Rich, APRN    ramelteon (ROZEREM) 8 MG tablet TAKE 1 TABLET BY MOUTH EVERY EVENING UP TO 3 HOURS BEFORE BEDTIME 9/27/22  Yes Jimmy Kelly II, MD   sucralfate (CARAFATE) 1 g tablet Take 1 tablet by mouth 4 (Four) Times a Day As Needed (Reflux). 10/28/20  Yes Zelda Rich APRN       Past Medical History:   Diagnosis Date   • Allergic rhinitis All my life    chronic infection   • Anxiety    • Asthma    • Chronic anemia    • Chronic pain    • CTS (carpal tunnel syndrome)    • Deaf    • Depression with anxiety    • Fracture of wrist    • Gastroesophageal reflux disease    • Hypertension    • Migraine    • Obesity    • Osteoarthritis    • Perforated tympanic membrane, bilateral    • Plantar fasciitis    • PONV (postoperative nausea and vomiting)    • Sleep apnea     sleep with c-pap   • Tinnitus        Past Surgical History:   Procedure Laterality Date   • ADENOIDECTOMY  1959   • COLONOSCOPY N/A 1/19/2018   • EAR TUBES     • ENDOSCOPIC FUNCTIONAL SINUS SURGERY (FESS) Bilateral 9/5/2017   • ENDOSCOPY N/A 1/19/2018   • ENDOSCOPY N/A 6/10/2019   • KNEE ARTHROPLASTY Right    • LAPAROSCOPIC TUBAL LIGATION     • RHINOPLASTY     • SINUS SURGERY  1988, 2002, 2006, 2018   • TOE FUSION Left 8/12/2021    Procedure: LEFT FIRST METATARSOPHALANGEAL JOINT ARTHRODESIS;  Surgeon: Brando Brice DPM;  Location: [facility];  Service: Podiatry;  Laterality: Left;   • TONSILLECTOMY  1959   • TOTAL SHOULDER ARTHROPLASTY Right    • TYMPANOPLASTY Left 6/5/2018   • TYMPANOPLASTY Right 2/7/2020    Procedure: TYMPANOPLASTY AND  CARTILAGE GRAFT;  Surgeon: Ramy Gaston MD;  Location: [facility];  Service: ENT;  Laterality: Right;   • UPPER GASTROINTESTINAL ENDOSCOPY         Social History     Socioeconomic History   • Marital status:    Tobacco Use   • Smoking status: Never     Passive exposure: Past   • Smokeless tobacco: Never   • Tobacco comments:     Never had the desire to try smoking   Vaping Use   • Vaping Use: Never used   Substance  and Sexual Activity   • Alcohol use: Yes     Alcohol/week: 3.0 standard drinks     Types: 1 Glasses of wine, 2 Drinks containing 0.5 oz of alcohol per week     Comment: per week maybe if that much   • Drug use: Yes     Frequency: 5.0 times per week     Types: Marijuana   • Sexual activity: Defer       Family History   Problem Relation Age of Onset   • Hypertension Mother    • Osteoarthritis Mother    • Alcohol abuse Father    • Heart disease Father    • Cancer Father    • Osteoarthritis Father    • Stroke Maternal Grandmother    • Irritable bowel syndrome Sister    • Osteoporosis Maternal Grandfather              This document has been electronically signed by Marlene Bauer MD on December 14, 2022 14:09 CST

## 2022-12-15 ENCOUNTER — HOSPITAL ENCOUNTER (OUTPATIENT)
Dept: PHYSICAL THERAPY | Facility: HOSPITAL | Age: 67
Setting detail: THERAPIES SERIES
Discharge: HOME | End: 2022-12-15
Payer: MEDICARE

## 2022-12-15 DIAGNOSIS — M76.31 ILIOTIBIAL BAND SYNDROME, RIGHT: ICD-10-CM

## 2022-12-15 DIAGNOSIS — M76.32 ILIOTIBIAL BAND SYNDROME, LEFT: ICD-10-CM

## 2022-12-15 DIAGNOSIS — M54.50 ACUTE BILATERAL LOW BACK PAIN, UNSPECIFIED WHETHER SCIATICA PRESENT: Primary | ICD-10-CM

## 2022-12-15 PROCEDURE — 97110 THERAPEUTIC EXERCISES: CPT | Performed by: PHYSICAL THERAPIST

## 2022-12-15 PROCEDURE — 97162 PT EVAL MOD COMPLEX 30 MIN: CPT | Performed by: PHYSICAL THERAPIST

## 2022-12-15 NOTE — THERAPY EVALUATION
Outpatient Physical Therapy Ortho Initial Evaluation  [facility]     Patient Name: Mikki Joel  : 1955  MRN: [id number]  Today's Date: 12/15/2022      Visit Date: 12/15/2022  Visit number:     % Improvement:   eli ANGULO appointment:     Recert date:  23    Visit Diagnosis:  Bilateral low back pain        Patient Active Problem List   Diagnosis   • Asthma   • Arthritis   • Nasal turbinate hypertrophy   • Gastroesophageal reflux disease   • Depression with anxiety   • Insomnia   • PTSD (post-traumatic stress disorder)   • Restless leg   • Mixed hearing loss of left ear   • Right knee pain   • Presence of right artificial knee joint   • Essential hypertension, benign   • Presbyesophagus   • Chronic vasomotor rhinitis   • Mixed conductive and sensorineural hearing loss of right ear with restricted hearing of left ear   • Morbidly obese (HCC)   • Hallux limitus of left foot   • Marijuana use   • Mixed hyperlipidemia        Past Medical History:   Diagnosis Date   • Allergic rhinitis All my life    chronic infection   • Anxiety    • Asthma    • Chronic anemia    • Chronic pain    • CTS (carpal tunnel syndrome)    • Deaf    • Depression with anxiety    • Fracture of wrist    • Gastroesophageal reflux disease    • Hypertension    • Migraine    • Obesity    • Osteoarthritis    • Perforated tympanic membrane, bilateral    • Plantar fasciitis    • PONV (postoperative nausea and vomiting)    • Sleep apnea     sleep with c-pap   • Tinnitus         Past Surgical History:   Procedure Laterality Date   • ADENOIDECTOMY     • COLONOSCOPY N/A 2018   • EAR TUBES     • ENDOSCOPIC FUNCTIONAL SINUS SURGERY (FESS) Bilateral 2017   • ENDOSCOPY N/A 2018   • ENDOSCOPY N/A 6/10/2019   • KNEE ARTHROPLASTY Right    • LAPAROSCOPIC TUBAL LIGATION     • RHINOPLASTY     • SINUS SURGERY  , , ,    • TOE FUSION Left 2021    Procedure: LEFT FIRST METATARSOPHALANGEAL JOINT  ARTHRODESIS;  Surgeon: Brando Brice DPM;  Location: [facility];  Service: Podiatry;  Laterality: Left;   • TONSILLECTOMY  1959   • TOTAL SHOULDER ARTHROPLASTY Right    • TYMPANOPLASTY Left 6/5/2018   • TYMPANOPLASTY Right 2/7/2020    Procedure: TYMPANOPLASTY AND  CARTILAGE GRAFT;  Surgeon: Ramy Gaston MD;  Location: [facility];  Service: ENT;  Laterality: Right;   • UPPER GASTROINTESTINAL ENDOSCOPY         Visit Dx:     ICD-10-CM ICD-9-CM   1. Acute bilateral low back pain, unspecified whether sciatica present  M54.50 724.2   2. Iliotibial band syndrome, right  M76.31 728.89   3. Iliotibial band syndrome, left  M76.32 728.89          Patient History     Row Name 12/15/22 1400             History    Brief Description of Current Complaint Patient reports gradual onset low back pain about a month ago.  She has had recurrent episodes of low back pain over the years. Pain is in bilateral lumbar area and extends into right thigh. She denies N/T and bowel and bladder incontinence. Bending forward and standing and walking aggravate symptoms. On her worst days she also has pain in sitting.  -SW      Results of Clinical Tests xray 12-5-22:degenerative changes bilateral hips (left > right), and low back  -SW         Pain     Pain Location Back  -SW      Pain at Present 8  -SW      Pain at Best 6  -SW      Pain at Worst 10  -SW      Is your sleep disturbed? No  -SW         Fall Risk Assessment    Number of falls reported in the last 12 months 1  -SW            User Key  (r) = Recorded By, (t) = Taken By, (c) = Cosigned By    Initials Name Provider Type    SW Haylee Hoffmann Physical Therapist                 PT Ortho     Row Name 12/15/22 1500       Posture/Observations    Posture/Observations Comments IC/PSIS level, fair to good and equal SIJ mobility  -SW       General ROM    Head/Neck/Trunk Trunk Extension;Trunk Flexion;Trunk Lt Lateral Flexion;Trunk Rt Lateral Flexion  -SW    RT Lower Ext Rt Hip Internal Rotation;Rt  Hip Extension  -SW    LT Lower Ext Lt Hip Extension;Lt Hip Internal Rotation  -SW       Head/Neck/Trunk    Trunk Extension AROM 25%  -SW    Trunk Flexion AROM 75%  -SW    Trunk Lt Lateral Flexion AROM 75%  -SW    Trunk Rt Lateral Flexion AROM 75%  -SW       Right Lower Ext    Rt Hip Extension AROM 5  -SW    Rt Hip Internal Rotation AROM 10  -SW       Left Lower Ext    Lt Hip Extension AROM 5  -SW    Lt Hip Internal Rotation AROM 5  -SW       MMT (Manual Muscle Testing)    Rt Lower Ext Rt Hip Flexion;Rt Hip Extension;Rt Hip ABduction;Rt Hip Internal (Medial) Rotation;Rt Hip External (Lateral) Rotation;Rt Knee Extension;Rt Knee Flexion;Rt Ankle Plantarflexion;Rt Ankle Dorsiflexion;Rt Ankle Subtalar Eversion;Rt MTP Extension  -SW    Lt Lower Ext Lt Hip Flexion;Lt Hip Extension;Lt Hip ABduction;Lt Hip Internal (Medial) Rotation;Lt Hip External (Lateral) Rotation;Lt Knee Extension;Lt Knee Flexion;Lt Ankle Plantarflexion;Lt Ankle Dorsiflexion;Lt Ankle Subtalar Eversion;Lt MTP Extension  -SW       MMT Right Lower Ext    Rt Hip Flexion MMT, Gross Movement (5/5) normal  -SW    Rt Hip Extension MMT, Gross Movement (3+/5) fair plus  -SW    Rt Hip ABduction MMT, Gross Movement (4/5) good  -SW    Rt Hip Internal (Medial) Rotation MMT, Gross Movement (3+/5) fair plus  -SW    Rt Hip External (Lateral) Rotation MMT, Gross Movement (5/5) normal  -SW    Rt Knee Extension MMT, Gross Movement (5/5) normal  -SW    Rt Knee Flexion MMT, Gross Movement (5/5) normal  -SW    Rt Ankle Plantarflexion MMT, Gross Movement (5/5) normal  -SW    Rt Ankle Dorsiflexion MMT, Gross Movement (5/5) normal  -SW    Rt Ankle Subtalar Eversion MMT, Gross Movement (5/5) normal  -SW    Rt MTP Extension MMT, Gross Movement (5/5) normal  -SW       MMT Left Lower Ext    Lt Hip Flexion MMT, Gross Movement (5/5) normal  -SW    Lt Hip Extension MMT, Gross Movement (3+/5) fair plus  -SW    Lt Hip ABduction MMT, Gross Movement (4/5) good  -SW    Lt Hip Internal  "(Medial) Rotation MMT, Gross Movement (3+/5) fair plus  -SW    Lt Hip External (Lateral) Rotation MMT, Gross Movement (5/5) normal  -SW    Lt Knee Extension MMT, Gross Movement (5/5) normal  -SW    Lt Knee Flexion MMT, Gross Movement (5/5) normal  -SW    Lt Ankle Plantarflexion MMT, Gross Movement (5/5) normal  -SW    Lt Ankle Dorsiflexion MMT, Gross Movement (5/5) normal  -SW    Lt Ankle Subtalar Eversion MMT, Gross Movement (5/5) normal  -SW    Lt MTP Extension MMT, Gross Movement (5/5) normal  -SW       Flexibility    Flexibility Tested? Lower Extremity  ITB flexibility WFL's bilaterally  -          User Key  (r) = Recorded By, (t) = Taken By, (c) = Cosigned By    Initials Name Provider Type    Haylee Mcgowan Physical Therapist                            Therapy Education  Education Details: LTR,pelvic rock,supine march,bridge,S&DKTC  Given: HEP, Symptoms/condition management  Program: New  How Provided: Verbal, Demonstration, Written  Provided to: Patient  Level of Understanding: Teach back education performed, Verbalized, Demonstrated      PT OP Goals     Row Name 12/15/22 1400          PT Short Term Goals    STG Date to Achieve 01/05/23  -SW     STG 1 independent and compliant with HEP  -        Long Term Goals    LTG Date to Achieve 01/26/23  -SW     LTG 1 5x sit to stand 18\" with no UE support  -     LTG 2 trunk AROM 90% all directions  -     LTG 3 bilateral hip abduction 5/5  -SW     LTG 4 bilateral hip extension/IR  4/5  -SW     LTG 5 able to perform 20 double heel taps without increase in symptoms  -     LTG 6 bilateral hip IR ROM 20 degrees  -        Time Calculation    PT Goal Re-Cert Due Date 01/05/23  -           User Key  (r) = Recorded By, (t) = Taken By, (c) = Cosigned By    Initials Name Provider Type    Haylee Mcgowan Physical Therapist                 PT Assessment/Plan     Row Name 12/15/22 1400          PT Assessment    Functional Limitations Decreased safety during " functional activities;Impaired locomotion;Limitation in home management;Limitations in community activities;Limitations in functional capacity and performance;Performance in leisure activities;Performance in self-care ADL  -     Impairments Balance;Gait;Impaired muscle endurance;Impaired muscle length;Muscle strength;Pain;Poor body mechanics;Posture;Range of motion  -SW     Assessment Comments 67 yof presents with insidious onset bilateral low back pain extending into right thigh with reduced core and hip ROM and strength. Patient was given an HEP to be performed 2x/day and advised to begin 5-10' on stationary bike or walking.  -SW     Rehab Potential Good  -SW     Patient/caregiver participated in establishment of treatment plan and goals Yes  -SW     Patient would benefit from skilled therapy intervention Yes  -SW        PT Plan    PT Frequency 2x/week  -SW     Predicted Duration of Therapy Intervention (PT) 6 weeks  -SW     Planned CPT's? PT EVAL MOD COMPLELITY: 26239;PT THER PROC EA 15 MIN: 38382;PT THER ACT EA 15 MIN: 76995;PT MANUAL THERAPY EA 15 MIN: 77371;PT NEUROMUSC RE-EDUCATION EA 15 MIN: 60290;PT SELF CARE/HOME MGMT/TRAIN EA 15: 74387;PT HOT OR COLD PACK TREAT [facility]  -     Physical Therapy Interventions (Optional Details) balance training;home exercise program;manual therapy techniques;modalities;neuromuscular re-education;patient/family education;ROM (Range of Motion);stair training;strengthening;stretching  -SW     PT Plan Comments Focus on core and hip strength (ext/abd/ir) and trunk ROM and hip IR and extension ROM.  -           User Key  (r) = Recorded By, (t) = Taken By, (c) = Cosigned By    Initials Name Provider Type    Haylee Mcgowan Physical Therapist                   OP Exercises     Row Name 12/15/22 1500             Subjective Comments    Subjective Comments see pt hx  -SW         Exercise 1    Exercise Name 1 lower trunk rotation  -SW      Reps 1 10  -SW         Exercise 2     "Exercise Name 2 pelvic rock  -SW      Reps 2 10  -SW         Exercise 3    Exercise Name 3 supine marching  -SW      Reps 3 20  -SW         Exercise 4    Exercise Name 4 bridge  -SW      Reps 4 20  -SW         Exercise 5    Exercise Name 5 sktc and dktc  -SW      Reps 5 30\"x1 ea  -SW            User Key  (r) = Recorded By, (t) = Taken By, (c) = Cosigned By    Initials Name Provider Type     Haylee Hoffmann Physical Therapist                              Outcome Measure Options: 5x Sit to Stand  5 Times Sit to Stand  5 Times Sit to Stand (seconds): 25 seconds  5 Times Sit to Stand Comments: no UE support         Time Calculation:     Start Time: 1445  Stop Time: 1516  Time Calculation (min): 31 min     Therapy Charges for Today     Code Description Service Date Service Provider Modifiers Qty    63303519579 HC PT THER PROC EA 15 MIN 12/15/2022 Haylee Hoffmann GP 1    42022267517 HC PT EVAL MOD COMPLEXITY 2 12/15/2022 Haylee Hoffmann GP 1          PT G-Codes  Outcome Measure Options: 5x Sit to Stand         Haylee Hoffmann  12/15/2022      "

## 2022-12-16 ENCOUNTER — OFFICE VISIT (OUTPATIENT)
Dept: SLEEP MEDICINE | Facility: HOSPITAL | Age: 67
End: 2022-12-16

## 2022-12-16 VITALS
BODY MASS INDEX: 38.04 KG/M2 | HEART RATE: 80 BPM | OXYGEN SATURATION: 96 % | DIASTOLIC BLOOD PRESSURE: 82 MMHG | SYSTOLIC BLOOD PRESSURE: 134 MMHG | WEIGHT: 228.3 LBS | HEIGHT: 65 IN

## 2022-12-16 DIAGNOSIS — G47.33 OSA (OBSTRUCTIVE SLEEP APNEA): Primary | ICD-10-CM

## 2022-12-16 DIAGNOSIS — G25.81 RESTLESS LEGS SYNDROME (RLS): ICD-10-CM

## 2022-12-16 DIAGNOSIS — F39 AFFECTIVE DISORDER: ICD-10-CM

## 2022-12-16 PROCEDURE — 99213 OFFICE O/P EST LOW 20 MIN: CPT | Performed by: PSYCHIATRY & NEUROLOGY

## 2022-12-16 NOTE — PROGRESS NOTES
"     Sleep Medicine Follow-Up Note    CC & ID:  Ms. Mikki Joel is a 67 y.o. female seen for follow-up regarding JUNIOR, Insomnia & RLS.      Treatment Summary:   --Last seen in Nov 21: Doing well    --JUNIOR: Dx PSG in Jan 20: AHI 9, PLMI 33   --Titration in Aug 20: no REM sleep, did well though with PLMI of 17.   --Increased APAP 8-15 cm.    --RLS:    --Hx Requip 3mg qHS   --Neurontin 800mg qEvening  --Ongoing symptoms more consistent with Hip and back pain.    --Insomnia:    --Encouraged behavioral interventions   --Cont Ramelteon 8mg qHS    --Dream enactment behavior: infrequent, monitor.  Unable to monitor for REM without atonia (No REM sleep).           HPI:   Today, patient presents unaccompanied.  She is pleasant and engaged well.    States she is doing well, w/out issues.      No complaints.  RLS s/sx controlled.  No URGE sx.  No dream enactment.      Rare DIMS, not problematic.      Concerning sleep pattern:  BT 1-3 A  Latency no issues  WT 9.30 - 11.30 A  Schedule isn't bothersome for pt    Daughter is in visiting from [place].     As previously noted:  Late at night is her \"decompression\" time / self care time  Rozarem is being used; not certain how helpful  Hx of Lunesta  Also of Hydroxyzine use, that was helpful  Complicated by chronic pain - osteoarthrisis in hip, shoulders, feet          Treatment: CPAP  -Pressure:8-15 cm   -Pressure intolerance: no   -Aerophagia: no   -Air Hunger: no  -Interface: cushion, nasal  -Fit: no  -Chin strap: no  -HCC: [facility] - getting supplies  -Patient's perceived outcome: benefits        Compliance Card Data:   - Date Range: 12/20/21 - 2/10/22 (has newer machine but no available data, using nightly)   - Days: 53    - % Days used: 100%  - % Days with Usage > 4 hrs: 100%  - Average usage days used: 10 h 28 m    - Setting: APAP 8-15  - Pressure data:   - Median 8.5 cm  - <=90%: 9.4 cm  - Max: 12.1 cm    - Residual AHI: 1.1    - Average Time in Large Leak: 3 min 4a " s          ROS:  Review of Systems   No HA/SA/MA    -CONSTITUTIONAL: Weight: stable as below  Wt Readings from Last 5 Encounters:   12/16/22 104 kg (228 lb 4.8 oz)   12/14/22 107 kg (235 lb 3.2 oz)   12/05/22 106 kg (233 lb)   10/26/22 103 kg (227 lb 12.8 oz)   10/18/22 101 kg (222 lb)         Daytime Symptoms:  -Daytime fatigue/sleepiness: none significant  -Naps: no  -Involuntary Dozing: no  -Driving: Difficulty with sleepiness and driving:  no   -- Close calls related to sleepiness: no   -- Accidents related to sleepiness: no         Questionnaires: ESS = 2  ESS = 0, Nov 21  Last ESS = 3         Patient Active Problem List   Diagnosis   • Asthma   • Arthritis   • Nasal turbinate hypertrophy   • Gastroesophageal reflux disease   • Depression with anxiety   • Insomnia   • PTSD (post-traumatic stress disorder)   • Restless leg   • Mixed hearing loss of left ear   • Right knee pain   • Presence of right artificial knee joint   • Essential hypertension, benign   • Presbyesophagus   • Chronic vasomotor rhinitis   • 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   --> Supplemental Oxygen Use: denies      Current Outpatient Medications   Medication Sig Dispense Refill   • albuterol sulfate  (90 Base) MCG/ACT inhaler Inhale 2 puffs Every 4 (Four) Hours As Needed for Wheezing.     • atorvastatin (Lipitor) 10 MG tablet Take 1 tablet by mouth Daily. 90 tablet 3   • cetirizine (zyrTEC) 10 MG tablet TAKE ONE TABLET BY MOUTH DAILY 90 tablet 3   • chlorthalidone (HYGROTON) 25 MG tablet TAKE 1 TABLET BY MOUTH DAILY 90 tablet 3   • ciprofloxacin (Ciloxan) 0.3 % ophthalmic solution 4 drops BID to left ear for 7 days 10 mL 0   • cyclobenzaprine (FLEXERIL) 5 MG tablet Take 1 tablet by mouth 3 (Three) Times  a Day As Needed for Muscle Spasms. 30 tablet 1   • dexlansoprazole (Dexilant) 60 MG capsule Take 1 capsule by mouth Daily. 90 capsule 1   • doxycycline (VIBRAMYCIN) 100 MG capsule Take 1 capsule by mouth 2 (Two) Times a Day for 10 days. 20 capsule 0   • ferrous sulfate 325 (65 FE) MG tablet Take 1 tablet by mouth Daily With Breakfast. Take with vitamin C to help with absorption 30 tablet 5   • FLUoxetine (PROzac) 40 MG capsule Take 1 capsule by mouth Daily. 90 capsule 1   • gabapentin (NEURONTIN) 600 MG tablet TAKE 2 TABLETS BY MOUTH AT NIGHT FOR RESTLESS LEG SYNDROME 60 tablet 2   • hydrOXYzine pamoate (VISTARIL) 50 MG capsule TAKE 1 CAPSULE BY MOUTH THREE TIMES DAILY EVERY NIGHT AS NEEDED FOR ITCHING OR SLEEP 90 capsule 5   • ipratropium (ATROVENT) 0.06 % nasal spray 2 sprays into the nostril(s) as directed by provider 2 (Two) Times a Day. 3 each 3   • linaclotide (Linzess) 145 MCG capsule capsule Take 1 capsule by mouth Every Morning Before Breakfast. 30 capsule 5   • metoprolol succinate XL (TOPROL-XL) 100 MG 24 hr tablet TAKE 1 TABLET BY MOUTH EVERY NIGHT 90 tablet 3   • nabumetone (RELAFEN) 750 MG tablet TAKE 1 TABLET BY MOUTH TWICE DAILY 60 tablet 1   • olopatadine (PATANASE) 0.6 % solution nasal solution      • ondansetron (ZOFRAN) 8 MG tablet Take 1 tablet by mouth Every 8 (Eight) Hours As Needed for Nausea or Vomiting. 30 tablet 0   • predniSONE (DELTASONE) 10 MG tablet Take 6 tablets by mouth Daily for 3 days, THEN 5 tablets Daily for 3 days, THEN 4 tablets Daily for 3 days, THEN 3 tablets Daily for 3 days, THEN 2 tablets Daily for 3 days, THEN 1 tablet Daily for 3 days. 63 tablet 0   • ramelteon (ROZEREM) 8 MG tablet TAKE 1 TABLET BY MOUTH EVERY EVENING UP TO 3 HOURS BEFORE BEDTIME 30 tablet 2   • sucralfate (CARAFATE) 1 g tablet Take 1 tablet by mouth 4 (Four) Times a Day As Needed (Reflux). 60 tablet 1     No current facility-administered medications for this visit.     -Notable Current  "Medications:  --> Ramelteon 8mg qHS  --> Zyrtec daily; Neurontin 1200mg qHS (taken 1-3 hrs before bed) + no longer on Opioid (used for pain control after foot surgery)      PE:  Body mass index is 37.99 kg/m².  Vitals:    12/16/22 1436 12/16/22 1458   BP: 176/92 134/82   BP Location: Left arm Left arm   Patient Position:  Sitting   Cuff Size:  Adult   Pulse: 80    SpO2: 96%    Weight: 104 kg (228 lb 4.8 oz)    Height: 165.1 cm (65\")    Manual recheck of above by me      Wt Readings from Last 5 Encounters:   12/16/22 104 kg (228 lb 4.8 oz)   12/14/22 107 kg (235 lb 3.2 oz)   12/05/22 106 kg (233 lb)   10/26/22 103 kg (227 lb 12.8 oz)   10/18/22 101 kg (222 lb)     Physical Exam  General:  In NAD.  Head: Atraumatic  Eyes: EOMI.  CV: No Clubbing.   Pul: Respirations: unlaboured.    MS: No atrophy.  Neuro: No resting tremor.    Psych: Socially appropriate.  Pleasant.  No overt dysphoria.         Assessment & Planning:  Ms. Mikki Joel is a 67 y.o. female who is seen for follow-up of:     --Obstructive Sleep Apnea:  Stable  --Cont Auto CPAP 8-15 cm  -Patient understands to notify clinic if developing waking headaches or worsening daytime sleepiness  --Script for yearly PAP supplies      ---Insomnia, chronic: resolved and stable  --Delayed sleep phase component but with residual initial insomnia that has improved.  -We discussed and I reiterated critical need for behavioral interventions.  --Discussed with patient the goals of behavioral intervention: stim control.  --Patient is going to consider; contemplative re: change.      --Discussed with the patient the data noting increased all cause mortality with insomnia without treatment.  Compared it to the data noting increased all cause mortality with hypnotics (long term, > 6 wks) and cognitive dysfunction risk.  Discussed benefits of behavioral therapy to treat insomnia without risks of medication.  She will consider but is content w/ current tx.     --Cont " Ramelteon 8 mg nightly as needed.       --RLS: resolved and stable  --Cont Neurontin 1200mg qEvening for restless leg symptoms.   --Co-morbid symptoms consistent with Hip, shoulder, and back pain in setting of osteoarthrisis.      --Dream enactment behavior: stable, none recent; monitor.  Unable to monitor for REM without atonia. on her last titration test given lack of REM.  No FHx of PKD or ET.  Will let us know of any worsening sx.      --MARQUISE reviewed today and unchanged; #[id number].      --Affective d/o / BH overlay: improving; not addressed today      -->  Cont. safe driving encouraged.    -->  Follow-up in 12 months given stability.  Sooner if needed.    --> Call with any questions or concerns.      All questions answered for the patient, who indicated understanding and agreed with the plan.       Jimmy Kelly MD  12/16/22   Sleep Medicine    CC: Ronald Bowser MD

## 2022-12-20 ENCOUNTER — APPOINTMENT (OUTPATIENT)
Dept: PHYSICAL THERAPY | Facility: HOSPITAL | Age: 67
End: 2022-12-20
Payer: MEDICARE

## 2022-12-21 RX ORDER — ONDANSETRON HYDROCHLORIDE 8 MG/1
8 TABLET, FILM COATED ORAL EVERY 8 HOURS PRN
Qty: 30 TABLET | Refills: 0 | Status: SHIPPED | OUTPATIENT
Start: 2022-12-21 | End: 2023-03-10 | Stop reason: SDUPTHER

## 2022-12-23 DIAGNOSIS — M76.32 ILIOTIBIAL BAND SYNDROME, LEFT: ICD-10-CM

## 2022-12-23 DIAGNOSIS — M54.50 ACUTE BILATERAL LOW BACK PAIN WITHOUT SCIATICA: ICD-10-CM

## 2022-12-23 DIAGNOSIS — M76.31 ILIOTIBIAL BAND SYNDROME, RIGHT: ICD-10-CM

## 2022-12-27 RX ORDER — CYCLOBENZAPRINE HCL 5 MG
TABLET ORAL
Qty: 30 TABLET | Refills: 1 | Status: SHIPPED | OUTPATIENT
Start: 2022-12-27 | End: 2023-02-10

## 2022-12-29 ENCOUNTER — APPOINTMENT (OUTPATIENT)
Dept: PHYSICAL THERAPY | Facility: HOSPITAL | Age: 67
End: 2022-12-29
Payer: MEDICARE

## 2023-01-03 ENCOUNTER — DOCUMENTATION (OUTPATIENT)
Dept: PHYSICAL THERAPY | Facility: HOSPITAL | Age: 68
End: 2023-01-03
Payer: MEDICARE

## 2023-01-03 DIAGNOSIS — G25.81 RESTLESS LEGS SYNDROME (RLS): Primary | ICD-10-CM

## 2023-01-03 RX ORDER — GABAPENTIN 600 MG/1
1200 TABLET ORAL NIGHTLY
Qty: 60 TABLET | Refills: 2 | Status: SHIPPED | OUTPATIENT
Start: 2023-01-03 | End: 2023-03-31 | Stop reason: SDUPTHER

## 2023-01-03 NOTE — PROGRESS NOTES
Refill request for Neurontin for RLS, 1200mg qHS>     Chart reviewed.     MARQUISE reviewed; scripts since Sept 22  MARQUISE Patient Controlled Substance Report (from 1/3/2022 to 1/2/2023)    Dispensed  Strength Quantity Days Supply Provider Pharmacy   12/11/2022 Gabapentin 600MG 60 each 30 [facility]   11/13/2022 Gabapentin 600MG 60 each 30 [facility]   10/12/2022 Gabapentin 600MG 60 each 30 [facility]   09/13/2022 Gabapentin 600MG 60 each 30 INDU [facility]          Refill for 600mg, #60, 2 refills.      Sent to [id number] [address] AT [facility] [address] & [place] - [phone] [facility] [phone] FX.     Indu Mendoza II, MD  01/03/23 @ 11:21 AM CST

## 2023-01-16 ENCOUNTER — OFFICE VISIT (OUTPATIENT)
Dept: OTOLARYNGOLOGY | Facility: CLINIC | Age: 68
End: 2023-01-16
Payer: MEDICARE

## 2023-01-16 VITALS — HEIGHT: 65 IN | WEIGHT: 228.3 LBS | OXYGEN SATURATION: 99 % | HEART RATE: 90 BPM | BODY MASS INDEX: 38.04 KG/M2

## 2023-01-16 DIAGNOSIS — J32.4 CHRONIC PANSINUSITIS: ICD-10-CM

## 2023-01-16 DIAGNOSIS — J32.8 OTHER CHRONIC SINUSITIS: Primary | ICD-10-CM

## 2023-01-16 PROCEDURE — 31237 NSL/SINS NDSC SURG BX POLYPC: CPT | Performed by: OTOLARYNGOLOGY

## 2023-01-16 NOTE — PROGRESS NOTES
Follow up Regarding: Left otitis externa, chronic pansinusitis    Assessment and Plan:  68-year-old female with chronic sinusitis status post multiple sinus surgeries, mild persistent purulence of right maxillary today, left otitis externa resolved    -Continue irrigations twice daily with mupirocin ointment as directed, call if you need refills  -Continue over-the-counter antihistamine medication  -Follow-up in 4 months to recheck sooner if new symptoms    History of present illness/Interval history:    Ms. Diaz is a 68-year-old female returning for follow-up of chronic sinusitis.  She states that she feels significantly improved but does still have a bad smell in her nose at times, this also appears to be improved.  She has been for doing irrigations as directed twice daily with the mupirocin ointment and eels that this has been helping.  She denies any new change in smell or taste.  He took her eardrops as directed and denies any significant pain, drainage, hearing changes since that time.  She is using her hearing aids as directed.  No other acute ENT concerns today.    Physical Exam:  General: NAD, awake and alert  Head: normocephalic, atraumatic  Eyes: EOMI, sclerae white, conjunctivae pink  Ears: pinnae intact without masses or lesions, bilateral hearing aids removed for exam   Right: TM intact without injection or effusion   Left: TM intact without injection or effusion  Nose: external straight without deformity  See endoscopy note for details  OC/OP: mucosa moist and pink,  Neuro: no focal deficits    Procedure: Rigid Nasal Endoscopy with debridement  Indications: Chronic sinusitis  Anesthesia: Local  Surgeon: Marlene Bauer MD  Estimated Blood Loss: none  Complications: none    Description:  Rigid nasal endoscopy - 30 degree endoscope(s)  Informed consent was verbally obtained.  The nose was decongested with topical Neosynephrine and anesthetized with 4% lidocaine solution.  The endoscope was then placed  into bilateral naris and advanced to the nasopharynx.  The endoscope was removed and advanced into the middle meatus. The endoscope was finally withdrawn at the conclusion of the exam.  Findings are listed below:    Nasal cavity:  No masses or lesions  Middle meatus: No polyps.  There is purulence and crusting within the right maxillary sinus, improved from prior.  Uncinate actually present right greater than left. Ethmoids partially absent bilaterally  Mucosa:  Pink and moist.  Significant improvement in irritation with minimal edema isolated to the maxillary sinus.  All operated sinuses appear patent  Turbinates: Middle normal.  Inferior normal and decongests nicely   Septum: Midline there is a small anterior inferior perforation without crusting  Nasopharynx: Normal adenoid pad and normal Eustachian tube orifices  Purulence and pus is debrided from the right maxillary sinus using curved suction and bayonet forceps.    Vital Signs   Vitals:    01/16/23 1343   Pulse: 90   SpO2: 99%       Results Review:  Prior clinic visit from 12/14/2022 demonstrated significant bilateral maxillary sinusitis with thick crusting and foul odor recommendations were for doxycycline, medicated nasal rinses with [employer]Med sinus irrigations and close follow-up.  She also had a left otitis externa at that time status post cleaning and otic drops with dry ear precautions were recommended.    Histories:  Allergies   Allergen Reactions   • Codeine Other (See Comments)     Increases heart rate   • Sulfa Antibiotics Hives       Prior to Admission medications    Medication Sig Start Date End Date Taking? Authorizing Provider   albuterol sulfate  (90 Base) MCG/ACT inhaler Inhale 2 puffs Every 4 (Four) Hours As Needed for Wheezing.   Yes Provider, MD Lucía   atorvastatin (Lipitor) 10 MG tablet Take 1 tablet by mouth Daily. 10/18/22  Yes Ronald Bowser MD   cetirizine (zyrTEC) 10 MG tablet TAKE ONE TABLET BY MOUTH DAILY 1/17/22  Yes  Ronald Bowser MD   chlorthalidone (HYGROTON) 25 MG tablet TAKE 1 TABLET BY MOUTH DAILY 10/19/22  Yes Ronald Bowser MD   cyclobenzaprine (FLEXERIL) 5 MG tablet TAKE 1 TABLET BY MOUTH THREE TIMES DAILY AS NEEDED FOR MUSCLE SPASMS 12/27/22  Yes Ronald Bowser MD   dexlansoprazole (Dexilant) 60 MG capsule Take 1 capsule by mouth Daily. 10/7/22  Yes Zelda Rich APRN   ferrous sulfate 325 (65 FE) MG tablet Take 1 tablet by mouth Daily With Breakfast. Take with vitamin C to help with absorption 6/14/22  Yes Mila Lang APRN   FLUoxetine (PROzac) 40 MG capsule Take 1 capsule by mouth Daily. 10/21/22  Yes Ronald Bowser MD   gabapentin (NEURONTIN) 600 MG tablet Take 2 tablets by mouth Every Night. For Restless Leg Syndrome.  Take 30 min to three hours before bedtime. 1/3/23  Yes Jimmy Kelly II, MD   hydrOXYzine pamoate (VISTARIL) 50 MG capsule TAKE 1 CAPSULE BY MOUTH THREE TIMES DAILY EVERY NIGHT AS NEEDED FOR ITCHING OR SLEEP 10/4/22  Yes Ronald Bowser MD   ipratropium (ATROVENT) 0.06 % nasal spray 2 sprays into the nostril(s) as directed by provider 2 (Two) Times a Day. 10/18/22  Yes Ronald Bowser MD   linaclotide (Linzess) 145 MCG capsule capsule Take 1 capsule by mouth Every Morning Before Breakfast. 11/4/22  Yes Zelda Rich APRN   metoprolol succinate XL (TOPROL-XL) 100 MG 24 hr tablet TAKE 1 TABLET BY MOUTH EVERY NIGHT 10/31/22  Yes Ronald Bowser MD   nabumetone (RELAFEN) 750 MG tablet TAKE 1 TABLET BY MOUTH TWICE DAILY 12/5/22  Yes Brando Brice DPM   olopatadine (PATANASE) 0.6 % solution nasal solution  10/25/21  Yes Lucía Greene MD   ondansetron (ZOFRAN) 8 MG tablet Take 1 tablet by mouth Every 8 (Eight) Hours As Needed for Nausea or Vomiting. 12/21/22  Yes Zelda Rich APRN   ramelteon (ROZEREM) 8 MG tablet TAKE 1 TABLET BY MOUTH EVERY EVENING UP TO 3 HOURS BEFORE BEDTIME 12/14/22  Yes Jimmy Kelly II, MD   sucralfate (CARAFATE) 1 g tablet Take 1 tablet by mouth 4  (Four) Times a Day As Needed (Reflux). 10/28/20  Yes Zelda Rich APRN   ciprofloxacin (Ciloxan) 0.3 % ophthalmic solution 4 drops BID to left ear for 7 days 12/14/22 1/16/23  Marlene Bauer MD       Past Medical History:   Diagnosis Date   • Allergic rhinitis All my life    chronic infection   • Anxiety    • Asthma    • Chronic anemia    • Chronic pain    • CTS (carpal tunnel syndrome)    • Deaf    • Depression with anxiety    • Fracture of wrist    • Gastroesophageal reflux disease    • Hypertension    • Migraine    • Obesity    • Osteoarthritis    • Perforated tympanic membrane, bilateral    • Plantar fasciitis    • PONV (postoperative nausea and vomiting)    • Sleep apnea     sleep with c-pap   • Tinnitus        Past Surgical History:   Procedure Laterality Date   • ADENOIDECTOMY  1959   • COLONOSCOPY N/A 1/19/2018   • EAR TUBES     • ENDOSCOPIC FUNCTIONAL SINUS SURGERY (FESS) Bilateral 9/5/2017   • ENDOSCOPY N/A 1/19/2018   • ENDOSCOPY N/A 6/10/2019   • KNEE ARTHROPLASTY Right    • LAPAROSCOPIC TUBAL LIGATION     • RHINOPLASTY     • SINUS SURGERY  1988, 2002, 2006, 2018   • TOE FUSION Left 8/12/2021    Procedure: LEFT FIRST METATARSOPHALANGEAL JOINT ARTHRODESIS;  Surgeon: Brando Brice DPM;  Location: [facility];  Service: Podiatry;  Laterality: Left;   • TONSILLECTOMY  1959   • TOTAL SHOULDER ARTHROPLASTY Right    • TYMPANOPLASTY Left 6/5/2018   • TYMPANOPLASTY Right 2/7/2020    Procedure: TYMPANOPLASTY AND  CARTILAGE GRAFT;  Surgeon: Ramy Gaston MD;  Location: [facility];  Service: ENT;  Laterality: Right;   • UPPER GASTROINTESTINAL ENDOSCOPY         Social History     Socioeconomic History   • Marital status:    Tobacco Use   • Smoking status: Never     Passive exposure: Past   • Smokeless tobacco: Never   • Tobacco comments:     Never had the desire to try smoking   Vaping Use   • Vaping Use: Never used   Substance and Sexual Activity   • Alcohol use: Yes     Alcohol/week: 3.0 standard drinks      Types: 1 Glasses of wine, 2 Drinks containing 0.5 oz of alcohol per week     Comment: per week maybe if that much   • Drug use: Yes     Frequency: 5.0 times per week     Types: Marijuana   • Sexual activity: Defer       Family History   Problem Relation Age of Onset   • Hypertension Mother    • Osteoarthritis Mother    • Alcohol abuse Father    • Heart disease Father    • Cancer Father    • Osteoarthritis Father    • Stroke Maternal Grandmother    • Irritable bowel syndrome Sister    • Osteoporosis Maternal Grandfather          This document has been electronically signed by Marlene Bauer MD on January 16, 2023 13:58 CST

## 2023-01-19 ENCOUNTER — E-VISIT (OUTPATIENT)
Dept: FAMILY MEDICINE CLINIC | Facility: TELEHEALTH | Age: 68
End: 2023-01-19
Payer: MEDICARE

## 2023-01-19 PROCEDURE — BRIGHTMDVISIT: Performed by: NURSE PRACTITIONER

## 2023-01-19 NOTE — EXTERNAL PATIENT INSTRUCTIONS
Diagnosis   Bacterial sinusitis   My name is Lisandra Gaines, and I'm a healthcare provider at [facility]. I reviewed your interview, and I see that you have bacterial sinusitis.   To prevent the spread of illness to others, I recommend that you stay home and away from other people as much as possible while you're sick. When you need to be around other people, consider wearing a face mask.   Medications   Your pharmacy   [id number] [address] [phone]     Prescription   Doxycycline monohydrate (100mg): Take 1 tablet by mouth twice a day for 7 days for infection. This medication is an antibiotic. Take it exactly as directed. You must finish the entire course of medication, even if you feel better after the first few days of treatment.    Start taking the antibiotics I've prescribed right away. You need to finish the entire course of antibiotics, even if you start to feel better before the pills run out.    Some people develop a yeast infection after taking antibiotics. If you get a yeast infection, you can treat it with antifungal creams or suppositories. These are available without a prescription at [facility] and many supermarkets.   About your diagnosis   The sinuses are hollow spaces connected to the nasal passages. Sinusitis occurs when the sinuses swell and block the drainage of fluid and mucus from the nose, causing pain, pressure, and congestion. Fatigue, difficulty sleeping, or decreased appetite may accompany your symptoms.   More than 90% of sinus infections are caused by viruses. However, in certain cases, a sinus infection may be caused by bacteria. Bacterial sinus infections usually look like one of the following cases:    Severe sinus symptoms with a fever over 102F.    Sinus symptoms that have not improved at all after 10 days.    Cold symptoms that slowly improve but then worsen again after 5 or 6 days, usually with a high fever,  headache, or nasal discharge.   What to expect   If you follow this treatment plan, you should start to feel better within a few days.   When to seek care   Call us at [phone]   with any sudden or unexpected symptoms.    Symptoms that last longer than 10 to 14 days.    Symptoms that get better for a few days, and then suddenly get worse.    Fever that measures over 103F or continues for more than 3 days.    Any vision changes.    A worsening headache.    Stiff neck.    Swelling of your forehead or eyes.    Coughing up red or bloody mucus.    Swallowing becomes extremely difficult or impossible.    More than 5 episodes of diarrhea in a day.    More than 5 episodes of vomiting in a day.    Severe shortness of breath.    Severe chest pain   Other treatment    Rest! Your body needs rest to recover and fight infection.    Drink plenty of water to stay hydrated.    Use steam to soothe your sinuses: Breathe it in from a shower or a bowl of hot water. Placing a warm, moist washcloth over your nose and forehead may help relieve the sinus pain and pressure.    Try non-prescription saline nasal sprays to help your nasal symptoms. Try using a Neti Pot to flush out your stuffy nose and sinuses. Neti Pots are available at any drugstore without a prescription.    Avoid smoke and air pollution. Smoke can make infections worse.   Prevention    Avoid close contact with other people when you're sick.    Cover your mouth and nose when you cough or sneeze. Use a tissue or cough into your elbow. Make sure that used tissues go directly into the trash.    Avoid touching your eyes, nose, or mouth while you're sick.    Wash your hands often, especially after coughing, sneezing, or blowing your nose. If soap and water are not available, use an alcohol-based hand .    If you or someone in your home or workplace is sick, disinfect commonly used items. This includes door handles, tables, computers, remotes, and pens.     Coronavirus (COVID-19) information   Common symptoms of COVID-19 include fever, cough, shortness of breath, fatigue, muscle or body aches, headaches, new loss of sense of taste or smell, sore throat, stuffy or runny nose, nausea or vomiting, and diarrhea. Most people who get COVID-19 have mild symptoms and can rest at home until they get better. Elderly people and those with chronic medical problems may be at risk for more serious complications.   FAQs about the COVID-19 vaccine   There are four authorized COVID-19 vaccines: Mert & Mert's Wm Vaccine (J&J/Wm), Moderna, Novavax, and Pfizer-BioNTech (Pfizer). The J&J/Wm and Novavax vaccines are approved for use in people aged 18 and older. The Moderna and Pfizer vaccines are approved for those aged 6 months and older. All four are available at no cost. Even if you don't have health insurance, you can still get the COVID-19 vaccine for free.   Which vaccine is the best? Which vaccine should I get?   All four vaccines are highly effective. Even if you get COVID-19 after being vaccinated, all of the vaccines help prevent severe disease, hospitalization, and complications.   Most people should get whichever vaccine is first available to them. However, women younger than 50 years old should consider the rare risk of blood clots with low platelets after vaccination with the J&J/Wm vaccine. This risk hasn't been seen with the other three vaccines.   Are the vaccines safe?   Yes. Hundreds of millions of people in the [place] have already safely received COVID-19 vaccines under the most intense safety monitoring in the history of the [place].   Do I need the vaccine if I've already had COVID?   Yes. Vaccination helps protect you even if you've already had COVID.   If you had COVID-19 and had symptoms, wait to get vaccinated until you've recovered and completed your isolation period.   If you tested positive for COVID-19 but did not have symptoms, you can get  vaccinated after 5 full days have passed since you had a positive test, as long as you don't develop symptoms.   How many doses of the vaccine do I need?   Visit www.cdc.gov/coronavirus/2019-ncov/vaccines/stay-up-to-date.html   to find out how to stay up to date with your COVID-19 vaccines.   I'm immunocompromised. How many doses of the vaccine do I need?   For information on how immunocompromised people can stay up to date with their COVID-19 vaccines, visit www.cdc.gov/coronavirus/2019-ncov/vaccines/recommendations/immuno.html  .   What are the common side effects of the vaccine?   A sore arm, tiredness, headache, and muscle pain may occur within two days of getting the vaccine and last a day or two. For the Moderna or Pfizer vaccines, side effects are more common after the second dose. People over the age of 55 are less likely to have side effects than younger people.   After I'm up to date on vaccines, can I still get or spread COVID?   Yes, you can still get COVID, but your disease should be milder. And your risk of serious illness, hospitalization, and complications will be much lower, especially if you're up to date. Unfortunately, you can still spread COVID if you've been vaccinated. That's why it's important to follow isolation guidelines if you get sick or test positive.   After I'm up to date on vaccines, can I go back to normal?   You should still wear a mask indoors in public if:    It's required by laws, rules, regulations, or local guidance.    You have a weakened immune system.    Your age puts you at increased risk of severe disease.    You have a medical condition that puts you at increased risk of severe disease.    Someone in your household has a weakened immune system, is at increased risk for severe disease, or is unvaccinated.    You're in an area of high transmission.   Where can I get a COVID-19 vaccine?   Visit [facility]'s website for more information. To find a COVID-19 vaccination  site near you, visit [email or web address]  , call [phone]  , or text your zip code to 875442 ([employer]). Message and data rates may apply.   What about travel?   Travel increases your risk of exposure to COVID-19. For more information, see www.cdc.gov/coronavirus/2019-ncov/travelers/index.html  .   I've had close contact with someone who has COVID. Do I need to quarantine, and if so, for how long?   For the most current answer, including a calculator to determine whether you need to stay home and for how long, visit www.cdc.gov/coronavirus/2019-ncov/your-health/quarantine-isolation.html  .   I've tested positive for COVID. How long do I need to isolate?   For the latest recommendations, including a calculator to determine how long you need to stay home, visit www.cdc.gov/coronavirus/2019-ncov/your-health/quarantine-isolation.html  .   What if I develop symptoms that might be from COVID?   For the latest recommendations on what to do if you're sick, including when to seek emergency care, visit www.cdc.gov/coronavirus/2019-ncov/if-you-are-sick/steps-when-sick.html  .    Flu vaccine information   Who should get a flu vaccine?   Everyone 6 months of age and older should get a yearly flu vaccine.   When should I get vaccinated?   You should get a flu vaccine by the end of October. Once you're vaccinated, it takes about two weeks for antibodies to develop and protect you against the flu. That's why it's important to get vaccinated as soon as possible.   After October, is it too late to get vaccinated?   No. You should still get vaccinated. As long as the flu viruses are still in your community, flu vaccines will remain available, even into January of next year or later.   Why do I need a flu vaccine EVERY year?   Flu viruses are constantly changing, so flu vaccines are usually updated from one season to the next. Your protection from the flu vaccine also lessens over time.   Is the flu vaccine safe?   Yes. Over the last  50 years, hundreds of millions of Americans have safely received the flu vaccines.   What are the side effects of flu vaccines?   You CANNOT get the flu from a flu vaccine. Common side effects of the flu shot include soreness, redness and/or swelling where the shot was given, low grade fever, and aches. Common side effects of the nasal spray flu vaccine for adults include runny nose, headaches, sore throat, and cough. For children, side effects include wheezing, vomiting, muscle aches, and fever.   Does the flu vaccine increase your risk of getting COVID-19?   No. There is no evidence that getting a flu vaccine increases your risk of getting COVID-19.   Is it safe to get the flu vaccine along with a COVID-19 vaccine?   Yes. It's safe to get the flu vaccine with a COVID-19 vaccine or booster.   Contact your healthcare provider TODAY for details on when and where to get your flu vaccine.   Your provider   Your diagnosis was provided by Lisandra Gaines, a member of your trusted care team at [facility].   If you have any questions, call us at [phone]  .

## 2023-01-19 NOTE — E-VISIT TREATED
Chief Complaint: Cold, flu, COVID, sinus, hay fever, or seasonal allergies   Patient introduction   Patient is 68-year-old female with congestion, voice hoarseness, and headache that started 3 to 5 days ago.   COVID-19 exposure, testing history, and vaccination status:    No known exposure to a person with a confirmed or suspected case of COVID-19.    No recent travel outside of their local community.    Patient did a self-test within the last week. Patient specifies date of test as 01/14/2023. Test result was negative.    Patient was prompted to take a self-test at the time of interview, but elected not to test now.    Received 3 doses of the COVID-19 vaccine (Pfizer, Pfizer, Pfizer).    Received their most recent dose of the vaccine more than 14 days ago.   Risk factors for severe disease from COVID-19 infection:    Age 65 or older.    BMI >= 25.    Asthma.   Warning. The following may warrant further investigation:    Headache described as severe (7 to 9 on a scale of 1 to 10).   Patient-submitted comments: My cheek bones hurt and I have a bad sinus/migraine head ..   Patient did not request an excuse note.   General presentation   Symptoms came on gradually.   Fever:    No fever.   Sinus and nasal symptoms:    Yellow nasal drainage.    Nasal drainage is thick.    Postnasal drip.    1 to 3 episodes of antibiotic treatment for sinus infection in the last year.    History of surgically corrected septal deviation or nasal polyps.    Congestion with sinus pain or pressure on or around the forehead, cheeks, and upper teeth or jaw.    Patient first noticed sinus pain less than 5 days ago.    Sinus pain is worse with Valsalva.    No nasal discharge.    No itchy nose or sneezing.    No history of unhealed nasal septal ulcer/nasal wound.   Throat symptoms:    Voice is mildly hoarse. Patient does not believe hoarseness is due to voice strain.    No sore throat.   Head and body aches:    Headache described as severe (7  to 9 on a scale of 1 to 10).    No sweats.    No chills.    No myalgia.    No fatigue.   Cough:    No cough.   Wheezing and shortness of breath:    Has asthma diagnosis.    Wheezing.    Using an albuterol inhaler for asthma.    Using albuterol every 6 hours or longer.    No COPD diagnosis.    No shortness of breath.    Current cold symptoms do not aggravate their asthma.   Chest pain:    No chest pain.   Ear symptoms:    None.   Dizziness:    No dizziness.   Allergies:    Patient has known seasonal allergies and known dust allergies.    Patient does not think symptoms are allergy-related.   Flu exposure:    No recent known exposure to a person with a confirmed flu diagnosis.    Received a flu vaccine in the last 1 to 3 months.   Patient is taking over-the-counter medications for current symptoms, including budesonide, cetirizine, fluticasone, guaifenesin, and triamcinolone.   Review of red flags/alarm symptoms:    No changes in alertness or awareness.    No symptoms suggesting respiratory distress.    No symptoms suggesting intracranial hemorrhage.    No decreased urination.   Risk factors for antibiotic resistance:    Antibiotic use for similar symptoms within the last 30 days.   Pregnancy/menstrual status/breastfeeding:    Patient is postmenopausal.   Self-exam:    Height: 172 centimeters    Weight: 98.8 kilograms    No difficulty moving their chin toward their chest.    Swollen/painful neck lymph nodes.    Has taken antibiotics for similar symptoms within the past month.   Current medications   Currently taking atorvastatin 10 MG tablet, metoprolol succinate  MG 24 hr tablet, hydrOXYzine pamoate 50 MG capsule, FLUoxetine 40 MG capsule, gabapentin 600 MG tablet, nabumetone 750 MG tablet, cyclobenzaprine 5 MG tablet, albuterol sulfate  (90 Base) MCG/ACT inhaler, ipratropium 0.06 % nasal spray, ondansetron 8 MG tablet, cetirizine 10 MG tablet, olopatadine 0.6 % solution nasal solution, linaclotide 145  MCG capsule capsule, ferrous sulfate 325 (65 FE) MG tablet, dexlansoprazole 60 MG capsule, chlorthalidone 25 MG tablet, and ramelteon 8 MG tablet.   Medication allergies   No relevant drug allergies.   Medication contraindication review   No history of anaphylactic reaction to beta-lactam antibiotics; aspirin triad; blood dyscrasia; bone marrow depression; catecholamine-releasing paraganglioma; coronary artery disease; coagulation disorder; congenital long QT syndrome; depression; electrolyte abnormalities; fungal infection; GI bleeding; GI obstruction; G6PD deficiency; heart arrhythmia; hypertension; mononucleosis; myasthenia; recent myocardial infarction; NSAID-induced asthma/urticaria; Parkinson's disease; pheochromocytoma; porphyria; Reye syndrome; seizure disorder; ulcerative colitis; and urinary retention.   No history of metoclopramide-associated dystonic reaction and tardive dyskinesia.   No known history of amoxicillin-clavulanate-associated cholestatic jaundice or hepatic impairment.   No known history of azithromycin-associated cholestatic jaundice or hepatic impairment.   Past medical history   Immune conditions: No immunocompromising conditions. No history of cancer.   Social history   High-risk household contacts: Patient's household includes one or more members of a group with risk factors for influenza complications, including a person 65 years or older.   Never smoked tobacco.   Assessment   Bacterial sinusitis. Ruled out: Traumatic laryngitis.   This is the likely diagnosis based on patient's interview responses and symptoms, including:    Congestion or sinus pressure.    Thick nasal discharge.    Yellow or green mucus.    Short duration of symptoms indicating severe symptoms at onset    Sinus pressure for less than 5 days.   Plan   Medications:    doxycycline monohydrate 100 mg tablet RX 100mg 1 tab PO bid 7d for infection. This medication is an antibiotic. Take it exactly as directed. You must  finish the entire course of medication, even if you feel better after the first few days of treatment. Amount is 14 tab.   The patient's prescription will be sent to:   [id number]   [address] [address] [id number]   Phone: [phone]     Fax: [phone]   Education:    Condition and causes    Prevention    Treatment and self-care    When to call provider   ----------   Electronically signed by MIRIAM Downey on 2023-01-19 at 16:01PM   ----------   Patient Interview Transcript:   Please carefully consider each question and answer as best you can. This helps your provider give you the best care. Which of these symptoms are bothering you? Select all that apply.    Stuffed-up nose or sinuses    Hoarse voice or loss of voice    Headache   Not selected:    Cough    Shortness of breath    Fever    Runny nose    Itchy or watery eyes    Itchy nose or sneezing    Loss of smell or taste    Sore throat    Sweats    Chills    Muscle or body aches    Fatigue or tiredness    Nausea or vomiting    Diarrhea    I don't have any of these symptoms   Since your current symptoms started, have you had a viral test for COVID-19? - This includes home self-tests as well as nose swab or saliva tests done at a doctor's office, lab, or testing site. - It does NOT include antibody tests, which use a blood sample to test for past infection. Select one.    Yes   Not selected:    No   When was your most recent COVID-19 test? Select one.    Within the last week (specify date as MM/DD/YY): 01/14/2023   Not selected:    Within the last 24 hours    7 to 14 days ago    15 to 30 days ago    More than 1 month ago   What type of COVID-19 test did you most recently have? Select one.    Viral self-test or home test   Not selected:    Viral test at a doctor's office, lab, or testing site   What was the result of your most recent COVID-19 test? Select one.    Negative   Not selected:    Positive   Even though your  "last test was negative, you could still have COVID. You may have tested before the virus was detectable. In some cases, repeat testing over several days is needed. If you have a COVID test kit at home, take the test now before continuing with this interview. Do you have a COVID test kit at home? Select one.    Yes, but I prefer not to take a test now   Not selected:    Yes, and I'll take another test now    No, I don't have a test kit   Have you gotten the COVID-19 vaccine? Select one.    Yes   Not selected:    No   How many total doses of the COVID-19 vaccine have you gotten? This includes boosters as well as additional doses for those who are immunocompromised. Select one.    3 doses   Not selected:    1 dose    2 doses    4 doses    5 doses   1st dose    Pfizer   Not selected:    J&J/Wm    Moderna    Novavax   2nd dose    Pfizer   Not selected:    J&J/Wm    Moderna    Novavax   3rd dose    Pfizer   Not selected:    J&J/Wm    Moderna    Novavax   When did you get your most recent dose of the COVID-19 vaccine?    More than 14 days ago   Not selected:    Less than 48 hours (2 days) ago    48 to 72 hours (3 days) ago    3 to 5 days ago    5 to 7 days ago    7 to 14 days ago   In the last 14 days, have you traveled outside of your local community? This includes travel by car, RV, bus, train, or plane. Travel increases your chances of getting and spreading COVID-19. Select one.    No   Not selected:    Yes   In the last 14 days, have you had close contact with someone who has coronavirus (COVID-19)? \"Close contact\" means any of these: - Living in the same household as someone with COVID-19. - Caring for someone with COVID-19. - Being within 6 feet of someone with COVID-19 for a total of at least 15 minutes over a 24-hour period. For example, three 5-minute exposures for a total of 15 minutes. - Being in direct contact with respiratory droplets from someone with COVID-19 (being coughed on, kissing, " "sharing utensils). Select one.    No, not that I know of   Not selected:    Yes, a confirmed case    Yes, a suspected case   When did your current symptoms start? Select one.    3 to 5 days ago   Not selected:    Less than 48 hours ago    6 to 9 days ago    10 to 14 days ago    2 to 3 weeks ago    3 to 4 weeks ago    More than a month ago   Do you know the exact date your symptoms started? If so, enter the date as MM/DD/YY. Select one.    No   Not selected:    Yes (specify)   Did your symptoms come on suddenly or gradually? Select one.    Gradually   Not selected:    Suddenly    I'm not sure   You mentioned having a headache. On a scale of 1 to 10, how severe is your headache pain? Select one.    Severe (7 to 9)   Not selected:    Mild (1 to 3)    Moderate (4 to 6)    Unbearable (10)    The worst headache of my life (10+)   You mentioned having a stuffy nose or sinus congestion. Do you feel pain or pressure in your sinuses?    Yes   Not selected:    No   Where do you feel sinus pain or pressure?    In my forehead    In my cheeks    In my upper teeth or jaw   Not selected:    Around my eyes    Behind my nose    I'm not sure   When did you first notice your sinus pain or pressure? Select one.    Less than 5 days ago   Not selected:    5 to 9 days ago    10 to 14 days ago    2 to 4 weeks ago    1 month ago or longer   Does coughing, sneezing, or leaning forward make your sinuses feel worse? Select one.    Yes   Not selected:    No   What color is your nasal drainage? Select one.    Yellow   Not selected:    Clear    White    Green    My nose is stuffed but not draining or running    I'm not sure   Is your nasal drainage thick or thin? Select one.    Thick   Not selected:    Thin   Is there any drainage (mucus) going down the back of your throat? This kind of drainage is also called \"postnasal drip.\" Select one.    Yes   Not selected:    No, not that I know of   Since your symptoms started, have you felt dizzy? Select " one.    No   Not selected:    Yes, but I can continue with my regular daily activities    Yes, and it makes it hard to stand, walk, or do daily activities   Do you have chest pain? You might also feel it as discomfort, aching, tightness, or squeezing in the chest. Select one.    No   Not selected:    Yes   Have you urinated at least 3 times in the last 24 hours? Select one.    Yes   Not selected:    No   Changes in alertness or awareness may mean you need emergency care. Since your symptoms started, have you had any of these? Select all that apply.    None of the above   Not selected:    Confusion    Slurred speech    Not knowing where you are or what day it is    Difficulty staying conscious    Fainting or passing out   Do your symptoms include a whistling sound, or wheezing, when you breathe? Select one.    Yes   Not selected:    No    I'm not sure   Early in this interview, you told us you were hoarse or you'd lost your voice. How would you describe the changes to your voice? Select one.    It just sounds a little raspy   Not selected:    It's harder than usual to talk    I can barely talk at all   Is it possible that you strained your voice? Singing, yelling, or talking more or louder than usual can cause voice strain. Select one.    No, not that I know of   Not selected:    Yes   Do you have any of these symptoms in your ear(s)? Select all that apply.    None of the above   Not selected:    Pain    Pressure    Fullness    Crackling or popping    Plugged or blocked sensation   Can you move your chin toward your chest?    Yes   Not selected:    No, my neck is too stiff   Are your glands/lymph nodes swollen, or does it hurt when you touch them?    Yes   Not selected:    No, not that I can tell   In the past week, has anyone around you (such as at school, work, or home) had a confirmed diagnosis of the flu? A confirmed diagnosis means that a nose swab was done to verify a flu infection. Select all that apply.     No, not that I know of   Not selected:    I live with someone who has the flu    I've been within touching distance of someone who has the flu    I've walked by, or sat about 3 feet away from, someone who has the flu    I've been in the same building as someone who has the flu   Have you ever been diagnosed with asthma? Select one.    Yes   Not selected:    No   Are your cold symptoms making your asthma worse? Select one.    No   Not selected:    Yes   What medications or inhalers are you currently using for your asthma? Select all that apply.    Albuterol inhaler, as needed (such as ProAir, Proventil, Ventolin)   Not selected:    Inhaled steroid (such as Qvar, Pulmicort, Flovent)    Inhaled steroid with long-acting bronchodilator (such as Advair, Dulera, Symbicort)    I'm not sure    I'm not taking any medications for my asthma    I usually take medications for my asthma, but I ran out   How often are you using albuterol? If you're using albuterol very frequently, you'll need to be seen in person. Select one.    Every 6 hours or longer   Not selected:    More than once an hour    Every 1 to 2 hours    Every 2 to 3 hours    Every 3 to 4 hours    Every 4 to 6 hours   Do you need a refill of albuterol? Select one.    No   Not selected:    Yes   Have you ever been diagnosed with chronic obstructive pulmonary disease (COPD)? Select one.    No, not that I know of   Not selected:    Yes   In the past month, have you taken antibiotics for similar symptoms? Examples of antibiotics include amoxicillin, amoxicillin-clavulanate (Augmentin), penicillin, cefdinir (Omnicef), doxycycline, and clindamycin (Cleocin). Select one.    Yes   Not selected:    No   In the last year, how many times were you treated with antibiotics for a sinus infection? Select one.    1 to 3 times   Not selected:    None    4 or more times   Have you been diagnosed with a deviated septum or nasal polyps? The nose is divided into two nostrils by the  septum. A crooked septum is called a deviated septum. Nasal polyps are growths inside the nose or sinuses. Select one.    Yes, but I had surgery to treat them   Not selected:    Yes, I have a deviated septum    Yes, I have nasal polyps    Yes, I have a deviated septum and nasal polyps    No, not that I know of   Do you have a sore inside your nose that won't heal? Select one.    No, not that I know of   Not selected:    Yes   Do you have allergies (pollen, dust mites, mold, animal dander)? Select one.    Yes   Not selected:    No, not that I know of   What kind of allergies do you have? Select all that apply.    Seasonal allergies (hay fever)    Dust allergies   Not selected:    Pet allergies    None of the above    I'm not sure   Do you think your symptoms could be allergy-related? Select one.    No   Not selected:    Yes    I'm not sure   Have you had a flu shot this season? Select one.    Yes, 1 to 3 months ago   Not selected:    Yes, less than 2 weeks ago    Yes, 2 to 4 weeks ago    Yes, 3 to 6 months ago    Yes, more than 6 months ago    No   Have you gone through menopause? Select one.    Yes   Not selected:    No    I'm going through it now   The flu and COVID-19 can be more serious for people with certain conditions or characteristics. These questions help us figure out if you or anyone you live with is at higher risk for complications from these infections. Do either of these statements apply to you? Select all that apply.    None of the above   Not selected:    I'm  or Native [place]    I'm a healthcare worker   Do you smoke tobacco? Select one.    No   Not selected:    Yes, every day    Yes, some days    No, I quit   Do you have any of these conditions? Select all that apply.    None of the above   Not selected:    Chronic lung disease, such as cystic fibrosis or interstitial fibrosis    Heart disease, such as congenital heart disease, congestive heart failure, or coronary artery disease     Disorder of the brain, spinal cord, or nerves and muscles, such as dementia, cerebral palsy, epilepsy, muscular dystrophy, or developmental delay    Metabolic disorder or mitochondrial disease    Cerebrovascular disease, such as stroke or another condition affecting the blood vessels or blood supply to the brain    Down syndrome    Mood disorder, including depression or schizophrenia spectrum disorders    Substance use disorder, such as alcohol, opioid, or cocaine use disorder    Tuberculosis   Do you live in a group care setting? Examples include: - Nursing home - Residential care - Psychiatric treatment facility - Group home - [facility] - [facility] and care home - Homeless shelter - Foster care setting Select one.    No   Not selected:    Yes   Are you a healthcare worker? Select one.    No   Not selected:    Yes   People with a very high body mass index (BMI) are at higher risk for developing complications from the flu and severe illness from COVID-19. To determine your BMI, we need to know your weight and height. Please enter your weight (in pounds).    Weight   Please enter your height.    Height   Do you have any of these conditions that can affect the immune system? Scroll to see all options. Select all that apply.    None of these   Not selected:    History of bone marrow transplant    Chronic kidney disease    Chronic liver disease (including cirrhosis)    HIV/AIDS    Inflammatory bowel disease (Crohn's disease or ulcerative colitis)    Lupus    Moderate to severe plaque psoriasis    Multiple sclerosis    Rheumatoid arthritis    Sickle cell anemia    Alpha or beta thalassemia    History of solid organ transplant (kidney, liver, or heart)    History of spleen removal    An autoimmune disorder not listed here    A condition requiring treatment with long-term use of oral steroids (such as prednisone, prednisolone, or dexamethasone)   Have you ever been diagnosed with cancer? Select one.    No   Not selected:     Yes, I have cancer now    Yes, but I'm in remission   Do any of these apply to you? Select all that apply.    None of the above   Not selected:    I've been hospitalized within the last 5 days    I have diabetes    I'm in close contact with a child in    Do any of these apply to the people who live with you? Select all that apply.    An adult 65 or older   Not selected:    A child under the age of 5    A person who is pregnant    A person who has given birth, had a miscarriage, had a pregnancy loss, or had an  in the last 2 weeks    An  or Native [place]    None of the above   Does any member of your household have any of these medical conditions? Select all that apply.    None of the above   Not selected:    Asthma    Disorders of the brain, spinal cord, or nerves and muscles, such as dementia, cerebral palsy, epilepsy, muscular dystrophy, or developmental delay    Chronic lung disease, such as COPD or cystic fibrosis    Heart disease, such as congenital heart disease, congestive heart failure, or coronary artery disease    Cerebrovascular disease, such as stroke or another condition affecting the blood vessels or blood supply to the brain    Blood disorders, such as sickle cell disease    Diabetes    Metabolic disorders such as inherited metabolic disorders or mitochondrial disease    Kidney disorders    Liver disorders    Weakened immune system due to illness or medications such as chemotherapy or steroids    Children under the age of 19 who are on long-term aspirin therapy    Extreme obesity (BMI > 40)   Do you have any of these conditions? Scroll to see all options. Select all that apply.    None of the above   Not selected:    Aspirin triad (also known as Samter's triad or ASA triad)    Asthma or hives from taking aspirin or other NSAIDs, such as ibuprofen or naproxen    Blockage or narrowing of the blood vessels of the heart    Blood dyscrasia, such anemia, leukemia, lymphoma,  or myeloma    Bone marrow depression    Catecholamine-releasing paraganglioma    Blood clotting disorder    Congenital long QT syndrome    Depression    Difficulty urinating or completely emptying your bladder    Uncorrected electrolyte abnormalities    Fungal infection    Gastrointestinal (GI) bleeding    Gastrointestinal (GI) obstruction    G6PD deficiency    Recent heart attack    High blood pressure    Irregular heartbeat or heart rhythm    Mononucleosis (mono)    Myasthenia gravis    Parkinson's disease    Pheochromocytoma    Reye syndrome    Seizure disorder    Ulcerative colitis   Have you ever had either of these conditions? Select all that apply.    No   Not selected:    Metoclopramide-associated dystonic reaction    Tardive dyskinesia   Just a few more questions about medications, and then you're finished. Have you used any non-prescription medications or nasal sprays for your current symptoms? Examples include saline sprays, decongestants, NyQuil, and Tylenol. Select one.    Yes   Not selected:    No   Which of these non-prescription medications have you tried? Scroll to see all options. Select all that apply.    Budesonide (Rhinocort)    Cetirizine (Zyrtec)    Fluticasone (Flonase)    Guaifenesin (Mucinex)    Triamcinolone (Nasacort)   Not selected:    Acetaminophen (Tylenol)    Chlorpheniramine (Aller-chlor, Chlor-Trimeton)    Cromolyn (NasalCrom)    Dextromethorphan (Delsym, Robitussin, Vicks DayQuil Cough)    Diphenhydramine (Benadryl)    Fexofenadine (Allegra)    Guaifenesin/dextromethorphan (Delsym DM, Mucinex DM, Robitussin DM)    Ibuprofen (Advil, Motrin, Midol)    Ketotifen (Alaway, Zaditor)    Loratadine (Alavert, Claritin)    Naphazoline-pheniramine (Naphcon-A, Opcon-A, Visine-A)    Omeprazole (Prilosec)    Oxymetazoline (Afrin)    Phenylephrine (Sudafed)    None of the above   Have you taken any monoamine oxidase inhibitor (MAOI) medications in the last 14 days? Examples include rasagiline  "(Azilect), selegiline (Eldepryl, Zelapar), isocarboxazid (Marplan), phenelzine (Nardil), and tranylcypromine (Parnate). Select one.    No, not that I know of   Not selected:    Yes   Do you take Kynmobi or Apokyn (apomorphine)? Select one.    No   Not selected:    Yes   Are you still taking these medications listed in your medical record? If you're not taking any of these, click Next. Select all that apply.    atorvastatin 10 MG tablet    metoprolol succinate  MG 24 hr tablet    hydrOXYzine pamoate 50 MG capsule    FLUoxetine 40 MG capsule    gabapentin 600 MG tablet    nabumetone 750 MG tablet    cyclobenzaprine 5 MG tablet    albuterol sulfate  (90 Base) MCG/ACT inhaler    ipratropium 0.06 % nasal spray    ondansetron 8 MG tablet    cetirizine 10 MG tablet    olopatadine 0.6 % solution nasal solution    linaclotide 145 MCG capsule capsule    ferrous sulfate 325 (65 FE) MG tablet    dexlansoprazole 60 MG capsule    chlorthalidone 25 MG tablet    ramelteon 8 MG tablet   Are you taking any other medications, vitamins, or supplements? Select one.    No   Not selected:    Yes   Have you ever had an allergic or bad reaction to any medication? Select one.    Yes   Not selected:    No   Have you had an allergic or bad reaction to any of these medications? Select all that apply.    No, not that I know of   Not selected:    Baloxavir (Xofluza)    Benzonatate (Tessalon Perles)    Fluconazole, itraconazole, or terconazole (brands include Diflucan, Sporanox, Terazol)    Oseltamivir (Tamiflu) or zanamivir (Relenza)    Paxlovid, nirmatrelvir, or ritonavir (Norvir)   Have you had an allergic or bad reaction to any of these antibiotic medications? Select all that apply.    No, not that I know of   Not selected:    Penicillin or any \"-cillin\" antibiotic, such as amoxicillin, ampicillin, dicloxacillin, nafcillin, or piperacillin (Brands include Augmentin, Unasyn, and Zosyn)    Tetracycline or any \"-cycline\" antibiotic, " "such as doxycycline, demeclocycline, minocycline (Brands include Declomycin, Doryx, Dynacin, Oracea, Monodox, Panmycin, and Vibramycin)    Ciprofloxacin or any \"-floxacin\" antibiotic, such as gemifloxacin, levofloxacin, moxifloxacin, or ofloxacin (Brands include Factive, Cipro, Floxin, and Levaquin)    Cephalexin or any \"cef-\" antibiotic, such as cefazolin, cefdinir, cefuroxime, ceftriaxone, ceftazidime, or cefepime (Brands include Ancef, Ceftin, Fortaz, Keflex, Maxipime, Rocephin, and Simplicef)    Azithromycin or any \"-thromycin\" antibiotic, such as erythromycin or clarithromycin (Brands include Biaxin, Erythrocin, Z-yobani, and Zithromax)    Clindamycin or lincomycin (Brands include Cleocin and Lincocin)   Have you had an allergic or bad reaction to any of these medications? Select all that apply.    No, not that I know of   Not selected:    Albuterol or a similar medication    Atropine    Corticosteroid (steroid) medication, including topical steroids, inhaled steroids, nasal steroids, or oral steroids (budesonide, ciclesonide, dexamethasone, flunisolide, fluticasone, methylprednisolone, triamcinolone, prednisone (or brand names Alvesco, Deltasone, Flovent, Medrol, Nasacort, Rhinocort, or Veramyst)    Metoclopramide (Reglan)    Ondansetron (Zuplenz, Zofran ODT, Zofran)    Prochlorperazine (Compazine)   Have you had an allergic or bad reaction to any of these eye drops, nasal sprays, or inhalers? Scroll to see all options. Select all that apply.    No, not that I know of   Not selected:    Azelastine (Astelin, Astepro, Optivar)    Cromolyn (Crolom, NasalCrom)    Ipratropium (Atrovent)    Ketotifen (Alaway, Zaditor)    Pheniramine/naphazoline (Naphcon-A, Opcon-A, Visine-A)    Olopatadine (Pataday, Patanol, Pazeo)   Have you had an allergic or bad reaction to any of these non-prescription medications? Scroll to see all options. Select all that apply.    No, not that I know of   Not selected:    Acetaminophen " (Tylenol)    Aspirin    Cetirizine (Zyrtec)    Dextromethorphan (Delsym, Robitussin, Vicks DayQuil Cough)    Diphenhydramine (Benadryl)    Fexofenadine (Allegra)    Guaifenesin (Mucinex)    Dextromethorphan (Delsym)    Ibuprofen (Advil, Motrin, Midol)    Loratadine (Alavert, Claritin)    Oxymetazoline (Afrin)    Phenylephrine (Sudafed)   Are you allergic to milk or to the proteins found in milk (for example, whey or casein)? A milk allergy is different from lactose intolerance. Select one.    No, not that I know of   Not selected:    Yes   Have you ever had jaundice or liver problems as a result of taking amoxicillin-clavulanate (Augmentin)? Jaundice is a condition in which the skin and the whites of the eyes turn yellow. Select all that apply.    No, not that I know of   Not selected:    Yes, jaundice    Yes, liver problems   Have you ever had jaundice or liver problems as a result of taking azithromycin (Zithromax, Zmax)? Jaundice is a condition in which the skin and the whites of the eyes turn yellow. Select all that apply.    No, not that I know of   Not selected:    Yes, jaundice    Yes, liver problems   Do you need a doctor's note? A doctor's note confirms that you received care today and states when you can return to school or work. It does not contain information about your diagnosis or treatment plan. Your provider will make the final decision on whether to give you a doctor's note and for how long. Doctor's notes CANNOT be backdated. We can't provide medical leave paperwork through this type of visit. If more paperwork is needed to request time off, contact your primary care provider. Select one.    No   Not selected:    Today only (1 day)    Today and tomorrow (2 days)    3 days    5 days    7 days    10 days    14 days   Is there anything else you'd like to tell us about your symptoms?    My cheek bones hurt and I have a bad sinus/migraine head .   ----------   Medical history   Medical history data does  not currently exist for this patient.

## 2023-01-20 DIAGNOSIS — J30.9 ALLERGIC RHINITIS, UNSPECIFIED SEASONALITY, UNSPECIFIED TRIGGER: ICD-10-CM

## 2023-01-23 RX ORDER — CETIRIZINE HYDROCHLORIDE 10 MG/1
TABLET ORAL
Qty: 90 TABLET | Refills: 3 | Status: SHIPPED | OUTPATIENT
Start: 2023-01-23

## 2023-01-27 DIAGNOSIS — F51.04 CHRONIC INSOMNIA: ICD-10-CM

## 2023-01-27 DIAGNOSIS — S92.312D CLOSED DISPLACED FRACTURE OF FIRST METATARSAL BONE OF LEFT FOOT WITH ROUTINE HEALING, SUBSEQUENT ENCOUNTER: ICD-10-CM

## 2023-01-27 RX ORDER — NABUMETONE 750 MG/1
TABLET, FILM COATED ORAL
Qty: 60 TABLET | Refills: 1 | Status: SHIPPED | OUTPATIENT
Start: 2023-01-27 | End: 2023-03-20

## 2023-01-27 RX ORDER — HYDROXYZINE PAMOATE 50 MG/1
CAPSULE ORAL
Qty: 90 CAPSULE | Refills: 5 | Status: SHIPPED | OUTPATIENT
Start: 2023-01-27

## 2023-02-03 RX ORDER — FERROUS SULFATE 325(65) MG
TABLET ORAL
Qty: 30 TABLET | Refills: 5 | Status: SHIPPED | OUTPATIENT
Start: 2023-02-03

## 2023-02-10 DIAGNOSIS — M76.32 ILIOTIBIAL BAND SYNDROME, LEFT: ICD-10-CM

## 2023-02-10 DIAGNOSIS — M76.31 ILIOTIBIAL BAND SYNDROME, RIGHT: ICD-10-CM

## 2023-02-10 DIAGNOSIS — M54.50 ACUTE BILATERAL LOW BACK PAIN WITHOUT SCIATICA: ICD-10-CM

## 2023-02-10 RX ORDER — CYCLOBENZAPRINE HCL 5 MG
TABLET ORAL
Qty: 30 TABLET | Refills: 1 | Status: SHIPPED | OUTPATIENT
Start: 2023-02-10

## 2023-02-26 RX ORDER — RAMELTEON 8 MG/1
TABLET ORAL
Qty: 30 TABLET | Refills: 2 | Status: SHIPPED | OUTPATIENT
Start: 2023-02-26

## 2023-02-26 RX ORDER — RAMELTEON 8 MG/1
TABLET ORAL
Qty: 30 TABLET | Refills: 2 | OUTPATIENT
Start: 2023-02-26

## 2023-03-13 RX ORDER — ONDANSETRON HYDROCHLORIDE 8 MG/1
8 TABLET, FILM COATED ORAL EVERY 8 HOURS PRN
Qty: 30 TABLET | Refills: 0 | Status: SHIPPED | OUTPATIENT
Start: 2023-03-13

## 2023-03-20 DIAGNOSIS — S92.312D CLOSED DISPLACED FRACTURE OF FIRST METATARSAL BONE OF LEFT FOOT WITH ROUTINE HEALING, SUBSEQUENT ENCOUNTER: ICD-10-CM

## 2023-03-20 RX ORDER — NABUMETONE 750 MG/1
TABLET, FILM COATED ORAL
Qty: 60 TABLET | Refills: 1 | Status: SHIPPED | OUTPATIENT
Start: 2023-03-20

## 2023-03-20 RX ORDER — FLUOXETINE HYDROCHLORIDE 40 MG/1
40 CAPSULE ORAL DAILY
Qty: 90 CAPSULE | Refills: 1 | Status: SHIPPED | OUTPATIENT
Start: 2023-03-20

## 2023-03-22 RX ORDER — DEXLANSOPRAZOLE 60 MG/1
1 CAPSULE, DELAYED RELEASE ORAL DAILY
Qty: 90 CAPSULE | Refills: 1 | Status: SHIPPED | OUTPATIENT
Start: 2023-03-22

## 2023-03-31 DIAGNOSIS — G25.81 RESTLESS LEGS SYNDROME (RLS): ICD-10-CM

## 2023-03-31 RX ORDER — GABAPENTIN 600 MG/1
1200 TABLET ORAL NIGHTLY
Qty: 60 TABLET | Refills: 2 | Status: SHIPPED | OUTPATIENT
Start: 2023-03-31

## 2023-03-31 NOTE — PROGRESS NOTES
Neurontin 1200mg qHS For RLS.     MARQUISE reviewed, since Jan 23:  MARQUISE Patient Controlled Substance Report (from 3/31/2022 to 3/30/2023)    Dispensed  Strength Quantity Days Supply Provider Pharmacy   03/04/2023 Gabapentin 600MG 60 each 30 INDU MENDOZA II   02/02/2023 Gabapentin 600MG 60 each 30 INDU MENDOZA II   01/03/2023 Gabapentin 600MG 60 each 30 INDU MENDOZA II                   Refill 600mg, #60, w/ two refills sent in to [facility].  F/u as current.     gabapentin (NEURONTIN) 600 MG tablet [[id number]] (Order [id number])  Order  Date: 3/31/2023 Department: [facility] Ordering/Authorizing: Indu Mendoza II, MD     Medication  gabapentin (NEURONTIN) 600 MG tablet [[id number]]  Order History  Outpatient  Date/Time Action Taken User Additional Information   03/31/23 1322 Sign Indu Mendoza II, MD Reorder from Order:[id number]   03/31/23 1322 Taking Flag Checked Indu Mendoza II, MD [id number]     Outpatient Morphine Milligram Equivalents Per Day  Expand All  Collapse All  No orders with morphine equivalence     gabapentin (NEURONTIN) 600 MG tablet [[id number]]     Order Details  Dose: 1,200 mg Route: Oral Frequency: Nightly   Dispense Quantity: 60 tablet Refills: 2          Sig: Take 2 tablets by mouth Every Night. For Restless Leg Syndrome.  Take 30 min to three hours before bedtime.         Start Date: 03/31/23 End Date: --   Written Date: 03/31/23 Expiration Date: 09/27/23       Diagnosis Association: Restless legs syndrome (RLS) (G25.81)   Original Order:  gabapentin (NEURONTIN) 600 MG tablet [[id number]]   Providers    Ordering Provider and Authorizing Provider:    Indu Mendoza II, MD   [address] [place] [address] [facility] [address]   Phone:  [phone]   Fax:  [phone]   NPI:  [id number]        Ordering User:  Indu Mendoza II, MD          Pharmacy    [id number]  [address] AT [facility] [address] - [phone]  - [phone]   [address], [facility] [address]   Phone:  [phone]  Fax:  [phone]         Jimmy Kelly II, MD  03/31/23 @ 1:23 PM CDT

## 2023-04-12 DIAGNOSIS — Z12.11 SCREEN FOR COLON CANCER: Primary | ICD-10-CM

## 2023-04-20 DIAGNOSIS — M76.31 ILIOTIBIAL BAND SYNDROME, RIGHT: ICD-10-CM

## 2023-04-20 DIAGNOSIS — M54.50 ACUTE BILATERAL LOW BACK PAIN WITHOUT SCIATICA: ICD-10-CM

## 2023-04-20 DIAGNOSIS — M76.32 ILIOTIBIAL BAND SYNDROME, LEFT: ICD-10-CM

## 2023-04-20 RX ORDER — CYCLOBENZAPRINE HCL 5 MG
TABLET ORAL
Qty: 30 TABLET | Refills: 1 | Status: SHIPPED | OUTPATIENT
Start: 2023-04-20

## 2023-04-24 ENCOUNTER — OFFICE VISIT (OUTPATIENT)
Dept: OTOLARYNGOLOGY | Facility: CLINIC | Age: 68
End: 2023-04-24
Payer: MEDICARE

## 2023-04-24 VITALS — HEART RATE: 84 BPM | OXYGEN SATURATION: 98 % | WEIGHT: 250 LBS | HEIGHT: 65 IN | BODY MASS INDEX: 41.65 KG/M2

## 2023-04-24 DIAGNOSIS — J32.8 OTHER CHRONIC SINUSITIS: Primary | ICD-10-CM

## 2023-04-24 DIAGNOSIS — J30.89 SEASONAL ALLERGIC RHINITIS DUE TO OTHER ALLERGIC TRIGGER: ICD-10-CM

## 2023-04-24 NOTE — PROGRESS NOTES
Follow up Regarding: Chronic sinusitis    Assessment and Plan:  68-year-old female with chronic sinusitis and allergic rhinitis, sinusitis controlled today, allergies uncontrolled    -We discussed changing her Flonase to budesonide added to her irrigations.  -Prescription for budesonide sent today, we discussed continuing to add the mupirocin in addition to the budesonide into her rinses both twice daily  -I will have her follow-up in 3 months to recheck, we did discuss that if she has persistently uncontrolled allergy symptoms it may be worthwhile pursuing retesting for immunotherapy    History of present illness/Interval history:    Ms. Diaz is a 68-year-old female with allergic rhinitis and chronic sinusitis presenting today for reevaluation.  She states that she has been doing mupirocin irrigations twice daily and Flonase twice daily with an additional oral antihistamine but states she has had significant itchy watery eyes, congestion, and rhinorrhea associated with seasonal allergies.  She does have a personal history of previous subcutaneous immunotherapy which had good benefit for about a decade but notes resurgence of symptoms in recent years.  He has had intermittent foul smell or taste but states this is much less frequent than previously.  No other acute or new concerns today.    Physical Exam:  General: NAD, awake and alert  Head: normocephalic, atraumatic  Eyes: EOMI, sclerae white, conjunctivae pink  Ears: pinnae intact without masses or lesions, bilateral hearing aids in place  Nose: external straight without deformity  See endoscopy note for details  OC/OP: mucosa moist and pink,  Neuro: no focal deficits    Procedure: Rigid Nasal Endoscopy  Indications: Chronic sinusitis  Anesthesia: Local  Surgeon: Marlene Bauer MD  Estimated Blood Loss: none  Complications: none     Description:  Rigid nasal endoscopy - 30 degree endoscope(s)  Informed consent was verbally obtained.  The nose was decongested  with topical Neosynephrine and anesthetized with 4% lidocaine solution.  The endoscope was then placed into bilateral naris and advanced to the nasopharynx.  The endoscope was removed and advanced into the middle meatus. The endoscope was finally withdrawn at the conclusion of the exam.  Findings are listed below:     Nasal cavity:  No masses or lesions  Middle meatus: No polyps.  Uncinate persistent right greater than left. Ethmoids partially absent bilaterally, resolution of previous purulence and crusting  Mucosa:   All operated sinuses appear patent, mild edema within the left middle meatus without polyps.  Turbinates: Middle normal.  Inferior normal and decongests nicely   Septum: Midline there is a small anterior inferior perforation without crusting  Nasopharynx: Normal adenoid pad and normal Eustachian tube orifices    Vital Signs   Vitals:    04/24/23 1117   Pulse: 84   SpO2: 98%     Results Review:  Previous clinic visit from 1/16/2023 reviewed today and demonstrates at that time chronic sinusitis status post multiple prior surgical intervention, mild persistent right maxillary purulence on exam at that time with resolution of the left otitis externa.  Recommendations were to continue twice daily mupirocin irrigations, continue oral antihistamine and to follow-up in 4 months to recheck.    Histories:  Allergies   Allergen Reactions   • Codeine Other (See Comments)     Increases heart rate   • Sulfa Antibiotics Hives       Prior to Admission medications    Medication Sig Start Date End Date Taking? Authorizing Provider   dexlansoprazole (Dexilant) 60 MG capsule Take 1 capsule by mouth Daily. 3/22/23   Zelda Rich APRN   albuterol sulfate  (90 Base) MCG/ACT inhaler Inhale 2 puffs Every 4 (Four) Hours As Needed for Wheezing.    Provider, MD Lucía   atorvastatin (Lipitor) 10 MG tablet Take 1 tablet by mouth Daily. 10/18/22   Ronald Bowser MD   cetirizine (zyrTEC) 10 MG tablet TAKE 1 TABLET BY  MOUTH DAILY 1/23/23   Ronald Bowser MD   chlorthalidone (HYGROTON) 25 MG tablet TAKE 1 TABLET BY MOUTH DAILY 10/19/22   Ronald Bowser MD   cyclobenzaprine (FLEXERIL) 5 MG tablet TAKE 1 TABLET BY MOUTH THREE TIMES DAILY AS NEEDED FOR MUSCLE SPASMS 4/20/23   Ronald Bowser MD   FeroSul 325 (65 Fe) MG tablet TAKE 1 TABLET BY MOUTH DAILY WITH BREAKFAST 2/3/23   Mila Lang APRN   FLUoxetine (PROzac) 40 MG capsule TAKE 1 CAPSULE BY MOUTH DAILY 3/20/23   Ronald Bowser MD   gabapentin (NEURONTIN) 600 MG tablet Take 2 tablets by mouth Every Night. For Restless Leg Syndrome.  Take 30 min to three hours before bedtime. 3/31/23   Jimmy Kelly II, MD   hydrOXYzine pamoate (VISTARIL) 50 MG capsule TAKE 1 CAPSULE BY MOUTH THREE TIMES DAILY AS NEEDED FOR ITCHING OR SLEEP 1/27/23   Ronald Bowser MD   ipratropium (ATROVENT) 0.06 % nasal spray 2 sprays into the nostril(s) as directed by provider 2 (Two) Times a Day. 10/18/22   Ronald Bowser MD   linaclotide (Linzess) 145 MCG capsule capsule Take 1 capsule by mouth Every Morning Before Breakfast. 11/4/22   Zelda Rich APRN   metoprolol succinate XL (TOPROL-XL) 100 MG 24 hr tablet TAKE 1 TABLET BY MOUTH EVERY NIGHT 10/31/22   Ronald Bowser MD   nabumetone (RELAFEN) 750 MG tablet TAKE 1 TABLET BY MOUTH TWICE DAILY 3/20/23   Brando Brice DPM   olopatadine (PATANASE) 0.6 % solution nasal solution  10/25/21   ProviderLucía MD   ondansetron (ZOFRAN) 8 MG tablet Take 1 tablet by mouth Every 8 (Eight) Hours As Needed for Nausea or Vomiting. 3/13/23   Zelda Rich APRN   ramelteon (ROZEREM) 8 MG tablet TAKE 1 TABLET BY MOUTH EVERY EVENING UP TO 3 HOURS BEFORE BEDTIME 2/26/23   Jimmy Kelly II, MD   sucralfate (CARAFATE) 1 g tablet Take 1 tablet by mouth 4 (Four) Times a Day As Needed (Reflux). 10/28/20   Zelda Rich, MIRIAM       Past Medical History:   Diagnosis Date   • Allergic rhinitis All my life    chronic infection   • Anxiety    • Asthma     • Chronic anemia    • Chronic pain    • CTS (carpal tunnel syndrome)    • Deaf    • Depression with anxiety    • Fracture of wrist    • Gastroesophageal reflux disease    • Hypertension    • Migraine    • Obesity    • Osteoarthritis    • Perforated tympanic membrane, bilateral    • Plantar fasciitis    • PONV (postoperative nausea and vomiting)    • Sleep apnea     sleep with c-pap   • Tinnitus        Past Surgical History:   Procedure Laterality Date   • ADENOIDECTOMY  1959   • COLONOSCOPY N/A 1/19/2018   • EAR TUBES     • ENDOSCOPIC FUNCTIONAL SINUS SURGERY (FESS) Bilateral 9/5/2017   • ENDOSCOPY N/A 1/19/2018   • ENDOSCOPY N/A 6/10/2019   • KNEE ARTHROPLASTY Right    • LAPAROSCOPIC TUBAL LIGATION     • RHINOPLASTY     • SINUS SURGERY  1988, 2002, 2006, 2018   • TOE FUSION Left 8/12/2021    Procedure: LEFT FIRST METATARSOPHALANGEAL JOINT ARTHRODESIS;  Surgeon: Brando Brice DPM;  Location: [facility];  Service: Podiatry;  Laterality: Left;   • TONSILLECTOMY  1959   • TOTAL SHOULDER ARTHROPLASTY Right    • TYMPANOPLASTY Left 6/5/2018   • TYMPANOPLASTY Right 2/7/2020    Procedure: TYMPANOPLASTY AND  CARTILAGE GRAFT;  Surgeon: Ramy Gaston MD;  Location: [facility];  Service: ENT;  Laterality: Right;   • UPPER GASTROINTESTINAL ENDOSCOPY         Social History     Socioeconomic History   • Marital status:    Tobacco Use   • Smoking status: Never     Passive exposure: Past   • Smokeless tobacco: Never   • Tobacco comments:     Never had the desire to try smoking   Vaping Use   • Vaping Use: Never used   Substance and Sexual Activity   • Alcohol use: Yes     Alcohol/week: 3.0 standard drinks     Types: 1 Glasses of wine, 2 Drinks containing 0.5 oz of alcohol per week     Comment: per week maybe if that much   • Drug use: Yes     Frequency: 5.0 times per week     Types: Marijuana   • Sexual activity: Defer       Family History   Problem Relation Age of Onset   • Hypertension Mother    • Osteoarthritis  Mother    • Alcohol abuse Father    • Heart disease Father    • Cancer Father    • Osteoarthritis Father    • Stroke Maternal Grandmother    • Irritable bowel syndrome Sister    • Osteoporosis Maternal Grandfather        10 point ROS asked and negative unless otherwise noted in HPI.    Immunization status not specifically asked and therefore not specifically documented.    Voice dictation disclaimer:  Voice dictation was used in the creation of this note.  As such, there may be typos or inappropriate words throughout the document.  The document is proofread for typos and errors, however some may not be caught.      This document has been electronically signed by Marlene Bauer MD on April 24, 2023 11:16 CDT

## 2023-04-26 ENCOUNTER — OFFICE VISIT (OUTPATIENT)
Dept: GASTROENTEROLOGY | Facility: CLINIC | Age: 68
End: 2023-04-26
Payer: MEDICARE

## 2023-04-26 VITALS
DIASTOLIC BLOOD PRESSURE: 96 MMHG | BODY MASS INDEX: 41.09 KG/M2 | HEIGHT: 65 IN | WEIGHT: 246.6 LBS | SYSTOLIC BLOOD PRESSURE: 162 MMHG | HEART RATE: 82 BPM

## 2023-04-26 DIAGNOSIS — K59.04 CHRONIC IDIOPATHIC CONSTIPATION: ICD-10-CM

## 2023-04-26 DIAGNOSIS — Z12.11 ENCOUNTER FOR SCREENING FOR MALIGNANT NEOPLASM OF COLON: Primary | ICD-10-CM

## 2023-04-26 DIAGNOSIS — K21.00 GASTROESOPHAGEAL REFLUX DISEASE WITH ESOPHAGITIS WITHOUT HEMORRHAGE: ICD-10-CM

## 2023-04-26 RX ORDER — ONDANSETRON HYDROCHLORIDE 8 MG/1
8 TABLET, FILM COATED ORAL EVERY 8 HOURS PRN
Qty: 30 TABLET | Refills: 1 | Status: SHIPPED | OUTPATIENT
Start: 2023-04-26

## 2023-04-26 RX ORDER — DEXTROSE AND SODIUM CHLORIDE 5; .45 G/100ML; G/100ML
30 INJECTION, SOLUTION INTRAVENOUS CONTINUOUS PRN
OUTPATIENT
Start: 2023-04-26

## 2023-04-26 RX ORDER — PEG-3350, SODIUM SULFATE, SODIUM CHLORIDE, POTASSIUM CHLORIDE, SODIUM ASCORBATE AND ASCORBIC ACID 7.5-2.691G
1000 KIT ORAL EVERY 12 HOURS
Qty: 2000 ML | Refills: 0 | Status: SHIPPED | OUTPATIENT
Start: 2023-04-26

## 2023-04-26 RX ORDER — SODIUM CHLORIDE 9 MG/ML
40 INJECTION, SOLUTION INTRAVENOUS AS NEEDED
OUTPATIENT
Start: 2023-04-26

## 2023-04-26 RX ORDER — DEXLANSOPRAZOLE 60 MG/1
1 CAPSULE, DELAYED RELEASE ORAL DAILY
Qty: 90 CAPSULE | Refills: 1 | Status: SHIPPED | OUTPATIENT
Start: 2023-04-26

## 2023-04-26 NOTE — PROGRESS NOTES
Chief Complaint   Patient presents with   • Colon Cancer Screening   • Heartburn       Subjective    Mikki Palak Joel is a 68 y.o. female. she is here today for follow-up.                                                                  Assessment & Plan                                     1. Encounter for screening for malignant neoplasm of colon    2. Gastroesophageal reflux disease with esophagitis without hemorrhage    3. Chronic idiopathic constipation      Plan; schedule patient for screening colonoscopy for surveillance.  Continue Dexilant daily avoid gastric irritants follow standard antireflux measures.  Continue Linzess daily encourage high-fiber diet physical activity as tolerated, adequate fluid consumption daily.    Follow-up: Return in about 6 months (around 10/26/2023) for Recheck.     HPI  68-year-old female presents to discuss GERD, constipation and need for screening colonoscopy.  Reports medication regimen is working well for her denies any GI complaints at this time.  Bowel movements are typically 3 times a day and will go to 3 days without a bowel movement denies any melena or hematochezia.  Previous colonoscopy was completed 2018 with repeat recommended in 5 years for surveillance.  Denies any family history of colon cancer.  Reports reflux is well controlled Dexilant but occasionally has some nausea would like refill of Zofran.  States she only uses Carafate as needed does not need a refill and rarely takes it    Review of Systems  Review of Systems   Constitutional: Positive for fatigue. Negative for activity change, appetite change, chills, diaphoresis, fever and unexpected weight change.   HENT: Negative for sore throat and trouble swallowing.    Respiratory: Negative for shortness of breath.    Gastrointestinal: Positive for abdominal pain (controlled with dexilant ),  "constipation (controlled with linzess) and nausea (occ.). Negative for abdominal distention, anal bleeding, blood in stool, diarrhea, rectal pain and vomiting.   Musculoskeletal: Negative for arthralgias.   Skin: Negative for pallor.   Neurological: Negative for light-headedness.       /96 (BP Location: Left arm) Comment: just did inhaler  Pulse 82   Ht 165.1 cm (65\")   Wt 112 kg (246 lb 9.6 oz)   LMP  (LMP Unknown)   BMI 41.04 kg/m²     Objective      Physical Exam  Constitutional:       General: She is not in acute distress.     Appearance: Normal appearance. She is normal weight. She is not ill-appearing.   HENT:      Head: Normocephalic and atraumatic.   Pulmonary:      Effort: Pulmonary effort is normal.   Abdominal:      General: Abdomen is flat. Bowel sounds are normal. There is no distension.      Palpations: Abdomen is soft. There is no mass.      Tenderness: There is no abdominal tenderness.   Neurological:      Mental Status: She is alert.               The following portions of the patient's history were reviewed and updated as appropriate:   Past Medical History:   Diagnosis Date   • Allergic rhinitis All my life    chronic infection   • Anxiety    • Asthma    • Chronic anemia    • Chronic pain    • CTS (carpal tunnel syndrome)    • Deaf    • Depression with anxiety    • Fracture of wrist    • Gastroesophageal reflux disease    • Hypertension    • Migraine    • Obesity    • Osteoarthritis    • Perforated tympanic membrane, bilateral    • Plantar fasciitis    • PONV (postoperative nausea and vomiting)    • Sleep apnea     sleep with c-pap   • Tinnitus      Past Surgical History:   Procedure Laterality Date   • ADENOIDECTOMY  1959   • COLONOSCOPY N/A 1/19/2018   • EAR TUBES     • ENDOSCOPIC FUNCTIONAL SINUS SURGERY (FESS) Bilateral 9/5/2017   • ENDOSCOPY N/A 1/19/2018   • ENDOSCOPY N/A 6/10/2019   • KNEE ARTHROPLASTY Right    • LAPAROSCOPIC TUBAL LIGATION     • RHINOPLASTY     • SINUS SURGERY  " , , ,    • TOE FUSION Left 2021    Procedure: LEFT FIRST METATARSOPHALANGEAL JOINT ARTHRODESIS;  Surgeon: Brando Brice DPM;  Location: [facility];  Service: Podiatry;  Laterality: Left;   • TONSILLECTOMY     • TOTAL SHOULDER ARTHROPLASTY Right    • TYMPANOPLASTY Left 2018   • TYMPANOPLASTY Right 2020    Procedure: TYMPANOPLASTY AND  CARTILAGE GRAFT;  Surgeon: Ramy Gaston MD;  Location: [facility];  Service: ENT;  Laterality: Right;   • UPPER GASTROINTESTINAL ENDOSCOPY       Family History   Problem Relation Age of Onset   • Hypertension Mother    • Osteoarthritis Mother    • Alcohol abuse Father    • Heart disease Father    • Cancer Father    • Osteoarthritis Father    • Stroke Maternal Grandmother    • Irritable bowel syndrome Sister    • Osteoporosis Maternal Grandfather      OB History        2    Para   2    Term   2            AB        Living           SAB        IAB        Ectopic        Molar        Multiple        Live Births                  Allergies   Allergen Reactions   • Codeine Other (See Comments)     Increases heart rate   • Sulfa Antibiotics Hives     Social History     Socioeconomic History   • Marital status:    Tobacco Use   • Smoking status: Never     Passive exposure: Past   • Smokeless tobacco: Never   • Tobacco comments:     Never had the desire to try smoking   Vaping Use   • Vaping Use: Never used   Substance and Sexual Activity   • Alcohol use: Yes     Alcohol/week: 3.0 standard drinks     Types: 1 Glasses of wine, 2 Drinks containing 0.5 oz of alcohol per week     Comment: per week maybe if that much   • Drug use: Yes     Frequency: 5.0 times per week     Types: Marijuana   • Sexual activity: Defer     Current Medications:  Prior to Admission medications    Medication Sig Start Date End Date Taking? Authorizing Provider   albuterol sulfate  (90 Base) MCG/ACT inhaler Inhale 2 puffs Every 4 (Four) Hours As Needed for  Wheezing.   Yes Lucía Greene MD   atorvastatin (Lipitor) 10 MG tablet Take 1 tablet by mouth Daily. 10/18/22  Yes Ronald Bowser MD   cetirizine (zyrTEC) 10 MG tablet TAKE 1 TABLET BY MOUTH DAILY 1/23/23  Yes Ronald Bowser MD   chlorthalidone (HYGROTON) 25 MG tablet TAKE 1 TABLET BY MOUTH DAILY 10/19/22  Yes Ronald Bowser MD   cyclobenzaprine (FLEXERIL) 5 MG tablet TAKE 1 TABLET BY MOUTH THREE TIMES DAILY AS NEEDED FOR MUSCLE SPASMS 4/20/23  Yes Ronald Bowser MD   dexlansoprazole (Dexilant) 60 MG capsule Take 1 capsule by mouth Daily. 4/26/23  Yes Zelda Rich APRN   FeroSul 325 (65 Fe) MG tablet TAKE 1 TABLET BY MOUTH DAILY WITH BREAKFAST 2/3/23  Yes Mila Lang APRN   FLUoxetine (PROzac) 40 MG capsule TAKE 1 CAPSULE BY MOUTH DAILY 3/20/23  Yes Ronald Bowser MD   gabapentin (NEURONTIN) 600 MG tablet Take 2 tablets by mouth Every Night. For Restless Leg Syndrome.  Take 30 min to three hours before bedtime. 3/31/23  Yes Jimmy Kelly II, MD   hydrOXYzine pamoate (VISTARIL) 50 MG capsule TAKE 1 CAPSULE BY MOUTH THREE TIMES DAILY AS NEEDED FOR ITCHING OR SLEEP 1/27/23  Yes Ronald Bowser MD   ipratropium (ATROVENT) 0.06 % nasal spray 2 sprays into the nostril(s) as directed by provider 2 (Two) Times a Day. 10/18/22  Yes Ronald Bowser MD   linaclotide (Linzess) 145 MCG capsule capsule Take 1 capsule by mouth Every Morning Before Breakfast. 4/26/23  Yes Zelda Rich APRN   metoprolol succinate XL (TOPROL-XL) 100 MG 24 hr tablet TAKE 1 TABLET BY MOUTH EVERY NIGHT 10/31/22  Yes Ronald Bowser MD   nabumetone (RELAFEN) 750 MG tablet TAKE 1 TABLET BY MOUTH TWICE DAILY 3/20/23  Yes Brando Brice DPM   olopatadine (PATANASE) 0.6 % solution nasal solution  10/25/21  Yes Lucía Greene MD   ondansetron (ZOFRAN) 8 MG tablet Take 1 tablet by mouth Every 8 (Eight) Hours As Needed for Nausea or Vomiting. 4/26/23  Yes Zelda Rich APRN   ramelteon (ROZEREM) 8 MG tablet TAKE 1 TABLET BY  MOUTH EVERY EVENING UP TO 3 HOURS BEFORE BEDTIME 2/26/23  Yes Jimmy Kelly II, MD   sucralfate (CARAFATE) 1 g tablet Take 1 tablet by mouth 4 (Four) Times a Day As Needed (Reflux). 10/28/20  Yes Zelda Rich APRN   dexlansoprazole (Dexilant) 60 MG capsule Take 1 capsule by mouth Daily. 3/22/23 4/26/23 Yes Zelda Rich APRN   linaclotide (Linzess) 145 MCG capsule capsule Take 1 capsule by mouth Every Morning Before Breakfast. 11/4/22 4/26/23 Yes Zelda Rich APRN   ondansetron (ZOFRAN) 8 MG tablet Take 1 tablet by mouth Every 8 (Eight) Hours As Needed for Nausea or Vomiting. 3/13/23 4/26/23 Yes Zelda Rich APRN   PEG-KCl-NaCl-NaSulf-Na Asc-C (MoviPrep) 100 g reconstituted solution powder Take 1,000 mL by mouth Every 12 (Twelve) Hours. 4/26/23   Zelda Rich APRN     Orders placed during this encounter include:  Orders Placed This Encounter   Procedures   • Obtain Informed Consent     Standing Status:   Future     Order Specific Question:   Informed Consent Given For     Answer:   COLONOSCOPY     COLONOSCOPY (N/A)  New Medications Ordered This Visit   Medications   • PEG-KCl-NaCl-NaSulf-Na Asc-C (MoviPrep) 100 g reconstituted solution powder     Sig: Take 1,000 mL by mouth Every 12 (Twelve) Hours.     Dispense:  2000 mL     Refill:  0   • linaclotide (Linzess) 145 MCG capsule capsule     Sig: Take 1 capsule by mouth Every Morning Before Breakfast.     Dispense:  90 capsule     Refill:  1   • dexlansoprazole (Dexilant) 60 MG capsule     Sig: Take 1 capsule by mouth Daily.     Dispense:  90 capsule     Refill:  1   • ondansetron (ZOFRAN) 8 MG tablet     Sig: Take 1 tablet by mouth Every 8 (Eight) Hours As Needed for Nausea or Vomiting.     Dispense:  30 tablet     Refill:  1         Review and/or summary of lab tests, radiology, procedures, medications. Review and summary of old records and obtaining of history. The risks and benefits of my recommendations, as well as other treatment options were  discussed . Any questions/concerned were answered. Patient voiced understanding and agreement.          This document has been electronically signed by MIRIAM White on April 26, 2023 13:36 CDT                                               Results for orders placed or performed in visit on 10/14/22   Lipid Panel With LDL / HDL Ratio    Specimen: Blood   Result Value Ref Range    Total Cholesterol 237 (H) 0 - 200 mg/dL    Triglycerides 158 (H) 0 - 150 mg/dL    HDL Cholesterol 47 40 - 60 mg/dL    LDL Cholesterol  161 (H) 0 - 100 mg/dL    VLDL Cholesterol 29 5 - 40 mg/dL    LDL/HDL Ratio 3.37    Magnesium    Specimen: Blood   Result Value Ref Range    Magnesium 1.9 1.6 - 2.4 mg/dL   Comprehensive Metabolic Panel    Specimen: Blood   Result Value Ref Range    Glucose 111 (H) 65 - 99 mg/dL    BUN 15 8 - 23 mg/dL    Creatinine 1.35 (H) 0.57 - 1.00 mg/dL    Sodium 138 136 - 145 mmol/L    Potassium 4.2 3.5 - 5.2 mmol/L    Chloride 99 98 - 107 mmol/L    CO2 27.1 22.0 - 29.0 mmol/L    Calcium 8.5 (L) 8.6 - 10.5 mg/dL    Total Protein 6.8 6.0 - 8.5 g/dL    Albumin 4.70 3.50 - 5.20 g/dL    ALT (SGPT) 22 1 - 33 U/L    AST (SGOT) 15 1 - 32 U/L    Alkaline Phosphatase 120 (H) 39 - 117 U/L    Total Bilirubin <0.2 0.0 - 1.2 mg/dL    Globulin 2.1 gm/dL    A/G Ratio 2.2 g/dL    BUN/Creatinine Ratio 11.1 7.0 - 25.0    Anion Gap 11.9 5.0 - 15.0 mmol/L    eGFR 42.9 (L) >60.0 mL/min/1.73   Results for orders placed or performed during the hospital encounter of 08/12/21   Urine Drug Screen - Urine, Clean Catch    Specimen: Urine, Clean Catch   Result Value Ref Range    THC, Screen, Urine Positive (A) Negative    Phencyclidine (PCP), Urine Negative Negative    Cocaine Screen, Urine Negative Negative    Methamphetamine, Ur Negative Negative    Opiate Screen Negative Negative    Amphetamine Screen, Urine Negative Negative    Benzodiazepine Screen, Urine Negative Negative    Tricyclic Antidepressants Screen Negative Negative    Methadone  Screen, Urine Negative Negative    Barbiturates Screen, Urine Negative Negative    Oxycodone Screen, Urine Negative Negative    Propoxyphene Screen Negative Negative    Buprenorphine, Screen, Urine Negative Negative   Results for orders placed or performed in visit on 08/10/21   ECG 12 Lead   Result Value Ref Range    QT Interval 410 ms    QTC Interval 419 ms   Results for orders placed or performed in visit on 08/10/21   COVID-19, [facility] IN-HOUSE, NP SWAB IN TRANSPORT MEDIA 8-10 HR TAT - Swab, Nasopharynx    Specimen: Nasopharynx; Swab   Result Value Ref Range    COVID19 Not Detected Not Detected - Ref. Range   Results for orders placed or performed in visit on 04/20/21   Lipid Panel With LDL / HDL Ratio    Specimen: Blood   Result Value Ref Range    Total Cholesterol 210 (H) 0 - 200 mg/dL    Triglycerides 207 (H) 0 - 150 mg/dL    HDL Cholesterol 42 40 - 60 mg/dL    LDL Cholesterol  131 (H) 0 - 100 mg/dL    VLDL Cholesterol 37 5 - 40 mg/dL    LDL/HDL Ratio 3.01    Magnesium    Specimen: Blood   Result Value Ref Range    Magnesium 2.2 1.6 - 2.4 mg/dL   Comprehensive Metabolic Panel    Specimen: Blood   Result Value Ref Range    Glucose 99 65 - 99 mg/dL    BUN 22 8 - 23 mg/dL    Creatinine 1.31 (H) 0.57 - 1.00 mg/dL    Sodium 142 136 - 145 mmol/L    Potassium 4.3 3.5 - 5.2 mmol/L    Chloride 103 98 - 107 mmol/L    CO2 26.4 22.0 - 29.0 mmol/L    Calcium 9.3 8.6 - 10.5 mg/dL    Total Protein 6.7 6.0 - 8.5 g/dL    Albumin 4.50 3.50 - 5.20 g/dL    ALT (SGPT) 20 1 - 33 U/L    AST (SGOT) 14 1 - 32 U/L    Alkaline Phosphatase 123 (H) 39 - 117 U/L    Total Bilirubin 0.4 0.0 - 1.2 mg/dL    eGFR Non African Amer 40 (L) >60 mL/min/1.73    Globulin 2.2 gm/dL    A/G Ratio 2.0 g/dL    BUN/Creatinine Ratio 16.8 7.0 - 25.0    Anion Gap 12.6 5.0 - 15.0 mmol/L   Results for orders placed or performed in visit on 01/04/21   Wound Culture - Wound, Nares    Specimen: Nares; Wound   Result Value Ref Range    Wound Culture Light growth  (2+) Klebsiella oxytoca (A)     Gram Stain Rare (1+) WBCs seen     Gram Stain No organisms seen        Susceptibility    Klebsiella oxytoca - CHAR*     Ampicillin  Resistant ug/ml     Ampicillin + Sulbactam  Intermediate ug/ml     Cefepime  Susceptible ug/ml     Ceftazidime  Susceptible ug/ml     Ceftriaxone  Susceptible ug/ml     Gentamicin  Susceptible ug/ml     Levofloxacin  Susceptible ug/ml     Piperacillin + Tazobactam  Susceptible ug/ml     Tetracycline  Susceptible ug/ml     Trimethoprim + Sulfamethoxazole  Susceptible ug/ml     * Cefazolin sensitivity will not be reported for Enterobacteriaceae in non-urine isolates. If cefazolin is preferred, please call the microbiology lab to request an E-test.  With the exception of urinary-sourced infections, aminoglycosides should not be used as monotherapy.   Results for orders placed or performed in visit on 12/15/20   Iron Profile    Specimen: Blood   Result Value Ref Range    Iron 96 37 - 145 mcg/dL    Iron Saturation 28 20 - 50 %    Transferrin 234 200 - 360 mg/dL    TIBC 349 298 - 536 mcg/dL   Ferritin Level    Specimen: Blood   Result Value Ref Range    Ferritin 200.00 (H) 13.00 - 150.00 ng/mL   Results for orders placed or performed in visit on 08/26/20   COVID-19, [facility] IN-HOUSE, NP SWAB IN TRANSPORT MEDIA 8-10 HR TAT - Swab, Nasopharynx    Specimen: Nasopharynx; Swab   Result Value Ref Range    COVID19 Not Detected Not Detected - Ref. Range   Results for orders placed or performed during the hospital encounter of 02/10/20   [facility] - [facility]   Result Value Ref Range    Extra Tube Hold for add-ons.    Urinalysis With Microscopic If Indicated (No Culture) - Urine, Clean Catch    Specimen: Urine, Clean Catch   Result Value Ref Range    Color, UA Yellow Yellow, Straw, Dark Yellow, Rut    Appearance, UA Clear Clear    pH, UA 6.0 5.0 - 9.0    Specific Gravity, UA 1.002 (L) 1.003 - 1.030    Glucose, UA Negative Negative    Ketones, UA Negative Negative    Bilirubin,  UA Negative Negative    Blood, UA Negative Negative    Protein, UA Negative Negative    Leuk Esterase, UA Negative Negative    Nitrite, UA Negative Negative    Urobilinogen, UA 0.2 E.U./dL 0.2 - 1.0 E.U./dL   CBC Auto Differential    Specimen: Blood   Result Value Ref Range    WBC 8.97 3.40 - 10.80 10*3/mm3    RBC 4.14 3.77 - 5.28 10*6/mm3    Hemoglobin 10.9 (L) 12.0 - 15.9 g/dL    Hematocrit 33.7 (L) 34.0 - 46.6 %    MCV 81.4 79.0 - 97.0 fL    MCH 26.3 (L) 26.6 - 33.0 pg    MCHC 32.3 31.5 - 35.7 g/dL    RDW 13.4 12.3 - 15.4 %    RDW-SD 39.4 37.0 - 54.0 fl    MPV 9.2 6.0 - 12.0 fL    Platelets 303 140 - 450 10*3/mm3    Neutrophil % 79.3 (H) 42.7 - 76.0 %    Lymphocyte % 12.0 (L) 19.6 - 45.3 %    Monocyte % 5.6 5.0 - 12.0 %    Eosinophil % 2.1 0.3 - 6.2 %    Basophil % 0.6 0.0 - 1.5 %    Immature Grans % 0.4 0.0 - 0.5 %    Neutrophils, Absolute 7.11 (H) 1.70 - 7.00 10*3/mm3    Lymphocytes, Absolute 1.08 0.70 - 3.10 10*3/mm3    Monocytes, Absolute 0.50 0.10 - 0.90 10*3/mm3    Eosinophils, Absolute 0.19 0.00 - 0.40 10*3/mm3    Basophils, Absolute 0.05 0.00 - 0.20 10*3/mm3    Immature Grans, Absolute 0.04 0.00 - 0.05 10*3/mm3    nRBC 0.0 0.0 - 0.2 /100 WBC     *Note: Due to a large number of results and/or encounters for the requested time period, some results have not been displayed. A complete set of results can be found in Results Review.

## 2023-05-01 ENCOUNTER — E-VISIT (OUTPATIENT)
Dept: FAMILY MEDICINE CLINIC | Facility: TELEHEALTH | Age: 68
End: 2023-05-01
Payer: MEDICARE

## 2023-05-01 RX ORDER — AMOXICILLIN AND CLAVULANATE POTASSIUM 875; 125 MG/1; MG/1
1 TABLET, FILM COATED ORAL 2 TIMES DAILY
Qty: 14 TABLET | Refills: 0 | Status: SHIPPED | OUTPATIENT
Start: 2023-05-01 | End: 2023-05-08

## 2023-05-01 NOTE — E-VISIT TREATED
Chief Complaint: Cold, flu, COVID, sinus, hay fever, or seasonal allergies   Patient introduction   Patient is 68-year-old female with cough, nasal discharge, itchy or watery eyes, itchy nose or sneezing, voice hoarseness, and fatigue that started 6 to 9 days ago.   COVID-19 testing history, vaccination status, and exposure:    Patient did a self-test within the last 24 hours. Test result was negative.    Received 3 doses of the COVID-19 vaccine (Pfizer, Pfizer, Pfizer). Received their most recent dose of the vaccine more than 14 days ago.    No known exposure to a person with a confirmed or suspected case of COVID-19.    No travel outside local community within the last 14 days.   Risk factors for severe disease from COVID-19 infection    Age 65 or older.    BMI >= 25.    Asthma.    Hypertension.   Warning. The following may warrant further investigation:    Hypertension.   Patient-submitted comments: I just want some relief..   Patient did not request an excuse note.   General presentation   No improvement of symptoms. Symptoms came on gradually.   Fever:    No fever.   Sinus and nasal symptoms:    Nasal discharge.    Itchy nose or sneezing.    Green nasal drainage.    Nasal drainage is thick.    Postnasal drip.    1 to 3 episodes of antibiotic treatment for sinus infection in the last year.    Sinus pain or pressure on or around the forehead.    Patient first noticed sinus pain 5 to 9 days ago.    Sinus pain is not worse with Valsalva.    No nasal or sinus congestion.    No history of unhealed nasal septal ulcer/nasal wound.    No history of deviated septum or nasal polyps.   Throat symptoms:    Voice is mildly hoarse. Patient does not believe hoarseness is due to voice strain.    No sore throat.   Head and body aches:    Fatigue.    No headache.    No sweats.    No chills.    No myalgia.   Cough:    Cough is worse during the day.    Cough is not productive of sputum.   Wheezing and shortness of breath:    Has  asthma diagnosis.    Using an albuterol inhaler for asthma.    Using albuterol every 6 hours or longer.    No COPD diagnosis.    No wheezing.    No shortness of breath.    Current cold symptoms do not aggravate their asthma.   Chest pain:    No chest pain.   Ear symptoms:    Current symptoms include fullness in the ear(s).   Dizziness:    No dizziness.   Allergies:    Patient has known seasonal allergies, known pet allergies, and known dust allergies.    Patient thinks symptoms are allergy-related.   Flu exposure:    No recent known exposure to a person with a confirmed flu diagnosis.    Received a flu vaccine more than 6 months ago.   Patient is taking over-the-counter medications for current symptoms, including budesonide, cetirizine, cromolyn, diphenhydramine, fexofenadine, fluticasone, guaifenesin, loratadine, and triamcinolone.   Review of red flags/alarm symptoms:    No changes in alertness or awareness.    No nuchal rigidity.    No symptoms suggesting respiratory distress.    No decreased urination.   Pregnancy/menstrual status/breastfeeding:    Patient is postmenopausal.   Self-exam:    Height: 172 centimeters    Weight: 108.8 kilograms    No difficulty moving their chin toward their chest.    Neck lymph nodes feel normal.    Mild periorbital edema.   Recent antibiotic use:    Has not taken antibiotics for similar symptoms within the past month.   Current medications   Currently taking medications or supplements, but did not provide the list of what they are taking.   Medication allergies    Macrolides.   Medication contraindication review   Patient has history of hypertension. Therefore, the following medication(s) will not be prescribed:    Metoclopramide.    Acetaminophen-diphenhydramine-phenylephrine.    Pseudoephedrine.    Aspirin-chlorpheniramine-phenylephrine.   No history of metoclopramide-associated dystonic reaction and tardive dyskinesia.   No known history of amoxicillin-clavulanate-associated  cholestatic jaundice or hepatic impairment.   Past medical history   Immune conditions: No immunocompromising conditions. No history of cancer.   Social history   Never smoked tobacco.   High-risk household contacts:    Household contact 65 years or older.   Assessment   Allergic rhinitis.   This is the likely diagnosis based on patient's interview responses, including:    Symptom profile    Patient's belief that their symptoms are allergy-related   Plan   Medications:    methylprednisolone 4 mg tablets in a dose pack RX 4mg as directed PO qid 6d for allergies. On day 1, take 2 tablets by mouth before breakfast, 1 tablet by mouth after lunch, 1 tablet by mouth after dinner, and 2 tablets by mouth at bedtime. On day 2, take 1 tablet by mouth before breakfast, 1 tablet by mouth after lunch, 1 tablet by mouth after dinner, and 2 tablets by mouth at bedtime. On day 3, take 1 tablet by mouth before breakfast, 1 tablet by mouth after lunch, 1 tablet by mouth after dinner, and 1 tablet by mouth at bedtime. On day 4, take 1 tablet by mouth before breakfast, 1 tablet by mouth after lunch, and 1 tablet by mouth at bedtime. On day 5, take 1 tablet by mouth before breakfast and 1 tablet by mouth at bedtime. On day 6, take 1 tablet by mouth before breakfast. Amount is 21 tab.   The patient's prescription will be sent to:   [id number]   [address] [id number]   Phone: [phone]     Fax: [phone]   Education:    Condition and causes    Prevention    Treatment and self-care    When to call provider   ----------   Electronically signed by MIRIAM Tavares on 2023-05-01 at 14:22PM   ----------   Patient Interview Transcript:   Please carefully consider each question and answer as best you can. This helps your provider give you the best care. Which of these symptoms are bothering you? Select all that apply.    Cough    Runny nose    Itchy or watery eyes    Itchy nose or sneezing     Hoarse voice or loss of voice    Fatigue or tiredness   Not selected:    Shortness of breath    Stuffed-up nose or sinuses    Loss of smell or taste    Sore throat    Headache    Fever    Sweats    Chills    Muscle or body aches    Nausea or vomiting    Diarrhea    I don't have any of these symptoms   When did your current symptoms start? Select one.    6 to 9 days ago   Not selected:    Less than 48 hours ago    3 to 5 days ago    10 to 14 days ago    2 to 3 weeks ago    3 to 4 weeks ago    More than a month ago   Do you know the exact date your symptoms started? If so, enter the date as MM/DD/YY. Select one.    No   Not selected:    Yes (specify)   Did your symptoms come on suddenly or gradually? Select one.    Gradually   Not selected:    Suddenly    I'm not sure   Have your symptoms improved at all since they began? Select one.    No   Not selected:    Yes, but they haven't gone away completely    Yes, but then they came back worse than before   Since your current symptoms started, have you had a viral test for COVID-19? - This includes home self-tests as well as nose swab or saliva tests done at a doctor's office, lab, or testing site. - It does NOT include antibody tests, which use a blood sample to test for past infection. Select one.    Yes   Not selected:    No   When was your most recent COVID-19 test? Select one.    Within the last 24 hours   Not selected:    Within the last week (specify date as MM/DD/YY)    7 to 14 days ago    15 to 30 days ago    More than 1 month ago   What type of COVID-19 test did you most recently have? Select one.    Viral self-test or home test   Not selected:    Viral test at a doctor's office, lab, or testing site   What was the result of your most recent COVID-19 test? Select one.    Negative   Not selected:    Positive   Have you gotten the COVID-19 vaccine? Select one.    Yes   Not selected:    No   How many total doses of the COVID-19 vaccine have you gotten? This includes  "boosters as well as additional doses for those who are immunocompromised. Select one.    3 doses   Not selected:    1 dose    2 doses    4 doses    5 doses   1st dose    Pfizer   Not selected:    J&J/Wm    Moderna    Novavax   2nd dose    Pfizer   Not selected:    J&J/Wm    Moderna    Novavax   3rd dose    Pfizer   Not selected:    J&J/Wm    Moderna    Novavax   When did you get your most recent dose of the COVID-19 vaccine?    More than 14 days ago   Not selected:    Less than 48 hours (2 days) ago    48 to 72 hours (3 days) ago    3 to 5 days ago    5 to 7 days ago    7 to 14 days ago   In the last 14 days, have you traveled outside of your local community? This includes travel by car, RV, bus, train, or plane. Travel increases your chances of getting and spreading COVID-19. Select one.    No   Not selected:    Yes   In the last 14 days, have you had close contact with someone who has coronavirus (COVID-19)? \"Close contact\" means any of these: - Living in the same household as someone with COVID-19. - Caring for someone with COVID-19. - Being within 6 feet of someone with COVID-19 for a total of at least 15 minutes over a 24-hour period. For example, three 5-minute exposures for a total of 15 minutes. - Being in direct contact with respiratory droplets from someone with COVID-19 (being coughed on, kissing, sharing utensils). Select one.    No, not that I know of   Not selected:    Yes, a confirmed case    Yes, a suspected case   Do you cough so hard that it's made you gag or vomit? By gag, we mean has your coughing made you choke or dry heave? Select all that apply.    No   Not selected:    Yes, my coughing has made me gag    Yes, my coughing has made me vomit   When is your cough the worst? Select all that apply.    During the day   Not selected:    In the morning, or when I wake up    At nighttime, or while I'm sleeping    I haven't noticed a difference depending on time of day   Are you coughing " "up mucus or phlegm? Select one.    No, my cough is dry   Not selected:    Yes, a little    Yes, a lot   Do you feel sinus pain or pressure in any of these areas?    In my forehead   Not selected:    Around my eyes    Behind my nose    In my cheeks    In my upper teeth or jaw    No   When did you first notice your sinus pain or pressure? Select one.    5 to 9 days ago   Not selected:    Less than 5 days ago    10 to 14 days ago    2 to 4 weeks ago    1 month ago or longer   Does coughing, sneezing, or leaning forward make your sinuses feel worse? Select one.    No   Not selected:    Yes   What color is your nasal drainage? Select one.    Green or greenish   Not selected:    Clear    White    Yellow or yellowish    My nose is stuffed but not draining or running   Is your nasal drainage thick or thin? Select one.    Thick   Not selected:    Thin   Is there any drainage (mucus) going down the back of your throat? This kind of drainage is also called \"postnasal drip.\" Select one.    Yes   Not selected:    No, not that I know of   Since your symptoms started, have you felt dizzy? Select one.    No   Not selected:    Yes, but I can continue with my regular daily activities    Yes, and it makes it hard to stand, walk, or do daily activities   Do you have chest pain? You might also feel it as discomfort, aching, tightness, or squeezing in the chest. Select one.    No   Not selected:    Yes   Have you urinated at least 3 times in the last 24 hours? Select one.    Yes   Not selected:    No   Changes in alertness or awareness may mean you need emergency care. Since your symptoms started, have you had any of these? Select all that apply.    None of the above   Not selected:    Confusion    Slurred speech    Not knowing where you are or what day it is    Difficulty staying conscious    Fainting or passing out   Do your symptoms include a whistling sound, or wheezing, when you breathe? Select one.    No   Not selected:    Yes   "  I'm not sure   Early in this interview, you told us you were hoarse or you'd lost your voice. How would you describe the changes to your voice? Select one.    It just sounds a little raspy   Not selected:    It's harder than usual to talk    I can barely talk at all   Is it possible that you strained your voice? Singing, yelling, or talking more or louder than usual can cause voice strain. Select one.    No, not that I know of   Not selected:    Yes   Are your eyelids or the areas around your eyes puffy? Select one.    Yes, but I can easily open my eye(s)   Not selected:    Yes, and it's hard to open my eye(s)    Yes, and my eye(s) are completely swollen shut    No   Do you have any of these symptoms in your ear(s)? Select all that apply.    Fullness   Not selected:    Pain    Pressure    Crackling or popping    Plugged or blocked sensation    None of the above   Can you move your chin toward your chest?    Yes   Not selected:    No, my neck is too stiff   Are your glands/lymph nodes swollen, or does it hurt when you touch them?    No, not that I can tell   Not selected:    Yes   In the past week, has anyone around you (such as at school, work, or home) had a confirmed diagnosis of the flu? A confirmed diagnosis means that a nose swab was done to verify a flu infection. Select all that apply.    No, not that I know of   Not selected:    I live with someone who has the flu    I've been within touching distance of someone who has the flu    I've walked by, or sat about 3 feet away from, someone who has the flu    I've been in the same building as someone who has the flu   Have you ever been diagnosed with asthma? Select one.    Yes   Not selected:    No   Are your cold symptoms making your asthma worse? Select one.    No   Not selected:    Yes   What medications or inhalers are you currently using for your asthma? Select all that apply.    Albuterol inhaler, as needed (such as ProAir, Proventil, Ventolin)   Not  selected:    Inhaled steroid (such as Qvar, Pulmicort, Flovent)    Inhaled steroid with long-acting bronchodilator (such as Advair, Dulera, Symbicort)    I'm not sure    I'm not taking any medications for my asthma    I usually take medications for my asthma, but I ran out   How often are you using albuterol? If you're using albuterol very frequently, you'll need to be seen in person. Select one.    Every 6 hours or longer   Not selected:    More than once an hour    Every 1 to 2 hours    Every 2 to 3 hours    Every 3 to 4 hours    Every 4 to 6 hours   Do you need a refill of albuterol? Select one.    No   Not selected:    Yes   Have you ever been diagnosed with chronic obstructive pulmonary disease (COPD)? Select one.    No, not that I know of   Not selected:    Yes   In the past month, have you taken antibiotics for similar symptoms? Examples of antibiotics include amoxicillin, amoxicillin-clavulanate (Augmentin), penicillin, cefdinir (Omnicef), doxycycline, and clindamycin (Cleocin). Select one.    No   Not selected:    Yes (specify)   In the last year, how many times were you treated with antibiotics for a sinus infection? Select one.    1 to 3 times   Not selected:    None    4 or more times   Have you been diagnosed with a deviated septum or nasal polyps? The nose is divided into two nostrils by the septum. A crooked septum is called a deviated septum. Nasal polyps are growths inside the nose or sinuses. Select one.    No, not that I know of   Not selected:    Yes, but I had surgery to treat them    Yes, I have a deviated septum    Yes, I have nasal polyps    Yes, I have a deviated septum and nasal polyps   Do you have a sore inside your nose that won't heal? Select one.    No, not that I know of   Not selected:    Yes   Do you have allergies (pollen, dust mites, mold, animal dander)? Select one.    Yes   Not selected:    No, not that I know of   What kind of allergies do you have? Select all that apply.     Seasonal allergies (hay fever)    Pet allergies    Dust allergies   Not selected:    None of the above    I'm not sure   Do you think your symptoms could be allergy-related? Select one.    Yes   Not selected:    No    I'm not sure   Have you had a flu shot this season? Select one.    Yes, more than 6 months ago   Not selected:    Yes, less than 2 weeks ago    Yes, 2 to 4 weeks ago    Yes, 1 to 3 months ago    Yes, 3 to 6 months ago    No   Have you gone through menopause? Select one.    Yes   Not selected:    No    I'm going through it now   The flu and COVID-19 can be more serious for people in certain groups. The next few questions help us figure out if you or anyone you live with is at higher risk for complications from these infections. Do any of these statements apply to you? Select all that apply.    None of the above   Not selected:    I'm     I'm     I'm Black    I'm  or    Do you smoke tobacco? Select one.    No   Not selected:    Yes, every day    Yes, some days    No, I quit   Do you have any of these conditions? Select all that apply.    None of the above   Not selected:    Chronic lung disease, such as cystic fibrosis or interstitial fibrosis    Heart disease, such as congenital heart disease, congestive heart failure, or coronary artery disease    Disorder of the brain, spinal cord, or nerves and muscles, such as dementia, cerebral palsy, epilepsy, muscular dystrophy, or developmental delay    Metabolic disorder or mitochondrial disease    Cerebrovascular disease, such as stroke or another condition affecting the blood vessels or blood supply to the brain    Down syndrome    Mood disorder, including depression or schizophrenia spectrum disorders    Substance use disorder, such as alcohol, opioid, or cocaine use disorder    Tuberculosis   Do you live in a group care setting? Examples include: - Nursing home - Residential care - Psychiatric treatment facility -  Group home - [facility] - [facility] and care home - Homeless shelter - Foster care setting Select one.    No   Not selected:    Yes   Are you a healthcare worker? Select one.    No   Not selected:    Yes   People with a very high body mass index (BMI) are at higher risk for developing complications from the flu and severe illness from COVID-19. To determine your BMI, we need to know your weight and height. Please enter your weight (in pounds).    Weight   Please enter your height.    Height   Do you have any of these conditions that can affect the immune system? Scroll to see all options. Select all that apply.    None of these   Not selected:    History of bone marrow transplant    Chronic kidney disease    Chronic liver disease (including cirrhosis)    HIV/AIDS    Inflammatory bowel disease (Crohn's disease or ulcerative colitis)    Lupus    Moderate to severe plaque psoriasis    Multiple sclerosis    Rheumatoid arthritis    Sickle cell anemia    Alpha or beta thalassemia    History of solid organ transplant (kidney, liver, or heart)    History of spleen removal    An autoimmune disorder not listed here    A condition requiring treatment with long-term use of oral steroids (such as prednisone, prednisolone, or dexamethasone)   Have you ever been diagnosed with cancer? Select one.    No   Not selected:    Yes, I have cancer now    Yes, but I'm in remission   Do any of these apply to you? Select all that apply.    None of the above   Not selected:    I've been hospitalized within the last 5 days    I have diabetes    I'm in close contact with a child in    The flu and COVID-19 can be more serious for people in certain groups. Do any of these apply to the people who live with you? Select all that apply.    Over age 65   Not selected:    Under age 5            Black     or     Pregnant    Has given birth, had a miscarriage, had a pregnancy loss, or had an  in the  last 2 weeks    None of the above   Does any member of your household have any of these medical conditions? Select all that apply.    None of the above   Not selected:    Asthma    Disorders of the brain, spinal cord, or nerves and muscles, such as dementia, cerebral palsy, epilepsy, muscular dystrophy, or developmental delay    Chronic lung disease, such as COPD or cystic fibrosis    Heart disease, such as congenital heart disease, congestive heart failure, or coronary artery disease    Cerebrovascular disease, such as stroke or another condition affecting the blood vessels or blood supply to the brain    Blood disorders, such as sickle cell disease    Diabetes    Metabolic disorders such as inherited metabolic disorders or mitochondrial disease    Kidney disorders    Liver disorders    Weakened immune system due to illness or medications such as chemotherapy or steroids    Children under the age of 19 who are on long-term aspirin therapy    Extreme obesity (BMI > 40)   Do you have any of these conditions? Scroll to see all options. Select all that apply.    High blood pressure   Not selected:    Aspirin triad (also known as Samter's triad or ASA triad)    Asthma or hives from taking aspirin or other NSAIDs, such as ibuprofen or naproxen    Blockage or narrowing of the blood vessels of the heart    Blood clotting disorder    Blood dyscrasia, such anemia, leukemia, lymphoma, or myeloma    Bone marrow depression    Catecholamine-releasing paraganglioma    Congenital long QT syndrome    Depression    Difficulty urinating or completely emptying your bladder    Uncorrected electrolyte abnormalities    Fungal infection    Gastrointestinal (GI) bleeding    Gastrointestinal (GI) obstruction    G6PD deficiency    Recent heart attack    Irregular heartbeat or heart rhythm    Mononucleosis (mono)    Myasthenia gravis    Parkinson's disease    Pheochromocytoma    Reye syndrome    Seizure disorder    Thyroid disease     Ulcerative colitis    None of the above   Have you ever had either of these conditions? Select all that apply.    No   Not selected:    Metoclopramide-associated dystonic reaction    Tardive dyskinesia   Just a few more questions about medications, and then you're finished. Have you used any non-prescription medications or nasal sprays for your current symptoms? Examples include saline sprays, decongestants, NyQuil, and Tylenol. Select one.    Yes   Not selected:    No   Which of these non-prescription medications have you tried? Scroll to see all options. Select all that apply.    Budesonide (Rhinocort)    Cetirizine (Zyrtec)    Cromolyn (NasalCrom)    Diphenhydramine (Benadryl)    Fexofenadine (Allegra)    Fluticasone (Flonase)    Guaifenesin (Mucinex)    Loratadine (Alavert, Claritin)    Triamcinolone (Nasacort)   Not selected:    Acetaminophen (Tylenol)    Chlorpheniramine (Aller-chlor, Chlor-Trimeton)    Dextromethorphan (Delsym, Robitussin, Vicks DayQuil Cough)    Guaifenesin/dextromethorphan (Delsym DM, Mucinex DM, Robitussin DM)    Ibuprofen (Advil, Motrin, Midol)    Ketotifen (Alaway, Zaditor)    Naphazoline-pheniramine (Naphcon-A, Opcon-A, Visine-A)    Omeprazole (Prilosec)    Oxymetazoline (Afrin)    Phenylephrine (Sudafed PE)    None of the above   Have you taken any monoamine oxidase inhibitor (MAOI) medications in the last 14 days? Examples include rasagiline (Azilect), selegiline (Eldepryl, Zelapar), isocarboxazid (Marplan), phenelzine (Nardil), and tranylcypromine (Parnate). Select one.    No, not that I know of   Not selected:    Yes   Do you take Kynmobi or Apokyn (apomorphine)? Select one.    No   Not selected:    Yes   Are you taking any other medications, vitamins, or supplements? Select one.    Yes   Not selected:    No   Have you ever had an allergic or bad reaction to any medication? Select one.    Yes   Not selected:    No   Have you had an allergic or bad reaction to any of these  "medications? Select all that apply.    No, not that I know of   Not selected:    Baloxavir (Xofluza)    Benzonatate (Tessalon Perles)    Fluconazole, itraconazole, or terconazole (brands include Diflucan, Sporanox, Terazol)    Oseltamivir (Tamiflu) or zanamivir (Relenza)    Paxlovid, nirmatrelvir, or ritonavir (Norvir)   Have you had an allergic or bad reaction to any of these antibiotic medications? Select all that apply.    Azithromycin or any \"-thromycin\" antibiotic, such as erythromycin or clarithromycin (Brands include Biaxin, Erythrocin, Z-yobani, and Zithromax)   Not selected:    Penicillin or any \"-cillin\" antibiotic, such as amoxicillin, ampicillin, dicloxacillin, nafcillin, or piperacillin (Brands include Augmentin, Unasyn, and Zosyn)    Tetracycline or any \"-cycline\" antibiotic, such as doxycycline, demeclocycline, minocycline (Brands include Declomycin, Doryx, Dynacin, Oracea, Monodox, Panmycin, and Vibramycin)    Ciprofloxacin or any \"-floxacin\" antibiotic, such as gemifloxacin, levofloxacin, moxifloxacin, or ofloxacin (Brands include Factive, Cipro, Floxin, and Levaquin)    Cephalexin or any \"cef-\" antibiotic, such as cefazolin, cefdinir, cefuroxime, ceftriaxone, ceftazidime, or cefepime (Brands include Ancef, Ceftin, Fortaz, Keflex, Maxipime, Rocephin, and Simplicef)    Clindamycin or lincomycin (Brands include Cleocin and Lincocin)    No, not that I know of   Have you had an allergic or bad reaction to any of these medications? Select all that apply.    No, not that I know of   Not selected:    Albuterol or a similar medication    Atropine    Corticosteroid (steroid) medication, including topical steroids, inhaled steroids, nasal steroids, or oral steroids (budesonide, ciclesonide, dexamethasone, flunisolide, fluticasone, methylprednisolone, triamcinolone, prednisone (or brand names Alvesco, Deltasone, Flovent, Medrol, Nasacort, Rhinocort, or Veramyst)    Metoclopramide (Reglan)    Ondansetron (Zuplenz, " Zofran ODT, Zofran)    Prochlorperazine (Compazine)   Have you had an allergic or bad reaction to any of these eye drops, nasal sprays, or inhalers? Scroll to see all options. Select all that apply.    No, not that I know of   Not selected:    Azelastine (Astelin, Astepro, Optivar)    Cromolyn (Crolom, NasalCrom)    Ipratropium (Atrovent)    Ketotifen (Alaway, Zaditor)    Pheniramine/naphazoline (Naphcon-A, Opcon-A, Visine-A)    Olopatadine (Pataday, Patanol, Pazeo)   Have you had an allergic or bad reaction to any of these non-prescription medications? Scroll to see all options. Select all that apply.    No, not that I know of   Not selected:    Acetaminophen (Tylenol)    Aspirin    Cetirizine (Zyrtec)    Dextromethorphan (Delsym, Robitussin, Vicks DayQuil Cough)    Diphenhydramine (Benadryl)    Fexofenadine (Allegra)    Guaifenesin (Mucinex)    Dextromethorphan (Delsym)    Ibuprofen (Advil, Motrin, Midol)    Loratadine (Alavert, Claritin)    Oxymetazoline (Afrin)    Phenylephrine (Sudafed PE)    Pseudoephedrine (Sudafed)   Are you allergic to milk or to the proteins found in milk (for example, whey or casein)? A milk allergy is different from lactose intolerance. Select one.    No, not that I know of   Not selected:    Yes   Have you ever had jaundice or liver problems as a result of taking amoxicillin-clavulanate (Augmentin)? Jaundice is a condition in which the skin and the whites of the eyes turn yellow. Select all that apply.    No, not that I know of   Not selected:    Yes, jaundice    Yes, liver problems   Do you need a doctor's note? A doctor's note confirms that you received care today and states when you can return to school or work. It does not contain information about your diagnosis or treatment plan. Your provider will make the final decision on whether to give you a doctor's note and for how long. Doctor's notes CANNOT be backdated. We can't provide medical leave paperwork through this type of visit.  If more paperwork is needed to request time off, contact your primary care provider. Select one.    No   Not selected:    Today only (1 day)    Today and tomorrow (2 days)    3 days    5 days    7 days    10 days    14 days   Is there anything else you'd like to tell us about your symptoms?    I just want some relief.   ----------   Medical history   The following information was received from the EMR on May 01, 2023.   Allergies:    SULFA ANTIBIOTICS   - Allergy Type: Medication   - Reaction: Hives   - Severity: None   - Clinical Status: Active   - Verification Status: Confirmed    CODEINE   - Allergy Type: Medication   - Reaction: Other (See Comments)   - Severity: None   - Clinical Status: Active   - Verification Status: Confirmed   Medications:    atorvastatin (LIPITOR) tablet   - Route: Oral   - Start Date: October 18, 2022   - End Date: None   - Status: Active    peg 3350/electrolytes/sodium ascorbate/ascorbic acid (MOVIPREP) powder   - Route: Oral   - Start Date: April 26, 2023   - End Date: None   - Status: Active    cyclobenzaprine (FLEXERIL) tablet   - Route:   - Start Date: April 20, 2023   - End Date: None   - Status: Active    cetirizine (zyrTEC) tablet   - Route:   - Start Date: January 23, 2023   - End Date: None   - Status: Active    metoprolol succinate (TOPROL-XL) 24 hr tablet   - Route: Oral   - Start Date: October 31, 2022   - End Date: None   - Status: Active    dexlansoprazole (DEXILANT) capsule   - Route: Oral   - Start Date: April 26, 2023   - End Date: None   - Status: Active    FEROSUL 325 (65 FE) MG PO TABS   - Route:   - Start Date: February 03, 2023   - End Date: None   - Status: Active    ramelteon (ROZEREM) tablet 8 mg   - Route:   - Start Date: February 26, 2023   - End Date: None   - Status: Active    FLUoxetine (PROzac) capsule   - Route: Oral   - Start Date: March 20, 2023   - End Date: None   - Status: Active    gabapentin (NEURONTIN) tablet (doses 600mg or greater)   - Route:  Oral   - Start Date: March 31, 2023   - End Date: None   - Status: Active    albuterol (PROVENTIL HFA;VENTOLIN HFA;PROAIR HFA) inhaler   - Route: Inhalation   - Start Date: August 10, 2021   - End Date: None   - Status: Active    ipratropium (ATROVENT) nasal spray 0.06%   - Route: Nasal   - Start Date: October 18, 2022   - End Date: None   - Status: Active    ondansetron (ZOFRAN) tablet   - Route: Oral   - Start Date: April 26, 2023   - End Date: None   - Status: Active    olopatadine (PATANASE) nasal solution 0.6%   - Route:   - Start Date: October 27, 2021   - End Date: None   - Status: Active    hydrOXYzine pamoate (VISTARIL) capsule   - Route:   - Start Date: January 27, 2023   - End Date: None   - Status: Active    chlorthalidone (HYGROTEN) tablet   - Route:   - Start Date: October 19, 2022   - End Date: None   - Status: Active    linaclotide (LINZESS) capsule   - Route: Oral   - Start Date: April 26, 2023   - End Date: None   - Status: Active    nabumetone (RELAFEN) tablet 500   - Route:   - Start Date: March 20, 2023   - End Date: None   - Status: Active    sucralfate (CARAFATE) tablet 1 gram   - Route: Oral   - Start Date: October 28, 2020   - End Date: None   - Status: Active   Problem list:    High blood pressure   - Category: Problem List Item   - Health Status:   - Start Date: August 01, 2017   - End Date: October 30, 2018   - Status: Resolved    Asthma   - Category: Problem List Item   - Health Status:   - Start Date: August 01, 2017   - End Date: None   - Status: Active    Arthritis   - Category: Problem List Item   - Health Status:   - Start Date: August 01, 2017   - End Date: None   - Status: Active    Sinusitis   - Category: Problem List Item   - Health Status:   - Start Date: August 24, 2017   - End Date: October 30, 2018   - Status: Resolved    Nasal septal deformity   - Category: Problem List Item   - Health Status:   - Start Date: September 05, 2017   - End Date: April 30, 2018   - Status:  Resolved    Nasal turbinate hypertrophy   - Category: Problem List Item   - Health Status:   - Start Date: September 05, 2017   - End Date: None   - Status: Active    Epigastric pain   - Category: Problem List Item   - Health Status:   - Start Date: November 28, 2017   - End Date: April 30, 2018   - Status: Resolved    Encounter for colonoscopy due to history of colonic polyp   - Category: Problem List Item   - Health Status:   - Start Date: November 28, 2017   - End Date: October 30, 2018   - Status: Resolved    Gastroesophageal reflux disease   - Category: Problem List Item   - Health Status:   - Start Date: November 28, 2017   - End Date: None   - Status: Active    Depression with anxiety   - Category: Problem List Item   - Health Status:   - Start Date: April 03, 2018   - End Date: None   - Status: Active    Insomnia   - Category: Problem List Item   - Health Status:   - Start Date: April 03, 2018   - End Date: None   - Status: Active    Other chronic pain   - Category: Problem List Item   - Health Status:   - Start Date: April 03, 2018   - End Date: April 22, 2020   - Status: Resolved    PTSD (post-traumatic stress disorder)   - Category: Problem List Item   - Health Status:   - Start Date: April 03, 2018   - End Date: None   - Status: Active    Restless leg   - Category: Problem List Item   - Health Status:   - Start Date: April 03, 2018   - End Date: None   - Status: Active    BMI 35.0-35.9,adult   - Category: Problem List Item   - Health Status:   - Start Date: April 30, 2018   - End Date: November 09, 2020   - Status: Resolved    Perforation of left tympanic membrane   - Category: Problem List Item   - Health Status:   - Start Date: May 14, 2018   - End Date: April 22, 2019   - Status: Resolved    Mixed hearing loss of left ear   - Category: Problem List Item   - Health Status:   - Start Date: May 14, 2018   - End Date: None   - Status: Active    Right knee pain   - Category: Problem List Item   - Health  Status:   - Start Date: May 15, 2018   - End Date: None   - Status: Active    Presence of right artificial knee joint   - Category: Problem List Item   - Health Status:   - Start Date: May 15, 2018   - End Date: None   - Status: Active    Essential hypertension, benign   - Category: Problem List Item   - Health Status:   - Start Date: October 30, 2018   - End Date: None   - Status: Active    Epigastric pain   - Category: Problem List Item   - Health Status:   - Start Date: May 09, 2019   - End Date: October 22, 2019   - Status: Resolved    Nausea   - Category: Problem List Item   - Health Status:   - Start Date: May 09, 2019   - End Date: April 22, 2020   - Status: Resolved    Presbyesophagus   - Category: Problem List Item   - Health Status:   - Start Date: May 09, 2019   - End Date: None   - Status: Active    Chronic vasomotor rhinitis   - Category: Problem List Item   - Health Status:   - Start Date: January 13, 2020   - End Date: None   - Status: Active    Perforated tympanic membrane, bilateral   - Category: Problem List Item   - Health Status:   - Start Date: January 13, 2020   - End Date: October 18, 2022   - Status: Resolved    Mixed conductive and sensorineural hearing loss of right ear with restricted hearing of left ear   - Category: Problem List Item   - Health Status:   - Start Date: January 13, 2020   - End Date: None   - Status: Active    Morbidly obese   - Category: Problem List Item   - Health Status:   - Start Date: January 30, 2020   - End Date: None   - Status: Active    Lethargy   - Category: Problem List Item   - Health Status:   - Start Date: February 10, 2020   - End Date: April 22, 2020   - Status: Resolved    Acute metabolic encephalopathy   - Category: Problem List Item   - Health Status:   - Start Date: February 10, 2020   - End Date: April 22, 2020   - Status: Resolved    GAMA (acute kidney injury)   - Category: Problem List Item   - Health Status:   - Start Date: February 10, 2020    - End Date: April 22, 2020   - Status: Resolved    Polypharmacy   - Category: Problem List Item   - Health Status:   - Start Date: February 10, 2020   - End Date: April 22, 2020   - Status: Resolved    Hallux limitus of left foot   - Category: Problem List Item   - Health Status:   - Start Date: July 14, 2021   - End Date: None   - Status: Active    Marijuana use   - Category: Problem List Item   - Health Status:   - Start Date: October 19, 2021   - End Date: None   - Status: Active    Mixed hyperlipidemia   - Category: Problem List Item   - Health Status:   - Start Date: October 18, 2022   - End Date: None   - Status: Active    Encounter for screening for malignant neoplasm of colon   - Category: Problem List Item   - Health Status:   - Start Date: April 26, 2023   - End Date: None   - Status: Active

## 2023-05-01 NOTE — EXTERNAL PATIENT INSTRUCTIONS
Note   Start Medrol steroid pack for allergy flare and sinus inflammation. Continue FLonsae and over the counter medicine of choice. If you still having sinus pressure, tenderness and colored nasal draiange at day 10 day symptoms, you may take the antibiotic prescribed to treat a sinus infection.   Diagnosis   Allergic rhinitis (allergies)   My name is KathrynMIRIAM Edwards, and I'm a healthcare provider at [facility]. I reviewed your interview and I see that you have allergic rhinitis, also known as seasonal allergies or hay fever. Allergic rhinitis is not an infection. It isn't caused by a virus or bacteria. Although allergy symptoms can be unpleasant, your symptoms aren't part of a more serious condition.   Based on what you told me in your interview, I haven't prescribed any antibiotics. Antibiotics won't help with allergies. Your symptoms suggest you have allergies, not an infection:    An itchy nose or sneezing.    Itchy or watery eyes.    Coughing, sneezing, or leaning forward has no effect on your sinus pain. In contrast, sinus pain caused by viruses or bacteria may get worse with these actions.    You haven't had symptoms long enough to suggest a bacterial infection.    You don't have a fever. Viral and bacterial infections, but not allergies, can lead to a fever.    You don't have sinus pain. Sinus infections can cause sinus pain or pressure.    You don't have a sore throat. Bacterial infections like strep throat cause a severe sore throat.   Medications   Your pharmacy   [id number] [address] [phone]     Prescription   Methylprednisolone (4mg): On day 1, take 2 tablets by mouth before breakfast, 1 tablet by mouth after lunch, 1 tablet by mouth after dinner, and 2 tablets by mouth at bedtime. On day 2, take 1 tablet by mouth before breakfast, 1 tablet by mouth after lunch, 1 tablet by mouth after dinner, and 2 tablets by mouth at bedtime. On day 3,  take 1 tablet by mouth before breakfast, 1 tablet by mouth after lunch, 1 tablet by mouth after dinner, and 1 tablet by mouth at bedtime. On day 4, take 1 tablet by mouth before breakfast, 1 tablet by mouth after lunch, and 1 tablet by mouth at bedtime. On day 5, take 1 tablet by mouth before breakfast and 1 tablet by mouth at bedtime. On day 6, take 1 tablet by mouth before breakfast.   About your diagnosis   Allergic rhinitis, also called allergies or hay fever, is very common. Symptoms can seem similar to a cold, but they're caused by an allergen, not a virus. These are the key things to know about allergies:    The best way to treat allergies is to avoid the cause of your symptoms. Medications can also help your symptoms.    Symptoms range in severity. They may be only mildly annoying or they may seriously affect day-to-day life.    Symptoms can be seasonal, triggered by tree pollen, grasses, and weeds. Mold, dust, or pet dander can cause allergy symptoms in any season.   In addition to symptoms affecting the nose and eyes, allergies can cause fatigue and irritability.   What to expect   Despite the unpleasant symptoms, the good news is that you're already aware of your allergies. If you follow this treatment plan, you should feel better soon.   When to seek care   Call us at [phone]   with any sudden or unexpected symptoms.    Severe allergy symptoms.    Your normal allergy medications stop working or cause uncomfortable side effects.    Your sinus pain continues for 10 days or more, without improvement.    Severe chest pain.   Other treatment   If possible, avoid the allergens that trigger your allergy symptoms. If you normally use medications, inhalers, or an asthma action plan, continue using them as prescribed.   Prevention   Avoid your known allergens, if possible. Allergy medications work best if you take them before your symptoms develop.   Avoid smoke and air pollution. Smoke can make  infections worse.    Flu vaccine information   Who should get a flu vaccine?   Everyone 6 months of age and older should get a yearly flu vaccine.   When should I get vaccinated?   You should get a flu vaccine by the end of October. Once you're vaccinated, it takes about two weeks for antibodies to develop and protect you against the flu. That's why it's important to get vaccinated as soon as possible.   After October, is it too late to get vaccinated?   No. You should still get vaccinated. As long as the flu viruses are still in your community, flu vaccines will remain available, even into January of next year or later.   Why do I need a flu vaccine EVERY year?   Flu viruses are constantly changing, so flu vaccines are usually updated from one season to the next. Your protection from the flu vaccine also lessens over time.   Is the flu vaccine safe?   Yes. Over the last 50 years, hundreds of millions of Americans have safely received the flu vaccines.   What are the side effects of flu vaccines?   You CANNOT get the flu from a flu vaccine. Common side effects of the flu shot include soreness, redness and/or swelling where the shot was given, low grade fever, and aches. Common side effects of the nasal spray flu vaccine for adults include runny nose, headaches, sore throat, and cough. For children, side effects include wheezing, vomiting, muscle aches, and fever.   Does the flu vaccine increase your risk of getting COVID-19?   No. There is no evidence that getting a flu vaccine increases your risk of getting COVID-19.   Is it safe to get the flu vaccine along with a COVID-19 vaccine?   Yes. It's safe to get the flu vaccine with a COVID-19 vaccine or booster.   Contact your healthcare provider TODAY for details on when and where to get your flu vaccine.    Coronavirus (COVID-19) information   Common symptoms of COVID-19 include fever, cough, shortness of breath, fatigue, muscle or body aches, headaches, new loss of  sense of taste or smell, sore throat, stuffy or runny nose, nausea or vomiting, and diarrhea. Most people who get COVID-19 have mild symptoms and can rest at home until they get better. Elderly people and those with chronic medical problems may be at risk for more serious complications.   FAQs about the COVID-19 vaccine   There are four authorized COVID-19 vaccines: Mert & Mert's Wm Vaccine (J&J/Wm), Moderna, Novavax, and Pfizer-BioNTech (Pfizer). The J&J/Wm and Novavax vaccines are approved for use in people aged 18 and older. The Moderna and Pfizer vaccines are approved for those aged 6 months and older. All four are available at no cost. Even if you don't have health insurance, you can still get the COVID-19 vaccine for free.   Which vaccine is the best? Which vaccine should I get?   All four vaccines are highly effective. Even if you get COVID-19 after being vaccinated, all of the vaccines help prevent severe disease, hospitalization, and complications.   Most people should get whichever vaccine is first available to them. However, women younger than 50 years old should consider the rare risk of blood clots with low platelets after vaccination with the J&J/Wm vaccine. This risk hasn't been seen with the other three vaccines.   Are the vaccines safe?   Yes. Hundreds of millions of people in the [place] have already safely received COVID-19 vaccines under the most intense safety monitoring in the history of the [place].   Do I need the vaccine if I've already had COVID?   Yes. Vaccination helps protect you even if you've already had COVID.   If you had COVID-19 and had symptoms, wait to get vaccinated until you've recovered and completed your isolation period.   If you tested positive for COVID-19 but did not have symptoms, you can get vaccinated after 5 full days have passed since you had a positive test, as long as you don't develop symptoms.   How many doses of the vaccine do I need?   Visit  www.cdc.gov/coronavirus/2019-ncov/vaccines/stay-up-to-date.html   to find out how to stay up to date with your COVID-19 vaccines.   I'm immunocompromised. How many doses of the vaccine do I need?   For information on how immunocompromised people can stay up to date with their COVID-19 vaccines, visit www.cdc.gov/coronavirus/2019-ncov/vaccines/recommendations/immuno.html  .   What are the common side effects of the vaccine?   A sore arm, tiredness, headache, and muscle pain may occur within two days of getting the vaccine and last a day or two. For the Moderna or Pfizer vaccines, side effects are more common after the second dose. People over the age of 55 are less likely to have side effects than younger people.   After I'm up to date on vaccines, can I still get or spread COVID?   Yes, you can still get COVID, but your disease should be milder. And your risk of serious illness, hospitalization, and complications will be much lower, especially if you're up to date. Unfortunately, you can still spread COVID if you've been vaccinated. That's why it's important to follow isolation guidelines if you get sick or test positive.   After I'm up to date on vaccines, can I go back to normal?   You should still wear a mask indoors in public if:    It's required by laws, rules, regulations, or local guidance.    You have a weakened immune system.    Your age puts you at increased risk of severe disease.    You have a medical condition that puts you at increased risk of severe disease.    Someone in your household has a weakened immune system, is at increased risk for severe disease, or is unvaccinated.    You're in an area of high transmission.   Where can I get a COVID-19 vaccine?   Visit [facility]'s website for more information. To find a COVID-19 vaccination site near you, visit www.vaccines.gov/  , call [phone]  , or text your zip code to [phone] ([employer] Plus). Message and data rates may apply.   What about travel?    Travel increases your risk of exposure to COVID-19. For more information, see www.cdc.gov/coronavirus/2019-ncov/travelers/index.html  .   I've had close contact with someone who has COVID. Do I need to quarantine, and if so, for how long?   For the most current answer, including a calculator to determine whether you need to stay home and for how long, visit www.cdc.gov/coronavirus/2019-ncov/your-health/quarantine-isolation.html  .   I've tested positive for COVID. How long do I need to isolate?   For the latest recommendations, including a calculator to determine how long you need to stay home, visit www.cdc.gov/coronavirus/2019-ncov/your-health/quarantine-isolation.html  .   What if I develop symptoms that might be from COVID?   For the latest recommendations on what to do if you're sick, including when to seek emergency care, visit www.cdc.gov/coronavirus/2019-ncov/if-you-are-sick/steps-when-sick.html  .   Your provider   Your diagnosis was provided by MIRIAM Tavares, a member of your trusted care team at [facility].   If you have any questions, call us at [phone]  .

## 2023-05-02 DIAGNOSIS — S92.312D CLOSED DISPLACED FRACTURE OF FIRST METATARSAL BONE OF LEFT FOOT WITH ROUTINE HEALING, SUBSEQUENT ENCOUNTER: ICD-10-CM

## 2023-05-02 RX ORDER — NABUMETONE 750 MG/1
TABLET, FILM COATED ORAL
Qty: 60 TABLET | Refills: 1 | Status: SHIPPED | OUTPATIENT
Start: 2023-05-02

## 2023-05-17 ENCOUNTER — OFFICE VISIT (OUTPATIENT)
Dept: FAMILY MEDICINE CLINIC | Facility: CLINIC | Age: 68
End: 2023-05-17
Payer: MEDICARE

## 2023-05-17 VITALS
HEART RATE: 77 BPM | DIASTOLIC BLOOD PRESSURE: 88 MMHG | HEIGHT: 65 IN | BODY MASS INDEX: 41.4 KG/M2 | OXYGEN SATURATION: 98 % | WEIGHT: 248.5 LBS | SYSTOLIC BLOOD PRESSURE: 142 MMHG

## 2023-05-17 DIAGNOSIS — E66.01 MORBIDLY OBESE: Chronic | ICD-10-CM

## 2023-05-17 DIAGNOSIS — J45.20 MILD INTERMITTENT ASTHMA, UNSPECIFIED WHETHER COMPLICATED: Chronic | ICD-10-CM

## 2023-05-17 DIAGNOSIS — E78.2 MIXED HYPERLIPIDEMIA: Chronic | ICD-10-CM

## 2023-05-17 DIAGNOSIS — I10 ESSENTIAL HYPERTENSION, BENIGN: Chronic | ICD-10-CM

## 2023-05-17 DIAGNOSIS — R06.00 DYSPNEA, UNSPECIFIED TYPE: Primary | ICD-10-CM

## 2023-05-17 DIAGNOSIS — Z00.00 MEDICARE ANNUAL WELLNESS VISIT, SUBSEQUENT: ICD-10-CM

## 2023-05-17 PROBLEM — Z12.11 ENCOUNTER FOR SCREENING FOR MALIGNANT NEOPLASM OF COLON: Status: RESOLVED | Noted: 2023-04-26 | Resolved: 2023-05-17

## 2023-05-17 RX ORDER — FLUTICASONE FUROATE AND VILANTEROL 100; 25 UG/1; UG/1
1 POWDER RESPIRATORY (INHALATION)
Qty: 3 EACH | Refills: 3 | Status: SHIPPED | OUTPATIENT
Start: 2023-05-17

## 2023-05-17 NOTE — PROGRESS NOTES
The ABCs of the Annual Wellness Visit  Subsequent Medicare Wellness Visit    Subjective      Mikki Joel is a 68 y.o. female who presents for a Subsequent Medicare Wellness Visit.    The following portions of the patient's history were reviewed and   updated as appropriate: allergies, current medications, past family history, past medical history, past social history, past surgical history and problem list.    Compared to one year ago, the patient feels her physical   health is the same.    Compared to one year ago, the patient feels her mental   health is the same.    Recent Hospitalizations:  She was not admitted to the hospital during the last year.       Current Medical Providers:  Patient Care Team:  Ronald Bowser MD as PCP - General (Family Medicine)    Outpatient Medications Prior to Visit   Medication Sig Dispense Refill   • albuterol sulfate  (90 Base) MCG/ACT inhaler Inhale 2 puffs Every 4 (Four) Hours As Needed for Wheezing.     • atorvastatin (Lipitor) 10 MG tablet Take 1 tablet by mouth Daily. 90 tablet 3   • cetirizine (zyrTEC) 10 MG tablet TAKE 1 TABLET BY MOUTH DAILY 90 tablet 3   • chlorthalidone (HYGROTON) 25 MG tablet TAKE 1 TABLET BY MOUTH DAILY 90 tablet 3   • cyclobenzaprine (FLEXERIL) 5 MG tablet TAKE 1 TABLET BY MOUTH THREE TIMES DAILY AS NEEDED FOR MUSCLE SPASMS 30 tablet 1   • dexlansoprazole (Dexilant) 60 MG capsule Take 1 capsule by mouth Daily. 90 capsule 1   • FeroSul 325 (65 Fe) MG tablet TAKE 1 TABLET BY MOUTH DAILY WITH BREAKFAST 30 tablet 5   • FLUoxetine (PROzac) 40 MG capsule TAKE 1 CAPSULE BY MOUTH DAILY 90 capsule 1   • gabapentin (NEURONTIN) 600 MG tablet Take 2 tablets by mouth Every Night. For Restless Leg Syndrome.  Take 30 min to three hours before bedtime. 60 tablet 2   • hydrOXYzine pamoate (VISTARIL) 50 MG capsule TAKE 1 CAPSULE BY MOUTH THREE TIMES DAILY AS NEEDED FOR ITCHING OR SLEEP 90 capsule 5   • ipratropium (ATROVENT) 0.06 % nasal spray 2 sprays  into the nostril(s) as directed by provider 2 (Two) Times a Day. 3 each 3   • linaclotide (Linzess) 145 MCG capsule capsule Take 1 capsule by mouth Every Morning Before Breakfast. 90 capsule 1   • metoprolol succinate XL (TOPROL-XL) 100 MG 24 hr tablet TAKE 1 TABLET BY MOUTH EVERY NIGHT 90 tablet 3   • nabumetone (RELAFEN) 750 MG tablet TAKE 1 TABLET BY MOUTH TWICE DAILY 60 tablet 1   • olopatadine (PATANASE) 0.6 % solution nasal solution      • ondansetron (ZOFRAN) 8 MG tablet Take 1 tablet by mouth Every 8 (Eight) Hours As Needed for Nausea or Vomiting. 30 tablet 1   • PEG-KCl-NaCl-NaSulf-Na Asc-C (MoviPrep) 100 g reconstituted solution powder Take 1,000 mL by mouth Every 12 (Twelve) Hours. 2000 mL 0   • ramelteon (ROZEREM) 8 MG tablet TAKE 1 TABLET BY MOUTH EVERY EVENING UP TO 3 HOURS BEFORE BEDTIME 30 tablet 2   • sucralfate (CARAFATE) 1 g tablet Take 1 tablet by mouth 4 (Four) Times a Day As Needed (Reflux). 60 tablet 1     No facility-administered medications prior to visit.       No opioid medication identified on active medication list. I have reviewed chart for other potential  high risk medication/s and harmful drug interactions in the elderly.          Aspirin is not on active medication list.  Aspirin use is not indicated based on review of current medical condition/s. Risk of harm outweighs potential benefits.  .    Patient Active Problem List   Diagnosis   • Asthma   • Arthritis   • Nasal turbinate hypertrophy   • Gastroesophageal reflux disease   • Depression with anxiety   • Insomnia   • PTSD (post-traumatic stress disorder)   • Restless leg   • Mixed hearing loss of left ear   • Right knee pain   • Presence of right artificial knee joint   • Essential hypertension, benign   • Presbyesophagus   • Chronic vasomotor rhinitis   • Mixed conductive and sensorineural hearing loss of right ear with restricted hearing of left ear   • Morbidly obese   • Hallux limitus of left foot   • Marijuana use   • Mixed  "hyperlipidemia     Advance Care Planning   Advance Care Planning     Advance Directive is not on file.  ACP discussion was held with the patient during this visit. Patient does not have an advance directive, information provided.     Objective    There were no vitals filed for this visit.  Estimated body mass index is 41.04 kg/m² as calculated from the following:    Height as of 23: 165.1 cm (65\").    Weight as of 23: 112 kg (246 lb 9.6 oz).    Class 3 Severe Obesity (BMI >=40). Obesity-related health conditions include the following: hypertension and dyslipidemias. Obesity is unchanged. BMI is is above average; BMI management plan is completed. We discussed portion control and increasing exercise.      Does the patient have evidence of cognitive impairment?   No            HEALTH RISK ASSESSMENT    Smoking Status:  Social History     Tobacco Use   Smoking Status Never   • Passive exposure: Past   Smokeless Tobacco Never   Tobacco Comments    Never had the desire to try smoking     Alcohol Consumption:  Social History     Substance and Sexual Activity   Alcohol Use Yes   • Alcohol/week: 3.0 standard drinks   • Types: 1 Glasses of wine, 2 Drinks containing 0.5 oz of alcohol per week    Comment: per week maybe if that much     Fall Risk Screen:    JUAN A Fall Risk Assessment was completed, and patient is at LOW risk for falls.Assessment completed on:2023    Depression Screenin/17/2023     2:00 PM   PHQ-2/PHQ-9 Depression Screening   Little Interest or Pleasure in Doing Things 0-->not at all   Feeling Down, Depressed or Hopeless 1-->several days   PHQ-9: Brief Depression Severity Measure Score 1       Health Habits and Functional and Cognitive Screenin/17/2023     2:00 PM   Functional & Cognitive Status   Do you have difficulty preparing food and eating? No   Do you have difficulty bathing yourself, getting dressed or grooming yourself? No   Do you have difficulty using the toilet? No "   Do you have difficulty moving around from place to place? No   Do you have trouble with steps or getting out of a bed or a chair? No   Current Diet Other   Dental Exam Up to date   Eye Exam Up to date   Exercise (times per week) Other   Current Exercises Include Other   Do you need help using the phone?  No   Are you deaf or do you have serious difficulty hearing?  No   Do you need help with transportation? No   Do you need help shopping? No   Do you need help preparing meals?  No   Do you need help with housework?  No   Do you need help with laundry? No   Do you need help taking your medications? No   Do you need help managing money? No   Do you ever drive or ride in a car without wearing a seat belt? No   Have you felt unusual stress, anger or loneliness in the last month? No   Who do you live with? Spouse   If you need help, do you have trouble finding someone available to you? No   Have you been bothered in the last four weeks by sexual problems? No   Do you have difficulty concentrating, remembering or making decisions? No       Age-appropriate Screening Schedule:  Refer to the list below for future screening recommendations based on patient's age, sex and/or medical conditions. Orders for these recommended tests are listed in the plan section. The patient has been provided with a written plan.    Health Maintenance   Topic Date Due   • COVID-19 Vaccine (4 - Booster for Pfizer series) 02/03/2022   • COLORECTAL CANCER SCREENING  01/19/2023   • ANNUAL WELLNESS VISIT  05/23/2023   • INFLUENZA VACCINE  08/01/2023   • LIPID PANEL  10/14/2023   • MAMMOGRAM  11/23/2024   • HEPATITIS C SCREENING  Completed   • Pneumococcal Vaccine 65+  Completed   • PAP SMEAR  Discontinued   • DXA SCAN  Discontinued   • TDAP/TD VACCINES  Discontinued   • ZOSTER VACCINE  Discontinued                  CMS Preventative Services Quick Reference  Risk Factors Identified During Encounter:    Immunizations Discussed/Encouraged: Influenza  and COVID19  Inactivity/Sedentary: Patient was advised to exercise at least 150 minutes a week per CDC recommendations.    The above risks/problems have been discussed with the patient.  Pertinent information has been shared with the patient in the After Visit Summary.    Diagnoses and all orders for this visit:    1. Essential hypertension, benign (Primary)    2. Mixed hyperlipidemia    3. Morbidly obese    4. Mild intermittent asthma, unspecified whether complicated    5. Medicare annual wellness visit, subsequent        Follow Up:   Next Medicare Wellness visit to be scheduled in 1 year.      An After Visit Summary and PPPS were made available to the patient.          Answers for HPI/ROS submitted by the patient on 5/9/2023  Please describe your symptoms.: Blood pressure re-check  Have you had these symptoms before?: Yes  How long have you been having these symptoms?: Greater than 2 weeks  Please list any medications you are currently taking for this condition.: Please see my chart for my medications.  Please describe any probable cause for these symptoms. : Don't know  What is the primary reason for your visit?: Other

## 2023-05-17 NOTE — PROGRESS NOTES
Subjective   Mikki Joel is a 68 y.o. female.  Dilation hypertension morbid obesity asthma allergic rhinitis chronic dysthymia diagnoses below in the interim unfortuneately gained some weight from inactivity from a knee injury.  Blood pressure is borderline with same.  Having some seasonal problems with increased use of albuterol postnasal drip etc.  Supposed be on a maintenance inhaler but ran out.  We need to reorder.  Counseled he also on environmental control measures.    History of Present Illness   HPI    The following portions of the patient's history were reviewed and updated as appropriate: allergies, current medications, past family history, past medical history, past social history, past surgical history and problem list.    Review of Systems  Review of Systems   Constitutional: Negative for activity change, appetite change, fatigue and unexpected weight change.   HENT: Positive for congestion and postnasal drip. Negative for trouble swallowing and voice change.    Eyes: Negative for redness and visual disturbance.   Respiratory: Positive for cough and wheezing (On occasion).    Cardiovascular: Negative for chest pain and palpitations.   Gastrointestinal: Negative for abdominal pain, constipation, diarrhea, nausea and vomiting.   Genitourinary: Negative for urgency.   Musculoskeletal: Negative for joint swelling.   Neurological: Negative for syncope and headaches.   Hematological: Negative for adenopathy.   Psychiatric/Behavioral: Negative for sleep disturbance.       Objective   Physical Exam  Physical Exam  Constitutional:       Appearance: She is well-developed.   HENT:      Right Ear: Tympanic membrane and ear canal normal.      Left Ear: Tympanic membrane, ear canal and external ear normal.      Nose:      Comments: Mild mucosal edema  Eyes:      Pupils: Pupils are equal, round, and reactive to light.   Cardiovascular:      Rate and Rhythm: Normal rate.   Pulmonary:      Effort: Pulmonary  "effort is normal.      Breath sounds: Normal breath sounds.   Abdominal:      General: Bowel sounds are normal.      Palpations: Abdomen is soft.   Musculoskeletal:         General: Normal range of motion.      Cervical back: Normal range of motion.      Comments: Trace edema get up and go 3 to 5-second   Neurological:      Mental Status: She is alert.   Psychiatric:         Attention and Perception: Attention normal.         Mood and Affect: Mood normal.         Speech: Speech normal.         Behavior: Behavior normal.         Thought Content: Thought content normal.         Cognition and Memory: Cognition normal.      Comments: Normal affect           Visit Vitals  /88   Pulse 77   Ht 165.1 cm (65\")   Wt 113 kg (248 lb 8 oz)   LMP  (LMP Unknown)   SpO2 98%   BMI 41.35 kg/m²     Body mass index is 41.35 kg/m².  /88   Pulse 77   Ht 165.1 cm (65\")   Wt 113 kg (248 lb 8 oz)   LMP  (LMP Unknown)   SpO2 98%   BMI 41.35 kg/m²       Assessment/Plan   Diagnoses and all orders for this visit:    1. Essential hypertension, benign (Primary)  -     Comprehensive Metabolic Panel; Future  -     Magnesium; Future    2. Mixed hyperlipidemia  -     Lipid Panel With LDL / HDL Ratio; Future    3. Morbidly obese    4. Mild intermittent asthma, unspecified whether complicated  -     CBC (No Diff); Future    5. Medicare annual wellness visit, subsequent    6. Dyspnea, unspecified type    Obesity counseled strongly on lifestyle measures diet and exercise late-night eating etc.  Counseled on using maintenance inhaler as well as rescue inhaler nasal inhalers etc.  Counseled [place] control measures at home.  Orders as above recheck lab work 6 months.  Answers for HPI/ROS submitted by the patient on 5/9/2023  Please describe your symptoms.: Blood pressure re-check  Have you had these symptoms before?: Yes  How long have you been having these symptoms?: Greater than 2 weeks  Please list any medications you are currently " taking for this condition.: Please see my chart for my medications.  Please describe any probable cause for these symptoms. : Don't know  What is the primary reason for your visit?: Other

## 2023-06-02 DIAGNOSIS — M54.50 ACUTE BILATERAL LOW BACK PAIN WITHOUT SCIATICA: ICD-10-CM

## 2023-06-02 DIAGNOSIS — M76.32 ILIOTIBIAL BAND SYNDROME, LEFT: ICD-10-CM

## 2023-06-02 DIAGNOSIS — M76.31 ILIOTIBIAL BAND SYNDROME, RIGHT: ICD-10-CM

## 2023-06-05 RX ORDER — CYCLOBENZAPRINE HCL 5 MG
TABLET ORAL
Qty: 30 TABLET | Refills: 1 | Status: SHIPPED | OUTPATIENT
Start: 2023-06-05

## 2023-06-06 DIAGNOSIS — F51.04 CHRONIC INSOMNIA: Primary | ICD-10-CM

## 2023-06-06 RX ORDER — RAMELTEON 8 MG/1
8 TABLET ORAL NIGHTLY
Qty: 90 TABLET | Refills: 1 | Status: SHIPPED | OUTPATIENT
Start: 2023-06-06

## 2023-06-06 NOTE — PROGRESS NOTES
Received request for Ramel[place] 8 mg qHS for chronic insomnia.      Having difficulties with getting full month supply.  Given stability on medication, will plan for 90 day supply.      Sent to   [id number] - [place], [place] - [address] [place] [address] AT [facility] OF  [address] - [phone]  - [phone] [address] [address]       Jimmy Kelly II, MD  06/06/23 @ 9:06 AM CDT

## 2023-06-12 ENCOUNTER — HOSPITAL ENCOUNTER (OUTPATIENT)
Facility: HOSPITAL | Age: 68
Setting detail: HOSPITAL OUTPATIENT SURGERY
Discharge: HOME | End: 2023-06-12
Attending: INTERNAL MEDICINE | Admitting: INTERNAL MEDICINE
Payer: MEDICARE

## 2023-06-12 ENCOUNTER — ANESTHESIA (OUTPATIENT)
Dept: GASTROENTEROLOGY | Facility: HOSPITAL | Age: 68
End: 2023-06-12
Payer: MEDICARE

## 2023-06-12 ENCOUNTER — ANESTHESIA EVENT (OUTPATIENT)
Dept: GASTROENTEROLOGY | Facility: HOSPITAL | Age: 68
End: 2023-06-12
Payer: MEDICARE

## 2023-06-12 VITALS
OXYGEN SATURATION: 99 % | HEIGHT: 65 IN | BODY MASS INDEX: 38.32 KG/M2 | HEART RATE: 90 BPM | TEMPERATURE: 97.2 F | SYSTOLIC BLOOD PRESSURE: 136 MMHG | WEIGHT: 230 LBS | DIASTOLIC BLOOD PRESSURE: 60 MMHG | RESPIRATION RATE: 20 BRPM

## 2023-06-12 DIAGNOSIS — Z12.11 ENCOUNTER FOR SCREENING FOR MALIGNANT NEOPLASM OF COLON: ICD-10-CM

## 2023-06-12 PROCEDURE — 25010000002 PROPOFOL 10 MG/ML EMULSION: Performed by: NURSE ANESTHETIST, CERTIFIED REGISTERED

## 2023-06-12 PROCEDURE — G0121 COLON CA SCRN NOT HI RSK IND: HCPCS | Performed by: INTERNAL MEDICINE

## 2023-06-12 RX ORDER — PROPOFOL 10 MG/ML
VIAL (ML) INTRAVENOUS AS NEEDED
Status: DISCONTINUED | OUTPATIENT
Start: 2023-06-12 | End: 2023-06-12 | Stop reason: SURG

## 2023-06-12 RX ORDER — DEXTROSE AND SODIUM CHLORIDE 5; .45 G/100ML; G/100ML
30 INJECTION, SOLUTION INTRAVENOUS CONTINUOUS PRN
Status: DISCONTINUED | OUTPATIENT
Start: 2023-06-12 | End: 2023-06-12 | Stop reason: HOSPADM

## 2023-06-12 RX ADMIN — PROPOFOL 20 MG: 10 INJECTION, EMULSION INTRAVENOUS at 15:09

## 2023-06-12 RX ADMIN — PROPOFOL 50 MG: 10 INJECTION, EMULSION INTRAVENOUS at 15:04

## 2023-06-12 RX ADMIN — PROPOFOL 30 MG: 10 INJECTION, EMULSION INTRAVENOUS at 15:05

## 2023-06-12 RX ADMIN — PROPOFOL 40 MG: 10 INJECTION, EMULSION INTRAVENOUS at 15:11

## 2023-06-12 RX ADMIN — DEXTROSE AND SODIUM CHLORIDE 30 ML/HR: 5; 450 INJECTION, SOLUTION INTRAVENOUS at 14:37

## 2023-06-12 RX ADMIN — PROPOFOL 20 MG: 10 INJECTION, EMULSION INTRAVENOUS at 15:07

## 2023-06-12 RX ADMIN — PROPOFOL 100 MG: 10 INJECTION, EMULSION INTRAVENOUS at 15:03

## 2023-06-12 NOTE — H&P
No chief complaint on file.      Subjective    Mikki Joel is a 68 y.o. female. she is here today for follow-up.                                                                  Assessment & Plan                                     No diagnosis found.    Plan; schedule patient for screening colonoscopy for surveillance.  Continue Dexilant daily avoid gastric irritants follow standard antireflux measures.  Continue Linzess daily encourage high-fiber diet physical activity as tolerated, adequate fluid consumption daily.    Follow-up: No follow-ups on file.     HPI I agree with the current note with no changes in the history.  Risks and benefits discussed with patient. Patient understands these and would like to proceed with procedure.    68-year-old female presents to discuss GERD, constipation and need for screening colonoscopy.  Reports medication regimen is working well for her denies any GI complaints at this time.  Bowel movements are typically 3 times a day and will go to 3 days without a bowel movement denies any melena or hematochezia.  Previous colonoscopy was completed 2018 with repeat recommended in 5 years for surveillance.  Denies any family history of colon cancer.  Reports reflux is well controlled Dexilant but occasionally has some nausea would like refill of Zofran.  States she only uses Carafate as needed does not need a refill and rarely takes it    Review of Systems  Review of Systems   Constitutional:  Positive for fatigue. Negative for activity change, appetite change, chills, diaphoresis, fever and unexpected weight change.   HENT:  Negative for sore throat and trouble swallowing.    Respiratory:  Negative for shortness of breath.    Gastrointestinal:  Positive for abdominal pain (controlled with dexilant ), constipation (controlled with linzess) and nausea (occ.). Negative  "for abdominal distention, anal bleeding, blood in stool, diarrhea, rectal pain and vomiting.   Musculoskeletal:  Negative for arthralgias.   Skin:  Negative for pallor.   Neurological:  Negative for light-headedness.     BP (!) 202/98   Pulse 96   Temp 98.6 °F (37 °C)   Resp 20   Ht 165.1 cm (65\")   Wt 104 kg (230 lb)   LMP  (LMP Unknown)   SpO2 97%   BMI 38.27 kg/m²     Objective      Physical Exam  Constitutional:       General: She is not in acute distress.     Appearance: Normal appearance. She is normal weight. She is not ill-appearing.   HENT:      Head: Normocephalic and atraumatic.   Pulmonary:      Effort: Pulmonary effort is normal.   Abdominal:      General: Abdomen is flat. Bowel sounds are normal. There is no distension.      Palpations: Abdomen is soft. There is no mass.      Tenderness: There is no abdominal tenderness.   Neurological:      Mental Status: She is alert.             The following portions of the patient's history were reviewed and updated as appropriate:   Past Medical History:   Diagnosis Date    Allergic rhinitis All my life    chronic infection    Anxiety     Asthma     Chronic anemia     Chronic pain     CTS (carpal tunnel syndrome)     Deaf     Depression with anxiety     Fracture of wrist     Gastroesophageal reflux disease     Hypertension     Migraine     Obesity     Osteoarthritis     Perforated tympanic membrane, bilateral     Plantar fasciitis     PONV (postoperative nausea and vomiting)     Sleep apnea     sleep with c-pap    Tinnitus      Past Surgical History:   Procedure Laterality Date    ADENOIDECTOMY  1959    COLONOSCOPY N/A 01/19/2018    EAR TUBES      ENDOSCOPIC FUNCTIONAL SINUS SURGERY (FESS) Bilateral 09/05/2017    ENDOSCOPY N/A 01/19/2018    ENDOSCOPY N/A 06/10/2019    KNEE ARTHROPLASTY Right     LAPAROSCOPIC TUBAL LIGATION      RHINOPLASTY      SINUS SURGERY  1988, 2002, 2006, 2018    TOE FUSION Left 08/12/2021    Procedure: LEFT FIRST METATARSOPHALANGEAL " JOINT ARTHRODESIS;  Surgeon: Brando Brice DPM;  Location: [facility];  Service: Podiatry;  Laterality: Left;    TONSILLECTOMY  195    TOTAL KNEE ARTHROPLASTY Right     TOTAL SHOULDER ARTHROPLASTY Right     TOTAL SHOULDER ARTHROPLASTY Right     TYMPANOPLASTY Left 2018    TYMPANOPLASTY Right 2020    Procedure: TYMPANOPLASTY AND  CARTILAGE GRAFT;  Surgeon: Ramy Gaston MD;  Location: [facility];  Service: ENT;  Laterality: Right;    UPPER GASTROINTESTINAL ENDOSCOPY       Family History   Problem Relation Age of Onset    Hypertension Mother     Osteoarthritis Mother     Alcohol abuse Father     Heart disease Father     Cancer Father     Osteoarthritis Father     Stroke Maternal Grandmother     Irritable bowel syndrome Sister     Osteoporosis Maternal Grandfather      OB History          2    Para   2    Term   2            AB        Living             SAB        IAB        Ectopic        Molar        Multiple        Live Births                  Allergies   Allergen Reactions    Codeine Other (See Comments)     Increases heart rate    Sulfa Antibiotics Hives     Social History     Socioeconomic History    Marital status:    Tobacco Use    Smoking status: Never     Passive exposure: Past    Smokeless tobacco: Never    Tobacco comments:     Never had the desire to try smoking   Vaping Use    Vaping Use: Never used   Substance and Sexual Activity    Alcohol use: Yes     Alcohol/week: 3.0 standard drinks     Types: 1 Glasses of wine, 2 Drinks containing 0.5 oz of alcohol per week     Comment: per week maybe if that much    Drug use: Yes     Frequency: 5.0 times per week     Types: Marijuana    Sexual activity: Defer     Current Medications:  Prior to Admission medications    Medication Sig Start Date End Date Taking? Authorizing Provider   albuterol sulfate  (90 Base) MCG/ACT inhaler Inhale 2 puffs Every 4 (Four) Hours As Needed for Wheezing.   Yes Provider, MD Lucía    atorvastatin (Lipitor) 10 MG tablet Take 1 tablet by mouth Daily. 10/18/22  Yes Ronald Bowser MD   cetirizine (zyrTEC) 10 MG tablet TAKE 1 TABLET BY MOUTH DAILY 1/23/23  Yes Ronald Bowser MD   chlorthalidone (HYGROTON) 25 MG tablet TAKE 1 TABLET BY MOUTH DAILY 10/19/22  Yes Ronald Bowser MD   cyclobenzaprine (FLEXERIL) 5 MG tablet TAKE 1 TABLET BY MOUTH THREE TIMES DAILY AS NEEDED FOR MUSCLE SPASMS 4/20/23  Yes Ronald Bowser MD   dexlansoprazole (Dexilant) 60 MG capsule Take 1 capsule by mouth Daily. 4/26/23  Yes Zelda Rich APRN   FeroSul 325 (65 Fe) MG tablet TAKE 1 TABLET BY MOUTH DAILY WITH BREAKFAST 2/3/23  Yes Mila Lang APRN   FLUoxetine (PROzac) 40 MG capsule TAKE 1 CAPSULE BY MOUTH DAILY 3/20/23  Yes Ronald Bowser MD   gabapentin (NEURONTIN) 600 MG tablet Take 2 tablets by mouth Every Night. For Restless Leg Syndrome.  Take 30 min to three hours before bedtime. 3/31/23  Yes Jimmy Kelly II, MD   hydrOXYzine pamoate (VISTARIL) 50 MG capsule TAKE 1 CAPSULE BY MOUTH THREE TIMES DAILY AS NEEDED FOR ITCHING OR SLEEP 1/27/23  Yes Ronald Bowser MD   ipratropium (ATROVENT) 0.06 % nasal spray 2 sprays into the nostril(s) as directed by provider 2 (Two) Times a Day. 10/18/22  Yes Ronald Bowser MD   linaclotide (Linzess) 145 MCG capsule capsule Take 1 capsule by mouth Every Morning Before Breakfast. 4/26/23  Yes Zelda Rich APRN   metoprolol succinate XL (TOPROL-XL) 100 MG 24 hr tablet TAKE 1 TABLET BY MOUTH EVERY NIGHT 10/31/22  Yes Ronald Bowser MD   nabumetone (RELAFEN) 750 MG tablet TAKE 1 TABLET BY MOUTH TWICE DAILY 3/20/23  Yes Brando Brice DPM   olopatadine (PATANASE) 0.6 % solution nasal solution  10/25/21  Yes Lucía Greene MD   ondansetron (ZOFRAN) 8 MG tablet Take 1 tablet by mouth Every 8 (Eight) Hours As Needed for Nausea or Vomiting. 4/26/23  Yes Zelda Rich APRN   ramelteon (ROZEREM) 8 MG tablet TAKE 1 TABLET BY MOUTH EVERY EVENING UP TO 3 HOURS BEFORE  BEDTIME 2/26/23  Yes Jimmy Kelly II, MD   sucralfate (CARAFATE) 1 g tablet Take 1 tablet by mouth 4 (Four) Times a Day As Needed (Reflux). 10/28/20  Yes Zelda Rich APRN   dexlansoprazole (Dexilant) 60 MG capsule Take 1 capsule by mouth Daily. 3/22/23 4/26/23 Yes Zelda Rich APRN   linaclotide (Linzess) 145 MCG capsule capsule Take 1 capsule by mouth Every Morning Before Breakfast. 11/4/22 4/26/23 Yes Zelda Rich APRN   ondansetron (ZOFRAN) 8 MG tablet Take 1 tablet by mouth Every 8 (Eight) Hours As Needed for Nausea or Vomiting. 3/13/23 4/26/23 Yes Zelda Rich APRN   PEG-KCl-NaCl-NaSulf-Na Asc-C (MoviPrep) 100 g reconstituted solution powder Take 1,000 mL by mouth Every 12 (Twelve) Hours. 4/26/23   Zelda Rich APRN     Orders placed during this encounter include:  No orders of the defined types were placed in this encounter.    COLONOSCOPY (N/A)  No orders of the defined types were placed in this encounter.        Review and/or summary of lab tests, radiology, procedures, medications. Review and summary of old records and obtaining of history. The risks and benefits of my recommendations, as well as other treatment options were discussed . Any questions/concerned were answered. Patient voiced understanding and agreement.          This document has been electronically signed by Ravi Cavanaugh MD on June 12, 2023 14:30 CDT                                               Results for orders placed or performed in visit on 10/14/22   Lipid Panel With LDL / HDL Ratio    Specimen: Blood   Result Value Ref Range    Total Cholesterol 237 (H) 0 - 200 mg/dL    Triglycerides 158 (H) 0 - 150 mg/dL    HDL Cholesterol 47 40 - 60 mg/dL    LDL Cholesterol  161 (H) 0 - 100 mg/dL    VLDL Cholesterol 29 5 - 40 mg/dL    LDL/HDL Ratio 3.37    Magnesium    Specimen: Blood   Result Value Ref Range    Magnesium 1.9 1.6 - 2.4 mg/dL   Comprehensive Metabolic Panel    Specimen: Blood   Result Value Ref Range    Glucose 111  (H) 65 - 99 mg/dL    BUN 15 8 - 23 mg/dL    Creatinine 1.35 (H) 0.57 - 1.00 mg/dL    Sodium 138 136 - 145 mmol/L    Potassium 4.2 3.5 - 5.2 mmol/L    Chloride 99 98 - 107 mmol/L    CO2 27.1 22.0 - 29.0 mmol/L    Calcium 8.5 (L) 8.6 - 10.5 mg/dL    Total Protein 6.8 6.0 - 8.5 g/dL    Albumin 4.70 3.50 - 5.20 g/dL    ALT (SGPT) 22 1 - 33 U/L    AST (SGOT) 15 1 - 32 U/L    Alkaline Phosphatase 120 (H) 39 - 117 U/L    Total Bilirubin <0.2 0.0 - 1.2 mg/dL    Globulin 2.1 gm/dL    A/G Ratio 2.2 g/dL    BUN/Creatinine Ratio 11.1 7.0 - 25.0    Anion Gap 11.9 5.0 - 15.0 mmol/L    eGFR 42.9 (L) >60.0 mL/min/1.73   Results for orders placed or performed during the hospital encounter of 08/12/21   Urine Drug Screen - Urine, Clean Catch    Specimen: Urine, Clean Catch   Result Value Ref Range    THC, Screen, Urine Positive (A) Negative    Phencyclidine (PCP), Urine Negative Negative    Cocaine Screen, Urine Negative Negative    Methamphetamine, Ur Negative Negative    Opiate Screen Negative Negative    Amphetamine Screen, Urine Negative Negative    Benzodiazepine Screen, Urine Negative Negative    Tricyclic Antidepressants Screen Negative Negative    Methadone Screen, Urine Negative Negative    Barbiturates Screen, Urine Negative Negative    Oxycodone Screen, Urine Negative Negative    Propoxyphene Screen Negative Negative    Buprenorphine, Screen, Urine Negative Negative   Results for orders placed or performed in visit on 08/10/21   ECG 12 Lead   Result Value Ref Range    QT Interval 410 ms    QTC Interval 419 ms   Results for orders placed or performed in visit on 08/10/21   COVID-19, [facility] IN-HOUSE, NP SWAB IN TRANSPORT MEDIA 8-10 HR TAT - Swab, Nasopharynx    Specimen: Nasopharynx; Swab   Result Value Ref Range    COVID19 Not Detected Not Detected - Ref. Range   Results for orders placed or performed in visit on 04/20/21   Lipid Panel With LDL / HDL Ratio    Specimen: Blood   Result Value Ref Range    Total Cholesterol  210 (H) 0 - 200 mg/dL    Triglycerides 207 (H) 0 - 150 mg/dL    HDL Cholesterol 42 40 - 60 mg/dL    LDL Cholesterol  131 (H) 0 - 100 mg/dL    VLDL Cholesterol 37 5 - 40 mg/dL    LDL/HDL Ratio 3.01    Magnesium    Specimen: Blood   Result Value Ref Range    Magnesium 2.2 1.6 - 2.4 mg/dL   Comprehensive Metabolic Panel    Specimen: Blood   Result Value Ref Range    Glucose 99 65 - 99 mg/dL    BUN 22 8 - 23 mg/dL    Creatinine 1.31 (H) 0.57 - 1.00 mg/dL    Sodium 142 136 - 145 mmol/L    Potassium 4.3 3.5 - 5.2 mmol/L    Chloride 103 98 - 107 mmol/L    CO2 26.4 22.0 - 29.0 mmol/L    Calcium 9.3 8.6 - 10.5 mg/dL    Total Protein 6.7 6.0 - 8.5 g/dL    Albumin 4.50 3.50 - 5.20 g/dL    ALT (SGPT) 20 1 - 33 U/L    AST (SGOT) 14 1 - 32 U/L    Alkaline Phosphatase 123 (H) 39 - 117 U/L    Total Bilirubin 0.4 0.0 - 1.2 mg/dL    eGFR Non African Amer 40 (L) >60 mL/min/1.73    Globulin 2.2 gm/dL    A/G Ratio 2.0 g/dL    BUN/Creatinine Ratio 16.8 7.0 - 25.0    Anion Gap 12.6 5.0 - 15.0 mmol/L   Results for orders placed or performed in visit on 01/04/21   Wound Culture - Wound, Nares    Specimen: Nares; Wound   Result Value Ref Range    Wound Culture Light growth (2+) Klebsiella oxytoca (A)     Gram Stain Rare (1+) WBCs seen     Gram Stain No organisms seen        Susceptibility    Klebsiella oxytoca - CHAR*     Ampicillin  Resistant ug/ml     Ampicillin + Sulbactam  Intermediate ug/ml     Cefepime  Susceptible ug/ml     Ceftazidime  Susceptible ug/ml     Ceftriaxone  Susceptible ug/ml     Gentamicin  Susceptible ug/ml     Levofloxacin  Susceptible ug/ml     Piperacillin + Tazobactam  Susceptible ug/ml     Tetracycline  Susceptible ug/ml     Trimethoprim + Sulfamethoxazole  Susceptible ug/ml     * Cefazolin sensitivity will not be reported for Enterobacteriaceae in non-urine isolates. If cefazolin is preferred, please call the microbiology lab to request an E-test.  With the exception of urinary-sourced infections, aminoglycosides  should not be used as monotherapy.   Results for orders placed or performed in visit on 12/15/20   Iron Profile    Specimen: Blood   Result Value Ref Range    Iron 96 37 - 145 mcg/dL    Iron Saturation (TSAT) 28 20 - 50 %    Transferrin 234 200 - 360 mg/dL    TIBC 349 298 - 536 mcg/dL   Ferritin Level    Specimen: Blood   Result Value Ref Range    Ferritin 200.00 (H) 13.00 - 150.00 ng/mL   Results for orders placed or performed in visit on 08/26/20   COVID-19,  [facility] IN-HOUSE, NP SWAB IN TRANSPORT MEDIA 8-10 HR TAT - Swab, Nasopharynx    Specimen: Nasopharynx; Swab   Result Value Ref Range    COVID19 Not Detected Not Detected - Ref. Range   Results for orders placed or performed during the hospital encounter of 02/10/20   Gold Top - SST   Result Value Ref Range    Extra Tube Hold for add-ons.    Urinalysis With Microscopic If Indicated (No Culture) - Urine, Clean Catch    Specimen: Urine, Clean Catch   Result Value Ref Range    Color, UA Yellow Yellow, Straw, Dark Yellow, Rut    Appearance, UA Clear Clear    pH, UA 6.0 5.0 - 9.0    Specific Gravity, UA 1.002 (L) 1.003 - 1.030    Glucose, UA Negative Negative    Ketones, UA Negative Negative    Bilirubin, UA Negative Negative    Blood, UA Negative Negative    Protein, UA Negative Negative    Leuk Esterase, UA Negative Negative    Nitrite, UA Negative Negative    Urobilinogen, UA 0.2 E.U./dL 0.2 - 1.0 E.U./dL   CBC Auto Differential    Specimen: Blood   Result Value Ref Range    WBC 8.97 3.40 - 10.80 10*3/mm3    RBC 4.14 3.77 - 5.28 10*6/mm3    Hemoglobin 10.9 (L) 12.0 - 15.9 g/dL    Hematocrit 33.7 (L) 34.0 - 46.6 %    MCV 81.4 79.0 - 97.0 fL    MCH 26.3 (L) 26.6 - 33.0 pg    MCHC 32.3 31.5 - 35.7 g/dL    RDW 13.4 12.3 - 15.4 %    RDW-SD 39.4 37.0 - 54.0 fl    MPV 9.2 6.0 - 12.0 fL    Platelets 303 140 - 450 10*3/mm3    Neutrophil % 79.3 (H) 42.7 - 76.0 %    Lymphocyte % 12.0 (L) 19.6 - 45.3 %    Monocyte % 5.6 5.0 - 12.0 %    Eosinophil % 2.1 0.3 - 6.2 %     Basophil % 0.6 0.0 - 1.5 %    Immature Grans % 0.4 0.0 - 0.5 %    Neutrophils, Absolute 7.11 (H) 1.70 - 7.00 10*3/mm3    Lymphocytes, Absolute 1.08 0.70 - 3.10 10*3/mm3    Monocytes, Absolute 0.50 0.10 - 0.90 10*3/mm3    Eosinophils, Absolute 0.19 0.00 - 0.40 10*3/mm3    Basophils, Absolute 0.05 0.00 - 0.20 10*3/mm3    Immature Grans, Absolute 0.04 0.00 - 0.05 10*3/mm3    nRBC 0.0 0.0 - 0.2 /100 WBC     *Note: Due to a large number of results and/or encounters for the requested time period, some results have not been displayed. A complete set of results can be found in Results Review.

## 2023-06-12 NOTE — ANESTHESIA POSTPROCEDURE EVALUATION
Patient: Mikki Joel    Procedure Summary       Date: 06/12/23 Room / Location: [facility] ENDOSCOPY 1 / [facility]    Anesthesia Start: 1459 Anesthesia Stop: 1524    Procedure: COLONOSCOPY Diagnosis:       Encounter for screening for malignant neoplasm of colon      (Encounter for screening for malignant neoplasm of colon [Z12.11])    Surgeons: Ravi Cavanaugh MD Provider: Efren Esteban CRNA    Anesthesia Type: general with block ASA Status: 3            Anesthesia Type: general with block    Vitals  No vitals data found for the desired time range.          Post Anesthesia Care and Evaluation    Patient location during evaluation: PHASE II  Patient participation: waiting for patient participation  Level of consciousness: sleepy but conscious  Pain management: adequate    Airway patency: patent  Anesthetic complications: No anesthetic complications  PONV Status: none  Cardiovascular status: acceptable  Respiratory status: acceptable, spontaneous ventilation and room air  Hydration status: acceptable

## 2023-06-12 NOTE — ANESTHESIA PREPROCEDURE EVALUATION
Anesthesia Evaluation     history of anesthetic complications:  PONV  NPO Solid Status: > 8 hours  NPO Liquid Status: > 2 hours           Airway   Mallampati: II  TM distance: >3 FB  Neck ROM: full  Possible difficult intubation  Dental    (+) poor dentition        Pulmonary - normal exam    breath sounds clear to auscultation  (+) asthma,sleep apnea on CPAP  (-) not a smoker    ROS comment: snores  Cardiovascular - normal exam    ECG reviewed  Patient on routine beta blocker and Beta blocker given within 24 hours of surgery  Rhythm: regular  Rate: normal    (+) hypertension poorly controlled less than 2 medications  (-) valvular problems/murmurs, dysrhythmias, angina, cardiac stents, DVT, hyperlipidemia    ROS comment: Normal sinus rhythm  Normal ECG  When compared with ECG of 10-FEB-2020 14:54,  Fusion complexes are no longer Present  T wave inversion no longer evident in Lateral leads    Neuro/Psych  (+) headaches (migraines weekly), psychiatric history Anxiety and Depression  (-) seizures, TIA, CVA, weakness, numbness  GI/Hepatic/Renal/Endo    (+) obesity, GERD well controlled  (-) hepatitis, liver disease, no renal disease, diabetes, no thyroid disorder    Musculoskeletal     (+) arthralgias, chronic pain  Abdominal   (+) obese   Substance History   (+) alcohol use (occ), drug use (marijuana)     OB/GYN          Other   arthritis,     (-) history of cancer  ROS/Med Hx Other: Gabapentin used as a sleep aid                  Anesthesia Plan    ASA 3     general with block     (Wang, scop patch  Popliteal nerve block discussed and patient agrees to proceed)  intravenous induction     Anesthetic plan, risks, benefits, and alternatives have been provided, discussed and informed consent has been obtained with: patient and spouse/significant other.

## 2023-06-16 DIAGNOSIS — F51.04 CHRONIC INSOMNIA: ICD-10-CM

## 2023-06-16 DIAGNOSIS — J30.89 SEASONAL ALLERGIC RHINITIS DUE TO OTHER ALLERGIC TRIGGER: ICD-10-CM

## 2023-06-16 RX ORDER — IPRATROPIUM BROMIDE 42 UG/1
SPRAY, METERED NASAL
Qty: 45 ML | Refills: 0 | Status: SHIPPED | OUTPATIENT
Start: 2023-06-16

## 2023-06-19 RX ORDER — HYDROXYZINE PAMOATE 50 MG/1
CAPSULE ORAL
Qty: 90 CAPSULE | Refills: 5 | Status: SHIPPED | OUTPATIENT
Start: 2023-06-19

## 2023-07-26 ENCOUNTER — OFFICE VISIT (OUTPATIENT)
Dept: OTOLARYNGOLOGY | Facility: CLINIC | Age: 68
End: 2023-07-26
Payer: MEDICARE

## 2023-07-26 VITALS — WEIGHT: 246 LBS | BODY MASS INDEX: 40.98 KG/M2 | HEIGHT: 65 IN | HEART RATE: 117 BPM | OXYGEN SATURATION: 97 %

## 2023-07-26 DIAGNOSIS — J30.89 SEASONAL ALLERGIC RHINITIS DUE TO OTHER ALLERGIC TRIGGER: ICD-10-CM

## 2023-07-26 DIAGNOSIS — J32.8 OTHER CHRONIC SINUSITIS: Primary | ICD-10-CM

## 2023-07-26 DIAGNOSIS — Z51.6 ALLERGY DESENSITIZATION THERAPY: ICD-10-CM

## 2023-07-26 PROCEDURE — 31231 NASAL ENDOSCOPY DX: CPT | Performed by: OTOLARYNGOLOGY

## 2023-07-26 PROCEDURE — 1159F MED LIST DOCD IN RCRD: CPT | Performed by: OTOLARYNGOLOGY

## 2023-07-26 PROCEDURE — 1160F RVW MEDS BY RX/DR IN RCRD: CPT | Performed by: OTOLARYNGOLOGY

## 2023-07-26 PROCEDURE — 99214 OFFICE O/P EST MOD 30 MIN: CPT | Performed by: OTOLARYNGOLOGY

## 2023-07-26 RX ORDER — BROMFENAC SODIUM 0.7 MG/ML
SOLUTION/ DROPS OPHTHALMIC
COMMUNITY
Start: 2023-07-03

## 2023-07-26 RX ORDER — LOTEPREDNOL ETABONATE 3.8 MG/G
GEL OPHTHALMIC
COMMUNITY
Start: 2023-07-06

## 2023-07-26 RX ORDER — EPINEPHRINE 0.3 MG/.3ML
0.3 INJECTION SUBCUTANEOUS ONCE
Qty: 1 EACH | Refills: 1 | Status: SHIPPED | OUTPATIENT
Start: 2023-07-26 | End: 2023-07-26

## 2023-07-26 RX ORDER — TOBRAMYCIN 3 MG/ML
SOLUTION/ DROPS OPHTHALMIC
COMMUNITY
Start: 2023-07-03

## 2023-07-26 NOTE — PROGRESS NOTES
Follow up Regarding: Chronic sinusitis    Assessment and Plan:  68-year-old female with chronic sinusitis and allergic rhinitis, sinusitis controlled today, allergies uncontrolled-persistent itching despite maximal medical therapy.    -Did discuss the risks, benefits, and alternative is to allergy testing, we discussed that she will need to hold her hydroxyzine and her Zyrtec for 2 weeks prior to testing.  We also discussed that her metoprolol would need to be changed to a non-beta-blocking medication to prevent unopposed alpha if epinephrine were needed for anaphylactic reaction.  She should talk to her primary care physician about changing this medication to a different class of antihypertensive    -EpiPen prescribed    -Continue twice daily budesonide rinses    -Follow-up in 2 weeks for allergy testing    -Pending allergy testing we will recheck her nose in 6 months    History of present illness/Interval history:    Ms. Diaz is a 68-year-old female with allergic rhinitis and chronic sinusitis presenting today for reevaluation.  She states that she has been doing mupirocin and budesonide irrigations twice daily and is taking an oral antihistamine.  She notes good control with mild intermittent bad smell but now significant crusting or drainage from the nose.  Sense of smell and taste remain intact.  She notes persistent general itching if she is outside for moderate or long periods.  And significant itching of the eyes and itching of the nose despite maximal medication treatment of allergies.  She has not had a will sinus infection since she was last seen.  She has no other acute or new concerns today.    Physical Exam:  General: NAD, awake and alert  Head: normocephalic, atraumatic  Eyes: EOMI, sclerae white, conjunctivae pink  Ears: pinnae intact without masses or lesions, bilateral hearing aids in place  Nose: external straight without deformity  See endoscopy note for details  OC/OP: mucosa moist and  pink,  Neuro: no focal deficits    Procedure: Rigid Nasal Endoscopy  Indications: Chronic sinusitis  Anesthesia: Local  Surgeon: Marlene Bauer MD  Estimated Blood Loss: none  Complications: none     Description:  Rigid nasal endoscopy - 0 degree endoscope(s)  Informed consent was verbally obtained.  The nose was decongested with topical Neosynephrine and anesthetized with 4% lidocaine solution.  The endoscope was then placed into bilateral naris and advanced to the nasopharynx.  The endoscope was removed and advanced into the middle meatus. The endoscope was finally withdrawn at the conclusion of the exam.  Findings are listed below:     Nasal cavity:  No masses or lesions  Middle meatus: No polyps.  Uncinate persistent right greater than left. Ethmoids partially absent bilaterally, no crusting or purulence  Mucosa:   All operated sinuses appear patent, mild edema within the left middle meatus without polyps.  Turbinates: Middle normal.  Inferior normal and decongests nicely   Septum: Midline there is a small anterior inferior perforation without crusting  Nasopharynx: Normal adenoid pad and normal Eustachian tube orifices    Vital Signs   Vitals:    07/26/23 1303   Pulse: 117   SpO2: 97%     Results Review:  Previous clinic visit from 4/24/2023 reviewed today and demonstrates at that time resolution of previous sinusitis and evidence of prior surgery.  Mild edema was noted in the left middle meatus without polyps.  Recommendations were to add budesonide to her twice daily irrigations, stop Flonase, and to follow-up in 3 months to recheck.    Histories:  Allergies   Allergen Reactions    Codeine Other (See Comments)     Increases heart rate    Sulfa Antibiotics Hives       Prior to Admission medications    Medication Sig Start Date End Date Taking? Authorizing Provider   dexlansoprazole (Dexilant) 60 MG capsule Take 1 capsule by mouth Daily. 3/22/23   Zelda Rich APRN   albuterol sulfate  (90 Base) MCG/ACT  inhaler Inhale 2 puffs Every 4 (Four) Hours As Needed for Wheezing.    Provider, MD Lucía   atorvastatin (Lipitor) 10 MG tablet Take 1 tablet by mouth Daily. 10/18/22   Ronald Bowser MD   cetirizine (zyrTEC) 10 MG tablet TAKE 1 TABLET BY MOUTH DAILY 1/23/23   Ronald Bowser MD   chlorthalidone (HYGROTON) 25 MG tablet TAKE 1 TABLET BY MOUTH DAILY 10/19/22   Ronald Bowser MD   cyclobenzaprine (FLEXERIL) 5 MG tablet TAKE 1 TABLET BY MOUTH THREE TIMES DAILY AS NEEDED FOR MUSCLE SPASMS 4/20/23   Ronald Bowser MD   FeroSul 325 (65 Fe) MG tablet TAKE 1 TABLET BY MOUTH DAILY WITH BREAKFAST 2/3/23   Mila Lang APRN   FLUoxetine (PROzac) 40 MG capsule TAKE 1 CAPSULE BY MOUTH DAILY 3/20/23   Ronald Bowser MD   gabapentin (NEURONTIN) 600 MG tablet Take 2 tablets by mouth Every Night. For Restless Leg Syndrome.  Take 30 min to three hours before bedtime. 3/31/23   Jimmy Kelly II, MD   hydrOXYzine pamoate (VISTARIL) 50 MG capsule TAKE 1 CAPSULE BY MOUTH THREE TIMES DAILY AS NEEDED FOR ITCHING OR SLEEP 1/27/23   Ronald Bowser MD   ipratropium (ATROVENT) 0.06 % nasal spray 2 sprays into the nostril(s) as directed by provider 2 (Two) Times a Day. 10/18/22   Ronald Bowser MD   linaclotide (Linzess) 145 MCG capsule capsule Take 1 capsule by mouth Every Morning Before Breakfast. 11/4/22   Zelda Rich APRN   metoprolol succinate XL (TOPROL-XL) 100 MG 24 hr tablet TAKE 1 TABLET BY MOUTH EVERY NIGHT 10/31/22   Ronald Bowser MD   nabumetone (RELAFEN) 750 MG tablet TAKE 1 TABLET BY MOUTH TWICE DAILY 3/20/23   Brando Brice DPM   olopatadine (PATANASE) 0.6 % solution nasal solution  10/25/21   ProviderLucía MD   ondansetron (ZOFRAN) 8 MG tablet Take 1 tablet by mouth Every 8 (Eight) Hours As Needed for Nausea or Vomiting. 3/13/23   Zelda Rich APRN   ramelteon (ROZEREM) 8 MG tablet TAKE 1 TABLET BY MOUTH EVERY EVENING UP TO 3 HOURS BEFORE BEDTIME 2/26/23   Jimmy Kelly II, MD    sucralfate (CARAFATE) 1 g tablet Take 1 tablet by mouth 4 (Four) Times a Day As Needed (Reflux). 10/28/20   Zelda Rich APRN       Past Medical History:   Diagnosis Date    Allergic rhinitis All my life    chronic infection    Anxiety     Asthma     Chronic anemia     Chronic pain     CTS (carpal tunnel syndrome)     Deaf     Depression with anxiety     Fracture of wrist     Gastroesophageal reflux disease     Hypertension     Migraine     Obesity     Osteoarthritis     Perforated tympanic membrane, bilateral     Plantar fasciitis     PONV (postoperative nausea and vomiting)     Sleep apnea     sleep with c-pap    Tinnitus        Past Surgical History:   Procedure Laterality Date    ADENOIDECTOMY  1959    COLONOSCOPY N/A 01/19/2018    COLONOSCOPY N/A 6/12/2023    Procedure: COLONOSCOPY;  Surgeon: Ravi Cavanaugh MD;  Location: [facility] ENDOSCOPY;  Service: Gastroenterology;  Laterality: N/A;    EAR TUBES      ENDOSCOPIC FUNCTIONAL SINUS SURGERY (FESS) Bilateral 09/05/2017    ENDOSCOPY N/A 01/19/2018    ENDOSCOPY N/A 06/10/2019    KNEE ARTHROPLASTY Right     LAPAROSCOPIC TUBAL LIGATION      RHINOPLASTY      SINUS SURGERY  1988, 2002, 2006, 2018    TOE FUSION Left 08/12/2021    Procedure: LEFT FIRST METATARSOPHALANGEAL JOINT ARTHRODESIS;  Surgeon: Brando Brice DPM;  Location: [facility] OR;  Service: Podiatry;  Laterality: Left;    TONSILLECTOMY  1959    TOTAL KNEE ARTHROPLASTY Right     TOTAL SHOULDER ARTHROPLASTY Right     TOTAL SHOULDER ARTHROPLASTY Right     TYMPANOPLASTY Left 06/05/2018    TYMPANOPLASTY Right 02/07/2020    Procedure: TYMPANOPLASTY AND  CARTILAGE GRAFT;  Surgeon: Ramy Gaston MD;  Location: [facility] OR;  Service: ENT;  Laterality: Right;    UPPER GASTROINTESTINAL ENDOSCOPY         Social History     Socioeconomic History    Marital status:    Tobacco Use    Smoking status: Never     Passive exposure: Past    Smokeless tobacco: Never    Tobacco comments:     Never had the desire to try  smoking   Vaping Use    Vaping Use: Never used   Substance and Sexual Activity    Alcohol use: Yes     Alcohol/week: 3.0 standard drinks     Types: 1 Glasses of wine, 2 Drinks containing 0.5 oz of alcohol per week     Comment: per week maybe if that much    Drug use: Yes     Frequency: 5.0 times per week     Types: Marijuana    Sexual activity: Defer       Family History   Problem Relation Age of Onset    Hypertension Mother     Osteoarthritis Mother     Alcohol abuse Father     Heart disease Father     Cancer Father     Osteoarthritis Father     Stroke Maternal Grandmother     Irritable bowel syndrome Sister     Osteoporosis Maternal Grandfather        10 point ROS asked and negative unless otherwise noted in HPI.    Immunization status not specifically asked and therefore not specifically documented.    Voice dictation disclaimer:  Voice dictation was used in the creation of this note.  As such, there may be typos or inappropriate words throughout the document.  The document is proofread for typos and errors, however some may not be caught.      This document has been electronically signed by Marlene Bauer MD on July 26, 2023 12:02 CDT

## 2023-07-29 DIAGNOSIS — M76.31 ILIOTIBIAL BAND SYNDROME, RIGHT: ICD-10-CM

## 2023-07-29 DIAGNOSIS — M54.50 ACUTE BILATERAL LOW BACK PAIN WITHOUT SCIATICA: ICD-10-CM

## 2023-07-29 DIAGNOSIS — M76.32 ILIOTIBIAL BAND SYNDROME, LEFT: ICD-10-CM

## 2023-07-31 RX ORDER — CYCLOBENZAPRINE HCL 5 MG
TABLET ORAL
Qty: 30 TABLET | Refills: 1 | Status: SHIPPED | OUTPATIENT
Start: 2023-07-31

## 2023-08-01 ENCOUNTER — TELEPHONE (OUTPATIENT)
Dept: FAMILY MEDICINE CLINIC | Facility: CLINIC | Age: 68
End: 2023-08-01
Payer: MEDICARE

## 2023-08-03 ENCOUNTER — OFFICE VISIT (OUTPATIENT)
Dept: FAMILY MEDICINE CLINIC | Facility: CLINIC | Age: 68
End: 2023-08-03
Payer: MEDICARE

## 2023-08-03 VITALS
OXYGEN SATURATION: 98 % | HEART RATE: 102 BPM | HEIGHT: 65 IN | SYSTOLIC BLOOD PRESSURE: 124 MMHG | BODY MASS INDEX: 40.44 KG/M2 | WEIGHT: 242.7 LBS | DIASTOLIC BLOOD PRESSURE: 68 MMHG

## 2023-08-03 DIAGNOSIS — I10 ESSENTIAL HYPERTENSION, BENIGN: Primary | Chronic | ICD-10-CM

## 2023-08-03 DIAGNOSIS — E66.01 MORBIDLY OBESE: Chronic | ICD-10-CM

## 2023-08-03 DIAGNOSIS — J45.20 MILD INTERMITTENT ASTHMA, UNSPECIFIED WHETHER COMPLICATED: Chronic | ICD-10-CM

## 2023-08-03 RX ORDER — EPINEPHRINE 0.3 MG/.3ML
0.3 INJECTION SUBCUTANEOUS ONCE
COMMUNITY
Start: 2023-07-26

## 2023-08-16 NOTE — PROGRESS NOTES
Subjective   Chief Complaint   Patient presents with   • Hypertension     follow up        Mikki Joel is a 63 y.o. female who presents for Hypertension (follow up )   Sore Throat    This is a new problem. The current episode started 1 to 4 weeks ago. The problem has been unchanged. The pain is worse on the left side. There has been no fever. The fever has been present for 3 to 4 days. The pain is at a severity of 7/10. Associated symptoms include coughing, headaches, a hoarse voice, neck pain and trouble swallowing. Pertinent negatives include no abdominal pain, congestion, diarrhea, drooling, ear discharge, ear pain, plugged ear sensation, shortness of breath, stridor, swollen glands or vomiting. She has had no exposure to strep or mono.   she is using flonase daily currently.    Migraines:  Having to take imitrex once a week. Often has to take 2. Has migraines about 1x per week. Had tried topamax in the past and it worked well.        The following portions of the patient's history were reviewed and updated as appropriate:problem list, current medications, allergies, past family history, past medical history, past social history and past surgical history    Past Medical History:   Diagnosis Date   • Anemia    • Anemia June 2016    might have been due to knee replacement surgery I had in mar   • Anxiety    • Arthritis    • Asthma    • Depression    • High blood pressure    • [place] (hard of hearing)    • Hypertension 1999    metoprolol 100 mg and triamterene/hydrochlorothiazide 37.5mg   • Migraine        Social History   Substance Use Topics   • Smoking status: Never Smoker   • Smokeless tobacco: Never Used      Comment: Never had the desire to try smoking   • Alcohol use Yes     2 Glasses of wine, 3 Shots of liquor, 2 Standard drinks or equivalent per week      Comment: per month     History   Sexual Activity   • Sexual activity: Yes   • Partners: Male   • Birth control/ protection: Post-menopausal,  Surgical, None     Comment: then tubal ligation after last child at age 32.       Medications:  Outpatient Medications Prior to Visit   Medication Sig Dispense Refill   • azelastine (ASTELIN) 0.1 % nasal spray 2 sprays into each nostril 2 (Two) Times a Day. Use in each nostril as directed 30 mL 11   • busPIRone (BUSPAR) 7.5 MG tablet Take 1 tablet by mouth 2 (Two) Times a Day. 60 tablet 11   • Cholecalciferol (VITAMIN D3) 5000 UNITS capsule capsule Take 5,000 Units by mouth Daily.     • ciprofloxacin-hydrocortisone (CIPRO HC) 0.2-1 % otic suspension Administer 3 drops into both ears 2 (Two) Times a Day. 10 mL 1   • cyclobenzaprine (FLEXERIL) 10 MG tablet Take 1 tablet by mouth 2 (Two) Times a Day As Needed (tension headaches or neck pain). May cause drowsiness 60 tablet 5   • FLUoxetine (PROzac) 40 MG capsule Take 1 capsule by mouth Daily. 30 capsule 11   • ipratropium (ATROVENT) 0.06 % nasal spray 2 sprays into each nostril 3 (Three) Times a Day. 15 mL 5   • meloxicam (MOBIC) 15 MG tablet TAKE 1 TABLET BY MOUTH EVERY DAY 30 tablet 0   • metoprolol succinate XL (TOPROL XL) 100 MG 24 hr tablet Take 1 tablet by mouth Daily. 30 tablet 11   • Multiple Vitamin (MULTI VITAMIN DAILY PO) Take 1 tablet by mouth Daily.     • pantoprazole (PROTONIX) 40 MG EC tablet Take 40 mg by mouth Daily.     • rOPINIRole (REQUIP) 1 MG tablet Take 1 tablet by mouth Every Night. Take 1 hour before bedtime. 30 tablet 11   • triamterene-hydrochlorothiazide (DYAZIDE) 37.5-25 MG per capsule Take 1 capsule by mouth Every Morning. 30 capsule 11   • SUMAtriptan (IMITREX) 25 MG tablet TAKE 1 TABLET BY MOUTH 1 TIME AS NEEDED FOR MIGRAINE 30 tablet 0     No facility-administered medications prior to visit.        Allergies   Allergen Reactions   • Codeine Other (See Comments)     Increases heart rate   • Sulfa Antibiotics Hives       Review of Systems   Constitutional: Negative for fatigue, fever and unexpected weight change.   HENT: Positive for  "hoarse voice, sore throat and trouble swallowing. Negative for congestion, drooling, ear discharge and ear pain.    Eyes: Negative.    Respiratory: Positive for cough. Negative for shortness of breath and stridor.    Cardiovascular: Negative for chest pain, palpitations and leg swelling.   Gastrointestinal: Negative for abdominal pain, constipation, diarrhea, nausea and vomiting.   Endocrine: Negative.    Genitourinary: Negative.    Musculoskeletal: Positive for neck pain.   Skin: Negative.    Neurological: Positive for headaches.   Psychiatric/Behavioral: Negative for dysphoric mood and sleep disturbance.       Objective   Visit Vitals   • /88 (BP Location: Left arm, Patient Position: Sitting, Cuff Size: Large Adult)   • Pulse 88   • Ht 170.2 cm (67.01\")   • Wt 99.6 kg (219 lb 8 oz)   • SpO2 96%   • BMI 34.37 kg/m2       Physical Exam   Constitutional: She is oriented to person, place, and time. She appears well-developed and well-nourished. No distress.   HENT:   Head: Normocephalic.   Nose: Mucosal edema present.   Mouth/Throat: Uvula is midline, oropharynx is clear and moist and mucous membranes are normal. No oropharyngeal exudate or posterior oropharyngeal erythema.   Eyes: Conjunctivae and EOM are normal. Right eye exhibits no discharge. Left eye exhibits no discharge.   Neck: Normal range of motion.   Cardiovascular: Normal rate, regular rhythm and normal heart sounds.  Exam reveals no gallop and no friction rub.    No murmur heard.  Pulmonary/Chest: Effort normal and breath sounds normal. She has no wheezes. She has no rales.   Musculoskeletal: She exhibits no edema.   Neurological: She is alert and oriented to person, place, and time. No cranial nerve deficit.   Skin: She is not diaphoretic.   Psychiatric: She has a normal mood and affect. Her behavior is normal.   Nursing note and vitals reviewed.      Assessment/Plan   Mikki Joel is a 63 y.o. female seen today for the following:     " Diagnosis Plan   1. Migraine without status migrainosus, not intractable, unspecified migraine type  Topiramate ER 25 MG capsule sustained-release 24 hr    SUMAtriptan (IMITREX) 25 MG tablet    T4, Free    TSH   2. Essential hypertension  Lipid Panel   3. Preventative health care  Lipid Panel   4. Chronic allergic rhinitis, unspecified seasonality, unspecified trigger  cetirizine (ZYRTEC ALLERGY) 10 MG tablet     Start trokendi. Risks, benefits and possible side effects of medication discussed. Patient voiced understanding.  Follow up in 1 month if still having frequent migraines  Check labs today  Start zyrtec. Continue flonase    Follow up: Return in about 3 months (around 4/30/2018) for establish new pcp, Recheck.          This document has been electronically signed by Katheryn Corrigan DO on January 30, 2018 1:36 PM         Yes

## 2023-08-23 DIAGNOSIS — M76.31 ILIOTIBIAL BAND SYNDROME, RIGHT: ICD-10-CM

## 2023-08-23 DIAGNOSIS — M54.50 ACUTE BILATERAL LOW BACK PAIN WITHOUT SCIATICA: ICD-10-CM

## 2023-08-23 DIAGNOSIS — M76.32 ILIOTIBIAL BAND SYNDROME, LEFT: ICD-10-CM

## 2023-08-24 RX ORDER — CYCLOBENZAPRINE HCL 5 MG
TABLET ORAL
Qty: 30 TABLET | Refills: 1 | Status: SHIPPED | OUTPATIENT
Start: 2023-08-24

## 2023-08-30 ENCOUNTER — PROCEDURE VISIT (OUTPATIENT)
Dept: OTOLARYNGOLOGY | Facility: CLINIC | Age: 68
End: 2023-08-30
Payer: MEDICARE

## 2023-08-30 ENCOUNTER — LAB (OUTPATIENT)
Dept: LAB | Facility: HOSPITAL | Age: 68
End: 2023-08-30
Payer: MEDICARE

## 2023-08-30 VITALS — HEART RATE: 107 BPM | HEIGHT: 65 IN | OXYGEN SATURATION: 97 % | BODY MASS INDEX: 40.39 KG/M2

## 2023-08-30 DIAGNOSIS — J30.89 SEASONAL ALLERGIC RHINITIS DUE TO OTHER ALLERGIC TRIGGER: ICD-10-CM

## 2023-08-30 DIAGNOSIS — Z01.82 ENCOUNTER FOR ALLERGY TESTING: Primary | ICD-10-CM

## 2023-08-30 PROCEDURE — 86003 ALLG SPEC IGE CRUDE XTRC EA: CPT

## 2023-08-30 PROCEDURE — 36415 COLL VENOUS BLD VENIPUNCTURE: CPT

## 2023-08-30 NOTE — PROGRESS NOTES
Procedure: Cutaneous Allergy Testing  Indications: Allergic rhinitis refractory to medical management  Surgeon: Marlene Bauer MD  EBL: minimal  Anesthesia: none  Complications: None     Findings:  It was confirmed that the patient had her epinephrine autoinjector that was not  and present today.  It was confirmed that the patient is no longer taking medications that can interact with epinephrine function or impede allergy testing     Results:  Positive control: Reactive 5 mm wheal with flare  Negative control: non-reactive     Dog, Cat, red mulberry, red cedar, white adi, eastern oak, white birch, eastern cottonwood, Khang grass, Bermuda grass, mixed ragweed, english plantain, lambs quarter, Alternaria, Rhodotorula, Aspergillus, Mucor, and mixed dust mite were non-reactive.       Description:  After informed consent was obtained from the patient and documented in the chart, the bilateral forearms were prepped with 70% isopropyl alcohol and marked with the appropriate corresponding skin prick allergen or control.  After period of 20 minutes the results were checked and measured using the OneWheel device, demonstrating the above results.  The forearms were again gently cleansed.  This concluded the procedure.  The patient tolerated the procedure well and without known complication.     Disposition:  Not allergic to any occluded allergens.  Resume allergy medications, we discussed Rast testing to evaluate for untested allergens versus continued medication management, the patient would like to undergo additional testing to see if she has a known allergen for which she can undergo immunotherapy.  I will have her follow-up after RAST testing to discuss the results.

## 2023-09-05 LAB
A ALTERNATA IGE QN: <0.1 KU/L
BAHIA GRASS IGE QN: <0.1 KU/L
BERMUDA GRASS IGE QN: <0.1 KU/L
C HERBARUM IGE QN: <0.1 KU/L
CONV CLASS DESCRIPTION: NORMAL
JOHNSON GRASS IGE QN: <0.1 KU/L
KENT BLUE GRASS IGE QN: <0.1 KU/L
M RACEMOSUS IGE QN: <0.1 KU/L
MEADOW FESCUE IGE QN: <0.1 KU/L
PER RYE GRASS IGE QN: <0.1 KU/L
TIMOTHY IGE QN: <0.1 KU/L

## 2023-09-10 DIAGNOSIS — E78.2 MIXED HYPERLIPIDEMIA: Chronic | ICD-10-CM

## 2023-09-11 RX ORDER — ATORVASTATIN CALCIUM 10 MG/1
10 TABLET, FILM COATED ORAL DAILY
Qty: 90 TABLET | Refills: 3 | Status: SHIPPED | OUTPATIENT
Start: 2023-09-11

## 2023-09-14 DIAGNOSIS — I10 ESSENTIAL HYPERTENSION: ICD-10-CM

## 2023-09-14 RX ORDER — METOPROLOL SUCCINATE 100 MG/1
100 TABLET, EXTENDED RELEASE ORAL NIGHTLY
Qty: 90 TABLET | Refills: 3 | OUTPATIENT
Start: 2023-09-14

## 2023-09-18 DIAGNOSIS — M54.50 ACUTE BILATERAL LOW BACK PAIN WITHOUT SCIATICA: ICD-10-CM

## 2023-09-18 DIAGNOSIS — M76.32 ILIOTIBIAL BAND SYNDROME, LEFT: ICD-10-CM

## 2023-09-18 DIAGNOSIS — M76.31 ILIOTIBIAL BAND SYNDROME, RIGHT: ICD-10-CM

## 2023-09-18 RX ORDER — CYCLOBENZAPRINE HCL 5 MG
TABLET ORAL
Qty: 30 TABLET | Refills: 1 | Status: SHIPPED | OUTPATIENT
Start: 2023-09-18

## 2023-09-20 ENCOUNTER — OFFICE VISIT (OUTPATIENT)
Dept: OTOLARYNGOLOGY | Facility: CLINIC | Age: 68
End: 2023-09-20
Payer: MEDICARE

## 2023-09-20 VITALS — OXYGEN SATURATION: 97 % | HEIGHT: 65 IN | WEIGHT: 242 LBS | BODY MASS INDEX: 40.32 KG/M2 | HEART RATE: 97 BPM

## 2023-09-20 DIAGNOSIS — J32.4 CHRONIC PANSINUSITIS: ICD-10-CM

## 2023-09-20 DIAGNOSIS — J30.89 SEASONAL ALLERGIC RHINITIS DUE TO OTHER ALLERGIC TRIGGER: Primary | ICD-10-CM

## 2023-09-20 RX ORDER — AZELASTINE 1 MG/ML
2 SPRAY, METERED NASAL 2 TIMES DAILY
Qty: 90 ML | Refills: 3 | Status: SHIPPED | OUTPATIENT
Start: 2023-09-20

## 2023-09-20 NOTE — PROGRESS NOTES
Follow up Regarding: Chronic sinusitis    Assessment and Plan:  68-year-old female with chronic sinusitis and allergic rhinitis, sinusitis controlled today, allergies uncontrolled-persistent itching despite maximal medical therapy.    -Continue BID budesonide irrigations   -Add Astelin as directed  -We discussed that she can use Pataday, and over-the-counter ophthalmic antihistamine as needed or routine once daily  -We discussed that 1-2 sinus infections per year, or 1 and a 6-month.  Is okay but more than that I would like to see her  -Follow-up in 6 months to recheck    History of present illness/Interval history:    Ms. Diaz is a 68-year-old female with allergic rhinitis and chronic sinusitis presenting today for reevaluation.  She states that she has been doing mupirocin and budesonide irrigations twice daily and is taking an oral antihistamine.  She notes good control with mild intermittent bad smell but now significant crusting or drainage from the nose.  Sense of smell and taste remain intact.  She notes persistent general itching if she is outside for moderate or long periods.  And significant itching of the eyes and itching of the nose despite maximal medication treatment of allergies.  She has not had a will sinus infection since she was last seen.  She has no other acute or new concerns today.    Physical Exam:  General: NAD, awake and alert  Head: normocephalic, atraumatic  Eyes: EOMI, sclerae white, conjunctivae pink  Ears: pinnae intact without masses or lesions, bilateral hearing aids in place  Nose: external straight without deformity  See endoscopy note for details  OC/OP: mucosa moist and pink,  Neuro: no focal deficits    Procedure: Rigid Nasal Endoscopy  Indications: Chronic sinusitis  Anesthesia: Local  Surgeon: Marlene Bauer MD  Estimated Blood Loss: none  Complications: none     Description:  Rigid nasal endoscopy - 0 degree endoscope(s)  Informed consent was verbally obtained.  The nose  was decongested with topical Neosynephrine and anesthetized with 4% lidocaine solution.  The endoscope was then placed into bilateral naris and advanced to the nasopharynx.  The endoscope was removed and advanced into the middle meatus. The endoscope was finally withdrawn at the conclusion of the exam.  Findings are listed below:     Nasal cavity:  No masses or lesions  Middle meatus: No polyps.  Uncinate persistent right greater than left. Ethmoids partially absent bilaterally, no crusting or purulence  Mucosa:   All operated sinuses appear patent, mild edema within the left middle meatus without polyps.  Turbinates: Middle normal.  Inferior normal and decongests nicely   Septum: Midline there is a small anterior inferior perforation without crusting  Nasopharynx: Normal adenoid pad and normal Eustachian tube orifices    Vital Signs   Vitals:    09/20/23 1300   Pulse: 97   SpO2: 97%     Results Review:  Previous clinic visit demonstrated persistent good control of chronic sinusitis but aggressive allergy symptoms, budesonide irrigations twice daily were recommended and allergy testing was recommended.  This was performed on 8/30/2023 patient was nonreactive to any of the tested substances.  Recommendations at that time were to continue her irrigations and follow-up for routine management.  Additional RAST testing was ordered, 1 out of 3 panels has returned and none of those substances were reactive.  Histories:  Allergies   Allergen Reactions    Sulfa Antibiotics Hives    Codeine Other (See Comments) and Rash     Increases heart rate       Prior to Admission medications    Medication Sig Start Date End Date Taking? Authorizing Provider   dexlansoprazole (Dexilant) 60 MG capsule Take 1 capsule by mouth Daily. 3/22/23   Zelda Rich APRN   albuterol sulfate  (90 Base) MCG/ACT inhaler Inhale 2 puffs Every 4 (Four) Hours As Needed for Wheezing.    Provider, MD Lucía   atorvastatin (Lipitor) 10 MG tablet  Take 1 tablet by mouth Daily. 10/18/22   Ronald Bowser MD   cetirizine (zyrTEC) 10 MG tablet TAKE 1 TABLET BY MOUTH DAILY 1/23/23   Ronald Bowser MD   chlorthalidone (HYGROTON) 25 MG tablet TAKE 1 TABLET BY MOUTH DAILY 10/19/22   Ronald Bowser MD   cyclobenzaprine (FLEXERIL) 5 MG tablet TAKE 1 TABLET BY MOUTH THREE TIMES DAILY AS NEEDED FOR MUSCLE SPASMS 4/20/23   Ronald Bowser MD   FeroSul 325 (65 Fe) MG tablet TAKE 1 TABLET BY MOUTH DAILY WITH BREAKFAST 2/3/23   Mila Lang APRN   FLUoxetine (PROzac) 40 MG capsule TAKE 1 CAPSULE BY MOUTH DAILY 3/20/23   Ronald Bowser MD   gabapentin (NEURONTIN) 600 MG tablet Take 2 tablets by mouth Every Night. For Restless Leg Syndrome.  Take 30 min to three hours before bedtime. 3/31/23   Jimmy Kelly II, MD   hydrOXYzine pamoate (VISTARIL) 50 MG capsule TAKE 1 CAPSULE BY MOUTH THREE TIMES DAILY AS NEEDED FOR ITCHING OR SLEEP 1/27/23   Ronald Bowser MD   ipratropium (ATROVENT) 0.06 % nasal spray 2 sprays into the nostril(s) as directed by provider 2 (Two) Times a Day. 10/18/22   Ronald Bowser MD   linaclotide (Linzess) 145 MCG capsule capsule Take 1 capsule by mouth Every Morning Before Breakfast. 11/4/22   Zelda Rich APRN   metoprolol succinate XL (TOPROL-XL) 100 MG 24 hr tablet TAKE 1 TABLET BY MOUTH EVERY NIGHT 10/31/22   Ronald Bowser MD   nabumetone (RELAFEN) 750 MG tablet TAKE 1 TABLET BY MOUTH TWICE DAILY 3/20/23   Brando Brice DPM   olopatadine (PATANASE) 0.6 % solution nasal solution  10/25/21   ProviderLucía MD   ondansetron (ZOFRAN) 8 MG tablet Take 1 tablet by mouth Every 8 (Eight) Hours As Needed for Nausea or Vomiting. 3/13/23   Zelda Rich APRN   ramelteon (ROZEREM) 8 MG tablet TAKE 1 TABLET BY MOUTH EVERY EVENING UP TO 3 HOURS BEFORE BEDTIME 2/26/23   Jimmy Kelly II, MD   sucralfate (CARAFATE) 1 g tablet Take 1 tablet by mouth 4 (Four) Times a Day As Needed (Reflux). 10/28/20   Zelda Rich, MIRIAM       Past  Medical History:   Diagnosis Date    Allergic rhinitis All my life    chronic infection    Anxiety     Asthma     Chronic anemia     Chronic pain     CTS (carpal tunnel syndrome)     Deaf     Depression with anxiety     Fracture of wrist     Gastroesophageal reflux disease     Hypertension     Migraine     Obesity     Osteoarthritis     Perforated tympanic membrane, bilateral     Plantar fasciitis     PONV (postoperative nausea and vomiting)     Sleep apnea     sleep with c-pap    Tinnitus        Past Surgical History:   Procedure Laterality Date    ADENOIDECTOMY  1959    COLONOSCOPY N/A 01/19/2018    COLONOSCOPY N/A 06/12/2023    Procedure: COLONOSCOPY;  Surgeon: Ravi Cavanaugh MD;  Location: [facility] ENDOSCOPY;  Service: Gastroenterology;  Laterality: N/A;    EAR TUBES      ENDOSCOPIC FUNCTIONAL SINUS SURGERY (FESS) Bilateral 09/05/2017    ENDOSCOPY N/A 01/19/2018    ENDOSCOPY N/A 06/10/2019    EYE SURGERY  07/17/2023    Cataract surgery right eye    KNEE ARTHROPLASTY Right     LAPAROSCOPIC TUBAL LIGATION      RHINOPLASTY      SINUS SURGERY  1988, 2002, 2006, 2018    TOE FUSION Left 08/12/2021    Procedure: LEFT FIRST METATARSOPHALANGEAL JOINT ARTHRODESIS;  Surgeon: Brando Brice DPM;  Location: [facility] OR;  Service: Podiatry;  Laterality: Left;    TONSILLECTOMY  1959    TOTAL KNEE ARTHROPLASTY Right     TOTAL SHOULDER ARTHROPLASTY Right     TOTAL SHOULDER ARTHROPLASTY Right     TYMPANOPLASTY Left 06/05/2018    TYMPANOPLASTY Right 02/07/2020    Procedure: TYMPANOPLASTY AND  CARTILAGE GRAFT;  Surgeon: Ramy Gaston MD;  Location: [facility] OR;  Service: ENT;  Laterality: Right;    UPPER GASTROINTESTINAL ENDOSCOPY         Social History     Socioeconomic History    Marital status:    Tobacco Use    Smoking status: Never     Passive exposure: Past    Smokeless tobacco: Never    Tobacco comments:     Never had the desire to try smoking   Vaping Use    Vaping Use: Never used   Substance and Sexual Activity     Alcohol use: Yes     Alcohol/week: 3.0 standard drinks     Types: 1 Glasses of wine, 2 Drinks containing 0.5 oz of alcohol per week     Comment: per monthly maybe    Drug use: Yes     Frequency: 5.0 times per week     Types: Marijuana     Comment: I use for my insomnia and AO pain.    Sexual activity: Defer       Family History   Problem Relation Age of Onset    Hypertension Mother     Osteoarthritis Mother     Alcohol abuse Father     Heart disease Father     Cancer Father         Lung cancer from smoking    Osteoarthritis Father     Stroke Maternal Grandmother     Irritable bowel syndrome Sister     Osteoporosis Maternal Grandfather        10 point ROS asked and negative unless otherwise noted in HPI.    Immunization status not specifically asked and therefore not specifically documented.    Voice dictation disclaimer:  Voice dictation was used in the creation of this note.  As such, there may be typos or inappropriate words throughout the document.  The document is proofread for typos and errors, however some may not be caught.      This document has been electronically signed by Marlene Bauer MD on September 20, 2023 12:44 CDT

## (undated) DEVICE — CANN SMPL SOFTECH BIFLO ETCO2 A/M 7FT

## (undated) DEVICE — SUT ETHLN 4/0 PS2 18IN 1667H

## (undated) DEVICE — MARKR SKIN W/RULR AND LBL

## (undated) DEVICE — GLV SURG SENSICARE PI PF LF 7 GRN STRL

## (undated) DEVICE — PIN BB TAK MTP

## (undated) DEVICE — PIN BB TAK MTP THRD

## (undated) DEVICE — SOL IRR NACL 0.9PCT BT 1000ML

## (undated) DEVICE — CATH IV ANGIOCATH FEP 14GA 1.88IN ORNG

## (undated) DEVICE — STERILE POLYISOPRENE POWDER-FREE SURGICAL GLOVES WITH EMOLLIENT COATING: Brand: PROTEXIS

## (undated) DEVICE — TP SXN YANKR BLB TIP W/TBG 10F LF STRL

## (undated) DEVICE — ANTIBACTERIAL UNDYED BRAIDED (POLYGLACTIN 910), SYNTHETIC ABSORBABLE SUTURE: Brand: COATED VICRYL

## (undated) DEVICE — TRY IRR

## (undated) DEVICE — NDL SPINE 25G 4 11/16 BLU

## (undated) DEVICE — GLV SURG TRIUMPH ORTHO W/ALOE PF LTX 7.5 STRL

## (undated) DEVICE — CATH IV CATHLON FEP POLY NON/RO STR 14G 2IN CLR

## (undated) DEVICE — PK POD 60

## (undated) DEVICE — COVER,MAYO STAND,STERILE: Brand: MEDLINE

## (undated) DEVICE — DEFOGGER!" ANTI FOG KIT: Brand: DEROYAL

## (undated) DEVICE — PK MAJ PROC LF 60

## (undated) DEVICE — SUT GUT CHRM 5/0 RB1 27IN U202H

## (undated) DEVICE — PREP PVP-I 7.5P BT 4OZ

## (undated) DEVICE — ADHS LIQ MASTISOL 2/3ML

## (undated) DEVICE — NDL HYPO ECLPS SFTY 25G 1 1/2IN

## (undated) DEVICE — GLV SURG TRIUMPH LT PF LTX 7.5 STRL

## (undated) DEVICE — 10CC: Brand: LOCKING SYRINGE

## (undated) DEVICE — SPNG GZ WOVN 4X4IN 12PLY 10/BX STRL

## (undated) DEVICE — CODMAN® SURGICAL PATTIES 1/2" X 3" (1.27CM X 7.62CM): Brand: CODMAN®

## (undated) DEVICE — CVR C/ARM MINI

## (undated) DEVICE — BANDAGE,GAUZE,BULKEE II,4.5"X4.1YD,STRL: Brand: MEDLINE

## (undated) DEVICE — GLV SURG SENSICARE GREEN W/ALOE PF LF 6.5 STRL

## (undated) DEVICE — SYR LL TP 10ML STRL

## (undated) DEVICE — SUT PDS 4-0 RB-1 Z304H

## (undated) DEVICE — SOL IRRIG NACL 1000ML

## (undated) DEVICE — DRP MICROSCP GLOBAL STRL

## (undated) DEVICE — SUT VIC 4/0 RB1 27IN J214H

## (undated) DEVICE — BITEBLOCK ENDO W/STRAP 60F A/ LF DISP

## (undated) DEVICE — CONTAINER,SPECIMEN,OR STERILE,4OZ: Brand: MEDLINE

## (undated) DEVICE — PENCL ES MEGADINE EZ/CLEAN BUTN W/HOLSTR 10FT

## (undated) DEVICE — Device

## (undated) DEVICE — GLV SURG TRIUMPH ORTHO W/ALOE PF LTX 6.5 STRL

## (undated) DEVICE — GOWN,AURORA,NOREINF,RAGLAN,XL,STERILE: Brand: MEDLINE

## (undated) DEVICE — UNDRPD BREATH 23X36 BG/10

## (undated) DEVICE — PK ENT LF 60

## (undated) DEVICE — FLOSEAL MATRIX IS INDICATED IN SURGICAL PROCEDURES (OTHER THAN IN OPHTHALMIC) AS AN ADJUNCT TO HEMOSTASIS WHEN CONTROL OF BLEEDING BY LIGATURE OR CONVENTIONAL PROCEDURES IS INEFFECTIVE OR IMPRACTICAL.: Brand: FLOSEAL HEMOSTATIC MATRIX

## (undated) DEVICE — GLV SURG SENSICARE GREEN W/ALOE PF LF 8 STRL

## (undated) DEVICE — ELECTRD BLD EZ CLN MOD 2.5IN

## (undated) DEVICE — BLD BEAVR MINI RND/TIP GRN

## (undated) DEVICE — POSTN ELEV LEG PROCARE FM UNIV 45DEG 31.5X10IN

## (undated) DEVICE — GOWN,PREVENTION PLUS,XLNG/XXLARGE,STRL: Brand: MEDLINE

## (undated) DEVICE — GLV SURG SENSICARE SLT PF LF 7.5 STRL

## (undated) DEVICE — SPNG LAP 18X18IN LF STRL PK/5

## (undated) DEVICE — STERILE COTTON BALLS LARGE 5/P: Brand: MEDLINE

## (undated) DEVICE — PATIENT RETURN ELECTRODE, SINGLE-USE, CONTACT QUALITY MONITORING, ADULT, WITH 9FT CORD, FOR PATIENTS WEIGING OVER 33LBS. (15KG): Brand: MEGADYNE

## (undated) DEVICE — NDL HYPO PRECISIONGLIDE/REG 18G 1IN PNK

## (undated) DEVICE — SUT PLAIN 1 4/0 1828H

## (undated) DEVICE — GLV SURG DERMASSURE GRN LF PF SZ 6.5

## (undated) DEVICE — SMALL TEAR CROSS CUT RASP (11.0 X 5.0MM)

## (undated) DEVICE — SPONGE,DRAIN,NONWVN,4"X4",6PLY,STRL,LF: Brand: MEDLINE

## (undated) DEVICE — DRSNG TELFA PAD NONADH STR 1S 3X4IN

## (undated) DEVICE — DRAPE,EENT,SPLIT,STERILE: Brand: MEDLINE

## (undated) DEVICE — SUT VIC 4/0 P3 18IN UD VCP494G

## (undated) DEVICE — TBG DECLOG DIEGO ELITE MULTIDEBRIDER ST

## (undated) DEVICE — FLEXIBLE ADHESIVE BANDAGE: Brand: CURITY

## (undated) DEVICE — GLV SURG SIGNATURE ESSENTIAL PF LTX SZ7

## (undated) DEVICE — SUT VIC 3/0 RB1 27IN J215H

## (undated) DEVICE — GLV SURG SENSICARE GREEN W/ALOE PF LF 6 STRL

## (undated) DEVICE — GLV SURG SENSICARE PI LF PF 8 GRN STRL

## (undated) DEVICE — SHEET,DRAPE,53X77,STERILE: Brand: MEDLINE

## (undated) DEVICE — PREP SOL POVIDONE/IODINE BT 4OZ

## (undated) DEVICE — IMPLANTABLE DEVICE
Type: IMPLANTABLE DEVICE | Status: NON-FUNCTIONAL
Removed: 2021-08-12

## (undated) DEVICE — GLV SURG SENSICARE POLYISPRN W/ALOE PF LF 6.5 GRN STRL

## (undated) DEVICE — COAGULATOR SXN HNDSWITCH 10F16IN

## (undated) DEVICE — BIT DRL FOR USE W LOCK SCRW3MM 2.2MM

## (undated) DEVICE — SOL IRR H2O BTL 1000ML STRL

## (undated) DEVICE — POSTN HD RING CUSH 9IN LF

## (undated) DEVICE — BLD BIPOL DIEGO SMR STR STD TYPE A 2MM

## (undated) DEVICE — GOWN,ECLIPSE,FABRIC-REINFORCED,X-LARGE: Brand: MEDLINE

## (undated) DEVICE — 1010 S-DRAPE TOWEL DRAPE 10/BX: Brand: STERI-DRAPE™

## (undated) DEVICE — SHEET, DRAPE, SPLIT, STERILE: Brand: MEDLINE

## (undated) DEVICE — #1020 STERI DRAPE 41MM X 41MM SMALL: Brand: STERI-DRAPE™

## (undated) DEVICE — GLV SURG TRIUMPH LT PF LTX 8 STRL

## (undated) DEVICE — SINGLE-USE BIOPSY FORCEPS: Brand: RADIAL JAW 4

## (undated) DEVICE — BNDG ELAS CO-FLEX SLF ADHR 4IN5YD LF STRL

## (undated) DEVICE — WIPE 3.5X3.5

## (undated) DEVICE — DISPOSABLE TOURNIQUET CUFF SINGLE BLADDER, DUAL PORT AND QUICK CONNECT CONNECTOR: Brand: COLOR CUFF